# Patient Record
Sex: FEMALE | Race: WHITE | NOT HISPANIC OR LATINO | Employment: OTHER | URBAN - METROPOLITAN AREA
[De-identification: names, ages, dates, MRNs, and addresses within clinical notes are randomized per-mention and may not be internally consistent; named-entity substitution may affect disease eponyms.]

---

## 2017-05-08 ENCOUNTER — OFFICE VISIT (OUTPATIENT)
Dept: UROLOGY | Facility: CLINIC | Age: 67
End: 2017-05-08
Payer: MEDICARE

## 2017-05-08 VITALS
DIASTOLIC BLOOD PRESSURE: 53 MMHG | SYSTOLIC BLOOD PRESSURE: 99 MMHG | TEMPERATURE: 99 F | BODY MASS INDEX: 43.41 KG/M2 | HEIGHT: 63 IN | WEIGHT: 245 LBS | HEART RATE: 69 BPM

## 2017-05-08 DIAGNOSIS — N39.0 URINARY TRACT INFECTION WITHOUT HEMATURIA, SITE UNSPECIFIED: Primary | ICD-10-CM

## 2017-05-08 DIAGNOSIS — R39.15 URINARY URGENCY: ICD-10-CM

## 2017-05-08 DIAGNOSIS — N39.41 URGE INCONTINENCE OF URINE: ICD-10-CM

## 2017-05-08 LAB
BILIRUB SERPL-MCNC: ABNORMAL MG/DL
BLOOD URINE, POC: ABNORMAL
COLOR, POC UA: ABNORMAL
GLUCOSE UR QL STRIP: ABNORMAL
KETONES UR QL STRIP: ABNORMAL
LEUKOCYTE ESTERASE URINE, POC: ABNORMAL
NITRITE, POC UA: ABNORMAL
PH, POC UA: 6
PROTEIN, POC: ABNORMAL
SPECIFIC GRAVITY, POC UA: 1010
UROBILINOGEN, POC UA: ABNORMAL

## 2017-05-08 PROCEDURE — 81001 URINALYSIS AUTO W/SCOPE: CPT | Mod: S$GLB,,, | Performed by: NURSE PRACTITIONER

## 2017-05-08 PROCEDURE — 1160F RVW MEDS BY RX/DR IN RCRD: CPT | Mod: S$GLB,,, | Performed by: NURSE PRACTITIONER

## 2017-05-08 PROCEDURE — 99999 PR PBB SHADOW E&M-NEW PATIENT-LVL V: CPT | Mod: PBBFAC,,, | Performed by: NURSE PRACTITIONER

## 2017-05-08 PROCEDURE — 1159F MED LIST DOCD IN RCRD: CPT | Mod: S$GLB,,, | Performed by: NURSE PRACTITIONER

## 2017-05-08 PROCEDURE — 87077 CULTURE AEROBIC IDENTIFY: CPT

## 2017-05-08 PROCEDURE — 99204 OFFICE O/P NEW MOD 45 MIN: CPT | Mod: 25,S$GLB,, | Performed by: NURSE PRACTITIONER

## 2017-05-08 PROCEDURE — 1125F AMNT PAIN NOTED PAIN PRSNT: CPT | Mod: S$GLB,,, | Performed by: NURSE PRACTITIONER

## 2017-05-08 PROCEDURE — 87086 URINE CULTURE/COLONY COUNT: CPT

## 2017-05-08 PROCEDURE — 87088 URINE BACTERIA CULTURE: CPT

## 2017-05-08 PROCEDURE — 87186 SC STD MICRODIL/AGAR DIL: CPT

## 2017-05-08 RX ORDER — LOSARTAN POTASSIUM AND HYDROCHLOROTHIAZIDE 12.5; 5 MG/1; MG/1
1 TABLET ORAL DAILY
COMMUNITY
Start: 2017-02-13 | End: 2019-12-26

## 2017-05-08 RX ORDER — INSULIN DETEMIR 100 [IU]/ML
65 INJECTION, SOLUTION SUBCUTANEOUS 2 TIMES DAILY
COMMUNITY
Start: 2017-03-10 | End: 2019-12-16 | Stop reason: SDUPTHER

## 2017-05-08 RX ORDER — AMOXICILLIN 500 MG/1
CAPSULE ORAL
COMMUNITY
Start: 2017-02-22 | End: 2017-05-08

## 2017-05-08 RX ORDER — AMOXICILLIN 250 MG/1
CAPSULE ORAL
COMMUNITY
Start: 2017-02-17 | End: 2017-05-08

## 2017-05-08 RX ORDER — HYDROCODONE BITARTRATE AND ACETAMINOPHEN 5; 325 MG/1; MG/1
TABLET ORAL
COMMUNITY
Start: 2017-02-17 | End: 2017-11-16

## 2017-05-08 RX ORDER — FUROSEMIDE 20 MG/1
20 TABLET ORAL
Status: ON HOLD | COMMUNITY
Start: 2017-03-02 | End: 2019-08-07

## 2017-05-08 RX ORDER — FLUOXETINE HYDROCHLORIDE 40 MG/1
40 CAPSULE ORAL DAILY
COMMUNITY
Start: 2017-02-13 | End: 2018-08-07 | Stop reason: DRUGHIGH

## 2017-05-08 RX ORDER — LAMOTRIGINE 100 MG/1
TABLET ORAL
COMMUNITY
Start: 2017-02-16 | End: 2018-04-03 | Stop reason: DRUGHIGH

## 2017-05-08 RX ORDER — LEVOTHYROXINE SODIUM 150 UG/1
150 TABLET ORAL
COMMUNITY
Start: 2017-03-07 | End: 2019-12-26 | Stop reason: SDUPTHER

## 2017-05-08 RX ORDER — TRAZODONE HYDROCHLORIDE 150 MG/1
150 TABLET ORAL NIGHTLY
COMMUNITY
Start: 2017-02-21 | End: 2019-07-29 | Stop reason: CLARIF

## 2017-05-08 RX ORDER — PANTOPRAZOLE SODIUM 40 MG/1
40 TABLET, DELAYED RELEASE ORAL DAILY
COMMUNITY
Start: 2017-02-16 | End: 2019-12-26

## 2017-05-08 RX ORDER — GABAPENTIN 800 MG/1
800 TABLET ORAL DAILY
COMMUNITY
Start: 2017-03-10 | End: 2019-12-26 | Stop reason: SDUPTHER

## 2017-05-08 RX ORDER — DOXYCYCLINE HYCLATE 100 MG
TABLET ORAL
COMMUNITY
Start: 2017-02-23 | End: 2017-05-12

## 2017-05-08 RX ORDER — CALCITRIOL 0.5 UG/1
0.5 CAPSULE ORAL DAILY
COMMUNITY
Start: 2017-03-13 | End: 2020-01-02 | Stop reason: SDUPTHER

## 2017-05-08 RX ORDER — INSULIN ASPART 100 [IU]/ML
INJECTION, SOLUTION INTRAVENOUS; SUBCUTANEOUS
COMMUNITY
Start: 2017-02-10 | End: 2019-12-26 | Stop reason: SDUPTHER

## 2017-05-08 RX ORDER — CHLORHEXIDINE GLUCONATE ORAL RINSE 1.2 MG/ML
SOLUTION DENTAL
COMMUNITY
Start: 2017-03-08 | End: 2017-11-16

## 2017-05-08 RX ORDER — CLONAZEPAM 1 MG/1
TABLET ORAL
COMMUNITY
Start: 2017-02-10 | End: 2018-04-03 | Stop reason: DRUGHIGH

## 2017-05-08 RX ORDER — MECLIZINE HYDROCHLORIDE 25 MG/1
TABLET ORAL 3 TIMES DAILY PRN
COMMUNITY
Start: 2017-03-02 | End: 2018-12-26 | Stop reason: CLARIF

## 2017-05-08 RX ORDER — LINACLOTIDE 145 UG/1
290 CAPSULE, GELATIN COATED ORAL DAILY
COMMUNITY
Start: 2017-03-08 | End: 2019-12-26

## 2017-05-08 RX ORDER — OXYCODONE HYDROCHLORIDE 10 MG/1
10 TABLET ORAL EVERY 8 HOURS PRN
Status: ON HOLD | COMMUNITY
Start: 2017-03-07 | End: 2018-09-19 | Stop reason: HOSPADM

## 2017-05-08 RX ORDER — UMECLIDINIUM BROMIDE AND VILANTEROL TRIFENATATE 62.5; 25 UG/1; UG/1
2 POWDER RESPIRATORY (INHALATION) DAILY
COMMUNITY
Start: 2017-03-09 | End: 2020-04-07 | Stop reason: SDUPTHER

## 2017-05-08 RX ORDER — FLUOXETINE HYDROCHLORIDE 20 MG/1
CAPSULE ORAL
COMMUNITY
Start: 2017-02-20 | End: 2018-04-03 | Stop reason: DRUGHIGH

## 2017-05-08 RX ORDER — ROPINIROLE 4 MG/1
4 TABLET, FILM COATED ORAL 2 TIMES DAILY
COMMUNITY
Start: 2017-03-14 | End: 2019-12-26

## 2017-05-08 RX ORDER — METHOCARBAMOL 500 MG/1
500 TABLET, FILM COATED ORAL 3 TIMES DAILY
COMMUNITY
Start: 2017-03-08 | End: 2019-02-06

## 2017-05-08 NOTE — PROGRESS NOTES
[facility] Note  Staff: STEPHIE MariaP-C    PCP: Dr. Jesus Melendez    Chief Complaint: Urinary Tract infection, urinary frequency, urinary incontinence.    Subjective:        HPI: Alanis Mendieta is a 66 y.o. female new patient with Urology presents with recent history of urinary tract infection, which pt states was +E.Coli. (No documentation upon arrival today for review)    Pt currently resides at [facility] and is alone today during office visit but is able to answer questions related to her medical history.    Questions asked pt during office visit today:  DTF: Yes, NTF: 3 x night  Urgency:Yes, incontinence with urgency? Yes; bothersome Yes; .  Incontinence with laughing or straining: Yes;   If yes, how many pads/day? Yes, wears pads and depends  Feel a bulge?No  G2, P 2, vaginal deliveries with no complications  Gross HematuriaNo  History of UTI: No  Sexually active?: No    REVIEW OF SYSTEMS:  Review of Systems   Constitutional: Negative for chills, diaphoresis, fever and weight loss.   HENT: Negative for congestion, hearing loss, nosebleeds and sore throat.    Eyes: Negative for blurred vision and pain.   Respiratory: Negative for cough and wheezing.    Cardiovascular: Negative for chest pain, palpitations and leg swelling.   Gastrointestinal: Negative for abdominal pain, heartburn, nausea and vomiting.   Genitourinary: Positive for frequency and urgency. Negative for dysuria, flank pain and hematuria.        +Incontinence   Musculoskeletal: Positive for back pain (chronic back pain-in wheelchair). Negative for joint pain, myalgias and neck pain.   Skin: Negative for itching and rash.   Neurological: Negative for dizziness, tremors, sensory change, seizures, loss of consciousness, weakness and headaches.   Endo/Heme/Allergies: Does not bruise/bleed easily.   Psychiatric/Behavioral: Negative for depression and suicidal ideas. The patient is not nervous/anxious.       PMHx:  Past Medical History:   Diagnosis Date    Allergy     Anxiety     Arthritis     Bipolar disorder     Chronic back pain     COPD (chronic obstructive pulmonary disease)     Diabetes mellitus     GERD (gastroesophageal reflux disease)     Hx of thyroid cancer     Hyperlipidemia     Hypertension     Hypothyroidism     Restless leg syndrome     Urinary tract infection      Kidney stones: None    PSHx:  Past Surgical History:   Procedure Laterality Date    CHOLECYSTECTOMY      HYSTERECTOMY      SPINAL FUSION      TOTAL THYROIDECTOMY  2014     Fam Hx:   malignancies: No , gyn malignancies: No . kidney stones: No     Social History     Social History    Marital status:      Spouse name: N/A    Number of children: N/A    Years of education: N/A     Social History Main Topics    Smoking status: Former Smoker     Packs/day: 1.00     Years: 30.00     Types: Cigarettes     Quit date: 5/8/2000    Smokeless tobacco: None    Alcohol use No    Drug use: None    Sexual activity: No     Other Topics Concern    None     Social History Narrative    None     Allergies:  Morpholine analogues and Tubersol [tuberculin ppd]    Medications: reviewed   Anticoagulation: No    Objective:     Vitals:    05/08/17 1331   BP: (!) 99/53   Pulse: 69   Temp: 98.5 °F (36.9 °C)     Physical Exam   Constitutional: She is oriented to person, place, and time. She appears well-developed and well-nourished.   HENT:   Head: Normocephalic and atraumatic.   Eyes: Conjunctivae and EOM are normal. Pupils are equal, round, and reactive to light.   Neck: Normal range of motion. Neck supple.   Cardiovascular: Normal rate, regular rhythm, normal heart sounds and intact distal pulses.    Pulmonary/Chest: Effort normal and breath sounds normal.   Abdominal: Soft. Bowel sounds are normal.   Musculoskeletal: Normal range of motion.   Neurological: She is alert and oriented to person, place, and time. She has normal  reflexes.   Skin: Skin is warm and dry.     Psychiatric: She has a normal mood and affect. Her behavior is normal. Judgment and thought content normal.     LABS REVIEW:  UA today:   Color:Clear, Yellow  Spec. Grav.  1.010  PH  6.0  Trace of Leukocytes    BUN/Cr 01/03/17:  14/0.53  GFR: 115    Assessment:       1. Urinary tract infection without hematuria, site unspecified    2. Urinary urgency    3. Urge incontinence of urine        Plan:     We will send urine for culture testing to rule out any current UTI.  Renal Ultrasound to be performed before next office visit.  Myrbetriq 25 mg one tablet daily prescribed to the patient for her LUTS.    F/u with me in three weeks for pvr scan and recheck.    Trixie Montelongo, STEPHIEP-C

## 2017-05-11 LAB — BACTERIA UR CULT: NORMAL

## 2017-05-12 RX ORDER — DOXYCYCLINE HYCLATE 100 MG
100 TABLET ORAL 2 TIMES DAILY
Qty: 42 TABLET | Refills: 0 | Status: SHIPPED | OUTPATIENT
Start: 2017-05-12 | End: 2017-05-12 | Stop reason: SDUPTHER

## 2017-05-12 RX ORDER — DOXYCYCLINE HYCLATE 100 MG
100 TABLET ORAL 2 TIMES DAILY
Qty: 42 TABLET | Refills: 0 | Status: SHIPPED | OUTPATIENT
Start: 2017-05-12 | End: 2017-06-02

## 2017-05-23 ENCOUNTER — HOSPITAL ENCOUNTER (EMERGENCY)
Facility: HOSPITAL | Age: 67
Discharge: HOME | End: 2017-05-23
Attending: EMERGENCY MEDICINE
Payer: MEDICARE

## 2017-05-23 VITALS
SYSTOLIC BLOOD PRESSURE: 139 MMHG | HEART RATE: 73 BPM | OXYGEN SATURATION: 100 % | RESPIRATION RATE: 18 BRPM | TEMPERATURE: 98 F | BODY MASS INDEX: 40.12 KG/M2 | DIASTOLIC BLOOD PRESSURE: 62 MMHG | HEIGHT: 64 IN | WEIGHT: 235 LBS

## 2017-05-23 DIAGNOSIS — M54.2 CHRONIC CERVICAL PAIN: ICD-10-CM

## 2017-05-23 DIAGNOSIS — S00.03XA CONTUSION OF SCALP, INITIAL ENCOUNTER: Primary | ICD-10-CM
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
## 2017-05-23 DIAGNOSIS — G89.29 CHRONIC CERVICAL PAIN: ICD-10-CM

## 2017-05-23 DIAGNOSIS — N28.9 KIDNEY DISEASE: ICD-10-CM

## 2017-05-23 PROCEDURE — 99284 EMERGENCY DEPT VISIT MOD MDM: CPT

## 2017-05-23 NOTE — ED PROVIDER NOTES
Encounter Date: 5/23/2017       History     Chief Complaint   Patient presents with    Fall     Review of patient's allergies indicates:   Allergen Reactions    Morpholine analogues     Tubersol [tuberculin ppd]      This patient is a 66-year-old female presenting to emergency department from [place] after a fall this morning.  She reports she lost her balance in the bathroom causing her to fall backwards onto the edge of the sink where she hit her head and is now localizing a small area of swelling without broken skin.  She denies any bleeding.  She denies she takes any blood thinners.  She denies losing consciousness or any accompanying confusion, visual or auditory changes, speech changes, difficulty breathing, chest pain, change in strength or sensation in any extremities.  She does report a history of chronic cervical and lumbar back pain and states this has exacerbated her cervical pain.  She additionally reports she scheduled to receive an ultrasound of her kidneys for progressing chronic kidney disease concerns and is requesting to have this here today.          Past Medical History:   Diagnosis Date    Allergy     Anxiety     Arthritis     Bipolar disorder     Chronic back pain     COPD (chronic obstructive pulmonary disease)     Diabetes mellitus     GERD (gastroesophageal reflux disease)     Hx of thyroid cancer     Hyperlipidemia     Hypertension     Hypothyroidism     Restless leg syndrome     Urinary tract infection      Past Surgical History:   Procedure Laterality Date    CHOLECYSTECTOMY      HYSTERECTOMY      SPINAL FUSION      TOTAL THYROIDECTOMY  2014     History reviewed. No pertinent family history.  Social History   Substance Use Topics    Smoking status: Former Smoker     Packs/day: 1.00     Years: 30.00     Types: Cigarettes     Quit date: 5/8/2000    Smokeless tobacco: Not on file    Alcohol use No     Review of Systems   Constitutional: Negative for fatigue.    HENT: Negative for dental problem.    Eyes: Negative for visual disturbance.   Respiratory: Negative for shortness of breath.    Cardiovascular: Negative for chest pain.   Gastrointestinal: Negative for vomiting.   Musculoskeletal: Positive for neck pain.   Skin: Negative for wound.   Neurological: Negative for headaches.   Hematological: Does not bruise/bleed easily.   Psychiatric/Behavioral: Negative for confusion.       Physical Exam     Initial Vitals   BP Pulse Resp Temp SpO2   05/23/17 0836 05/23/17 0836 05/23/17 0836 05/23/17 0839 05/23/17 0836   (!) 155/70 72 18 98.3 °F (36.8 °C) 96 %     Physical Exam    Nursing note and vitals reviewed.  Constitutional: She appears well-developed and well-nourished. No distress.   HENT:   Head: Normocephalic and atraumatic.   Right Ear: External ear normal.   Left Ear: External ear normal.   Nose: Nose normal.   Mouth/Throat: Oropharynx is clear and moist.   Mild area of superficial contusion over the right posterior scalp, no underlying skull depression, no laceration   Eyes: Conjunctivae and EOM are normal.   Neck: Neck supple.   Patient seen in c-collar immobilization, localizing tenderness primarily to the paracervical musculature, no new step-offs, no open lesions   Cardiovascular: Normal rate and regular rhythm.   Pulmonary/Chest: Breath sounds normal. No respiratory distress. She has no wheezes.   Abdominal: Bowel sounds are normal. She exhibits no distension.   Musculoskeletal: Normal range of motion. She exhibits no edema.   Neurological: She is alert and oriented to person, place, and time.   Skin: Skin is warm and dry.   Psychiatric: She has a normal mood and affect. Her behavior is normal. Thought content normal.         ED Course   Procedures  Labs Reviewed - No data to display          Medical Decision Making:   Initial Assessment:   The patient was interviewed and examined and found to be in no acute distress.  She has no focal neurologic deficits or any  severe physical examination stigmata for significant trauma.  She will receive a CT scan of her head to rule out evidence of acute intracranial cold abnormality, as well as a CT scan of the cervical vertebrae to assess for any new fractures in the setting of severe osteoarthritis.  She reports that she has oxycodone-based medications at home for pain but is currently not requesting anything for pain.  She does not warrant a tetanus update this time.  Differential Diagnosis:   DDX include, but are not limited to, acute cervical strain, acute cervical vertebral fracture, skull fracture, subarachnoid hemorrhage, subdural hemorrhage, concussion, contusion, laceration  ED Management:  CT scans of the head and neck are negative for acute concerning findings.  Advanced chronic changes are noted in the CT scan of the neck and the patient was made aware of these.  Ultrasound indicated mild renal cortical thickening, otherwise an unremarkable examination.  On reassessment, the patient denied any worsening symptoms and I do think she is currently stable for discharge.  She will be asked to take her oxycodone-based medications at home and rest over the next few days and use assistance and a walker when needed.  She is asked to return to the ER for any new, concerning, or worsening symptoms and the patient was agreeable with this plan for follow-up and discharged in stable condition per EMS transport back to [place].                    ED Course   Value Comment By Time   US Retroperitoneal Complete (Reviewed) Charles Arias MD 05/23 1007     Clinical Impression:   The primary encounter diagnosis was Contusion of scalp, initial encounter. Diagnoses of Fall from other slipping, tripping, or stumbling, Kidney disease, and Chronic cervical pain were also pertinent to this visit.      Disposition:   Disposition: Discharged  Condition: Stable       Charles Arias MD  05/23/17 5043

## 2017-05-31 ENCOUNTER — TELEPHONE (OUTPATIENT)
Dept: UROLOGY | Facility: CLINIC | Age: 67
End: 2017-05-31

## 2017-05-31 NOTE — TELEPHONE ENCOUNTER
----- Message from Olimpia Malagon sent at 5/31/2017 11:46 AM CDT -----  Contact: Lisa Be called to request clinic notes from the 5/12/17 visit be faxed to    If any questions please call    Thanks

## 2017-05-31 NOTE — TELEPHONE ENCOUNTER
----- Message from Tressa Clark sent at 5/31/2017  2:00 PM CDT -----  Contact: Lisa Gonzalez with [place] - [phone] is saying that she spoke with a Nurse earlier and some information was going to be faxed to her but she has not received anything yet/please fax to [phone] as soon as possible

## 2017-06-12 ENCOUNTER — OFFICE VISIT (OUTPATIENT)
Dept: UROLOGY | Facility: CLINIC | Age: 67
End: 2017-06-12
Payer: MEDICARE

## 2017-06-12 VITALS — DIASTOLIC BLOOD PRESSURE: 66 MMHG | HEART RATE: 72 BPM | SYSTOLIC BLOOD PRESSURE: 115 MMHG | TEMPERATURE: 98 F

## 2017-06-12 DIAGNOSIS — R32 URINARY INCONTINENCE, UNSPECIFIED TYPE: ICD-10-CM

## 2017-06-12 DIAGNOSIS — N39.0 RECURRENT UTI: ICD-10-CM

## 2017-06-12 DIAGNOSIS — N39.0 URINARY TRACT INFECTION WITHOUT HEMATURIA, SITE UNSPECIFIED: Primary | ICD-10-CM

## 2017-06-12 LAB
BILIRUB SERPL-MCNC: ABNORMAL MG/DL
BLOOD URINE, POC: ABNORMAL
COLOR, POC UA: ABNORMAL
GLUCOSE UR QL STRIP: ABNORMAL
KETONES UR QL STRIP: ABNORMAL
LEUKOCYTE ESTERASE URINE, POC: ABNORMAL
NITRITE, POC UA: POSITIVE
PH, POC UA: 8
PROTEIN, POC: ABNORMAL
SPECIFIC GRAVITY, POC UA: 1
UROBILINOGEN, POC UA: ABNORMAL

## 2017-06-12 PROCEDURE — 99213 OFFICE O/P EST LOW 20 MIN: CPT | Mod: 25,S$GLB,, | Performed by: NURSE PRACTITIONER

## 2017-06-12 PROCEDURE — 87086 URINE CULTURE/COLONY COUNT: CPT

## 2017-06-12 PROCEDURE — 81001 URINALYSIS AUTO W/SCOPE: CPT | Mod: S$GLB,,, | Performed by: NURSE PRACTITIONER

## 2017-06-12 PROCEDURE — 99999 PR PBB SHADOW E&M-EST. PATIENT-LVL V: CPT | Mod: PBBFAC,,, | Performed by: NURSE PRACTITIONER

## 2017-06-12 PROCEDURE — 1125F AMNT PAIN NOTED PAIN PRSNT: CPT | Mod: S$GLB,,, | Performed by: NURSE PRACTITIONER

## 2017-06-12 PROCEDURE — 1159F MED LIST DOCD IN RCRD: CPT | Mod: S$GLB,,, | Performed by: NURSE PRACTITIONER

## 2017-06-12 PROCEDURE — 87077 CULTURE AEROBIC IDENTIFY: CPT

## 2017-06-12 PROCEDURE — 51701 INSERT BLADDER CATHETER: CPT | Mod: S$GLB,,, | Performed by: NURSE PRACTITIONER

## 2017-06-12 PROCEDURE — 87088 URINE BACTERIA CULTURE: CPT

## 2017-06-12 PROCEDURE — 87186 SC STD MICRODIL/AGAR DIL: CPT

## 2017-06-12 NOTE — PROGRESS NOTES
[facility] Note  Staff: JOHNNIE Maria      Chief Complaint: Follow-up:  Urinary urgency, frequency, incontinence, hx of recurrent urinary tract infections.    Subjective:        HPI: Alanis Mendieta is a 66 y.o. female presents today for routine recheck.  I initially saw this patient on 05/08/2017 with complaints of recurrent UTIs, (+E.Coli infections), urinary incontinence, urgency and frequency issues.  The pt currently resides at [facility] and is alone today but is able to answer all questions during visit and takes care of herself with no problems.    Questions asked pt during FIRST office visit:  DTF: Yes, NTF: 3 x night  Urgency:Yes, incontinence with urgency? Yes; bothersome Yes; .  Incontinence with laughing or straining: Yes;   If yes, how many pads/day? Yes, wears pads and depends  Feel a bulge?No  G2, P 2, vaginal deliveries with no complications  Gross HematuriaNo  History of UTI: No  Sexually active?: No    Last office visit I started the pt on Myrbetriq 25 mg one tablet daily which has improved her LUTS a little.  As far as UTIs are of concern, she does not remember if she has had another infection since our last visit.    Renal Ultrasound done on 05/23/2017 showed the following:  IMPRESSION:  Findings: The kidneys are normal in size and the right measuring 12.1 x 4.8 x 5.5 cm and the left and 13.2 x 5.0 x 6.4 cm. Resistive indices are minimally elevated at 0.72 on the right and 0.71 on the left, nonspecific finding. Mild cortical thinning is present bilaterally. No stone, mass, or hydronephrosis is identified. The urinary bladder is moderately distended and unremarkable.  IMPRESSION:   Mild renal cortical thinning. Otherwise unremarkable exam.    REVIEW OF SYSTEMS:  Review of Systems   Constitutional: Negative for chills, diaphoresis, fever and weight loss.   HENT: Negative for congestion, hearing loss, nosebleeds and sore throat.    Eyes: Negative  for blurred vision and pain.        Wears glasses   Respiratory: Positive for shortness of breath and wheezing. Negative for cough.         +COPD, On O2 per 2L nasal cannula   Cardiovascular: Negative for chest pain, palpitations and leg swelling.   Gastrointestinal: Negative for abdominal pain, heartburn, nausea and vomiting.   Genitourinary: Positive for dysuria, frequency and urgency. Negative for flank pain and hematuria.        Incontinence +   Musculoskeletal: Negative for back pain, joint pain, myalgias and neck pain.   Skin: Negative for itching and rash.   Neurological: Negative for dizziness, tremors, sensory change, seizures, loss of consciousness, weakness and headaches.   Endo/Heme/Allergies: Does not bruise/bleed easily.   Psychiatric/Behavioral: Negative for depression and suicidal ideas. The patient is not nervous/anxious.      PMHx:  Past Medical History:   Diagnosis Date    Allergy     Anxiety     Arthritis     Bipolar disorder     Chronic back pain     COPD (chronic obstructive pulmonary disease)     Diabetes mellitus     GERD (gastroesophageal reflux disease)     Hx of thyroid cancer     Hyperlipidemia     Hypertension     Hypothyroidism     Restless leg syndrome     Urinary tract infection      Kidney stones: No    PSHx:  Past Surgical History:   Procedure Laterality Date    CHOLECYSTECTOMY      HYSTERECTOMY      SPINAL FUSION      TOTAL THYROIDECTOMY  2014     Social History     Social History    Marital status:      Spouse name: N/A    Number of children: N/A    Years of education: N/A     Social History Main Topics    Smoking status: Former Smoker     Packs/day: 1.00     Years: 30.00     Types: Cigarettes     Quit date: 5/8/2000    Smokeless tobacco: None    Alcohol use No    Drug use: Unknown    Sexual activity: No     Other Topics Concern    None     Social History Narrative    None     Allergies:  Morpholine analogues and Tubersol [tuberculin  ppd]    Medications: reviewed   Anticoagulation: No    Objective:     Vitals:    06/12/17 1352   BP: 115/66   Pulse: 72   Temp: 98.2 °F (36.8 °C)     Physical Exam   Vitals reviewed.  Constitutional: She is oriented to person, place, and time. She appears well-developed and well-nourished.   HENT:   Head: Normocephalic and atraumatic.   Eyes: Conjunctivae and EOM are normal. Pupils are equal, round, and reactive to light.   Neck: Normal range of motion. Neck supple.   Cardiovascular: Normal rate, regular rhythm, normal heart sounds and intact distal pulses.    Pulmonary/Chest: Effort normal. She has wheezes.   Abdominal: Soft. Bowel sounds are normal.   Musculoskeletal:   Limited range of motion with muscle weakness, gets around by wheelchair   Neurological: She is alert and oriented to person, place, and time. She has normal reflexes.   Skin: Skin is warm and dry.     Psychiatric: She has a normal mood and affect. Her behavior is normal. Judgment and thought content normal.     *An in and out catheterized specimen was performed by me today with 80 cc of urine obtained.    LABS REVIEW:  UA today: by catheterized specimen  Color, UA  cloudy yellow   Spec Grav UA 1.005   pH, UA 8   WBC, UA  ++ positive   Nitrite, UA  positive   Protein neg   Glucose, UA neg   Ketones, UA neg   Urobilinogen, UA neg   Bilirubin neg   Blood, UA neg     UCx:   Results for orders placed or performed in visit on 05/08/17   Urine culture   Result Value Ref Range    Urine Culture, Routine       AEROCOCCUS SPECIES  > 100,000 cfu/ml  Susceptibility testing not routinely performed  No other significant isolate       Cr: No results found for: CREATININE    Assessment:       1. Urinary tract infection without hematuria, site unspecified    2. Urinary incontinence, unspecified type    3. Recurrent UTI          Plan:   Recurrent UTIs:  We will send urine for culture testing today.    Urinary incontinence, frequency:  -We will increase her Myrbetriq  to 50 mg one tablet daily.    F/u with Dr. Faith Strong for further evaluation of LUTS.  I believe she will need a urodynamic study in the near future along with cystoscopy if indicated.  Pt verbalized understanding.    [facility]: Roland Montelongo, STEPHIEP-C

## 2017-06-15 LAB — BACTERIA UR CULT: NORMAL

## 2017-06-15 RX ORDER — CIPROFLOXACIN 500 MG/1
500 TABLET ORAL 2 TIMES DAILY
Qty: 42 TABLET | Refills: 0 | Status: SHIPPED | OUTPATIENT
Start: 2017-06-15 | End: 2017-07-06

## 2017-07-13 ENCOUNTER — OFFICE VISIT (OUTPATIENT)
Dept: UROLOGY | Facility: CLINIC | Age: 67
End: 2017-07-13
Payer: MEDICARE

## 2017-07-13 ENCOUNTER — LAB VISIT (OUTPATIENT)
Dept: LAB | Facility: HOSPITAL | Age: 67
End: 2017-07-13
Attending: UROLOGY
Payer: MEDICARE

## 2017-07-13 VITALS
SYSTOLIC BLOOD PRESSURE: 136 MMHG | HEIGHT: 63 IN | RESPIRATION RATE: 18 BRPM | WEIGHT: 250.88 LBS | TEMPERATURE: 98 F | HEART RATE: 90 BPM | BODY MASS INDEX: 44.45 KG/M2 | DIASTOLIC BLOOD PRESSURE: 80 MMHG

## 2017-07-13 DIAGNOSIS — N39.3 SUI (STRESS URINARY INCONTINENCE, FEMALE): ICD-10-CM

## 2017-07-13 DIAGNOSIS — E08.3213 DIABETES MELLITUS DUE TO UNDERLYING CONDITION WITH BOTH EYES AFFECTED BY MILD NONPROLIFERATIVE RETINOPATHY AND MACULAR EDEMA: ICD-10-CM

## 2017-07-13 DIAGNOSIS — N32.81 OAB (OVERACTIVE BLADDER): ICD-10-CM

## 2017-07-13 DIAGNOSIS — N39.0 RECURRENT UTI: ICD-10-CM

## 2017-07-13 DIAGNOSIS — N39.41 URINARY INCONTINENCE, URGE: ICD-10-CM

## 2017-07-13 DIAGNOSIS — N39.0 RECURRENT UTI: Primary | ICD-10-CM

## 2017-07-13 LAB
ALBUMIN SERPL BCP-MCNC: 3.6 G/DL
ALP SERPL-CCNC: 74 U/L
ALT SERPL W/O P-5'-P-CCNC: 19 U/L
ANION GAP SERPL CALC-SCNC: 10 MMOL/L
AST SERPL-CCNC: 20 U/L
BILIRUB SERPL-MCNC: 0.3 MG/DL
BUN SERPL-MCNC: 11 MG/DL
CALCIUM SERPL-MCNC: 9.4 MG/DL
CHLORIDE SERPL-SCNC: 93 MMOL/L
CO2 SERPL-SCNC: 40 MMOL/L
CREAT SERPL-MCNC: 0.8 MG/DL
ERYTHROCYTE [DISTWIDTH] IN BLOOD BY AUTOMATED COUNT: 14.5 %
EST. GFR  (AFRICAN AMERICAN): >60 ML/MIN/1.73 M^2
EST. GFR  (NON AFRICAN AMERICAN): >60 ML/MIN/1.73 M^2
ESTIMATED AVG GLUCOSE: 209 MG/DL
GLUCOSE SERPL-MCNC: 113 MG/DL
HBA1C MFR BLD HPLC: 8.9 %
HCT VFR BLD AUTO: 42.4 %
HGB BLD-MCNC: 14.4 G/DL
MCH RBC QN AUTO: 31.6 PG
MCHC RBC AUTO-ENTMCNC: 33.9 %
MCV RBC AUTO: 93 FL
PLATELET # BLD AUTO: 216 K/UL
PMV BLD AUTO: 8.3 FL
POTASSIUM SERPL-SCNC: 4.3 MMOL/L
PROT SERPL-MCNC: 7.2 G/DL
RBC # BLD AUTO: 4.55 M/UL
SODIUM SERPL-SCNC: 143 MMOL/L
WBC # BLD AUTO: 6.3 K/UL

## 2017-07-13 PROCEDURE — 83036 HEMOGLOBIN GLYCOSYLATED A1C: CPT

## 2017-07-13 PROCEDURE — 51725 SIMPLE CYSTOMETROGRAM: CPT | Mod: S$GLB,,, | Performed by: UROLOGY

## 2017-07-13 PROCEDURE — 85027 COMPLETE CBC AUTOMATED: CPT

## 2017-07-13 PROCEDURE — 99999 PR PBB SHADOW E&M-EST. PATIENT-LVL III: CPT | Mod: PBBFAC,,, | Performed by: UROLOGY

## 2017-07-13 PROCEDURE — 99215 OFFICE O/P EST HI 40 MIN: CPT | Mod: 25,S$GLB,, | Performed by: UROLOGY

## 2017-07-13 PROCEDURE — 1159F MED LIST DOCD IN RCRD: CPT | Mod: S$GLB,,, | Performed by: UROLOGY

## 2017-07-13 PROCEDURE — 1125F AMNT PAIN NOTED PAIN PRSNT: CPT | Mod: S$GLB,,, | Performed by: UROLOGY

## 2017-07-13 PROCEDURE — 36415 COLL VENOUS BLD VENIPUNCTURE: CPT

## 2017-07-13 PROCEDURE — 80053 COMPREHEN METABOLIC PANEL: CPT

## 2017-07-13 RX ORDER — CIPROFLOXACIN 500 MG/1
500 TABLET ORAL 2 TIMES DAILY
Qty: 6 TABLET | Refills: 0 | Status: SHIPPED | OUTPATIENT
Start: 2017-07-13 | End: 2017-07-16

## 2017-07-13 NOTE — PATIENT INSTRUCTIONS
Recurrent uti  - given sheet today  -likey related to pad usage  - given cipro 500 bid x 3d to take for 3 days    Mixed incontinence and OAB  -urge incontinence has improved on myrbetriq 50mg  -still has significant teresa  -not a great surgical candidate for general anesthesia  -recommend if pt wants to proceed and feels that sx are bothersome enough, with coaptite (less successful, but less invasive) vs sling (more invasive, longer lasting results, more risks of post op complications)  -will go ahead and order a cbc, bmp and hba1c today  -will work on getting clearance from  in the meantime    F/u in 1 month to schedule coaptitie vs sling, but likely coaptite

## 2017-07-13 NOTE — PROGRESS NOTES
"[facility] Note - [place]  Staff: MD Ligia    Referring provider and please cc: Christos  PCP: dominic hopkins pt    [facility]: inactive    Chief Complaint: recurrent uti    Subjective:        HPI: Alanis Mendieta is a 66 y.o. female presents with     Answers all questions     -fell in December 2015 and says she had significant incontinence after, but says her incontinence was present for many years before surgery.  +WILLIAM and UUI. had c-spine surgery a year ago and says sx have increased. She states she does not feel the urge to go many times.  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   - 5/8/17 seen by christos for recurrent E.coli uti and incontinence. Nocturia  3x a night. Wears 10-11 depends/day. Was started on myrbetriq 25mg daily. Voided ucx was aerococcus. She says she has asymptomatic utis. Used to be symptomatic.  -6/12/17 seen by monty nunn. States myrbetriq helped "some" and increased to myrbetriq 50mg.  In and out cath was 80cc. ua was nitrite +. ucx with k.pneumoniae.   - returns today stating that her frequency has increased. Prior to the myrbetriq she was going 10x a day, now about 6x a day. Down to 1 pad and 6 or 7 pads a day. Denies any WILLIAM. + constipation (q3 to 4 days and takes Linzess). DM x 17 years.     ECOG Status: 1 - in a wheelchair but can walk with assistance. She is on O2 for copd. Resident of [facility] after c-spine surgery. She's been there for a year and plan is for her to leave.    G3, P 2, vaginal   Gross HematuriaYes - >5 years ago  History of UTI: yes    REVIEW OF SYSTEMS:  General ROS: no fevers, no chills  Psychological ROS: no depression  Endocrine ROS: no heat or cold  Respiratory ROS: + SOB  Cardiovascular ROS: no CP  Gastrointestinal ROS: no abdominal pain, + constipation, no diarrhea, +BRBPR with tearing. Musculoskeletal ROS: no muscle pain  Neurological ROS: no " headaches  Dermatological ROS: no rashes  HEENT: noiglasses, no sinus   ROS: per HPI     PMHx:  Past Medical History:   Diagnosis Date    Allergy     Anxiety     Arthritis     Bipolar disorder     Chronic back pain     COPD (chronic obstructive pulmonary disease)     Diabetes mellitus     GERD (gastroesophageal reflux disease)     Hx of thyroid cancer     Hyperlipidemia     Hypertension     Hypothyroidism     Restless leg syndrome     Urinary tract infection      Kidney stones: No    PSHx:  Past Surgical History:   Procedure Laterality Date    CHOLECYSTECTOMY      HYSTERECTOMY      SPINAL FUSION      TOTAL THYROIDECTOMY       Urologic or Gynecologic Surgery: none    Stents/Valves/Foreign Bodies: No  Cardiac Evaluation: No    Screening Studies  Colonoscopy: yes, last one was a while ago    Fam Hx:   malignancies: No , gyn malignancies: No . mother  a 89, father  a 73  kidney stones: No     Soc Hx:  Former tobacco.  1pk per day x 30 year  No alcohol  Lives in [place]  :  Children: 3  Occupation:previous nurse    Allergies:  Morpholine analogues and Tubersol [tuberculin ppd]    Medications: Myrbetriq, pain medicine  Anticoagulation: No    Objective:     Vitals:    17 0923   BP: 136/80   Pulse: 90   Resp: 18   Temp: 98.4 °F (36.9 °C)       General:WDWN in NAD  Eyes: PERRLA, normal conjunctiva  Respiratory: no increased work on breathing, clear to auscultation  Cardiovascular: regular rate and rhythm. No obvious extremity edema.  GI: no palpation of masses. No tenderness. No hepatosplenomegaly to palpation.  Musculoskeletal: normal range of motion of bilateral upper extremities. Normal muscle strength and tone.  Skin: no obvious rashes or lesions. No tightening of skin noted.  Neurologic: CN grossly normal. Normal sensation.   Psychiatric: awake, alert and oriented x 3. Mood and affect normal. Cooperative.      Pelvic exam today:  negative stress test with  coughing  In and out cath performed with 20 residual  Bladder filled to 150 very slowly  Sensation to void felt at 120cc  Catheter removed  +large volume stress test with coughing but not with valsava  Had pt stand and she had large volume leakage with coughing and valsava  No prolapse  Stool palpable in vault  +severe atrophic vaginitis      LABS REVIEW:  UA voided: clear    UCx:   6/12/17 k.pneumoniae, cath, nitrite +  5/8/17 aerococcus, 100k, voided, tr leuk    Cr: No results found for: CREATININE    PATHOLOGY REVIEW:  none    RADIOGRAPHIC REVIEW:  rbus 5/23/17  No stone  No hydro  Bladder moderately distended      Assessment:       1. Recurrent UTI    2. OAB (overactive bladder)    3. 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 WILLIAM (stress urinary incontinence, female)    5. Urinary incontinence, urge          Plan:     Recurrent uti  - given sheet today  -likey related to pad usage  - given cipro 500 bid x 3d to take for 3 days    Mixed incontinence and OAB  -urge incontinence has improved on myrbetriq 50mg  -still has significant william  -not a great surgical candidate for general anesthesia  -recommend if pt wants to proceed and feels that sx are bothersome enough, with coaptite (less successful, but less invasive) vs sling (more invasive, longer lasting results, more risks of post op complications)  -will go ahead and order a cbc, bmp and hba1c today  -will work on getting clearance from  in the meantime    F/u in 1 month to schedule coaptitie vs sling, but likely coaptite      Faith Strong MD

## 2017-07-27 ENCOUNTER — OFFICE VISIT (OUTPATIENT)
Dept: RHEUMATOLOGY | Facility: CLINIC | Age: 67
End: 2017-07-27
Payer: MEDICARE

## 2017-07-27 VITALS
SYSTOLIC BLOOD PRESSURE: 124 MMHG | DIASTOLIC BLOOD PRESSURE: 67 MMHG | WEIGHT: 240 LBS | BODY MASS INDEX: 42.52 KG/M2 | HEIGHT: 63 IN

## 2017-07-27 DIAGNOSIS — Z79.899 ENCOUNTER FOR LONG-TERM (CURRENT) USE OF MEDICATIONS: Primary | ICD-10-CM

## 2017-07-27 DIAGNOSIS — R76.8 POSITIVE ANA (ANTINUCLEAR ANTIBODY): Primary | ICD-10-CM

## 2017-07-27 LAB
ALBUMIN SERPL-MCNC: 3.8 G/DL (ref 3.1–4.7)
ALP SERPL-CCNC: 61 IU/L (ref 40–104)
ALT (SGPT): 22 IU/L (ref 3–33)
AST SERPL-CCNC: 24 IU/L (ref 10–40)
BASOPHILS NFR BLD: 0 K/UL (ref 0–0.2)
BASOPHILS NFR BLD: 0.3 %
BILIRUB SERPL-MCNC: 0.3 MG/DL (ref 0.3–1)
BUN SERPL-MCNC: 18 MG/DL (ref 8–20)
CALCIUM SERPL-MCNC: 8.9 MG/DL (ref 7.7–10.4)
CHLORIDE: 93 MMOL/L (ref 98–110)
CO2 SERPL-SCNC: 35.6 MMOL/L (ref 22.8–31.6)
CREATININE: 0.81 MG/DL (ref 0.6–1.4)
CRP SERPL-MCNC: 0.99 MG/DL (ref 0–1.4)
EOSINOPHIL NFR BLD: 0.1 K/UL (ref 0–0.7)
EOSINOPHIL NFR BLD: 1.2 %
ERYTHROCYTE [DISTWIDTH] IN BLOOD BY AUTOMATED COUNT: 13.2 % (ref 11.7–14.9)
GLUCOSE: 111 MG/DL (ref 70–99)
GRAN #: 3.7 K/UL (ref 1.4–6.5)
GRAN%: 61.9 %
HCT VFR BLD AUTO: 42.8 % (ref 36–48)
HGB BLD-MCNC: 14.4 G/DL (ref 12–15)
IMMATURE GRANS (ABS): 0 K/UL (ref 0–1)
IMMATURE GRANULOCYTES: 0.5 %
LYMPH #: 1.8 K/UL (ref 1.2–3.4)
LYMPH%: 30.7 %
MCH RBC QN AUTO: 31.6 PG (ref 25–35)
MCHC RBC AUTO-ENTMCNC: 33.6 G/DL (ref 31–36)
MCV RBC AUTO: 93.9 FL (ref 79–98)
MONO #: 0.3 K/UL (ref 0.1–0.6)
MONO%: 5.4 %
NUCLEATED RBCS: 0 %
PLATELET # BLD AUTO: 233 K/UL (ref 140–440)
PMV BLD AUTO: 10.6 FL (ref 8.8–12.7)
POTASSIUM SERPL-SCNC: 3.7 MMOL/L (ref 3.5–5)
PROT SERPL-MCNC: 7.1 G/DL (ref 6–8.2)
RBC # BLD AUTO: 4.56 M/UL (ref 3.5–5.5)
SODIUM: 140 MMOL/L (ref 134–144)
URATE SERPL-MCNC: 5.2 MG/DL (ref 2.6–7.8)
WBC # BLD AUTO: 6 K/UL (ref 5–10)

## 2017-07-27 PROCEDURE — 99204 OFFICE O/P NEW MOD 45 MIN: CPT | Mod: ,,, | Performed by: INTERNAL MEDICINE

## 2017-07-27 PROCEDURE — 1159F MED LIST DOCD IN RCRD: CPT | Mod: ,,, | Performed by: INTERNAL MEDICINE

## 2017-07-27 PROCEDURE — 1125F AMNT PAIN NOTED PAIN PRSNT: CPT | Mod: ,,, | Performed by: INTERNAL MEDICINE

## 2017-07-27 NOTE — PROGRESS NOTES
[facility] RHEUMATOLOGY        NEW PATIENT      Subjective:       Patient ID:   NAME: Alanis Mendieta : 1950     66 y.o. female    Referring Doc: No ref. provider found  Other Physicians:    Chief Complaint:  new pt (pt. had an Positive LEIGH)      History of Present Illness:     New patient referred for positive LEIGH. Test was done by her psychiatry who she sees for Bipolar Disorder\  Fatigue for yrs,hair thinning,no photosensitivity  Dry eyes and dry mouth for 2 yrs.  No mucosal ulceration.  No hx renal or hepatic disorders  No hx thromboembolic episodes,one miscarriage at first trimester          ROS:   GEN: no fevers night sweats or significant weight changes   + fatigue for last few yrs.Interrupted sleep and hx sleep apnea but she is not compliant with CPAP( she does not have one yet)  HEENT:  HA's from neck to occiput for 6 months changes in vision , no mouth ulcers, + sicca symptoms, no scalp tenderness, jaw claudication  CV: no CP, SOB, PND, CRUZ or orthopnea,no palpitations  PULM:n+SOB and  Cough due to COPD ( quit smoking in )No hemoptysis, sputum or pleuritic pain  GI: no abdominal pain, nausea, vomiting,+constipation, No diarrhea, melanotic stools, BRBPR, or hematemesis, + dysphagia  : no hematuria, dysuria  NEURO: paresthesias in hands due to CTS, headaches, visual disturbances, muscle weakness  SKIN:  no rashes , erythema, bruising, or swelling, no Raynauds, no photosensitivity  MUSCULOSKELETAL:no joint swelling except occ L knee, no prolonged AM stiffness, + back pain   PSYCH:    Insomnia,   depression,  anxiety    Medications:    Current Outpatient Prescriptions:     ANORO ELLIPTA 62.5-25 mcg/actuation DsDv, , Disp: , Rfl:     calcitRIOL (ROCALTROL) 0.5 MCG Cap, , Disp: , Rfl:     clonazePAM (KLONOPIN) 1 MG tablet, , Disp: , Rfl:     furosemide (LASIX) 20 MG tablet, , Disp: , Rfl:     gabapentin (NEURONTIN) 800 MG tablet, , Disp: , Rfl:     hydrocodone-acetaminophen 5-325mg  "(NORCO) 5-325 mg per tablet, , Disp: , Rfl:     lamotrigine (LAMICTAL) 100 MG tablet, , Disp: , Rfl:     LEVEMIR FLEXTOUCH 100 unit/mL (3 mL) InPn pen, , Disp: , Rfl:     levothyroxine (SYNTHROID) 150 MCG tablet, , Disp: , Rfl:     LINZESS 145 mcg Cap capsule, , Disp: , Rfl:     losartan-hydrochlorothiazide 50-12.5 mg (HYZAAR) 50-12.5 mg per tablet, , Disp: , Rfl:     meclizine (ANTIVERT) 25 mg tablet, , Disp: , Rfl:     methocarbamol (ROBAXIN) 500 MG Tab, , Disp: , Rfl:     mirabegron (MYRBETRIQ) 50 mg Tb24, Take 1 tablet (50 mg total) by mouth once daily., Disp: 30 tablet, Rfl: 11    NOVOLOG FLEXPEN 100 unit/mL InPn pen, , Disp: , Rfl:     oxycodone (ROXICODONE) 10 mg Tab immediate release tablet, , Disp: , Rfl:     pantoprazole (PROTONIX) 40 MG tablet, , Disp: , Rfl:     ropinirole (REQUIP) 4 MG tablet, , Disp: , Rfl:     chlorhexidine (PERIDEX) 0.12 % solution, , Disp: , Rfl:     fluoxetine (PROZAC) 20 MG capsule, , Disp: , Rfl:     fluoxetine (PROZAC) 40 MG capsule, , Disp: , Rfl:     trazodone (DESYREL) 150 MG tablet, , Disp: , Rfl:    FAMILY HISTORY: negative for Connective Tissue Disease      PAST MEDICAL HISTORY:Thyroid CA,Bipolar Disorder,DM,HTN,OA,Hypothyroidism    PAST SURGICAL HISTORY:Thyroidectomy,Cervical Fusion.Lumbar surgery    SOCIAL HISTORY:lives at [place] for one yr.    ALLERGIES:NKDA except Morphine makes her itch          Objective:     Vitals:  Blood pressure 124/67, height 5' 3" (1.6 m), weight 108.9 kg (240 lb).    Physical Examination:   GEN: no apparent distress, comfortable; AAOx3  SKIN: no rashes, no lesions, no sclerodactyly or induration, no Raynaud's, no periungual erythema  HEAD: normal  EYES: no pallor, no icterus, PERRLA  ENT:  no thrush,no mucosal dryness or ulcerations  NECK: no masses, thyroid normal, trachea midline, no LAD/LN's, supple  CV:   S1 and S2 regular, no murmurs, gallop or rubs  CHEST: Normal respiratory effort;  normal breath sounds; no rubs, no " wheezes, no crackles.   ABDOM: nontender and nondistended; soft; ; no rebound/guarding,no masses  MUSC/Skeletal: ROM normal; no crepitus; joints without synovitis, no deformities   EXTREM: no clubbing, cyanosis, edema, normal pulses.  NEURO: grossly intact; motorWNL; AAOx3; no tremors  PSYCH: normal mood, affect and behavior  LYMPH: normal cervical, supraclavicular            Labs:   @RESUFAST(WBC,HGB,HCT,MCV,PLT)  )@RESUFAST(NA,K,CL,CO2,GLU,BUN,Creatinine,Calcium,PROT,Albumin,Bilitot,Alkphos,AST,ALT,LEIGH,Sed Rate,CRP,RF,CCP)      Radiology/Diagnostic Studies:    I have reviewed all available labs and XRay reports    Assessment/Plan:   66 y.o. female with hx of pos LEIGH,OA,Hx Bipolar Disorder        PLAN:LEIGH panel,complements,CBC,CMP,UA,RF,Hepatitis serology  FU in 2 wks          Discussion:     I have explained all of the above in detail and the patient understands all of the current recommendation(s). I have answered all of their questions to the best of my ability and to their complete satisfaction.      I have reviewed the risks and benefits of the medication in detail with patient, who understands and wishes to proceed. Printed information regarding the disease and/or medication was also provided.        RTC        Electronically signed by Keegan Beverly MD

## 2017-07-29 LAB
C3 SERPL-MCNC: 178 MG/DL (ref 82–167)
C4 NEF SERPL-MCNC: 25 MG/DL (ref 14–44)
HCV AB SERPL QL IA: 0.1 S/CO RATIO (ref 0–0.9)

## 2017-07-31 ENCOUNTER — TELEPHONE (OUTPATIENT)
Dept: RHEUMATOLOGY | Facility: CLINIC | Age: 67
End: 2017-07-31

## 2017-07-31 LAB
ANA SER-ACNC: NEGATIVE
DSDNA AB SER IA-ACNC: 12 IU/ML (ref 0–9)
RNP ANTIBODY: 0.5 AI (ref 0–0.9)
SCL-70 ANTIBODY: <0.2 AI (ref 0–0.9)
SMITH AB: <0.2 AI (ref 0–0.9)

## 2017-07-31 NOTE — TELEPHONE ENCOUNTER
Patient notified of UTI. Pt not allergic to augmentin/pcn.  Pt lives in [facility]. Called [phone] spoke to Shirley Carpenter pts nurse gave rx order for augmentin 875 mg one bid for 5 days no refills. (rx's are filled by [facility] pharmacy)

## 2017-08-10 ENCOUNTER — OFFICE VISIT (OUTPATIENT)
Dept: RHEUMATOLOGY | Facility: CLINIC | Age: 67
End: 2017-08-10
Payer: MEDICARE

## 2017-08-10 VITALS
SYSTOLIC BLOOD PRESSURE: 118 MMHG | DIASTOLIC BLOOD PRESSURE: 69 MMHG | BODY MASS INDEX: 42.52 KG/M2 | HEIGHT: 63 IN | WEIGHT: 240 LBS

## 2017-08-10 DIAGNOSIS — M15.9 OSTEOARTHRITIS, GENERALIZED: Primary | ICD-10-CM

## 2017-08-10 PROCEDURE — 99213 OFFICE O/P EST LOW 20 MIN: CPT | Mod: ,,, | Performed by: INTERNAL MEDICINE

## 2017-08-10 PROCEDURE — 3008F BODY MASS INDEX DOCD: CPT | Mod: ,,, | Performed by: INTERNAL MEDICINE

## 2017-08-10 PROCEDURE — 1159F MED LIST DOCD IN RCRD: CPT | Mod: ,,, | Performed by: INTERNAL MEDICINE

## 2017-08-10 RX ORDER — INSULIN ADMIN. SUPPLIES
INSULIN PEN (EA) SUBCUTANEOUS
COMMUNITY
Start: 2017-08-08 | End: 2018-12-26 | Stop reason: CLARIF

## 2017-08-10 RX ORDER — LAMOTRIGINE 150 MG/1
TABLET ORAL
COMMUNITY
Start: 2017-07-28 | End: 2017-08-10

## 2017-08-10 RX ORDER — DULOXETIN HYDROCHLORIDE 30 MG/1
CAPSULE, DELAYED RELEASE ORAL
COMMUNITY
Start: 2017-07-28 | End: 2018-04-03 | Stop reason: DRUGHIGH

## 2017-08-10 RX ORDER — AMOXICILLIN AND CLAVULANATE POTASSIUM 875; 125 MG/1; MG/1
TABLET, FILM COATED ORAL
COMMUNITY
Start: 2017-07-31 | End: 2017-11-16

## 2017-08-10 NOTE — PROGRESS NOTES
[facility] RHEUMATOLOGY            PROGRESS NOTE      Subjective:       Patient ID:   NAME: Alanis Mendieta : 1950     66 y.o. female    Referring Doc: No ref. provider found  Other Physicians:    Chief Complaint:  Positive LEIGH (lab results, no energy, increased pain /lower back pain, legs, arms)      History of Present Illness:     Patient returns today for a regularly scheduled follow-up visit forPositive LEIGH.       The patient has no new symptoms since last office visit no complaints except pain PIP and DIP joints. Occasional generalized myalgias. No chest pains, no cough, no shortness of breath            ROS:   GEN:  No  fever, night sweats . weight is stable   + fatigue  SKIN: no rashes, no bruising, no ulcerations, no Raynaud's  HEENT: no HA's, No visual changes, no mucosal ulcers, no sicca symptoms,  CV:   no CP, SOB, PND, CRUZ, no orthopnea, no palpitations  PULM: normal with no SOB, cough, hemoptysis, sputum or pleuritic pain  GI:  no abdominal pain, nausea, vomiting, constipation, diarrhea, melanotic stools, BRBPR, hematemesis, no dysphagia  :   no dysuria  NEURO: no paresthesias, headaches, visual disturbances, muscle weakness  MUSCULOSKELETAL:no joint swelling, prolonged AM stiffness, no back pain, + muscle pain  Allergies:  Review of patient's allergies indicates:   Allergen Reactions    Morpholine analogues     Tubersol [tuberculin ppd]        Medications:    Current Outpatient Prescriptions:     amoxicillin-clavulanate 875-125mg (AUGMENTIN) 875-125 mg per tablet, , Disp: , Rfl:     ANORO ELLIPTA 62.5-25 mcg/actuation DsDv, , Disp: , Rfl:     calcitRIOL (ROCALTROL) 0.5 MCG Cap, , Disp: , Rfl:     chlorhexidine (PERIDEX) 0.12 % solution, , Disp: , Rfl:     clonazePAM (KLONOPIN) 1 MG tablet, , Disp: , Rfl:     duloxetine (CYMBALTA) 30 MG capsule, , Disp: , Rfl:     fluoxetine (PROZAC) 40 MG capsule, , Disp: , Rfl:     furosemide (LASIX) 20 MG tablet, , Disp: , Rfl:      "gabapentin (NEURONTIN) 800 MG tablet, , Disp: , Rfl:     lamotrigine (LAMICTAL) 100 MG tablet, , Disp: , Rfl:     LEVEMIR FLEXTOUCH 100 unit/mL (3 mL) InPn pen, , Disp: , Rfl:     levothyroxine (SYNTHROID) 150 MCG tablet, , Disp: , Rfl:     LINZESS 145 mcg Cap capsule, , Disp: , Rfl:     losartan-hydrochlorothiazide 50-12.5 mg (HYZAAR) 50-12.5 mg per tablet, , Disp: , Rfl:     meclizine (ANTIVERT) 25 mg tablet, , Disp: , Rfl:     methocarbamol (ROBAXIN) 500 MG Tab, , Disp: , Rfl:     mirabegron (MYRBETRIQ) 50 mg Tb24, Take 1 tablet (50 mg total) by mouth once daily., Disp: 30 tablet, Rfl: 11    NOVOFINE AUTOCOVER 30 gauge x 1/3" Ndle, , Disp: , Rfl:     NOVOLOG FLEXPEN 100 unit/mL InPn pen, , Disp: , Rfl:     oxycodone (ROXICODONE) 10 mg Tab immediate release tablet, , Disp: , Rfl:     pantoprazole (PROTONIX) 40 MG tablet, , Disp: , Rfl:     ropinirole (REQUIP) 4 MG tablet, , Disp: , Rfl:     trazodone (DESYREL) 150 MG tablet, , Disp: , Rfl:     fluoxetine (PROZAC) 20 MG capsule, , Disp: , Rfl:     hydrocodone-acetaminophen 5-325mg (NORCO) 5-325 mg per tablet, , Disp: , Rfl:     PMHx/PSHx Updates:    Objective:     Vitals:  Blood pressure 118/69, height 5' 3" (1.6 m), weight 108.9 kg (240 lb).    Physical Examination:   GEN: no apparent distress, comfortable; AAOx3  SKIN: no rashes,no ulceration, no Raynaud's, no petechiae, no SQ nodules,  HEAD: normal  EYES: no pallor, no icterus,   NECK: no masses, thyroid normal, trachea midline, no LAD/LN's, supple  CV: RRR with no murmur; l S1 and S2 reg. ,no gallop no rubs,   CHEST: Normal respiratory effort; CTAB; normal breath sounds; no wheeze or crackles  MUSC/Skeletal: ROM normal; no crepitus; joints without synovitis,  no deformities  No joint swelling or tenderness of PIP, MCP, wrist, elbow, shoulder, or knee joints  EXTREM: no clubbing, cyanosis, no edema,normal  pulses   NEURO: grossly intact; motor WNL; AAOx3; , PSYCH: normal mood, affect and " behavior  LYMPH: normal cervical, supraclavicular          Labs:   Lab Results   Component Value Date    WBC 6.0 07/27/2017    HGB 14.4 07/27/2017    HCT 42.8 07/27/2017    MCV 93.9 07/27/2017     07/27/2017    CMP  @LASTLAB(NA,K,CL,CO2,GLU,BUN,Creatinine,Calcium,PROT,Albumin,Bilitot,Alkphos,AST,ALT,CRP,ESR,RF,CCP,LEIGH,SSA,CPK,uric acid) )@  I have reviewed all available lab results and radiology reports.    Radiology/Diagnostic Studies:        Assessment/Plan:   (1) 66 y.o. female with diagnosis of Osteoarthritis.  Repeated LEIGH is  negative with a negative profile except for a slightly positive double-stranded DNA. Complement levels were not decreased CBC CMP, UA, hepatitis serology were all negative; rheumatoid factor was negative as well. Inflammatory markers were within normal range  I do not believe she has systemic lupus. We will see her back in 3 months and repeat serology at that time.                  Discussion:     I have explained all of the above in detail and the patient understands all of the current recommendation(s). I have answered all questions to the best of my ability and to their complete satisfaction.       The patient is to continue with the current management plan         RTC in   3 months      Electronically signed by Keegan Beverly MD

## 2017-08-14 ENCOUNTER — TELEPHONE (OUTPATIENT)
Dept: UROGYNECOLOGY | Facility: CLINIC | Age: 67
End: 2017-08-14

## 2017-08-14 ENCOUNTER — OFFICE VISIT (OUTPATIENT)
Dept: UROLOGY | Facility: CLINIC | Age: 67
End: 2017-08-14
Payer: MEDICARE

## 2017-08-14 VITALS
HEIGHT: 63 IN | SYSTOLIC BLOOD PRESSURE: 124 MMHG | HEART RATE: 93 BPM | WEIGHT: 251 LBS | TEMPERATURE: 99 F | BODY MASS INDEX: 44.47 KG/M2 | DIASTOLIC BLOOD PRESSURE: 73 MMHG

## 2017-08-14 DIAGNOSIS — N39.46 MIXED INCONTINENCE: ICD-10-CM

## 2017-08-14 DIAGNOSIS — N39.0 RECURRENT UTI: Primary | ICD-10-CM

## 2017-08-14 PROCEDURE — 87086 URINE CULTURE/COLONY COUNT: CPT

## 2017-08-14 PROCEDURE — 87088 URINE BACTERIA CULTURE: CPT

## 2017-08-14 PROCEDURE — 3008F BODY MASS INDEX DOCD: CPT | Mod: ,,, | Performed by: UROLOGY

## 2017-08-14 PROCEDURE — 87077 CULTURE AEROBIC IDENTIFY: CPT

## 2017-08-14 PROCEDURE — 99213 OFFICE O/P EST LOW 20 MIN: CPT | Mod: S$GLB,,, | Performed by: UROLOGY

## 2017-08-14 PROCEDURE — 87186 SC STD MICRODIL/AGAR DIL: CPT

## 2017-08-14 PROCEDURE — 99999 PR PBB SHADOW E&M-EST. PATIENT-LVL III: CPT | Mod: PBBFAC,,, | Performed by: UROLOGY

## 2017-08-14 PROCEDURE — 1159F MED LIST DOCD IN RCRD: CPT | Mod: ,,, | Performed by: UROLOGY

## 2017-08-14 PROCEDURE — 1125F AMNT PAIN NOTED PAIN PRSNT: CPT | Mod: ,,, | Performed by: UROLOGY

## 2017-08-14 RX ORDER — OXYBUTYNIN CHLORIDE 10 MG/1
10 TABLET, EXTENDED RELEASE ORAL DAILY
Qty: 30 TABLET | Refills: 11 | Status: SHIPPED | OUTPATIENT
Start: 2017-08-14 | End: 2018-03-23 | Stop reason: ALTCHOICE

## 2017-08-14 RX ORDER — DOCUSATE SODIUM 100 MG/1
100 CAPSULE, LIQUID FILLED ORAL 3 TIMES DAILY PRN
Qty: 100 CAPSULE | Refills: 0 | Status: SHIPPED | OUTPATIENT
Start: 2017-08-14 | End: 2017-08-24

## 2017-08-14 NOTE — TELEPHONE ENCOUNTER
Spoke with patient assisted with scheduling Pessary Appointment 9/12/17 @ 7403. Date time and location of appt confirmed

## 2017-08-14 NOTE — TELEPHONE ENCOUNTER
Attempted to contact patient in regards to consult appt for pessary appt. No answer left message with call back number provided

## 2017-08-14 NOTE — TELEPHONE ENCOUNTER
Attempted to contact in regards to consult appt per Dr wylie for a pessary. No answer left message with call back number

## 2017-08-14 NOTE — PATIENT INSTRUCTIONS
Recurrent uti  -send urine for culture but will hold off on treating infection  -pt does hba1c.     Mixed incontinence and OAB  -urge incontinence has improved some on myrbetriq 50mg, still has significant teresa  -not a great surgical candidate for general anesthesia, will go ahead and refer to urogynecology for pessary. Before any procedure ever done would need control of diabetes (hba1c 8.9)  -if she has no improvement with pessary will plan for uds and schedule uds for 3 months with pessary for now with a catheterized culture and urinalysis 10 days prior at [facility].   -add ditropan 10mg XL once a day and continue myrbetriq 50mg daily. But can cause constipation.  Increase colace to twice a a day.   -change to decaf everything    F/u 3 months for uds  Refer to urogynecology for pessary

## 2017-08-14 NOTE — TELEPHONE ENCOUNTER
Attempted to contact patient in regards to appt  For pessary no answer left message with call back number

## 2017-08-14 NOTE — TELEPHONE ENCOUNTER
----- Message from Faith Strong MD sent at 8/14/2017 11:13 AM CDT -----  Please make f/u appt pessary

## 2017-08-15 LAB
BILIRUB SERPL-MCNC: ABNORMAL MG/DL
BLOOD URINE, POC: ABNORMAL
COLOR, POC UA: ABNORMAL
GLUCOSE UR QL STRIP: ABNORMAL
KETONES UR QL STRIP: ABNORMAL
LEUKOCYTE ESTERASE URINE, POC: ABNORMAL
NITRITE, POC UA: ABNORMAL
PH, POC UA: 6
PROTEIN, POC: ABNORMAL
SPECIFIC GRAVITY, POC UA: 1015
UROBILINOGEN, POC UA: ABNORMAL

## 2017-08-15 NOTE — TELEPHONE ENCOUNTER
Attempted to contact patient in regards to moving up pessary appointment with Debbie galan to sooner date.  no answer left message with callback number.

## 2017-08-15 NOTE — TELEPHONE ENCOUNTER
Attempted to contact patient in regards to scheduling pessary appointment sooner with STEFAN Jacobson. No answer left message with call back number

## 2017-08-17 LAB — BACTERIA UR CULT: NORMAL

## 2017-09-12 ENCOUNTER — OFFICE VISIT (OUTPATIENT)
Dept: UROGYNECOLOGY | Facility: CLINIC | Age: 67
End: 2017-09-12
Payer: MEDICARE

## 2017-09-12 VITALS
TEMPERATURE: 98 F | WEIGHT: 247.56 LBS | HEART RATE: 84 BPM | HEIGHT: 63 IN | BODY MASS INDEX: 43.86 KG/M2 | SYSTOLIC BLOOD PRESSURE: 100 MMHG | DIASTOLIC BLOOD PRESSURE: 62 MMHG

## 2017-09-12 DIAGNOSIS — N81.11 MIDLINE CYSTOCELE: ICD-10-CM

## 2017-09-12 DIAGNOSIS — N39.46 MIXED INCONTINENCE URGE AND STRESS: Primary | ICD-10-CM

## 2017-09-12 DIAGNOSIS — N81.6 RECTOCELE: ICD-10-CM

## 2017-09-12 DIAGNOSIS — N95.2 ATROPHIC VAGINITIS: ICD-10-CM

## 2017-09-12 PROCEDURE — 3008F BODY MASS INDEX DOCD: CPT | Mod: S$GLB,,, | Performed by: OBSTETRICS & GYNECOLOGY

## 2017-09-12 PROCEDURE — 99999 PR PBB SHADOW E&M-EST. PATIENT-LVL III: CPT | Mod: PBBFAC,,, | Performed by: OBSTETRICS & GYNECOLOGY

## 2017-09-12 PROCEDURE — 57160 INSERT PESSARY/OTHER DEVICE: CPT | Mod: S$GLB,,, | Performed by: OBSTETRICS & GYNECOLOGY

## 2017-09-12 PROCEDURE — 99213 OFFICE O/P EST LOW 20 MIN: CPT | Mod: 25,S$GLB,, | Performed by: OBSTETRICS & GYNECOLOGY

## 2017-09-12 PROCEDURE — 1159F MED LIST DOCD IN RCRD: CPT | Mod: S$GLB,,, | Performed by: OBSTETRICS & GYNECOLOGY

## 2017-09-12 PROCEDURE — 1125F AMNT PAIN NOTED PAIN PRSNT: CPT | Mod: S$GLB,,, | Performed by: OBSTETRICS & GYNECOLOGY

## 2017-09-12 NOTE — PROGRESS NOTES
Subjective:     Chief Complaint: pelvic relaxation  History of Present Illness: Alanis Mendieta is a 66 y.o. female patient of Dr. Strong who presents for pelvic relaxation.  Referred for pessary management.  Has mixed incontinence presently on medications with some improvement and urodynamics pending.  She complains of pressure and vaginal bulge.  She lives at a nursing home.  She doesn't think she can place and remove the pessary herself because of physical limitations.    Review of Systems    Constitutional: DM  Eyes: No vision changes.  ENT: No headaches.   Respiratory: COPD  Cardiovascular:  Hypertension  Gastrointestinal: reflux   Genitourinary: No vaginal bleeding or discharge.  Integument/Breast: Negative  Hematologic/Lymphatic: No history of anemia.  Musculoskeletal: Spinal fusion  Neurological: lumbar disc problems.RLS   Behavioral/Psych: Bipolar  Endocrine: Thyroid CA  Allergy/Immune: no recent reactions     Objective:   General Exam:  General appearance: WDNF. NAD.   HEENT: Leyla. EOM's intact.  Neck: Normal thyroid.   Back: No CVA tenderness.  RESP: No SOB.  Breasts: deferred  Abdomen: Benign without localizing signs.  Extremities: No edema. No varices.  Lymphatic: noncontributory  Skin: No rashes. No lesions.  Neurologic: Intact.   Psych: Oriented.   Pelvic Exam:  V:  No lesions. No palpable nodes.   Va:No d/c bleeding or lesions.  Atrophic.  Dominant apical rectocele.Bp=0.  Midline cystocele Ba-1   .Meatus:No caruncle or stenosis  Urethra: Non tender. No suburethral masses.  Cx/Cuff: Good cuff support  Uterus: Surgically absent.  Ad: No mass or tenderness.  Levators :Symmetrical. Normal tone. Non tender.  Minimal contractions  BL: Non tender  RV: No hemorrhoids.  Assessment:   OAB responding to medication  Pelvic relaxation and atrophy     Procedure note; pessary fitting; #3 ring pessary was placed; seemed to fit fairly well; she ambulated in the room and squatted with no displacement;  permanent #3 ring pessary was placed  Plan:    She is moving from nursing home to living by herself and says she cannot manage the pessary herself.  We will allow her to wear it for about 2 weeks and if it does okay and it improves her symptoms, we will see if she can afford the Estring or Femring.  If it does not improve her symptoms she probably be better off just observing for worsening of her prolapse

## 2017-09-12 NOTE — LETTER
September 12, 2017      Faith Strong MD  [address]  [address]  [place] [address]           [place] - Urogynecology  [address], [address]  [place] [address]  Phone: [phone]  Fax: [phone]          Patient: Alanis Mendieta   MR Number: [id number]   YOB: 1950   Date of Visit: 9/12/2017       Dear Dr. Faith Strong:    Thank you for referring Alanis Mendieta to me for evaluation. Attached you will find relevant portions of my assessment and plan of care.    If you have questions, please do not hesitate to call me. I look forward to following Alanis Mendieta along with you.    Sincerely,    Hardy Russell MD    Enclosure  CC:  No Recipients    If you would like to receive this communication electronically, please contact [email or web address] or [phone] to request more information on [employer] access.    For providers and/or their staff who would like to refer a patient to [facility], please contact us through our one-stop-shop provider referral line, [facility], at [phone].    If you feel you have received this communication in error or would no longer like to receive these types of communications, please e-mail [email or web address]

## 2017-09-14 ENCOUNTER — OFFICE VISIT (OUTPATIENT)
Dept: UROGYNECOLOGY | Facility: CLINIC | Age: 67
End: 2017-09-14
Payer: MEDICARE

## 2017-09-14 VITALS
WEIGHT: 247.56 LBS | TEMPERATURE: 99 F | HEART RATE: 87 BPM | SYSTOLIC BLOOD PRESSURE: 128 MMHG | HEIGHT: 63 IN | BODY MASS INDEX: 43.86 KG/M2 | DIASTOLIC BLOOD PRESSURE: 66 MMHG

## 2017-09-14 DIAGNOSIS — N81.11 MIDLINE CYSTOCELE: ICD-10-CM

## 2017-09-14 DIAGNOSIS — N39.0 URINARY TRACT INFECTION WITHOUT HEMATURIA, SITE UNSPECIFIED: Primary | ICD-10-CM

## 2017-09-14 DIAGNOSIS — N39.46 MIXED INCONTINENCE URGE AND STRESS: ICD-10-CM

## 2017-09-14 DIAGNOSIS — N81.6 RECTOCELE: ICD-10-CM

## 2017-09-14 DIAGNOSIS — N95.2 ATROPHIC VAGINITIS: ICD-10-CM

## 2017-09-14 PROCEDURE — 3008F BODY MASS INDEX DOCD: CPT | Mod: S$GLB,,, | Performed by: NURSE PRACTITIONER

## 2017-09-14 PROCEDURE — 1159F MED LIST DOCD IN RCRD: CPT | Mod: S$GLB,,, | Performed by: NURSE PRACTITIONER

## 2017-09-14 PROCEDURE — 99999 PR PBB SHADOW E&M-EST. PATIENT-LVL V: CPT | Mod: PBBFAC,,, | Performed by: NURSE PRACTITIONER

## 2017-09-14 PROCEDURE — 99213 OFFICE O/P EST LOW 20 MIN: CPT | Mod: S$GLB,,, | Performed by: NURSE PRACTITIONER

## 2017-09-14 PROCEDURE — 1126F AMNT PAIN NOTED NONE PRSNT: CPT | Mod: S$GLB,,, | Performed by: NURSE PRACTITIONER

## 2017-09-14 NOTE — PROGRESS NOTES
Subjective:       Patient ID: Alanis Mendieta is a 66 y.o. female.    Chief Complaint: Pessary Check    HPI  Alanis Mendieta is a 66 y.o. female who presents today because her pessary fell out.  She had a #3 ring pessary placed by Dr. huynh on 09/12/17.  She was having to push at times to completely empty her bladder and she is not sure if this dislodged the pessary.  She does not wish to try another pessary at this time.  She is wanting surgical intervention.  She has multiple medical problems including COPD for which she wears oxygen, DM and back problems.  She is going next week for a trial of an implantable morphine pump.  She has been having some lower abdominal tenderness for the past month as well.  She denies any dysuria, N/V/D, fevers or changes in her urination.  She is wondering if she might have a UTI    Review of Systems   Constitutional: Negative for activity change, fever and unexpected weight change.   HENT: Negative for hearing loss.    Eyes: Negative for visual disturbance.   Respiratory: Negative for shortness of breath and wheezing.    Cardiovascular: Negative for chest pain, palpitations and leg swelling.   Gastrointestinal: Negative for abdominal pain, constipation and diarrhea.   Genitourinary: Positive for frequency and urgency. Negative for dyspareunia, dysuria, vaginal bleeding and vaginal discharge.   Musculoskeletal: Negative for gait problem and neck pain.   Skin: Negative for rash and wound.   Allergic/Immunologic: Negative for immunocompromised state.   Neurological: Negative for tremors, speech difficulty and weakness.   Hematological: Does not bruise/bleed easily.   Psychiatric/Behavioral: Negative for agitation and confusion.       Objective:      Physical Exam   Constitutional: She is oriented to person, place, and time. She appears well-developed and well-nourished. No distress.   HENT:   Head: Normocephalic and atraumatic.   Neck: Neck supple. No thyromegaly present.    Pulmonary/Chest: Effort normal. No respiratory distress.   Abdominal: Soft. There is tenderness in the suprapubic area and left lower quadrant. No hernia.   Musculoskeletal: Normal range of motion.   Neurological: She is alert and oriented to person, place, and time.   Skin: Skin is warm and dry. No rash noted.   Psychiatric: She has a normal mood and affect. Her behavior is normal.     Pelvic exam  Deferred per pt request      Assessment:       1. Urinary tract infection without hematuria, site unspecified    2. Mixed incontinence urge and stress    3. Midline cystocele    4. Rectocele    5. Atrophic vaginitis        Plan:       Urinary tract infection without hematuria, site unspecified- I have sent the pt an RX for macrodantin 100 mg bid x 7 days to be filled by the nursing home.  We will send her urine for a culture.  -     Urine culture    Mixed incontinence urge and stress- monitor    Midline cystocele- pt will meet with Dr. Dobbins to discuss her surgical options    Rectocele- as noted above    Atrophic vaginitis- monitor    RTC 2 weeks with Dr. Dobbins

## 2017-09-19 ENCOUNTER — TELEPHONE (OUTPATIENT)
Dept: UROGYNECOLOGY | Facility: CLINIC | Age: 67
End: 2017-09-19

## 2017-09-19 NOTE — TELEPHONE ENCOUNTER
Spoke with Gabi at [facility] informed a urine culture was needed for patient. Order faxed to [phone].

## 2017-09-29 ENCOUNTER — OFFICE VISIT (OUTPATIENT)
Dept: UROGYNECOLOGY | Facility: CLINIC | Age: 67
End: 2017-09-29
Payer: MEDICARE

## 2017-09-29 VITALS
WEIGHT: 250.44 LBS | DIASTOLIC BLOOD PRESSURE: 72 MMHG | BODY MASS INDEX: 44.38 KG/M2 | TEMPERATURE: 98 F | HEART RATE: 80 BPM | HEIGHT: 63 IN | SYSTOLIC BLOOD PRESSURE: 156 MMHG

## 2017-09-29 DIAGNOSIS — N81.6 POSTERIOR VAGINAL WALL PROLAPSE: ICD-10-CM

## 2017-09-29 DIAGNOSIS — N39.46 MIXED INCONTINENCE: ICD-10-CM

## 2017-09-29 DIAGNOSIS — R35.1 NOCTURIA: ICD-10-CM

## 2017-09-29 DIAGNOSIS — R39.15 URINARY URGENCY: Primary | ICD-10-CM

## 2017-09-29 DIAGNOSIS — N81.10 PROLAPSE OF ANTERIOR VAGINAL WALL: ICD-10-CM

## 2017-09-29 PROCEDURE — 1159F MED LIST DOCD IN RCRD: CPT | Mod: S$GLB,,, | Performed by: OBSTETRICS & GYNECOLOGY

## 2017-09-29 PROCEDURE — 3008F BODY MASS INDEX DOCD: CPT | Mod: S$GLB,,, | Performed by: OBSTETRICS & GYNECOLOGY

## 2017-09-29 PROCEDURE — 87077 CULTURE AEROBIC IDENTIFY: CPT

## 2017-09-29 PROCEDURE — 87088 URINE BACTERIA CULTURE: CPT

## 2017-09-29 PROCEDURE — 51701 INSERT BLADDER CATHETER: CPT | Mod: S$GLB,,, | Performed by: OBSTETRICS & GYNECOLOGY

## 2017-09-29 PROCEDURE — 1125F AMNT PAIN NOTED PAIN PRSNT: CPT | Mod: S$GLB,,, | Performed by: OBSTETRICS & GYNECOLOGY

## 2017-09-29 PROCEDURE — 99215 OFFICE O/P EST HI 40 MIN: CPT | Mod: 25,S$GLB,, | Performed by: OBSTETRICS & GYNECOLOGY

## 2017-09-29 PROCEDURE — 87086 URINE CULTURE/COLONY COUNT: CPT

## 2017-09-29 PROCEDURE — 99999 PR PBB SHADOW E&M-EST. PATIENT-LVL V: CPT | Mod: PBBFAC,,, | Performed by: OBSTETRICS & GYNECOLOGY

## 2017-09-29 PROCEDURE — 87186 SC STD MICRODIL/AGAR DIL: CPT

## 2017-09-29 NOTE — LETTER
October 2, 2017        KRISTINE Jacobson, FNP  [address]  [place] [address]             [place] - Urogynecology  [address] [place] [address]  Phone: [phone]  Fax: [phone]   Patient: Alanis Mendieta   MR Number: [id number]   YOB: 1950   Date of Visit: 9/29/2017       Dear Dr. Jacobson:    Thank you for referring Alanis Mendieta to me for evaluation. Attached you will find relevant portions of my assessment and plan of care.    If you have questions, please do not hesitate to call me. I look forward to following Alanis Mendieta along with you.    Sincerely,      Mark Townsend, DO            CC  No Recipients    Enclosure

## 2017-09-29 NOTE — PATIENT INSTRUCTIONS
1.  Mixed urinary incontinence, urge > stress:   --Bladder diary for 3-7 days, write the number of times you go to the rest room and associated symptoms of urgency or leakage.   --Empty bladder every 3 hours.  Empty well: wait a minute, lean forward on toilet.    --Avoid dietary irritants (see sheet).  Keep diary x 3-5 days to determine your irritants.  --KEGELS: do 10 in AM and 10 in PM, holding each x 10 seconds.  When you feel urge to go, STOP, KEGEL, and when urge has passed, then go to bathroom.  Consider PT in future.    --URGE: Start Ditropan 5 mg daily or Myrbetriq 50 mg daily.  SE profile reviewed.  Takes 2-4 weeks to see if will have effect.  For dry mouth: get sour, sugar free lozenge or gum.    --STRESS:  Pessary vs. Sling.   --UDS     2. Prolapse:  Symptomatic Stage 2 anterior and posterior vaginal wall prolapse   --failed Pessary trial, discussed other pessaries not interested and wants to proceed with surgery.   --Daily pelvic floor exercises as assigned, consider Pelvic floor PT in future  --Non surgical options discussed with patient    --Surgical options discussed such as anterior/ posterior colporrhaphy, enterocele repair . Risks/ benefits/ alternatives/ succuss and failure rates were reviewed.     3. Constipation: Controlling constipation may help bladder urgency/leakage and fiber may better control cholesterol and blood glucose.  Start daily fiber.  Take 1 tsp of fiber powder (psyllium or other sugar-free powder).  Mix in 8 oz of water.  Take x 3-5 days.  Then, increase fiber by 1 tsp every 3-5 days until stool is easy to pass.  Stop and continue at that dose.   Do not exceed 6 tsps/day.  May also use over the counter stool softener 1-2 x/day.  AVOID laxatives  --Linzess.    4. --Nocturia (nighttime urination): stop fluids 2 hours before bed.  If have leg swelling:  Elevate feet above chest x 1 hour before bed to get excess fluid off.  Can also use support hose (knee highs).      5. Chronic  opiate use  --Working with Dr. Almazan ( [place])

## 2017-09-29 NOTE — PROGRESS NOTES
Subjective:       Patient ID: Alanis Mendieta is a 66 y.o. female.    Chief Complaint:  Consult (Incontinece )      History of Present Illness: 66 Y O F with history of HTN, DM, COPD, Thyroid cancer (papillary) s/p totally thyroidectomy and radiation on supplemental oxygen since 2012 has a history of fall with spinal stenosis ambulates with a walker. Lives in a nursing home.  She was a referral by STEFAN Jacobson for evaluation of urinary incontinence not quite sure if urge associated. Leakage happenes mostly with change in posisiton. Night time urination. She was tried oxybuynin and started myriberiq 50 mg daily not helping with her symptoms.     HPI:  Ohs Peq Urogyn Hpi     Question 9/29/2017  4:14 PM CDT   General Urogynecology: Are you experiencing the following?    Dysuria (painful urination) Yes   Nocturia:  waking up at night to empty your bladder  Yes   If you answered yes to the previous question, how many times does this happen per night? 3-4   Enuresis (urine loss during sleep) Yes   Dribbling urine after you urinate No   Hematuria (urine appears red) Yes   Type of stream Interrupted   Urinary Incontinence (General): Are you experiencing the following?    Past consultation for incontinence: Have you ever seen someone for the evaluation of incontinence? No   If you answered yes to the previous question, please select all the therapies you have tried.  Kegals   Please note the effectiveness of the therapies. Ineffective   Need to wear protection to keep clothes dry  No   If you answered yes to the previous question, please royer the protection you use.  Diaper   If you wear protection, how much wetness is typically on each pad? Moderate   If you wear protection, how often do you have to change per day, if applicable?  7-8   Stress Symptoms: Are you experiencing the following?    Leakage of urine with cough, laugh and/or sneeze Yes   If you answered yes to the previous question, what is the frequency in days,  "weeks and/or months? Weekly   Leakage of urine with sex No   Leakage of urine with bending/ lifting Yes   Leakage of urine with briskly walking or jogging No   If you lose urine for any other reason not previously mentioned, please note it below, if applicable.     Urge Symptoms: Are you experiencing the following?    Urgency ("got to go" feeling) Yes   Urge: How frequently do you feel an urge to urinate (feeling like you "gotta go" to the bathroom and can't wait) Several times a day   Do you experience a leakage of urine when you have a feeling of urgency?  Yes   Leakage of urine when unaware Yes   Past use of anticholinergics (medications used to treat overactive bladder) No   If you answered yes to the previous question, please royer the anticholinergics you have used:  Oxybutynin   Have you ever used Mirbetriq (aka Mirabegron)?  Yes   Prolapse Symptoms: Are you experiencing any of the following?     Falling out/ Bulging/ Heaviness in the vagina No   Vaginal/ Abdominal Pain/ Pressure No   Need to strain/ Push to void No   Need to wait on the toilet before you void No   Unusual position to urinate (using your hands to push back the vaginal bulge) No   Sensation of incomplete emptying Yes   Past use of pessary device No   If you answered yes to the previous question, please list the devices you have used below.     Bowel Symptoms: Are you experiencing any of the following?    Constipation Yes   Diarrhea  No   Hematochezia (bloody stool) No   Incomplete evacuation of stool No   Involuntary loss of formed stool No   Fecal smearing/urgency No   Involuntary loss of gas No   Vaginal Symptoms: Are you experiencing any of the following?     Abnormal vaginal bleeding  No   Vaginal dryness No   Sexually active  No   Dyspareunia (painful intercourse) No   Estrogen use  No       GYN & OB History  No LMP recorded. Patient has had a hysterectomy.   Date of Last Pap: No result found    OB History    Para Term  AB " Living   3       1     SAB TAB Ectopic Multiple Live Births           2      # Outcome Date GA Lbr Guicho/2nd Weight Sex Delivery Anes PTL Lv   3             2             1 AB                 Past Medical History:   Diagnosis Date    Allergy     Anxiety     Arthritis     Bipolar disorder     Chronic back pain     COPD (chronic obstructive pulmonary disease)     Diabetes mellitus     GERD (gastroesophageal reflux disease)     Hx of thyroid cancer     Hyperlipidemia     Hypertension     Hypothyroidism     Restless leg syndrome     Urinary tract infection      Past Surgical History:   Procedure Laterality Date    CHOLECYSTECTOMY      HYSTERECTOMY      SPINAL FUSION      TOTAL THYROIDECTOMY       Review of patient's allergies indicates:   Allergen Reactions    Morpholine analogues     Tubersol [tuberculin ppd]        Current Outpatient Prescriptions:     amoxicillin-clavulanate 875-125mg (AUGMENTIN) 875-125 mg per tablet, , Disp: , Rfl:     ANORO ELLIPTA 62.5-25 mcg/actuation DsDv, , Disp: , Rfl:     calcitRIOL (ROCALTROL) 0.5 MCG Cap, , Disp: , Rfl:     chlorhexidine (PERIDEX) 0.12 % solution, , Disp: , Rfl:     clonazePAM (KLONOPIN) 1 MG tablet, , Disp: , Rfl:     duloxetine (CYMBALTA) 30 MG capsule, , Disp: , Rfl:     fluoxetine (PROZAC) 20 MG capsule, , Disp: , Rfl:     fluoxetine (PROZAC) 40 MG capsule, , Disp: , Rfl:     furosemide (LASIX) 20 MG tablet, , Disp: , Rfl:     gabapentin (NEURONTIN) 800 MG tablet, , Disp: , Rfl:     hydrocodone-acetaminophen 5-325mg (NORCO) 5-325 mg per tablet, , Disp: , Rfl:     lamotrigine (LAMICTAL) 100 MG tablet, , Disp: , Rfl:     LEVEMIR FLEXTOUCH 100 unit/mL (3 mL) InPn pen, , Disp: , Rfl:     levothyroxine (SYNTHROID) 150 MCG tablet, , Disp: , Rfl:     LINZESS 145 mcg Cap capsule, , Disp: , Rfl:     losartan-hydrochlorothiazide 50-12.5 mg (HYZAAR) 50-12.5 mg per tablet, , Disp: , Rfl:     meclizine (ANTIVERT) 25 mg tablet, ,  "Disp: , Rfl:     methocarbamol (ROBAXIN) 500 MG Tab, , Disp: , Rfl:     mirabegron (MYRBETRIQ) 50 mg Tb24, Take 1 tablet (50 mg total) by mouth once daily., Disp: 30 tablet, Rfl: 11    NOVOFINE AUTOCOVER 30 gauge x 1/3" Ndle, , Disp: , Rfl:     NOVOLOG FLEXPEN 100 unit/mL InPn pen, , Disp: , Rfl:     oxycodone (ROXICODONE) 10 mg Tab immediate release tablet, , Disp: , Rfl:     pantoprazole (PROTONIX) 40 MG tablet, , Disp: , Rfl:     ropinirole (REQUIP) 4 MG tablet, , Disp: , Rfl:     trazodone (DESYREL) 150 MG tablet, , Disp: , Rfl:     oxybutynin (DITROPAN-XL) 10 MG 24 hr tablet, Take 1 tablet (10 mg total) by mouth once daily., Disp: 30 tablet, Rfl: 11      Review of Systems  Review of Systems   Constitutional: Negative.  Negative for activity change, appetite change, chills, diaphoresis, fatigue, fever and unexpected weight change.   HENT: Negative.    Eyes: Negative.    Respiratory: Negative.  Negative for apnea, cough and wheezing.    Cardiovascular: Negative.  Negative for chest pain and palpitations.   Gastrointestinal: Positive for constipation. Negative for abdominal distention, abdominal pain, anal bleeding, blood in stool, diarrhea, nausea, rectal pain and vomiting.   Endocrine: Negative.    Genitourinary: Positive for frequency and urgency. Negative for decreased urine volume, difficulty urinating, dyspareunia, dysuria, enuresis, flank pain, genital sores, hematuria, menstrual problem, pelvic pain, vaginal bleeding, vaginal discharge and vaginal pain.   Musculoskeletal: Negative for back pain and gait problem.   Skin: Negative for color change, pallor, rash and wound.   Allergic/Immunologic: Negative for immunocompromised state.   Neurological: Negative.  Negative for dizziness and speech difficulty.   Hematological: Negative for adenopathy.   Psychiatric/Behavioral: Negative for agitation, behavioral problems, confusion and sleep disturbance.           Objective:     Physical Exam "   Constitutional: She is oriented to person, place, and time. She appears well-developed.   HENT:   Head: Normocephalic and atraumatic.   Eyes: Conjunctivae and EOM are normal.   Neck: Normal range of motion. Neck supple.   Cardiovascular: Normal rate, regular rhythm, S1 normal, S2 normal, normal heart sounds and intact distal pulses.    Pulmonary/Chest: Effort normal and breath sounds normal. She exhibits no tenderness.   Abdominal: Soft. Bowel sounds are normal. She exhibits no distension and no mass. There is no hepatosplenomegaly, splenomegaly or hepatomegaly. There is no tenderness. There is no rigidity, no rebound, no guarding and no CVA tenderness. Hernia confirmed negative in the right inguinal area and confirmed negative in the left inguinal area.   Genitourinary: Pelvic exam was performed with patient supine. Rectum normal, vagina normal, skenes normal and bartholins normal. Right labia normal and left labia normal. Urethra exhibits hypermobility. Urethra exhibits no urethral caruncle, no urethral diverticulum and no urethral mass. Right bartholin is not enlarged and not tender. Left bartholin is not enlarged and not tender. Rectal exam shows resting tone normal and active tone normal. Rectal exam shows no external hemorrhoid, no fissure, no tenderness, anal tone normal and no dovetailing. Guaiac negative stool. There is a rectocele and a cystocele in the vagina. No foreign body, tenderness, bleeding, unspecified prolapse of vaginal walls, atrophy, fistula, mesh exposure or lavator tenderness in the vagina. No vaginal discharge found. Right adnexum displays no tenderness. Left adnexum displays no tenderness. Cervix exhibits absence. Uterus is absent.   PVR: 40 ML  Empty cough stress test: Negative.  Kegel: 2/5    POP-Q  Aa: 1 Ba: 1 C: -4   GH: 4 PB: 2 TVL: 8   Ap: 0 Bp: 0 D:                      Musculoskeletal: Normal range of motion.   Lymphadenopathy:     She has no axillary adenopathy.        Right: No  inguinal adenopathy present.        Left: No inguinal adenopathy present.   Neurological: She is alert and oriented to person, place, and time. She has normal strength and normal reflexes. Cranial nerves II through XII intact. No cranial nerve deficit.   Skin: Skin is warm, dry and intact.   Psychiatric: She has a normal mood and affect. Her speech is normal and behavior is normal. Judgment normal. Cognition and memory are normal.          Assessment:        1. Urinary urgency    2. Mixed incontinence    3. Nocturia    4. Prolapse of anterior vaginal wall    5. Posterior vaginal wall prolapse               Plan:       1.  Mixed urinary incontinence, urge > stress:   --Bladder diary for 3-7 days, write the number of times you go to the rest room and associated symptoms of urgency or leakage.   --Empty bladder every 3 hours.  Empty well: wait a minute, lean forward on toilet.    --Avoid dietary irritants (see sheet).  Keep diary x 3-5 days to determine your irritants.  --KEGELS: do 10 in AM and 10 in PM, holding each x 10 seconds.  When you feel urge to go, STOP, KEGEL, and when urge has passed, then go to bathroom.  Consider PT in future.    --URGE: will consider meds after UDS.   SE profile reviewed.  Takes 2-4 weeks to see if will have effect.  For dry mouth: get sour, sugar free lozenge or gum.    --STRESS:  Pessary vs. Sling.   --UDS     2. Prolapse:  Symptomatic Stage 2 anterior and posterior vaginal wall prolapse   --failed Pessary trial, discussed other pessaries not interested and wants to proceed with surgery.   --Daily pelvic floor exercises as assigned, consider Pelvic floor PT in future  --Non surgical options discussed with patient    --Surgical options discussed such as anterior/ posterior colporrhaphy, enterocele repair . Risks/ benefits/ alternatives/ succuss and failure rates were reviewed.     3. Constipation: Controlling constipation may help bladder urgency/leakage and fiber may better control  cholesterol and blood glucose.  Start daily fiber.  Take 1 tsp of fiber powder (psyllium or other sugar-free powder).  Mix in 8 oz of water.  Take x 3-5 days.  Then, increase fiber by 1 tsp every 3-5 days until stool is easy to pass.  Stop and continue at that dose.   Do not exceed 6 tsps/day.  May also use over the counter stool softener 1-2 x/day.  AVOID laxatives  --Linzess    4. --Nocturia (nighttime urination): stop fluids 2 hours before bed.  If have leg swelling:  Elevate feet above chest x 1 hour before bed to get excess fluid off.  Can also use support hose (knee highs).      5. Chronic opiate use  --Working with Dr. Almazan ([facility])     Approximately  50 min were spent in consult, 90 % in discussion.     Thank you for requesting consultation of your patient.  I look forward to participating in her care.    Mark Townsend DO  Female Pelvic Medicine and Reconstructive Surgery  [facility] [place], [place]

## 2017-10-02 PROBLEM — N81.10 PROLAPSE OF ANTERIOR VAGINAL WALL: Status: ACTIVE | Noted: 2017-10-02

## 2017-10-02 PROBLEM — R35.1 NOCTURIA: Status: ACTIVE | Noted: 2017-10-02

## 2017-10-02 PROBLEM — N39.46 MIXED INCONTINENCE: Status: ACTIVE | Noted: 2017-10-02

## 2017-10-02 LAB — BACTERIA UR CULT: NORMAL

## 2017-10-03 ENCOUNTER — TELEPHONE (OUTPATIENT)
Dept: UROGYNECOLOGY | Facility: CLINIC | Age: 67
End: 2017-10-03

## 2017-10-03 RX ORDER — NITROFURANTOIN (MACROCRYSTALS) 100 MG/1
100 CAPSULE ORAL EVERY 12 HOURS
Qty: 14 CAPSULE | Refills: 0 | Status: SHIPPED | OUTPATIENT
Start: 2017-10-03 | End: 2017-10-10

## 2017-10-04 NOTE — TELEPHONE ENCOUNTER
Attempted to contact patient in regards to Medication. No answer left message with call back number

## 2017-10-05 ENCOUNTER — TELEPHONE (OUTPATIENT)
Dept: UROGYNECOLOGY | Facility: CLINIC | Age: 67
End: 2017-10-05

## 2017-10-05 NOTE — TELEPHONE ENCOUNTER
----- Message from Nickolas Mathew sent at 10/5/2017 11:55 AM CDT -----      ----- Message -----  From: Tanja Garcia  Sent: 10/5/2017  11:38 AM  To: Cary Torres Staff    Returning your call.  Please call [phone].

## 2017-10-05 NOTE — TELEPHONE ENCOUNTER
Spoke with Nan at [facility] to confirm pharmacy ,. Per nan a verbal order has to be received in order for patient to start medication than she (Nan) contacts the pharmacy to have it filled. Verbal order provided to Nan with readback. States there is nothing left to day she will contact pharmacy

## 2017-10-05 NOTE — TELEPHONE ENCOUNTER
----- Message from Nickolas Mathew sent at 10/5/2017 12:10 PM CDT -----      ----- Message -----  From: Tanja Garcia  Sent: 10/5/2017  11:38 AM  To: Cary Torres Staff    Returning your call.  Please call [phone].

## 2017-10-05 NOTE — TELEPHONE ENCOUNTER
Spoke with Lisa nurse at [facility]  given verbal order for Macrodantin . Nurse Lisa states she will notify pharmacy of rx . States nothing else needs to be done to get patient started on medication.  Attempted to return call to patient no answer left message.

## 2017-10-13 ENCOUNTER — PROCEDURE VISIT (OUTPATIENT)
Dept: UROGYNECOLOGY | Facility: CLINIC | Age: 67
End: 2017-10-13
Payer: MEDICARE

## 2017-10-13 VITALS
SYSTOLIC BLOOD PRESSURE: 126 MMHG | HEIGHT: 63 IN | DIASTOLIC BLOOD PRESSURE: 68 MMHG | HEART RATE: 85 BPM | WEIGHT: 250.25 LBS | TEMPERATURE: 99 F | BODY MASS INDEX: 44.34 KG/M2

## 2017-10-13 DIAGNOSIS — N39.46 MIXED INCONTINENCE: Primary | ICD-10-CM

## 2017-10-13 PROCEDURE — 51797 INTRAABDOMINAL PRESSURE TEST: CPT | Mod: S$GLB,,, | Performed by: OBSTETRICS & GYNECOLOGY

## 2017-10-13 PROCEDURE — 51728 CYSTOMETROGRAM W/VP: CPT | Mod: S$GLB,,, | Performed by: OBSTETRICS & GYNECOLOGY

## 2017-10-13 PROCEDURE — 51741 ELECTRO-UROFLOWMETRY FIRST: CPT | Mod: S$GLB,,, | Performed by: OBSTETRICS & GYNECOLOGY

## 2017-10-13 PROCEDURE — 51784 ANAL/URINARY MUSCLE STUDY: CPT | Mod: S$GLB,,, | Performed by: OBSTETRICS & GYNECOLOGY

## 2017-10-15 NOTE — PROCEDURES
Procedures     TITLE OF OPERATION:  1. Complex cystometry.  2. Complex uroflowmetry.  3. Electromyography with surface electrodes.  4. Pressure voiding flow study.  5. Abdominal pressure measurement.  6. Leak point pressure measurement.    INDICATIONS:  66 Y O F with history of HTN, DM, COPD, Thyroid cancer (papillary) s/p totally thyroidectomy and radiation on supplemental oxygen since 2012 has a history of fall with spinal stenosis ambulates with a walker. Lives in a nursing home.  She was a referral by STEFAN Jacobson for evaluation of urinary incontinence not quite sure if urge associated. Leakage happenes mostly with change in posisiton. Night time urination. She was tried oxybuynin and started myriberiq 50 mg daily not helping with her symptoms.     PREOPERATIVE DIAGNOSIS:  1. Mixed urinary incontinence   2. Urinary urgency   3. Nocturia   4. Cystocele   5. Rectocele       POSTOPERATIVE DIAGNOSIS:  1. Mixed urinary incontinence   2. Urinary urgency   3. Nocturia   4. Cystocele   5. Rectocele     ANESTHESIA:  None.    SPECIMEN (BACTERIOLOGICAL, PATHOLOGICAL OR OTHER):  None.    PROSTHETIC DEVICE/IMPLANT:  None.    SURGEONS NARRATIVE:  A time out was performed in which the patient identity and procedure were confirmed.  Urodynamic evaluation was performed using a computerized system ([employer]  Uroflowmetry was performed on the patient in the sitting position without catheters in place.  Subsequent urodynamic testing was performed with the patient in the lithotomy position at 45 degrees. Air charged catheters were used with sterile water as the infusion medium. Vesical and abdominal (rectal) pressures were measured, and detrusor pressure was calculated. EMG activity was recorded with surface electrodes. During filling, room temperature sterile water was infused at a rate of 30 cubic centimeters per minute. The patient was asked cough after instillation of each 100cc volume. Two Valsalva leak  point pressures and two cough leak point pressures were performed with the catheters in place at 300 cubic centimeters and again at maximum capacity. Valsalva leak point pressure was defined as the difference between vesical pressure at which leakage was noted (visualized at the external urethral meatus) and the baseline vesical pressure. Following urodynamic testing, a pressure flow study was performed with the patient in the sitting position. Vesical and abdominal pressures were monitored and detrusor pressures were calculated. After the pressure flow study, the catheters were then removed. The patient tolerated the procedure well.     Urine dipstick: normal .    1.  VOIDING PHASE:      a.  Uroflowmetry:   Prolapse reduction: No   Voided volume:  13.2 mL    Voiding time:   4.7 seconds   Max flow:  2.9 mL/s   Avg flow:   2.8 mL/s    PVR:   80 mL    The overall configuration of this uroflow study was  N/a volume too low .      b.  Pressure flow:   Prolapse reduction: No   Voided volume:   240 mL   Voiding time:  55  seconds   Peak flow:  160 mL/s    Avg flow:  .4 mL/s   Max det pressure:  2.5  cm H20   Det pressure at max flow: 47 cm H20   Void initiated by  permission.     Urethral relaxation (EMG):  Normal .     PVR (calculated):  120 mL    The overall configuration of this pressure flow study was cath fell out,.      2.  FILLING PHASE:   1st desire: 203 mL   Normal desire:  253 mL   Strong desire:  450 mL   Urgency:  549 mL   Compliance (calculated)   mL/cm H20   EMG activity during fillin.8   Detrusor contractions observed: No.      3.  URETHRAL FUNCTION/STORAGE PHASE:    a.  WITHOUT prolapse reduction:   CLPP (300 mL): Positive  at  91 cm H20   VLPP (300 mL): Positive  at  11 cm H20    CLPP (MAX ):    Positive  at  98 cm H20   VLPP (MAX):     Positive  at  79 cm H20  b.  WITH prolapse reduction: had patient stand up to evaluate the incontinence and she had immediate leakage  without control, the catheter fell out so we were unable to see if this was an cough induced DI      CLPP (MAX ):  Positive  at  102 cm H20    These findings are consistent with Positive urodynamic stress incontinence .    Assessment:  UF  incontinence .  PF  Prolapsed .  Compliance  Normal .  Max capacity  Normal .  DO (--).  WILLIAM (+).      Plan: discussed the options of a retropubic mid urethral sling, to help the incontinence, may also need Periureteral bulking as the leakage is so noticeable with standing.

## 2017-10-17 DIAGNOSIS — Z79.899 ENCOUNTER FOR LONG-TERM (CURRENT) USE OF MEDICATIONS: Primary | ICD-10-CM

## 2017-11-16 ENCOUNTER — OFFICE VISIT (OUTPATIENT)
Dept: RHEUMATOLOGY | Facility: CLINIC | Age: 67
End: 2017-11-16
Payer: MEDICARE

## 2017-11-16 VITALS
BODY MASS INDEX: 41.66 KG/M2 | WEIGHT: 244 LBS | SYSTOLIC BLOOD PRESSURE: 124 MMHG | HEIGHT: 64 IN | DIASTOLIC BLOOD PRESSURE: 70 MMHG

## 2017-11-16 DIAGNOSIS — M15.9 OSTEOARTHRITIS, GENERALIZED: Primary | ICD-10-CM

## 2017-11-16 DIAGNOSIS — R76.8 DS DNA ANTIBODY POSITIVE: ICD-10-CM

## 2017-11-16 PROCEDURE — 99213 OFFICE O/P EST LOW 20 MIN: CPT | Mod: ,,, | Performed by: INTERNAL MEDICINE

## 2017-11-16 RX ORDER — LATANOPROST 50 UG/ML
1 SOLUTION/ DROPS OPHTHALMIC NIGHTLY
COMMUNITY
Start: 2017-10-23

## 2017-11-16 NOTE — PROGRESS NOTES
[facility] RHEUMATOLOGY            PROGRESS NOTE      Subjective:       Patient ID:   NAME: Alanis Mendieta : 1950     66 y.o. female    Referring Doc: No ref. provider found  Other Physicians:    Chief Complaint:  No chief complaint on file.      History of Present Illness:     Patient returns today for a regularly scheduled follow-up visit for  History of a positive LEIGH.     The patient is doing well except for left knee pain. She states she injured her knee when she fell a few weeks ago. She was evaluated by ortho,Dr Marin, and received intra-articular steroid injection.  She is still experiencing  knee pain but has a  follow-up appointment with him  coming up. She is contemplating a left knee replacement.          ROS:   GEN:  No  fever, night sweats . weight is stable   + fatigue  SKIN: no rashes, no bruising, no ulcerations, no Raynaud's  HEENT: no HA's, No visual changes, no mucosal ulcers, no sicca symptoms,  CV:   no CP, SOB, PND, CRUZ, no orthopnea, no palpitations  PULM: normal with no SOB, cough, hemoptysis, sputum or pleuritic pain  GI:  no abdominal pain, nausea, vomiting, constipation, diarrhea, melanotic stools, BRBPR, hematemesis, no dysphagia  :   no dysuria  NEURO: no paresthesias, headaches, visual disturbances, muscle weakness  MUSCULOSKELETAL:no joint swelling, prolonged AM stiffness, + back pain, + muscle pain  Allergies:  Review of patient's allergies indicates:   Allergen Reactions    Morpholine analogues     Tubersol [tuberculin ppd]        Medications:    Current Outpatient Prescriptions:     ANORO ELLIPTA 62.5-25 mcg/actuation DsDv, , Disp: , Rfl:     calcitRIOL (ROCALTROL) 0.5 MCG Cap, , Disp: , Rfl:     clonazePAM (KLONOPIN) 1 MG tablet, , Disp: , Rfl:     duloxetine (CYMBALTA) 30 MG capsule, , Disp: , Rfl:     fluoxetine (PROZAC) 20 MG capsule, , Disp: , Rfl:     fluoxetine (PROZAC) 40 MG capsule, , Disp: , Rfl:     furosemide (LASIX) 20 MG tablet, , Disp: ,  "Rfl:     gabapentin (NEURONTIN) 800 MG tablet, , Disp: , Rfl:     lamotrigine (LAMICTAL) 100 MG tablet, , Disp: , Rfl:     latanoprost 0.005 % ophthalmic solution, , Disp: , Rfl:     LEVEMIR FLEXTOUCH 100 unit/mL (3 mL) InPn pen, , Disp: , Rfl:     levothyroxine (SYNTHROID) 150 MCG tablet, , Disp: , Rfl:     LINZESS 145 mcg Cap capsule, , Disp: , Rfl:     losartan-hydrochlorothiazide 50-12.5 mg (HYZAAR) 50-12.5 mg per tablet, , Disp: , Rfl:     meclizine (ANTIVERT) 25 mg tablet, , Disp: , Rfl:     methocarbamol (ROBAXIN) 500 MG Tab, , Disp: , Rfl:     mirabegron (MYRBETRIQ) 50 mg Tb24, Take 1 tablet (50 mg total) by mouth once daily., Disp: 30 tablet, Rfl: 11    NOVOFINE AUTOCOVER 30 gauge x 1/3" Ndle, , Disp: , Rfl:     NOVOLOG FLEXPEN 100 unit/mL InPn pen, , Disp: , Rfl:     oxycodone (ROXICODONE) 10 mg Tab immediate release tablet, , Disp: , Rfl:     pantoprazole (PROTONIX) 40 MG tablet, , Disp: , Rfl:     ropinirole (REQUIP) 4 MG tablet, , Disp: , Rfl:     trazodone (DESYREL) 150 MG tablet, , Disp: , Rfl:     oxybutynin (DITROPAN-XL) 10 MG 24 hr tablet, Take 1 tablet (10 mg total) by mouth once daily., Disp: 30 tablet, Rfl: 11    PMHx/PSHx Updates:      Objective:     Vitals:  Blood pressure 124/70, height 5' 4" (1.626 m), weight 110.7 kg (244 lb).    Physical Examination:   GEN: no apparent distress, comfortable; AAOx3  SKIN: no rashes,no ulceration, no Raynaud's, no petechiae, no SQ nodules,  HEAD: normal  EYES: no pallor, no icterus,   NECK: no masses, thyroid normal, trachea midline, no LAD/LN's, supple  CV: RRR with no murmur; l S1 and S2 reg. ,no gallop no rubs,   CHEST: Normal respiratory effort; CTAB; normal breath sounds; no wheeze or crackles  ABDOM: nontender and nondistended; soft; no masses; no rebound/guarding  MUSC/Skeletal: ROM normal; no crepitus; joints without synovitis,  no deformities  No joint swelling or tenderness of PIP, MCP, wrist, elbow, shoulder, or knee " joints  EXTREM: no clubbing, cyanosis, no edema,normal  pulses   NEURO: grossly intact; motor WNL; AAOx3;  PSYCH: normal mood, affect and behavior  LYMPH: normal cervical, supraclavicular          Labs:   Lab Results   Component Value Date    WBC 6.0 07/27/2017    HGB 14.4 07/27/2017    HCT 42.8 07/27/2017    MCV 93.9 07/27/2017     07/27/2017    CMP  @LASTLAB(NA,K,CL,CO2,GLU,BUN,Creatinine,Calcium,PROT,Albumin,Bilitot,Alkphos,AST,ALT,CRP,ESR,RF,CCP,LEIGH,SSA,CPK,uric acid) )@  I have reviewed all available lab results and radiology reports.    Radiology/Diagnostic Studies:        Assessment/Plan:   (1) 66 y.o. female with diagnosis of History of positive LEIGH. Repeated one was negative but with had sl elevation of double-stranded DNA antibody. She doesn't exhibit any signs or symptoms of lupus. We will repeat LEIGH and double-stranded DNA.    2)Osteoarthritis: Had intra-articular injection in the left knee by . She has follow-up appointment with him and is contemplating a L knee replacement            Discussion:     I have explained all of the above in detail and the patient understands all of the current recommendation(s). I have answered all questions to the best of my ability and to their complete satisfaction.       The patient is to continue with the current management plan         RTC in  4 months or before prn       Electronically signed by Keegan Beverly MD

## 2017-11-16 NOTE — PROGRESS NOTES
[facility] RHEUMATOLOGY            PROGRESS NOTE      Subjective:       Patient ID:   NAME: Alanis Mendieta : 1950     66 y.o. female    Referring Doc: No ref. provider found  Other Physicians:    Chief Complaint:  No chief complaint on file.      History of Present Illness:     Patient returns today for a regularly scheduled follow-up visit for   Pos LEIGH    The patient            ROS:   GEN:  No  fever, night sweats . weight is stable   +fatigue  SKIN: no rashes, no bruising, no ulcerations, no Raynaud's  HEENT: no HA's, No visual changes, no mucosal ulcers, no sicca symptoms,  CV:   no CP, SOB, PND, CRUZ, no orthopnea, no palpitations  PULM: normal with no SOB, cough, hemoptysis, sputum or pleuritic pain  GI:  no abdominal pain, nausea, vomiting, constipation, diarrhea, melanotic stools, BRBPR, hematemesis, no dysphagia  :   no dysuria  NEURO: no paresthesias, headaches, visual disturbances, muscle weakness  MUSCULOSKELETAL:no joint swelling, prolonged AM stiffness, + back pain, + muscle pain  Allergies:  Review of patient's allergies indicates:   Allergen Reactions    Morpholine analogues     Tubersol [tuberculin ppd]        Medications:    Current Outpatient Prescriptions:     ANORO ELLIPTA 62.5-25 mcg/actuation DsDv, , Disp: , Rfl:     calcitRIOL (ROCALTROL) 0.5 MCG Cap, , Disp: , Rfl:     clonazePAM (KLONOPIN) 1 MG tablet, , Disp: , Rfl:     duloxetine (CYMBALTA) 30 MG capsule, , Disp: , Rfl:     fluoxetine (PROZAC) 20 MG capsule, , Disp: , Rfl:     fluoxetine (PROZAC) 40 MG capsule, , Disp: , Rfl:     furosemide (LASIX) 20 MG tablet, , Disp: , Rfl:     gabapentin (NEURONTIN) 800 MG tablet, , Disp: , Rfl:     lamotrigine (LAMICTAL) 100 MG tablet, , Disp: , Rfl:     latanoprost 0.005 % ophthalmic solution, , Disp: , Rfl:     LEVEMIR FLEXTOUCH 100 unit/mL (3 mL) InPn pen, , Disp: , Rfl:     levothyroxine (SYNTHROID) 150 MCG tablet, , Disp: , Rfl:     LINZESS 145 mcg Cap capsule, ,  "Disp: , Rfl:     losartan-hydrochlorothiazide 50-12.5 mg (HYZAAR) 50-12.5 mg per tablet, , Disp: , Rfl:     meclizine (ANTIVERT) 25 mg tablet, , Disp: , Rfl:     methocarbamol (ROBAXIN) 500 MG Tab, , Disp: , Rfl:     mirabegron (MYRBETRIQ) 50 mg Tb24, Take 1 tablet (50 mg total) by mouth once daily., Disp: 30 tablet, Rfl: 11    NOVOFINE AUTOCOVER 30 gauge x 1/3" Ndle, , Disp: , Rfl:     NOVOLOG FLEXPEN 100 unit/mL InPn pen, , Disp: , Rfl:     oxycodone (ROXICODONE) 10 mg Tab immediate release tablet, , Disp: , Rfl:     pantoprazole (PROTONIX) 40 MG tablet, , Disp: , Rfl:     ropinirole (REQUIP) 4 MG tablet, , Disp: , Rfl:     trazodone (DESYREL) 150 MG tablet, , Disp: , Rfl:     oxybutynin (DITROPAN-XL) 10 MG 24 hr tablet, Take 1 tablet (10 mg total) by mouth once daily., Disp: 30 tablet, Rfl: 11    PMHx/PSHx Updates:    Objective:     Vitals:  Blood pressure 124/70, height 5' 4" (1.626 m), weight 110.7 kg (244 lb).    Physical Examination:   GEN: no apparent distress, comfortable; AAOx3  SKIN: no rashes,no ulceration, no Raynaud's, no petechiae, no SQ nodules,  HEAD: normal  EYES: no pallor, no icterus,  NECK: no masses, thyroid normal, trachea midline, no LAD/LN's, supple  CV: RRR with no murmur; l S1 and S2 reg. ,no gallop no rubs,   CHEST: Normal respiratory effort; CTAB; normal breath sounds; no wheeze or crackles  ABDOM: nontender and nondistended; soft; no masses; no rebound/guarding  MUSC/Skeletal: ROM normal; no crepitus; joints without synovitis,  no deformities  No joint swelling or tenderness of PIP, MCP, wrist, elbow, shoulder, or knee joints.Pain in L knee with FROM,no effusion  EXTREM: no clubbing, cyanosis, no edema,normal  pulses   NEURO: grossly intact; motor WNL; AAOx3; ,  PSYCH: normal mood, affect and behavior  LYMPH: normal cervical, supraclavicular          Labs:   Lab Results   Component Value Date    WBC 6.0 07/27/2017    HGB 14.4 07/27/2017    HCT 42.8 07/27/2017    MCV 93.9 " 07/27/2017     07/27/2017    CMP  @LASTLAB(NA,K,CL,CO2,GLU,BUN,Creatinine,Calcium,PROT,Albumin,Bilitot,Alkphos,AST,ALT,CRP,ESR,RF,CCP,LEIGH,SSA,CPK,uric acid) )@  I have reviewed all available lab results and radiology reports.    Radiology/Diagnostic Studies:        Assessment/Plan:   (1) 66 y.o. female with diagnosis of Osteoarthritis and hx pos LEIGH ( repeated one was neg with sl elevation of ds DNA)  Again,i do not think she has SLE but will repeat tests today                Discussion:     I have explained all of the above in detail and the patient understands all of the current recommendation(s). I have answered all questions to the best of my ability and to their complete satisfaction.       The patient is to continue with the current management plan         RTC in         Electronically signed by Keegan Beverly MD

## 2017-11-18 LAB — DSDNA AB SER IA-ACNC: 16 IU/ML (ref 0–9)

## 2017-12-04 DIAGNOSIS — M25.572 LEFT ANKLE PAIN, UNSPECIFIED CHRONICITY: Primary | ICD-10-CM

## 2017-12-07 ENCOUNTER — HOSPITAL ENCOUNTER (OUTPATIENT)
Dept: RADIOLOGY | Facility: HOSPITAL | Age: 67
Discharge: HOME | End: 2017-12-07
Attending: ORTHOPAEDIC SURGERY
Payer: MEDICARE

## 2017-12-07 ENCOUNTER — OFFICE VISIT (OUTPATIENT)
Dept: ORTHOPEDICS | Facility: CLINIC | Age: 67
End: 2017-12-07
Payer: MEDICARE

## 2017-12-07 VITALS — WEIGHT: 244 LBS | HEIGHT: 64 IN | BODY MASS INDEX: 41.66 KG/M2

## 2017-12-07 DIAGNOSIS — M79.672 LEFT FOOT PAIN: ICD-10-CM

## 2017-12-07 DIAGNOSIS — M79.672 LEFT FOOT PAIN: Primary | ICD-10-CM

## 2017-12-07 DIAGNOSIS — M25.572 LEFT ANKLE PAIN, UNSPECIFIED CHRONICITY: Primary | ICD-10-CM

## 2017-12-07 DIAGNOSIS — M25.572 LEFT ANKLE PAIN, UNSPECIFIED CHRONICITY: ICD-10-CM

## 2017-12-07 DIAGNOSIS — S93.325A LISFRANC DISLOCATION, LEFT, INITIAL ENCOUNTER: ICD-10-CM

## 2017-12-07 DIAGNOSIS — S82.832A OTHER CLOSED FRACTURE OF DISTAL END OF LEFT FIBULA, INITIAL ENCOUNTER: ICD-10-CM

## 2017-12-07 PROCEDURE — 73630 X-RAY EXAM OF FOOT: CPT | Mod: TC,PN,LT

## 2017-12-07 PROCEDURE — 99204 OFFICE O/P NEW MOD 45 MIN: CPT | Mod: S$GLB,,, | Performed by: ORTHOPAEDIC SURGERY

## 2017-12-07 PROCEDURE — 73630 X-RAY EXAM OF FOOT: CPT | Mod: 26,LT,, | Performed by: RADIOLOGY

## 2017-12-07 PROCEDURE — 73610 X-RAY EXAM OF ANKLE: CPT | Mod: TC,PN,LT

## 2017-12-07 PROCEDURE — 73610 X-RAY EXAM OF ANKLE: CPT | Mod: 26,LT,, | Performed by: RADIOLOGY

## 2017-12-07 PROCEDURE — 99999 PR PBB SHADOW E&M-EST. PATIENT-LVL II: CPT | Mod: PBBFAC,,, | Performed by: ORTHOPAEDIC SURGERY

## 2017-12-07 NOTE — LETTER
December 7, 2017      Yariel Marin MD  [address]  [place] [address]           [address]  [place] [address]  Phone: [phone]          Patient: Alanis Mendieta   MR Number: [id number]   YOB: 1950   Date of Visit: 12/7/2017       Dear Dr. Yariel Marin:    Thank you for referring Alanis Mendieta to me for evaluation. Attached you will find relevant portions of my assessment and plan of care.    If you have questions, please do not hesitate to call me. I look forward to following Alanis Mendieta along with you.    Sincerely,    Pedro Morrison MD    Enclosure  CC:  No Recipients    If you would like to receive this communication electronically, please contact [email or web address] or [phone] to request more information on [facility] Link access.    For providers and/or their staff who would like to refer a patient to [facility], please contact us through our one-stop-shop provider referral line, [facility] , at [phone].    If you feel you have received this communication in error or would no longer like to receive these types of communications, please e-mail [email or web address]

## 2017-12-07 NOTE — PROGRESS NOTES
"HPI: Alanis Mendieta is a  66 y.o. female who was referred to me by Dr. Marin and was seen in consultation today for left ankle pain and foot pain. She has h/o COPD and is on home O2. She was in rehab facility for weakness and knee pain. She says on 11/24/17 her knee went out while walking with PT and she fell and twisted her foot. She rates her pain as 7/10 today. The patient has T2DM and the last HbA1c was 8.9 in July. She does have h/o peripheral neuropathy.      PAST MEDICAL/SURGICAL/FAMILY/SOCIAL/ HISTORY: REVIEWED    ALLERGIES/MEDICATIONS: REVIEWED       Review of Systems:     Constitution: Negative.   HEENT: Negative.   Eyes: Negative.   Cardiovascular: Negative.   Respiratory: Negative.   Endocrine: Negative.   Hematologic/Lymphatic: Negative.   Skin: Negative.   Musculoskeletal: Positive for left ankle and foot pain   Gastrointestinal: Negative.   Genitourinary: Negative.   Neurological: Negative.   Psychiatric/Behavioral: Negative.   Allergic/Immunologic: Negative.       PHYSICAL EXAM:  There were no vitals filed for this visit.  Ht Readings from Last 1 Encounters:   12/07/17 5' 4" (1.626 m)     Wt Readings from Last 1 Encounters:   12/07/17 110.7 kg (244 lb)       GENERAL: Well developed, well nourished, no acute distress.  SKIN: Skin is intact. No atrophy, abrasions or lesions are noted.   Neurological: Normal mental status. Appropriate and conversant. Alert and oriented x 3.  GAIT: In a wheelchair    Left lower extremity:  2+ dorsalis pedis pulse.  Capillary refill < 3 seconds.  Decreased range of motion tibiotalar and subtalar joints. Diminshed Sensation to light touch  sural, saphenous, superficial peroneal and deep peroneal nerves. Severe swelling, ecchymosis of the foot.  No lymphadenopathy, no masses or tumors palpated.  Minimal  tenderness to palpation.     XRAYS:   3 views of left ankle obtained and reviewed today reveal mildly displaced distal fibula fracture.      3 views of left foot " obtained and reviewed today reveal  displaced Lisfranc/charoct type fracture/dislocation with fragmentation.     ASSESSMENT:        Encounter Diagnoses   Name Primary?    Lisfranc dislocation, left, initial encounter     Other closed fracture of distal end of left fibula, initial encounter     Probable charcot foot    PLAN:  I spent 20 minutes in consulation with the patient today. More than half the time was spent counseling the patient on her condition and the options for operative versus non-operative care.  I ordered an HbA1c and Vitamin D level. We discussed possible limb salvage surgery versus total contact casting. We also discussed risk of below the knee amputation.  I do not think she is an appropriate surgical candidate due to her uncontrolled diabetes mellitus. Short leg splint applied. F/u 1 week with xray of the left foot and ankle Non-weightbearing.

## 2017-12-20 DIAGNOSIS — M25.572 LEFT ANKLE PAIN, UNSPECIFIED CHRONICITY: Primary | ICD-10-CM

## 2017-12-20 DIAGNOSIS — S93.325A LISFRANC DISLOCATION, LEFT, INITIAL ENCOUNTER: ICD-10-CM

## 2017-12-21 ENCOUNTER — HOSPITAL ENCOUNTER (OUTPATIENT)
Dept: RADIOLOGY | Facility: HOSPITAL | Age: 67
Discharge: HOME | End: 2017-12-21
Attending: ORTHOPAEDIC SURGERY
Payer: MEDICARE

## 2017-12-21 ENCOUNTER — TELEPHONE (OUTPATIENT)
Dept: ORTHOPEDICS | Facility: CLINIC | Age: 67
End: 2017-12-21

## 2017-12-21 ENCOUNTER — OFFICE VISIT (OUTPATIENT)
Dept: ORTHOPEDICS | Facility: CLINIC | Age: 67
End: 2017-12-21
Payer: MEDICARE

## 2017-12-21 VITALS
BODY MASS INDEX: 41.66 KG/M2 | WEIGHT: 244 LBS | HEIGHT: 64 IN | DIASTOLIC BLOOD PRESSURE: 74 MMHG | HEART RATE: 94 BPM | SYSTOLIC BLOOD PRESSURE: 151 MMHG

## 2017-12-21 DIAGNOSIS — S93.325A LISFRANC DISLOCATION, LEFT, INITIAL ENCOUNTER: ICD-10-CM

## 2017-12-21 DIAGNOSIS — M25.572 LEFT ANKLE PAIN, UNSPECIFIED CHRONICITY: ICD-10-CM

## 2017-12-21 DIAGNOSIS — S82.832A OTHER CLOSED FRACTURE OF DISTAL END OF LEFT FIBULA, INITIAL ENCOUNTER: ICD-10-CM

## 2017-12-21 DIAGNOSIS — S93.325A LISFRANC DISLOCATION, LEFT, INITIAL ENCOUNTER: Primary | ICD-10-CM

## 2017-12-21 PROCEDURE — 73630 X-RAY EXAM OF FOOT: CPT | Mod: 26,LT,, | Performed by: RADIOLOGY

## 2017-12-21 PROCEDURE — 73610 X-RAY EXAM OF ANKLE: CPT | Mod: 26,LT,, | Performed by: RADIOLOGY

## 2017-12-21 PROCEDURE — 73630 X-RAY EXAM OF FOOT: CPT | Mod: TC,PN,LT

## 2017-12-21 PROCEDURE — 73610 X-RAY EXAM OF ANKLE: CPT | Mod: TC,PN,LT

## 2017-12-21 PROCEDURE — 99999 PR PBB SHADOW E&M-EST. PATIENT-LVL III: CPT | Mod: PBBFAC,,, | Performed by: ORTHOPAEDIC SURGERY

## 2017-12-21 PROCEDURE — 29405 APPL SHORT LEG CAST: CPT | Mod: LT,S$GLB,, | Performed by: ORTHOPAEDIC SURGERY

## 2017-12-21 PROCEDURE — 99214 OFFICE O/P EST MOD 30 MIN: CPT | Mod: 25,S$GLB,, | Performed by: ORTHOPAEDIC SURGERY

## 2017-12-21 NOTE — PROGRESS NOTES
"HPI: Alanis Mendieta is a  66 y.o. female who is here for f/u  today for left ankle pain and foot pain. She has h/o COPD and is on home O2. She was in rehab facility for weakness and knee pain. She says on 11/24/17 her knee went out while walking with PT and she fell and twisted her foot. She rates her pain as 7/10 today. The patient has T2DM and the last HbA1c was 8.9 in July. She does have h/o peripheral neuropathy. Her vitamin D level was 12.5.     PAST MEDICAL/SURGICAL/FAMILY/SOCIAL/ HISTORY: REVIEWED    ALLERGIES/MEDICATIONS: REVIEWED       Review of Systems:     Constitution: Negative.   HEENT: Negative.   Eyes: Negative.   Cardiovascular: Negative.   Respiratory: Negative.   Endocrine: Negative.   Hematologic/Lymphatic: Negative.   Skin: Negative.   Musculoskeletal: Positive for left ankle and foot pain   Gastrointestinal: Negative.   Genitourinary: Negative.   Neurological: Negative.   Psychiatric/Behavioral: Negative.   Allergic/Immunologic: Negative.       PHYSICAL EXAM:  Vitals:    12/21/17 0849   BP: (!) 151/74   Pulse: 94     Ht Readings from Last 1 Encounters:   12/21/17 5' 4" (1.626 m)     Wt Readings from Last 1 Encounters:   12/21/17 110.7 kg (244 lb)       GENERAL: Well developed, well nourished, no acute distress.  SKIN: Skin is intact. No atrophy, abrasions or lesions are noted.   Neurological: Normal mental status. Appropriate and conversant. Alert and oriented x 3.  GAIT: In a wheelchair    Left lower extremity:  2+ dorsalis pedis pulse.  Capillary refill < 3 seconds.  Decreased range of motion tibiotalar and subtalar joints. Diminshed Sensation to light touch  sural, saphenous, superficial peroneal and deep peroneal nerves. Moderate swelling, ecchymosis of the foot.  No lymphadenopathy, no masses or tumors palpated. Mild tenderness to palpation distal fibula and midfoot.     XRAYS:   3 views of left ankle obtained and reviewed today reveal mildly displaced distal fibula fracture.      3 " views of left foot obtained and reviewed today reveal mildly displaced Lisfranc/charoct type fracture/dislocation with fragmentation.     ASSESSMENT:        Encounter Diagnoses   Name Primary?    Lisfranc dislocation, left, initial encounter Yes    Other closed fracture of distal end of left fibula, initial encounter     Probable charcot foot    PLAN:    I do not think she is an appropriate surgical candidate due to her uncontrolled diabetes mellitus. Short leg cast applied, strict Non-weightbearing.  I recommend non-op treatment at this time and to allow the fractures/charcot to consolidate. I supplemented her with vitamin D 50,000 units once a week for a month and she will f/u with her PCP for this issue.  F/u 2 weeks with xray of the left foot and ankle Non-weightbearing.

## 2017-12-21 NOTE — TELEPHONE ENCOUNTER
Verbal order 1)Vitamin D2 50,000 units 1 capsule po every week x4 weeks 2)F/u with PCP 3)aide at appts to provide transfers 4)Strict elevation of left lower extremity 5)Strict non-weight bearing of left lower extremity 6) F/u on 1/4/18. Lisa BAUTISTA stated understanding.

## 2017-12-21 NOTE — TELEPHONE ENCOUNTER
----- Message from Hermelinda Reynolds sent at 12/21/2017 11:07 AM CST -----  Contact: Lisa Velarde [phone]  She is requesting that you fax the prescriptions that you wrote for this patient to her @ [phone] so she can take care of them.  Thank you!

## 2018-01-03 DIAGNOSIS — S93.325A LISFRANC DISLOCATION, LEFT, INITIAL ENCOUNTER: Primary | ICD-10-CM

## 2018-01-04 ENCOUNTER — TELEPHONE (OUTPATIENT)
Dept: PODIATRY | Facility: CLINIC | Age: 68
End: 2018-01-04

## 2018-01-04 ENCOUNTER — TELEPHONE (OUTPATIENT)
Dept: ORTHOPEDICS | Facility: CLINIC | Age: 68
End: 2018-01-04

## 2018-01-04 NOTE — TELEPHONE ENCOUNTER
Spoke to Samara at [facility]. Advised that Dr. Morrison is out on medical leave effective today. Will call Samara back tomorrow with an appointment for patient to see another physician who will follow the patient. Samara stated understanding.

## 2018-01-04 NOTE — TELEPHONE ENCOUNTER
Received incoming call from Sonam Saunders LPN, she stated that Dr Morrison is not able to follow Ms Thapa, for her Lisfranc Dislocation (in a Cast). Dr Morrison wanted to know if you can follow her?

## 2018-01-04 NOTE — TELEPHONE ENCOUNTER
----- Message from Tressa Marroquin sent at 1/4/2018 12:14 PM CST -----  Contact: Omari from [facility]  Calling regarding appointment for post-op being scheduled for Denise as patient is in [place]. Please call Samara , [phone]. Thanks!

## 2018-01-26 ENCOUNTER — OFFICE VISIT (OUTPATIENT)
Dept: UROGYNECOLOGY | Facility: CLINIC | Age: 68
End: 2018-01-26
Payer: MEDICARE

## 2018-01-26 VITALS
TEMPERATURE: 98 F | DIASTOLIC BLOOD PRESSURE: 76 MMHG | HEART RATE: 83 BPM | SYSTOLIC BLOOD PRESSURE: 133 MMHG | HEIGHT: 64 IN | RESPIRATION RATE: 16 BRPM

## 2018-01-26 DIAGNOSIS — N39.46 MIXED INCONTINENCE: ICD-10-CM

## 2018-01-26 DIAGNOSIS — N81.10 PROLAPSE OF ANTERIOR VAGINAL WALL: ICD-10-CM

## 2018-01-26 DIAGNOSIS — R39.15 URINARY URGENCY: ICD-10-CM

## 2018-01-26 DIAGNOSIS — R35.1 NOCTURIA: ICD-10-CM

## 2018-01-26 DIAGNOSIS — N81.6 POSTERIOR VAGINAL WALL PROLAPSE: ICD-10-CM

## 2018-01-26 DIAGNOSIS — N30.00 ACUTE CYSTITIS WITHOUT HEMATURIA: Primary | ICD-10-CM

## 2018-01-26 PROCEDURE — 87088 URINE BACTERIA CULTURE: CPT

## 2018-01-26 PROCEDURE — 87186 SC STD MICRODIL/AGAR DIL: CPT

## 2018-01-26 PROCEDURE — 87086 URINE CULTURE/COLONY COUNT: CPT

## 2018-01-26 PROCEDURE — 87077 CULTURE AEROBIC IDENTIFY: CPT

## 2018-01-26 PROCEDURE — 99999 PR PBB SHADOW E&M-EST. PATIENT-LVL V: CPT | Mod: PBBFAC,,, | Performed by: OBSTETRICS & GYNECOLOGY

## 2018-01-26 PROCEDURE — 99214 OFFICE O/P EST MOD 30 MIN: CPT | Mod: S$GLB,,, | Performed by: OBSTETRICS & GYNECOLOGY

## 2018-01-26 RX ORDER — CIPROFLOXACIN 500 MG/1
500 TABLET ORAL 2 TIMES DAILY
Qty: 14 TABLET | Refills: 0 | Status: SHIPPED | OUTPATIENT
Start: 2018-01-26 | End: 2018-01-26 | Stop reason: SDUPTHER

## 2018-01-26 RX ORDER — CIPROFLOXACIN 500 MG/1
500 TABLET ORAL 2 TIMES DAILY
Qty: 14 TABLET | Refills: 0 | Status: SHIPPED | OUTPATIENT
Start: 2018-01-26 | End: 2018-02-02

## 2018-01-26 RX ORDER — CEPHALEXIN 500 MG/1
500 CAPSULE ORAL EVERY 12 HOURS
Qty: 14 CAPSULE | Refills: 0 | Status: SHIPPED | OUTPATIENT
Start: 2018-01-26 | End: 2018-01-26 | Stop reason: SDUPTHER

## 2018-01-26 RX ORDER — POLYETHYLENE GLYCOL 3350 17 G/17G
17 POWDER, FOR SOLUTION ORAL EVERY OTHER DAY
Qty: 100 PACKET | Refills: 2 | Status: SHIPPED | OUTPATIENT
Start: 2018-01-26 | End: 2018-01-26 | Stop reason: SDUPTHER

## 2018-01-26 RX ORDER — POLYETHYLENE GLYCOL 3350 17 G/17G
17 POWDER, FOR SOLUTION ORAL EVERY OTHER DAY
Qty: 100 PACKET | Refills: 2 | Status: SHIPPED | OUTPATIENT
Start: 2018-01-26 | End: 2018-04-12 | Stop reason: SDUPTHER

## 2018-01-26 RX ORDER — CEPHALEXIN 500 MG/1
500 CAPSULE ORAL EVERY 12 HOURS
Qty: 14 CAPSULE | Refills: 0 | Status: SHIPPED | OUTPATIENT
Start: 2018-01-26 | End: 2018-01-31

## 2018-01-26 NOTE — PATIENT INSTRUCTIONS
1.  Mixed urinary incontinence, urge > stress:   -- timed voids every 3 hours.  Empty well: wait a minute, lean forward on toilet.    --Avoid dietary irritants (see sheet).  Keep diary x 3-5 days to determine your irritants.  --KEGELS: do 10 in AM and 10 in PM, holding each x 10 seconds.  When you feel urge to go, STOP, KEGEL, and when urge has passed, then go to bathroom.  Consider PT in future.    --URGE:   Continue Myrbetriq 50 mg daily, may need to trial SE profile reviewed.  Takes 2-4 weeks to see if will have effect.  For dry mouth: get sour, sugar free lozenge or gum.    --STRESS:  Pessary vs. Sling.   --Voiding dysfunction, Recommend pelvic floor PT with STEFAN Jacobson once foot has healed.     2. Prolapse:  Symptomatic Stage 2 anterior and posterior vaginal wall prolapse; recently   --failed Pessary trial, discussed other pessaries not interested and wants to proceed with surgery.   --Daily pelvic floor exercises as assigned, consider Pelvic floor PT in future  --Non surgical options discussed with patient    --Surgical options discussed such as anterior/ posterior colporrhaphy, enterocele repair . Risks/ benefits/ alternatives/ succuss and failure rates were reviewed.     3. Constipation: bothersome   --Linzess  --Start miralax every other day may increase to daily   --Will recommend colorectal evaluation, may need a colonoscopy     4. --Nocturia (nighttime urination): stop fluids 2 hours before bed.  If have leg swelling:  Elevate feet above chest x 1 hour before bed to get excess fluid off.  Can also use support hose (knee highs).      5. Chronic opiate use  --Working with Dr. Almazan ([place])     6. UTI  --urine culture   --Rx for keflex 500 mg bid

## 2018-01-26 NOTE — Clinical Note
Michael Flores,  I saw her today. She looks like she's got an infection. I did UDS back on October which did show WILLIAM, she wants surgery but recently broke her leg and may need surgery. Gonna have her come back in 2 months to see how she's doing. Do you want her to see you before then? Let me know.  Jaquelin

## 2018-01-26 NOTE — PROGRESS NOTES
Subjective:       Patient ID: Alanis Mendieta is a 67 y.o. female.    Chief Complaint:  Follow-up (2 mo )      History of Present Illness: 66 Y O F with history of HTN, DM, COPD on home O2, Thyroid cancer (papillary) s/p totally thyroidectomy and radiation on supplemental oxygen since 2012 has a history of fall with spinal stenosis ambulates with a walker. Lives in a nursing home.  She was a referral by STEFAN Jacobson for evaluation of urinary incontinence not quite sure if urge associated. Leakage happenes mostly with change in posisiton. Night time urination is bothersome.  She has seen Dr. Strong and Dr. Russell in the past for her urinary incontinence. She was tried oxybuynin and started myriberiq 50 mg daily not helping with her symptoms.     HPI:    Interval  HP since the last visit   1)  UI:  (+) WILLIAM  (+) UUI  - (+) diapers: 6.  Daytime frequency: Q 4 -5 hours.  Nocturia: Yes:    (--) dysuria-  (--) hematuria,  (--) frequent UTIs.  (+) complete bladder emptying.     2)  POP:   Symptoms:(+)  .  (--) vaginal bleeding. (--) vaginal discharge. (-) sexually active.  (--) dyspareunia.   (--)  Vaginal dryness.  (--) vaginal estrogen use.     3)  BM:  (+) constipation/straining.  (--) chronic diarrhea. (--) hematochezia.  (--) fecal incontinence.  (+) fecal smearing/urgency.  (--) incomplete evacuation.  No recent colonoscopy     4)Pessary- tried fell out;      5) Recurrent UTI: UCx:   9/29/17            E. Coli 100 K  8/16/17             E.coli, 2+leukocyte/nitrite, voided   7/27/17                      E.coli  6/12/17                      k.pneumoniae, cath, nitrite +  5/8/17                                  aerococcus, 100k, voided, tr       [facility]     Question 9/29/2017  4:14 PM CDT   General Urogynecology: Are you experiencing the following?    Dysuria (painful urination) Yes   Nocturia:  waking up at night to empty your bladder  Yes   If you answered yes to the previous question, how many times  "does this happen per night? 3-4   Enuresis (urine loss during sleep) Yes   Dribbling urine after you urinate No   Hematuria (urine appears red) Yes   Type of stream Interrupted   Urinary Incontinence (General): Are you experiencing the following?    Past consultation for incontinence: Have you ever seen someone for the evaluation of incontinence? No   If you answered yes to the previous question, please select all the therapies you have tried.  Kegals   Please note the effectiveness of the therapies. Ineffective   Need to wear protection to keep clothes dry  No   If you answered yes to the previous question, please royer the protection you use.  Diaper   If you wear protection, how much wetness is typically on each pad? Moderate   If you wear protection, how often do you have to change per day, if applicable?  7-8   Stress Symptoms: Are you experiencing the following?    Leakage of urine with cough, laugh and/or sneeze Yes   If you answered yes to the previous question, what is the frequency in days, weeks and/or months? Weekly   Leakage of urine with sex No   Leakage of urine with bending/ lifting Yes   Leakage of urine with briskly walking or jogging No   If you lose urine for any other reason not previously mentioned, please note it below, if applicable.     Urge Symptoms: Are you experiencing the following?    Urgency ("got to go" feeling) Yes   Urge: How frequently do you feel an urge to urinate (feeling like you "gotta go" to the bathroom and can't wait) Several times a day   Do you experience a leakage of urine when you have a feeling of urgency?  Yes   Leakage of urine when unaware Yes   Past use of anticholinergics (medications used to treat overactive bladder) No   If you answered yes to the previous question, please royer the anticholinergics you have used:  Oxybutynin   Have you ever used Mirbetriq (aka Mirabegron)?  Yes   Prolapse Symptoms: Are you experiencing any of the following?     Falling out/ " Bulging/ Heaviness in the vagina No   Vaginal/ Abdominal Pain/ Pressure No   Need to strain/ Push to void No   Need to wait on the toilet before you void No   Unusual position to urinate (using your hands to push back the vaginal bulge) No   Sensation of incomplete emptying Yes   Past use of pessary device No   If you answered yes to the previous question, please list the devices you have used below.     Bowel Symptoms: Are you experiencing any of the following?    Constipation Yes   Diarrhea  No   Hematochezia (bloody stool) No   Incomplete evacuation of stool No   Involuntary loss of formed stool No   Fecal smearing/urgency No   Involuntary loss of gas No   Vaginal Symptoms: Are you experiencing any of the following?     Abnormal vaginal bleeding  No   Vaginal dryness No   Sexually active  No   Dyspareunia (painful intercourse) No   Estrogen use  No       GYN & OB History  No LMP recorded. Patient has had a hysterectomy.   Date of Last Pap: No result found    OB History    Para Term  AB Living   3       1     SAB TAB Ectopic Multiple Live Births           2      # Outcome Date GA Lbr Guicho/2nd Weight Sex Delivery Anes PTL Lv   3             2             1 AB                 Past Medical History:   Diagnosis Date    Allergy     Anxiety     Arthritis     Bipolar disorder     Chronic back pain     COPD (chronic obstructive pulmonary disease)     Diabetes mellitus     GERD (gastroesophageal reflux disease)     Hx of thyroid cancer     Hyperlipidemia     Hypertension     Hypothyroidism     Restless leg syndrome     Urinary tract infection      Past Surgical History:   Procedure Laterality Date    CHOLECYSTECTOMY      HYSTERECTOMY      SPINAL FUSION      TOTAL THYROIDECTOMY       Review of patient's allergies indicates:   Allergen Reactions    Morpholine analogues     Tubersol [tuberculin ppd]        Current Outpatient Prescriptions:     ANORO ELLIPTA 62.5-25  "mcg/actuation DsDv, , Disp: , Rfl:     calcitRIOL (ROCALTROL) 0.5 MCG Cap, , Disp: , Rfl:     clonazePAM (KLONOPIN) 1 MG tablet, , Disp: , Rfl:     duloxetine (CYMBALTA) 30 MG capsule, , Disp: , Rfl:     fluoxetine (PROZAC) 20 MG capsule, , Disp: , Rfl:     fluoxetine (PROZAC) 40 MG capsule, , Disp: , Rfl:     furosemide (LASIX) 20 MG tablet, , Disp: , Rfl:     gabapentin (NEURONTIN) 800 MG tablet, , Disp: , Rfl:     lamotrigine (LAMICTAL) 100 MG tablet, , Disp: , Rfl:     latanoprost 0.005 % ophthalmic solution, , Disp: , Rfl:     LEVEMIR FLEXTOUCH 100 unit/mL (3 mL) InPn pen, , Disp: , Rfl:     levothyroxine (SYNTHROID) 150 MCG tablet, , Disp: , Rfl:     LINZESS 145 mcg Cap capsule, , Disp: , Rfl:     losartan-hydrochlorothiazide 50-12.5 mg (HYZAAR) 50-12.5 mg per tablet, , Disp: , Rfl:     meclizine (ANTIVERT) 25 mg tablet, , Disp: , Rfl:     methocarbamol (ROBAXIN) 500 MG Tab, , Disp: , Rfl:     mirabegron (MYRBETRIQ) 50 mg Tb24, Take 1 tablet (50 mg total) by mouth once daily., Disp: 30 tablet, Rfl: 11    NOVOFINE AUTOCOVER 30 gauge x 1/3" Ndle, , Disp: , Rfl:     NOVOLOG FLEXPEN 100 unit/mL InPn pen, , Disp: , Rfl:     oxycodone (ROXICODONE) 10 mg Tab immediate release tablet, , Disp: , Rfl:     pantoprazole (PROTONIX) 40 MG tablet, , Disp: , Rfl:     ropinirole (REQUIP) 4 MG tablet, , Disp: , Rfl:     trazodone (DESYREL) 150 MG tablet, , Disp: , Rfl:     ciprofloxacin HCl (CIPRO) 500 MG tablet, Take 1 tablet (500 mg total) by mouth 2 (two) times daily., Disp: 14 tablet, Rfl: 0    oxybutynin (DITROPAN-XL) 10 MG 24 hr tablet, Take 1 tablet (10 mg total) by mouth once daily., Disp: 30 tablet, Rfl: 11    polyethylene glycol (GLYCOLAX) 17 gram PwPk, Take 17 g by mouth every other day., Disp: 100 packet, Rfl: 2      Review of Systems  Review of Systems   Constitutional: Negative.  Negative for activity change, appetite change, chills, diaphoresis, fatigue, fever and unexpected weight " change.   HENT: Negative.    Eyes: Negative.    Respiratory: Negative.  Negative for apnea, cough and wheezing.    Cardiovascular: Negative.  Negative for chest pain and palpitations.   Gastrointestinal: Positive for constipation. Negative for abdominal distention, abdominal pain, anal bleeding, blood in stool, diarrhea, nausea, rectal pain and vomiting.   Endocrine: Negative.    Genitourinary: Positive for frequency and urgency. Negative for decreased urine volume, difficulty urinating, dyspareunia, dysuria, enuresis, flank pain, genital sores, hematuria, menstrual problem, pelvic pain, vaginal bleeding, vaginal discharge and vaginal pain.   Musculoskeletal: Negative for back pain and gait problem.   Skin: Negative for color change, pallor, rash and wound.   Allergic/Immunologic: Negative for immunocompromised state.   Neurological: Negative.  Negative for dizziness and speech difficulty.   Hematological: Negative for adenopathy.   Psychiatric/Behavioral: Negative for agitation, behavioral problems, confusion and sleep disturbance.           Objective:     Physical Exam   Constitutional: She is oriented to person, place, and time. She appears well-developed and well-nourished.   HENT:   Head: Normocephalic and atraumatic.   Neck: Normal range of motion. Neck supple.   Pulmonary/Chest: No respiratory distress. She has no wheezes.   Musculoskeletal: Normal range of motion.   Leg soft cast  Wheelchair bound   Neurological: She is alert and oriented to person, place, and time.   Skin: No cyanosis. Nails show no clubbing.   Psychiatric: She has a normal mood and affect. Her behavior is normal. Judgment and thought content normal.          Assessment:        1. Acute cystitis without hematuria    2. Mixed incontinence    3. Urinary urgency    4. Nocturia    5. Prolapse of anterior vaginal wall    6. Posterior vaginal wall prolapse               Plan:       1.  Mixed urinary incontinence, urge > stress:   -- timed voids  every 3 hours.  Empty well: wait a minute, lean forward on toilet.    --Avoid dietary irritants (see sheet).  Keep diary x 3-5 days to determine your irritants.  --KEGELS: do 10 in AM and 10 in PM, holding each x 10 seconds.  When you feel urge to go, STOP, KEGEL, and when urge has passed, then go to bathroom.  Consider PT in future.    --URGE: Continue Myrbetriq 50 mg daily  --STRESS:  Sling: UDS + WILLIAM, will follow up with Dr. Strong   --Voiding dysfunction, Recommend pelvic floor PT with STEFAN Jacobson once foot has healed.     2. Prolapse:  Symptomatic Stage 2 anterior and posterior vaginal wall prolapse; recently   --failed Pessary trial, discussed other pessaries not interested and wants to proceed with surgery.   --Daily pelvic floor exercises as assigned, consider Pelvic floor PT in future  --Non surgical options discussed with patient    --Surgical options discussed such as anterior/ posterior colporrhaphy, enterocele repair . Risks/ benefits/ alternatives/ succuss and failure rates were reviewed. Would consider in the future.     3. Constipation: bothersome   --Linzess  --Start miralax every other day may increase to daily   --Will recommend colorectal evaluation, may need a colonoscopy     4. --Nocturia (nighttime urination): stop fluids 2 hours before bed.  If have leg swelling:  Elevate feet above chest x 1 hour before bed to get excess fluid off.  Can also use support hose (knee highs).      5. . Recurrent UTI   --UA and Culture  --Vaginal estrogen has been shown to decrease the recurrence of urinary tract infections in post menopausal women. Will hold off until she is able ambulate and better.   --Change pads often: Q 2-3 hours and add barrier cream daily (Dana's butt paste vs dessitin)   --Add Cranberry supplementation and Probiotics, D-Manose, consider methenamine in the future   --Discussed long term treatment options including: Antibiotic ppx vs self treatment    6. Chronic opiate  use  --Working with Dr. Almazan ([place])     Approximately  30 min were spent in consult, 90 % in discussion.     Mark Townsend DO  Female Pelvic Medicine and Reconstructive Surgery  [facility] [place], [place]

## 2018-01-29 LAB — BACTERIA UR CULT: NORMAL

## 2018-01-30 ENCOUNTER — TELEPHONE (OUTPATIENT)
Dept: UROGYNECOLOGY | Facility: CLINIC | Age: 68
End: 2018-01-30

## 2018-01-30 NOTE — TELEPHONE ENCOUNTER
Called patient to see if she had started keflex given at the office.  Taking med's symptoms resolved.

## 2018-01-31 ENCOUNTER — TELEPHONE (OUTPATIENT)
Dept: ORTHOPEDICS | Facility: CLINIC | Age: 68
End: 2018-01-31

## 2018-01-31 NOTE — TELEPHONE ENCOUNTER
Spoke to Lisa at [facility]. Informed that patient's information was sent to Dr. Caruso. They will need to call his office to schedule an appt as we have sent the patient's information to him. Lisa stated understanding.

## 2018-01-31 NOTE — TELEPHONE ENCOUNTER
----- Message from Anthony Jerome sent at 1/31/2018 12:34 PM CST -----  Contact: [facility]/Elda Schroeder called in regarding the attached patient and stated nurse was supposed to call Berkley back about rescheduling this patient to get her cast off.  Patient had appt on 1/4/18 but got cancelled.  Elda asked for a call back to patients , Samara Velarde at [phone]

## 2018-03-23 ENCOUNTER — OFFICE VISIT (OUTPATIENT)
Dept: UROLOGY | Facility: CLINIC | Age: 68
End: 2018-03-23
Payer: MEDICARE

## 2018-03-23 ENCOUNTER — APPOINTMENT (OUTPATIENT)
Dept: LAB | Facility: HOSPITAL | Age: 68
End: 2018-03-23
Attending: UROLOGY
Payer: MEDICARE

## 2018-03-23 ENCOUNTER — OFFICE VISIT (OUTPATIENT)
Dept: UROGYNECOLOGY | Facility: CLINIC | Age: 68
End: 2018-03-23
Payer: MEDICARE

## 2018-03-23 VITALS
SYSTOLIC BLOOD PRESSURE: 102 MMHG | HEART RATE: 75 BPM | HEIGHT: 64 IN | WEIGHT: 244.06 LBS | DIASTOLIC BLOOD PRESSURE: 61 MMHG | TEMPERATURE: 98 F | BODY MASS INDEX: 41.67 KG/M2

## 2018-03-23 VITALS
DIASTOLIC BLOOD PRESSURE: 68 MMHG | WEIGHT: 244 LBS | SYSTOLIC BLOOD PRESSURE: 118 MMHG | HEIGHT: 64 IN | BODY MASS INDEX: 41.66 KG/M2 | TEMPERATURE: 98 F | HEART RATE: 83 BPM

## 2018-03-23 DIAGNOSIS — N39.0 RECURRENT UTI: Primary | ICD-10-CM

## 2018-03-23 DIAGNOSIS — R39.15 URINARY URGENCY: Primary | ICD-10-CM

## 2018-03-23 LAB
BACTERIA #/AREA URNS HPF: ABNORMAL /HPF
BILIRUB SERPL-MCNC: ABNORMAL MG/DL
BILIRUB UR QL STRIP: NEGATIVE
BLOOD URINE, POC: ABNORMAL
CLARITY UR: ABNORMAL
COLOR UR: YELLOW
COLOR, POC UA: YELLOW
GLUCOSE UR QL STRIP: ABNORMAL
GLUCOSE UR QL STRIP: NEGATIVE
HGB UR QL STRIP: NEGATIVE
KETONES UR QL STRIP: ABNORMAL
KETONES UR QL STRIP: NEGATIVE
LEUKOCYTE ESTERASE UR QL STRIP: ABNORMAL
LEUKOCYTE ESTERASE URINE, POC: ABNORMAL
MICROSCOPIC COMMENT: ABNORMAL
NITRITE UR QL STRIP: POSITIVE
NITRITE, POC UA: POSITIVE
PH UR STRIP: 6 [PH] (ref 5–8)
PH, POC UA: 6
PROT UR QL STRIP: NEGATIVE
PROTEIN, POC: ABNORMAL
SP GR UR STRIP: 1.01 (ref 1–1.03)
SPECIFIC GRAVITY, POC UA: 1.01
URN SPEC COLLECT METH UR: ABNORMAL
UROBILINOGEN UR STRIP-ACNC: NEGATIVE EU/DL
UROBILINOGEN, POC UA: ABNORMAL
WBC #/AREA URNS HPF: >100 /HPF (ref 0–5)

## 2018-03-23 PROCEDURE — 99213 OFFICE O/P EST LOW 20 MIN: CPT | Mod: PBBFAC,PO | Performed by: OBSTETRICS & GYNECOLOGY

## 2018-03-23 PROCEDURE — 87186 SC STD MICRODIL/AGAR DIL: CPT

## 2018-03-23 PROCEDURE — 99999 PR PBB SHADOW E&M-EST. PATIENT-LVL V: CPT | Mod: PBBFAC,,, | Performed by: UROLOGY

## 2018-03-23 PROCEDURE — 99214 OFFICE O/P EST MOD 30 MIN: CPT | Mod: 25,S$GLB,, | Performed by: UROLOGY

## 2018-03-23 PROCEDURE — 99999 PR PBB SHADOW E&M-EST. PATIENT-LVL III: CPT | Mod: PBBFAC,,, | Performed by: OBSTETRICS & GYNECOLOGY

## 2018-03-23 PROCEDURE — 87086 URINE CULTURE/COLONY COUNT: CPT

## 2018-03-23 PROCEDURE — 87077 CULTURE AEROBIC IDENTIFY: CPT

## 2018-03-23 PROCEDURE — 87088 URINE BACTERIA CULTURE: CPT

## 2018-03-23 PROCEDURE — 81002 URINALYSIS NONAUTO W/O SCOPE: CPT | Mod: S$GLB,,, | Performed by: UROLOGY

## 2018-03-23 PROCEDURE — 99214 OFFICE O/P EST MOD 30 MIN: CPT | Mod: S$PBB,,, | Performed by: OBSTETRICS & GYNECOLOGY

## 2018-03-23 PROCEDURE — 81000 URINALYSIS NONAUTO W/SCOPE: CPT

## 2018-03-23 RX ORDER — MUPIROCIN 20 MG/G
OINTMENT TOPICAL
COMMUNITY
Start: 2018-03-07 | End: 2018-04-17

## 2018-03-23 RX ORDER — ESTRADIOL 0.1 MG/G
1 CREAM VAGINAL
Qty: 42.5 G | Refills: 6 | Status: SHIPPED | OUTPATIENT
Start: 2018-03-23 | End: 2018-04-12 | Stop reason: SDUPTHER

## 2018-03-23 NOTE — PROGRESS NOTES
"[facility] Note - [place]  Staff: MD Ligia    Referring provider and please cc: Christos  PCP: Jesus Melendez-[facility] pt    [facility]: inactive    Chief Complaint: recurrent uti    Subjective:        HPI: Alanis Mendieta is a 67 y.o. female presents with     Resident of [facility], there for last 2 years   Pt was an LPN    Recurrent uti  - Likely related to pad use. Last pvr by in and out cath was 10cc  - Sx include: dysuria with voiding  - RF: pads, diabetic hba1c 7.3  - Mild renal cortical thinning. Otherwise unremarkable exam.  - pt was supposed to have a pain pump on 3/16/18 (week ago) and was to have pain pump but found to have UTI. She was started on cipro and has been on this for a week. She says her urine is less foul smelling and she has less burning.     UA voided: 1.015/6/2+/leuk/nit+/trace blood -some burning  UCx:   1/26/18 E.coli  09/29/17 E.coli   8/14/17 E.coli, void: 2+leukocyte/nitrite, voided   7/27/17 E.coli  6/12/17  k.pneumoniae, cath, nitrite +  5/8/17   aerococcus, 100k, voided, tr leuk    oab/mixed incontinence  -fell in 12/15 and says she had significant incontinence after, but says her incontinence was present for many years before surgery.  +WILLIAM and UUI. had c-spine surgery a year ago and says sx have increased. She states she does not feel the urge to go many times.  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   - 5/8/17 seen by christos for recurrent E.coli uti and incontinence. Nocturia  3x a night. Wears 10-11 depends/day. Was started on myrbetriq 25mg daily. Voided ucx was aerococcus. She says she has asymptomatic utis. Used to be symptomatic.  -6/12/17 seen by monty nunn. States myrbetriq helped "some" and increased to myrbetriq 50mg.  In and out cath was 80cc. ua was nitrite +. ucx with k.pneumoniae.   - 7/13/17 frequency had decreased. Prior to the myrbetriq she was going 10x a day, now " about 6x a day. Down to 1 pad from 6 or 7 pads a day. Denies any WILLIAM. + constipation (q3 to 4 days and takes Linzess). DM x 17 years. +stress test with large volume incontinence. But poor candidate for surgery. hba1c 8.9  -8/14/17 dysuria. Urine with nitrite +. Nocturia 2-3x a night. Voids 6-8x a day without lasix. Lasix 3x a week. +UUI.  6-7 depends a day. Has never tried tried ditropan. Takes colace 1x a day and has a bm soft to hard, daily (on oxycodone 10mg 3x a day for her back). Drinks 1 coke a day and 2 coffees a day.    -1/28/18 seen by  and found to have symptomatic stage 2 A/P prolapse. Failed pessary trial. Wanted to proceed with surgery. However she broke her foot and ankle when she was walking with her walker  She was started on hormone cream but pt states she can't reach but a nurse should be able to.     Today she states she is using 6 depends a day. She has a uti again. Not using estrace. Still drinking 4 cups of coffee a day.     ECOG Status: 1 - in a wheelchair but can walk with assistance. She is on O2 for copd. Resident of [facility] after c-spine surgery. She's been there for a year and plan is for her to leave.    G3, P 2, vaginal   Gross HematuriaYes - >5 years ago  History of UTI: yes    REVIEW OF SYSTEMS:  General ROS: no fevers, no chills  Psychological ROS: no depression  Endocrine ROS: no heat or cold  Respiratory ROS: + SOB  Cardiovascular ROS: no CP  Gastrointestinal ROS: no abdominal pain, + constipation, no diarrhea, +BRBPR with tearing. Musculoskeletal ROS: no muscle pain  Neurological ROS: no headaches  Dermatological ROS: no rashes  HEENT: noiglasses, no sinus    ROS: per HPI     Past medical, surgical, social and family hx have been reviewed. There have not any changes.     Allergies:  Morpholine analogues and Tubersol [tuberculin ppd]    Medications: Myrbetriq, pain medicine  Anticoagulation: No    Objective:     Vitals:    03/23/18 0943   BP: 118/68   Pulse: 83    Temp: 97.7 °F (36.5 °C)       General:WDWN in NAD  Eyes: PERRLA, normal conjunctiva  Respiratory: no increased work on breathing, clear to auscultation  Cardiovascular: regular rate and rhythm. No obvious extremity edema.  GI: no palpation of masses. No tenderness. No hepatosplenomegaly to palpation.  Musculoskeletal: normal range of motion of bilateral upper extremities. Normal muscle strength and tone.  Skin: no obvious rashes or lesions. No tightening of skin noted.  Neurologic: CN grossly normal. Normal sensation.   Psychiatric: awake, alert and oriented x 3. Mood and affect normal. Cooperative.     Pelvic exam 7/13/17:  negative stress test with coughing  In and out cath performed with 20 residual  Bladder filled to 150 very slowly  Sensation to void felt at 120cc  Catheter removed  +large volume stress test with coughing but not with valsava  Had pt stand and she had large volume leakage with coughing and valsava  No prolapse  Stool palpable in vault  +severe atrophic vaginitis      LABS REVIEW:      Labs reviewed 7/13/17  Glucose 113  Cr 0  0.8  H/H 14.4/42.4  Hba1c 8.9    Cr:   Lab Results   Component Value Date    CREATININE 0.81 07/27/2017       PATHOLOGY REVIEW:  none    RADIOGRAPHIC REVIEW:  rbus 5/23/17  No stone  No hydro  Bladder moderately distended      Assessment:       1. Recurrent UTI          Plan:     Recurrent uti  -send urine for culture, having some burning, currently on cipro - will not treat, already on cipro   -explained that she will always have an infection with her pads and diabetes. If she has her A/P repair and sling may decrease her pad usage and ultimately her UTIs. During the day she tries to void in the toilet, has some difficulty and nurse's aid either do not respond in time and with pt's oab difficult for her to wait. Not steady enough to void on her own.    -if she wants pain pump then she will need a urine culture done 7-10 days prior to her surgery and start abx 5 days  prior to her surgery and do the surgery while she is on abx. However she will likely have recurrent uti after.  -apply estrace cream vaginally with applicator 3x a week - to be done by nurse's aide.   -still will have recurrent uti's after surgery if she is still in pads bc of functional incontinence. Explained she is going to have to have aid bring her to toilet every 2 hours even if she doesn't have to void.   -will fax a copy of this note to her pain doctor     Mixed incontinence and OAB with Anterior and Posterior Prolapse.   -urge incontinence not improved with myrbetriq and ditropan.   -seeing  today to discuss A/p repair and possible sling (+ stress test)   -she has significant constipation and has a bm once every 2 weeks.  Takes linzess daily and milk of magnesia as needed. This may help her Overactive bladder and stress incontienence. High risk for failure of posterior repair bc of chornic constipation. Takes pain medicine 3x a day.   -start estrogen cream vaginally 3x a week.   -Continue myrbetriq 50mg daily and discontinue ditropan.   -needs to avoid all caffeine.     Will see what  says about scheduling surgery.  Pt would need clearance from Dr.Michael Melendez, does not have pain doctor.   Need to see a1c and other labs from  (a1c 7.3)  Not on any blood thinners, no aspirin.   Will need a cath a urinalysis and urine culture 1 week prior.    Note routed to  and   As well as her pain doctor   Faith Strong MD

## 2018-03-23 NOTE — PATIENT INSTRUCTIONS
Recurrent uti  -send urine for culture, having some burning, currently on cipro  -explained that she will always have an infection with her pads and diabetes. If she has her A/P repair and sling may decrease her pad usage and ultimately her UTIs. During the day she tries to void in the toilet, has some difficulty and nurse's aid either do not respond in time and with pt's oab difficult for her to wait. Not steady enough to void on her own.    -if she wants pain pump then she will need a urine culture done 7-10 days prior to her surgery and start abx 5 days prior to her surgery and do the surgery while she is on abx. However she will likely have recurrent uti after.  -apply estrace cream vaginally with applicator 3x a week - to be done by nurse's aide.   -still will have recurrent uti's after surgery if she is still in pads bc of functional incontinence. Explained she is going to have to have aid bring her to toilet every 2 hours even if she doesn't have to void.     Mixed incontinence and OAB with Anterior and Posterior Prolapse.   -urge incontinence not improved with myrbetriq and ditropan.   -seeing  today to discuss A/p repair and possible sling (+ stress test)   -she has significant constipation and has a bm once every 2 weeks.  Takes linzess daily and milk of magnesia as needed. This may help her Overactive bladder and stress incontienence. High risk for failure of posterior repair bc of chornic constipation. Takes pain medicine 3x a day.   -start estrogen cream vaginally 3x a week.   -Continue myrbetriq 50mg daily and discontinue ditropan.   -needs to avoid all caffeine.     Will see what  says about scheduling surgery.  Pt would need clearance from Dr.Michael Melendez, does not have pain doctor.   Need to see a1c and other labs from  (a1c 7.3)  Not on any blood thinners, no aspirin.

## 2018-03-23 NOTE — PROGRESS NOTES
ubjective:       Patient ID: Alanis Mendieta is a 67 y.o. female.    Chief Complaint:  Follow-up      History of Present Illness: 66 Y O F with history of HTN, DM, COPD on home O2, Thyroid cancer (papillary) s/p totally thyroidectomy and radiation on supplemental oxygen since 2012 has a history of fall with spinal stenosis ambulates with a walker. Lives in a nursing home.  She was a referral by STEFAN Jacobson for evaluation of urinary incontinence not quite sure if urge associated. Leakage happenes mostly with change in posisiton. Night time urination is bothersome.  She has seen Dr. Strong and Dr. Russell in the past for her urinary incontinence. She was tried oxybuynin and started myriberiq 50 mg daily not helping with her symptoms.         Interval  HP since the last visit   1)  UI:  (+) WILLIAM  (+) UUI  - (+) diapers: 6.  Daytime frequency: Q 4 -5 hours.  Nocturia: Yes:    (--) dysuria-  (--) hematuria,  (--) frequent UTIs.  (+) complete bladder emptying.     2)  POP:   Symptoms:(+)  .  (--) vaginal bleeding. (--) vaginal discharge. (-) sexually active.  (--) dyspareunia.   (--)  Vaginal dryness.  (--) vaginal estrogen use.     3)  BM:  (+) constipation/straining.  (--) chronic diarrhea. (--) hematochezia.  (--) fecal incontinence.  (+) fecal smearing/urgency.  (--) incomplete evacuation.  No recent colonoscopy     4)Pessary- tried fell out;      5) Recurrent UTI: UCx:   9/29/17            E. Coli 100 K  8/16/17             E.coli, 2+leukocyte/nitrite, voided   7/27/17                      E.coli  6/12/17                      k.pneumoniae, cath, nitrite +  5/8/17                                  aerococcus, 100k, voided, tr       [facility]     Question 9/29/2017  4:14 PM CDT   General Urogynecology: Are you experiencing the following?    Dysuria (painful urination) Yes   Nocturia:  waking up at night to empty your bladder  Yes   If you answered yes to the previous question, how many times does this  "happen per night? 3-4   Enuresis (urine loss during sleep) Yes   Dribbling urine after you urinate No   Hematuria (urine appears red) Yes   Type of stream Interrupted   Urinary Incontinence (General): Are you experiencing the following?    Past consultation for incontinence: Have you ever seen someone for the evaluation of incontinence? No   If you answered yes to the previous question, please select all the therapies you have tried.  Kegals   Please note the effectiveness of the therapies. Ineffective   Need to wear protection to keep clothes dry  No   If you answered yes to the previous question, please royer the protection you use.  Diaper   If you wear protection, how much wetness is typically on each pad? Moderate   If you wear protection, how often do you have to change per day, if applicable?  7-8   Stress Symptoms: Are you experiencing the following?    Leakage of urine with cough, laugh and/or sneeze Yes   If you answered yes to the previous question, what is the frequency in days, weeks and/or months? Weekly   Leakage of urine with sex No   Leakage of urine with bending/ lifting Yes   Leakage of urine with briskly walking or jogging No   If you lose urine for any other reason not previously mentioned, please note it below, if applicable.     Urge Symptoms: Are you experiencing the following?    Urgency ("got to go" feeling) Yes   Urge: How frequently do you feel an urge to urinate (feeling like you "gotta go" to the bathroom and can't wait) Several times a day   Do you experience a leakage of urine when you have a feeling of urgency?  Yes   Leakage of urine when unaware Yes   Past use of anticholinergics (medications used to treat overactive bladder) No   If you answered yes to the previous question, please royer the anticholinergics you have used:  Oxybutynin   Have you ever used Mirbetriq (aka Mirabegron)?  Yes   Prolapse Symptoms: Are you experiencing any of the following?     Falling out/ Bulging/ " Heaviness in the vagina No   Vaginal/ Abdominal Pain/ Pressure No   Need to strain/ Push to void No   Need to wait on the toilet before you void No   Unusual position to urinate (using your hands to push back the vaginal bulge) No   Sensation of incomplete emptying Yes   Past use of pessary device No   If you answered yes to the previous question, please list the devices you have used below.     Bowel Symptoms: Are you experiencing any of the following?    Constipation Yes   Diarrhea  No   Hematochezia (bloody stool) No   Incomplete evacuation of stool No   Involuntary loss of formed stool No   Fecal smearing/urgency No   Involuntary loss of gas No   Vaginal Symptoms: Are you experiencing any of the following?     Abnormal vaginal bleeding  No   Vaginal dryness No   Sexually active  No   Dyspareunia (painful intercourse) No   Estrogen use  No       GYN & OB History  No LMP recorded. Patient has had a hysterectomy.   Date of Last Pap: No result found    OB History    Para Term  AB Living   3       1     SAB TAB Ectopic Multiple Live Births           2      # Outcome Date GA Lbr Guicho/2nd Weight Sex Delivery Anes PTL Lv   3             2             1 AB                 Past Medical History:   Diagnosis Date    Allergy     Anxiety     Arthritis     Bipolar disorder     Chronic back pain     COPD (chronic obstructive pulmonary disease)     Diabetes mellitus     GERD (gastroesophageal reflux disease)     Hx of thyroid cancer     Hyperlipidemia     Hypertension     Hypothyroidism     Restless leg syndrome     Urinary tract infection      Past Surgical History:   Procedure Laterality Date    CHOLECYSTECTOMY      HYSTERECTOMY      SPINAL FUSION      TOTAL THYROIDECTOMY       Review of patient's allergies indicates:   Allergen Reactions    Morpholine analogues     Tubersol [tuberculin ppd]        Current Outpatient Prescriptions:     ANORO ELLIPTA 62.5-25 mcg/actuation  "DsDv, , Disp: , Rfl:     calcitRIOL (ROCALTROL) 0.5 MCG Cap, , Disp: , Rfl:     clonazePAM (KLONOPIN) 1 MG tablet, , Disp: , Rfl:     duloxetine (CYMBALTA) 30 MG capsule, , Disp: , Rfl:     ERGOCALCIFEROL, VITAMIN D2, (VITAMIN D ORAL), Take by mouth., Disp: , Rfl:     estradiol (ESTRACE) 0.01 % (0.1 mg/gram) vaginal cream, Place 1 g vaginally 3 (three) times a week. Once a day for first two weeks then 3x a week after, Disp: 42.5 g, Rfl: 6    fluoxetine (PROZAC) 20 MG capsule, , Disp: , Rfl:     fluoxetine (PROZAC) 40 MG capsule, , Disp: , Rfl:     furosemide (LASIX) 20 MG tablet, , Disp: , Rfl:     gabapentin (NEURONTIN) 800 MG tablet, , Disp: , Rfl:     lamotrigine (LAMICTAL) 100 MG tablet, , Disp: , Rfl:     latanoprost 0.005 % ophthalmic solution, , Disp: , Rfl:     LEVEMIR FLEXTOUCH 100 unit/mL (3 mL) InPn pen, , Disp: , Rfl:     levothyroxine (SYNTHROID) 150 MCG tablet, , Disp: , Rfl:     LINZESS 145 mcg Cap capsule, , Disp: , Rfl:     losartan-hydrochlorothiazide 50-12.5 mg (HYZAAR) 50-12.5 mg per tablet, , Disp: , Rfl:     meclizine (ANTIVERT) 25 mg tablet, , Disp: , Rfl:     methocarbamol (ROBAXIN) 500 MG Tab, , Disp: , Rfl:     mirabegron (MYRBETRIQ) 50 mg Tb24, Take 1 tablet (50 mg total) by mouth once daily., Disp: 30 tablet, Rfl: 11    mupirocin (BACTROBAN) 2 % ointment, , Disp: , Rfl:     NOVOFINE AUTOCOVER 30 gauge x 1/3" Ndle, , Disp: , Rfl:     NOVOLOG FLEXPEN 100 unit/mL InPn pen, , Disp: , Rfl:     oxycodone (ROXICODONE) 10 mg Tab immediate release tablet, , Disp: , Rfl:     pantoprazole (PROTONIX) 40 MG tablet, , Disp: , Rfl:     polyethylene glycol (GLYCOLAX) 17 gram PwPk, Take 17 g by mouth every other day., Disp: 100 packet, Rfl: 2    ropinirole (REQUIP) 4 MG tablet, , Disp: , Rfl:     trazodone (DESYREL) 150 MG tablet, , Disp: , Rfl:       Review of Systems  Review of Systems   Constitutional: Negative.  Negative for activity change, appetite change, chills, " diaphoresis, fatigue, fever and unexpected weight change.   HENT: Negative.    Eyes: Negative.    Respiratory: Negative.  Negative for apnea, cough and wheezing.    Cardiovascular: Negative.  Negative for chest pain and palpitations.   Gastrointestinal: Positive for constipation. Negative for abdominal distention, abdominal pain, anal bleeding, blood in stool, diarrhea, nausea, rectal pain and vomiting.   Endocrine: Negative.    Genitourinary: Positive for frequency and urgency. Negative for decreased urine volume, difficulty urinating, dyspareunia, dysuria, enuresis, flank pain, genital sores, hematuria, menstrual problem, pelvic pain, vaginal bleeding, vaginal discharge and vaginal pain.   Musculoskeletal: Negative for back pain and gait problem.   Skin: Negative for color change, pallor, rash and wound.   Allergic/Immunologic: Negative for immunocompromised state.   Neurological: Negative.  Negative for dizziness and speech difficulty.   Hematological: Negative for adenopathy.   Psychiatric/Behavioral: Negative for agitation, behavioral problems, confusion and sleep disturbance.           Objective:     Physical Exam   Constitutional: She is oriented to person, place, and time. She appears well-developed and well-nourished.   HENT:   Head: Normocephalic and atraumatic.   Neck: Normal range of motion. Neck supple.   Pulmonary/Chest: No respiratory distress. She has no wheezes.   Musculoskeletal: Normal range of motion.   Leg soft cast  Wheelchair bound   Neurological: She is alert and oriented to person, place, and time.   Skin: No cyanosis. Nails show no clubbing.   Psychiatric: She has a normal mood and affect. Her behavior is normal. Judgment and thought content normal.          Assessment:        No diagnosis found.           Plan:       1.  Mixed urinary incontinence, urge > stress:   -- timed voids every 3 hours.  Empty well: wait a minute, lean forward on toilet.    --Avoid dietary irritants (see sheet).   Keep diary x 3-5 days to determine your irritants.  --KEGELS: do 10 in AM and 10 in PM, holding each x 10 seconds.  When you feel urge to go, STOP, KEGEL, and when urge has passed, then go to bathroom.  Consider PT in future.    --URGE: Continue Myrbetriq 50 mg daily  --STRESS:  Sling: UDS + WILLIAM, will follow up with Dr. Strong   --Voiding dysfunction, Recommend pelvic floor PT with STEFAN Jacobson once foot has healed.     2. Prolapse:  Symptomatic Stage 2 anterior and posterior vaginal wall prolapse; recently   --failed Pessary trial, discussed other pessaries not interested and wants to proceed with surgery.   --Daily pelvic floor exercises as assigned, consider Pelvic floor PT in future  --Non surgical options discussed with patient    --Surgical options discussed such as anterior/ posterior colporrhaphy, enterocele repair . Risks/ benefits/ alternatives/ succuss and failure rates were reviewed. Would consider in the future.     3. Constipation: Bothersome   --Linzess daily   --Continue miralax every other day may increase to daily   --Will recommend colorectal evaluation, colonoscopy     4. --Nocturia (nighttime urination): stop fluids 2 hours before bed.  If have leg swelling:  Elevate feet above chest x 1 hour before bed to get excess fluid off.  Can also use support hose (knee highs).      5.  Recurrent UTI   --UA and Culture  --Vaginal estrogen has been shown to decrease the recurrence of urinary tract infections in post menopausal women. Will hold off until she is able ambulate and better.   --Change pads often: Q 2-3 hours and add barrier cream daily (Dana's butt paste vs dessitin)   --Add Cranberry supplementation and Probiotics, D-Manose, start Methenamine in the future   --Discussed long term treatment options including: Antibiotic ppx vs self treatment    6. Chronic opiate use  --Working with Dr. Almazan ([place])     Approximately  30 min were spent in consult, 90 % in discussion.      Mark Townsend DO    Female Pelvic Medicine and Reconstructive Surgery  [facility] [place], [place]

## 2018-03-26 LAB — BACTERIA UR CULT: NORMAL

## 2018-03-27 ENCOUNTER — TELEPHONE (OUTPATIENT)
Dept: UROLOGY | Facility: CLINIC | Age: 68
End: 2018-03-27

## 2018-03-27 NOTE — TELEPHONE ENCOUNTER
Please let them know  She will most likley never be clear continously of UTIs   I can write for abx but only if she can get her pain pump while she is on abx  Please find out who the doctor is that's inserting this pain pump and I can speak to him, get his cell number for me please

## 2018-03-27 NOTE — TELEPHONE ENCOUNTER
Spoke with [facility] nurse she states patient was seen by doctor at [facility]. [facility]oned [facility] left message for nurse to give the office a call back

## 2018-03-27 NOTE — TELEPHONE ENCOUNTER
Pt was supposed to have surgery about two weeks ago but it was cancelled bc of UTI. Haile needs to tell us who the surgeon was that was going to operate on her. This is the only way we can communicate to the surgeon the plan myself.

## 2018-03-27 NOTE — TELEPHONE ENCOUNTER
----- Message from Hodan Byrne LPN sent at 3/26/2018  3:25 PM CDT -----  Spoke with nurse at [facility] she states patient needs antibiotic treatment in order to get her pain pump

## 2018-03-27 NOTE — TELEPHONE ENCOUNTER
Unable to get in contact with patient's doctor office. Spoke with someone who gave me a different number to call but states patient in there system has not been since 10/2017

## 2018-03-28 NOTE — TELEPHONE ENCOUNTER
Spoke with 's nurse she states the doctor that is doing the surgery is . She will give 's cell number to  to give  a call

## 2018-03-29 NOTE — TELEPHONE ENCOUNTER
Spoke with  and explained situation. morenitaley to clear. He will check urine 10-14d prior to her procedure and treat her with culture romeo abx for 7d prior to procedure and keep her on it for 3 more days. And if it appears contaminated rec'd cath urine.

## 2018-04-03 ENCOUNTER — OFFICE VISIT (OUTPATIENT)
Dept: RHEUMATOLOGY | Facility: CLINIC | Age: 68
End: 2018-04-03
Payer: MEDICARE

## 2018-04-03 VITALS
SYSTOLIC BLOOD PRESSURE: 118 MMHG | BODY MASS INDEX: 41.89 KG/M2 | DIASTOLIC BLOOD PRESSURE: 78 MMHG | WEIGHT: 244.06 LBS

## 2018-04-03 DIAGNOSIS — E87.6 HYPOKALEMIA: Primary | ICD-10-CM

## 2018-04-03 DIAGNOSIS — M15.9 OSTEOARTHRITIS, GENERALIZED: Primary | ICD-10-CM

## 2018-04-03 DIAGNOSIS — R76.8 DS DNA ANTIBODY POSITIVE: ICD-10-CM

## 2018-04-03 LAB
ALBUMIN SERPL-MCNC: 3.6 G/DL (ref 3.1–4.7)
ALP SERPL-CCNC: 62 IU/L (ref 40–104)
ALT (SGPT): 16 IU/L (ref 3–33)
AST SERPL-CCNC: 20 IU/L (ref 10–40)
BASOPHILS NFR BLD: 0 K/UL (ref 0–0.2)
BASOPHILS NFR BLD: 0.6 %
BILIRUB SERPL-MCNC: 0.4 MG/DL (ref 0.3–1)
BUN SERPL-MCNC: 12 MG/DL (ref 8–20)
CALCIUM SERPL-MCNC: 8.8 MG/DL (ref 7.7–10.4)
CHLORIDE: 95 MMOL/L (ref 98–110)
CO2 SERPL-SCNC: 37.8 MMOL/L (ref 22.8–31.6)
CREATININE: 0.56 MG/DL (ref 0.6–1.4)
CRP SERPL-MCNC: 1.4 MG/DL (ref 0–1.4)
EOSINOPHIL NFR BLD: 0.1 K/UL (ref 0–0.7)
EOSINOPHIL NFR BLD: 2.2 %
ERYTHROCYTE [DISTWIDTH] IN BLOOD BY AUTOMATED COUNT: 13.4 % (ref 11.7–14.9)
GLUCOSE: 80 MG/DL (ref 70–99)
GRAN #: 3 K/UL (ref 1.4–6.5)
GRAN%: 55.5 %
HCT VFR BLD AUTO: 40.4 % (ref 36–48)
HGB BLD-MCNC: 13.5 G/DL (ref 12–15)
IMMATURE GRANS (ABS): 0 K/UL (ref 0–1)
IMMATURE GRANULOCYTES: 0.7 %
LYMPH #: 1.8 K/UL (ref 1.2–3.4)
LYMPH%: 33.9 %
MCH RBC QN AUTO: 31.8 PG (ref 25–35)
MCHC RBC AUTO-ENTMCNC: 33.4 G/DL (ref 31–36)
MCV RBC AUTO: 95.3 FL (ref 79–98)
MONO #: 0.4 K/UL (ref 0.1–0.6)
MONO%: 7.1 %
NUCLEATED RBCS: 0 %
PLATELET # BLD AUTO: 243 K/UL (ref 140–440)
PMV BLD AUTO: 10.6 FL (ref 8.8–12.7)
POTASSIUM SERPL-SCNC: 3.4 MMOL/L (ref 3.5–5)
PROT SERPL-MCNC: 7.1 G/DL (ref 6–8.2)
RBC # BLD AUTO: 4.24 M/UL (ref 3.5–5.5)
SODIUM: 141 MMOL/L (ref 134–144)
WBC # BLD AUTO: 5.3 K/UL (ref 5–10)

## 2018-04-03 PROCEDURE — 99213 OFFICE O/P EST LOW 20 MIN: CPT | Mod: ,,, | Performed by: INTERNAL MEDICINE

## 2018-04-03 RX ORDER — DULOXETIN HYDROCHLORIDE 60 MG/1
30 CAPSULE, DELAYED RELEASE ORAL DAILY
COMMUNITY
Start: 2018-03-27 | End: 2018-07-30 | Stop reason: DRUGHIGH

## 2018-04-03 RX ORDER — CLONAZEPAM 0.5 MG/1
TABLET ORAL
COMMUNITY
Start: 2018-03-27 | End: 2018-04-12 | Stop reason: SDUPTHER

## 2018-04-03 RX ORDER — LAMOTRIGINE 150 MG/1
150 TABLET ORAL 2 TIMES DAILY
COMMUNITY
Start: 2018-03-23 | End: 2020-01-02 | Stop reason: SDUPTHER

## 2018-04-03 NOTE — PROGRESS NOTES
[facility] RHEUMATOLOGY            PROGRESS NOTE      Subjective:       Patient ID:   NAME: Aalnis Mendieta : 1950     67 y.o. female    Referring Doc: No ref. provider found  Other Physicians:    Chief Complaint:  Osteoarthritis (Broke Left Foot 17, Left Knee Pain off and on, Right Shoulder Pain and acting up for about 3 weeks)  Follow up appt for Osteoarthritis and ds DNA antibody.  Patient experiencing  pain in the left knee.Will see Dr Marin ( ortho) soon  No CP,cough or SOB  No Raynaud's.No sicca sxs.No joint swelling  Occ pain in R shoulder,no swelling      ROS:   GEN:  No  fever, night sweats . weight is stable  + fatigue  SKIN: no rashes, no bruising, no ulcerations, no Raynaud's  HEENT: no HA's, No visual changes, no mucosal ulcers, no sicca symptoms,  CV:   no CP, SOB, PND, CRUZ, no orthopnea, no palpitations  PULM: normal with no SOB, cough, hemoptysis, sputum or pleuritic pain  GI:  no abdominal pain, nausea, vomiting, constipation, diarrhea, melanotic stools, BRBPR, hematemesis, no dysphagia  :   no dysuria  NEURO: no paresthesias, headaches, visual disturbances, muscle weakness  MUSCULOSKELETAL:no joint swelling, prolonged AM stiffness, no back pain, + muscle pain  Allergies:  Review of patient's allergies indicates:   Allergen Reactions    Morpholine analogues     Tubersol [tuberculin ppd]        Medications:    Current Outpatient Prescriptions:     ANORO ELLIPTA 62.5-25 mcg/actuation DsDv, , Disp: , Rfl:     calcitRIOL (ROCALTROL) 0.5 MCG Cap, , Disp: , Rfl:     clonazePAM (KLONOPIN) 0.5 MG tablet, , Disp: , Rfl:     DULoxetine (CYMBALTA) 60 MG capsule, , Disp: , Rfl:     ERGOCALCIFEROL, VITAMIN D2, (VITAMIN D ORAL), Take by mouth., Disp: , Rfl:     estradiol (ESTRACE) 0.01 % (0.1 mg/gram) vaginal cream, Place 1 g vaginally 3 (three) times a week. Once a day for first two weeks then 3x a week after, Disp: 42.5 g, Rfl: 6    fluoxetine (PROZAC) 40 MG capsule, , Disp: ,  "Rfl:     furosemide (LASIX) 20 MG tablet, , Disp: , Rfl:     gabapentin (NEURONTIN) 800 MG tablet, , Disp: , Rfl:     lamoTRIgine (LAMICTAL) 150 MG Tab, , Disp: , Rfl:     latanoprost 0.005 % ophthalmic solution, , Disp: , Rfl:     LEVEMIR FLEXTOUCH 100 unit/mL (3 mL) InPn pen, , Disp: , Rfl:     levothyroxine (SYNTHROID) 150 MCG tablet, , Disp: , Rfl:     LINZESS 145 mcg Cap capsule, , Disp: , Rfl:     losartan-hydrochlorothiazide 50-12.5 mg (HYZAAR) 50-12.5 mg per tablet, , Disp: , Rfl:     meclizine (ANTIVERT) 25 mg tablet, , Disp: , Rfl:     methocarbamol (ROBAXIN) 500 MG Tab, , Disp: , Rfl:     mirabegron (MYRBETRIQ) 50 mg Tb24, Take 1 tablet (50 mg total) by mouth once daily., Disp: 30 tablet, Rfl: 11    mupirocin (BACTROBAN) 2 % ointment, , Disp: , Rfl:     NOVOFINE AUTOCOVER 30 gauge x 1/3" Ndle, , Disp: , Rfl:     NOVOLOG FLEXPEN 100 unit/mL InPn pen, , Disp: , Rfl:     oxycodone (ROXICODONE) 10 mg Tab immediate release tablet, , Disp: , Rfl:     pantoprazole (PROTONIX) 40 MG tablet, , Disp: , Rfl:     polyethylene glycol (GLYCOLAX) 17 gram PwPk, Take 17 g by mouth every other day., Disp: 100 packet, Rfl: 2    ropinirole (REQUIP) 4 MG tablet, , Disp: , Rfl:     trazodone (DESYREL) 150 MG tablet, , Disp: , Rfl:     PMHx/PSHx Updates:          Objective:     Vitals:  Blood pressure 118/78, weight 110.7 kg (244 lb 0.8 oz).    Physical Examination:   GEN: no apparent distress, comfortable; AAOx3  SKIN: no rashes,no ulceration, no Raynaud's, no petechiae, no SQ nodules,  HEAD: normal  EYES: no pallor, no icterus,   NECK: no masses, thyroid normal, trachea midline, no LAD/LN's, supple  CV: RRR with no murmur; l S1 and S2 reg. ,no gallop no rubs,   CHEST: Normal respiratory effort; CTAB; normal breath sounds; no wheeze or crackles  MUSC/Skeletal: ROM normal; no crepitus; joints without synovitis,  no deformities  No joint swelling or tenderness of PIP, MCP, wrist, elbow, shoulder, or knee " joints.FROM r shoulder,no swelling  EXTREM: no clubbing, cyanosis, no edema,normal  pulses   NEURO: grossly intact; motor WNL; AAOx3;   PSYCH: normal mood, affect and behavior  LYMPH: normal cervical, supraclavicular          Labs:   Lab Results   Component Value Date    WBC 6.0 07/27/2017    HGB 14.4 07/27/2017    HCT 42.8 07/27/2017    MCV 93.9 07/27/2017     07/27/2017    CMP  @LASTLAB(NA,K,CL,CO2,GLU,BUN,Creatinine,Calcium,PROT,Albumin,Bilitot,Alkphos,AST,ALT,CRP,ESR,RF,CCP,LEIGH,SSA,CPK,uric acid) )@  I have reviewed all available lab results and radiology reports.    Radiology/Diagnostic Studies:        Assessment/Plan:   (1) 67 y.o. female with diagnosis of Osteoarthritis. More symptomatic the left knee. Contemplating  knee replacement.  2) ANA1;80 ( borderline ) with pos ds DNA antibody.No other signs or sxs of a CTD        PLAN: repeat labs with CBC,CMP    Discussion:     I have explained all of the above in detail and the patient understands all of the current recommendation(s). I have answered all questions to the best of my ability and to their complete satisfaction.       The patient is to continue with the current management plan         RTC in 4  Months or before if needed      Electronically signed by Keegan Beverly MD

## 2018-04-04 LAB — DSDNA AB SER IA-ACNC: 23 IU/ML (ref 0–9)

## 2018-04-05 LAB
ANA SER-ACNC: NEGATIVE
CCP ANTIBODIES IGG/IGA: 3 UNITS (ref 0–19)

## 2018-04-06 ENCOUNTER — TELEPHONE (OUTPATIENT)
Dept: UROLOGY | Facility: CLINIC | Age: 68
End: 2018-04-06

## 2018-04-06 NOTE — TELEPHONE ENCOUNTER
I was sent a copy of her urine culture 3/27/18 from [facility]f.  E.coli resistant to cipro, doxy, bactrim. sens to augmentin.    I'm not exactly sure why they sent this.  is her pain medicine doctor who was going to do surgery on her. We discussed how she should be on abx while getting surgery sicne she is unlikley to ever clear and he said he would take care of it.     Please contact raf and find out if anything else needs to be done about this or they are just sending it to me to have a copy?    I'm also cc'ing

## 2018-04-12 ENCOUNTER — OFFICE VISIT (OUTPATIENT)
Dept: UROLOGY | Facility: CLINIC | Age: 68
End: 2018-04-12
Payer: MEDICARE

## 2018-04-12 ENCOUNTER — OFFICE VISIT (OUTPATIENT)
Dept: UROGYNECOLOGY | Facility: CLINIC | Age: 68
End: 2018-04-12
Payer: MEDICARE

## 2018-04-12 ENCOUNTER — TELEPHONE (OUTPATIENT)
Dept: UROLOGY | Facility: CLINIC | Age: 68
End: 2018-04-12

## 2018-04-12 VITALS
SYSTOLIC BLOOD PRESSURE: 124 MMHG | HEART RATE: 71 BPM | BODY MASS INDEX: 40.8 KG/M2 | TEMPERATURE: 98 F | HEIGHT: 64 IN | WEIGHT: 239 LBS | DIASTOLIC BLOOD PRESSURE: 69 MMHG

## 2018-04-12 VITALS
BODY MASS INDEX: 40.8 KG/M2 | DIASTOLIC BLOOD PRESSURE: 69 MMHG | HEART RATE: 71 BPM | HEIGHT: 64 IN | WEIGHT: 239 LBS | SYSTOLIC BLOOD PRESSURE: 124 MMHG | TEMPERATURE: 98 F

## 2018-04-12 DIAGNOSIS — N39.0 RECURRENT UTI: Primary | ICD-10-CM

## 2018-04-12 DIAGNOSIS — N81.6 POSTERIOR VAGINAL WALL PROLAPSE: ICD-10-CM

## 2018-04-12 DIAGNOSIS — N39.3 SUI (STRESS URINARY INCONTINENCE, FEMALE): ICD-10-CM

## 2018-04-12 DIAGNOSIS — N39.0 RECURRENT UTI: ICD-10-CM

## 2018-04-12 DIAGNOSIS — R39.15 URINARY URGENCY: ICD-10-CM

## 2018-04-12 DIAGNOSIS — R79.9 ABNORMAL FINDING OF BLOOD CHEMISTRY: ICD-10-CM

## 2018-04-12 DIAGNOSIS — Z01.818 PRE-OP EVALUATION: ICD-10-CM

## 2018-04-12 DIAGNOSIS — R32 URINARY INCONTINENCE, UNSPECIFIED TYPE: ICD-10-CM

## 2018-04-12 DIAGNOSIS — N81.10 PROLAPSE OF ANTERIOR VAGINAL WALL: Primary | ICD-10-CM

## 2018-04-12 DIAGNOSIS — N39.0 URINARY TRACT INFECTION WITHOUT HEMATURIA, SITE UNSPECIFIED: ICD-10-CM

## 2018-04-12 DIAGNOSIS — R35.0 URINARY FREQUENCY: ICD-10-CM

## 2018-04-12 DIAGNOSIS — N81.9 FEMALE GENITAL PROLAPSE, UNSPECIFIED TYPE: ICD-10-CM

## 2018-04-12 LAB
BILIRUB SERPL-MCNC: ABNORMAL MG/DL
BLOOD URINE, POC: ABNORMAL
COLOR, POC UA: YELLOW
GLUCOSE UR QL STRIP: ABNORMAL
KETONES UR QL STRIP: ABNORMAL
LEUKOCYTE ESTERASE URINE, POC: ABNORMAL
NITRITE, POC UA: ABNORMAL
PH, POC UA: 6
PROTEIN, POC: ABNORMAL
SPECIFIC GRAVITY, POC UA: 1
UROBILINOGEN, POC UA: ABNORMAL

## 2018-04-12 PROCEDURE — 99999 PR PBB SHADOW E&M-EST. PATIENT-LVL III: CPT | Mod: PBBFAC,,, | Performed by: OBSTETRICS & GYNECOLOGY

## 2018-04-12 PROCEDURE — 81002 URINALYSIS NONAUTO W/O SCOPE: CPT | Mod: S$GLB,,, | Performed by: OBSTETRICS & GYNECOLOGY

## 2018-04-12 PROCEDURE — 87077 CULTURE AEROBIC IDENTIFY: CPT

## 2018-04-12 PROCEDURE — 87186 SC STD MICRODIL/AGAR DIL: CPT

## 2018-04-12 PROCEDURE — 87086 URINE CULTURE/COLONY COUNT: CPT

## 2018-04-12 PROCEDURE — 99215 OFFICE O/P EST HI 40 MIN: CPT | Mod: S$PBB,,, | Performed by: UROLOGY

## 2018-04-12 PROCEDURE — 99213 OFFICE O/P EST LOW 20 MIN: CPT | Mod: PBBFAC,PN | Performed by: UROLOGY

## 2018-04-12 PROCEDURE — 87088 URINE BACTERIA CULTURE: CPT

## 2018-04-12 PROCEDURE — 99999 PR PBB SHADOW E&M-EST. PATIENT-LVL III: CPT | Mod: PBBFAC,,, | Performed by: UROLOGY

## 2018-04-12 PROCEDURE — 99215 OFFICE O/P EST HI 40 MIN: CPT | Mod: 25,S$GLB,, | Performed by: OBSTETRICS & GYNECOLOGY

## 2018-04-12 RX ORDER — AMPICILLIN 500 MG/1
500 CAPSULE ORAL
Status: ON HOLD | COMMUNITY
Start: 2018-04-05 | End: 2018-04-26

## 2018-04-12 RX ORDER — POLYETHYLENE GLYCOL 3350 17 G/17G
17 POWDER, FOR SOLUTION ORAL DAILY
Qty: 100 PACKET | Refills: 2 | Status: SHIPPED | OUTPATIENT
Start: 2018-04-12 | End: 2018-08-09 | Stop reason: ALTCHOICE

## 2018-04-12 RX ORDER — CLONAZEPAM 1 MG/1
1 TABLET ORAL EVERY MORNING
COMMUNITY
Start: 2018-04-11 | End: 2020-01-22 | Stop reason: SDUPTHER

## 2018-04-12 RX ORDER — ESTRADIOL 0.1 MG/G
1 CREAM VAGINAL
Qty: 42.5 G | Refills: 6 | Status: SHIPPED | OUTPATIENT
Start: 2018-04-13 | End: 2018-06-07 | Stop reason: ALTCHOICE

## 2018-04-12 NOTE — PROGRESS NOTES
Subjective:       Patient ID: Alanis Mendieta is a 67 y.o. female.    Chief Complaint:  Pre-op Exam      History of Present Illness: 66 Y O F with history of HTN, DM, COPD on home O2, Thyroid cancer (papillary) s/p totally thyroidectomy and radiation on supplemental oxygen since 2012 has a history of fall with spinal stenosis ambulates with a walker. Lives in a nursing home.  She was a referral by STEFAN Jacobson for evaluation of urinary incontinence not quite sure if urge associated. Leakage happenes mostly with change in posisiton. Night time urination is bothersome.  She has seen Dr. Strong and Dr. Russell in the past for her urinary incontinence. She was tried oxybuynin and started myriberiq 50 mg daily not helping with her symptoms.  She presents today to discuss surgery.       :    Interval  HP since the last visit   1)  UI:  (+) WILLIAM  (+) UUI  - (+) diapers: 6.  Daytime frequency: Q 4 -5 hours.  Nocturia: Yes:    (--) dysuria-  (--) hematuria,  (--) frequent UTIs.  (+) complete bladder emptying.     2)  POP:   Symptoms:(+)  .  (--) vaginal bleeding. (--) vaginal discharge. (-) sexually active.  (--) dyspareunia.   (--)  Vaginal dryness.  (--) vaginal estrogen use.     3)  BM:  (+) constipation/straining.  (--) chronic diarrhea. (--) hematochezia.  (--) fecal incontinence.  (+) fecal smearing/urgency.  (--) incomplete evacuation.  No recent colonoscopy     4)Pessary- tried fell out;      5) Recurrent UTI: UCx:   9/29/17            E. Coli 100 K  8/16/17             E.coli, 2+leukocyte/nitrite, voided   7/27/17                      E.coli  6/12/17                      k.pneumoniae, cath, nitrite +  5/8/17                                  aerococcus, 100k, voided, tr       [facility]     Question 9/29/2017  4:14 PM CDT   General Urogynecology: Are you experiencing the following?    Dysuria (painful urination) Yes   Nocturia:  waking up at night to empty your bladder  Yes   If you answered yes to the  "previous question, how many times does this happen per night? 3-4   Enuresis (urine loss during sleep) Yes   Dribbling urine after you urinate No   Hematuria (urine appears red) Yes   Type of stream Interrupted   Urinary Incontinence (General): Are you experiencing the following?    Past consultation for incontinence: Have you ever seen someone for the evaluation of incontinence? No   If you answered yes to the previous question, please select all the therapies you have tried.  Kegals   Please note the effectiveness of the therapies. Ineffective   Need to wear protection to keep clothes dry  No   If you answered yes to the previous question, please royer the protection you use.  Diaper   If you wear protection, how much wetness is typically on each pad? Moderate   If you wear protection, how often do you have to change per day, if applicable?  7-8   Stress Symptoms: Are you experiencing the following?    Leakage of urine with cough, laugh and/or sneeze Yes   If you answered yes to the previous question, what is the frequency in days, weeks and/or months? Weekly   Leakage of urine with sex No   Leakage of urine with bending/ lifting Yes   Leakage of urine with briskly walking or jogging No   If you lose urine for any other reason not previously mentioned, please note it below, if applicable.     Urge Symptoms: Are you experiencing the following?    Urgency ("got to go" feeling) Yes   Urge: How frequently do you feel an urge to urinate (feeling like you "gotta go" to the bathroom and can't wait) Several times a day   Do you experience a leakage of urine when you have a feeling of urgency?  Yes   Leakage of urine when unaware Yes   Past use of anticholinergics (medications used to treat overactive bladder) No   If you answered yes to the previous question, please royer the anticholinergics you have used:  Oxybutynin   Have you ever used Mirbetriq (aka Mirabegron)?  Yes   Prolapse Symptoms: Are you experiencing any of the " following?     Falling out/ Bulging/ Heaviness in the vagina No   Vaginal/ Abdominal Pain/ Pressure No   Need to strain/ Push to void No   Need to wait on the toilet before you void No   Unusual position to urinate (using your hands to push back the vaginal bulge) No   Sensation of incomplete emptying Yes   Past use of pessary device No   If you answered yes to the previous question, please list the devices you have used below.     Bowel Symptoms: Are you experiencing any of the following?    Constipation Yes   Diarrhea  No   Hematochezia (bloody stool) No   Incomplete evacuation of stool No   Involuntary loss of formed stool No   Fecal smearing/urgency No   Involuntary loss of gas No   Vaginal Symptoms: Are you experiencing any of the following?     Abnormal vaginal bleeding  No   Vaginal dryness No   Sexually active  No   Dyspareunia (painful intercourse) No   Estrogen use  No       GYN & OB History  No LMP recorded. Patient has had a hysterectomy.   Date of Last Pap: No result found    OB History    Para Term  AB Living   3       1     SAB TAB Ectopic Multiple Live Births           2      # Outcome Date GA Lbr Guicho/2nd Weight Sex Delivery Anes PTL Lv   3             2             1 AB                 Past Medical History:   Diagnosis Date    Allergy     Anxiety     Arthritis     Bipolar disorder     Chronic back pain     COPD (chronic obstructive pulmonary disease)     Diabetes mellitus     GERD (gastroesophageal reflux disease)     Hx of thyroid cancer     Hyperlipidemia     Hypertension     Hypothyroidism     Restless leg syndrome     Urinary tract infection      Past Surgical History:   Procedure Laterality Date    broken foot and ankle      CHOLECYSTECTOMY      HYSTERECTOMY      SPINAL FUSION      TOTAL THYROIDECTOMY       Review of patient's allergies indicates:   Allergen Reactions    Morpholine analogues     Tubersol [tuberculin ppd]        Current  "Outpatient Prescriptions:     ampicillin (PRINCIPEN) 500 MG capsule, , Disp: , Rfl:     ANORO ELLIPTA 62.5-25 mcg/actuation DsDv, , Disp: , Rfl:     calcitRIOL (ROCALTROL) 0.5 MCG Cap, , Disp: , Rfl:     clonazePAM (KLONOPIN) 1 MG tablet, , Disp: , Rfl:     DULoxetine (CYMBALTA) 60 MG capsule, , Disp: , Rfl:     ERGOCALCIFEROL, VITAMIN D2, (VITAMIN D ORAL), Take by mouth., Disp: , Rfl:     fluoxetine (PROZAC) 40 MG capsule, , Disp: , Rfl:     furosemide (LASIX) 20 MG tablet, , Disp: , Rfl:     gabapentin (NEURONTIN) 800 MG tablet, , Disp: , Rfl:     lamoTRIgine (LAMICTAL) 150 MG Tab, , Disp: , Rfl:     latanoprost 0.005 % ophthalmic solution, , Disp: , Rfl:     LEVEMIR FLEXTOUCH 100 unit/mL (3 mL) InPn pen, , Disp: , Rfl:     levothyroxine (SYNTHROID) 150 MCG tablet, , Disp: , Rfl:     LINZESS 145 mcg Cap capsule, , Disp: , Rfl:     losartan-hydrochlorothiazide 50-12.5 mg (HYZAAR) 50-12.5 mg per tablet, , Disp: , Rfl:     meclizine (ANTIVERT) 25 mg tablet, , Disp: , Rfl:     methocarbamol (ROBAXIN) 500 MG Tab, , Disp: , Rfl:     mupirocin (BACTROBAN) 2 % ointment, , Disp: , Rfl:     NOVOFINE AUTOCOVER 30 gauge x 1/3" Ndle, , Disp: , Rfl:     NOVOLOG FLEXPEN 100 unit/mL InPn pen, , Disp: , Rfl:     oxycodone (ROXICODONE) 10 mg Tab immediate release tablet, , Disp: , Rfl:     pantoprazole (PROTONIX) 40 MG tablet, , Disp: , Rfl:     ropinirole (REQUIP) 4 MG tablet, , Disp: , Rfl:     trazodone (DESYREL) 150 MG tablet, , Disp: , Rfl:     [START ON 4/13/2018] estradiol (ESTRACE) 0.01 % (0.1 mg/gram) vaginal cream, Place 1 g vaginally 3 (three) times a week. Place 1g vaginally every other day, Disp: 42.5 g, Rfl: 6    mirabegron (MYRBETRIQ) 50 mg Tb24, Take 1 tablet (50 mg total) by mouth once daily., Disp: 90 tablet, Rfl: 3    polyethylene glycol (GLYCOLAX) 17 gram PwPk, Take 17 g by mouth once daily. Mix 1 cap in juice daily, Disp: 100 packet, Rfl: 2    sodium phosphates (FLEET ENEMA) 19-7 " gram/118 mL Enem, Place 1 enema rectally once. 2 fleets enemas the night before surgery, Disp: 2 Bottle, Rfl: 0      Review of Systems  Review of Systems   Constitutional: Negative.  Negative for activity change, appetite change, chills, diaphoresis, fatigue, fever and unexpected weight change.   HENT: Negative.    Eyes: Negative.    Respiratory: Negative.  Negative for apnea, cough and wheezing.    Cardiovascular: Negative.  Negative for chest pain and palpitations.   Gastrointestinal: Positive for constipation. Negative for abdominal distention, abdominal pain, anal bleeding, blood in stool, diarrhea, nausea, rectal pain and vomiting.   Endocrine: Negative.    Genitourinary: Positive for frequency and urgency. Negative for decreased urine volume, difficulty urinating, dyspareunia, dysuria, enuresis, flank pain, genital sores, hematuria, menstrual problem, pelvic pain, vaginal bleeding, vaginal discharge and vaginal pain.   Musculoskeletal: Negative for back pain and gait problem.   Skin: Negative for color change, pallor, rash and wound.   Allergic/Immunologic: Negative for immunocompromised state.   Neurological: Negative.  Negative for dizziness and speech difficulty.   Hematological: Negative for adenopathy.   Psychiatric/Behavioral: Negative for agitation, behavioral problems, confusion and sleep disturbance.           Objective:     Physical Exam   Constitutional: She is oriented to person, place, and time. She appears well-developed and well-nourished.   HENT:   Head: Normocephalic and atraumatic.   Neck: Normal range of motion. Neck supple.   Pulmonary/Chest: No respiratory distress. She has no wheezes.   Musculoskeletal: Normal range of motion.   Neurological: She is alert and oriented to person, place, and time.   Skin: No cyanosis. Nails show no clubbing.   Psychiatric: She has a normal mood and affect. Her behavior is normal. Judgment and thought content normal.          Assessment:        1. Prolapse  of anterior vaginal wall    2. Urinary frequency    3. Posterior vaginal wall prolapse               Plan:       1. Patient consented for  anterior/posterior repair with perineorrhaphy and cystourethroscopy.   R/B/A reviewed. Specific risks reviewed include:  infection, bleeding, need for blood transfusion, damage to surrounding structures, anesthesia risks, death, heart attack, stroke, mesh erosion/extrusion, pain, dyspareunia, urinary retention, voiding dysfunction, urinary incontinence, exacerbation of urinary urge incontinence, and need for further surgeries.  We reviewed potential for failure of POP defect repair and need for future surgery, with no way of predicting risk.  She understands success rate of  Native tissue vaginal repair 60%.    Alternatives reviewed include: pessary/PT for POP. All questions were answered and consents were signed today and placed in the chart. The sling procedure will be done by Dr. Strong.   2. T&S, urine HCG on DOS  3. Needs clearance for OR per PCP  4. VTE Prophylaxis:  + SCDs  5. Proceed to OR for above-mentioned procedure.  6. Patient instructed clear liquid diet and enema as well as chlorahexadine/dial soap prep to perform day before & AM of surgery.  7. She has a history of chronic opiate use will need to contact  Dr. Almazan ([place]) to discusses post op pain management plan     Approximately  30 min were spent in consult, 90 % in discussion.     Mark Townsend DO  Female Pelvic Medicine and Reconstructive Surgery  [facility]

## 2018-04-12 NOTE — PATIENT INSTRUCTIONS
start myrbetriq 50mg daily   Estrace cream vaginally 3x a week  miralax 17g daily mixed with juice  clearance from dr. Melendez  2 enemas the night before surgery  Start antibiotics 5 days prior to surgery (april 21st- we will call it in)      Recurrent uti  -Patient has NO SYMPTOMS OF UTI. Discontinue the ampicillin.   -explained that she will always have an infection with her pads and diabetes. If she has her A/P repair and sling may decrease her pad usage and ultimately her UTIs. During the day she tries to void in the toilet, has some difficulty and nurse's aid either do not respond in time and with pt's oab difficult for her to wait. Not steady enough to void on her own.    -if she wants pain pump then she will need a urine culture done 7-10 days prior to her surgery and start abx 5 days prior to her surgery and do the surgery while she is on abx. However she will likely have recurrent uti after. Spoke with  on 3/29/18 who agrees with plan.   -restart estrace cream every other night.    -still will have recurrent uti's after surgery if she is still in pads bc of functional incontinence. Explained she is going to have to have aid bring her to toilet every 2 hours even if she doesn't have to void.   -will fax a copy of this note to her pain doctor     Mixed incontinence and OAB with Anterior and Posterior Prolapse.   -urge incontinence not improved with myrbetriq and ditropan.   -seeing  today to discuss A/p repair and possible sling (+ stress test)   -she has significant constipation and has a bm once a week.  Takes linzess daily and milk of magnesia as needed. This may help her Overactive bladder and stress incontienence. High risk for failure of posterior repair bc of chornic constipation. Takes pain medicine 3x a day.    -continue linzess and start miralax 1 cap a day in addition to linzess.   -re-start estrogen cream vaginally 3x a week.   -re-start myrbetriq 50mg daily if not on this.   -I  have consented pt for sling for her WILLIAM, which would likely worsen once her prolapse is repaired. She is scheduled for April 26th  -2 enemas the night before surgery    Pt would need clearance from Dr.Michael Melendez.   Cbc, cmp, hba1c and type and screen to be done by us  If hba1c is >7.7 will have to postpone.   If anterior prolapse recurs she could become obstructed.  If constipation recurs she could have recurrence of rectal prolapse.    Not on any blood thinners, no aspirin.     Post Op Instructions for Midurethral Sling Surgery    RECOVERY ROOM   You will likely awaken with a vaginal packing in place.  This will feel like a large tampon.   If you have a catheter and feel the need to urinate, relax and let the urine flow.  If you do not have a catheter and feel the need to urinate, please let the recovery room nurse know so they can either assist you to the bathroom or provide you with a bedpan.   You may see blue or green urine after surgery.  This is from a dye that is given to you during surgery and will clear within 24 hours.      ACTIVITY   The first six weeks is a critical time period for wound healing.  We depend on the scar tissue to grow into the sling to provide the necessary support to treat the stress urinary incontinence.     After receiving an anesthetic you may feel sleepy or nauseated.  It is best to have little activity for the first 24-48 hours.  Gradually increase activity.   No heavy lifting (nothing greater than 10 pounds or a gallon of milk) for 6 weeks.   Pelvic rest (no sexual intercourse, douching or tampons) for 6 weeks.   It is OK to walk around the house.  Avoid riding a bicycle or putting similar pressure over this area.  No strenuous workouts.     Do not drive for the first 48 hours or if you are taking narcotic pain medication.  Before driving, be sure you can press the brakes without difficulty or pain.    WOUND CARE INSTRUCTIONS   You may have small incisions that were  closed with a medical grade superglue.  You may wash these and pat them dry after 24 hours.   To protect the sling material from infection, do not immerse yourself in water, (i.e. no tub baths, swimming or hot tubbing) for 6 weeks.  You may shower.     It is normal to have some light bleeding which should gradually resolve over the next week.  This will look like a light period.    DIET   In the first 24 hours, it is common to experience some nausea, so take clear liquids.  Once the nausea has resolved, a soft diet is recommended with a gradual increase to your normal diet.    MEDICATIONS   Pain medication should be taken on an as needed basis.  If the narcotic is too much, over-the-counter pain relievers may be taken.  However, do not take additional Tylenol/acetaminophen while taking narcotic pain medication as this can lead to an overdose of the Tylenol/acetaminophen.   Antibiotics, if ordered, should be taken as directed until the prescription has been completed.     A stool softener (Colace or docusate sodium) is recommended to maintain soft stools after surgery.   If you do not have a bowel movement within 36-48 hours of surgery, take 2 tablespoons of Milk of Magnesia.  If there is still no bowel movement by the next day, take ½ bottle of Magnesium Citrate (refrigerate and drink with a straw.  This is available over-the-counter.)    WHEN TO CALL THE DOCTOR:   For heavy bleeding (vaginal discharge like a light period is expected for the 3rd week)   Redness or swelling around the incision.   Fever over 101oF (38.5oC.)   Significant vaginal drainage.    Persistent pain unrelieved by the pain medication.   Leg swelling.   Shortness of breath.   Burning with urination.   Inability to urinate.   Abdominal pain not improving day by day.        FOR EMERGENCIES   If any unusual problems, questions or concerns, please contact your physician or physician on call at [phone] after hours or during  office hours, [phone]

## 2018-04-12 NOTE — TELEPHONE ENCOUNTER
We need clearance from dr melendez for surgery for April 26th  Ordering labs, fax today's note from me and dr goss  Will need abx started 5days prior to surgery    Mixed incontinence and OAB with Anterior and Posterior Prolapse.   -urge incontinence not improved with myrbetriq and ditropan.   -seeing  today to discuss A/p repair and possible sling (+ stress test)   -she has significant constipation and has a bm once a week.  Takes linzess daily and milk of magnesia as needed. This may help her Overactive bladder and stress incontienence. High risk for failure of posterior repair bc of chornic constipation. Takes pain medicine 3x a day.    -continue linzess and start miralax 1 cap a day in addition to linzess.   -re-start estrogen cream vaginally 3x a week.   -re-start myrbetriq 50mg daily if not on this.   -I have consented pt for sling for her WILLIAM, which would likely worsen once her prolapse is repaired. She is scheduled for April 26th    Pt would need clearance from Dr.Michael Melendez.   Cbc, cmp, hba1c and type and screen to be done by us  If hba1c is >7.7 will have to postpone.     Not on any blood thinners, no aspirin.

## 2018-04-12 NOTE — PROGRESS NOTES
[facility] Note - [place]  Staff: MD Ligia    Referring provider and please cc: Cliff  [facility]    [facility]    Chief Complaint: recurrent uti    Subjective:        HPI: Alanis Mendieta is a 67 y.o. female presents with     Resident of [facility], there for last 2 years   Pt was an LPN    Recurrent uti  - Likely related to pad use. Last pvr by in and out cath was 10cc  - Sx include: dysuria with voiding  - RF: pads, diabetic hba1c 7.3  - Mild renal cortical thinning. Otherwise unremarkable exam.  - pt was supposed to have a pain pump on 3/16/18 (week ago) and was to have pain pump but found to have UTI. Pain pump in lower back. She was started on cipro and has been on this for a week. She says her urine is less foul smelling and she has less burning. Still has not received pain pump.   -Spoke with  and explained situation. UTI unlikley to clear. He will check urine 10-14d prior to her procedure and treat her with culture romeo abx for 7d prior to procedure and keep her on it for 3 more days. And if it appears contaminated rec'd cath urine  -started on estrace cream with applicator every night but they stopped after 2 weeks.     UA voided: 1.006+leuk/nit+ - no sx, currently on amoxcillin for UTI  UCx:   3/27/18 E.coli resistant to cipro, doxy, bactrim. sens to augmentin.  3/23/18 E.coli, void:2+/leuk/nit+/trace blood -some burning  18 E.coli  17 E.coli   17 E.coli, void: 2+leukocyte/nitrite, voided   17 E.coli  17  k.pneumoniae, cath, nitrite +  17   aerococcus, 100k, voided, tr leuk    oab/mixed incontinence/anterior and posterior prolapse  -  -fell in 12/15 and says she had significant incontinence after, but says her incontinence was present for many years before surgery.  +WILLIAM and UUI. had c-spine surgery a year ago and says sx have increased. She states she does not feel the urge to go many times.   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   - 5/8/17 started on myrbetriq  - 7/13/17 frequency had decreased. Prior to the myrbetriq she was going 10x a day, now about 6x a day. Down to 1 pad from 6 or 7 pads a day. Denies any WILLIAM. + constipation (q3 to 4 days and takes Linzess). DM x 17 years. +stress test with large volume, I&o:20cc incontinence. But poor candidate for surgery. hba1c 8.9  -1/28/18 seen by  and found to have symptomatic stage 2 A/P prolapse. Failed pessary trial. Wanted to proceed with surgery. However she broke her foot and ankle when she was walking with her walker  She was started on hormone cream but pt states she can't reach but a nurse should be able to. Last hba1c  7.7  -seen 3/28/18 and up to 6 depends a day. She has a uti again. Not using estrace. Still drinking 4 cups of coffee a day. Voids in the toilet.  -she says she is not on myrbetriq but she's supposed to be on this.    -here today for pre-op and wearing 2 diapers at a time. -still has significant constipation despite being on Linzess. Last bm was 5 days ago. She has to splint to empty.       ECOG Status: 1 - in a wheelchair but can walk with assistance. She is on O2 for copd. Resident of [facility] after c-spine surgery. She's been there for a year and plan is for her to leave.    G3, P 2, vaginal   Gross HematuriaYes - >5 years ago  History of UTI: yes    REVIEW OF SYSTEMS:  General ROS: no fevers, no chills  Psychological ROS: no depression  Endocrine ROS: no heat or cold  Respiratory ROS: + SOB  Cardiovascular ROS: no CP  Gastrointestinal ROS: no abdominal pain, + constipation, no diarrhea, +BRBPR with tearing. Musculoskeletal ROS: no muscle pain  Neurological ROS: no headaches  Dermatological ROS: no rashes  HEENT: noiglasses, no sinus    ROS: per HPI    Past medical, surgical, social and family hx have been reviewed. There have not any changes.      Allergies:  Morpholine analogues and Tubersol [tuberculin ppd]    Medications: Myrbetriq, pain medicine  Anticoagulation: No    Objective:     Vitals:    04/12/18 1516   BP: 124/69   Pulse: 71   Temp: 98.4 °F (36.9 °C)       General:WDWN in NAD  Eyes: PERRLA, normal conjunctiva  Respiratory: no increased work on breathing, clear to auscultation, on 2LO2  Cardiovascular: regular rate and rhythm. No obvious extremity edema.  GI: no palpation of masses. No tenderness. No hepatosplenomegaly to palpation.  Musculoskeletal: normal range of motion of bilateral upper extremities. Normal muscle strength and tone.  Skin: no obvious rashes or lesions. No tightening of skin noted.  Neurologic: CN grossly normal. Normal sensation.   Psychiatric: awake, alert and oriented x 3. Mood and affect normal. Cooperative.     Pelvic exam 7/13/17:  negative stress test with coughing  In and out cath performed with 20 residual  Bladder filled to 150 very slowly  Sensation to void felt at 120cc  Catheter removed  +large volume stress test with coughing but not with valsava  Had pt stand and she had large volume leakage with coughing and valsava  No prolapse  Stool palpable in vault  +severe atrophic vaginitis      LABS REVIEW:      Labs reviewed 7/13/17  Glucose 113  Cr 0  0.8  H/H 14.4/42.4  Hba1c 8.9    3/19/18 a1c 7.3    Cr:   Lab Results   Component Value Date    CREATININE 0.56 (L) 04/03/2018       PATHOLOGY REVIEW:  none    RADIOGRAPHIC REVIEW:  rbus 5/23/17  No stone  No hydro  Bladder moderately distended      Assessment:       1. Recurrent UTI    2. Female genital prolapse, unspecified type    3. WILLIAM (stress urinary incontinence, female)          Plan:     Recurrent uti  -Patient has NO SYMPTOMS OF UTI. Discontinue the ampicillin.   -explained that she will always have an infection with her pads and diabetes. If she has her A/P repair and sling may decrease her pad usage and ultimately her UTIs. During the day she tries to void  in the toilet, has some difficulty and nurse's aid either do not respond in time and with pt's oab difficult for her to wait. Not steady enough to void on her own.    -if she wants pain pump then she will need a urine culture done 7-10 days prior to her surgery and start abx 5 days prior to her surgery and do the surgery while she is on abx. However she will likely have recurrent uti after. Spoke with  on 3/29/18 who agrees with plan.   -restart estrace cream every other night.    -still will have recurrent uti's after surgery if she is still in pads bc of functional incontinence. Explained she is going to have to have aid bring her to toilet every 2 hours even if she doesn't have to void.   -will fax a copy of this note to her pain doctor     Mixed incontinence and OAB with Anterior and Posterior Prolapse.   -urge incontinence not improved with myrbetriq and ditropan.   -seeing  today to discuss A/p repair and possible sling (+ stress test)   -she has significant constipation and has a bm once a week.  Takes linzess daily and milk of magnesia as needed. This may help her Overactive bladder and stress incontienence. High risk for failure of posterior repair bc of chornic constipation. Takes pain medicine 3x a day.    -continue linzess and start miralax 1 cap a day in addition to linzess.   -re-start estrogen cream vaginally 3x a week.   -re-start myrbetriq 50mg daily if not on this.   -I have consented pt for sling for her WILLIAM, which would likely worsen once her prolapse is repaired. She is scheduled for April 26th.  -2 enemas the day before surgery    Pt would need clearance from Dr.Michael Melendez. I will send over request.   Cbc, cmp and type and screen to be done by us  Most recent a1c was 7.3  Urine culture sent from today and will treat 5 days prior to surgery and keep her on for total of 10 days.     Not on any blood thinners, no aspirin.     To OR on 4/26/18 for retropubic [place]x mid  urethral sling and A/P repair    I will do this with the assist of Dr. Townsend    The risks include temporary and long term urinary retention requiring a catheter for indefinite amount of time. If this happened pt would need release of the sling. There's a risk of extrusion into the vagina cusing pain in the vagina, risk of erosion into the bladder causing infections, both requiring excision. There is a risks of bleeding and infection. There is a risk of pain. There's a risk that the incontinence is not completely treated or may require pads. There is also risk of perforation of the urethra and bladder. However if there is perforation of the urethra, this would result in termination of the procedure and placement of catheter for indefinite period of time (usually less than 2 weeks) with treatment of incontinence at a later date.     I went over the risks and benefits as well as the difference between mesh for prolapse vs mesh for incontinence and the meaning of the FDA warning against mesh for prolapse.  If anterior prolapse recurs she could become obstructed.  If constipation recurs she could have recurrence of rectal prolapse.       Pt was given a post-op instruction sheet for slings    I spent 60 minutes with the patient of which more than half was spent in direct consultation with the patient in regards to our treatment and plan.        Faith Strong MD

## 2018-04-12 NOTE — PROGRESS NOTES
Subjective:       Patient ID: Alanis Mendieta is a 67 y.o. female.    Chief Complaint:  No chief complaint on file.      History of Present Illness: 66 Y O F with history of HTN, DM, COPD on home O2, Thyroid cancer (papillary) s/p totally thyroidectomy and radiation on supplemental oxygen since  has a history of fall with spinal stenosis ambulates with a walker. Lives in a nursing home [facility].  She was a referral by STEFAN Jacobson for evaluation of urinary incontinence not quite sure if urge associated. Leakage happenes mostly with change in posisiton. Night time urination is bothersome.  She has seen Dr. Strong and Dr. Russell in the past for her urinary incontinence. She was tried oxybuynin and started myriberiq 50 mg daily not helping with her symptoms.             Interval  HP since the last visit   1)  UI:  (+) WILLIAM  (+) UUI  - (+) diapers: 6-8   Daytime frequency: Q 4 -5 hours.  Nocturia: Yes: 2-3  (--) dysuria-  (--) hematuria,  (--) frequent UTIs.  (+) complete bladder emptying.     2)  POP:  Symptoms:(+)  .  (--) vaginal bleeding. (--) vaginal discharge. (-) sexually active.  (--) dyspareunia.   (--)  Vaginal dryness.  (--) vaginal estrogen use.     3)  BM:  (+) constipation/straining.  (--) chronic diarrhea. (--) hematochezia.  (--) fecal incontinence.  (+) fecal smearing/urgency.  (--) incomplete evacuation.  No recent colonoscopy     4)Pessary- tried fell out;      5) Recurrent UTI: UCx:   17            E. Coli 100 K  17             E.coli, 2+leukocyte/nitrite, voided   17                      E.coli  17                      k.pneumoniae, cath, nitrite +  17                                  aerococcus, 100k, voided, tr     GYN & OB History  No LMP recorded. Patient has had a hysterectomy.   Date of Last Pap: No result found    OB History    Para Term  AB Living   3       1     SAB TAB Ectopic Multiple Live Births           2      # Outcome Date GA Lbr  Guicho/2nd Weight Sex Delivery Anes PTL Lv   3             2             1 AB                 Past Medical History:   Diagnosis Date    Allergy     Anxiety     Arthritis     Bipolar disorder     Chronic back pain     COPD (chronic obstructive pulmonary disease)     Diabetes mellitus     GERD (gastroesophageal reflux disease)     Hx of thyroid cancer     Hyperlipidemia     Hypertension     Hypothyroidism     Restless leg syndrome     Urinary tract infection      Past Surgical History:   Procedure Laterality Date    broken foot and ankle      CHOLECYSTECTOMY      HYSTERECTOMY      SPINAL FUSION      TOTAL THYROIDECTOMY       Review of patient's allergies indicates:   Allergen Reactions    Morpholine analogues     Tubersol [tuberculin ppd]        Current Outpatient Prescriptions:     ampicillin (PRINCIPEN) 500 MG capsule, , Disp: , Rfl:     ANORO ELLIPTA 62.5-25 mcg/actuation DsDv, , Disp: , Rfl:     calcitRIOL (ROCALTROL) 0.5 MCG Cap, , Disp: , Rfl:     clonazePAM (KLONOPIN) 1 MG tablet, , Disp: , Rfl:     DULoxetine (CYMBALTA) 60 MG capsule, , Disp: , Rfl:     ERGOCALCIFEROL, VITAMIN D2, (VITAMIN D ORAL), Take by mouth., Disp: , Rfl:     estradiol (ESTRACE) 0.01 % (0.1 mg/gram) vaginal cream, Place 1 g vaginally 3 (three) times a week. Once a day for first two weeks then 3x a week after, Disp: 42.5 g, Rfl: 6    fluoxetine (PROZAC) 40 MG capsule, , Disp: , Rfl:     furosemide (LASIX) 20 MG tablet, , Disp: , Rfl:     gabapentin (NEURONTIN) 800 MG tablet, , Disp: , Rfl:     lamoTRIgine (LAMICTAL) 150 MG Tab, , Disp: , Rfl:     latanoprost 0.005 % ophthalmic solution, , Disp: , Rfl:     LEVEMIR FLEXTOUCH 100 unit/mL (3 mL) InPn pen, , Disp: , Rfl:     levothyroxine (SYNTHROID) 150 MCG tablet, , Disp: , Rfl:     LINZESS 145 mcg Cap capsule, , Disp: , Rfl:     losartan-hydrochlorothiazide 50-12.5 mg (HYZAAR) 50-12.5 mg per tablet, , Disp: , Rfl:     meclizine (ANTIVERT)  "25 mg tablet, , Disp: , Rfl:     methocarbamol (ROBAXIN) 500 MG Tab, , Disp: , Rfl:     mirabegron (MYRBETRIQ) 50 mg Tb24, Take 1 tablet (50 mg total) by mouth once daily., Disp: 30 tablet, Rfl: 11    mupirocin (BACTROBAN) 2 % ointment, , Disp: , Rfl:     NOVOFINE AUTOCOVER 30 gauge x 1/3" Ndle, , Disp: , Rfl:     NOVOLOG FLEXPEN 100 unit/mL InPn pen, , Disp: , Rfl:     oxycodone (ROXICODONE) 10 mg Tab immediate release tablet, , Disp: , Rfl:     pantoprazole (PROTONIX) 40 MG tablet, , Disp: , Rfl:     polyethylene glycol (GLYCOLAX) 17 gram PwPk, Take 17 g by mouth every other day., Disp: 100 packet, Rfl: 2    ropinirole (REQUIP) 4 MG tablet, , Disp: , Rfl:     trazodone (DESYREL) 150 MG tablet, , Disp: , Rfl:       Review of Systems  Review of Systems   Constitutional: Negative.  Negative for activity change, appetite change, chills, diaphoresis, fatigue, fever and unexpected weight change.   HENT: Negative.    Eyes: Negative.    Respiratory: Negative.  Negative for apnea, cough and wheezing.    Cardiovascular: Negative.  Negative for chest pain and palpitations.   Gastrointestinal: Positive for constipation. Negative for abdominal distention, abdominal pain, anal bleeding, blood in stool, diarrhea, nausea, rectal pain and vomiting.   Endocrine: Negative.    Genitourinary: Positive for frequency and urgency. Negative for decreased urine volume, difficulty urinating, dyspareunia, dysuria, enuresis, flank pain, genital sores, hematuria, menstrual problem, pelvic pain, vaginal bleeding, vaginal discharge and vaginal pain.   Musculoskeletal: Negative for back pain and gait problem.   Skin: Negative for color change, pallor, rash and wound.   Allergic/Immunologic: Negative for immunocompromised state.   Neurological: Negative.  Negative for dizziness and speech difficulty.   Hematological: Negative for adenopathy.   Psychiatric/Behavioral: Negative for agitation, behavioral problems, confusion and sleep " disturbance.           Objective:     Physical Exam   Constitutional: She is oriented to person, place, and time. She appears well-developed and well-nourished.   HENT:   Head: Normocephalic and atraumatic.   Neck: Normal range of motion. Neck supple.   Pulmonary/Chest: No respiratory distress. She has no wheezes.   Musculoskeletal: Normal range of motion.   Leg soft cast  Wheelchair bound   Neurological: She is alert and oriented to person, place, and time.   Skin: No cyanosis. Nails show no clubbing.   Psychiatric: She has a normal mood and affect. Her behavior is normal. Judgment and thought content normal.          Assessment:        1. Prolapse of anterior vaginal wall    2. Posterior vaginal wall prolapse               Plan:       1. Patient consented for anterior/posterior repair with perineorrhaphy, possible laparotomy and cystourethroscopy . She will also have  placement of synthetic midurethral sling  and cystourethroscopy by Dr. Strong.    R/B/A reviewed. Specific risks reviewed include:  infection, bleeding, need for blood transfusion, damage to surrounding structures, anesthesia risks, death, heart attack, stroke, mesh erosion/extrusion, pain, dyspareunia, urinary retention, voiding dysfunction, urinary incontinence, exacerbation of urinary urge incontinence, and need for further surgeries.  We reviewed potential for failure of POP defect repair and need for future surgery, with no way of predicting risk.  She understands success rate of native tissue anterior and posterior repair approaches 60 %.  Alternatives reviewed include: pessary/PT for POP. All questions were answered and consents were signed today and placed in the chart.  2. T&S, urine HCG on DOS  3. Cleared for OR per PCP  4. VTE Prophylaxis:   SCDs  5. Proceed to OR for above-mentioned procedure.  6. Patient instructed on mag citrate chlorahexadine/dial soap prep to perform day before & AM of surgery.     Approximately 30 min were spent  in consult, 90 % in discussion.     Mark Townsend DO    Female Pelvic Medicine and Reconstructive Surgery  [facility] [place], [place]

## 2018-04-13 DIAGNOSIS — M17.12 OSTEOARTHRITIS OF LEFT KNEE, UNSPECIFIED OSTEOARTHRITIS TYPE: Primary | ICD-10-CM

## 2018-04-16 ENCOUNTER — TELEPHONE (OUTPATIENT)
Dept: UROLOGY | Facility: CLINIC | Age: 68
End: 2018-04-16

## 2018-04-16 LAB — BACTERIA UR CULT: NORMAL

## 2018-04-16 RX ORDER — CEFDINIR 300 MG/1
300 CAPSULE ORAL 2 TIMES DAILY
Qty: 20 CAPSULE | Refills: 0 | Status: SHIPPED | OUTPATIENT
Start: 2018-04-16 | End: 2018-04-16 | Stop reason: SDUPTHER

## 2018-04-16 RX ORDER — CEFDINIR 300 MG/1
300 CAPSULE ORAL 2 TIMES DAILY
Qty: 20 CAPSULE | Refills: 0 | Status: SHIPPED | OUTPATIENT
Start: 2018-04-16 | End: 2018-04-26

## 2018-04-16 NOTE — TELEPHONE ENCOUNTER
Spoke with Amanda nursing supervisor antibiotic prescription called in to nurse to start on 4/20. Verbally voiced understanding and will get antibiotics for the patient

## 2018-04-16 NOTE — TELEPHONE ENCOUNTER
Her culture grew e.coli sensitive to all the cephalosporins.  I would like to start omnicef 300 bid x 10 days, 5 days before her procedure.   She is not sensitive to any other po abx except nitrofurantoin and I need an antiobiotic with better tissue penetration with her upcoming surgery    Please make sure she starts the abx on 4/20/18  I [facility] to [facility] but also writing hand written rx

## 2018-04-17 ENCOUNTER — HOSPITAL ENCOUNTER (OUTPATIENT)
Dept: PREADMISSION TESTING | Facility: HOSPITAL | Age: 68
Discharge: HOME | End: 2018-04-17
Attending: OBSTETRICS & GYNECOLOGY
Payer: MEDICARE

## 2018-04-17 ENCOUNTER — HOSPITAL ENCOUNTER (OUTPATIENT)
Dept: RADIOLOGY | Facility: HOSPITAL | Age: 68
Discharge: HOME | End: 2018-04-17
Attending: OBSTETRICS & GYNECOLOGY
Payer: MEDICARE

## 2018-04-17 VITALS — BODY MASS INDEX: 40.63 KG/M2 | HEIGHT: 64 IN | WEIGHT: 238 LBS

## 2018-04-17 DIAGNOSIS — N81.9 FEMALE GENITAL PROLAPSE, UNSPECIFIED TYPE: ICD-10-CM

## 2018-04-17 DIAGNOSIS — R39.15 URINARY URGENCY: ICD-10-CM

## 2018-04-17 DIAGNOSIS — N81.6 POSTERIOR VAGINAL WALL PROLAPSE: ICD-10-CM

## 2018-04-17 DIAGNOSIS — N39.0 RECURRENT UTI: ICD-10-CM

## 2018-04-17 DIAGNOSIS — N39.3 SUI (STRESS URINARY INCONTINENCE, FEMALE): ICD-10-CM

## 2018-04-17 DIAGNOSIS — Z01.818 PRE-OP EVALUATION: ICD-10-CM

## 2018-04-17 PROCEDURE — 99900104 DSU ONLY-NO CHARGE-EA ADD'L HR (STAT)

## 2018-04-17 PROCEDURE — 93005 ELECTROCARDIOGRAM TRACING: CPT

## 2018-04-17 PROCEDURE — 93010 ELECTROCARDIOGRAM REPORT: CPT | Mod: ,,, | Performed by: INTERNAL MEDICINE

## 2018-04-17 PROCEDURE — 71046 X-RAY EXAM CHEST 2 VIEWS: CPT | Mod: TC,FY

## 2018-04-17 PROCEDURE — 71046 X-RAY EXAM CHEST 2 VIEWS: CPT | Mod: 26,,, | Performed by: RADIOLOGY

## 2018-04-17 PROCEDURE — 99900103 DSU ONLY-NO CHARGE-INITIAL HR (STAT)

## 2018-04-17 RX ORDER — FERROUS SULFATE 325(65) MG
325 TABLET ORAL DAILY
Status: ON HOLD | COMMUNITY
End: 2019-08-07

## 2018-04-17 RX ORDER — IBUPROFEN 200 MG
400 TABLET ORAL EVERY 12 HOURS PRN
Status: ON HOLD | COMMUNITY
End: 2018-09-19 | Stop reason: HOSPADM

## 2018-04-19 ENCOUNTER — TELEPHONE (OUTPATIENT)
Dept: UROGYNECOLOGY | Facility: CLINIC | Age: 68
End: 2018-04-19

## 2018-04-20 ENCOUNTER — TELEPHONE (OUTPATIENT)
Dept: CARDIOLOGY | Facility: CLINIC | Age: 68
End: 2018-04-20

## 2018-04-20 NOTE — TELEPHONE ENCOUNTER
----- Message from Guillermina Lucas sent at 4/20/2018  9:15 AM CDT -----  Contact: Lisa.  Type:  Sooner Apoointment Request    Caller is requesting a sooner appointment.  Caller declined first available appointment listed below.  Caller will not accept being placed on the waitlist and is requesting a message be sent to doctor.    Name of Caller:  Lisa  When is the first available appointment?  06/26  Symptoms:  Consult  Best Call Back Number:  [phone]  Additional Information:  Lisa nurse with [facility] stated patient can't come on 04/23 next available appointment is too far,requesting a call back

## 2018-04-20 NOTE — TELEPHONE ENCOUNTER
Called and spoke with Lisa at [facility], I advised her that Monday 4/23/18 @ 1pm is our soonest appt. I fit Ms. Mendieta in. The nurse stated that they will do everything in their power to get her here. No futher issues noted.

## 2018-04-23 ENCOUNTER — OFFICE VISIT (OUTPATIENT)
Dept: CARDIOLOGY | Facility: CLINIC | Age: 68
End: 2018-04-23
Payer: MEDICARE

## 2018-04-23 ENCOUNTER — TELEPHONE (OUTPATIENT)
Dept: UROLOGY | Facility: CLINIC | Age: 68
End: 2018-04-23

## 2018-04-23 VITALS
HEIGHT: 64 IN | SYSTOLIC BLOOD PRESSURE: 132 MMHG | WEIGHT: 241.88 LBS | BODY MASS INDEX: 41.29 KG/M2 | HEART RATE: 86 BPM | OXYGEN SATURATION: 97 % | DIASTOLIC BLOOD PRESSURE: 76 MMHG

## 2018-04-23 DIAGNOSIS — Z76.89 ENCOUNTER TO ESTABLISH CARE: ICD-10-CM

## 2018-04-23 DIAGNOSIS — Z76.89 ENCOUNTER TO ESTABLISH CARE: Primary | ICD-10-CM

## 2018-04-23 DIAGNOSIS — N30.00 ACUTE CYSTITIS WITHOUT HEMATURIA: Primary | ICD-10-CM

## 2018-04-23 DIAGNOSIS — Z01.818 PREOPERATIVE CLEARANCE: Primary | ICD-10-CM

## 2018-04-23 PROCEDURE — 93000 ELECTROCARDIOGRAM COMPLETE: CPT | Mod: S$GLB,,, | Performed by: INTERNAL MEDICINE

## 2018-04-23 PROCEDURE — 99999 PR PBB SHADOW E&M-EST. PATIENT-LVL III: CPT | Mod: PBBFAC,,, | Performed by: INTERNAL MEDICINE

## 2018-04-23 PROCEDURE — 99204 OFFICE O/P NEW MOD 45 MIN: CPT | Mod: S$GLB,,, | Performed by: INTERNAL MEDICINE

## 2018-04-23 NOTE — PROGRESS NOTES
[facility]    CC: preoperative  Chief Complaint   Patient presents with    Consult     Abnormal ECG       Patient ID: Alanis Mendieta is a 67 y.o. female with a past medical history of hypertension, diabetes    HPI  She is here for preoperative risk stratification.  SHe has advanced COPD and is on oxygen 24/ 7    Denies any exertional chest pain.    Past Medical History:   Diagnosis Date    Allergy     Anxiety     Arthritis     Asthma     Bipolar disorder     Chronic back pain     COPD (chronic obstructive pulmonary disease)     Diabetes mellitus     Fibromyalgia     GERD (gastroesophageal reflux disease)     Hx of thyroid cancer     Hyperlipidemia     Hypertension     Hypothyroidism     Neuropathy     On home oxygen therapy     Restless leg syndrome     Sleep apnea     Urinary tract infection      Past Surgical History:   Procedure Laterality Date    APPENDECTOMY      broken foot and ankle      CHOLECYSTECTOMY      HYSTERECTOMY      SPINAL FUSION      TOTAL THYROIDECTOMY  2014     Social History     Social History    Marital status:      Spouse name: N/A    Number of children: N/A    Years of education: N/A     Occupational History    Not on file.     Social History Main Topics    Smoking status: Former Smoker     Packs/day: 1.00     Years: 30.00     Types: Cigarettes     Quit date: 5/8/2000    Smokeless tobacco: Never Used    Alcohol use No    Drug use: No    Sexual activity: No     Other Topics Concern    Not on file     Social History Narrative    No narrative on file     No family history on file.    Review of patient's allergies indicates:   Allergen Reactions    Morpholine analogues     Tubersol [tuberculin ppd]        Medication List with Changes/Refills   Current Medications    AMPICILLIN (PRINCIPEN) 500 MG CAPSULE    Take 500 mg by mouth.     ANORO ELLIPTA 62.5-25 MCG/ACTUATION DSDV    Inhale into the lungs once daily.     CALCITRIOL  "(ROCALTROL) 0.5 MCG CAP    Take 0.5 mcg by mouth once daily.     CEFDINIR (OMNICEF) 300 MG CAPSULE    Take 1 capsule (300 mg total) by mouth 2 (two) times daily.    CLONAZEPAM (KLONOPIN) 1 MG TABLET    Take 1 mg by mouth every evening.     DULOXETINE (CYMBALTA) 60 MG CAPSULE    Take 30 mg by mouth once daily.     ERGOCALCIFEROL, VITAMIN D2, (VITAMIN D ORAL)    Take by mouth once daily.     ESTRADIOL (ESTRACE) 0.01 % (0.1 MG/GRAM) VAGINAL CREAM    Place 1 g vaginally 3 (three) times a week. Place 1g vaginally every other day    FERROUS SULFATE 325 MG (65 MG IRON) TAB TABLET    Take 325 mg by mouth once daily.    FLUOXETINE (PROZAC) 40 MG CAPSULE    Take 40 mg by mouth once daily.     FUROSEMIDE (LASIX) 20 MG TABLET    Take 20 mg by mouth 3 (three) times a week.     GABAPENTIN (NEURONTIN) 800 MG TABLET    Take 800 mg by mouth 3 (three) times daily.     IBUPROFEN (ADVIL,MOTRIN) 200 MG TABLET    Take 400 mg by mouth every 12 (twelve) hours as needed for Pain.    LAMOTRIGINE (LAMICTAL) 150 MG TAB    Take 150 mg by mouth 2 (two) times daily.     LATANOPROST 0.005 % OPHTHALMIC SOLUTION    Place 1 drop into both eyes every evening.     LEVEMIR FLEXTOUCH 100 UNIT/ML (3 ML) INPN PEN    Inject 65 Units into the skin every evening.     LEVOTHYROXINE (SYNTHROID) 150 MCG TABLET    Take 150 mcg by mouth before breakfast.     LINZESS 145 MCG CAP CAPSULE    Take 145 mcg by mouth once daily.     LOSARTAN-HYDROCHLOROTHIAZIDE 50-12.5 MG (HYZAAR) 50-12.5 MG PER TABLET    Take 1 tablet by mouth once daily.     MECLIZINE (ANTIVERT) 25 MG TABLET    Take by mouth.     METHOCARBAMOL (ROBAXIN) 500 MG TAB    Take 500 mg by mouth.     MIRABEGRON (MYRBETRIQ) 50 MG TB24    Take 1 tablet (50 mg total) by mouth once daily.    NOVOFINE AUTOCOVER 30 GAUGE X 1/3" NDLE        NOVOLOG FLEXPEN 100 UNIT/ML INPN PEN        OXYCODONE (ROXICODONE) 10 MG TAB IMMEDIATE RELEASE TABLET    Take 10 mg by mouth every 8 (eight) hours as needed.     PANTOPRAZOLE " "(PROTONIX) 40 MG TABLET    Take 40 mg by mouth.     POLYETHYLENE GLYCOL (GLYCOLAX) 17 GRAM PWPK    Take 17 g by mouth once daily. Mix 1 cap in juice daily    ROPINIROLE (REQUIP) 4 MG TABLET    Take 4 mg by mouth 2 (two) times daily.     TRAZODONE (DESYREL) 150 MG TABLET    Take 150 mg by mouth nightly.        Review of Systems  Constitution: Denies chills, fever, and sweats.  HENT: Denies headaches or blurry vision.  Cardiovascular: Denies chest pain or irregular heart beat.  Respiratory: shortness of breath.  Gastrointestinal: Denies abdominal pain, nausea, or vomiting.  Musculoskeletal: Denies muscle cramps.  Neurological: Denies dizziness or focal weakness.  Psychiatric/Behavioral: Normal mental status.  Hematologic/Lymphatic: Denies bleeding problem or easy bruising/bleeding.  Skin: Denies rash or suspicious lesions    Physical Examination  /76 (BP Location: Left arm)   Pulse 86   Ht 5' 4" (1.626 m)   Wt 109.7 kg (241 lb 13.5 oz)   SpO2 97%   BMI 41.51 kg/m²     Constitutional: No acute distress, conversant  HEENT: Sclera anicteric, Pupils equal, round and reactive to light, extraocular motions intact, Oropharynx clear  Neck: No JVD, no carotid bruits  Cardiovascular: regular rate and rhythm, no murmur, rubs or gallops, normal S1/S2  Pulmonary: Clear to auscultation bilaterally  Abdominal: Abdomen soft, nontender, nondistended, positive bowel sounds  Extremities: No lower extremity edema,   Pulses:  Carotid pulses are 2+ on the right side, and 2+ on the left side.  Radial pulses are 2+ on the right side, and 2+ on the left side.   .    Skin: No ecchymosis, erythema, or ulcers  Psych: Alert and oriented x 3, appropriate affect  Neuro: CNII-XII intact, no focal deficits    Labs:  Most Recent Data  CBC:   Lab Results   Component Value Date    WBC 6.70 04/17/2018    HGB 13.6 04/17/2018    HCT 41.6 04/17/2018     04/17/2018    MCV 94 04/17/2018    RDW 14.5 04/17/2018     BMP:   Lab Results "   Component Value Date     04/17/2018    K 4.2 04/17/2018    CL 95 04/17/2018    CO2 36 (H) 04/17/2018    BUN 15 04/17/2018    CREATININE 0.7 04/17/2018    GLU 70 04/17/2018    CALCIUM 9.5 04/17/2018     LFTS;   Lab Results   Component Value Date    PROT 7.1 04/03/2018    ALBUMIN 3.6 04/03/2018    BILITOT 0.4 04/03/2018    AST 20 04/03/2018    ALKPHOS 62 04/03/2018    ALT 19 07/13/2017     COAGS: No results found for: INR, PROTIME, PTT  FLP: No results found for: CHOL, HDL, LDLCALC, TRIG, CHOLHDL  CARDIAC: No results found for: TROPONINI, CKMB, BNP    Imaging:    EKG: Normal sinus rhythm.  Possible left atrial enlargement.  Poor R-wave progression.          I have personally reviewed these images and echo data    Assessment/Plan:  Alanis was seen today for consult.    Diagnoses and all orders for this visit:    Preoperative clearance  -     Transthoracic echo (TTE) complete (CUPID ONLY-[facility]); Future    Encounter to establish care  -     EKG 12-lead      iF Her echocardiography is within normal range she should be okay to proceed with her upcoming  Surgery.  Surgery is scheduled in the next 4 days.  SHe is at least at a moderate risk for cardiovascular mortality and morbidity given her underlying  smoking history, diabetes and hypertension.        Echo report noted. Ok to proceed to surgery and no further cardiac testing needed.    Total duration of face to face visit time 30 minutes.  Total time spent counseling greater than fifty percent of total visit time.  Counseling included discussion regarding imaging findings, diagnosis, possibilities, treatment options, risks and benefits.  The patient had many questions regarding the options and long-term effect which were all answered to my best ability.      Walter Elmore MD,MRCP,RPVI,FACC,FSCAI.  Interventional Cardiology   Phone [phone]

## 2018-04-23 NOTE — TELEPHONE ENCOUNTER
Pt is medically cleared by   Clearance received 4/18/18  Please fax copy to asc    We are awaiting clearance from , whom she sees today, for abnormal ekg    Surgery still scheduled for this Thursday   Can she come here and give catheterized urine to confirm no infection today on cefdinir after she sees     She has been on abx (Cefdinir)  She is not on any blood thinners    I spoke with her and she will come over after her cards appt

## 2018-04-23 NOTE — TELEPHONE ENCOUNTER
----- Message from Karen Mcmillan sent at 4/23/2018  3:05 PM CDT -----  Contact: patient   Patient calling to speak to the Nurse. She will not be coming in to give her urine specimen. She has to go to hospital tomorrow for echo and would like to know if she can do the specimen at the Nursing Home and bring it in. Please advise.   Call back    Thanks!

## 2018-04-23 NOTE — LETTER
April 23, 2018      Mark Townsend,   [address]  [facility] [address]           [place] [facility] - Cardiology  [address]  [place] [address]  Phone: [phone]          Patient: Alanis Mendieta   MR Number: [id number]   YOB: 1950   Date of Visit: 4/23/2018       Dear Dr. Mark Townsend:    Thank you for referring Alanis Mendieta to me for evaluation. Attached you will find relevant portions of my assessment and plan of care.    If you have questions, please do not hesitate to call me. I look forward to following Alanis Mendieta along with you.    Sincerely,    Walter Elmore MD    Enclosure  CC:  No Recipients    If you would like to receive this communication electronically, please contact [email or web address] or [phone] to request more information on [employer] access.    For providers and/or their staff who would like to refer a patient to [facility], please contact us through our one-stop-shop provider referral line, [facility], at [phone].    If you feel you have received this communication in error or would no longer like to receive these types of communications, please e-mail [email or web address]

## 2018-04-24 ENCOUNTER — HOSPITAL ENCOUNTER (OUTPATIENT)
Dept: CARDIOLOGY | Facility: HOSPITAL | Age: 68
Discharge: HOME | End: 2018-04-24
Attending: INTERNAL MEDICINE
Payer: MEDICARE

## 2018-04-24 VITALS
DIASTOLIC BLOOD PRESSURE: 76 MMHG | HEIGHT: 64 IN | SYSTOLIC BLOOD PRESSURE: 132 MMHG | WEIGHT: 241 LBS | BODY MASS INDEX: 41.15 KG/M2 | HEART RATE: 80 BPM

## 2018-04-24 DIAGNOSIS — Z01.818 PREOPERATIVE CLEARANCE: ICD-10-CM

## 2018-04-24 LAB
BSA FOR ECHO PROCEDURE: 2.22 M2
RA PRESSURE: 3 MMHG
TDI LATERAL: 0.09
TDI SEPTAL: 0.09
TDI: 0.09

## 2018-04-24 PROCEDURE — 25500020 PHARM REV CODE 255: Performed by: INTERNAL MEDICINE

## 2018-04-24 PROCEDURE — C8929 TTE W OR WO FOL WCON,DOPPLER: HCPCS

## 2018-04-24 PROCEDURE — 93306 TTE W/DOPPLER COMPLETE: CPT | Mod: 26,,, | Performed by: INTERNAL MEDICINE

## 2018-04-24 RX ADMIN — SULFUR HEXAFLUORIDE 5 ML: KIT at 02:04

## 2018-04-24 NOTE — TELEPHONE ENCOUNTER
Cath ua 4/23/18: negative    Echo done today, awaiting results and clearance by   Surgery scheduled for thursday

## 2018-04-25 ENCOUNTER — TELEPHONE (OUTPATIENT)
Dept: CARDIOLOGY | Facility: CLINIC | Age: 68
End: 2018-04-25

## 2018-04-25 ENCOUNTER — ANESTHESIA EVENT (OUTPATIENT)
Dept: SURGERY | Facility: HOSPITAL | Age: 68
End: 2018-04-25
Payer: MEDICARE

## 2018-04-25 ENCOUNTER — TELEPHONE (OUTPATIENT)
Dept: UROLOGY | Facility: CLINIC | Age: 68
End: 2018-04-25

## 2018-04-25 NOTE — TELEPHONE ENCOUNTER
----- Message from Tanja Garcia sent at 4/25/2018 11:37 AM CDT -----  Test results.  Please call Haile/Sushma today stating that surgery is scheduled for tomorrow.  Please call [phone]

## 2018-04-25 NOTE — TELEPHONE ENCOUNTER
Spoke with Sushma at [facility], advised her that I would need to speak with Dr. Elmore to make sure ms. Thapa is cleared. She verbalized understanding. No further issues noted.

## 2018-04-25 NOTE — OR NURSING
"Spoke with Shirley Carpenter LPN at [place] [phone] to confirm pts arrival time and preop instructions. Advised pt needs to be here for 0800. Nurse states that patient might arrive earlier because "they have a busy day". Advised pt might not be able to come back to preop any earlier. Nurse states "should not be a problem."  "

## 2018-04-25 NOTE — TELEPHONE ENCOUNTER
----- Message from Susanne Estrada sent at 4/25/2018 11:56 AM CDT -----  Type: Needs Medical Advice    Who Called sofya  With    :raf   Case  Manager   Symptoms (please be specific):na  How long has patient had these symptoms: karl  Pharmacy name and phone #: karl  Best Call Back Number: [phone]  Cell [phone]   Additional Information: ##  Pt  Having   Surgery  Tomorrow //  Placed a call to pod // pt needed to  Be  Cleared  By  Dr  Today  To  Have  surgery

## 2018-04-26 ENCOUNTER — HOSPITAL ENCOUNTER (OUTPATIENT)
Facility: HOSPITAL | Age: 68
End: 2018-04-27
Attending: OBSTETRICS & GYNECOLOGY | Admitting: OBSTETRICS & GYNECOLOGY
Payer: MEDICARE

## 2018-04-26 ENCOUNTER — ANESTHESIA (OUTPATIENT)
Dept: SURGERY | Facility: HOSPITAL | Age: 68
End: 2018-04-26
Payer: MEDICARE

## 2018-04-26 DIAGNOSIS — E46 PROTEIN-CALORIE MALNUTRITION, UNSPECIFIED SEVERITY: ICD-10-CM

## 2018-04-26 DIAGNOSIS — N81.6 POSTERIOR VAGINAL WALL PROLAPSE: Primary | ICD-10-CM

## 2018-04-26 DIAGNOSIS — I10 ESSENTIAL HYPERTENSION: ICD-10-CM

## 2018-04-26 DIAGNOSIS — N81.10 PROLAPSE OF ANTERIOR VAGINAL WALL: ICD-10-CM

## 2018-04-26 DIAGNOSIS — E89.0 POSTOPERATIVE HYPOTHYROIDISM: ICD-10-CM

## 2018-04-26 DIAGNOSIS — J44.9 CHRONIC OBSTRUCTIVE PULMONARY DISEASE, UNSPECIFIED COPD TYPE: ICD-10-CM

## 2018-04-26 PROBLEM — K21.9 GERD (GASTROESOPHAGEAL REFLUX DISEASE): Status: ACTIVE | Noted: 2018-04-26

## 2018-04-26 PROBLEM — F41.9 ANXIETY: Status: ACTIVE | Noted: 2018-04-26

## 2018-04-26 PROBLEM — Z79.4 TYPE 2 DIABETES MELLITUS, WITH LONG-TERM CURRENT USE OF INSULIN: Status: ACTIVE | Noted: 2018-04-26

## 2018-04-26 PROBLEM — E11.9 TYPE 2 DIABETES MELLITUS, WITH LONG-TERM CURRENT USE OF INSULIN: Status: ACTIVE | Noted: 2018-04-26

## 2018-04-26 PROBLEM — Z91.81 HISTORY OF FALL: Status: ACTIVE | Noted: 2018-04-26

## 2018-04-26 PROBLEM — E66.9 OBESITY: Status: ACTIVE | Noted: 2018-04-26

## 2018-04-26 LAB
ALBUMIN SERPL BCP-MCNC: 3 G/DL
ALP SERPL-CCNC: 61 U/L
ALT SERPL W/O P-5'-P-CCNC: 13 U/L
ANION GAP SERPL CALC-SCNC: 9 MMOL/L
AST SERPL-CCNC: 15 U/L
BILIRUB SERPL-MCNC: 0.2 MG/DL
BUN SERPL-MCNC: 14 MG/DL
CALCIUM SERPL-MCNC: 8.1 MG/DL
CHLORIDE SERPL-SCNC: 98 MMOL/L
CO2 SERPL-SCNC: 33 MMOL/L
CREAT SERPL-MCNC: 0.7 MG/DL
EST. GFR  (AFRICAN AMERICAN): >60 ML/MIN/1.73 M^2
EST. GFR  (NON AFRICAN AMERICAN): >60 ML/MIN/1.73 M^2
GLUCOSE SERPL-MCNC: 155 MG/DL
POCT GLUCOSE: 109 MG/DL (ref 70–110)
POCT GLUCOSE: 278 MG/DL (ref 70–110)
POTASSIUM SERPL-SCNC: 3.6 MMOL/L
PROT SERPL-MCNC: 5.8 G/DL
SODIUM SERPL-SCNC: 140 MMOL/L

## 2018-04-26 PROCEDURE — 99219 PR INITIAL OBSERVATION CARE,LEVL II: CPT | Mod: ,,, | Performed by: INTERNAL MEDICINE

## 2018-04-26 PROCEDURE — 80053 COMPREHEN METABOLIC PANEL: CPT

## 2018-04-26 PROCEDURE — 36000706: Performed by: OBSTETRICS & GYNECOLOGY

## 2018-04-26 PROCEDURE — 00860 ANES XTRPRTL PX LWR ABD NOS: CPT | Performed by: OBSTETRICS & GYNECOLOGY

## 2018-04-26 PROCEDURE — 63600175 PHARM REV CODE 636 W HCPCS: Performed by: OBSTETRICS & GYNECOLOGY

## 2018-04-26 PROCEDURE — 57288 REPAIR BLADDER DEFECT: CPT | Mod: 80,,, | Performed by: OBSTETRICS & GYNECOLOGY

## 2018-04-26 PROCEDURE — 25000003 PHARM REV CODE 250: Performed by: ANESTHESIOLOGY

## 2018-04-26 PROCEDURE — 36415 COLL VENOUS BLD VENIPUNCTURE: CPT

## 2018-04-26 PROCEDURE — 99900103 DSU ONLY-NO CHARGE-INITIAL HR (STAT): Performed by: OBSTETRICS & GYNECOLOGY

## 2018-04-26 PROCEDURE — 25000003 PHARM REV CODE 250: Performed by: NURSE PRACTITIONER

## 2018-04-26 PROCEDURE — D9220A PRA ANESTHESIA: Mod: ANES,,, | Performed by: ANESTHESIOLOGY

## 2018-04-26 PROCEDURE — 71000039 HC RECOVERY, EACH ADD'L HOUR: Performed by: OBSTETRICS & GYNECOLOGY

## 2018-04-26 PROCEDURE — 25000003 PHARM REV CODE 250: Performed by: NURSE ANESTHETIST, CERTIFIED REGISTERED

## 2018-04-26 PROCEDURE — 37000008 HC ANESTHESIA 1ST 15 MINUTES: Performed by: OBSTETRICS & GYNECOLOGY

## 2018-04-26 PROCEDURE — 57288 REPAIR BLADDER DEFECT: CPT | Mod: ,,, | Performed by: UROLOGY

## 2018-04-26 PROCEDURE — 25000003 PHARM REV CODE 250: Performed by: OBSTETRICS & GYNECOLOGY

## 2018-04-26 PROCEDURE — 37000009 HC ANESTHESIA EA ADD 15 MINS: Performed by: OBSTETRICS & GYNECOLOGY

## 2018-04-26 PROCEDURE — C1771 REP DEV, URINARY, W/SLING: HCPCS | Performed by: OBSTETRICS & GYNECOLOGY

## 2018-04-26 PROCEDURE — 63600175 PHARM REV CODE 636 W HCPCS: Performed by: ANESTHESIOLOGY

## 2018-04-26 PROCEDURE — 63600175 PHARM REV CODE 636 W HCPCS: Performed by: NURSE PRACTITIONER

## 2018-04-26 PROCEDURE — 99900104 DSU ONLY-NO CHARGE-EA ADD'L HR (STAT): Performed by: OBSTETRICS & GYNECOLOGY

## 2018-04-26 PROCEDURE — D9220A PRA ANESTHESIA: Mod: CRNA,,, | Performed by: NURSE ANESTHETIST, CERTIFIED REGISTERED

## 2018-04-26 PROCEDURE — 63600175 PHARM REV CODE 636 W HCPCS: Performed by: NURSE ANESTHETIST, CERTIFIED REGISTERED

## 2018-04-26 PROCEDURE — S0020 INJECTION, BUPIVICAINE HYDRO: HCPCS | Performed by: OBSTETRICS & GYNECOLOGY

## 2018-04-26 PROCEDURE — 36000707: Performed by: OBSTETRICS & GYNECOLOGY

## 2018-04-26 PROCEDURE — 27201423 OPTIME MED/SURG SUP & DEVICES STERILE SUPPLY: Performed by: OBSTETRICS & GYNECOLOGY

## 2018-04-26 PROCEDURE — 88305 TISSUE EXAM BY PATHOLOGIST: CPT | Performed by: PATHOLOGY

## 2018-04-26 PROCEDURE — 27200651 HC AIRWAY, LMA: Performed by: NURSE ANESTHETIST, CERTIFIED REGISTERED

## 2018-04-26 PROCEDURE — 71000033 HC RECOVERY, INTIAL HOUR: Performed by: OBSTETRICS & GYNECOLOGY

## 2018-04-26 DEVICE — SLING MIDURETHRAL LYNX MESH: Type: IMPLANTABLE DEVICE | Site: VAGINA | Status: FUNCTIONAL

## 2018-04-26 RX ORDER — ONDANSETRON 2 MG/ML
INJECTION INTRAMUSCULAR; INTRAVENOUS
Status: DISCONTINUED | OUTPATIENT
Start: 2018-04-26 | End: 2018-04-26

## 2018-04-26 RX ORDER — BACITRACIN 50000 [IU]/1
INJECTION, POWDER, FOR SOLUTION INTRAMUSCULAR
Status: DISCONTINUED | OUTPATIENT
Start: 2018-04-26 | End: 2018-04-26 | Stop reason: HOSPADM

## 2018-04-26 RX ORDER — MIDAZOLAM HYDROCHLORIDE 1 MG/ML
INJECTION, SOLUTION INTRAMUSCULAR; INTRAVENOUS
Status: DISCONTINUED | OUTPATIENT
Start: 2018-04-26 | End: 2018-04-26

## 2018-04-26 RX ORDER — KETOROLAC TROMETHAMINE 30 MG/ML
15 INJECTION, SOLUTION INTRAMUSCULAR; INTRAVENOUS EVERY 6 HOURS
Status: DISCONTINUED | OUTPATIENT
Start: 2018-04-26 | End: 2018-04-27 | Stop reason: HOSPADM

## 2018-04-26 RX ORDER — NEOSTIGMINE METHYLSULFATE 1 MG/ML
INJECTION, SOLUTION INTRAVENOUS
Status: DISCONTINUED | OUTPATIENT
Start: 2018-04-26 | End: 2018-04-26

## 2018-04-26 RX ORDER — DEXAMETHASONE SODIUM PHOSPHATE 4 MG/ML
INJECTION, SOLUTION INTRA-ARTICULAR; INTRALESIONAL; INTRAMUSCULAR; INTRAVENOUS; SOFT TISSUE
Status: DISCONTINUED | OUTPATIENT
Start: 2018-04-26 | End: 2018-04-26

## 2018-04-26 RX ORDER — LEVOTHYROXINE SODIUM 50 UG/1
150 TABLET ORAL
Status: DISCONTINUED | OUTPATIENT
Start: 2018-04-27 | End: 2018-04-27 | Stop reason: HOSPADM

## 2018-04-26 RX ORDER — SUCCINYLCHOLINE CHLORIDE 20 MG/ML
INJECTION INTRAMUSCULAR; INTRAVENOUS
Status: DISCONTINUED | OUTPATIENT
Start: 2018-04-26 | End: 2018-04-26

## 2018-04-26 RX ORDER — DEXTROSE MONOHYDRATE, SODIUM CHLORIDE, AND POTASSIUM CHLORIDE 50; 1.49; 9 G/1000ML; G/1000ML; G/1000ML
INJECTION, SOLUTION INTRAVENOUS CONTINUOUS
Status: DISCONTINUED | OUTPATIENT
Start: 2018-04-26 | End: 2018-04-27 | Stop reason: HOSPADM

## 2018-04-26 RX ORDER — LIDOCAINE HYDROCHLORIDE 10 MG/ML
1 INJECTION, SOLUTION EPIDURAL; INFILTRATION; INTRACAUDAL; PERINEURAL ONCE
Status: COMPLETED | OUTPATIENT
Start: 2018-04-26 | End: 2018-04-26

## 2018-04-26 RX ORDER — LOSARTAN POTASSIUM AND HYDROCHLOROTHIAZIDE 12.5; 5 MG/1; MG/1
1 TABLET ORAL DAILY
Status: DISCONTINUED | OUTPATIENT
Start: 2018-04-27 | End: 2018-04-27 | Stop reason: HOSPADM

## 2018-04-26 RX ORDER — CALCITRIOL 0.25 UG/1
0.5 CAPSULE ORAL DAILY
Status: DISCONTINUED | OUTPATIENT
Start: 2018-04-27 | End: 2018-04-27 | Stop reason: HOSPADM

## 2018-04-26 RX ORDER — OXYCODONE AND ACETAMINOPHEN 10; 325 MG/1; MG/1
1 TABLET ORAL EVERY 4 HOURS PRN
Status: DISCONTINUED | OUTPATIENT
Start: 2018-04-26 | End: 2018-04-27 | Stop reason: HOSPADM

## 2018-04-26 RX ORDER — BISACODYL 10 MG
10 SUPPOSITORY, RECTAL RECTAL DAILY PRN
Status: DISCONTINUED | OUTPATIENT
Start: 2018-04-26 | End: 2018-04-27 | Stop reason: HOSPADM

## 2018-04-26 RX ORDER — ROPINIROLE 1 MG/1
4 TABLET, FILM COATED ORAL 2 TIMES DAILY
Status: DISCONTINUED | OUTPATIENT
Start: 2018-04-26 | End: 2018-04-27 | Stop reason: HOSPADM

## 2018-04-26 RX ORDER — ACETAMINOPHEN 325 MG/1
650 TABLET ORAL EVERY 6 HOURS PRN
Status: DISCONTINUED | OUTPATIENT
Start: 2018-04-26 | End: 2018-04-27 | Stop reason: HOSPADM

## 2018-04-26 RX ORDER — LATANOPROST 50 UG/ML
1 SOLUTION/ DROPS OPHTHALMIC NIGHTLY
Status: DISCONTINUED | OUTPATIENT
Start: 2018-04-26 | End: 2018-04-27 | Stop reason: HOSPADM

## 2018-04-26 RX ORDER — TRAZODONE HYDROCHLORIDE 50 MG/1
150 TABLET ORAL NIGHTLY
Status: DISCONTINUED | OUTPATIENT
Start: 2018-04-26 | End: 2018-04-27 | Stop reason: HOSPADM

## 2018-04-26 RX ORDER — BUPIVACAINE HYDROCHLORIDE 5 MG/ML
INJECTION, SOLUTION EPIDURAL; INTRACAUDAL
Status: DISCONTINUED | OUTPATIENT
Start: 2018-04-26 | End: 2018-04-26 | Stop reason: HOSPADM

## 2018-04-26 RX ORDER — INSULIN ASPART 100 [IU]/ML
0-5 INJECTION, SOLUTION INTRAVENOUS; SUBCUTANEOUS
Status: DISCONTINUED | OUTPATIENT
Start: 2018-04-26 | End: 2018-04-27 | Stop reason: HOSPADM

## 2018-04-26 RX ORDER — FENTANYL CITRATE 50 UG/ML
25 INJECTION, SOLUTION INTRAMUSCULAR; INTRAVENOUS EVERY 5 MIN PRN
Status: COMPLETED | OUTPATIENT
Start: 2018-04-26 | End: 2018-04-26

## 2018-04-26 RX ORDER — IPRATROPIUM BROMIDE AND ALBUTEROL SULFATE 2.5; .5 MG/3ML; MG/3ML
3 SOLUTION RESPIRATORY (INHALATION) EVERY 6 HOURS
Status: DISCONTINUED | OUTPATIENT
Start: 2018-04-26 | End: 2018-04-27 | Stop reason: HOSPADM

## 2018-04-26 RX ORDER — AMOXICILLIN 250 MG
1 CAPSULE ORAL 2 TIMES DAILY PRN
Status: DISCONTINUED | OUTPATIENT
Start: 2018-04-26 | End: 2018-04-27 | Stop reason: HOSPADM

## 2018-04-26 RX ORDER — PROPOFOL 10 MG/ML
VIAL (ML) INTRAVENOUS
Status: DISCONTINUED | OUTPATIENT
Start: 2018-04-26 | End: 2018-04-26

## 2018-04-26 RX ORDER — DULOXETIN HYDROCHLORIDE 30 MG/1
30 CAPSULE, DELAYED RELEASE ORAL DAILY
Status: DISCONTINUED | OUTPATIENT
Start: 2018-04-27 | End: 2018-04-27 | Stop reason: HOSPADM

## 2018-04-26 RX ORDER — LIDOCAINE HCL/PF 100 MG/5ML
SYRINGE (ML) INTRAVENOUS
Status: DISCONTINUED | OUTPATIENT
Start: 2018-04-26 | End: 2018-04-26

## 2018-04-26 RX ORDER — ACETAMINOPHEN 10 MG/ML
1000 INJECTION, SOLUTION INTRAVENOUS EVERY 8 HOURS
Status: DISCONTINUED | OUTPATIENT
Start: 2018-04-26 | End: 2018-04-27 | Stop reason: HOSPADM

## 2018-04-26 RX ORDER — FERROUS SULFATE 325(65) MG
325 TABLET, DELAYED RELEASE (ENTERIC COATED) ORAL DAILY
Status: DISCONTINUED | OUTPATIENT
Start: 2018-04-27 | End: 2018-04-27 | Stop reason: HOSPADM

## 2018-04-26 RX ORDER — ACETAMINOPHEN 10 MG/ML
INJECTION, SOLUTION INTRAVENOUS
Status: DISCONTINUED | OUTPATIENT
Start: 2018-04-26 | End: 2018-04-26

## 2018-04-26 RX ORDER — GLYCOPYRROLATE 0.2 MG/ML
INJECTION INTRAMUSCULAR; INTRAVENOUS
Status: DISCONTINUED | OUTPATIENT
Start: 2018-04-26 | End: 2018-04-26

## 2018-04-26 RX ORDER — EPHEDRINE SULFATE 50 MG/ML
INJECTION, SOLUTION INTRAVENOUS
Status: DISCONTINUED | OUTPATIENT
Start: 2018-04-26 | End: 2018-04-26

## 2018-04-26 RX ORDER — GABAPENTIN 400 MG/1
800 CAPSULE ORAL 3 TIMES DAILY
Status: DISCONTINUED | OUTPATIENT
Start: 2018-04-26 | End: 2018-04-27 | Stop reason: HOSPADM

## 2018-04-26 RX ORDER — ROCURONIUM BROMIDE 10 MG/ML
INJECTION, SOLUTION INTRAVENOUS
Status: DISCONTINUED | OUTPATIENT
Start: 2018-04-26 | End: 2018-04-26

## 2018-04-26 RX ORDER — IBUPROFEN 200 MG
24 TABLET ORAL
Status: DISCONTINUED | OUTPATIENT
Start: 2018-04-26 | End: 2018-04-27 | Stop reason: HOSPADM

## 2018-04-26 RX ORDER — IBUPROFEN 200 MG
16 TABLET ORAL
Status: DISCONTINUED | OUTPATIENT
Start: 2018-04-26 | End: 2018-04-27 | Stop reason: HOSPADM

## 2018-04-26 RX ORDER — SODIUM CHLORIDE 0.9 % (FLUSH) 0.9 %
5 SYRINGE (ML) INJECTION
Status: DISCONTINUED | OUTPATIENT
Start: 2018-04-26 | End: 2018-04-27 | Stop reason: HOSPADM

## 2018-04-26 RX ORDER — LUBIPROSTONE 8 UG/1
24 CAPSULE ORAL 2 TIMES DAILY WITH MEALS
Status: DISCONTINUED | OUTPATIENT
Start: 2018-04-27 | End: 2018-04-27 | Stop reason: HOSPADM

## 2018-04-26 RX ORDER — CLONAZEPAM 1 MG/1
1 TABLET ORAL 2 TIMES DAILY PRN
Status: DISCONTINUED | OUTPATIENT
Start: 2018-04-26 | End: 2018-04-27 | Stop reason: HOSPADM

## 2018-04-26 RX ORDER — OXYCODONE HYDROCHLORIDE 5 MG/1
10 TABLET ORAL ONCE AS NEEDED
Status: COMPLETED | OUTPATIENT
Start: 2018-04-26 | End: 2018-04-26

## 2018-04-26 RX ORDER — KETOROLAC TROMETHAMINE 30 MG/ML
INJECTION, SOLUTION INTRAMUSCULAR; INTRAVENOUS
Status: DISCONTINUED | OUTPATIENT
Start: 2018-04-26 | End: 2018-04-26

## 2018-04-26 RX ORDER — GLUCAGON 1 MG
1 KIT INJECTION
Status: DISCONTINUED | OUTPATIENT
Start: 2018-04-26 | End: 2018-04-27 | Stop reason: HOSPADM

## 2018-04-26 RX ORDER — DIPHENHYDRAMINE HYDROCHLORIDE 50 MG/ML
25 INJECTION INTRAMUSCULAR; INTRAVENOUS EVERY 4 HOURS PRN
Status: DISCONTINUED | OUTPATIENT
Start: 2018-04-26 | End: 2018-04-27 | Stop reason: HOSPADM

## 2018-04-26 RX ORDER — FUROSEMIDE 20 MG/1
20 TABLET ORAL
Status: DISCONTINUED | OUTPATIENT
Start: 2018-04-27 | End: 2018-04-27 | Stop reason: HOSPADM

## 2018-04-26 RX ORDER — FENTANYL CITRATE 50 UG/ML
INJECTION, SOLUTION INTRAMUSCULAR; INTRAVENOUS
Status: DISCONTINUED | OUTPATIENT
Start: 2018-04-26 | End: 2018-04-26

## 2018-04-26 RX ORDER — FAMOTIDINE 20 MG/1
20 TABLET, FILM COATED ORAL 2 TIMES DAILY
Status: DISCONTINUED | OUTPATIENT
Start: 2018-04-26 | End: 2018-04-27 | Stop reason: HOSPADM

## 2018-04-26 RX ORDER — IBUPROFEN 600 MG/1
600 TABLET ORAL EVERY 6 HOURS
Status: DISCONTINUED | OUTPATIENT
Start: 2018-04-27 | End: 2018-04-27 | Stop reason: HOSPADM

## 2018-04-26 RX ORDER — POLYETHYLENE GLYCOL 3350 17 G/17G
17 POWDER, FOR SOLUTION ORAL DAILY
Status: DISCONTINUED | OUTPATIENT
Start: 2018-04-27 | End: 2018-04-27 | Stop reason: HOSPADM

## 2018-04-26 RX ORDER — DIPHENHYDRAMINE HCL 25 MG
25 CAPSULE ORAL EVERY 4 HOURS PRN
Status: DISCONTINUED | OUTPATIENT
Start: 2018-04-26 | End: 2018-04-27 | Stop reason: HOSPADM

## 2018-04-26 RX ORDER — ONDANSETRON 4 MG/1
8 TABLET, ORALLY DISINTEGRATING ORAL EVERY 8 HOURS PRN
Status: DISCONTINUED | OUTPATIENT
Start: 2018-04-26 | End: 2018-04-27 | Stop reason: HOSPADM

## 2018-04-26 RX ORDER — CEFAZOLIN SODIUM 2 G/50ML
2 SOLUTION INTRAVENOUS
Status: COMPLETED | OUTPATIENT
Start: 2018-04-26 | End: 2018-04-26

## 2018-04-26 RX ORDER — IPRATROPIUM BROMIDE AND ALBUTEROL SULFATE 2.5; .5 MG/3ML; MG/3ML
3 SOLUTION RESPIRATORY (INHALATION) EVERY 4 HOURS PRN
Status: DISCONTINUED | OUTPATIENT
Start: 2018-04-26 | End: 2018-04-27 | Stop reason: HOSPADM

## 2018-04-26 RX ORDER — OXYCODONE AND ACETAMINOPHEN 5; 325 MG/1; MG/1
1 TABLET ORAL EVERY 4 HOURS PRN
Status: DISCONTINUED | OUTPATIENT
Start: 2018-04-26 | End: 2018-04-27 | Stop reason: HOSPADM

## 2018-04-26 RX ADMIN — OXYCODONE HYDROCHLORIDE AND ACETAMINOPHEN 1 TABLET: 10; 325 TABLET ORAL at 09:04

## 2018-04-26 RX ADMIN — FENTANYL CITRATE 50 MCG: 50 INJECTION, SOLUTION INTRAMUSCULAR; INTRAVENOUS at 02:04

## 2018-04-26 RX ADMIN — MIDAZOLAM 2 MG: 1 INJECTION INTRAMUSCULAR; INTRAVENOUS at 12:04

## 2018-04-26 RX ADMIN — ACETAMINOPHEN 1000 MG: 10 INJECTION, SOLUTION INTRAVENOUS at 12:04

## 2018-04-26 RX ADMIN — LAMOTRIGINE 150 MG: 100 TABLET ORAL at 09:04

## 2018-04-26 RX ADMIN — CEFAZOLIN SODIUM 2 G: 2 SOLUTION INTRAVENOUS at 12:04

## 2018-04-26 RX ADMIN — ROCURONIUM BROMIDE 10 MG: 10 INJECTION, SOLUTION INTRAVENOUS at 12:04

## 2018-04-26 RX ADMIN — SUCCINYLCHOLINE CHLORIDE 140 MG: 20 INJECTION, SOLUTION INTRAMUSCULAR; INTRAVENOUS at 12:04

## 2018-04-26 RX ADMIN — DEXTROSE MONOHYDRATE, SODIUM CHLORIDE, AND POTASSIUM CHLORIDE: 50; 9; 1.49 INJECTION, SOLUTION INTRAVENOUS at 10:04

## 2018-04-26 RX ADMIN — ROCURONIUM BROMIDE 10 MG: 10 INJECTION, SOLUTION INTRAVENOUS at 01:04

## 2018-04-26 RX ADMIN — KETOROLAC TROMETHAMINE 15 MG: 30 INJECTION, SOLUTION INTRAMUSCULAR at 05:04

## 2018-04-26 RX ADMIN — OXYCODONE HYDROCHLORIDE 10 MG: 5 TABLET ORAL at 03:04

## 2018-04-26 RX ADMIN — DEXTROSE MONOHYDRATE, SODIUM CHLORIDE, AND POTASSIUM CHLORIDE: 50; 9; 1.49 INJECTION, SOLUTION INTRAVENOUS at 03:04

## 2018-04-26 RX ADMIN — ONDANSETRON 4 MG: 2 INJECTION, SOLUTION INTRAMUSCULAR; INTRAVENOUS at 02:04

## 2018-04-26 RX ADMIN — LATANOPROST 1 DROP: 50 SOLUTION OPHTHALMIC at 10:04

## 2018-04-26 RX ADMIN — KETOROLAC TROMETHAMINE 30 MG: 30 INJECTION, SOLUTION INTRAMUSCULAR; INTRAVENOUS at 02:04

## 2018-04-26 RX ADMIN — FENTANYL CITRATE 25 MCG: 50 INJECTION, SOLUTION INTRAMUSCULAR; INTRAVENOUS at 03:04

## 2018-04-26 RX ADMIN — INSULIN DETEMIR 30 UNITS: 100 INJECTION, SOLUTION SUBCUTANEOUS at 09:04

## 2018-04-26 RX ADMIN — PROPOFOL 150 MG: 10 INJECTION, EMULSION INTRAVENOUS at 12:04

## 2018-04-26 RX ADMIN — GABAPENTIN 800 MG: 400 CAPSULE ORAL at 09:04

## 2018-04-26 RX ADMIN — DEXAMETHASONE SODIUM PHOSPHATE 4 MG: 4 INJECTION, SOLUTION INTRAMUSCULAR; INTRAVENOUS at 02:04

## 2018-04-26 RX ADMIN — SODIUM CHLORIDE, SODIUM GLUCONATE, SODIUM ACETATE, POTASSIUM CHLORIDE, MAGNESIUM CHLORIDE, SODIUM PHOSPHATE, DIBASIC, AND POTASSIUM PHOSPHATE: .53; .5; .37; .037; .03; .012; .00082 INJECTION, SOLUTION INTRAVENOUS at 01:04

## 2018-04-26 RX ADMIN — ROPINIROLE HYDROCHLORIDE 4 MG: 1 TABLET, FILM COATED ORAL at 09:04

## 2018-04-26 RX ADMIN — CLONAZEPAM 1 MG: 1 TABLET ORAL at 08:04

## 2018-04-26 RX ADMIN — INSULIN ASPART 1 UNITS: 100 INJECTION, SOLUTION INTRAVENOUS; SUBCUTANEOUS at 09:04

## 2018-04-26 RX ADMIN — ROCURONIUM BROMIDE 10 MG: 10 INJECTION, SOLUTION INTRAVENOUS at 02:04

## 2018-04-26 RX ADMIN — GLYCOPYRROLATE 0.4 MG: 0.2 INJECTION, SOLUTION INTRAMUSCULAR; INTRAVENOUS at 02:04

## 2018-04-26 RX ADMIN — EPHEDRINE SULFATE 10 MG: 50 INJECTION, SOLUTION INTRAMUSCULAR; INTRAVENOUS; SUBCUTANEOUS at 12:04

## 2018-04-26 RX ADMIN — FENTANYL CITRATE 50 MCG: 50 INJECTION, SOLUTION INTRAMUSCULAR; INTRAVENOUS at 12:04

## 2018-04-26 RX ADMIN — ROCURONIUM BROMIDE 20 MG: 10 INJECTION, SOLUTION INTRAVENOUS at 12:04

## 2018-04-26 RX ADMIN — ACETAMINOPHEN 1000 MG: 10 INJECTION, SOLUTION INTRAVENOUS at 09:04

## 2018-04-26 RX ADMIN — LIDOCAINE HYDROCHLORIDE 100 MG: 20 INJECTION, SOLUTION INTRAVENOUS at 12:04

## 2018-04-26 RX ADMIN — TRAZODONE HYDROCHLORIDE 150 MG: 50 TABLET ORAL at 09:04

## 2018-04-26 RX ADMIN — FAMOTIDINE 20 MG: 20 TABLET, FILM COATED ORAL at 08:04

## 2018-04-26 RX ADMIN — SODIUM CHLORIDE, SODIUM GLUCONATE, SODIUM ACETATE, POTASSIUM CHLORIDE, MAGNESIUM CHLORIDE, SODIUM PHOSPHATE, DIBASIC, AND POTASSIUM PHOSPHATE: .53; .5; .37; .037; .03; .012; .00082 INJECTION, SOLUTION INTRAVENOUS at 09:04

## 2018-04-26 RX ADMIN — NEOSTIGMINE METHYLSULFATE 5 MG: 1 INJECTION INTRAVENOUS at 02:04

## 2018-04-26 RX ADMIN — LIDOCAINE HYDROCHLORIDE 10 MG: 10 INJECTION, SOLUTION EPIDURAL; INFILTRATION; INTRACAUDAL; PERINEURAL at 09:04

## 2018-04-26 NOTE — INTERVAL H&P NOTE
The patient has been examined and the H&P has been reviewed:    I concur with the findings and no changes have occurred since H&P was written.    Anesthesia/Surgery risks, benefits and alternative options discussed and understood by patient/family.          Active Hospital Problems    Diagnosis  POA    Posterior vaginal wall prolapse [N81.6]  Yes      Resolved Hospital Problems    Diagnosis Date Resolved POA   No resolved problems to display.

## 2018-04-26 NOTE — TELEPHONE ENCOUNTER
----- Message from Faith Strong MD sent at 4/26/2018 12:06 PM CDT -----  F/u 6 weeks with me for vaginal exam and in and out cath.

## 2018-04-26 NOTE — OP NOTE
THIS IS CONFIDENTIAL AND PRIVILEGED COMMUNICATION  PLEASE DISPOSE OF PAPER COPIES APPROPRIATELY  [facility] [place] [place]   OPERATIVE REPORT        PATIENT NAME: Alanis Mendieta     MEDICAL RECORD NUMBER: [id number]    PROCEDURE DATE: 04/26/2018    PROCEDURE:   1. Enterocele repair  2. Posterior Colporrhaphy  3. Perineorrhaphy     ATTENDING SURGEON: Mark Townsend DO    ASSISTANT: Dr. Strong    PREOPERATIVE DIAGNOSIS: Symptomatic stage 2 Posterior vaginal wall prolapse     POSTOPERATIVE DIAGNOSIS: same     ANESTHESIA: GET    WOUND CLASSIFICATION: Clean-Contaminated    ANTIBIOTIC PROPHYLAXIS: Ancef     FINDINGS: Stage 2 anterior vaginal prolapse, Vaginal apex well suspended. Normal cystourethroscopy with bilateral flow of urine from both ureteral jets, normal bladder mucosa    SPECIMENS SENT: Vaginal epithelium    COMPLICATIONS: None     DRAINS: Preston, UOP- 75 mL    ESTIMATED BLOOD LOSS: 25 mL    IV FLUIDS: 1000 mL    POSTOP CONDITION: stable    PROCEDURE:     The patient was identified in the preoperative area where informed consent was confirmed, and she was taken to the operating room where an adequate level of general anesthesia was obtained. The patient was positioned in lithotomy position with legs in Yellow fin stirrups. Care was taken to avoid joint hyperflexion or hyperextension, and all extremity surfaces were carefully padded so as to minimize risk of neurologic injury. Intravenous antibiotics were administered preoperatively. Sequential compression devices were applied to the patient's lower extremities preoperatively for VTE prophylaxis. Surgical time-out was performed, where the patient was identified and procedures confirmed. An examination under anesthesia was performed with findings described as above. The patient's abdomen, perineum, and vagina were sterilely prepped and draped. A preston catheter was placed in the bladder for drainage.     The  Sling and Cystourethroscopy was done by Dr. Strong and will be dictated separately.  We performed the posterior colporrhaphy and perineorrhaphy.  Allis clamps were placed on the left and right hymenal remnants and at the proximal limit of the rectovaginal septal defect along the vaginal mucosa. The perineal skin to be resected for the perineorrhaphy was grasped with several Allis clamps.  The perineum and vaginal mucosa were injected with .5 Maracine and dilute pitressin, which was distributed beneath the perineal skin and vaginal mucosa both in the midline and in the direction of the fornices. The perineal skin within the outlined area was dissected off the underlying perineal body with Metzenbaum scissors.  The posterior vaginal mucosa was then incised in the midline with Metzenbaum scissors.  The left and right margins of the vaginal mucosa were grasped with Allis clamps and the vaginal mucosa was dissected sharply off of the underlying rectovaginal fibromuscular connective tissue.  Individual areas of bleeding were made hemostatic with the electrocautery.      The rectovaginal fibromuscular tissue was exposed bilaterally to the margins of the levator ani muscles.  At this time a high enterocele sac was identified. We proceeded to isolate and repair the enterocele. This was begun with care to stay very superficial and develop the proper plane. Care taken here will prevent early entry into the peritoneal cavity. The dissection of the enterocele is continued all the way to the neck of the enterocele sac. After the enterocele has been completely isolated, the enterocele sac is reduced and closed.   A 0 Vicryl suture was placed through the peritoneum and into the prerectal fascia that overlies the rectum. A circumferential closure of the defect is then performed.      The rectovaginal fibromuscular tissue was exposed bilaterally to the margins of the levator ani muscles and plicated in the midline with a series  of vertical mattress stitches of 0 Vicryl, bringing together the perineal body in the midline between the hymenal remnants.  A series of vertical mattress stitches were used to plicate the perineal body beneath the perineal skin thereby plicating the superficial and deep transverse perineal muscles and bulbocavernosus muscles.  Care was taken to make sure that vaginal caliber/ introitus allowed 2 - 3 fingerbreadths to be placed without difficulty. There was no significant posterior vaginal wall contracture/ ridging at the completion of the plication. The bed of the repair was copiously irrigated. The vaginal mucosal margins of the vertical incision were trimmed with Metzenbaum scissors on each side and reapproximated with a running, locking stitch of 2-0 Vicryl.  Excellent hemostasis was observed along the suture line. Vaginal exam confirmed good reinforcement of the rectocele site without ridging or significant contracture of the vault.  Rectovaginal examination was performed with findings as described above.    The patient tolerated the procedure well. Needle, sponge lap, and instrument counts were correct x2.  The vaginal was packed with guaze. The patient was transferred to recovery in stable condition.    Dr. Mark Townsend was present and participated in the entire procedure.      DICTATED BY Mark Townsend, DO    _________________________

## 2018-04-26 NOTE — DISCHARGE INSTRUCTIONS
Plan:  Pt already has pain medicine at home. Can take every 4 to 6 hours as needed for pain.   Continue Miralax 1 cup daily   No antibiotics necessary.  Return in 2 weeks for post-op visit with  for vaginal exam and check residual by in and out cath, assess straining and emptying.   She will f/u with  in 6 weeks post-op again to check residual and vaginal exam. Hodan will arrange or sooner if she is c/o straining to urinate or has elevated urine residual.   Return to ER if she has significant vaginal drainage or difficulty urinating.   See post op instructions for prolapse and sling and review with her.     Post Op Instructions for Midurethral Sling Surgery and Prolapse Surgery     RECOVERY ROOM   You will likely awaken with a vaginal packing in place.  This will feel like a large tampon.   If you have a catheter and feel the need to urinate, relax and let the urine flow.  If you do not have a catheter and feel the need to urinate, please let the recovery room nurse know so they can either assist you to the bathroom or provide you with a bedpan.   You may see blue or green urine after surgery.  This is from a dye that is given to you during surgery and will clear within 24 hours.      ACTIVITY   The first six weeks is a critical time period for wound healing.  We depend on the scar tissue to grow into the sling to provide the necessary support to treat the stress urinary incontinence.     After receiving an anesthetic you may feel sleepy or nauseated.  It is best to have little activity for the first 24-48 hours.  Gradually increase activity.   No heavy lifting (nothing greater than 10 pounds or a gallon of milk) for 6 weeks.   Pelvic rest (no sexual intercourse, douching or tampons) for 6 weeks.   It is OK to walk around the house.  Avoid riding a bicycle or putting similar pressure over this area.  No strenuous workouts.     Do not drive for the first 48 hours or if you are  taking narcotic pain medication.  Before driving, be sure you can press the brakes without difficulty or pain.    WOUND CARE INSTRUCTIONS   You may have small incisions that were closed with a medical grade superglue.  You may wash these and pat them dry after 24 hours.   To protect the sling material from infection, do not immerse yourself in water, (i.e. no tub baths, swimming or hot tubbing) for 6 weeks.  You may shower.     It is normal to have some light bleeding which should gradually resolve over the next week.  This will look like a light period.    DIET   In the first 24 hours, it is common to experience some nausea, so take clear liquids.  Once the nausea has resolved, a soft diet is recommended with a gradual increase to your normal diet.    MEDICATIONS   Pain medication should be taken on an as needed basis.  If the narcotic is too much, over-the-counter pain relievers may be taken.  However, do not take additional Tylenol/acetaminophen while taking narcotic pain medication as this can lead to an overdose of the Tylenol/acetaminophen.   Antibiotics, if ordered, should be taken as directed until the prescription has been completed.     A stool softener (Colace or docusate sodium) is recommended to maintain soft stools after surgery.   If you do not have a bowel movement within 36-48 hours of surgery, take 2 tablespoons of Milk of Magnesia.  If there is still no bowel movement by the next day, take ½ bottle of Magnesium Citrate (refrigerate and drink with a straw.  This is available over-the-counter.)    WHEN TO CALL THE DOCTOR:   For heavy bleeding (vaginal discharge like a light period is expected for the 3rd week)   Redness or swelling around the incision.   Fever over 101oF (38.5oC.)   Significant vaginal drainage.    Persistent pain unrelieved by the pain medication.   Leg swelling.   Shortness of breath.   Burning with urination.   Inability to urinate.   Abdominal pain not  improving day by day.        FOR EMERGENCIES   If any unusual problems, questions or concerns, please contact your physician or physician on call at [phone] after hours or during office hours, [phone]

## 2018-04-26 NOTE — H&P (VIEW-ONLY)
Subjective:       Patient ID: Alanis Mendieta is a 67 y.o. female.    Chief Complaint:  Pre-op Exam      History of Present Illness: 66 Y O F with history of HTN, DM, COPD on home O2, Thyroid cancer (papillary) s/p totally thyroidectomy and radiation on supplemental oxygen since 2012 has a history of fall with spinal stenosis ambulates with a walker. Lives in a nursing home.  She was a referral by STEFAN Jacobson for evaluation of urinary incontinence not quite sure if urge associated. Leakage happenes mostly with change in posisiton. Night time urination is bothersome.  She has seen Dr. Strong and Dr. Russell in the past for her urinary incontinence. She was tried oxybuynin and started myriberiq 50 mg daily not helping with her symptoms.  She presents today to discuss surgery.       :    Interval  HP since the last visit   1)  UI:  (+) WILLIAM  (+) UUI  - (+) diapers: 6.  Daytime frequency: Q 4 -5 hours.  Nocturia: Yes:    (--) dysuria-  (--) hematuria,  (--) frequent UTIs.  (+) complete bladder emptying.     2)  POP:   Symptoms:(+)  .  (--) vaginal bleeding. (--) vaginal discharge. (-) sexually active.  (--) dyspareunia.   (--)  Vaginal dryness.  (--) vaginal estrogen use.     3)  BM:  (+) constipation/straining.  (--) chronic diarrhea. (--) hematochezia.  (--) fecal incontinence.  (+) fecal smearing/urgency.  (--) incomplete evacuation.  No recent colonoscopy     4)Pessary- tried fell out;      5) Recurrent UTI: UCx:   9/29/17            E. Coli 100 K  8/16/17             E.coli, 2+leukocyte/nitrite, voided   7/27/17                      E.coli  6/12/17                      k.pneumoniae, cath, nitrite +  5/8/17                                  aerococcus, 100k, voided, tr       [facility]     Question 9/29/2017  4:14 PM CDT   General Urogynecology: Are you experiencing the following?    Dysuria (painful urination) Yes   Nocturia:  waking up at night to empty your bladder  Yes   If you answered yes to the  "previous question, how many times does this happen per night? 3-4   Enuresis (urine loss during sleep) Yes   Dribbling urine after you urinate No   Hematuria (urine appears red) Yes   Type of stream Interrupted   Urinary Incontinence (General): Are you experiencing the following?    Past consultation for incontinence: Have you ever seen someone for the evaluation of incontinence? No   If you answered yes to the previous question, please select all the therapies you have tried.  Kegals   Please note the effectiveness of the therapies. Ineffective   Need to wear protection to keep clothes dry  No   If you answered yes to the previous question, please royer the protection you use.  Diaper   If you wear protection, how much wetness is typically on each pad? Moderate   If you wear protection, how often do you have to change per day, if applicable?  7-8   Stress Symptoms: Are you experiencing the following?    Leakage of urine with cough, laugh and/or sneeze Yes   If you answered yes to the previous question, what is the frequency in days, weeks and/or months? Weekly   Leakage of urine with sex No   Leakage of urine with bending/ lifting Yes   Leakage of urine with briskly walking or jogging No   If you lose urine for any other reason not previously mentioned, please note it below, if applicable.     Urge Symptoms: Are you experiencing the following?    Urgency ("got to go" feeling) Yes   Urge: How frequently do you feel an urge to urinate (feeling like you "gotta go" to the bathroom and can't wait) Several times a day   Do you experience a leakage of urine when you have a feeling of urgency?  Yes   Leakage of urine when unaware Yes   Past use of anticholinergics (medications used to treat overactive bladder) No   If you answered yes to the previous question, please royer the anticholinergics you have used:  Oxybutynin   Have you ever used Mirbetriq (aka Mirabegron)?  Yes   Prolapse Symptoms: Are you experiencing any of the " following?     Falling out/ Bulging/ Heaviness in the vagina No   Vaginal/ Abdominal Pain/ Pressure No   Need to strain/ Push to void No   Need to wait on the toilet before you void No   Unusual position to urinate (using your hands to push back the vaginal bulge) No   Sensation of incomplete emptying Yes   Past use of pessary device No   If you answered yes to the previous question, please list the devices you have used below.     Bowel Symptoms: Are you experiencing any of the following?    Constipation Yes   Diarrhea  No   Hematochezia (bloody stool) No   Incomplete evacuation of stool No   Involuntary loss of formed stool No   Fecal smearing/urgency No   Involuntary loss of gas No   Vaginal Symptoms: Are you experiencing any of the following?     Abnormal vaginal bleeding  No   Vaginal dryness No   Sexually active  No   Dyspareunia (painful intercourse) No   Estrogen use  No       GYN & OB History  No LMP recorded. Patient has had a hysterectomy.   Date of Last Pap: No result found    OB History    Para Term  AB Living   3       1     SAB TAB Ectopic Multiple Live Births           2      # Outcome Date GA Lbr Guicho/2nd Weight Sex Delivery Anes PTL Lv   3             2             1 AB                 Past Medical History:   Diagnosis Date    Allergy     Anxiety     Arthritis     Bipolar disorder     Chronic back pain     COPD (chronic obstructive pulmonary disease)     Diabetes mellitus     GERD (gastroesophageal reflux disease)     Hx of thyroid cancer     Hyperlipidemia     Hypertension     Hypothyroidism     Restless leg syndrome     Urinary tract infection      Past Surgical History:   Procedure Laterality Date    broken foot and ankle      CHOLECYSTECTOMY      HYSTERECTOMY      SPINAL FUSION      TOTAL THYROIDECTOMY       Review of patient's allergies indicates:   Allergen Reactions    Morpholine analogues     Tubersol [tuberculin ppd]        Current  "Outpatient Prescriptions:     ampicillin (PRINCIPEN) 500 MG capsule, , Disp: , Rfl:     ANORO ELLIPTA 62.5-25 mcg/actuation DsDv, , Disp: , Rfl:     calcitRIOL (ROCALTROL) 0.5 MCG Cap, , Disp: , Rfl:     clonazePAM (KLONOPIN) 1 MG tablet, , Disp: , Rfl:     DULoxetine (CYMBALTA) 60 MG capsule, , Disp: , Rfl:     ERGOCALCIFEROL, VITAMIN D2, (VITAMIN D ORAL), Take by mouth., Disp: , Rfl:     fluoxetine (PROZAC) 40 MG capsule, , Disp: , Rfl:     furosemide (LASIX) 20 MG tablet, , Disp: , Rfl:     gabapentin (NEURONTIN) 800 MG tablet, , Disp: , Rfl:     lamoTRIgine (LAMICTAL) 150 MG Tab, , Disp: , Rfl:     latanoprost 0.005 % ophthalmic solution, , Disp: , Rfl:     LEVEMIR FLEXTOUCH 100 unit/mL (3 mL) InPn pen, , Disp: , Rfl:     levothyroxine (SYNTHROID) 150 MCG tablet, , Disp: , Rfl:     LINZESS 145 mcg Cap capsule, , Disp: , Rfl:     losartan-hydrochlorothiazide 50-12.5 mg (HYZAAR) 50-12.5 mg per tablet, , Disp: , Rfl:     meclizine (ANTIVERT) 25 mg tablet, , Disp: , Rfl:     methocarbamol (ROBAXIN) 500 MG Tab, , Disp: , Rfl:     mupirocin (BACTROBAN) 2 % ointment, , Disp: , Rfl:     NOVOFINE AUTOCOVER 30 gauge x 1/3" Ndle, , Disp: , Rfl:     NOVOLOG FLEXPEN 100 unit/mL InPn pen, , Disp: , Rfl:     oxycodone (ROXICODONE) 10 mg Tab immediate release tablet, , Disp: , Rfl:     pantoprazole (PROTONIX) 40 MG tablet, , Disp: , Rfl:     ropinirole (REQUIP) 4 MG tablet, , Disp: , Rfl:     trazodone (DESYREL) 150 MG tablet, , Disp: , Rfl:     [START ON 4/13/2018] estradiol (ESTRACE) 0.01 % (0.1 mg/gram) vaginal cream, Place 1 g vaginally 3 (three) times a week. Place 1g vaginally every other day, Disp: 42.5 g, Rfl: 6    mirabegron (MYRBETRIQ) 50 mg Tb24, Take 1 tablet (50 mg total) by mouth once daily., Disp: 90 tablet, Rfl: 3    polyethylene glycol (GLYCOLAX) 17 gram PwPk, Take 17 g by mouth once daily. Mix 1 cap in juice daily, Disp: 100 packet, Rfl: 2    sodium phosphates (FLEET ENEMA) 19-7 " gram/118 mL Enem, Place 1 enema rectally once. 2 fleets enemas the night before surgery, Disp: 2 Bottle, Rfl: 0      Review of Systems  Review of Systems   Constitutional: Negative.  Negative for activity change, appetite change, chills, diaphoresis, fatigue, fever and unexpected weight change.   HENT: Negative.    Eyes: Negative.    Respiratory: Negative.  Negative for apnea, cough and wheezing.    Cardiovascular: Negative.  Negative for chest pain and palpitations.   Gastrointestinal: Positive for constipation. Negative for abdominal distention, abdominal pain, anal bleeding, blood in stool, diarrhea, nausea, rectal pain and vomiting.   Endocrine: Negative.    Genitourinary: Positive for frequency and urgency. Negative for decreased urine volume, difficulty urinating, dyspareunia, dysuria, enuresis, flank pain, genital sores, hematuria, menstrual problem, pelvic pain, vaginal bleeding, vaginal discharge and vaginal pain.   Musculoskeletal: Negative for back pain and gait problem.   Skin: Negative for color change, pallor, rash and wound.   Allergic/Immunologic: Negative for immunocompromised state.   Neurological: Negative.  Negative for dizziness and speech difficulty.   Hematological: Negative for adenopathy.   Psychiatric/Behavioral: Negative for agitation, behavioral problems, confusion and sleep disturbance.           Objective:     Physical Exam   Constitutional: She is oriented to person, place, and time. She appears well-developed and well-nourished.   HENT:   Head: Normocephalic and atraumatic.   Neck: Normal range of motion. Neck supple.   Pulmonary/Chest: No respiratory distress. She has no wheezes.   Musculoskeletal: Normal range of motion.   Neurological: She is alert and oriented to person, place, and time.   Skin: No cyanosis. Nails show no clubbing.   Psychiatric: She has a normal mood and affect. Her behavior is normal. Judgment and thought content normal.          Assessment:        1. Prolapse  of anterior vaginal wall    2. Urinary frequency    3. Posterior vaginal wall prolapse               Plan:       1. Patient consented for  anterior/posterior repair with perineorrhaphy and cystourethroscopy.   R/B/A reviewed. Specific risks reviewed include:  infection, bleeding, need for blood transfusion, damage to surrounding structures, anesthesia risks, death, heart attack, stroke, mesh erosion/extrusion, pain, dyspareunia, urinary retention, voiding dysfunction, urinary incontinence, exacerbation of urinary urge incontinence, and need for further surgeries.  We reviewed potential for failure of POP defect repair and need for future surgery, with no way of predicting risk.  She understands success rate of  Native tissue vaginal repair 60%.    Alternatives reviewed include: pessary/PT for POP. All questions were answered and consents were signed today and placed in the chart. The sling procedure will be done by Dr. Strong.   2. T&S, urine HCG on DOS  3. Needs clearance for OR per PCP  4. VTE Prophylaxis:  + SCDs  5. Proceed to OR for above-mentioned procedure.  6. Patient instructed clear liquid diet and enema as well as chlorahexadine/dial soap prep to perform day before & AM of surgery.  7. She has a history of chronic opiate use will need to contact  Dr. Almazan ([place]) to discusses post op pain management plan     Approximately  30 min were spent in consult, 90 % in discussion.     Mark Townsend DO  Female Pelvic Medicine and Reconstructive Surgery  [facility]

## 2018-04-26 NOTE — ANESTHESIA POSTPROCEDURE EVALUATION
"Anesthesia Post Evaluation    Patient: Alanis Mendieta    Procedure(s) Performed: Procedure(s) (LRB):  REPAIR POSTERIOR (N/A)  mid urethral sling (N/A)  CYSTOSCOPY    Final Anesthesia Type: general  Patient location during evaluation: PACU  Patient participation: Yes- Able to Participate  Level of consciousness: awake and alert  Post-procedure vital signs: reviewed and stable  Pain management: adequate  Airway patency: patent  PONV status at discharge: No PONV  Anesthetic complications: no      Cardiovascular status: hemodynamically stable and blood pressure returned to baseline  Respiratory status: unassisted, spontaneous ventilation and room air  Hydration status: euvolemic  Follow-up not needed.        Visit Vitals  /63   Pulse 66   Temp 36.3 °C (97.3 °F) (Temporal)   Resp 17   Ht 5' 4" (1.626 m)   Wt 108 kg (237 lb 15.8 oz)   SpO2 98%   Breastfeeding? No   BMI 40.85 kg/m²       Pain/Loraine Score: Pain Assessment Performed: Yes (4/26/2018  3:30 PM)  Presence of Pain: complains of pain/discomfort (4/26/2018  3:30 PM)  Pain Rating Prior to Med Admin: 5 (4/26/2018  3:46 PM)  Loraine Score: 9 (4/26/2018  3:30 PM)      "

## 2018-04-26 NOTE — TRANSFER OF CARE
"Anesthesia Transfer of Care Note    Patient: Alanis Mendieta    Procedure(s) Performed: Procedure(s) (LRB):  REPAIR POSTERIOR (N/A)  mid urethral sling (N/A)  CYSTOSCOPY    Patient location: PACU    Anesthesia Type: general    Transport from OR: Transported from OR on 2-3 L/min O2 by NC with adequate spontaneous ventilation    Post pain: adequate analgesia    Post assessment: no apparent anesthetic complications    Post vital signs: stable    Level of consciousness: awake    Nausea/Vomiting: no nausea/vomiting    Complications: none    Transfer of care protocol was followed      Last vitals:   Visit Vitals  BP (!) 150/68 (BP Location: Left arm, Patient Position: Lying)   Pulse 76   Temp 36.9 °C (98.4 °F) (Temporal)   Resp 18   Ht 5' 4" (1.626 m)   Wt 108 kg (237 lb 15.8 oz)   SpO2 96%   Breastfeeding? No   BMI 40.85 kg/m²     "

## 2018-04-26 NOTE — ANESTHESIA PREPROCEDURE EVALUATION
04/26/2018  Alanis Mendieta is a 67 y.o., female.    Anesthesia Evaluation      I have reviewed the Medications.     Review of Systems  Anesthesia Hx:  No problems with previous Anesthesia   Social:  Former Smoker    Cardiovascular:   Hypertension hyperlipidemia Cardiac clearance per Dr. Elmore.   Pulmonary:   COPD (home O2 3L), severe Asthma Sleep Apnea (does not have CPAP machine)    Renal/:  Renal/ Normal     Hepatic/GI:   GERD    Neurological:   RLS  Chronic Pain Syndrome (oxycodone daily )  Peripheral Neuropathy    Endocrine:   Diabetes Hypothyroidism    Psych:   Psychiatric History (Bipolar disorder) anxiety          Physical Exam  General:  Morbid Obesity    Airway/Jaw/Neck:  Airway Findings: Mouth Opening: Normal General Airway Assessment: Adult  Mallampati: II  Jaw/Neck Findings:  Neck ROM: Extension Decreased, Mild      Dental:  Dental Findings: Edentulous   Chest/Lungs:  Chest/Lungs Findings: Clear to auscultation, Decreased Breath Sounds Bilateral     Heart/Vascular:  Heart Findings: Rate: Normal  Rhythm: Regular Rhythm  Sounds: Normal  Heart murmur: negative Vascular Findings: Normal (No carotid bruits.)       Mental Status:  Mental Status Findings:  Cooperative, Alert and Oriented         Anesthesia Plan  Type of Anesthesia, risks & benefits discussed:  Anesthesia Type:  general  Patient's Preference:   Intra-op Monitoring Plan:   Intra-op Monitoring Plan Comments:   Post Op Pain Control Plan:   Post Op Pain Control Plan Comments:   Induction:   IV  Beta Blocker:  Patient is not currently on a Beta-Blocker (No further documentation required).       Informed Consent: Patient understands risks and agrees with Anesthesia plan.  Questions answered. Anesthesia consent signed with patient.  ASA Score: 3     Day of Surgery Review of History & Physical:            Ready For Surgery From  Anesthesia Perspective.

## 2018-04-27 LAB
ALBUMIN SERPL BCP-MCNC: 2.7 G/DL
ALP SERPL-CCNC: 61 U/L
ALT SERPL W/O P-5'-P-CCNC: 15 U/L
ANION GAP SERPL CALC-SCNC: 6 MMOL/L
AST SERPL-CCNC: 17 U/L
BASOPHILS # BLD AUTO: 0 K/UL
BASOPHILS NFR BLD: 0.1 %
BILIRUB SERPL-MCNC: 0.2 MG/DL
BUN SERPL-MCNC: 11 MG/DL
CALCIUM SERPL-MCNC: 7.8 MG/DL
CHLORIDE SERPL-SCNC: 100 MMOL/L
CO2 SERPL-SCNC: 32 MMOL/L
CREAT SERPL-MCNC: 0.7 MG/DL
DIFFERENTIAL METHOD: ABNORMAL
EOSINOPHIL # BLD AUTO: 0 K/UL
EOSINOPHIL NFR BLD: 0 %
ERYTHROCYTE [DISTWIDTH] IN BLOOD BY AUTOMATED COUNT: 14 %
EST. GFR  (AFRICAN AMERICAN): >60 ML/MIN/1.73 M^2
EST. GFR  (NON AFRICAN AMERICAN): >60 ML/MIN/1.73 M^2
GLUCOSE SERPL-MCNC: 212 MG/DL
HCT VFR BLD AUTO: 34.7 %
HGB BLD-MCNC: 11.6 G/DL
LYMPHOCYTES # BLD AUTO: 1.4 K/UL
LYMPHOCYTES NFR BLD: 16.9 %
MAGNESIUM SERPL-MCNC: 1.9 MG/DL
MCH RBC QN AUTO: 31 PG
MCHC RBC AUTO-ENTMCNC: 33.4 G/DL
MCV RBC AUTO: 93 FL
MONOCYTES # BLD AUTO: 0.3 K/UL
MONOCYTES NFR BLD: 4.1 %
NEUTROPHILS # BLD AUTO: 6.6 K/UL
NEUTROPHILS NFR BLD: 78.9 %
PHOSPHATE SERPL-MCNC: 3.8 MG/DL
PLATELET # BLD AUTO: 223 K/UL
PMV BLD AUTO: 8.2 FL
POCT GLUCOSE: 155 MG/DL (ref 70–110)
POCT GLUCOSE: 159 MG/DL (ref 70–110)
POTASSIUM SERPL-SCNC: 4.3 MMOL/L
PREALB SERPL-MCNC: 23 MG/DL
PROT SERPL-MCNC: 5.8 G/DL
RBC # BLD AUTO: 3.74 M/UL
SODIUM SERPL-SCNC: 138 MMOL/L
T4 FREE SERPL-MCNC: 0.96 NG/DL
TSH SERPL DL<=0.005 MIU/L-ACNC: 0.25 UIU/ML
WBC # BLD AUTO: 8.3 K/UL

## 2018-04-27 PROCEDURE — 84134 ASSAY OF PREALBUMIN: CPT

## 2018-04-27 PROCEDURE — 63600175 PHARM REV CODE 636 W HCPCS: Performed by: OBSTETRICS & GYNECOLOGY

## 2018-04-27 PROCEDURE — 80053 COMPREHEN METABOLIC PANEL: CPT

## 2018-04-27 PROCEDURE — 27000221 HC OXYGEN, UP TO 24 HOURS

## 2018-04-27 PROCEDURE — 84443 ASSAY THYROID STIM HORMONE: CPT

## 2018-04-27 PROCEDURE — 94640 AIRWAY INHALATION TREATMENT: CPT

## 2018-04-27 PROCEDURE — 94761 N-INVAS EAR/PLS OXIMETRY MLT: CPT

## 2018-04-27 PROCEDURE — 25000242 PHARM REV CODE 250 ALT 637 W/ HCPCS: Performed by: NURSE PRACTITIONER

## 2018-04-27 PROCEDURE — 85025 COMPLETE CBC W/AUTO DIFF WBC: CPT

## 2018-04-27 PROCEDURE — 25000003 PHARM REV CODE 250: Performed by: OBSTETRICS & GYNECOLOGY

## 2018-04-27 PROCEDURE — 25000003 PHARM REV CODE 250: Performed by: NURSE PRACTITIONER

## 2018-04-27 PROCEDURE — 84439 ASSAY OF FREE THYROXINE: CPT

## 2018-04-27 PROCEDURE — 99225 PR SUBSEQUENT OBSERVATION CARE,LEVEL II: CPT | Mod: ,,, | Performed by: INTERNAL MEDICINE

## 2018-04-27 PROCEDURE — 97162 PT EVAL MOD COMPLEX 30 MIN: CPT

## 2018-04-27 PROCEDURE — 84100 ASSAY OF PHOSPHORUS: CPT

## 2018-04-27 PROCEDURE — 97116 GAIT TRAINING THERAPY: CPT

## 2018-04-27 PROCEDURE — 36415 COLL VENOUS BLD VENIPUNCTURE: CPT

## 2018-04-27 PROCEDURE — 83735 ASSAY OF MAGNESIUM: CPT

## 2018-04-27 PROCEDURE — 97802 MEDICAL NUTRITION INDIV IN: CPT | Mod: 59

## 2018-04-27 RX ADMIN — KETOROLAC TROMETHAMINE 15 MG: 30 INJECTION, SOLUTION INTRAMUSCULAR at 12:04

## 2018-04-27 RX ADMIN — GABAPENTIN 800 MG: 400 CAPSULE ORAL at 09:04

## 2018-04-27 RX ADMIN — LAMOTRIGINE 150 MG: 100 TABLET ORAL at 09:04

## 2018-04-27 RX ADMIN — CALCITRIOL 0.5 MCG: 0.25 CAPSULE, LIQUID FILLED ORAL at 09:04

## 2018-04-27 RX ADMIN — IPRATROPIUM BROMIDE AND ALBUTEROL SULFATE 3 ML: .5; 3 SOLUTION RESPIRATORY (INHALATION) at 12:04

## 2018-04-27 RX ADMIN — ACETAMINOPHEN 1000 MG: 10 INJECTION, SOLUTION INTRAVENOUS at 05:04

## 2018-04-27 RX ADMIN — FUROSEMIDE 20 MG: 20 TABLET ORAL at 09:04

## 2018-04-27 RX ADMIN — DEXTROSE MONOHYDRATE, SODIUM CHLORIDE, AND POTASSIUM CHLORIDE: 50; 9; 1.49 INJECTION, SOLUTION INTRAVENOUS at 06:04

## 2018-04-27 RX ADMIN — OXYCODONE HYDROCHLORIDE AND ACETAMINOPHEN 1 TABLET: 5; 325 TABLET ORAL at 10:04

## 2018-04-27 RX ADMIN — FAMOTIDINE 20 MG: 20 TABLET, FILM COATED ORAL at 09:04

## 2018-04-27 RX ADMIN — LOSARTAN POTASSIUM AND HYDROCHLOROTHIAZIDE 1 TABLET: 12.5; 5 TABLET ORAL at 09:04

## 2018-04-27 RX ADMIN — POLYETHYLENE GLYCOL (3350) 17 G: 17 POWDER, FOR SOLUTION ORAL at 09:04

## 2018-04-27 RX ADMIN — ROPINIROLE HYDROCHLORIDE 4 MG: 1 TABLET, FILM COATED ORAL at 09:04

## 2018-04-27 RX ADMIN — DULOXETINE HYDROCHLORIDE 30 MG: 30 CAPSULE, DELAYED RELEASE ORAL at 09:04

## 2018-04-27 RX ADMIN — LEVOTHYROXINE SODIUM 150 MCG: 50 TABLET ORAL at 05:04

## 2018-04-27 RX ADMIN — KETOROLAC TROMETHAMINE 15 MG: 30 INJECTION, SOLUTION INTRAMUSCULAR at 05:04

## 2018-04-27 RX ADMIN — LUBIPROSTONE 24 MCG: 8 CAPSULE, GELATIN COATED ORAL at 09:04

## 2018-04-27 RX ADMIN — IPRATROPIUM BROMIDE AND ALBUTEROL SULFATE 3 ML: .5; 3 SOLUTION RESPIRATORY (INHALATION) at 07:04

## 2018-04-27 RX ADMIN — FERROUS SULFATE TAB EC 325 MG (65 MG FE EQUIVALENT) 325 MG: 325 (65 FE) TABLET DELAYED RESPONSE at 09:04

## 2018-04-27 NOTE — HPI
Consulted postoperatively to assist with medical managment of a 67 y/o female who underwent posterior vaginal prolapse repair.  She has a PMH of HTN, DM, COPD requiring on home O2, hypothyroidism s/p total thyroidectomy for thyroid cancer, spinal stenosis, falls, and anxiety.  She is a nursing home patient, who had preoperative c/o urinary incontinence. She appears anxious and reports mild vaginal discomfort at this time.  She denies SOB, CP, N/V, fever, or chills.

## 2018-04-27 NOTE — PLAN OF CARE
Problem: Physical Therapy Goal  Goal: Physical Therapy Goal  Goals to be met by: 2018     Patient will increase functional independence with mobility by performin. Sit to stand transfer with Minimal Assistance  2. Bed to chair transfer with Minimal Assistance using Rolling Walker  3. Gait  x 50 feet with Minimal Assistance using Rolling Walker.   4. Lower extremity exercise program x20 reps per handout, with assistance as needed    Outcome: Ongoing (interventions implemented as appropriate)  PT eval and treat completed- gait training 20ft/RW with o2 2 liters and wheelchair following with LLE giveway- Pt to benefit from continued therapies

## 2018-04-27 NOTE — SUBJECTIVE & OBJECTIVE
Past Medical History:   Diagnosis Date    Allergy     Anxiety     Arthritis     Asthma     Bipolar disorder     Chronic back pain     COPD (chronic obstructive pulmonary disease)     Diabetes mellitus     Fibromyalgia     GERD (gastroesophageal reflux disease)     Hx of thyroid cancer     Hyperlipidemia     Hypertension     Hypothyroidism     Neuropathy     On home oxygen therapy     Restless leg syndrome     Sleep apnea     Urinary tract infection        Past Surgical History:   Procedure Laterality Date    APPENDECTOMY      broken foot and ankle      CHOLECYSTECTOMY      HYSTERECTOMY      SPINAL FUSION      TOTAL THYROIDECTOMY  2014       Review of patient's allergies indicates:   Allergen Reactions    Morpholine analogues     Tubersol [tuberculin ppd]        No current facility-administered medications on file prior to encounter.      Current Outpatient Prescriptions on File Prior to Encounter   Medication Sig    ANORO ELLIPTA 62.5-25 mcg/actuation DsDv Inhale into the lungs once daily.     calcitRIOL (ROCALTROL) 0.5 MCG Cap Take 0.5 mcg by mouth once daily.     clonazePAM (KLONOPIN) 1 MG tablet Take 1 mg by mouth every evening.     DULoxetine (CYMBALTA) 60 MG capsule Take 30 mg by mouth once daily.     ERGOCALCIFEROL, VITAMIN D2, (VITAMIN D ORAL) Take by mouth once daily.     fluoxetine (PROZAC) 40 MG capsule Take 40 mg by mouth once daily.     furosemide (LASIX) 20 MG tablet Take 20 mg by mouth 3 (three) times a week.     gabapentin (NEURONTIN) 800 MG tablet Take 800 mg by mouth 3 (three) times daily.     lamoTRIgine (LAMICTAL) 150 MG Tab Take 150 mg by mouth 2 (two) times daily.     latanoprost 0.005 % ophthalmic solution Place 1 drop into both eyes every evening.     levothyroxine (SYNTHROID) 150 MCG tablet Take 150 mcg by mouth before breakfast.     LINZESS 145 mcg Cap capsule Take 145 mcg by mouth once daily.     losartan-hydrochlorothiazide 50-12.5 mg (HYZAAR)  "50-12.5 mg per tablet Take 1 tablet by mouth once daily.     meclizine (ANTIVERT) 25 mg tablet Take by mouth.     ropinirole (REQUIP) 4 MG tablet Take 4 mg by mouth 2 (two) times daily.     trazodone (DESYREL) 150 MG tablet Take 150 mg by mouth nightly.     LEVEMIR FLEXTOUCH 100 unit/mL (3 mL) InPn pen Inject 65 Units into the skin every evening.     methocarbamol (ROBAXIN) 500 MG Tab Take 500 mg by mouth.     NOVOFINE AUTOCOVER 30 gauge x 1/3" Ndle     NOVOLOG FLEXPEN 100 unit/mL InPn pen     oxycodone (ROXICODONE) 10 mg Tab immediate release tablet Take 10 mg by mouth every 8 (eight) hours as needed.     pantoprazole (PROTONIX) 40 MG tablet Take 40 mg by mouth.     [DISCONTINUED] ampicillin (PRINCIPEN) 500 MG capsule Take 500 mg by mouth.      Family History     Problem Relation (Age of Onset)    Diabetes Mother, Father    Heart disease Mother, Father    Hypertension Mother, Father        Social History Main Topics    Smoking status: Former Smoker     Packs/day: 1.00     Years: 30.00     Types: Cigarettes     Quit date: 5/8/2000    Smokeless tobacco: Never Used    Alcohol use No    Drug use: No    Sexual activity: No     Review of Systems   Constitutional: Negative for chills and fever.   HENT: Negative for congestion and rhinorrhea.    Eyes: Negative for discharge and visual disturbance.   Respiratory: Negative for cough, chest tightness, shortness of breath and wheezing.    Cardiovascular: Negative for chest pain, palpitations and leg swelling.   Gastrointestinal: Negative for abdominal distention, abdominal pain, diarrhea, nausea and vomiting.   Genitourinary: Negative for dysuria and frequency.        Reports hx of urinary incontinence and mild postoperative vaginal discomfort     Musculoskeletal: Positive for arthralgias, back pain and myalgias. Negative for joint swelling.   Skin: Negative.  Negative for rash.   Neurological: Negative for seizures and syncope.   Psychiatric/Behavioral: The " patient is nervous/anxious.      Objective:     Vital Signs (Most Recent):  Temp: 97.8 °F (36.6 °C) (04/26/18 1925)  Pulse: 74 (04/26/18 1925)  Resp: 18 (04/26/18 1925)  BP: (!) 149/65 (04/26/18 1925)  SpO2: 98 % (04/26/18 1925) Vital Signs (24h Range):  Temp:  [97.3 °F (36.3 °C)-98.4 °F (36.9 °C)] 97.8 °F (36.6 °C)  Pulse:  [66-80] 74  Resp:  [14-29] 18  SpO2:  [91 %-98 %] 98 %  BP: (134-183)/(60-87) 149/65     Weight: 108 kg (237 lb 15.8 oz)  Body mass index is 40.85 kg/m².    Physical Exam   Constitutional: She is oriented to person, place, and time. She appears well-developed and well-nourished. No distress. Nasal cannula in place.   HENT:   Head: Normocephalic and atraumatic.   Eyes: EOM are normal. Pupils are equal, round, and reactive to light.   Neck: Normal range of motion. Neck supple.   Cardiovascular: Normal rate, regular rhythm, normal heart sounds and intact distal pulses.    No murmur heard.  Pulmonary/Chest: Effort normal and breath sounds normal. No respiratory distress. She has no wheezes. She has no rales.   Abdominal: Soft. Bowel sounds are normal. She exhibits no distension. There is no tenderness.   Obese    Genitourinary:   Genitourinary Comments: preston catheter present    Musculoskeletal: Normal range of motion.   Neurological: She is alert and oriented to person, place, and time.   Skin: Skin is warm and dry. She is not diaphoretic.   Psychiatric: Her behavior is normal. Her mood appears anxious.   Nursing note and vitals reviewed.      Significant Labs:   CMP:   Recent Labs  Lab 04/26/18  1509      K 3.6   CL 98   CO2 33*   *   BUN 14   CREATININE 0.7   CALCIUM 8.1*   PROT 5.8*   ALBUMIN 3.0*   BILITOT 0.2   ALKPHOS 61   AST 15   ALT 13   ANIONGAP 9   EGFRNONAA >60       Significant Imaging: reviewed

## 2018-04-27 NOTE — ASSESSMENT & PLAN NOTE
Contributing Nutrition Diagnosis  Morbid obesity    Related to (etiology):   Excessive energy intake    Signs and Symptoms (as evidenced by):   BMI 40.94     Interventions/Recommendations (treatment strategy):  Diabetic calorie controlled diet (with protein supplement that limits calories, ie ProMod or Beneprotein)    Nutrition Diagnosis Status:   New

## 2018-04-27 NOTE — PLAN OF CARE
Problem: Patient Care Overview  Goal: Plan of Care Review  Outcome: Ongoing (interventions implemented as appropriate)  Pt awake in bed. AAOx3. Dx: s/p retropubic sling repair w/cysto. Plan of care reviewed. Questions answered. Verbalized understanding.  IVFs infusing. O2 @ 3L/min per n/c. Byrd intact. Vaginal packing in place. TEDs/SCDs. Trapeze. Accuchecks AC/HS. Conts pain management. No further complaints at this time. Call light in reach. Bed low. Will cont to monitor.

## 2018-04-27 NOTE — PROGRESS NOTES
Progress Note  Hospital Medicine  Patient Name:Alanis Mendieta  MRN:  [id number]  Patient Class: OP- Outpatient Recovery  Admit Date: 4/26/2018  Length of Stay: 0 days  Expected Discharge Date:   Attending Physician: Mark Townsend DO  Primary Care Provider:  Delio Melendez MD    SUBJECTIVE:     Principal Problem: Posterior vaginal wall prolapse  Initial history of present illness: Consulted postoperatively to assist with medical managment of a 65 y/o female who underwent posterior vaginal prolapse repair.  She has a PMH of HTN, DM, COPD requiring on home O2, hypothyroidism s/p total thyroidectomy for thyroid cancer, spinal stenosis, falls, and anxiety.  She is a nursing home patient, who had preoperative c/o urinary incontinence. She appears anxious and reports mild vaginal discomfort at this time.  She denies SOB, CP, N/V, fever, or chills.     PMH/PSH/SH/FH/Meds: reviewed.    Symptoms/Review of Systems: Patient is in good spirits, pain controlled. No shortness of breath, cough, chest pain or headache, fever or abdominal pain.     Diet:  Adequate intake.    Activity level: up with assist, wheel-chair bound  Pain:  Patient reports no pain.       OBJECTIVE:   Vital Signs (Most Recent):      Temp: 97.4 °F (36.3 °C) (04/27/18 0815)  Pulse: 74 (04/27/18 0815)  Resp: 15 (04/27/18 0815)  BP: (!) 119/90 (04/27/18 0815)  SpO2: 99 % (04/27/18 0815)       Vital Signs Range (Last 24H):  Temp:  [96.4 °F (35.8 °C)-98.4 °F (36.9 °C)]   Pulse:  [66-82]   Resp:  [14-29]   BP: (115-183)/(58-90)   SpO2:  [91 %-100 %]     Weight: 108 kg (237 lb 15.8 oz)  Body mass index is 40.85 kg/m².    Intake/Output Summary (Last 24 hours) at 04/27/18 0845  Last data filed at 04/27/18 0600   Gross per 24 hour   Intake             1000 ml   Output             2950 ml   Net            -1950 ml     Physical Examination:  Constitutional: She is oriented to person, place, and time. She appears well-developed and well-nourished. No distress.  Nasal cannula in place.   HENT:   Head: Normocephalic and atraumatic.   Eyes: EOM are normal. Pupils are equal, round, and reactive to light.   Neck: Normal range of motion. Neck supple.   Cardiovascular: Normal rate, regular rhythm, normal heart sounds and intact distal pulses.    No murmur heard.  Pulmonary/Chest: Effort normal and breath sounds normal. No respiratory distress. She has no wheezes. She has no rales.   Abdominal: Soft. Bowel sounds are normal. She exhibits no distension. There is no tenderness.   Musculoskeletal: Normal range of motion.   Neurological: She is alert and oriented to person, place, and time.   Skin: Skin is warm and dry. She is not diaphoretic.   Psychiatric: Her behavior is normal. Her mood appears anxious.   Nursing note and vitals reviewed.    CBC:    Recent Labs  Lab 04/27/18  0352   WBC 8.30   RBC 3.74*   HGB 11.6*   HCT 34.7*      MCV 93   MCH 31.0   MCHC 33.4   BMP    Recent Labs  Lab 04/26/18  1509 04/27/18  0352   * 212*    138   K 3.6 4.3   CL 98 100   CO2 33* 32*   BUN 14 11   CREATININE 0.7 0.7   CALCIUM 8.1* 7.8*   MG  --  1.9      Diagnostic Results:  Microbiology Results (last 7 days)     ** No results found for the last 168 hours. **         Assessment/Plan:         * Posterior vaginal wall prolapse     - S/P repair by Dr. Townsend and Dr. Strong   - Care per primary rec's  - PRN pain management per urology           COPD (chronic obstructive pulmonary disease)     - hx of COPD requiring home O2  - titrate supplemental oxygen as needed to maintain saturations greater than 92%  - duo nebs Q6 for now           Type 2 diabetes mellitus, with long-term current use of insulin     Check blood glucose level q AC/HS.  Use Novolog Insulin Sliding Scale as needed.   Continue American Diabetic Association 1800 Kcal diet          Postoperative hypothyroidism     - hx of thyroid cancer s/p thyroidectomy   - resume home dosing of levothyroxine            Protein calorie malnutrition     - total protein 5.8, albumin 3.0 with initial labs  - Encouraged patient to improve protein intake.  - nutrition consulted           Essential hypertension     - resume home antihypertensive regimen           History of fall     - hx of falls - utilizes walker at baseline  - high risk of fall / injury follow all fall precautions and safety measures  - PT/OT consulted           Anxiety     - resume home clonazepam dosing PRN BID          GERD (gastroesophageal reflux disease)     - continue famotidine as prescribed           Obesity     - encourage healthy lifestyle and weight loss efforts     Expecting DC back to nursing home once patient urinates and works with PT.     VTE Risk Mitigation         Ordered     IP VTE LOW RISK PATIENT  Once      04/26/18 0807     Place NICHO hose  Until discontinued      04/26/18 0807     Place sequential compression device  Until discontinued      04/26/18 0807        Fabiano Stubbs MD  Department of Hospital Medicine   [facility]

## 2018-04-27 NOTE — PLAN OF CARE
04/27/18 1300   Final Note   Assessment Type Final Discharge Note   Discharge Disposition group home [facility]  ([facility])   What phone number can be called within the next 1-3 days to see how you are doing after discharge? ([phone])

## 2018-04-27 NOTE — ASSESSMENT & PLAN NOTE
- hx of falls - utilizes walker at baseline  - high risk of fall / injury follow all fall precautions and safety measures  - PT/OT consulted

## 2018-04-27 NOTE — ASSESSMENT & PLAN NOTE
- S/P repair by Dr. Townsend and Dr. Strong   - Care per primary rec's  - PRN pain management per urology

## 2018-04-27 NOTE — PROGRESS NOTES
04/27/18 0733   Patient Assessment/Suction   Level of Consciousness (AVPU) alert   All Lung Fields Breath Sounds clear   Cough Type none   PRE-TX-O2-ETCO2   O2 Device (Oxygen Therapy) nasal cannula   $ Is the patient on Low Flow Oxygen? Yes   Flow (L/min) 2   SpO2 100 %   Pulse Oximetry Type Intermittent   $ Pulse Oximetry - Multiple Charge Pulse Oximetry - Multiple   Pulse 71   Resp 18   Aerosol Therapy   $ Aerosol Therapy Charges Aerosol Treatment   Respiratory Treatment Status given   SVN/Inhaler Treatment Route mask   Position During Treatment HOB at 30 degrees   Patient Tolerance good   Post-Treatment   Post-treatment Heart Rate (beats/min) 75   Post-treatment Resp Rate (breaths/min) 18   All Fields Breath Sounds unchanged

## 2018-04-27 NOTE — PLAN OF CARE
Notified NP Way of need for orders via Secure Chat. Also informed of patients request for Klonopin due to feeling anxious and SOB. Sats 100%3L/NC. VS stable. No distress noted.

## 2018-04-27 NOTE — PLAN OF CARE
AAOx4. Plan of care reviewed with patient. Understanding verbalized. Safety maintained throughout shift. Bed in low and locked position, side rails up x2, call light within reach. O2 3L in use. Patient ambulated with walker and assistance. Ambulated with PT. No falls/injuries during shift. Vaginal packing removed. Byrd catheter removed, patient voided with no difficulties. Blood glucose monitored per orders. Pain mildly controlled with PRN pain medications.       Patient to be discharged to [facility]. Report called to Aydee. IV discontinued, catheter intact. Patient transferred off unit via W/C with [facility] transport.

## 2018-04-27 NOTE — PLAN OF CARE
Problem: Nutrition Imbalance: Excess Oral Intake (Adult)  Intervention: Promote Healthy Nutritional Intake/Lifestyle  Recommendation/Intervention: 1) continue diabetic 2000 calorie diet with texture per SLP 2) Add protein supplement, Beneprotein, with meals (recommend protein supplement that has minimal calories since pt is eating well) 3) Encouraged use of high protein foods and discussed examples  Goals: 1) pt will consume at least 75% of meals during admit 2) pt will use protein supplement during admit  Nutrition Goal Status: new  Communication of RD Recs:  (POC, sticky note)

## 2018-04-27 NOTE — PT/OT/SLP EVAL
Physical Therapy Evaluation    Patient Name:  Alanis Mendieta   MRN:  [id number]    Recommendations:     Discharge Recommendations:  nursing facility, skilled   Discharge Equipment Recommendations: none   Barriers to discharge: None    Assessment:     Alanis Mendieta is a 67 y.o. female admitted with a medical diagnosis of Posterior vaginal wall prolapse.  She presents with the following impairments/functional limitations:  weakness, impaired endurance, impaired self care skills, impaired functional mobilty, decreased lower extremity function, impaired cardiopulmonary response to activity . Pt ambulated with RW with LLE giveaway/wheelchair following- pt stated scheduled for L TKA end of May by Dr Marin.    Rehab Prognosis:  fair; patient would benefit from acute skilled PT services to address these deficits and reach maximum level of function.      Recent Surgery: Procedure(s) (LRB):  REPAIR POSTERIOR (N/A)  mid urethral sling (N/A)  CYSTOSCOPY 1 Day Post-Op    Plan:     During this hospitalization, patient to be seen 6 x/week to address the above listed problems via gait training, therapeutic activities, therapeutic exercises  · Plan of Care Expires:  05/11/18   Plan of Care Reviewed with: patient    Subjective     Communicated with nurse Martin prior to session.  Patient found seated at the bedside chair post OT upon PT entry to room, agreeable to evaluation.    Stated is happy with result of surgery- able to empty bladder well- pt pleasant, cheerful and cooperative  Stated walks with RW with wheelchair following as LK is bad and gives way without warning    Chief Complaint: none  Patient comments/goals: go back to [facility] today  Pain/Comfort:  · Pain Rating 1: 0/10    Patients cultural, spiritual, [employer] conflicts given the current situation:      Living Environment:  [facility] x 2 years  Prior to admission, patients level of function was ambulatory short distance with chair following.  Patient  has the following equipment: wheelchair, walker, rolling.  DME owned (not currently used): .  Upon discharge, patient will have assistance from NH.    Objective:     Patient found with: oxygen     General Precautions: Standard,     Orthopedic Precautions:N/A   Braces: N/A     Exams:  · RLE ROM: WFL  · RLE Strength: WFL  · LLE ROM: WFL  · LLE Strength: WFL    Functional Mobility:  · Transfers:     · Sit to Stand:  minimum assistance with rolling walker  · Gait: 20ft with O2 at 2 liters/RW with wheelchair following with LLE giveaway    AM-PAC 6 CLICK MOBILITY  Total Score:15       Therapeutic Activities and Exercises:   bedside commode use with OT Tee  Pt transferred to bedside chair  Gait to hallways with wheelchair following  Back to reclining chair via stand pivot min assist    Patient left up in chair with all lines intact, call button in reach and nurse present.    GOALS:    Physical Therapy Goals        Problem: Physical Therapy Goal    Goal Priority Disciplines Outcome Goal Variances Interventions   Physical Therapy Goal     PT/OT, PT Ongoing (interventions implemented as appropriate)     Description:  Goals to be met by: 2018     Patient will increase functional independence with mobility by performin. Sit to stand transfer with Minimal Assistance  2. Bed to chair transfer with Minimal Assistance using Rolling Walker  3. Gait  x 50 feet with Minimal Assistance using Rolling Walker.   4. Lower extremity exercise program x20 reps per handout, with assistance as needed                      History:     Past Medical History:   Diagnosis Date    Allergy     Anxiety     Arthritis     Asthma     Bipolar disorder     Chronic back pain     COPD (chronic obstructive pulmonary disease)     Diabetes mellitus     Fibromyalgia     GERD (gastroesophageal reflux disease)     Hx of thyroid cancer     Hyperlipidemia     Hypertension     Hypothyroidism     Neuropathy     On home oxygen therapy      Restless leg syndrome     Sleep apnea     Urinary tract infection        Past Surgical History:   Procedure Laterality Date    APPENDECTOMY      broken foot and ankle      CHOLECYSTECTOMY      HYSTERECTOMY      SPINAL FUSION      TOTAL THYROIDECTOMY  2014       Clinical Decision Making:     History  Co-morbidities and personal factors that may impact the plan of care Examination  Body Structures and Functions, activity limitations and participation restrictions that may impact the plan of care Clinical Presentation   Decision Making/ Complexity Score   Co-morbidities:   [] Time since onset of injury / illness / exacerbation  [] Status of current condition  []Patient's cognitive status and safety concerns    [] Multiple Medical Problems (see med hx)  Personal Factors:   [] Patient's age  [] Prior Level of function   [] Patient's home situation (environment and family support)  [] Patient's level of motivation  [] Expected progression of patient      HISTORY:(criteria)    [] 27503 - no personal factors/history    [] 05052 - has 1-2 personal factor/comorbidity     [] 85205 - has >3 personal factor/comorbidity     Body Regions:  [] Objective examination findings  [] Head     []  Neck  [] Trunk   [] Upper Extremity  [] Lower Extremity    Body Systems:  [] For communication ability, affect, cognition, language, and learning style: the assessment of the ability to make needs known, consciousness, orientation (person, place, and time), expected emotional /behavioral responses, and learning preferences (eg, learning barriers, education  needs)  [] For the neuromuscular system: a general assessment of gross coordinated movement (eg, balance, gait, locomotion, transfers, and transitions) and motor function  (motor control and motor learning)  [] For the musculoskeletal system: the assessment of gross symmetry, gross range of motion, gross strength, height, and weight  [] For the integumentary system: the assessment  of pliability(texture), presence of scar formation, skin color, and skin integrity  [] For cardiovascular/pulmonary system: the assessment of heart rate, respiratory rate, blood pressure, and edema     Activity limitations:    [] Patient's cognitive status and saf ety concerns          [] Status of current condition      [] Weight bearing restriction  [] Cardiopulmunary Restriction    Participation Restrictions:   [] Goals and goal agreement with the patient     [] Rehab potential (prognosis) and probable outcome      Examination of Body System: (criteria)    [] 79706 - addressing 1-2 elements    [] 45338 - addressing a total of 3 or more elements     [] 19552 -  Addressing a total of 4 or more elements         Clinical Presentation: (criteria)  Choose one     On examination of body system using standardized tests and measures patient presents with (CHOOSE ONE) elements from any of the following: body structures and functions, activity limitations, and/or participation restrictions.  Leading to a clinical presentation that is considered (CHOOSE ONE)                              Clinical Decision Making  (Eval Complexity):  Choose One     Time Tracking:     PT Received On: 04/27/18  PT Start Time: 1127     PT Stop Time: 1146  PT Total Time (min): 19 min     Billable Minutes: Evaluation 9 and Gait Training 10      Cris Gomez, PT  04/27/2018

## 2018-04-27 NOTE — ASSESSMENT & PLAN NOTE
- glucose 278 post operatively   - poor PO intake this evening - will administer 30 units detemir x1 this evening then resume home dosing starting 04/27/18  - ISS AC / HS  - diabetic diet

## 2018-04-27 NOTE — PLAN OF CARE
Met with pt to obtain signature for the disclosure form to return to [facility].  I offered to call pt's family to let them know she was returning to [facility] however pt stated that she would take care of that and call her daughter-in-law or text her son.  Awaiting call back from [facility] when okay to call report.      12:50 P.M. - Lakeisha at [facility] stated she just put in a note in [facility].  They are requesting to know frequency of taking the meclizine and methocarbamal.  If frequency is the same as previously then okay for the nurse to verbally tell their nurse when report is called.  Lakeisha stated they do not need a hard script for klonopin as she had requested in [facility]. Okay to call report to their nurse Aydee.  Updated pt's nurse Mary.    Faxed [facility] pt's MAR's through [facility].          04/27/18 1225   Discharge Reassessment   Assessment Type Discharge Planning Reassessment   Discharge Plan A Return to nursing home

## 2018-04-27 NOTE — CONSULTS
[facility] Hospital Medicine  Consult Note    Patient Name: Alanis Mendieta  MRN: [id number]  Admission Date: 4/26/2018  Hospital Length of Stay: 0 days  Attending Physician: Mark Townsend DO   Primary Care Provider: Delio Melendez MD           Patient information was obtained from patient and ER records.     Consults  Subjective:     Principal Problem: Posterior vaginal wall prolapse    Chief Complaint: No chief complaint on file.       HPI: Consulted postoperatively to assist with medical managment of a 65 y/o female who underwent posterior vaginal prolapse repair.  She has a PMH of HTN, DM, COPD requiring on home O2, hypothyroidism s/p total thyroidectomy for thyroid cancer, spinal stenosis, falls, and anxiety.  She is a nursing home patient, who had preoperative c/o urinary incontinence. She appears anxious and reports mild vaginal discomfort at this time.  She denies SOB, CP, N/V, fever, or chills.     Past Medical History:   Diagnosis Date    Allergy     Anxiety     Arthritis     Asthma     Bipolar disorder     Chronic back pain     COPD (chronic obstructive pulmonary disease)     Diabetes mellitus     Fibromyalgia     GERD (gastroesophageal reflux disease)     Hx of thyroid cancer     Hyperlipidemia     Hypertension     Hypothyroidism     Neuropathy     On home oxygen therapy     Restless leg syndrome     Sleep apnea     Urinary tract infection        Past Surgical History:   Procedure Laterality Date    APPENDECTOMY      broken foot and ankle      CHOLECYSTECTOMY      HYSTERECTOMY      SPINAL FUSION      TOTAL THYROIDECTOMY  2014       Review of patient's allergies indicates:   Allergen Reactions    Morpholine analogues     Tubersol [tuberculin ppd]        No current facility-administered medications on file prior to encounter.      Current Outpatient Prescriptions on File Prior to Encounter   Medication Sig    ANORO ELLIPTA 62.5-25 mcg/actuation  "DsDv Inhale into the lungs once daily.     calcitRIOL (ROCALTROL) 0.5 MCG Cap Take 0.5 mcg by mouth once daily.     clonazePAM (KLONOPIN) 1 MG tablet Take 1 mg by mouth every evening.     DULoxetine (CYMBALTA) 60 MG capsule Take 30 mg by mouth once daily.     ERGOCALCIFEROL, VITAMIN D2, (VITAMIN D ORAL) Take by mouth once daily.     fluoxetine (PROZAC) 40 MG capsule Take 40 mg by mouth once daily.     furosemide (LASIX) 20 MG tablet Take 20 mg by mouth 3 (three) times a week.     gabapentin (NEURONTIN) 800 MG tablet Take 800 mg by mouth 3 (three) times daily.     lamoTRIgine (LAMICTAL) 150 MG Tab Take 150 mg by mouth 2 (two) times daily.     latanoprost 0.005 % ophthalmic solution Place 1 drop into both eyes every evening.     levothyroxine (SYNTHROID) 150 MCG tablet Take 150 mcg by mouth before breakfast.     LINZESS 145 mcg Cap capsule Take 145 mcg by mouth once daily.     losartan-hydrochlorothiazide 50-12.5 mg (HYZAAR) 50-12.5 mg per tablet Take 1 tablet by mouth once daily.     meclizine (ANTIVERT) 25 mg tablet Take by mouth.     ropinirole (REQUIP) 4 MG tablet Take 4 mg by mouth 2 (two) times daily.     trazodone (DESYREL) 150 MG tablet Take 150 mg by mouth nightly.     LEVEMIR FLEXTOUCH 100 unit/mL (3 mL) InPn pen Inject 65 Units into the skin every evening.     methocarbamol (ROBAXIN) 500 MG Tab Take 500 mg by mouth.     NOVOFINE AUTOCOVER 30 gauge x 1/3" Ndle     NOVOLOG FLEXPEN 100 unit/mL InPn pen     oxycodone (ROXICODONE) 10 mg Tab immediate release tablet Take 10 mg by mouth every 8 (eight) hours as needed.     pantoprazole (PROTONIX) 40 MG tablet Take 40 mg by mouth.     [DISCONTINUED] ampicillin (PRINCIPEN) 500 MG capsule Take 500 mg by mouth.      Family History     Problem Relation (Age of Onset)    Diabetes Mother, Father    Heart disease Mother, Father    Hypertension Mother, Father        Social History Main Topics    Smoking status: Former Smoker     Packs/day: 1.00     " Years: 30.00     Types: Cigarettes     Quit date: 5/8/2000    Smokeless tobacco: Never Used    Alcohol use No    Drug use: No    Sexual activity: No     Review of Systems   Constitutional: Negative for chills and fever.   HENT: Negative for congestion and rhinorrhea.    Eyes: Negative for discharge and visual disturbance.   Respiratory: Negative for cough, chest tightness, shortness of breath and wheezing.    Cardiovascular: Negative for chest pain, palpitations and leg swelling.   Gastrointestinal: Negative for abdominal distention, abdominal pain, diarrhea, nausea and vomiting.   Genitourinary: Negative for dysuria and frequency.        Reports hx of urinary incontinence and mild postoperative vaginal discomfort     Musculoskeletal: Positive for arthralgias, back pain and myalgias. Negative for joint swelling.   Skin: Negative.  Negative for rash.   Neurological: Negative for seizures and syncope.   Psychiatric/Behavioral: The patient is nervous/anxious.      Objective:     Vital Signs (Most Recent):  Temp: 97.8 °F (36.6 °C) (04/26/18 1925)  Pulse: 74 (04/26/18 1925)  Resp: 18 (04/26/18 1925)  BP: (!) 149/65 (04/26/18 1925)  SpO2: 98 % (04/26/18 1925) Vital Signs (24h Range):  Temp:  [97.3 °F (36.3 °C)-98.4 °F (36.9 °C)] 97.8 °F (36.6 °C)  Pulse:  [66-80] 74  Resp:  [14-29] 18  SpO2:  [91 %-98 %] 98 %  BP: (134-183)/(60-87) 149/65     Weight: 108 kg (237 lb 15.8 oz)  Body mass index is 40.85 kg/m².    Physical Exam   Constitutional: She is oriented to person, place, and time. She appears well-developed and well-nourished. No distress. Nasal cannula in place.   HENT:   Head: Normocephalic and atraumatic.   Eyes: EOM are normal. Pupils are equal, round, and reactive to light.   Neck: Normal range of motion. Neck supple.   Cardiovascular: Normal rate, regular rhythm, normal heart sounds and intact distal pulses.    No murmur heard.  Pulmonary/Chest: Effort normal and breath sounds normal. No respiratory distress.  She has no wheezes. She has no rales.   Abdominal: Soft. Bowel sounds are normal. She exhibits no distension. There is no tenderness.   Obese    Genitourinary:   Genitourinary Comments: preston catheter present    Musculoskeletal: Normal range of motion.   Neurological: She is alert and oriented to person, place, and time.   Skin: Skin is warm and dry. She is not diaphoretic.   Psychiatric: Her behavior is normal. Her mood appears anxious.   Nursing note and vitals reviewed.      Significant Labs:   CMP:   Recent Labs  Lab 04/26/18  1509      K 3.6   CL 98   CO2 33*   *   BUN 14   CREATININE 0.7   CALCIUM 8.1*   PROT 5.8*   ALBUMIN 3.0*   BILITOT 0.2   ALKPHOS 61   AST 15   ALT 13   ANIONGAP 9   EGFRNONAA >60       Significant Imaging: reviewed     Assessment/Plan:     * Posterior vaginal wall prolapse    - S/P repair by Dr. Townsend and Dr. Strong   - Care per primary rec's  - PRN pain management per urology         COPD (chronic obstructive pulmonary disease)    - hx of COPD requiring home O2  - titrate supplemental oxygen as needed to maintain saturations greater than 92%  - duo nebs Q6 for now         Type 2 diabetes mellitus, with long-term current use of insulin    - glucose 278 post operatively   - poor PO intake this evening - will administer 30 units detemir x1 this evening then resume home dosing starting 04/27/18  - ISS AC / HS  - diabetic diet         Postoperative hypothyroidism    - hx of thyroid cancer s/p thyroidectomy   - resume home dosing of levothyroxine         Protein calorie malnutrition    - total protein 5.8, albumin 3.0 with initial labs  - prealbumin pending   - nutrition consulted         Essential hypertension    - resume home antihypertensive regimen         History of fall    - hx of falls - utilizes walker at baseline  - high risk of fall / injury follow all fall precautions and safety measures  - PT/OT consulted         Anxiety    - resume home clonazepam dosing PRN  BID        GERD (gastroesophageal reflux disease)    - continue famotidine as prescribed         Obesity    - encourage healthy lifestyle and weight loss efforts          VTE Risk Mitigation         Ordered     IP VTE LOW RISK PATIENT  Once      04/26/18 0807     Place NICHO hose  Until discontinued      04/26/18 0807     Place sequential compression device  Until discontinued      04/26/18 0807            Thank you for your consult. We will follow-up with patient. Please contact us if you have any additional questions.    Walter Escobar NP  Department of Hospital Medicine   [facility]

## 2018-04-27 NOTE — PLAN OF CARE
Spoke to Soledad at [facility], [phone] who verified they would  pt and let us know when okay to call for report after they received the discharge orders.  Faxed the AVS, H & P, Op report, discharge summary through [facility] to [facility].      04/27/18 2812   Discharge Reassessment   Discharge Plan A Return to nursing home

## 2018-04-27 NOTE — CONSULTS
[facility]  Adult Nutrition  Consult Note    SUMMARY     Recommendations    Recommendation/Intervention: 1) continue diabetic 2000 calorie diet with texture per SLP 2) Add protein supplement, Beneprotein, with meals (recommend protein supplement that has minimal calories since pt is eating well) 3) Encouraged use of high protein foods and discussed examples  Goals: 1) pt will consume at least 75% of meals during admit 2) pt will use protein supplement during admit  Nutrition Goal Status: new  Communication of RD Recs:  (POC, sticky note)    Discharge Plan    Diabetic 2000 calorie with low calorie protein supplement      Reason for Assessment    Reason for Assessment: consult  1. Posterior vaginal wall prolapse    2. Prolapse of anterior vaginal wall    3. Chronic obstructive pulmonary disease, unspecified COPD type    4. Essential hypertension    5. Postoperative hypothyroidism    6. Protein-calorie malnutrition, unspecified severity      Past Medical History:   Diagnosis Date    Allergy     Anxiety     Arthritis     Asthma     Bipolar disorder     Chronic back pain     COPD (chronic obstructive pulmonary disease)     Diabetes mellitus     Fibromyalgia     GERD (gastroesophageal reflux disease)     Hx of thyroid cancer     Hyperlipidemia     Hypertension     Hypothyroidism     Neuropathy     On home oxygen therapy     Restless leg syndrome     Sleep apnea     Urinary tract infection      General Information Comments: Admitted for stress urinary incontinence.  Pt is s/p procedure, alert and reports good appetite. Pt has no teeth, and notes her gums have toughened up so she can eat whole meats now.  Mild muscle loss. Wt loss is minimal.      Nutrition Risk Screen    Nutrition Risk Screen: no indicators present    Nutrition/Diet History    Patient Reported Diet/Restrictions/Preferences: general (without desserts, has fruit instead)  Food Preferences: no intolerances noted. Notes  "she can use Boost now to increase protein intake.    Do you have any cultural, spiritual, Moravian conflicts, given your current situation?: none  Food Allergies: NKFA    Anthropometrics    Temp: 97 °F (36.1 °C)  Height Method: Stated  Height: 5' 4"  Height (inches): 64 in  Weight Method: Stated  Weight: 107.9 kg (237 lb 14 oz)  Weight (lb): 237.88 lb  Ideal Body Weight (IBW), Female: 120 lb  % Ideal Body Weight, Female (lb): 198.23 lb  BMI (Calculated): 40.9  BMI Grade: greater than 40 - morbid obesity  Usual Body Weight (UBW), k kg (per chart review 17)  % Usual Body Weight: 98.3  % Weight Change From Usual Weight: -1.91 %       Lab/Procedures/Meds    Pertinent Labs Reviewed: reviewed  Lab Results   Component Value Date    HGBA1C 6.6 (H) 2018     Lab Results   Component Value Date    ALBUMIN 2.7 (L) 2018     Lab Results   Component Value Date    CRP 1.40 2018     Lab Results   Component Value Date    WBC 8.30 2018     Pertinent Medications Reviewed: reviewed  Scheduled Meds:   acetaminophen  1,000 mg Intravenous Q8H    albuterol-ipratropium 2.5mg-0.5mg/3mL  3 mL Nebulization Q6H    calcitRIOL  0.5 mcg Oral Daily    DULoxetine  30 mg Oral Daily    famotidine  20 mg Oral BID    ferrous sulfate  325 mg Oral Daily    furosemide  20 mg Oral Once per day on     gabapentin  800 mg Oral TID    ibuprofen  600 mg Oral Q6H    insulin detemir U-100  65 Units Subcutaneous QHS    ketorolac  15 mg Intravenous Q6H    lamoTRIgine  150 mg Oral BID    latanoprost  1 drop Both Eyes QHS    levothyroxine  150 mcg Oral Before breakfast    losartan-hydrochlorothiazide 50-12.5 mg  1 tablet Oral Daily    lubiprostone  24 mcg Oral BID WM    polyethylene glycol  17 g Oral Daily    rOPINIRole  4 mg Oral BID    traZODone  150 mg Oral Nightly     Continuous Infusions:   dextrose 5 % and 0.9 % NaCl with KCl 20 mEq 125 mL/hr at 18 0629    electrolyte-S (pH 7.4) Stopped " (04/26/18 3885)     PRN Meds:.acetaminophen, albuterol-ipratropium 2.5mg-0.5mg/3mL, bisacodyl, clonazePAM, dextrose 50%, dextrose 50%, diphenhydrAMINE, diphenhydrAMINE, glucagon (human recombinant), glucose, glucose, insulin aspart U-100, ondansetron, oxyCODONE-acetaminophen, oxyCODONE-acetaminophen, promethazine (PHENERGAN) IVPB, senna-docusate 8.6-50 mg, sodium chloride 0.9%    Physical Findings/Assessment    Overall Physical Appearance: obese  Oral/Mouth Cavity: teeth absent  Skin: incision(s) (William score 17)    Estimated/Assessed Needs    Weight Used For Calorie Calculations: 108 kg (238 lb 1.6 oz)     Energy Need Method: Dent-St Jeor: 1600 X 1.25=2000     Weight Used For Protein Calculations: 108 kg (238 lb 1.6 oz)  1.0-1.2 gm/kg/day: 108-129    Fluid Need Method: RDA Method (or per MD) (5131-3294 mls/day)     CHO Requirement: 50% of EEN      Nutrition Prescription Ordered    Current Diet Order: diabetic 2000 calorie, dysphagia (mechanical soft), diet Coke with meals    Evaluation of Received Nutrient/Fluid Intake    Energy Calories Required: exceed needs  Protein Required: exceed needs  Fluid Required: meeting needs  % Intake of Estimated Energy Needs: >100% (prior to admit)  % Meal Intake: YVONNE since admit    Nutrition Risk    Level of Risk/Frequency of Follow-up:  (1 x wkly)     Assessment and Plan    Nutrition Problem  Increased protein needs    Related to (etiology):   Increased physiological need for healing    Signs and Symptoms (as evidenced by):   S/p surgery with incision    Interventions/Recommendations (treatment strategy):  See above    Nutrition Diagnosis Status:   New        Obesity    Contributing Nutrition Diagnosis  Morbid obesity    Related to (etiology):   Excessive energy intake    Signs and Symptoms (as evidenced by):   BMI 40.94     Interventions/Recommendations (treatment strategy):  Diabetic calorie controlled diet (with protein supplement that limits calories, ie ProMod or  Beneprotein)    Nutrition Diagnosis Status:   New               Monitor and Evaluation    Food and Nutrient Intake: energy intake  Food and Nutrient Adminstration: diet order  Anthropometric Measurements: weight, weight change  Biochemical Data, Medical Tests and Procedures: inflammatory profile, glucose/endocrine profile  Nutrition-Focused Physical Findings: overall appearance, skin

## 2018-04-27 NOTE — ASSESSMENT & PLAN NOTE
- hx of COPD requiring home O2  - titrate supplemental oxygen as needed to maintain saturations greater than 92%  - duo nebs Q6 for now

## 2018-04-27 NOTE — PROGRESS NOTES
[facility]  Obstetrics & Gynecology  Progress Note    Patient Name: Alanis Mendieta  MRN: [id number]  Admission Date: 4/26/2018  Primary Care Provider: Delio Melendez MD  Principal Problem: Posterior vaginal wall prolapse    Subjective:     Interval History: s/p retropubic mus, and Posterior colporrhapy, enterocele repair and perineorrhaphy POD #1 doing well. Patient tolerating diet well, no n/v/f/c. Passed active void trial with PVR of 50 mL    Scheduled Meds:   acetaminophen  1,000 mg Intravenous Q8H    albuterol-ipratropium 2.5mg-0.5mg/3mL  3 mL Nebulization Q6H    calcitRIOL  0.5 mcg Oral Daily    DULoxetine  30 mg Oral Daily    famotidine  20 mg Oral BID    ferrous sulfate  325 mg Oral Daily    furosemide  20 mg Oral Once per day on Mon Wed Fri    gabapentin  800 mg Oral TID    ibuprofen  600 mg Oral Q6H    insulin detemir U-100  65 Units Subcutaneous QHS    ketorolac  15 mg Intravenous Q6H    lamoTRIgine  150 mg Oral BID    latanoprost  1 drop Both Eyes QHS    levothyroxine  150 mcg Oral Before breakfast    losartan-hydrochlorothiazide 50-12.5 mg  1 tablet Oral Daily    lubiprostone  24 mcg Oral BID WM    polyethylene glycol  17 g Oral Daily    rOPINIRole  4 mg Oral BID    traZODone  150 mg Oral Nightly     Continuous Infusions:   dextrose 5 % and 0.9 % NaCl with KCl 20 mEq 125 mL/hr at 04/27/18 0629    electrolyte-S (pH 7.4) Stopped (04/26/18 1546)     PRN Meds:acetaminophen, albuterol-ipratropium 2.5mg-0.5mg/3mL, bisacodyl, clonazePAM, dextrose 50%, dextrose 50%, diphenhydrAMINE, diphenhydrAMINE, glucagon (human recombinant), glucose, glucose, insulin aspart U-100, ondansetron, oxyCODONE-acetaminophen, oxyCODONE-acetaminophen, promethazine (PHENERGAN) IVPB, senna-docusate 8.6-50 mg, sodium chloride 0.9%    Review of patient's allergies indicates:   Allergen Reactions    Morpholine analogues     Tubersol [tuberculin ppd]        Objective:     Vital Signs (Most  Recent):  Temp: 97 °F (36.1 °C) (04/27/18 1157)  Pulse: 79 (04/27/18 1157)  Resp: 18 (04/27/18 1157)  BP: 120/89 (04/27/18 1157)  SpO2: 99 % (04/27/18 1157) Vital Signs (24h Range):  Temp:  [96.4 °F (35.8 °C)-98.4 °F (36.9 °C)] 97 °F (36.1 °C)  Pulse:  [66-82] 79  Resp:  [14-29] 18  SpO2:  [91 %-100 %] 99 %  BP: (115-183)/(58-90) 120/89     Weight: 107.9 kg (237 lb 14 oz)  Body mass index is 40.83 kg/m².  No LMP recorded. Patient has had a hysterectomy.    I&O (Last 24H):    Intake/Output Summary (Last 24 hours) at 04/27/18 1253  Last data filed at 04/27/18 0600   Gross per 24 hour   Intake              500 ml   Output             2950 ml   Net            -2450 ml       Physical Exam:   Constitutional: She appears well-developed and well-nourished.    HENT:   Head: Normocephalic and atraumatic.     Neck: Normal range of motion.    Cardiovascular: Normal rate, regular rhythm and normal heart sounds.     Pulmonary/Chest: Effort normal and breath sounds normal. No respiratory distress. She has no wheezes. She has no rales. She exhibits no tenderness.        Abdominal: Soft. Bowel sounds are normal. She exhibits no distension, no mass and no abdominal incision. There is tenderness. There is no rebound and no guarding. No hernia. Hernia confirmed negative in the right inguinal area and confirmed negative in the left inguinal area.     Genitourinary: Rectum normal. Rectal exam shows no external hemorrhoid, no fissure, no tenderness, anal tone normal and guaiac negative stool. Guaiac negative stool. Pelvic exam was performed with patient supine. There is no rash, tenderness, lesion or injury on the right labia. There is no rash, tenderness, lesion or injury on the left labia. Uterus is absent. Uterus is not deviated. Right adnexum displays no mass, no tenderness and no fullness. Left adnexum displays no mass, no tenderness and no fullness. No erythema in the vagina. Labial bartholins normal.Cervix exhibits absence.    Genitourinary Comments: Packing removed by nursing staff, 50% saturated            Musculoskeletal: Moves all extremeties. She exhibits no edema or tenderness.      Lymphadenopathy:        Right: No inguinal adenopathy present.        Left: No inguinal adenopathy present.    Neurological: She displays normal reflexes. She exhibits normal muscle tone.    Skin: Skin is warm and dry. No rash noted. No erythema. No pallor.    Psychiatric: She has a normal mood and affect.       Laboratory:  ABGs: No results for input(s): PH, PCO2, PO2, HCO3, POCSATURATED, BE in the last 48 hours.  CBC:   Recent Labs  Lab 04/27/18  0352   WBC 8.30   RBC 3.74*   HGB 11.6*   HCT 34.7*      MCV 93   MCH 31.0   MCHC 33.4     CMP:   Recent Labs  Lab 04/27/18 0352   *   CALCIUM 7.8*   ALBUMIN 2.7*   PROT 5.8*      K 4.3   CO2 32*      BUN 11   CREATININE 0.7   ALKPHOS 61   ALT 15   AST 17   BILITOT 0.2       Diagnostic Results:  Labs: Reviewed  labs reviewed nlw    Assessment/Plan:     Active Diagnoses:    Diagnosis Date Noted POA    PRINCIPAL PROBLEM:  Posterior vaginal wall prolapse [N81.6] 04/12/2018 Yes    COPD (chronic obstructive pulmonary disease) [J44.9] 04/26/2018 Unknown    Postoperative hypothyroidism [E89.0] 04/26/2018 Unknown    Type 2 diabetes mellitus, with long-term current use of insulin [E11.9, Z79.4] 04/26/2018 Not Applicable    GERD (gastroesophageal reflux disease) [K21.9] 04/26/2018 Unknown    Anxiety [F41.9] 04/26/2018 Unknown    Obesity [E66.9] 04/26/2018 Unknown    History of fall [Z91.81] 04/26/2018 Not Applicable    Protein calorie malnutrition [E46] 04/26/2018 Unknown    Essential hypertension [I10] 04/26/2018 Unknown      Problems Resolved During this Admission:    Diagnosis Date Noted Date Resolved POA       Dispo:  Discharge to [facility]  PO pain medications given by Pain specialist   Follow up in 2 wks call and ED precautions given.     Mark Townsend,  DO  Obstetrics & Gynecology  [facility]

## 2018-04-27 NOTE — PLAN OF CARE
04/27/18 0021   Patient Assessment/Suction   Level of Consciousness (AVPU) alert   Respiratory Effort Normal;Unlabored   Expansion/Accessory Muscles/Retractions no use of accessory muscles   All Lung Fields Breath Sounds clear   PRE-TX-O2-ETCO2   O2 Device (Oxygen Therapy) room air  (Pt placed back on 2Lpm NC. )   SpO2 96 %   Pulse 82   Resp 18   Aerosol Therapy   $ Aerosol Therapy Charges Aerosol Treatment   Respiratory Treatment Status given   SVN/Inhaler Treatment Route mouthpiece;with oxygen   Position During Treatment HOB at 30 degrees   Patient Tolerance good   Post-Treatment   Post-treatment Heart Rate (beats/min) 83   Post-treatment Resp Rate (breaths/min) 18   All Fields Breath Sounds unchanged   Pt tolerates RA/NC and treatments well.

## 2018-05-02 VITALS
WEIGHT: 237.88 LBS | BODY MASS INDEX: 40.61 KG/M2 | OXYGEN SATURATION: 99 % | TEMPERATURE: 97 F | HEIGHT: 64 IN | SYSTOLIC BLOOD PRESSURE: 120 MMHG | DIASTOLIC BLOOD PRESSURE: 89 MMHG | RESPIRATION RATE: 18 BRPM | HEART RATE: 79 BPM

## 2018-05-04 ENCOUNTER — TELEPHONE (OUTPATIENT)
Dept: UROGYNECOLOGY | Facility: CLINIC | Age: 68
End: 2018-05-04

## 2018-05-04 NOTE — TELEPHONE ENCOUNTER
Called pt and relayed message that her pathology report was normal, pt voiced understanding and wanted to know from the doctor how long did she think she should wait before getting her knee replacement surgery explain to pt I will forward her question to the doctor she voiced understanding and call was ended.

## 2018-05-04 NOTE — TELEPHONE ENCOUNTER
----- Message from Mark Townsend DO sent at 5/3/2018  4:33 PM CDT -----  Please call the patient to let her know that the pathology results are normal. Thank you

## 2018-05-05 PROBLEM — E46 PROTEIN CALORIE MALNUTRITION: Status: ACTIVE | Noted: 2018-05-05

## 2018-05-05 PROBLEM — E66.9 OBESITY: Status: ACTIVE | Noted: 2018-05-05

## 2018-05-05 PROBLEM — E89.0 POSTOPERATIVE HYPOTHYROIDISM: Status: ACTIVE | Noted: 2018-05-05

## 2018-05-05 PROBLEM — J44.9 COPD (CHRONIC OBSTRUCTIVE PULMONARY DISEASE): Status: ACTIVE | Noted: 2018-05-05

## 2018-05-05 PROBLEM — K21.9 GERD (GASTROESOPHAGEAL REFLUX DISEASE): Status: ACTIVE | Noted: 2018-05-05

## 2018-05-05 PROBLEM — F41.9 ANXIETY: Status: ACTIVE | Noted: 2018-05-05

## 2018-05-05 PROBLEM — I10 ESSENTIAL HYPERTENSION: Status: ACTIVE | Noted: 2018-05-05

## 2018-05-06 NOTE — DISCHARGE SUMMARY
[facility]  Obstetrics & Gynecology  Discharge Summary    Patient Name: Alanis Mendieta  MRN: [id number]  Admission Date: 4/26/2018  Hospital Length of Stay: 0 days  Discharge Date and Time: 4/27/2018  3:28 PM  Attending Physician: No att. providers found   Discharging Provider: Mark Townsend DO  Primary Care Provider: Delio eMlendez MD    HPI:    66 Y O F with history of HTN, DM, COPD on home O2, Thyroid cancer (papillary) s/p totally thyroidectomy and radiation on supplemental oxygen since 2012 has a history of fall with spinal stenosis ambulates with a walker. Lives in a nursing home.  She was a referral by STEFAN Jacobson for evaluation of urinary incontinence not quite sure if urge associated. Leakage happenes mostly with change in posisiton. Night time urination is bothersome.  She has seen Dr. Strong and Dr. Russell in the past for her urinary incontinence. She was tried oxybuynin and started myriberiq 50 mg daily not helping with her symptoms.  She wants to proceed with surgery.     Hospital Course: S/p  MUS and posterior repair, enterocele repair.    Procedure(s) (LRB):  REPAIR POSTERIOR (N/A)  mid urethral sling (N/A)  CYSTOSCOPY     Consults:   Consults         Status Ordering Provider     Inpatient consult to Registered Dietitian/Nutritionist  Once     Provider:  (Not yet assigned)    PAULIE Garcia          Significant Diagnostic Studies: Labs: BMP: No results for input(s): GLU, NA, K, CL, CO2, BUN, CREATININE, CALCIUM, MG in the last 48 hours.    Pending Diagnostic Studies:     None        Final Active Diagnoses:    Diagnosis Date Noted POA    PRINCIPAL PROBLEM:  Posterior vaginal wall prolapse [N81.6] 04/12/2018 Yes    COPD (chronic obstructive pulmonary disease) [J44.9] 05/05/2018 Yes    Postoperative hypothyroidism [E89.0] 05/05/2018 Yes    GERD (gastroesophageal reflux disease) [K21.9] 05/05/2018 Yes    Anxiety [F41.9] 05/05/2018 Yes    Obesity [E66.9]  05/05/2018 Yes    Protein calorie malnutrition [E46] 05/05/2018 Yes    Essential hypertension [I10] 05/05/2018 Yes    Type 2 diabetes mellitus, with long-term current use of insulin [E11.9, Z79.4] 04/26/2018 Not Applicable    History of fall [Z91.81] 04/26/2018 Not Applicable      Problems Resolved During this Admission:    Diagnosis Date Noted Date Resolved POA        Discharged Condition: good    Disposition: alf Nursing Home    Follow Up:  Follow-up Information     [facility].    Why:  Nursing Home, alf  Contact information:  [address]  [facility] [address]           Mark Townsend,  In 2 weeks.    Specialties:  Urology, Gynecology  Contact information:  [address] [place] [address] Drive  [address]  [facility] [address]                 Patient Instructions:   No discharge procedures on file.  Medications:  Reconciled Home Medications:      Medication List      CONTINUE taking these medications    ANORO ELLIPTA 62.5-25 mcg/actuation Dsdv  Generic drug:  umeclidinium-vilanterol  Inhale into the lungs once daily.     calcitRIOL 0.5 MCG Cap  Commonly known as:  ROCALTROL  Take 0.5 mcg by mouth once daily.     clonazePAM 1 MG tablet  Commonly known as:  KLONOPIN  Take 1 mg by mouth every evening.     DULoxetine 60 MG capsule  Commonly known as:  CYMBALTA  Take 30 mg by mouth once daily.     ferrous sulfate 325 mg (65 mg iron) Tab tablet  Take 325 mg by mouth once daily.     FLUoxetine 40 MG capsule  Commonly known as:  PROZAC  Take 40 mg by mouth once daily.     furosemide 20 MG tablet  Commonly known as:  LASIX  Take 20 mg by mouth 3 (three) times a week.     gabapentin 800 MG tablet  Commonly known as:  NEURONTIN  Take 800 mg by mouth 3 (three) times daily.     ibuprofen 200 MG tablet  Commonly known as:  ADVIL,MOTRIN  Take 400 mg by mouth every 12 (twelve) hours as needed for Pain.     lamoTRIgine 150 MG Tab  Commonly known as:  LAMICTAL  Take 150 mg by mouth 2 (two) times  "daily.     latanoprost 0.005 % ophthalmic solution  Place 1 drop into both eyes every evening.     LEVEMIR FLEXTOUCH U-100 INSULN 100 unit/mL (3 mL) Inpn pen  Generic drug:  insulin detemir U-100  Inject 65 Units into the skin every evening.     levothyroxine 150 MCG tablet  Commonly known as:  SYNTHROID  Take 150 mcg by mouth before breakfast.     LINZESS 145 mcg Cap capsule  Generic drug:  linaclotide  Take 145 mcg by mouth once daily.     losartan-hydrochlorothiazide 50-12.5 mg 50-12.5 mg per tablet  Commonly known as:  HYZAAR  Take 1 tablet by mouth once daily.     meclizine 25 mg tablet  Commonly known as:  ANTIVERT  Take by mouth 3 (three) times daily as needed.     methocarbamol 500 MG Tab  Commonly known as:  ROBAXIN  Take 500 mg by mouth 3 (three) times daily.     mirabegron 50 mg Tb24  Commonly known as:  MYRBETRIQ  Take 1 tablet (50 mg total) by mouth once daily.     NOVOFINE AUTOCOVER 30 gauge x 1/3" Ndle  Generic drug:  pen needle, diabetic, safety     NovoLOG Flexpen U-100 Insulin 100 unit/mL Inpn pen  Generic drug:  insulin aspart U-100     oxyCODONE 10 mg Tab immediate release tablet  Commonly known as:  ROXICODONE  Take 10 mg by mouth every 8 (eight) hours as needed.     pantoprazole 40 MG tablet  Commonly known as:  PROTONIX  Take 40 mg by mouth.     polyethylene glycol 17 gram Pwpk  Commonly known as:  GLYCOLAX  Take 17 g by mouth once daily. Mix 1 cap in juice daily     rOPINIRole 4 MG tablet  Commonly known as:  REQUIP  Take 4 mg by mouth 2 (two) times daily.     traZODone 150 MG tablet  Commonly known as:  DESYREL  Take 150 mg by mouth nightly.     VITAMIN D ORAL  Take by mouth once daily.        ASK your doctor about these medications    cefdinir 300 MG capsule  Commonly known as:  OMNICEF  Take 1 capsule (300 mg total) by mouth 2 (two) times daily.  Ask about: Should I take this medication?     estradiol 0.01 % (0.1 mg/gram) vaginal cream  Commonly known as:  ESTRACE  Place 1 g vaginally 3 " (three) times a week. Place 1g vaginally every other day            Mark Townsend DO  Obstetrics & Gynecology  [facility]

## 2018-05-16 ENCOUNTER — TELEPHONE (OUTPATIENT)
Dept: UROLOGY | Facility: CLINIC | Age: 68
End: 2018-05-16

## 2018-05-16 NOTE — TELEPHONE ENCOUNTER
Patient is trying to see orthopedic doctor to discuss knee replacement surgery. Orthopedic doctor states patient needs written clearance from  for the sling performed by  in order to schedule another surgery. Please advise

## 2018-05-16 NOTE — TELEPHONE ENCOUNTER
----- Message from Molly Olivo sent at 5/16/2018 11:23 AM CDT -----  Contact: Sushma wei/ raf ph#[phone]  Sushma wei/ raf ph#[phone]  Need clearance for knee replacement from dr wylie   Fax# [phone] trey cowart

## 2018-05-17 ENCOUNTER — TELEPHONE (OUTPATIENT)
Dept: UROGYNECOLOGY | Facility: CLINIC | Age: 68
End: 2018-05-17

## 2018-05-17 NOTE — TELEPHONE ENCOUNTER
----- Message from Chrissy Hill sent at 5/17/2018 11:00 AM CDT -----  Contact: Ana wei/[facility]   Ana is calling in regards to scheduling a f/u appt for pt.     Ana can be reached at [phone].    Thank you

## 2018-05-18 ENCOUNTER — TELEPHONE (OUTPATIENT)
Dept: UROLOGY | Facility: CLINIC | Age: 68
End: 2018-05-18

## 2018-05-18 NOTE — TELEPHONE ENCOUNTER
She is cleared to proceed with ortho surgery as long as she is not in extended length hyperflex position (squat/lithotomy)

## 2018-05-18 NOTE — TELEPHONE ENCOUNTER
Left detailed message for patient regarding clearance for ortho.  Instructed to call clinic in regards to Ortho surgeon she is seeing.      She is cleared to proceed with ortho surgery as long as she is not in extended length hyperflex position (squat/lithotomy)     Patient is trying to see orthopedic doctor to discuss knee replacement surgery. Orthopedic doctor states patient needs written clearance from  for the sling performed by  in order to schedule another surgery. Please advise

## 2018-05-24 ENCOUNTER — OFFICE VISIT (OUTPATIENT)
Dept: UROGYNECOLOGY | Facility: CLINIC | Age: 68
End: 2018-05-24
Payer: MEDICARE

## 2018-05-24 VITALS
HEIGHT: 64 IN | SYSTOLIC BLOOD PRESSURE: 150 MMHG | DIASTOLIC BLOOD PRESSURE: 70 MMHG | BODY MASS INDEX: 41.13 KG/M2 | HEART RATE: 80 BPM | WEIGHT: 240.94 LBS

## 2018-05-24 DIAGNOSIS — R30.0 DYSURIA: Primary | ICD-10-CM

## 2018-05-24 DIAGNOSIS — R35.0 URINARY FREQUENCY: ICD-10-CM

## 2018-05-24 LAB
BILIRUB SERPL-MCNC: ABNORMAL MG/DL
BLOOD URINE, POC: 50
COLOR, POC UA: ABNORMAL
GLUCOSE UR QL STRIP: 250
KETONES UR QL STRIP: ABNORMAL
LEUKOCYTE ESTERASE URINE, POC: ABNORMAL
NITRITE, POC UA: POSITIVE
PH, POC UA: 7
PROTEIN, POC: ABNORMAL
SPECIFIC GRAVITY, POC UA: 1
UROBILINOGEN, POC UA: ABNORMAL

## 2018-05-24 PROCEDURE — 99024 POSTOP FOLLOW-UP VISIT: CPT | Mod: S$GLB,,, | Performed by: OBSTETRICS & GYNECOLOGY

## 2018-05-24 PROCEDURE — 87077 CULTURE AEROBIC IDENTIFY: CPT

## 2018-05-24 PROCEDURE — 99999 PR PBB SHADOW E&M-EST. PATIENT-LVL III: CPT | Mod: PBBFAC,,, | Performed by: OBSTETRICS & GYNECOLOGY

## 2018-05-24 PROCEDURE — 87088 URINE BACTERIA CULTURE: CPT

## 2018-05-24 PROCEDURE — 81002 URINALYSIS NONAUTO W/O SCOPE: CPT | Mod: S$GLB,,, | Performed by: OBSTETRICS & GYNECOLOGY

## 2018-05-24 PROCEDURE — 87086 URINE CULTURE/COLONY COUNT: CPT

## 2018-05-24 PROCEDURE — 87186 SC STD MICRODIL/AGAR DIL: CPT

## 2018-05-24 RX ORDER — ALBUTEROL SULFATE 90 UG/1
1 AEROSOL, METERED RESPIRATORY (INHALATION) 2 TIMES DAILY
COMMUNITY
Start: 2018-04-23 | End: 2019-12-26

## 2018-05-24 RX ORDER — CLONAZEPAM 0.5 MG/1
TABLET ORAL
COMMUNITY
Start: 2018-05-23 | End: 2018-07-30 | Stop reason: DRUGHIGH

## 2018-05-24 RX ORDER — CEFDINIR 300 MG/1
CAPSULE ORAL
COMMUNITY
Start: 2018-04-27 | End: 2018-07-30

## 2018-05-24 RX ORDER — DULOXETIN HYDROCHLORIDE 30 MG/1
CAPSULE, DELAYED RELEASE ORAL
COMMUNITY
Start: 2018-04-27 | End: 2018-12-26 | Stop reason: CLARIF

## 2018-05-28 LAB — BACTERIA UR CULT: NORMAL

## 2018-05-30 ENCOUNTER — TELEPHONE (OUTPATIENT)
Dept: UROGYNECOLOGY | Facility: CLINIC | Age: 68
End: 2018-05-30

## 2018-05-30 NOTE — TELEPHONE ENCOUNTER
Left voice message for pt to give the office a call back at [phone]. Called to relay Positive UTI results and to remind her to  her Keflex from her pharmacy.

## 2018-05-30 NOTE — TELEPHONE ENCOUNTER
----- Message from Mark Townsend DO sent at 5/30/2018  6:40 AM CDT -----  Please call the patient regarding her positive urine culture and make sure that she is taking her Keflex antibiotics. If she is not taking it, please let me know. Thanks

## 2018-05-31 ENCOUNTER — TELEPHONE (OUTPATIENT)
Dept: UROGYNECOLOGY | Facility: CLINIC | Age: 68
End: 2018-05-31

## 2018-05-31 NOTE — TELEPHONE ENCOUNTER
Informed pt her urine culture results was positive for UTI and Dr Townsend sent a rx for antibiotic Keflex to her pharmacy. Pt stated she did not know if the nurse Shirley had received it and gave me the number [phone] to confirm with her. Called Shirley and informed her Dr Townsend want pt to stop taking the bactrim and start taking keflex 500 mg BID for 7 days. Shirley voiced understanding and called ended.

## 2018-06-01 ENCOUNTER — TELEPHONE (OUTPATIENT)
Dept: UROGYNECOLOGY | Facility: CLINIC | Age: 68
End: 2018-06-01

## 2018-06-01 NOTE — TELEPHONE ENCOUNTER
Called pt checking to see if she started taking her antibiotic keflex. Pt stated yes, she stop taking the bactrim and is now currently taking keflex. Informed pt I'll relay this message to Dr Townsend, pt voiced understanding and call ended.

## 2018-06-01 NOTE — PROGRESS NOTES
Post Op Note     6/1/2018  Alanis Mendieta is a 67 y.o. female status post    PROCEDURE DATE: 04/26/2018     PROCEDURE:   1. Enterocele repair  2. Posterior Colporrhaphy  3. Perineorrhaphy     And mid urethral sling by Dr. Strong       for pelvic relaxation and urinary incontinence on 4/26/2018.   doing well with some problems : urinary urgency and worsened incontinence .      Incontinence episode: No,   OAB symptoms: No   Incomplete emptying: No  Prolapse symptoms: No        Review of patient's allergies indicates:   Allergen Reactions    Morpholine analogues     Tubersol [tuberculin ppd]       Past Medical History:   Diagnosis Date    Allergy     Anxiety     Arthritis     Asthma     Bipolar disorder     Chronic back pain     COPD (chronic obstructive pulmonary disease)     Diabetes mellitus     Fibromyalgia     GERD (gastroesophageal reflux disease)     Hx of thyroid cancer     Hyperlipidemia     Hypertension     Hypothyroidism     Neuropathy     On home oxygen therapy     Restless leg syndrome     Sleep apnea     Urinary tract infection      Past Surgical History:   Procedure Laterality Date    APPENDECTOMY      broken foot and ankle      CHOLECYSTECTOMY      HYSTERECTOMY      SPINAL FUSION      TOTAL THYROIDECTOMY  2014     Family History   Problem Relation Age of Onset    Diabetes Mother     Heart disease Mother     Hypertension Mother     Diabetes Father     Heart disease Father     Hypertension Father        Current Outpatient Prescriptions on File Prior to Visit   Medication Sig Dispense Refill    ANORO ELLIPTA 62.5-25 mcg/actuation DsDv Inhale into the lungs once daily.       calcitRIOL (ROCALTROL) 0.5 MCG Cap Take 0.5 mcg by mouth once daily.       clonazePAM (KLONOPIN) 1 MG tablet Take 1 mg by mouth every evening.       DULoxetine (CYMBALTA) 60 MG capsule Take 30 mg by mouth once daily.       ERGOCALCIFEROL, VITAMIN D2, (VITAMIN D ORAL) Take by mouth once  "daily.       ferrous sulfate 325 mg (65 mg iron) Tab tablet Take 325 mg by mouth once daily.      fluoxetine (PROZAC) 40 MG capsule Take 40 mg by mouth once daily.       furosemide (LASIX) 20 MG tablet Take 20 mg by mouth 3 (three) times a week.       gabapentin (NEURONTIN) 800 MG tablet Take 800 mg by mouth 3 (three) times daily.       ibuprofen (ADVIL,MOTRIN) 200 MG tablet Take 400 mg by mouth every 12 (twelve) hours as needed for Pain.      lamoTRIgine (LAMICTAL) 150 MG Tab Take 150 mg by mouth 2 (two) times daily.       latanoprost 0.005 % ophthalmic solution Place 1 drop into both eyes every evening.       LEVEMIR FLEXTOUCH 100 unit/mL (3 mL) InPn pen Inject 65 Units into the skin every evening.       levothyroxine (SYNTHROID) 150 MCG tablet Take 150 mcg by mouth before breakfast.       LINZESS 145 mcg Cap capsule Take 145 mcg by mouth once daily.       losartan-hydrochlorothiazide 50-12.5 mg (HYZAAR) 50-12.5 mg per tablet Take 1 tablet by mouth once daily.       meclizine (ANTIVERT) 25 mg tablet Take by mouth 3 (three) times daily as needed.       methocarbamol (ROBAXIN) 500 MG Tab Take 500 mg by mouth 3 (three) times daily.       mirabegron (MYRBETRIQ) 50 mg Tb24 Take 1 tablet (50 mg total) by mouth once daily. 90 tablet 3    NOVOFINE AUTOCOVER 30 gauge x 1/3" Ndle       NOVOLOG FLEXPEN 100 unit/mL InPn pen       oxycodone (ROXICODONE) 10 mg Tab immediate release tablet Take 10 mg by mouth every 8 (eight) hours as needed.       pantoprazole (PROTONIX) 40 MG tablet Take 40 mg by mouth.       polyethylene glycol (GLYCOLAX) 17 gram PwPk Take 17 g by mouth once daily. Mix 1 cap in juice daily 100 packet 2    ropinirole (REQUIP) 4 MG tablet Take 4 mg by mouth 2 (two) times daily.       trazodone (DESYREL) 150 MG tablet Take 150 mg by mouth nightly.       estradiol (ESTRACE) 0.01 % (0.1 mg/gram) vaginal cream Place 1 g vaginally 3 (three) times a week. Place 1g vaginally every other day 42.5 g " "6     No current facility-administered medications on file prior to visit.          PE  Vitals:    05/24/18 1709   BP: (!) 150/70   Pulse: 80   Weight: 109.3 kg (240 lb 15.4 oz)   Height: 5' 4" (1.626 m)   PainSc:   6   PainLoc: Back     Lung:clear to auscultation and percussion, no chest deformities noted  Heart:RRR, normal S1 & S2, no murmurs, rubs, or gallops, 2+ peripheral pulses  Abdomen: BS normal.  Abdomen soft, non-tender.  No masses or organomegaly  Incision: healing well.  Extremities: normal.  Vagina: normal vagina sutures in place no mesh erosion       POP-Q:     Aa                             Ba                             C                            GH                            PB                             TVL                        Ap                             Bp                             D                                  PVR: 80 mL  UA: bacteriuria and nitrite positive      Operative findings again reviewed. Pathology report discussed.    Impression: Normal post-operative course, UTI    Plan:     Increase PO intake  Start Bactrim DS BID for 7 days   Urine culture  Call and ED precautions given   Follow up visit in in 4 weeks. Anticipated return   to work not applicable.    "

## 2018-06-07 ENCOUNTER — OFFICE VISIT (OUTPATIENT)
Dept: UROLOGY | Facility: CLINIC | Age: 68
End: 2018-06-07
Payer: MEDICARE

## 2018-06-07 ENCOUNTER — APPOINTMENT (OUTPATIENT)
Dept: LAB | Facility: HOSPITAL | Age: 68
End: 2018-06-07
Attending: UROLOGY
Payer: MEDICARE

## 2018-06-07 VITALS
SYSTOLIC BLOOD PRESSURE: 122 MMHG | WEIGHT: 240.94 LBS | HEART RATE: 87 BPM | HEIGHT: 64 IN | BODY MASS INDEX: 41.13 KG/M2 | TEMPERATURE: 98 F | DIASTOLIC BLOOD PRESSURE: 75 MMHG

## 2018-06-07 DIAGNOSIS — N39.46 MIXED INCONTINENCE: ICD-10-CM

## 2018-06-07 DIAGNOSIS — N32.81 OAB (OVERACTIVE BLADDER): ICD-10-CM

## 2018-06-07 DIAGNOSIS — N39.0 RECURRENT UTI: Primary | ICD-10-CM

## 2018-06-07 LAB
BILIRUB SERPL-MCNC: ABNORMAL MG/DL
BILIRUB UR QL STRIP: NEGATIVE
BLOOD URINE, POC: ABNORMAL
CLARITY UR: CLEAR
COLOR UR: YELLOW
COLOR, POC UA: ABNORMAL
GLUCOSE UR QL STRIP: ABNORMAL
GLUCOSE UR QL STRIP: ABNORMAL
HGB UR QL STRIP: NEGATIVE
KETONES UR QL STRIP: ABNORMAL
KETONES UR QL STRIP: NEGATIVE
LEUKOCYTE ESTERASE UR QL STRIP: NEGATIVE
LEUKOCYTE ESTERASE URINE, POC: ABNORMAL
NITRITE UR QL STRIP: NEGATIVE
NITRITE, POC UA: ABNORMAL
PH UR STRIP: 6 [PH] (ref 5–8)
PH, POC UA: 5
PROT UR QL STRIP: NEGATIVE
PROTEIN, POC: ABNORMAL
SP GR UR STRIP: 1.02 (ref 1–1.03)
SPECIFIC GRAVITY, POC UA: 1025
URN SPEC COLLECT METH UR: ABNORMAL
UROBILINOGEN UR STRIP-ACNC: NEGATIVE EU/DL
UROBILINOGEN, POC UA: ABNORMAL

## 2018-06-07 PROCEDURE — 99024 POSTOP FOLLOW-UP VISIT: CPT | Mod: S$GLB,,, | Performed by: UROLOGY

## 2018-06-07 PROCEDURE — 99999 PR PBB SHADOW E&M-EST. PATIENT-LVL V: CPT | Mod: PBBFAC,,, | Performed by: UROLOGY

## 2018-06-07 PROCEDURE — 51725 SIMPLE CYSTOMETROGRAM: CPT | Mod: 58,S$GLB,, | Performed by: UROLOGY

## 2018-06-07 PROCEDURE — 87086 URINE CULTURE/COLONY COUNT: CPT

## 2018-06-07 PROCEDURE — 81003 URINALYSIS AUTO W/O SCOPE: CPT

## 2018-06-07 PROCEDURE — 81002 URINALYSIS NONAUTO W/O SCOPE: CPT | Mod: S$GLB,,, | Performed by: UROLOGY

## 2018-06-07 RX ORDER — CEPHALEXIN 500 MG/1
CAPSULE ORAL
COMMUNITY
Start: 2018-05-31 | End: 2018-06-07 | Stop reason: ALTCHOICE

## 2018-06-07 RX ORDER — CONJUGATED ESTROGENS 0.62 MG/G
CREAM VAGINAL
COMMUNITY
Start: 2018-06-01 | End: 2018-06-07 | Stop reason: SDUPTHER

## 2018-06-07 RX ORDER — POLYETHYLENE GLYCOL 3350 17 G/17G
POWDER, FOR SOLUTION ORAL
COMMUNITY
Start: 2018-06-01 | End: 2018-06-07 | Stop reason: ALTCHOICE

## 2018-06-07 RX ORDER — SULFAMETHOXAZOLE AND TRIMETHOPRIM 800; 160 MG/1; MG/1
TABLET ORAL
COMMUNITY
Start: 2018-05-25 | End: 2018-06-07 | Stop reason: ALTCHOICE

## 2018-06-07 RX ORDER — CONJUGATED ESTROGENS 0.62 MG/G
0.5 CREAM VAGINAL
Qty: 30 G | Status: SHIPPED | OUTPATIENT
Start: 2018-06-08 | End: 2018-10-11 | Stop reason: ALTCHOICE

## 2018-06-07 NOTE — PATIENT INSTRUCTIONS
Recurrent uti/asymptomatic bacteriuria  -Patient has NO SYMPTOMS OF UTI. Discontinue the keflex  -explained that she will always have an infection with her pads and diabetes. If she has her A/P repair and sling may decrease her pad usage and ultimately her UTIs. During the day she tries to void in the toilet, has some difficulty and nurse's aid either do not respond in time and with pt's oab difficult for her to wait. Not steady enough to void on her own.    -if she wants pain pump then she will need a urine culture done 7-10 days prior to her surgery and start abx 5 days prior to her surgery and do the surgery while she is on abx. However she will likely have recurrent uti after. Spoke with  on 3/29/18 who agrees with plan.   -restart premarin cream every other night.    -still will have recurrent uti's after surgery if she is still in pads bc of functional incontinence. Explained she is going to have to have aid bring   -sending cath urine for ua and culture but will not treat  -stop wearing pads/diapers    Mixed incontinence (leakage) and OAB with Anterior and Posterior Prolapse s/p mid urethral sling and enterocele and posterior repair on 4/26/18  -urge incontinence still present. Continue myrbetriq 50mg daily for now. May need botox in the future. Treat constipation and needs to void every 2 hours. Set alarm on phone. DO NOT HOLD urine. stop wearing diapers, patient using as a safety mechanis  -negative stress test today with 150cc in bladder sitting and standing, previously had large volume.   -she had significant constipation and has a bm once a week.  Takes linzess daily and milk of magnesia as needed.  High risk for failure of posterior repair bc of chronic constipation. Takes pain medicine 3x a day. Needs to take linzess and miralax daily (not prn)  -continue linzess and miralax 1 cap a day  -continue premarin cream vaginally 3x a week.   -continue myrbetriq 50mg daily if not on this.     Follow-up  in 2 months for vaginal exam and pvr by in and out cath

## 2018-06-07 NOTE — PROGRESS NOTES
[facility] Note - [place]  Staff: MD Ligia    Referring provider and please cc: Cliff  [facility]    [facility]    Chief Complaint: recurrent uti    Subjective:        HPI: Alanis Mendieta is a 67 y.o. female presents with     Resident of [facility], there for last 2 years   Pt was an LPN    Recurrent uti  - Likely related to pad use. Last pvr by in and out cath was 10cc  - Sx include: dysuria with voiding  - RF: pads, diabetic hba1c 7.3  - Mild renal cortical thinning. Otherwise unremarkable exam.  - pt was supposed to have a pain pump on 3/16/18 (week ago) and was to have pain pump but found to have UTI. Pain pump in lower back. She was started on cipro and has been on this for a week. She says her urine is less foul smelling and she has less burning. Still has not received pain pump.   -Spoke with  and explained situation. UTI unlikley to clear. He will check urine 10-14d prior to her procedure and treat her with culture romeo abx for 7d prior to procedure and keep her on it for 3 more days. And if it appears contaminated rec'd cath urine  -started on estrace cream with applicator every night but they stopped after 2 weeks.     UA voided: 1.006+leuk/nit+ - no sx, currently on amoxcillin for UTI  UCx:   3/27/18 E.coli resistant to cipro, doxy, bactrim. sens to augmentin.  3/23/18 E.coli, void:2+/leuk/nit+/trace blood -some burning  18 E.coli  17 E.coli   17 E.coli, void: 2+leukocyte/nitrite, voided   17 E.coli  17  k.pneumoniae, cath, nitrite +  17   aerococcus, 100k, voided, tr leuk    oab/mixed incontinence/anterior and posterior prolapse  -  -fell in 12/15 and says she had significant incontinence after, but says her incontinence was present for many years before surgery.  +WILLIAM and UUI. had c-spine surgery a year ago and says sx have increased. She states she does not feel the urge to go many times.   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   - 5/8/17 started on myrbetriq  - 7/13/17 frequency had decreased. Prior to the myrbetriq she was going 10x a day, now about 6x a day. Down to 1 pad from 6 or 7 pads a day. Denies any WILLIAM. + constipation (q3 to 4 days and takes Linzess). DM x 17 years. +stress test with large volume, I&o:20cc incontinence. But poor candidate for surgery. hba1c 8.9  -1/28/18 seen by  and found to have symptomatic stage 2 A/P prolapse. Failed pessary trial. Wanted to proceed with surgery. However she broke her foot and ankle when she was walking with her walker  She was started on hormone cream but pt states she can't reach but a nurse should be able to. Last hba1c  7.7  -seen 3/28/18 and up to 6 depends a day. She has a uti again. Not using estrace. Still drinking 4 cups of coffee a day. Voids in the toilet.  -4/12/18 here today for pre-op and wearing 2 diapers at a time. -still has significant constipation despite being on Linzess. Last bm was 5 days ago. She has to splint to empty.   -4/26/18 underwent [facility] by me and enterocele repair, posterior colporapphy and perineopharry   -6/1/18 seen by  and pvr by 80.     Returns today and states she is wearing 6 depends/diapers per day and mostly dry. She has urge incontinence with large squirt on the way to the bathroom. Voids every 2 hours in the am but waits to go to the bathroom in the evening. She also uses lasix 3x a day.  She's been having a soft bm every day since being on miralax 1 cap and linzess daily. She's using premarin cream 3x a day.       ECOG Status: 1 - in a wheelchair but can walk with assistance. She is on O2 for copd. Resident of [facility] after c-spine surgery. She's been there for a year and plan is for her to leave.    G3, P 2, vaginal   Gross HematuriaYes - >5 years ago  History of UTI: yes    REVIEW OF SYSTEMS:  General  ROS: no fevers, no chills  Psychological ROS: no depression  Endocrine ROS: no heat or cold  Respiratory ROS: + SOB  Cardiovascular ROS: no CP  Gastrointestinal ROS: no abdominal pain, + constipation, no diarrhea, +BRBPR with tearing. Musculoskeletal ROS: no muscle pain  Neurological ROS: no headaches  Dermatological ROS: no rashes  HEENT: noiglasses, no sinus    ROS: per HPI    Past medical, surgical, social and family hx have been reviewed. There have not any changes.     Allergies:  Morpholine analogues and Tubersol [tuberculin ppd]    Medications: Myrbetriq 50mg daily  pain medicine  Anticoagulation: No    Objective:     Vitals:    06/07/18 1024   BP: 122/75   Pulse: 87   Temp: 98 °F (36.7 °C)       General:WDWN in NAD  Eyes: PERRLA, normal conjunctiva  Respiratory: no increased work on breathing, clear to auscultation, on 2LO2  Cardiovascular: regular rate and rhythm. No obvious extremity edema.  GI: no palpation of masses. No tenderness. No hepatosplenomegaly to palpation.  Musculoskeletal: normal range of motion of bilateral upper extremities. Normal muscle strength and tone.  Skin: no obvious rashes or lesions. No tightening of skin noted.  Neurologic: CN grossly normal. Normal sensation.   Psychiatric: awake, alert and oriented x 3. Mood and affect normal. Cooperative.     Pelvic exam 7/13/17:  negative stress test with coughing  In and out cath performed with 20 residual  Bladder filled to 150 very slowly  Sensation to void felt at 120cc  Catheter removed  +large volume stress test with coughing but not with valsava  Had pt stand and she had large volume leakage with coughing and valsava  No prolapse  Stool palpable in vault  +severe atrophic vaginitis    Pelvic exam today (s/p sling and repair):  negative stress test with coughing supine, negative stress test with valsalva supine  In and out cath performed with 60cc residual - urine was sent for ua and culture sent for sample  Bladder filled to 150  cc  Sensation to void felt at 100 cc  Catheter removed  Negative stress test with coughing supine, negative stress test with valsalva supine  Negative stress test with coughing or valsalva  Standing  No prolapse  Mesh not exposed         LABS REVIEW:      Labs reviewed 7/13/17  Glucose 113  Cr 0  0.8  H/H 14.4/42.4  Hba1c 8.9    3/19/18 a1c 7.3    Cr:   Lab Results   Component Value Date    CREATININE 0.7 04/27/2018       PATHOLOGY REVIEW:  Vagina, excision 4/26/18:  Benign nonkeratinizing squamous mucosa without dysplasia.    RADIOGRAPHIC REVIEW:  rbus 5/23/17  No stone  No hydro  Bladder moderately distended      Assessment:       1. Recurrent UTI    2. Mixed incontinence    3. OAB (overactive bladder)          Plan:     Recurrent uti/asymptomatic bacteriuria  -Patient has NO SYMPTOMS OF UTI. Discontinue the keflex  -explained that she will always have an infection with her pads and diabetes. If she has her A/P repair and sling may decrease her pad usage and ultimately her UTIs. During the day she tries to void in the toilet, has some difficulty and nurse's aid either do not respond in time and with pt's oab difficult for her to wait. Not steady enough to void on her own.    -if she wants pain pump then she will need a urine culture done 7-10 days prior to her surgery and start abx 5 days prior to her surgery and do the surgery while she is on abx. However she will likely have recurrent uti after. Spoke with  on 3/29/18 who agrees with plan.   -restart premarin cream every other night.    -still will have recurrent uti's after surgery if she is still in pads bc of functional incontinence. Explained she is going to have to have aid bring   -sending cath urine for ua and culture but will not treat  -stop wearing pads/diapers    Mixed incontinence (leakage) and OAB with Anterior and Posterior Prolapse s/p mid urethral sling and enterocele and posterior repair on 4/26/18  -urge incontinence still present.  Continue myrbetriq 50mg daily for now. May need botox in the future. Treat constipation and needs to void every 2 hours. Set alarm on phone. DO NOT HOLD urine. stop wearing diapers, patient using as a safety mechanis  -negative stress test today with 150cc in bladder sitting and standing, previously had large volume.   -she had significant constipation and has a bm once a week.  Takes linzess daily and milk of magnesia as needed.  High risk for failure of posterior repair bc of chronic constipation. Takes pain medicine 3x a day. Needs to take linzess and miralax daily (not prn)  -continue linzess and miralax 1 cap a day  -continue premarin cream vaginally 3x a week.   -continue myrbetriq 50mg daily if not on this.     Follow-up in 2 months for vaginal exam and pvr by in and out cath            Faith Strong MD

## 2018-06-08 LAB — BACTERIA UR CULT: NO GROWTH

## 2018-06-29 ENCOUNTER — OFFICE VISIT (OUTPATIENT)
Dept: UROGYNECOLOGY | Facility: CLINIC | Age: 68
End: 2018-06-29
Payer: MEDICARE

## 2018-06-29 VITALS
HEIGHT: 64 IN | HEART RATE: 76 BPM | WEIGHT: 246.5 LBS | DIASTOLIC BLOOD PRESSURE: 59 MMHG | SYSTOLIC BLOOD PRESSURE: 124 MMHG | BODY MASS INDEX: 42.08 KG/M2

## 2018-06-29 DIAGNOSIS — R35.1 NOCTURIA: ICD-10-CM

## 2018-06-29 DIAGNOSIS — R35.0 URINARY FREQUENCY: Primary | ICD-10-CM

## 2018-06-29 PROCEDURE — 99213 OFFICE O/P EST LOW 20 MIN: CPT | Mod: S$PBB,,, | Performed by: OBSTETRICS & GYNECOLOGY

## 2018-06-29 PROCEDURE — 99215 OFFICE O/P EST HI 40 MIN: CPT | Mod: PBBFAC,PO | Performed by: OBSTETRICS & GYNECOLOGY

## 2018-06-29 PROCEDURE — 99999 PR PBB SHADOW E&M-EST. PATIENT-LVL V: CPT | Mod: PBBFAC,,, | Performed by: OBSTETRICS & GYNECOLOGY

## 2018-06-29 NOTE — PROGRESS NOTES
Post Op Note     6/29/2018  Alanis Mendieta is a 67 y.o. female status post    PROCEDURE DATE: 04/26/2018     PROCEDURE:   1. Enterocele repair  2. Posterior Colporrhaphy  3. Perineorrhaphy     And mid urethral sling by Dr. Strong       for pelvic relaxation and urinary incontinence on 4/26/2018.   doing well with some problems : urinary urgency and worsened incontinence .      Incontinence episode: No  OAB symptoms: No   Incomplete emptying: No  Prolapse symptoms: No    Review of patient's allergies indicates:   Allergen Reactions    Morpholine analogues     Tubersol [tuberculin ppd]       Past Medical History:   Diagnosis Date    Allergy     Anxiety     Arthritis     Asthma     Bipolar disorder     Chronic back pain     COPD (chronic obstructive pulmonary disease)     Diabetes mellitus     Fibromyalgia     GERD (gastroesophageal reflux disease)     Hx of thyroid cancer     Hyperlipidemia     Hypertension     Hypothyroidism     Neuropathy     On home oxygen therapy     Restless leg syndrome     Sleep apnea     Urinary tract infection      Past Surgical History:   Procedure Laterality Date    APPENDECTOMY      broken foot and ankle      CHOLECYSTECTOMY      HYSTERECTOMY      SPINAL FUSION      TOTAL THYROIDECTOMY  2014     Family History   Problem Relation Age of Onset    Diabetes Mother     Heart disease Mother     Hypertension Mother     Diabetes Father     Heart disease Father     Hypertension Father        Current Outpatient Prescriptions on File Prior to Visit   Medication Sig Dispense Refill    ANORO ELLIPTA 62.5-25 mcg/actuation DsDv Inhale into the lungs once daily.       calcitRIOL (ROCALTROL) 0.5 MCG Cap Take 0.5 mcg by mouth once daily.       cefdinir (OMNICEF) 300 MG capsule       clonazePAM (KLONOPIN) 0.5 MG tablet       clonazePAM (KLONOPIN) 1 MG tablet Take 1 mg by mouth every evening.       DULoxetine (CYMBALTA) 30 MG capsule       DULoxetine  "(CYMBALTA) 60 MG capsule Take 30 mg by mouth once daily.       ERGOCALCIFEROL, VITAMIN D2, (VITAMIN D ORAL) Take by mouth once daily.       ferrous sulfate 325 mg (65 mg iron) Tab tablet Take 325 mg by mouth once daily.      fluoxetine (PROZAC) 40 MG capsule Take 40 mg by mouth once daily.       furosemide (LASIX) 20 MG tablet Take 20 mg by mouth 3 (three) times a week.       gabapentin (NEURONTIN) 800 MG tablet Take 800 mg by mouth 3 (three) times daily.       ibuprofen (ADVIL,MOTRIN) 200 MG tablet Take 400 mg by mouth every 12 (twelve) hours as needed for Pain.      lamoTRIgine (LAMICTAL) 150 MG Tab Take 150 mg by mouth 2 (two) times daily.       latanoprost 0.005 % ophthalmic solution Place 1 drop into both eyes every evening.       LEVEMIR FLEXTOUCH 100 unit/mL (3 mL) InPn pen Inject 65 Units into the skin every evening.       levothyroxine (SYNTHROID) 150 MCG tablet Take 150 mcg by mouth before breakfast.       LINZESS 145 mcg Cap capsule Take 145 mcg by mouth once daily.       losartan-hydrochlorothiazide 50-12.5 mg (HYZAAR) 50-12.5 mg per tablet Take 1 tablet by mouth once daily.       meclizine (ANTIVERT) 25 mg tablet Take by mouth 3 (three) times daily as needed.       methocarbamol (ROBAXIN) 500 MG Tab Take 500 mg by mouth 3 (three) times daily.       mirabegron (MYRBETRIQ) 50 mg Tb24 Take 1 tablet (50 mg total) by mouth once daily. 90 tablet 3    NOVOFINE AUTOCOVER 30 gauge x 1/3" Ndle       NOVOLOG FLEXPEN 100 unit/mL InPn pen       oxycodone (ROXICODONE) 10 mg Tab immediate release tablet Take 10 mg by mouth every 8 (eight) hours as needed.       pantoprazole (PROTONIX) 40 MG tablet Take 40 mg by mouth.       polyethylene glycol (GLYCOLAX) 17 gram PwPk Take 17 g by mouth once daily. Mix 1 cap in juice daily 100 packet 2    PREMARIN vaginal cream Place 0.5 g vaginally 3 (three) times a week. 30 g prn    PROAIR HFA 90 mcg/actuation inhaler       ropinirole (REQUIP) 4 MG tablet Take " "4 mg by mouth 2 (two) times daily.       trazodone (DESYREL) 150 MG tablet Take 150 mg by mouth nightly.        No current facility-administered medications on file prior to visit.          PE  Vitals:    06/29/18 1353   BP: (!) 124/59   Pulse: 76   Weight: 111.8 kg (246 lb 7.6 oz)   Height: 5' 4" (1.626 m)   PainSc:   8   PainLoc: Back     Lung:clear to auscultation and percussion, no chest deformities noted  Heart:RRR, normal S1 & S2, no murmurs, rubs, or gallops, 2+ peripheral pulses  Abdomen: BS normal.  Abdomen soft, non-tender.  No masses or organomegaly  Incision: healing well.  Extremities: normal.  Vagina: normal vagina sutures in place no mesh erosion       POP-Q:     Aa                             Ba                             C                            GH                            PB                             TVL                        Ap                             Bp                             D                                          Impression: Normal post-operative course, worsening incontinence, no UTI     Plan:   Follow up in 6 months   call and ED precuations reviewed  --  "

## 2018-07-10 ENCOUNTER — OFFICE VISIT (OUTPATIENT)
Dept: ORTHOPEDICS | Facility: CLINIC | Age: 68
End: 2018-07-10
Payer: MEDICARE

## 2018-07-10 VITALS
HEART RATE: 79 BPM | HEIGHT: 64 IN | BODY MASS INDEX: 41.86 KG/M2 | DIASTOLIC BLOOD PRESSURE: 74 MMHG | SYSTOLIC BLOOD PRESSURE: 122 MMHG | WEIGHT: 245.19 LBS

## 2018-07-10 DIAGNOSIS — M17.12 OSTEOARTHRITIS OF LEFT KNEE, UNSPECIFIED OSTEOARTHRITIS TYPE: Primary | ICD-10-CM

## 2018-07-10 PROCEDURE — 99214 OFFICE O/P EST MOD 30 MIN: CPT | Mod: ,,, | Performed by: ORTHOPAEDIC SURGERY

## 2018-07-10 PROCEDURE — 3074F SYST BP LT 130 MM HG: CPT | Mod: ,,, | Performed by: ORTHOPAEDIC SURGERY

## 2018-07-10 PROCEDURE — 3078F DIAST BP <80 MM HG: CPT | Mod: ,,, | Performed by: ORTHOPAEDIC SURGERY

## 2018-07-10 NOTE — PROGRESS NOTES
[facility]    Subjective:     Chief Complaint:   Chief Complaint   Patient presents with    Left Knee - Pain     Left knee pain. States that her pain has gotten up to a 7 today. States that she would like to set up for surgery.       Past Medical History:   Diagnosis Date    Allergy     Anxiety     Arthritis     Asthma     Bipolar disorder     Chronic back pain     COPD (chronic obstructive pulmonary disease)     Diabetes mellitus     Fibromyalgia     GERD (gastroesophageal reflux disease)     Hx of thyroid cancer     Hyperlipidemia     Hypertension     Hypothyroidism     Neuropathy     On home oxygen therapy     Restless leg syndrome     Sleep apnea     Urinary tract infection        Past Surgical History:   Procedure Laterality Date    APPENDECTOMY      broken foot and ankle      CHOLECYSTECTOMY      HYSTERECTOMY      SPINAL FUSION      TOTAL THYROIDECTOMY  2014       Current Outpatient Prescriptions   Medication Sig    ANORO ELLIPTA 62.5-25 mcg/actuation DsDv Inhale into the lungs once daily.     calcitRIOL (ROCALTROL) 0.5 MCG Cap Take 0.5 mcg by mouth once daily.     cefdinir (OMNICEF) 300 MG capsule     clonazePAM (KLONOPIN) 0.5 MG tablet     clonazePAM (KLONOPIN) 1 MG tablet Take 1 mg by mouth every evening.     DULoxetine (CYMBALTA) 30 MG capsule     DULoxetine (CYMBALTA) 60 MG capsule Take 30 mg by mouth once daily.     ERGOCALCIFEROL, VITAMIN D2, (VITAMIN D ORAL) Take by mouth once daily.     ferrous sulfate 325 mg (65 mg iron) Tab tablet Take 325 mg by mouth once daily.    fluoxetine (PROZAC) 40 MG capsule Take 40 mg by mouth once daily.     furosemide (LASIX) 20 MG tablet Take 20 mg by mouth 3 (three) times a week.     gabapentin (NEURONTIN) 800 MG tablet Take 800 mg by mouth 3 (three) times daily.     ibuprofen (ADVIL,MOTRIN) 200 MG tablet Take 400 mg by mouth every 12 (twelve) hours as needed for Pain.    lamoTRIgine (LAMICTAL) 150 MG Tab Take 150 mg  "by mouth 2 (two) times daily.     latanoprost 0.005 % ophthalmic solution Place 1 drop into both eyes every evening.     LEVEMIR FLEXTOUCH 100 unit/mL (3 mL) InPn pen Inject 65 Units into the skin every evening.     levothyroxine (SYNTHROID) 150 MCG tablet Take 150 mcg by mouth before breakfast.     LINZESS 145 mcg Cap capsule Take 145 mcg by mouth once daily.     losartan-hydrochlorothiazide 50-12.5 mg (HYZAAR) 50-12.5 mg per tablet Take 1 tablet by mouth once daily.     meclizine (ANTIVERT) 25 mg tablet Take by mouth 3 (three) times daily as needed.     methocarbamol (ROBAXIN) 500 MG Tab Take 500 mg by mouth 3 (three) times daily.     mirabegron (MYRBETRIQ) 50 mg Tb24 Take 1 tablet (50 mg total) by mouth once daily.    NOVOFINE AUTOCOVER 30 gauge x 1/3" Ndle     NOVOLOG FLEXPEN 100 unit/mL InPn pen     oxycodone (ROXICODONE) 10 mg Tab immediate release tablet Take 10 mg by mouth every 8 (eight) hours as needed.     pantoprazole (PROTONIX) 40 MG tablet Take 40 mg by mouth.     polyethylene glycol (GLYCOLAX) 17 gram PwPk Take 17 g by mouth once daily. Mix 1 cap in juice daily    PREMARIN vaginal cream Place 0.5 g vaginally 3 (three) times a week.    PROAIR HFA 90 mcg/actuation inhaler     ropinirole (REQUIP) 4 MG tablet Take 4 mg by mouth 2 (two) times daily.     trazodone (DESYREL) 150 MG tablet Take 150 mg by mouth nightly.      No current facility-administered medications for this visit.        Review of patient's allergies indicates:   Allergen Reactions    Morpholine analogues     Tubersol [tuberculin ppd]        Family History   Problem Relation Age of Onset    Diabetes Mother     Heart disease Mother     Hypertension Mother     Diabetes Father     Heart disease Father     Hypertension Father        Social History     Social History    Marital status:      Spouse name: N/A    Number of children: N/A    Years of education: N/A     Occupational History    Not on file. "     Social History Main Topics    Smoking status: Former Smoker     Packs/day: 1.00     Years: 30.00     Types: Cigarettes     Quit date: 5/8/2000    Smokeless tobacco: Never Used    Alcohol use No    Drug use: No    Sexual activity: No     Other Topics Concern    Not on file     Social History Narrative    No narrative on file       History of present illness: Patient comes in today again for her left knee. She is miserable. She says she simply cannot walk because of her left knee. She has severe daily pain. That bothers her at night. She states that she really does not want to go on like this and wants to have a total knee done. She has gotten clearance from her primary care doctor.      Review of Systems:    Constitution: Negative for chills, fever, and sweats.  Negative for unexplained weight loss.    HENT:  Negative for headaches and blurry vision.    Cardiovascular:Negative for chest pain or irregular heart beat. Negative for hypertension.    Respiratory:  Negative for cough and shortness of breath.    Gastrointestinal: Negative for abdominal pain, heartburn, melena, nausea, and vomitting.    Genitourinary:  Negative bladder incontinence and dysuria.    Musculoskeletal:  See HPI for details.     Neurological: Negative for numbness.    Psychiatric/Behavioral: Negative for depression.  The patient is not nervous/anxious.      Endocrine: Negative for polyuria    Hematologic/Lymphatic: Negative for bleeding problem.  Does not bruise/bleed easily.    Skin: Negative for poor would healing and rash    Objective:      Physical Examination:    Vital Signs:    Vitals:    07/10/18 1607   BP: 122/74   Pulse: 79       Body mass index is 42.09 kg/m².    This a well-developed, well nourished patient in no acute distress.  They are alert and oriented and cooperative to examination.        Patient appears her stated age. She has range of motion from 0-120 degrees. Her knee is stable. She has a 1+ effusion.  Pertinent  New Results:    XRAY Report / Interpretation:   No new XRAYS Today.    Assessment/Plan:      This is woman with severe osteoarthritis of the knee. We spoke extensively about joint replacement surgery. Spoke about the risks and benefits. We also spoke about her increased risk due to medical conditions like COPD diabetes and urinary problems. She understood. Surgery be scheduled at her convenience      This note was created using Dragon voice recognition software that occasionally misinterpreted phrases or words.

## 2018-07-11 ENCOUNTER — TELEPHONE (OUTPATIENT)
Dept: ORTHOPEDICS | Facility: CLINIC | Age: 68
End: 2018-07-11

## 2018-07-11 NOTE — TELEPHONE ENCOUNTER
Spoke with Sushma. As per Dr. Marin, he wants to speak with Dr. De La Rosa before we give a date for surgery. Once they talk I will call Sushma back and we can set up surgery

## 2018-07-11 NOTE — TELEPHONE ENCOUNTER
----- Message from Claudia Webster sent at 7/11/2018 11:10 AM CDT -----  Contact: JERROD @ [facility] # [phone]

## 2018-07-11 NOTE — TELEPHONE ENCOUNTER
----- Message from Claudia Webster sent at 7/11/2018 11:10 AM CDT -----  Contact: JERROD @ [facility] # [phone]

## 2018-07-20 DIAGNOSIS — M17.12 PRIMARY OSTEOARTHRITIS OF LEFT KNEE: Primary | ICD-10-CM

## 2018-07-20 RX ORDER — MUPIROCIN 20 MG/G
OINTMENT TOPICAL
Status: CANCELLED | OUTPATIENT
Start: 2018-07-20

## 2018-07-20 RX ORDER — SODIUM CHLORIDE 9 MG/ML
INJECTION, SOLUTION INTRAVENOUS CONTINUOUS
Status: CANCELLED | OUTPATIENT
Start: 2018-07-20

## 2018-07-30 ENCOUNTER — HOSPITAL ENCOUNTER (OUTPATIENT)
Dept: PREADMISSION TESTING | Facility: HOSPITAL | Age: 68
Discharge: HOME | End: 2018-07-30
Attending: ORTHOPAEDIC SURGERY
Payer: MEDICARE

## 2018-07-30 ENCOUNTER — HOSPITAL ENCOUNTER (OUTPATIENT)
Dept: RADIOLOGY | Facility: HOSPITAL | Age: 68
Discharge: HOME | End: 2018-07-30
Attending: ORTHOPAEDIC SURGERY
Payer: MEDICARE

## 2018-07-30 VITALS — WEIGHT: 244 LBS | HEIGHT: 64 IN | BODY MASS INDEX: 41.66 KG/M2

## 2018-07-30 DIAGNOSIS — M17.12 PRIMARY OSTEOARTHRITIS OF LEFT KNEE: ICD-10-CM

## 2018-07-30 LAB
ABO + RH BLD: NORMAL
ALBUMIN SERPL BCP-MCNC: 3.5 G/DL
ALP SERPL-CCNC: 69 U/L
ALT SERPL W/O P-5'-P-CCNC: 17 U/L
ANION GAP SERPL CALC-SCNC: 9 MMOL/L
AST SERPL-CCNC: 17 U/L
BACTERIA #/AREA URNS HPF: ABNORMAL /HPF
BASOPHILS # BLD AUTO: 0 K/UL
BASOPHILS NFR BLD: 0.4 %
BILIRUB SERPL-MCNC: 0.3 MG/DL
BILIRUB UR QL STRIP: NEGATIVE
BLD GP AB SCN CELLS X3 SERPL QL: NORMAL
BUN SERPL-MCNC: 10 MG/DL
CALCIUM SERPL-MCNC: 9.6 MG/DL
CHLORIDE SERPL-SCNC: 92 MMOL/L
CLARITY UR: ABNORMAL
CO2 SERPL-SCNC: 39 MMOL/L
COLOR UR: YELLOW
CREAT SERPL-MCNC: 0.8 MG/DL
DIFFERENTIAL METHOD: ABNORMAL
EOSINOPHIL # BLD AUTO: 0.1 K/UL
EOSINOPHIL NFR BLD: 1.9 %
ERYTHROCYTE [DISTWIDTH] IN BLOOD BY AUTOMATED COUNT: 14.4 %
EST. GFR  (AFRICAN AMERICAN): >60 ML/MIN/1.73 M^2
EST. GFR  (NON AFRICAN AMERICAN): >60 ML/MIN/1.73 M^2
GLUCOSE SERPL-MCNC: 244 MG/DL
GLUCOSE UR QL STRIP: ABNORMAL
HCT VFR BLD AUTO: 42.2 %
HGB BLD-MCNC: 13.8 G/DL
HGB UR QL STRIP: NEGATIVE
KETONES UR QL STRIP: NEGATIVE
LEUKOCYTE ESTERASE UR QL STRIP: ABNORMAL
LYMPHOCYTES # BLD AUTO: 1.5 K/UL
LYMPHOCYTES NFR BLD: 32.9 %
MCH RBC QN AUTO: 30.1 PG
MCHC RBC AUTO-ENTMCNC: 32.8 G/DL
MCV RBC AUTO: 92 FL
MICROSCOPIC COMMENT: ABNORMAL
MONOCYTES # BLD AUTO: 0.3 K/UL
MONOCYTES NFR BLD: 5.9 %
NEUTROPHILS # BLD AUTO: 2.7 K/UL
NEUTROPHILS NFR BLD: 58.9 %
NITRITE UR QL STRIP: POSITIVE
PH UR STRIP: 7 [PH] (ref 5–8)
PLATELET # BLD AUTO: 227 K/UL
PMV BLD AUTO: 8.8 FL
POTASSIUM SERPL-SCNC: 3.9 MMOL/L
PROT SERPL-MCNC: 6.8 G/DL
PROT UR QL STRIP: NEGATIVE
RBC # BLD AUTO: 4.59 M/UL
RBC #/AREA URNS HPF: 0 /HPF (ref 0–4)
SODIUM SERPL-SCNC: 140 MMOL/L
SP GR UR STRIP: 1.01 (ref 1–1.03)
SQUAMOUS #/AREA URNS HPF: 1 /HPF
URN SPEC COLLECT METH UR: ABNORMAL
UROBILINOGEN UR STRIP-ACNC: NEGATIVE EU/DL
WBC # BLD AUTO: 4.6 K/UL
WBC #/AREA URNS HPF: 11 /HPF (ref 0–5)
YEAST URNS QL MICRO: ABNORMAL

## 2018-07-30 PROCEDURE — 87088 URINE BACTERIA CULTURE: CPT

## 2018-07-30 PROCEDURE — 99900103 DSU ONLY-NO CHARGE-INITIAL HR (STAT)

## 2018-07-30 PROCEDURE — 87081 CULTURE SCREEN ONLY: CPT

## 2018-07-30 PROCEDURE — 86901 BLOOD TYPING SEROLOGIC RH(D): CPT

## 2018-07-30 PROCEDURE — 71046 X-RAY EXAM CHEST 2 VIEWS: CPT | Mod: 26,,, | Performed by: RADIOLOGY

## 2018-07-30 PROCEDURE — 87077 CULTURE AEROBIC IDENTIFY: CPT

## 2018-07-30 PROCEDURE — 81000 URINALYSIS NONAUTO W/SCOPE: CPT

## 2018-07-30 PROCEDURE — 80053 COMPREHEN METABOLIC PANEL: CPT

## 2018-07-30 PROCEDURE — 87086 URINE CULTURE/COLONY COUNT: CPT

## 2018-07-30 PROCEDURE — 99900104 DSU ONLY-NO CHARGE-EA ADD'L HR (STAT)

## 2018-07-30 PROCEDURE — 87186 SC STD MICRODIL/AGAR DIL: CPT

## 2018-07-30 PROCEDURE — 85025 COMPLETE CBC W/AUTO DIFF WBC: CPT

## 2018-07-30 PROCEDURE — 71046 X-RAY EXAM CHEST 2 VIEWS: CPT | Mod: TC,FY

## 2018-07-30 NOTE — PRE ADMISSION SCREENING
JOINT CAMP ASSESSMENT    Name Alanis Mendieta   MRN [id number]    Age/Sex 67 y.o. female    Surgeon Dr. Saldivar Finger   Joint Camp Date 7/30/2018   Surgery Date 8/13/2018   Procedure Left Knee Arthroplasty   Insurance Payor: [email or web address] MEDICARE / Plan: [employer] (SPECIAL NEEDS PLAN) / Product Type: Medicare Advantage /    Care Team Patient Care Team:  Delio Melendez MD as PCP - General (Family Medicine)    Pharmacy   [facility] [place] [place] [facility] [address] [facility]  [address] [place] [address]  Phone: [phone] Fax: [phone]     AM-PAC Score   15   Risk Assessment Score 14     Past Medical History:   Diagnosis Date    Allergy     Anxiety     Arthritis     Asthma     Bipolar disorder     Cancer     thyroid    Chronic back pain     COPD (chronic obstructive pulmonary disease)     Diabetes mellitus     Fibromyalgia     GERD (gastroesophageal reflux disease)     [place] (hard of hearing)     Hx of thyroid cancer     Hyperlipidemia     Hypertension     Hypothyroidism     Neuropathy     On home oxygen therapy     3 l/m 24/7    Restless leg syndrome     Sleep apnea     Urinary tract infection        Past Surgical History:   Procedure Laterality Date    APPENDECTOMY      broken foot and ankle      CHOLECYSTECTOMY      HYSTERECTOMY      SPINAL FUSION      x3 BACK, NECK X 4    TOTAL THYROIDECTOMY  2014         Home Enviroment     Living Arrangement: Lives in nursing home  Home Environment: lives in a nursing home.  Home Safety Concerns: unremarkable    DISCHARGE CAREGIVER/SUPPORT SYSTEM     Identified post-op caregiver: Patient has assisted living/ group Home.  Patient's caregiver(s) will be able to provide physical assistance. Patient will have someone to assist overnight.      Caregiver present at pre-op interview:  No      PRE-OPERATIVE FUNCTIONAL STATUS     Employment: Unemployed    Pre-op Functional Status: Mobility/Ambulation: independent with safety concerns with  mobility in the home on all with wheelchair for all for distances.    Use of assistive device for ambulation: wheelchair  ADL: partial assistance  ADL Limitations: difficulty with walking  Medical Restrictions: Decreased range of motions in extremities    POTENTIAL BARRIERS TO DISCHARGE/POTENTIAL POST-OP COMPLICATIONS     Patient with significant medical Hx along with home oxygen use. Patient is currently in [facility] and will be returning upon discharge.      DISCHARGE PLAN     Expected LOS of 3 days or less for joint replacement discussed with patient.     Patient in agreement with discharge plan: Yes    Discharge to: Skilled Nursing Facility (Patient currently in [facility])     HH:  N/A due to patient being in a nursing home.     OP PT: Patient will have therapy in the nursing home.     Home DME: rolling walker     Needed DME at D/C: bedside commode     Rx: Per Dr. Marin at discharge.     Meds to Beds: N/A  Patient expected to discharge on Aspirin 325mg by mouth twice daily for DVT prophylaxis.

## 2018-07-30 NOTE — PRE ADMISSION SCREENING
Patient Name: Alanis Mendieta  YOB: 1950   MRN: [id number]     [facility]   Basic Mobility Inpatient Short Form 6 Clicks         How much difficulty does the patient currently have  Unable  A Lot  A Little  None      1. Turning over in bed (including adjusting bedclothes, sheets and blankets)?     1 []    2 [x]    3 []    4 []        2. Sitting down on and standing up from a chair with arms (e.g., wheelchair, bedside commode, etc.)     1 []  2 [x]  3 []     4 []      3. Moving from lying on back to sitting on the side of the bed?     1 []  2 [x]  3 []    4 []    How much help from another person does the patient currently need  Total  A Lot  A Little  None      4. Moving to and from a bed to a chair (including a wheelchair)?    1 []  2 []  3 []    4 [x]      5. Need to walk in hospital room?    1 []  2 []  3 []    4 [x]      6. Climbing 3-5 steps with a railing?    1 [x]  2 []  3 []    4 []       Raw Score:       15           CMS 0-100% Score:    57.70        %   Standardized Score:    39.45           CMS Modifier:        CK                                  [facility]   Basic Mobility Inpatient Short Form 6 Clicks Score Conversion Table*         *Use this form to convert -PAC Basic Mobility Inpatient Raw Scores.   [facility] Inpatient Basic Mobility Short Form Scoring Example   1. Add the number values associated with the response to each item. For example, items totals yield a Raw Score of 21.   2. Match the raw score to the t-Scale scores (t-Scale score = 50.25, SE = 4.69).   3. Find the associated CMS % (CMS % = 28.97%).   4. Locate the correct CMS Functional Modifier Code, or G Code (G code = CJ)     NOTE: Each -PAC Short Form has a separate conversion table. Make sure that you use the correct conversion table.       Instruction Manual - page 45 contains conversion table

## 2018-07-30 NOTE — PRE ADMISSION SCREENING
"               [facility] Risk Assessment Scale    Patient Name: Alanis Mendieta  YOB: 1950  MRN: [id number]            RIsk Factor Measure Recommendation Patient Data Scale/Score   BMI >40 Reconsider surgery, weight loss   Estimated body mass index is 41.88 kg/m² as calculated from the following:    Height as of this encounter: 5' 4" (1.626 m).    Weight as of this encounter: 110.7 kg (244 lb).   [] 0 = 1 - 24.9  [] 1 = 25-29.9  [] 2 = 30-34.9  [] 3 = 35-39.9  [x] 4 = 40-44.9  [] 5 = 45-99.9   Hemoglobin AIC (if applicable) >9 Delay surgery until DM under control  Refer for:  · Nutrition Therapy  · Exercise   · Medication    Lab Results   Component Value Date    HGBA1C 6.6 (H) 04/17/2018       Lab Results   Component Value Date     (H) 07/30/2018      [] 0 = 4.0-5.6  [] 1 = 5.7-6.4  [x] 2 = 6.5-6.9  [] 3 = 7.0-7.9  [] 4 = 8.0-8.9  [] 5 = 9.0-12   Hemoglobin (Anemia) <9 Delay surgery   Correct anemia Lab Results   Component Value Date    HGB 13.8 07/30/2018    [] 20 - <9.0                    Albumin <3 Delay surgery &Workup Lab Results   Component Value Date    ALBUMIN 3.5 07/30/2018    [] 20 - <3.0   Smoking Cessation >4 Weeks Delay Surgery  Refer to OP Cessation Class    Former Smoker  Quit: 2000 [] 20 - current smoker                                _____ PPD                    Hx of MI, PE, Arrhythmia, CVA, DVT <30 Days Delay Surgery    N/A [] 20      Infection Variable Delay surgery and re-evaluate   N/A [] 20 - recent/current infection     Depression (PHQ) >10 out of 27 Delay Surgery and re-evaluate  Medication  Counseling              [x] 0     []1     []2     []3      []4      [] 5                    (1-4)      (5-9)  (10-14)  (15-19)   (20-27)     Memory Impairment & Memory loss (Mini-Cog Screening Tool) Advanced dementia and/or Parkinson's Reconsider surgery     [x] 0     []1     []2     []3     []4     [] 5     Physical Conditioning (Modified AM-PAC Per Physical Therapy at [facility]) " Unable to ambulate on day of surgery Delay surgery and re-evaluate  Pre-Rehabilitation   (PT evaluation)       []  0   []4       [x]8     []12        []16     []20       (<20%)   (<40%)   (<60%)   (<80% )    (>80%)     Home Environment/Caregiver support  (Per /Navigator Interview)    Availability of basic services and/or approprate assistance during post-operative period Delay surgery and re-evaluate  Safe home environment  Health   1 week post-surgery  Transportation  availability  Ability to obtain DME/Medications post-op    [x] 0     []1     []2     []3     []4     [] 5  [x] 0     []1     []2     []3     []4     [] 5  [x] 0     []1     []2     []3     []4     [] 5  [x] 0     []1     []2     []3     []4     [] 5         MD Contact: Dr. Marin Comments:  Total Score:  14

## 2018-07-31 ENCOUNTER — TELEPHONE (OUTPATIENT)
Dept: ORTHOPEDICS | Facility: CLINIC | Age: 68
End: 2018-07-31

## 2018-07-31 RX ORDER — SULFAMETHOXAZOLE AND TRIMETHOPRIM 800; 160 MG/1; MG/1
1 TABLET ORAL 2 TIMES DAILY
COMMUNITY
Start: 2018-07-31 | End: 2018-08-07

## 2018-07-31 NOTE — TELEPHONE ENCOUNTER
Called Sushma at [facility] and informed her pt has a UTI, we need to treat and repeat cath u/a on aug 7.

## 2018-08-01 LAB — MRSA SPEC QL CULT: NORMAL

## 2018-08-02 ENCOUNTER — TELEPHONE (OUTPATIENT)
Dept: ORTHOPEDICS | Facility: CLINIC | Age: 68
End: 2018-08-02

## 2018-08-02 LAB — BACTERIA UR CULT: NORMAL

## 2018-08-02 NOTE — TELEPHONE ENCOUNTER
Spoke with Sushma, I need to know  What insurance this pt has as primary. It is not [employer]. She will have the lady in accounting call me

## 2018-08-02 NOTE — TELEPHONE ENCOUNTER
----- Message from Edna Hodgson sent at 8/2/2018 10:08 AM CDT -----  Contact: Sushma [phone]  Sushma is returning your call from yesterday.

## 2018-08-03 ENCOUNTER — TELEPHONE (OUTPATIENT)
Dept: ORTHOPEDICS | Facility: CLINIC | Age: 68
End: 2018-08-03

## 2018-08-03 NOTE — TELEPHONE ENCOUNTER
----- Message from Nena Shi sent at 8/2/2018  2:57 PM CDT -----  Contact: neymar ferrer Pemberton  She has active [facility] a

## 2018-08-07 ENCOUNTER — OFFICE VISIT (OUTPATIENT)
Dept: RHEUMATOLOGY | Facility: CLINIC | Age: 68
End: 2018-08-07
Payer: MEDICARE

## 2018-08-07 VITALS
BODY MASS INDEX: 42.33 KG/M2 | SYSTOLIC BLOOD PRESSURE: 128 MMHG | WEIGHT: 246.63 LBS | DIASTOLIC BLOOD PRESSURE: 58 MMHG

## 2018-08-07 DIAGNOSIS — M15.9 OSTEOARTHRITIS, GENERALIZED: Primary | ICD-10-CM

## 2018-08-07 PROCEDURE — 99213 OFFICE O/P EST LOW 20 MIN: CPT | Mod: ,,, | Performed by: INTERNAL MEDICINE

## 2018-08-07 RX ORDER — FLUOXETINE HYDROCHLORIDE 20 MG/1
20 CAPSULE ORAL DAILY
COMMUNITY
Start: 2018-07-25 | End: 2020-01-02 | Stop reason: SDUPTHER

## 2018-08-07 NOTE — PROGRESS NOTES
[facility] RHEUMATOLOGY            PROGRESS NOTE      Subjective:       Patient ID:   NAME: Alanis Mendieta : 1950     67 y.o. female    Referring Doc: No ref. provider found  Other Physicians:    Chief Complaint:  Osteoarthritis      History of Present Illness:     Patient returns today for a regularly scheduled follow-up visit for osteoarthritis      The patient offers  no new complaints. Chronic knee pain. She will have knee replacement in the coming weeks. No chest pains, cough or shortness of breath. She is taking  antibiotics for UTI.            ROS:   GEN:  No  fever, night sweats . weight is stable   + some fatigue  SKIN: no rashes, no bruising, no ulcerations, no Raynaud's  HEENT: no HA's, No visual changes, no mucosal ulcers, no sicca symptoms,  CV:   no CP, SOB, PND, CRUZ, no orthopnea, no palpitations  PULM: normal with no SOB, cough, hemoptysis, sputum or pleuritic pain  GI:  no abdominal pain, nausea, vomiting, constipation, diarrhea, melanotic stools, BRBPR, hematemesis, no dysphagia  :   no dysuria  NEURO: no paresthesias, headaches, visual disturbances, muscle weakness  MUSCULOSKELETAL:no joint swelling, prolonged AM stiffness, no back pain, no muscle pain  Allergies:  Review of patient's allergies indicates:   Allergen Reactions    Morphine Itching    Tubersol [tuberculin ppd] Other (See Comments)     Site swells bad. Has to have chest xray       Medications:    Current Outpatient Prescriptions:     ANORO ELLIPTA 62.5-25 mcg/actuation DsDv, Inhale into the lungs once daily. Inhale x 2, Disp: , Rfl:     calcitRIOL (ROCALTROL) 0.5 MCG Cap, Take 0.5 mcg by mouth once daily. , Disp: , Rfl:     clonazePAM (KLONOPIN) 1 MG tablet, Take 1 mg by mouth every evening. , Disp: , Rfl:     DULoxetine (CYMBALTA) 30 MG capsule, , Disp: , Rfl:     ERGOCALCIFEROL, VITAMIN D2, (VITAMIN D ORAL), Take by mouth once daily. , Disp: , Rfl:     ferrous sulfate 325 mg (65 mg iron) Tab tablet, Take 325  "mg by mouth once daily., Disp: , Rfl:     FLUoxetine (PROZAC) 20 MG capsule, , Disp: , Rfl:     furosemide (LASIX) 20 MG tablet, Take 20 mg by mouth 3 (three) times a week. , Disp: , Rfl:     gabapentin (NEURONTIN) 800 MG tablet, Take 800 mg by mouth 3 (three) times daily. , Disp: , Rfl:     ibuprofen (ADVIL,MOTRIN) 200 MG tablet, Take 400 mg by mouth every 12 (twelve) hours as needed for Pain., Disp: , Rfl:     lamoTRIgine (LAMICTAL) 150 MG Tab, Take 150 mg by mouth 2 (two) times daily. , Disp: , Rfl:     latanoprost 0.005 % ophthalmic solution, Place 1 drop into both eyes every evening. , Disp: , Rfl:     LEVEMIR FLEXTOUCH 100 unit/mL (3 mL) InPn pen, Inject 40 Units into the skin 2 (two) times daily. , Disp: , Rfl:     levothyroxine (SYNTHROID) 150 MCG tablet, Take 150 mcg by mouth before breakfast. , Disp: , Rfl:     LINZESS 145 mcg Cap capsule, Take 145 mcg by mouth once daily. , Disp: , Rfl:     losartan-hydrochlorothiazide 50-12.5 mg (HYZAAR) 50-12.5 mg per tablet, Take 1 tablet by mouth once daily. , Disp: , Rfl:     meclizine (ANTIVERT) 25 mg tablet, Take by mouth 3 (three) times daily as needed. , Disp: , Rfl:     methocarbamol (ROBAXIN) 500 MG Tab, Take 500 mg by mouth 3 (three) times daily. , Disp: , Rfl:     mirabegron (MYRBETRIQ) 50 mg Tb24, Take 1 tablet (50 mg total) by mouth once daily., Disp: 90 tablet, Rfl: 3    NOVOFINE AUTOCOVER 30 gauge x 1/3" Ndle, , Disp: , Rfl:     NOVOLOG FLEXPEN 100 unit/mL InPn pen, , Disp: , Rfl:     oxycodone (ROXICODONE) 10 mg Tab immediate release tablet, Take 10 mg by mouth every 8 (eight) hours as needed. , Disp: , Rfl:     pantoprazole (PROTONIX) 40 MG tablet, Take 40 mg by mouth. , Disp: , Rfl:     polyethylene glycol (GLYCOLAX) 17 gram PwPk, Take 17 g by mouth once daily. Mix 1 cap in juice daily, Disp: 100 packet, Rfl: 2    PREMARIN vaginal cream, Place 0.5 g vaginally 3 (three) times a week., Disp: 30 g, Rfl: prn    PROAIR HFA 90 " mcg/actuation inhaler, , Disp: , Rfl:     ropinirole (REQUIP) 4 MG tablet, Take 4 mg by mouth 2 (two) times daily. , Disp: , Rfl:     sulfamethoxazole-trimethoprim 800-160mg (BACTRIM DS) 800-160 mg Tab, Take 1 tablet by mouth 2 (two) times daily., Disp: , Rfl:     trazodone (DESYREL) 150 MG tablet, Take 150 mg by mouth nightly. , Disp: , Rfl:     PMHx/PSHx Updates:          Objective:     Vitals:  Blood pressure (!) 128/58, weight 111.9 kg (246 lb 9.6 oz).    Physical Examination:   GEN: no apparent distress, comfortable; AAOx3  SKIN: no rashes,no ulceration, no Raynaud's, no petechiae, no SQ nodules,  HEAD: normal  EYES: no pallor, no icterus,  NECK: no masses, thyroid normal, trachea midline, no LAD/LN's, supple  CV: RRR with no murmur; l S1 and S2 reg. ,no gallop no rubs,   CHEST: Normal respiratory effort; CTAB; normal breath sounds; no wheeze or crackles  MUSC/Skeletal: ROM normal; no crepitus; joints without synovitis,   No joint swelling or tenderness of PIP, MCP, wrist, elbow, shoulder, or knee joints  EXTREM: no clubbing, cyanosis, no edema,normal  pulses   NEURO: grossly intact; motor WNL; AAOx3; ,   PSYCH: normal mood, affect and behavior  LYMPH: normal cervical, supraclavicular          Labs:   Lab Results   Component Value Date    WBC 4.60 07/30/2018    HGB 13.8 07/30/2018    HCT 42.2 07/30/2018    MCV 92 07/30/2018     07/30/2018    CMP  @LASTLAB(NA,K,CL,CO2,GLU,BUN,Creatinine,Calcium,PROT,Albumin,Bilitot,Alkphos,AST,ALT,CRP,ESR,RF,CCP,LEIGH,SSA,CPK,uric acid) )@  I have reviewed all available lab results and radiology reports.    Radiology/Diagnostic Studies:        Assessment/Plan:   (1) 67 y.o. female with diagnosis of Osteoarthritis She will have total knee replacement  In the next few weeks.                Discussion:     I have explained all of the above in detail and the patient understands all of the current recommendation(s). I have answered all questions to the best of my ability and  to their complete satisfaction.       The patient is to continue with the current management plan         RTC in 4 months or before if needed        Electronically signed by Keegan Beverly MD

## 2018-08-08 ENCOUNTER — TELEPHONE (OUTPATIENT)
Dept: ORTHOPEDICS | Facility: CLINIC | Age: 68
End: 2018-08-08

## 2018-08-08 DIAGNOSIS — N39.0 URINARY TRACT INFECTION WITHOUT HEMATURIA, SITE UNSPECIFIED: Primary | ICD-10-CM

## 2018-08-08 NOTE — TELEPHONE ENCOUNTER
Spoke with pt, explained to her again about her UTI that we have to treat and canceled her surgery, then we can reschedule her surgery

## 2018-08-08 NOTE — TELEPHONE ENCOUNTER
Pt still has a UTI. Call pt and explained that her surgery is cx due to her UTI. I called [facility] and spoke with her nurse, Lisa and informed her also.As per Dr. Marin, will treat her with Cipro and repeat urine when done. Called [facility], spoke with Marline and cx surgery

## 2018-08-08 NOTE — TELEPHONE ENCOUNTER
----- Message from Kendra York sent at 8/8/2018  2:54 PM CDT -----  Contact: Self [phone]  Patient is requesting a call back from the nurse regarding the cancelled surgery.  Patient would like to know if there are any results from her lab work.    Patient may be reached at [phone].    Thank you.  LC

## 2018-08-09 ENCOUNTER — OFFICE VISIT (OUTPATIENT)
Dept: UROLOGY | Facility: CLINIC | Age: 68
End: 2018-08-09
Payer: MEDICARE

## 2018-08-09 ENCOUNTER — APPOINTMENT (OUTPATIENT)
Dept: LAB | Facility: HOSPITAL | Age: 68
End: 2018-08-09
Attending: UROLOGY
Payer: MEDICARE

## 2018-08-09 VITALS
BODY MASS INDEX: 42.12 KG/M2 | DIASTOLIC BLOOD PRESSURE: 65 MMHG | HEIGHT: 64 IN | WEIGHT: 246.69 LBS | TEMPERATURE: 98 F | SYSTOLIC BLOOD PRESSURE: 115 MMHG | HEART RATE: 83 BPM

## 2018-08-09 DIAGNOSIS — R39.198 INCREASING RESIDUAL URINE: ICD-10-CM

## 2018-08-09 DIAGNOSIS — N39.3 SUI (STRESS URINARY INCONTINENCE, FEMALE): ICD-10-CM

## 2018-08-09 DIAGNOSIS — N32.81 OAB (OVERACTIVE BLADDER): ICD-10-CM

## 2018-08-09 DIAGNOSIS — N39.0 RECURRENT UTI: Primary | ICD-10-CM

## 2018-08-09 DIAGNOSIS — R79.9 ABNORMAL FINDING OF BLOOD CHEMISTRY: ICD-10-CM

## 2018-08-09 LAB
BACTERIA #/AREA URNS HPF: ABNORMAL /HPF
BILIRUB SERPL-MCNC: ABNORMAL MG/DL
BILIRUB UR QL STRIP: NEGATIVE
BLOOD URINE, POC: ABNORMAL
CLARITY UR: CLEAR
COLOR UR: YELLOW
COLOR, POC UA: ABNORMAL
GLUCOSE UR QL STRIP: 500
GLUCOSE UR QL STRIP: ABNORMAL
HGB UR QL STRIP: NEGATIVE
KETONES UR QL STRIP: ABNORMAL
KETONES UR QL STRIP: NEGATIVE
LEUKOCYTE ESTERASE UR QL STRIP: ABNORMAL
LEUKOCYTE ESTERASE URINE, POC: ABNORMAL
MICROSCOPIC COMMENT: ABNORMAL
NITRITE UR QL STRIP: NEGATIVE
NITRITE, POC UA: ABNORMAL
PH UR STRIP: 7 [PH] (ref 5–8)
PH, POC UA: 6
PROT UR QL STRIP: NEGATIVE
PROTEIN, POC: ABNORMAL
RBC #/AREA URNS HPF: 1 /HPF (ref 0–4)
SP GR UR STRIP: 1.01 (ref 1–1.03)
SPECIFIC GRAVITY, POC UA: 1015
URN SPEC COLLECT METH UR: ABNORMAL
UROBILINOGEN UR STRIP-ACNC: NEGATIVE EU/DL
UROBILINOGEN, POC UA: ABNORMAL
WBC #/AREA URNS HPF: 18 /HPF (ref 0–5)
WBC CLUMPS URNS QL MICRO: ABNORMAL
YEAST URNS QL MICRO: ABNORMAL

## 2018-08-09 PROCEDURE — 99999 PR PBB SHADOW E&M-EST. PATIENT-LVL V: CPT | Mod: PBBFAC,,, | Performed by: UROLOGY

## 2018-08-09 PROCEDURE — 99215 OFFICE O/P EST HI 40 MIN: CPT | Mod: S$GLB,,, | Performed by: UROLOGY

## 2018-08-09 PROCEDURE — 87088 URINE BACTERIA CULTURE: CPT

## 2018-08-09 PROCEDURE — 87186 SC STD MICRODIL/AGAR DIL: CPT

## 2018-08-09 PROCEDURE — 51701 INSERT BLADDER CATHETER: CPT | Mod: PBBFAC,PN | Performed by: UROLOGY

## 2018-08-09 PROCEDURE — 87077 CULTURE AEROBIC IDENTIFY: CPT

## 2018-08-09 PROCEDURE — 87086 URINE CULTURE/COLONY COUNT: CPT

## 2018-08-09 PROCEDURE — 81002 URINALYSIS NONAUTO W/O SCOPE: CPT | Mod: PBBFAC,PN | Performed by: UROLOGY

## 2018-08-09 PROCEDURE — 99215 OFFICE O/P EST HI 40 MIN: CPT | Mod: PBBFAC,PN,25 | Performed by: UROLOGY

## 2018-08-09 PROCEDURE — 81000 URINALYSIS NONAUTO W/SCOPE: CPT

## 2018-08-09 RX ORDER — CIPROFLOXACIN 500 MG/1
500 TABLET ORAL 2 TIMES DAILY
Qty: 14 TABLET | Refills: 0 | Status: SHIPPED | OUTPATIENT
Start: 2018-08-09 | End: 2018-08-09 | Stop reason: ALTCHOICE

## 2018-08-09 NOTE — PROGRESS NOTES
[facility] Note - [place]  Staff: MD Ligia    Referring provider and please cc: Cliff  [facility]    [facility]    Chief Complaint: recurrent uti    Subjective:        HPI: Alanis Mendieta is a 67 y.o. female presents with     Resident of [facility], there for last 2 years   Pt was an LPN    Recurrent uti  - Likely related to pad use. Last pvr by in and out cath was 10cc  - Sx include: dysuria with voiding  - RF: pads, diabetic hba1c 7.3  - Mild renal cortical thinning. Otherwise unremarkable exam.  - pt was supposed to have a pain pump on 3/16/18 (week ago) and was to have pain pump but found to have UTI. Pain pump in lower back. She was started on cipro and has been on this for a week. She says her urine is less foul smelling and she has less burning. Still has not received pain pump.   -Spoke with  and explained situation. UTI unlikley to clear. He will check urine 10-14d prior to her procedure and treat her with culture romeo abx for 7d prior to procedure and keep her on it for 3 more days. And if it appears contaminated rec'd cath urine. Never had her pain pump placed.   -was supposed to hae knee surgery with  on 8/13/18 but cancelled  -started on premarin cream with applicator every night but they stopped after 2 weeks.   -last a1c 4/17/18 6.6    UA void: 1+leuk/500 glucose/tr blood - on cipro   UA cath: sent for urinalysis and culture, voided 300 and pvr by I&o: 200cc (felt empty).  UCx:   7/30/18 E.coli, void:1+leuk/nit+, 11 wbc  6/7/18  NG, cath: 2+glucose, void: 1+wbc/tr protein/2+glucose/tr blood, pvr by I&o: 60cc   5/24/18 E.Coli, void: nit+/2_wbc/250 glucose/50 blood   4/12/18 E.coli, void: 1+wbc  3/27/18 E.coli resistant to cipro, doxy, bactrim. sens to augmentin.  3/23/18 E.coli, void:2+/leuk/nit+/trace blood -some burning  1/26/18 E.coli  09/29/17 E.coli   8/14/17 E.coli, void: 2+leukocyte/nitrite, voided    17 E.coli  17  k.pneumoniae, cath, nitrite +  17   aerococcus, 100k, voided, tr leuk    oab/mixed incontinence/anterior and posterior prolapse  -  -fell in 12/15 and says she had significant incontinence after, but says her incontinence was present for many years before surgery.  +WILLIAM and UUI. had c-spine surgery a year ago and says sx have increased. She states she does not feel the urge to go many times.  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   - 17 started on myrbetriq  - 17 frequency had decreased. Prior to the myrbetriq she was going 10x a day, now about 6x a day. Down to 1 pad from 6 or 7 pads a day. Denies any WILLIAM. + constipation (q3 to 4 days and takes Linzess). DM x 17 years. +stress test with large volume, I&o:20cc incontinence. But poor candidate for surgery. hba1c 8.9  -18 seen by  and found to have symptomatic stage 2 A/P prolapse. Failed pessary trial. Wanted to proceed with surgery. However she broke her foot and ankle when she was walking with her walker  She was started on hormone cream but pt states she can't reach but a nurse should be able to. Last hba1c  7.7  -seen 3/28/18 and up to 6 depends a day. She has a uti again. Not using estrace. Still drinking 4 cups of coffee a day. Voids in the toilet.  -18 here today for pre-op and wearing 2 diapers at a time. -still has significant constipation despite being on Linzess. Last bm was 5 days ago. She has to splint to empty.   -18 underwent [facility] MUS by me and enterocele repair, posterior colporapphy and perineopharry   -18 seen by  and pvr by 80.   -seen 18 wearing 6 depends/diapers per day and mostly dry. She has urge incontinence with large squirt on the way to the bathroom. Voids every 2 hours in the am but waits to go to the bathroom in the evening. She also uses lasix 3x a day.  She's been having  a soft bm every day since being on miralax 1 cap and linzess daily. She's using premarin cream 3x a day. We continued myrbetriq.    She returns today and states she's have explosive BMs 3 to 4x a day on linzess and 1 cap of miralax a day. She denies any urgency or urge incontinence and also denies any stress incontinence since I've seen her during the day. She is able to ambulate/get to the restroom during the day, whereas she used to void or leak in her diaper. She is still on myrbetriq. Her main issue is night time bedwetting. Her in and out cath was 200cc today after voiding 300cc. Mild prolapse. Does state she has noticed a slow flow.     She also was supposed to have knee surgery but they cancelled bc of uti and put her on cipro (culture resistant to this) and told her they would schedule her when she was clear. She also never had her pain pump.       ECOG Status: 1 - in a wheelchair but can walk with assistance. She is on O2 for copd. Resident of [facility] after c-spine surgery. She's been there for a year and plan is for her to leave.    G3, P 2, vaginal   Gross HematuriaYes - >5 years ago  History of UTI: yes    REVIEW OF SYSTEMS:  General ROS: no fevers, no chills  Psychological ROS: no depression  Endocrine ROS: no heat or cold  Respiratory ROS: + SOB  Cardiovascular ROS: no CP  Gastrointestinal ROS: no abdominal pain, + constipation, no diarrhea, +BRBPR with tearing. Musculoskeletal ROS: no muscle pain  Neurological ROS: no headaches  Dermatological ROS: no rashes  HEENT: noiglasses, no sinus    ROS: per HPI    Past medical, surgical, social and family hx have been reviewed. There have not any changes.     Allergies:  Morphine and Tubersol [tuberculin ppd]    Medications: Myrbetriq 50mg daily  pain medicine  Anticoagulation: No    Objective:     Vitals:    08/09/18 1047   BP: 115/65   Pulse: 83   Temp: 98 °F (36.7 °C)       General:WDWN in NAD  Eyes: PERRLA, normal conjunctiva  Respiratory: no  increased work on breathing, clear to auscultation, on 2LO2  Cardiovascular: regular rate and rhythm. No obvious extremity edema.  GI: no palpation of masses. No tenderness. No hepatosplenomegaly to palpation.  Musculoskeletal: normal range of motion of bilateral upper extremities. Normal muscle strength and tone.  Skin: no obvious rashes or lesions. No tightening of skin noted.  Neurologic: CN grossly normal. Normal sensation.   Psychiatric: awake, alert and oriented x 3. Mood and affect normal. Cooperative.     Pelvic exam 7/13/17:  negative stress test with coughing  In and out cath performed with 20 residual  Bladder filled to 150 very slowly  Sensation to void felt at 120cc  Catheter removed  +large volume stress test with coughing but not with valsava  Had pt stand and she had large volume leakage with coughing and valsava  No prolapse  Stool palpable in vault  +severe atrophic vaginitis    Pelvic exam 6/8/18 (s/p sling and repair):  negative stress test with coughing supine, negative stress test with valsalva supine  In and out cath performed with 60cc residual - urine was sent for ua and culture sent for sample  Bladder filled to 150 cc  Sensation to void felt at 100 cc  Catheter removed  Negative stress test with coughing supine, negative stress test with valsalva supine  Negative stress test with coughing or valsalva  Standing  No prolapse  Mesh not exposed     In and out cath today with 200cc. Felt empty. Just voided 300cc  Mesh not palpable  Mild cystocele. Sling does not appear obstructing      LABS REVIEW:      Labs reviewed 7/13/17  Glucose 113  Cr 0  0.8  H/H 14.4/42.4  Hba1c 8.9    3/19/18 a1c 7.3    Cr:   Lab Results   Component Value Date    CREATININE 0.8 07/30/2018       PATHOLOGY REVIEW:  Vagina, excision 4/26/18:  Benign nonkeratinizing squamous mucosa without dysplasia.    RADIOGRAPHIC REVIEW:  rbus 5/23/17  No stone  No hydro  Bladder moderately distended      Assessment:       1. Recurrent  "UTI    2. WILLIAM (stress urinary incontinence, female)          Plan:     Recurrent uti/asymptomatic bacteriuria  -Patient has NO SYMPTOMS OF UTI.   -she was written for Cipro and is on this now bc she was diagnosed with UTI prior to her knee surgery, which has now been cancelled. The culture is not sensitive to cipro giving antibiotics an aysmptomatic patient is only create MORE resistant bacteria. She only needs treatment prior to surgery as discussed below. As soon as she comes off antibiotics, she develops uti so treating her prior to surgery and trying to clear her is unlikley to happen if she does not get surgery while on antibiotics.  She needs to be ON antibiotics while she is getting surgery.   -if she wants pain pump or knee surgery then she will need a urine culture done 7-10 days prior to her surgery and start abx 5 days prior to her surgery and do the surgery while she is on abx. However she will likely have recurrent uti after. Spoke with  on 3/29/18 who agrees with plan.   -message sent to  "This pt is never going to be clear of uti. The only way you will be able to do surgery uti free is to get a urine 14 days prior to surgery and then treat her with culture appropriate abx starting 7 days prior to surgery and continue for 10 days total. You can also get a cath urinalysis the morning before surgery to make sure she's clear and treated  If you need us to do this for you we can. Just notify us about when the surgery is and we can set her up for the above."  -restart premarin cream every other night.    -sending cath urine for ua and culture but will not treat. Discontinue cipro.   -jessica get a ct urogram to evaluate kidneys for stones. Ultrasound does not always show this. Stones can always cause recurrent utis. If no reason found for recurrent uti's on ct, likely it's because just prone to utis.   -risk factors for recurrent uti: elevated urine residuals (always at least 60 to 100cc), " depends usage, diabetes, atrophic vaginitis (lack of hormones) and checking for stones. Will check a1c    Mixed incontinence (leakage) and OAB with Anterior and Posterior Prolapse s/p mid urethral sling and enterocele and posterior repair on 4/26/18  -discontinue myrbetriq. Repair of cystocele and stress incontinence should improve overactive bladder symptoms by now. Wearing depends during day for safety but no longer leaking. Only having nocturnal enuresis, not much we can do for this.   -negative stress test 6/8/18 with 150cc in bladder sitting and standing, previously had large volume leakage with coughing and laughing.   -continue linzess. Discontinue miralax since having explosive BMs 3 to 4x a day now. No diarrhea in depends. High risk for recurrence of rectocele if she develops constipation.   -continue premarin cream vaginally 3x a week.   -pvr by in and out cath 200cc today. Felt empty. No obstruction seen. Denies straining. Does have weak stream.     Follow up in 2 months for pvr by in and out cath (after stopping myrbetriq)    I spent 60 minutes with the patient of which more than half was spent in direct consultation with the patient in regards to our treatment and plan.      Faith Strong MD

## 2018-08-09 NOTE — PATIENT INSTRUCTIONS
Patient Instructions   (MUST READ and have family member review with you if not clear)      Medicines discontinued today: myrbetriq (overactive bladder medicine), discontinue cipro and miralax     Imaging/Pictures ordered (to be done in radiology at the hospital): ct scan. When?: soon. Why?:look for stones or cause of uti. If you do not have this scheduled then please call nurse back to find out when to schedule.       Other instructions:   1. NO ANTIBIOTICS UNLESS YOU ARE GOING TO GET SURGERY. THEN START ABX BASED ON THE CULTURE 7 DAYS BEFORE SURGERY. Culture should be obtained 14 days before surgery.  -Please feel free to call us or message via my chart if you have any questions about this plan.   -please sign up for [employer] our online portal system, it is great way to see your results and communicate with me or my nurses    Follow up: me in 2 months. If you do not receive a request for a follow-up appointment or no follow-up appointment has been made please make sure to contact us.    Detailed information about your medical conditions you have been diagnosed and treated for today. Please feel free to contact us with any questions about this.     Recurrent uti/asymptomatic bacteriuria  -Patient has NO SYMPTOMS OF UTI.   -she was written for Cipro and is on this now bc she was diagnosed with UTI prior to her knee surgery, which has now been cancelled. Not only is the culture not sensitive to cipro giving antibiotics an aysmptomatic patient is only create MORE resistant bacteria. She only needs treatment prior to surgery as discussed below. As soon as she comes off antibiotics, she develops uti. She needs to be ON antibiotics while she is getting surgery.   -if she wants pain pump or knee surgery then she will need a urine culture done 7-10 days prior to her surgery and start abx 5 days prior to her surgery and do the surgery while she is on abx. However she will likely have recurrent uti after. Spoke with   "on 3/29/18 who agrees with plan.   -message sent to  "This pt is never going to be clear of uti. The only way you will be able to do surgery uti free is to get a urine 14 days prior to surgery and then treat her with culture appropriate abx starting 7 days prior to surgery and continue for 10 days total. You can also get a cath urinalysis the morning before surgery to make sure she's clear and treated  If you need us to do this for you we can. Just notify us about when the surgery is and we can set her up for the above."  -restart premarin cream every other night.    -sending cath urine for ua and culture but will not treat. Discontinue cipro.   -jessica get a ct urogram to evaluate kidneys for stones. Ultrasound does not always show this. Stones can always cause recurrent utis. If no reason found for recurrent uti's on ct, likely it's because just prone to utis.   -risk factors for recurrent uti: elevated urine residuals (always at least 60 to 100cc), depends usage, diabetes, atrophic vaginitis (lack of hormones) and checking for stones.     Mixed incontinence (leakage) and OAB with Anterior and Posterior Prolapse s/p mid urethral sling and enterocele and posterior repair on 4/26/18  -discontinue myrbetriq. Repair of cystocele and stress incontinence should improve overactive bladder symptoms by now. Wearing depends during day for safety but no longer leaking. Only having nocturnal enuresis, not much we can do for this.   -negative stress test 6/8/18 with 150cc in bladder sitting and standing, previously had large volume leakage with coughing and laughing.   -continue linzess. Discontinue miralax since having explosive BMs 3 to 4x a day now. No diarrhea in depends. High risk for recurrence of rectocele if she develops constipation.   -continue premarin cream vaginally 3x a week.   -pvr by in and out cath 200cc today. Felt empty. No obstruction seen. Denies straining. Does have weak stream.     Follow up in 2 " months for pvr by in and out cath (after stopping myrbetriq)

## 2018-08-13 ENCOUNTER — TELEPHONE (OUTPATIENT)
Dept: ORTHOPEDICS | Facility: CLINIC | Age: 68
End: 2018-08-13

## 2018-08-13 LAB — BACTERIA UR CULT: NORMAL

## 2018-08-13 NOTE — TELEPHONE ENCOUNTER
----- Message from Edna Hodgson sent at 8/13/2018 11:42 AM CDT -----  Contact: Sushma Be [phone]  Please call Sushma about patients surgery that was CX.

## 2018-08-13 NOTE — TELEPHONE ENCOUNTER
Spoke with Sushma Brink Alexis. Will talk with Dr. Marin tomorrow about if he wants to treat pt for chronic UTI or have Dr. Stone do it.

## 2018-08-14 ENCOUNTER — HOSPITAL ENCOUNTER (OUTPATIENT)
Dept: RADIOLOGY | Facility: HOSPITAL | Age: 68
Discharge: HOME | End: 2018-08-14
Attending: UROLOGY
Payer: MEDICARE

## 2018-08-14 ENCOUNTER — TELEPHONE (OUTPATIENT)
Dept: UROLOGY | Facility: CLINIC | Age: 68
End: 2018-08-14

## 2018-08-14 DIAGNOSIS — N39.0 RECURRENT UTI: ICD-10-CM

## 2018-08-14 DIAGNOSIS — N39.3 SUI (STRESS URINARY INCONTINENCE, FEMALE): ICD-10-CM

## 2018-08-14 PROCEDURE — 74178 CT ABD&PLV WO CNTR FLWD CNTR: CPT | Mod: TC

## 2018-08-14 PROCEDURE — 74178 CT ABD&PLV WO CNTR FLWD CNTR: CPT | Mod: 26,,, | Performed by: RADIOLOGY

## 2018-08-14 PROCEDURE — 25500020 PHARM REV CODE 255

## 2018-08-14 RX ORDER — SODIUM CHLORIDE 9 MG/ML
INJECTION, SOLUTION INTRAVENOUS
Status: DISPENSED
Start: 2018-08-14 | End: 2018-08-14

## 2018-08-14 RX ADMIN — IOHEXOL 100 ML: 350 INJECTION, SOLUTION INTRAVENOUS at 11:08

## 2018-08-14 NOTE — TELEPHONE ENCOUNTER
Spoke with Sushma at [place]. Note faxed below. She will coordinate this information with 's office

## 2018-08-14 NOTE — TELEPHONE ENCOUNTER
Spoke with Sushma at [place] patient was suppose to have orthopedic surgery this week but was canceled due to uti. Patient is currently on cipro.  requesting recommendations from  before surgery can be rescheduled.

## 2018-08-14 NOTE — TELEPHONE ENCOUNTER
"This is from my note on 8/9/18. At the time of that visit I sent an epic message to  and his staff regarding her UTI, which I documented in my note below. Please send them an epic message again, and make a copy of my note and highlight below. Also let them know we are very responsive to epic so if they tell us the date of the surgetry we can arrange the urine samples. The catheterized urine samples can be obtained at [place].       Recurrent uti/asymptomatic bacteriuria  -Patient has NO SYMPTOMS OF UTI.   -she was written for Cipro and is on this now bc she was diagnosed with UTI prior to her knee surgery, which has now been cancelled. The culture is not sensitive to cipro giving antibiotics an aysmptomatic patient is only create MORE resistant bacteria. She only needs treatment prior to surgery as discussed below. As soon as she comes off antibiotics, she develops uti so treating her prior to surgery and trying to clear her is unlikley to happen if she does not get surgery while on antibiotics.  She needs to be ON antibiotics while she is getting surgery.   -if she wants pain pump or knee surgery then she will need a urine culture done 7-10 days prior to her surgery and start abx 5 days prior to her surgery and do the surgery while she is on abx. However she will likely have recurrent uti after. Spoke with  on 3/29/18 who agrees with plan.   -message sent to  "This pt is never going to be clear of uti. The only way you will be able to do surgery uti free is to get a urine 14 days prior to surgery and then treat her with culture appropriate abx starting 7 days prior to surgery and continue for 10 days total. You can also get a cath urinalysis the morning before surgery to make sure she's clear and treated  If you need us to do this for you we can. Just notify us about when the surgery is and we can set her up for the above."      "

## 2018-08-14 NOTE — TELEPHONE ENCOUNTER
----- Message from Savannah Bloom sent at 8/14/2018  8:13 AM CDT -----  Type: Needs Medical Advice    Who Called:  Sushma Be  Best Call Back Number: [phone]  Additional Information: Stated patient has Urinary Tract Infection and needs another medication ordered/please call back to advise.

## 2018-08-15 ENCOUNTER — TELEPHONE (OUTPATIENT)
Dept: ORTHOPEDICS | Facility: CLINIC | Age: 68
End: 2018-08-15

## 2018-08-15 NOTE — TELEPHONE ENCOUNTER
Spoke with Sushma, as per Dr. Marin, we will put the pt back on the surgery schedule. Dr. Strong can treat her before and during surgery for the chronic UTI. He will do a cath UA the morning of surgery and he feels there is too many WBC's at that time, the surgery will be postponed again. Will call Sushma back with a surgery date

## 2018-08-15 NOTE — TELEPHONE ENCOUNTER
----- Message from Edna Hodgson sent at 8/14/2018  2:32 PM CDT -----  Contact: Sushma [phone]  Sushma is returning your call. She is also faxing you a updated progress note on patient.

## 2018-08-22 ENCOUNTER — TELEPHONE (OUTPATIENT)
Dept: ORTHOPEDICS | Facility: CLINIC | Age: 68
End: 2018-08-22

## 2018-08-22 NOTE — TELEPHONE ENCOUNTER
Spoke with Sushma at [place]. Surgery is now scheduled on 9/17/18. As per Dr. Marin, Dr. Strong will treat her for 10 days before surgery and thereafter with antibiotics for her chronic UTI. Dr. Castro will repeat a cath u/a the morning of surgery and if too infected surgery will be canceled again.

## 2018-08-22 NOTE — TELEPHONE ENCOUNTER
Called [facility] in pre-op, pt added back on for surgery on 9/17. Dr. Strong will treat pt with antibiotics 10 days before surgery and after. Cath u/a will be repeated morning of surgery and shown to Dr. Marin and make a decision if surgery can continue. Called surgery, spoke with Tatyana and added pt back on the book

## 2018-08-23 ENCOUNTER — TELEPHONE (OUTPATIENT)
Dept: UROLOGY | Facility: CLINIC | Age: 68
End: 2018-08-23

## 2018-08-23 NOTE — TELEPHONE ENCOUNTER
Spoke with Sushma at [place] she states patient's surgery with  is 9/17 and wants to know which antibiotic  wants the patient on 10 days prior to surgery

## 2018-08-23 NOTE — TELEPHONE ENCOUNTER
Please ask them to get a catheterized uriin eculture on sept 5 and send me the results and I will send in abx based on urine culture sensitivities

## 2018-08-23 NOTE — TELEPHONE ENCOUNTER
----- Message from Renzo Yao sent at 8/23/2018 11:43 AM CDT -----  Contact:   Sushma, [phone]. Calling to give the office patient's surgery date and what antibiotic she needs to be on. Please advise. Thanks.

## 2018-09-05 ENCOUNTER — TELEPHONE (OUTPATIENT)
Dept: UROLOGY | Facility: CLINIC | Age: 68
End: 2018-09-05

## 2018-09-05 NOTE — TELEPHONE ENCOUNTER
Spoke with Sushma at [place] informed her results not received. Faxed to a [phone] number. Sushma will refax results

## 2018-09-05 NOTE — TELEPHONE ENCOUNTER
----- Message from Jael Molina sent at 9/5/2018  1:51 PM CDT -----  Contact: Sushma,  at [place]  Sushma faxed over the patient's urine culture and wanted to see if the office received it and to see when the office will send over the patient's antibiotic prescription.  Call Back#[phone]  Thanks

## 2018-09-07 ENCOUNTER — TELEPHONE (OUTPATIENT)
Dept: UROLOGY | Facility: CLINIC | Age: 68
End: 2018-09-07

## 2018-09-07 ENCOUNTER — TELEPHONE (OUTPATIENT)
Dept: ORTHOPEDICS | Facility: CLINIC | Age: 68
End: 2018-09-07

## 2018-09-07 RX ORDER — AMOXICILLIN AND CLAVULANATE POTASSIUM 875; 125 MG/1; MG/1
1 TABLET, FILM COATED ORAL 2 TIMES DAILY
Qty: 20 TABLET | Refills: 0 | Status: ON HOLD | OUTPATIENT
Start: 2018-09-07 | End: 2018-09-19

## 2018-09-07 NOTE — TELEPHONE ENCOUNTER
Her cath urine culture from 9/5/18  Grew e.coli (uploaded to media)  Orally sensitive to: augmentin, ampicillin and ceftin  Iv sensitive to: rocephin, ancef, ertapenem, gen, imipenem and zosyn  Resistant to: cipro, levaquin and bactrim    I recommend she start augmentin 875 twice a day 7 days before surgery and remain on it for 5 days after. Sent to [facility] care pharmacy  She can receive ancef or rocephin pre-op, up to .

## 2018-09-07 NOTE — TELEPHONE ENCOUNTER
Spoke with nan with [facility] informed them urine culture results received per  will review today after clinic

## 2018-09-07 NOTE — TELEPHONE ENCOUNTER
Spoke with Mine, I did get lab work. Forward to [facility]. Pt glucose high. She will speak with pt as she is non-compliant about what she eats.

## 2018-09-07 NOTE — TELEPHONE ENCOUNTER
----- Message from Nena Shi sent at 9/6/2018 11:41 AM CDT -----  Contact: yas from [facility]  (dr reina) yas is calling to check to see if we received the faxes that were faxed over on this week on this patient [phone]

## 2018-09-07 NOTE — TELEPHONE ENCOUNTER
----- Message from Lea Grey sent at 9/7/2018 10:40 AM CDT -----  Contact: Dick finn  [place]  Urine cultures have been faxed twice and they are waiting on antibiotic to be called in for patient     Please call back [phone]

## 2018-09-07 NOTE — TELEPHONE ENCOUNTER
----- Message from Mackenzie Gonsalves sent at 9/7/2018  8:20 AM CDT -----  Contact: Sushma olvera  at [place]   Sushma olvera  at [place] called to check status of the order for patient antibiotics. Please call back at [phone]

## 2018-09-14 ENCOUNTER — ANESTHESIA EVENT (OUTPATIENT)
Dept: SURGERY | Facility: HOSPITAL | Age: 68
End: 2018-09-14
Payer: MEDICARE

## 2018-09-14 RX ORDER — VANCOMYCIN HYDROCHLORIDE 1 G/20ML
1000 INJECTION, POWDER, LYOPHILIZED, FOR SOLUTION INTRAVENOUS
Status: CANCELLED | OUTPATIENT
Start: 2018-09-14 | End: 2018-09-15

## 2018-09-17 ENCOUNTER — ANESTHESIA (OUTPATIENT)
Dept: SURGERY | Facility: HOSPITAL | Age: 68
End: 2018-09-17
Payer: MEDICARE

## 2018-09-17 ENCOUNTER — HOSPITAL ENCOUNTER (OUTPATIENT)
Facility: HOSPITAL | Age: 68
Discharge: NURSING, REHAB OR LONG-TERM CARE | End: 2018-09-19
Attending: ORTHOPAEDIC SURGERY | Admitting: ORTHOPAEDIC SURGERY
Payer: MEDICARE

## 2018-09-17 DIAGNOSIS — I10 ESSENTIAL HYPERTENSION: ICD-10-CM

## 2018-09-17 DIAGNOSIS — J44.9 CHRONIC OBSTRUCTIVE PULMONARY DISEASE, UNSPECIFIED COPD TYPE: ICD-10-CM

## 2018-09-17 DIAGNOSIS — K21.9 GASTROESOPHAGEAL REFLUX DISEASE, ESOPHAGITIS PRESENCE NOT SPECIFIED: ICD-10-CM

## 2018-09-17 DIAGNOSIS — Z79.4 TYPE 2 DIABETES MELLITUS WITH COMPLICATION, WITH LONG-TERM CURRENT USE OF INSULIN: ICD-10-CM

## 2018-09-17 DIAGNOSIS — M17.12 PRIMARY OSTEOARTHRITIS OF LEFT KNEE: Primary | ICD-10-CM

## 2018-09-17 DIAGNOSIS — Z96.652 S/P TOTAL KNEE ARTHROPLASTY, LEFT: ICD-10-CM

## 2018-09-17 DIAGNOSIS — E11.8 TYPE 2 DIABETES MELLITUS WITH COMPLICATION, WITH LONG-TERM CURRENT USE OF INSULIN: ICD-10-CM

## 2018-09-17 DIAGNOSIS — E78.5 HYPERLIPIDEMIA, UNSPECIFIED HYPERLIPIDEMIA TYPE: ICD-10-CM

## 2018-09-17 PROBLEM — E03.9 HYPOTHYROIDISM: Status: ACTIVE | Noted: 2018-09-17

## 2018-09-17 LAB
ABO + RH BLD: NORMAL
BILIRUB UR QL STRIP: NEGATIVE
BLD GP AB SCN CELLS X3 SERPL QL: NORMAL
CLARITY UR: CLEAR
COLOR UR: YELLOW
GLUCOSE UR QL STRIP: NEGATIVE
HGB UR QL STRIP: NEGATIVE
KETONES UR QL STRIP: NEGATIVE
LEUKOCYTE ESTERASE UR QL STRIP: NEGATIVE
NITRITE UR QL STRIP: NEGATIVE
PH UR STRIP: 7 [PH] (ref 5–8)
POCT GLUCOSE: 160 MG/DL (ref 70–110)
POCT GLUCOSE: 235 MG/DL (ref 70–110)
POCT GLUCOSE: 277 MG/DL (ref 70–110)
PROT UR QL STRIP: ABNORMAL
SP GR UR STRIP: 1.01 (ref 1–1.03)
URN SPEC COLLECT METH UR: ABNORMAL
UROBILINOGEN UR STRIP-ACNC: 1 EU/DL

## 2018-09-17 PROCEDURE — 63600175 PHARM REV CODE 636 W HCPCS: Performed by: ANESTHESIOLOGY

## 2018-09-17 PROCEDURE — 64447 NJX AA&/STRD FEMORAL NRV IMG: CPT | Mod: 59,LT,, | Performed by: ANESTHESIOLOGY

## 2018-09-17 PROCEDURE — 71000039 HC RECOVERY, EACH ADD'L HOUR: Performed by: ORTHOPAEDIC SURGERY

## 2018-09-17 PROCEDURE — 99900035 HC TECH TIME PER 15 MIN (STAT)

## 2018-09-17 PROCEDURE — 63600175 PHARM REV CODE 636 W HCPCS: Performed by: NURSE ANESTHETIST, CERTIFIED REGISTERED

## 2018-09-17 PROCEDURE — 99900104 DSU ONLY-NO CHARGE-EA ADD'L HR (STAT): Performed by: ORTHOPAEDIC SURGERY

## 2018-09-17 PROCEDURE — 94640 AIRWAY INHALATION TREATMENT: CPT

## 2018-09-17 PROCEDURE — 25000003 PHARM REV CODE 250: Performed by: ANESTHESIOLOGY

## 2018-09-17 PROCEDURE — 71000033 HC RECOVERY, INTIAL HOUR: Performed by: ORTHOPAEDIC SURGERY

## 2018-09-17 PROCEDURE — 99220 PR INITIAL OBSERVATION CARE,LEVL III: CPT | Mod: ,,, | Performed by: INTERNAL MEDICINE

## 2018-09-17 PROCEDURE — 63600175 PHARM REV CODE 636 W HCPCS: Performed by: ORTHOPAEDIC SURGERY

## 2018-09-17 PROCEDURE — 63600175 PHARM REV CODE 636 W HCPCS

## 2018-09-17 PROCEDURE — 86850 RBC ANTIBODY SCREEN: CPT

## 2018-09-17 PROCEDURE — 27201423 OPTIME MED/SURG SUP & DEVICES STERILE SUPPLY: Performed by: ORTHOPAEDIC SURGERY

## 2018-09-17 PROCEDURE — 37000009 HC ANESTHESIA EA ADD 15 MINS: Performed by: ORTHOPAEDIC SURGERY

## 2018-09-17 PROCEDURE — 25000003 PHARM REV CODE 250: Performed by: ORTHOPAEDIC SURGERY

## 2018-09-17 PROCEDURE — 25000003 PHARM REV CODE 250: Performed by: NURSE ANESTHETIST, CERTIFIED REGISTERED

## 2018-09-17 PROCEDURE — D9220A PRA ANESTHESIA: Mod: CRNA,,, | Performed by: NURSE ANESTHETIST, CERTIFIED REGISTERED

## 2018-09-17 PROCEDURE — 99900103 DSU ONLY-NO CHARGE-INITIAL HR (STAT): Performed by: ORTHOPAEDIC SURGERY

## 2018-09-17 PROCEDURE — 37000008 HC ANESTHESIA 1ST 15 MINUTES: Performed by: ORTHOPAEDIC SURGERY

## 2018-09-17 PROCEDURE — 36000710: Performed by: ORTHOPAEDIC SURGERY

## 2018-09-17 PROCEDURE — 36415 COLL VENOUS BLD VENIPUNCTURE: CPT

## 2018-09-17 PROCEDURE — 94770 HC EXHALED C02 TEST: CPT | Mod: 59

## 2018-09-17 PROCEDURE — 94761 N-INVAS EAR/PLS OXIMETRY MLT: CPT

## 2018-09-17 PROCEDURE — C1776 JOINT DEVICE (IMPLANTABLE): HCPCS | Performed by: ORTHOPAEDIC SURGERY

## 2018-09-17 PROCEDURE — 25000242 PHARM REV CODE 250 ALT 637 W/ HCPCS: Performed by: ANESTHESIOLOGY

## 2018-09-17 PROCEDURE — S0020 INJECTION, BUPIVICAINE HYDRO: HCPCS | Performed by: ANESTHESIOLOGY

## 2018-09-17 PROCEDURE — 81003 URINALYSIS AUTO W/O SCOPE: CPT

## 2018-09-17 PROCEDURE — 76942 ECHO GUIDE FOR BIOPSY: CPT | Mod: 26,,, | Performed by: ANESTHESIOLOGY

## 2018-09-17 PROCEDURE — 25000003 PHARM REV CODE 250: Performed by: INTERNAL MEDICINE

## 2018-09-17 PROCEDURE — 63600175 PHARM REV CODE 636 W HCPCS: Performed by: INTERNAL MEDICINE

## 2018-09-17 PROCEDURE — 36000711: Performed by: ORTHOPAEDIC SURGERY

## 2018-09-17 PROCEDURE — C1713 ANCHOR/SCREW BN/BN,TIS/BN: HCPCS | Performed by: ORTHOPAEDIC SURGERY

## 2018-09-17 PROCEDURE — S5010 5% DEXTROSE AND 0.45% SALINE: HCPCS | Performed by: ORTHOPAEDIC SURGERY

## 2018-09-17 PROCEDURE — 27000221 HC OXYGEN, UP TO 24 HOURS

## 2018-09-17 PROCEDURE — D9220A PRA ANESTHESIA: Mod: ANES,,, | Performed by: ANESTHESIOLOGY

## 2018-09-17 DEVICE — PATELLA ONLAY 29MM TRI PEG: Type: IMPLANTABLE DEVICE | Site: KNEE | Status: FUNCTIONAL

## 2018-09-17 RX ORDER — KETOROLAC TROMETHAMINE 30 MG/ML
INJECTION, SOLUTION INTRAMUSCULAR; INTRAVENOUS
Status: DISCONTINUED | OUTPATIENT
Start: 2018-09-17 | End: 2018-09-17

## 2018-09-17 RX ORDER — BISACODYL 10 MG
10 SUPPOSITORY, RECTAL RECTAL DAILY
Status: DISCONTINUED | OUTPATIENT
Start: 2018-09-18 | End: 2018-09-19 | Stop reason: HOSPADM

## 2018-09-17 RX ORDER — CEFAZOLIN SODIUM 1 G/50ML
1 SOLUTION INTRAVENOUS
Status: COMPLETED | OUTPATIENT
Start: 2018-09-17 | End: 2018-09-18

## 2018-09-17 RX ORDER — PHENYLEPHRINE HYDROCHLORIDE 10 MG/ML
INJECTION INTRAVENOUS
Status: DISCONTINUED | OUTPATIENT
Start: 2018-09-17 | End: 2018-09-17

## 2018-09-17 RX ORDER — MORPHINE SULFATE 2 MG/ML
2 INJECTION, SOLUTION INTRAMUSCULAR; INTRAVENOUS EVERY 4 HOURS PRN
Status: DISCONTINUED | OUTPATIENT
Start: 2018-09-17 | End: 2018-09-17

## 2018-09-17 RX ORDER — GLUCAGON 1 MG
1 KIT INJECTION
Status: DISCONTINUED | OUTPATIENT
Start: 2018-09-17 | End: 2018-09-19 | Stop reason: HOSPADM

## 2018-09-17 RX ORDER — SUCCINYLCHOLINE CHLORIDE 20 MG/ML
INJECTION INTRAMUSCULAR; INTRAVENOUS
Status: DISCONTINUED | OUTPATIENT
Start: 2018-09-17 | End: 2018-09-17

## 2018-09-17 RX ORDER — LOSARTAN POTASSIUM AND HYDROCHLOROTHIAZIDE 12.5; 5 MG/1; MG/1
1 TABLET ORAL DAILY
Status: DISCONTINUED | OUTPATIENT
Start: 2018-09-18 | End: 2018-09-19 | Stop reason: HOSPADM

## 2018-09-17 RX ORDER — LATANOPROST 50 UG/ML
1 SOLUTION/ DROPS OPHTHALMIC NIGHTLY
Status: DISCONTINUED | OUTPATIENT
Start: 2018-09-17 | End: 2018-09-19 | Stop reason: HOSPADM

## 2018-09-17 RX ORDER — DIPHENHYDRAMINE HYDROCHLORIDE 50 MG/ML
12.5 INJECTION INTRAMUSCULAR; INTRAVENOUS EVERY 6 HOURS PRN
Status: DISCONTINUED | OUTPATIENT
Start: 2018-09-17 | End: 2018-09-19 | Stop reason: HOSPADM

## 2018-09-17 RX ORDER — VANCOMYCIN HCL IN 5 % DEXTROSE 1G/250ML
1 PLASTIC BAG, INJECTION (ML) INTRAVENOUS
Status: COMPLETED | OUTPATIENT
Start: 2018-09-17 | End: 2018-09-17

## 2018-09-17 RX ORDER — ZOLPIDEM TARTRATE 5 MG/1
5 TABLET ORAL NIGHTLY PRN
Status: DISCONTINUED | OUTPATIENT
Start: 2018-09-17 | End: 2018-09-18

## 2018-09-17 RX ORDER — IBUPROFEN 200 MG
16 TABLET ORAL
Status: DISCONTINUED | OUTPATIENT
Start: 2018-09-17 | End: 2018-09-19 | Stop reason: HOSPADM

## 2018-09-17 RX ORDER — OXYCODONE HYDROCHLORIDE 5 MG/1
5 TABLET ORAL
Status: DISCONTINUED | OUTPATIENT
Start: 2018-09-17 | End: 2018-09-17 | Stop reason: HOSPADM

## 2018-09-17 RX ORDER — ROCURONIUM BROMIDE 10 MG/ML
INJECTION, SOLUTION INTRAVENOUS
Status: DISCONTINUED | OUTPATIENT
Start: 2018-09-17 | End: 2018-09-17

## 2018-09-17 RX ORDER — MIDAZOLAM HYDROCHLORIDE 1 MG/ML
INJECTION, SOLUTION INTRAMUSCULAR; INTRAVENOUS
Status: DISCONTINUED | OUTPATIENT
Start: 2018-09-17 | End: 2018-09-17

## 2018-09-17 RX ORDER — OXYCODONE HYDROCHLORIDE 10 MG/1
10 TABLET ORAL
Status: DISCONTINUED | OUTPATIENT
Start: 2018-09-17 | End: 2018-09-19 | Stop reason: HOSPADM

## 2018-09-17 RX ORDER — FERROUS SULFATE 325(65) MG
325 TABLET, DELAYED RELEASE (ENTERIC COATED) ORAL DAILY
Status: DISCONTINUED | OUTPATIENT
Start: 2018-09-18 | End: 2018-09-19 | Stop reason: HOSPADM

## 2018-09-17 RX ORDER — OXYCODONE HYDROCHLORIDE 5 MG/1
5 TABLET ORAL
Status: DISCONTINUED | OUTPATIENT
Start: 2018-09-17 | End: 2018-09-19 | Stop reason: HOSPADM

## 2018-09-17 RX ORDER — PROPOFOL 10 MG/ML
VIAL (ML) INTRAVENOUS
Status: DISCONTINUED | OUTPATIENT
Start: 2018-09-17 | End: 2018-09-17

## 2018-09-17 RX ORDER — ONDANSETRON 4 MG/1
8 TABLET, ORALLY DISINTEGRATING ORAL EVERY 8 HOURS PRN
Status: DISCONTINUED | OUTPATIENT
Start: 2018-09-17 | End: 2018-09-19 | Stop reason: HOSPADM

## 2018-09-17 RX ORDER — DEXTROSE MONOHYDRATE AND SODIUM CHLORIDE 5; .45 G/100ML; G/100ML
INJECTION, SOLUTION INTRAVENOUS CONTINUOUS
Status: DISCONTINUED | OUTPATIENT
Start: 2018-09-17 | End: 2018-09-17

## 2018-09-17 RX ORDER — ASPIRIN 325 MG
325 TABLET ORAL 2 TIMES DAILY
Status: DISCONTINUED | OUTPATIENT
Start: 2018-09-17 | End: 2018-09-19 | Stop reason: HOSPADM

## 2018-09-17 RX ORDER — OXYCODONE HCL 10 MG/1
10 TABLET, FILM COATED, EXTENDED RELEASE ORAL EVERY 12 HOURS
Status: DISCONTINUED | OUTPATIENT
Start: 2018-09-17 | End: 2018-09-19 | Stop reason: HOSPADM

## 2018-09-17 RX ORDER — CELECOXIB 100 MG/1
200 CAPSULE ORAL ONCE
Status: COMPLETED | OUTPATIENT
Start: 2018-09-17 | End: 2018-09-17

## 2018-09-17 RX ORDER — SODIUM CHLORIDE, SODIUM LACTATE, POTASSIUM CHLORIDE, CALCIUM CHLORIDE 600; 310; 30; 20 MG/100ML; MG/100ML; MG/100ML; MG/100ML
75 INJECTION, SOLUTION INTRAVENOUS CONTINUOUS
Status: DISCONTINUED | OUTPATIENT
Start: 2018-09-17 | End: 2018-09-17

## 2018-09-17 RX ORDER — ONDANSETRON HYDROCHLORIDE 2 MG/ML
INJECTION, SOLUTION INTRAMUSCULAR; INTRAVENOUS
Status: DISCONTINUED | OUTPATIENT
Start: 2018-09-17 | End: 2018-09-17

## 2018-09-17 RX ORDER — LIDOCAINE HCL/PF 100 MG/5ML
SYRINGE (ML) INTRAVENOUS
Status: DISCONTINUED | OUTPATIENT
Start: 2018-09-17 | End: 2018-09-17

## 2018-09-17 RX ORDER — HYDROMORPHONE HYDROCHLORIDE 2 MG/ML
0.2 INJECTION, SOLUTION INTRAMUSCULAR; INTRAVENOUS; SUBCUTANEOUS EVERY 5 MIN PRN
Status: DISCONTINUED | OUTPATIENT
Start: 2018-09-17 | End: 2018-09-17 | Stop reason: HOSPADM

## 2018-09-17 RX ORDER — ACETAMINOPHEN 10 MG/ML
1000 INJECTION, SOLUTION INTRAVENOUS ONCE
Status: COMPLETED | OUTPATIENT
Start: 2018-09-17 | End: 2018-09-17

## 2018-09-17 RX ORDER — ROPINIROLE 1 MG/1
4 TABLET, FILM COATED ORAL 2 TIMES DAILY
Status: DISCONTINUED | OUTPATIENT
Start: 2018-09-17 | End: 2018-09-19 | Stop reason: HOSPADM

## 2018-09-17 RX ORDER — SODIUM CHLORIDE 0.9 % (FLUSH) 0.9 %
3 SYRINGE (ML) INJECTION
Status: DISCONTINUED | OUTPATIENT
Start: 2018-09-17 | End: 2018-09-17 | Stop reason: HOSPADM

## 2018-09-17 RX ORDER — PREGABALIN 75 MG/1
75 CAPSULE ORAL ONCE
Status: COMPLETED | OUTPATIENT
Start: 2018-09-17 | End: 2018-09-17

## 2018-09-17 RX ORDER — POLYETHYLENE GLYCOL 3350 17 G/17G
17 POWDER, FOR SOLUTION ORAL DAILY
Status: DISCONTINUED | OUTPATIENT
Start: 2018-09-18 | End: 2018-09-19 | Stop reason: HOSPADM

## 2018-09-17 RX ORDER — FENTANYL CITRATE 50 UG/ML
INJECTION, SOLUTION INTRAMUSCULAR; INTRAVENOUS
Status: DISCONTINUED | OUTPATIENT
Start: 2018-09-17 | End: 2018-09-17

## 2018-09-17 RX ORDER — GLYCOPYRROLATE 0.2 MG/ML
INJECTION INTRAMUSCULAR; INTRAVENOUS
Status: DISCONTINUED | OUTPATIENT
Start: 2018-09-17 | End: 2018-09-17

## 2018-09-17 RX ORDER — DIPHENHYDRAMINE HYDROCHLORIDE 50 MG/ML
25 INJECTION INTRAMUSCULAR; INTRAVENOUS EVERY 6 HOURS PRN
Status: DISCONTINUED | OUTPATIENT
Start: 2018-09-17 | End: 2018-09-17 | Stop reason: HOSPADM

## 2018-09-17 RX ORDER — POLYETHYLENE GLYCOL 3350 17 G/17G
17 POWDER, FOR SOLUTION ORAL DAILY PRN
COMMUNITY
End: 2019-07-29 | Stop reason: CLARIF

## 2018-09-17 RX ORDER — INSULIN ASPART 100 [IU]/ML
0-5 INJECTION, SOLUTION INTRAVENOUS; SUBCUTANEOUS
Status: DISCONTINUED | OUTPATIENT
Start: 2018-09-17 | End: 2018-09-19 | Stop reason: HOSPADM

## 2018-09-17 RX ORDER — SODIUM CHLORIDE 0.9 % (FLUSH) 0.9 %
5 SYRINGE (ML) INJECTION
Status: DISCONTINUED | OUTPATIENT
Start: 2018-09-17 | End: 2018-09-19 | Stop reason: HOSPADM

## 2018-09-17 RX ORDER — CALCITRIOL 0.25 UG/1
0.5 CAPSULE ORAL DAILY
Status: DISCONTINUED | OUTPATIENT
Start: 2018-09-18 | End: 2018-09-19 | Stop reason: HOSPADM

## 2018-09-17 RX ORDER — ONDANSETRON 2 MG/ML
4 INJECTION INTRAMUSCULAR; INTRAVENOUS ONCE
Status: COMPLETED | OUTPATIENT
Start: 2018-09-17 | End: 2018-09-17

## 2018-09-17 RX ORDER — NEOSTIGMINE METHYLSULFATE 1 MG/ML
INJECTION, SOLUTION INTRAVENOUS
Status: DISCONTINUED | OUTPATIENT
Start: 2018-09-17 | End: 2018-09-17

## 2018-09-17 RX ORDER — SODIUM CHLORIDE, SODIUM LACTATE, POTASSIUM CHLORIDE, CALCIUM CHLORIDE 600; 310; 30; 20 MG/100ML; MG/100ML; MG/100ML; MG/100ML
INJECTION, SOLUTION INTRAVENOUS CONTINUOUS
Status: DISCONTINUED | OUTPATIENT
Start: 2018-09-17 | End: 2018-09-17

## 2018-09-17 RX ORDER — OXYCODONE HCL 10 MG/1
10 TABLET, FILM COATED, EXTENDED RELEASE ORAL ONCE
Status: COMPLETED | OUTPATIENT
Start: 2018-09-17 | End: 2018-09-17

## 2018-09-17 RX ORDER — LEVOTHYROXINE SODIUM 50 UG/1
150 TABLET ORAL
Status: DISCONTINUED | OUTPATIENT
Start: 2018-09-18 | End: 2018-09-19 | Stop reason: HOSPADM

## 2018-09-17 RX ORDER — IBUPROFEN 200 MG
24 TABLET ORAL
Status: DISCONTINUED | OUTPATIENT
Start: 2018-09-17 | End: 2018-09-19 | Stop reason: HOSPADM

## 2018-09-17 RX ORDER — SODIUM CHLORIDE 9 MG/ML
INJECTION, SOLUTION INTRAVENOUS CONTINUOUS
Status: DISCONTINUED | OUTPATIENT
Start: 2018-09-17 | End: 2018-09-17

## 2018-09-17 RX ORDER — LIDOCAINE HYDROCHLORIDE 10 MG/ML
1 INJECTION, SOLUTION EPIDURAL; INFILTRATION; INTRACAUDAL; PERINEURAL ONCE
Status: COMPLETED | OUTPATIENT
Start: 2018-09-17 | End: 2018-09-17

## 2018-09-17 RX ORDER — TRANEXAMIC ACID 100 MG/ML
INJECTION, SOLUTION INTRAVENOUS
Status: DISCONTINUED | OUTPATIENT
Start: 2018-09-17 | End: 2018-09-17

## 2018-09-17 RX ORDER — POLYETHYLENE GLYCOL 3350 17 G/17G
17 POWDER, FOR SOLUTION ORAL DAILY PRN
Status: DISCONTINUED | OUTPATIENT
Start: 2018-09-17 | End: 2018-09-19 | Stop reason: HOSPADM

## 2018-09-17 RX ORDER — DEXAMETHASONE SODIUM PHOSPHATE 4 MG/ML
INJECTION, SOLUTION INTRA-ARTICULAR; INTRALESIONAL; INTRAMUSCULAR; INTRAVENOUS; SOFT TISSUE
Status: DISCONTINUED | OUTPATIENT
Start: 2018-09-17 | End: 2018-09-17

## 2018-09-17 RX ORDER — LUBIPROSTONE 8 UG/1
8 CAPSULE ORAL
Status: DISCONTINUED | OUTPATIENT
Start: 2018-09-18 | End: 2018-09-19 | Stop reason: HOSPADM

## 2018-09-17 RX ORDER — LEVALBUTEROL 1.25 MG/.5ML
1.25 SOLUTION, CONCENTRATE RESPIRATORY (INHALATION)
Status: COMPLETED | OUTPATIENT
Start: 2018-09-17 | End: 2018-09-17

## 2018-09-17 RX ORDER — FUROSEMIDE 20 MG/1
20 TABLET ORAL
Status: DISCONTINUED | OUTPATIENT
Start: 2018-09-17 | End: 2018-09-19 | Stop reason: HOSPADM

## 2018-09-17 RX ORDER — FENTANYL CITRATE 50 UG/ML
25 INJECTION, SOLUTION INTRAMUSCULAR; INTRAVENOUS EVERY 5 MIN PRN
Status: DISCONTINUED | OUTPATIENT
Start: 2018-09-17 | End: 2018-09-17 | Stop reason: HOSPADM

## 2018-09-17 RX ORDER — MEPERIDINE HYDROCHLORIDE 50 MG/ML
12.5 INJECTION INTRAMUSCULAR; INTRAVENOUS; SUBCUTANEOUS ONCE AS NEEDED
Status: DISCONTINUED | OUTPATIENT
Start: 2018-09-17 | End: 2018-09-17 | Stop reason: HOSPADM

## 2018-09-17 RX ORDER — BUPIVACAINE HYDROCHLORIDE 5 MG/ML
INJECTION, SOLUTION EPIDURAL; INTRACAUDAL
Status: COMPLETED | OUTPATIENT
Start: 2018-09-17 | End: 2018-09-17

## 2018-09-17 RX ORDER — FAMOTIDINE 20 MG/1
20 TABLET, FILM COATED ORAL 2 TIMES DAILY
Status: DISCONTINUED | OUTPATIENT
Start: 2018-09-17 | End: 2018-09-19 | Stop reason: HOSPADM

## 2018-09-17 RX ADMIN — OXYCODONE HYDROCHLORIDE 15 MG: 10 TABLET ORAL at 06:09

## 2018-09-17 RX ADMIN — HYDROMORPHONE HYDROCHLORIDE 0.2 MG: 2 INJECTION INTRAMUSCULAR; INTRAVENOUS; SUBCUTANEOUS at 02:09

## 2018-09-17 RX ADMIN — FUROSEMIDE 20 MG: 20 TABLET ORAL at 06:09

## 2018-09-17 RX ADMIN — ASPIRIN 325 MG ORAL TABLET 325 MG: 325 PILL ORAL at 08:09

## 2018-09-17 RX ADMIN — ROPINIROLE HYDROCHLORIDE 4 MG: 1 TABLET, FILM COATED ORAL at 08:09

## 2018-09-17 RX ADMIN — MIDAZOLAM 1 MG: 1 INJECTION INTRAMUSCULAR; INTRAVENOUS at 10:09

## 2018-09-17 RX ADMIN — ZOLPIDEM TARTRATE 5 MG: 5 TABLET ORAL at 10:09

## 2018-09-17 RX ADMIN — Medication 0.5 MG: at 10:09

## 2018-09-17 RX ADMIN — LAMOTRIGINE 150 MG: 100 TABLET ORAL at 08:09

## 2018-09-17 RX ADMIN — KETOROLAC TROMETHAMINE 30 MG: 30 INJECTION, SOLUTION INTRAMUSCULAR; INTRAVENOUS at 01:09

## 2018-09-17 RX ADMIN — FENTANYL CITRATE 100 MCG: 50 INJECTION, SOLUTION INTRAMUSCULAR; INTRAVENOUS at 02:09

## 2018-09-17 RX ADMIN — LEVALBUTEROL 1.25 MG: 1.25 SOLUTION, CONCENTRATE RESPIRATORY (INHALATION) at 11:09

## 2018-09-17 RX ADMIN — LIDOCAINE HYDROCHLORIDE 50 MG: 20 INJECTION, SOLUTION INTRAVENOUS at 12:09

## 2018-09-17 RX ADMIN — CELECOXIB 200 MG: 100 CAPSULE ORAL at 09:09

## 2018-09-17 RX ADMIN — SODIUM CHLORIDE, SODIUM LACTATE, POTASSIUM CHLORIDE, AND CALCIUM CHLORIDE: .6; .31; .03; .02 INJECTION, SOLUTION INTRAVENOUS at 10:09

## 2018-09-17 RX ADMIN — OXYCODONE HYDROCHLORIDE 10 MG: 10 TABLET, FILM COATED, EXTENDED RELEASE ORAL at 09:09

## 2018-09-17 RX ADMIN — CEFAZOLIN SODIUM 1 G: 1 SOLUTION INTRAVENOUS at 03:09

## 2018-09-17 RX ADMIN — DEXTROSE AND SODIUM CHLORIDE: 5; .45 INJECTION, SOLUTION INTRAVENOUS at 02:09

## 2018-09-17 RX ADMIN — BUPIVACAINE HYDROCHLORIDE 10 ML: 5 INJECTION, SOLUTION EPIDURAL; INTRACAUDAL; PERINEURAL at 10:09

## 2018-09-17 RX ADMIN — PROPOFOL 140 MG: 10 INJECTION, EMULSION INTRAVENOUS at 12:09

## 2018-09-17 RX ADMIN — SUCCINYLCHOLINE CHLORIDE 120 MG: 20 INJECTION, SOLUTION INTRAMUSCULAR; INTRAVENOUS at 12:09

## 2018-09-17 RX ADMIN — PREGABALIN 75 MG: 75 CAPSULE ORAL at 09:09

## 2018-09-17 RX ADMIN — DEXAMETHASONE SODIUM PHOSPHATE 4 MG: 4 INJECTION, SOLUTION INTRAMUSCULAR; INTRAVENOUS at 12:09

## 2018-09-17 RX ADMIN — SODIUM CHLORIDE, SODIUM LACTATE, POTASSIUM CHLORIDE, AND CALCIUM CHLORIDE: .6; .31; .03; .02 INJECTION, SOLUTION INTRAVENOUS at 01:09

## 2018-09-17 RX ADMIN — LIDOCAINE HYDROCHLORIDE 10 MG: 10 INJECTION, SOLUTION EPIDURAL; INFILTRATION; INTRACAUDAL; PERINEURAL at 09:09

## 2018-09-17 RX ADMIN — CEFAZOLIN SODIUM 1 G: 1 SOLUTION INTRAVENOUS at 10:09

## 2018-09-17 RX ADMIN — INSULIN ASPART 3 UNITS: 100 INJECTION, SOLUTION INTRAVENOUS; SUBCUTANEOUS at 06:09

## 2018-09-17 RX ADMIN — ONDANSETRON 4 MG: 2 INJECTION, SOLUTION INTRAMUSCULAR; INTRAVENOUS at 12:09

## 2018-09-17 RX ADMIN — ROCURONIUM BROMIDE 10 MG: 10 INJECTION, SOLUTION INTRAVENOUS at 12:09

## 2018-09-17 RX ADMIN — FENTANYL CITRATE 50 MCG: 50 INJECTION, SOLUTION INTRAMUSCULAR; INTRAVENOUS at 10:09

## 2018-09-17 RX ADMIN — INSULIN ASPART 1 UNITS: 100 INJECTION, SOLUTION INTRAVENOUS; SUBCUTANEOUS at 10:09

## 2018-09-17 RX ADMIN — FAMOTIDINE 20 MG: 20 TABLET ORAL at 08:09

## 2018-09-17 RX ADMIN — PHENYLEPHRINE HYDROCHLORIDE 100 MCG: 10 INJECTION INTRAVENOUS at 12:09

## 2018-09-17 RX ADMIN — ACETAMINOPHEN 1000 MG: 10 INJECTION, SOLUTION INTRAVENOUS at 10:09

## 2018-09-17 RX ADMIN — GLYCOPYRROLATE 0.4 MG: 0.2 INJECTION, SOLUTION INTRAMUSCULAR; INTRAVENOUS at 01:09

## 2018-09-17 RX ADMIN — TRANEXAMIC ACID 1000 MG: 100 INJECTION, SOLUTION INTRAVENOUS at 12:09

## 2018-09-17 RX ADMIN — FENTANYL CITRATE 50 MCG: 50 INJECTION, SOLUTION INTRAMUSCULAR; INTRAVENOUS at 01:09

## 2018-09-17 RX ADMIN — OXYCODONE HYDROCHLORIDE 10 MG: 10 TABLET, FILM COATED, EXTENDED RELEASE ORAL at 08:09

## 2018-09-17 RX ADMIN — VANCOMYCIN HYDROCHLORIDE 1000 MG: 1 INJECTION, POWDER, LYOPHILIZED, FOR SOLUTION INTRAVENOUS at 11:09

## 2018-09-17 RX ADMIN — OXYCODONE HYDROCHLORIDE 5 MG: 5 TABLET ORAL at 02:09

## 2018-09-17 RX ADMIN — FENTANYL CITRATE 100 MCG: 50 INJECTION, SOLUTION INTRAMUSCULAR; INTRAVENOUS at 12:09

## 2018-09-17 RX ADMIN — NEOSTIGMINE METHYLSULFATE 3 MG: 1 INJECTION INTRAVENOUS at 01:09

## 2018-09-17 NOTE — PLAN OF CARE
Released from pacu per dr ramires pt with pain controlled pt resting deep at intervals verbalozed knee hurts then falls back asleep and snores on 02 at 3 liters at [facility] complete on knee ice man on and running + pedal pulse  No nausea no emesis report to nurse on 406

## 2018-09-17 NOTE — BRIEF OP NOTE
[facility]  Brief Operative Note    SUMMARY     Surgery Date: 9/17/2018     Surgeon(s) and Role:     * Yariel Marin MD - Primary    Assisting Surgeon: None    Pre-op Diagnosis:  Osteoarthritis of left knee [M17.12]    Post-op Diagnosis:  Post-Op Diagnosis Codes:     * Osteoarthritis of left knee [M17.12]    Procedure(s) (LRB):  ARTHROPLASTY, KNEE (Left)    Anesthesia: General    Description of Procedure: R tka    Description of the findings of the procedure: dictated 168493    Estimated Blood Loss: 20 mL         Specimens:   Specimen (12h ago, onward)    None

## 2018-09-17 NOTE — OR NURSING
Pt arrived to preop via own w/c. With the assistance of one pt was assisted to bed. SCD to right leg only and warm blankets provided. Periop process explained to patient with verbalized understanding. Pts belongings tagged/bagged and brought to PACU. Pt wants her son, Kj, called after surgery [phone].

## 2018-09-17 NOTE — ANESTHESIA PROCEDURE NOTES
Peripheral    Patient location during procedure: pre-op   Block not for primary anesthetic.  Reason for block: at surgeon's request and post-op pain management   Post-op Pain Location: DDD LEFT KNEE  Start time: 9/17/2018 10:45 AM  Timeout: 9/17/2018 10:45 AM   End time: 9/17/2018 10:55 AM  Surgery related to: LEFT KNEE TKA  Staffing  Anesthesiologist: Aguilar Lucas MD  Performed: anesthesiologist   Preanesthetic Checklist  Completed: patient identified, site marked, surgical consent, pre-op evaluation, timeout performed, IV checked, risks and benefits discussed and monitors and equipment checked  Peripheral Block  Patient position: supine  Prep: ChloraPrep and site prepped and draped  Patient monitoring: heart rate, cardiac monitor, continuous pulse ox, continuous capnometry and frequent blood pressure checks  Block type: adductor canal  Laterality: left  Injection technique: single shot  Needle  Needle type: Stimuplex   Needle gauge: 17 G  Needle length: 4 in  Needle localization: ultrasound guidance  Needle insertion depth: 2 cm   -ultrasound image captured on disc.  Assessment  Injection assessment: negative aspiration, negative parasthesia and local visualized surrounding nerve  Paresthesia pain: none  Heart rate change: no  Slow fractionated injection: yes  Additional Notes  VSS.  [facility] RN monitoring vitals throughout procedure.  Patient tolerated procedure well.     EXPAREL 20 ML

## 2018-09-17 NOTE — NURSING TRANSFER
"  Nursing Transfer Note      9/17/2018     Transfer from PACU to 406  Transfer via bed.   Dressing to left knee CDI; Byrd catheter intact and draining clear, yellow urine; PIV CDI and infusing; SCD/foot pump initiated. Pedal pulses +2 bilaterally. O2 at 3L via NC. Patient pain 5/10; it's "tolerable" per patient. Voices no other complaints at this time. Oriented to room. Patient belongings at bedside. Bed low, brakes locked, SR^2, call light within reach. Will continue to monitor.      "

## 2018-09-17 NOTE — ANESTHESIA PREPROCEDURE EVALUATION
09/17/2018  Alanis Mendieta is a 67 y.o., female.    Anesthesia Evaluation    I have reviewed the Patient Summary Reports.    I have reviewed the Nursing Notes.   I have reviewed the Medications.     Review of Systems  Cardiovascular:   Hypertension    Hepatic/GI:   GERD    Musculoskeletal:   Arthritis     Endocrine:   Diabetes, poorly controlled, type 2 Hypothyroidism    Psych:   Psychiatric History          Physical Exam  General:  Morbid Obesity                 Anesthesia Plan  Type of Anesthesia, risks & benefits discussed:  Anesthesia Type:  general, regional  Patient's Preference:   Intra-op Monitoring Plan: standard ASA monitors  Intra-op Monitoring Plan Comments:   Post Op Pain Control Plan:   Post Op Pain Control Plan Comments:   Induction:   IV  Beta Blocker:  Patient is not currently on a Beta-Blocker (No further documentation required).       Informed Consent: Patient understands risks and agrees with Anesthesia plan.  Questions answered. Anesthesia consent signed with patient.  ASA Score: 4     Day of Surgery Review of History & Physical: I have interviewed and examined the patient. I have reviewed the patient's H&P dated:  There are no significant changes.          Ready For Surgery From Anesthesia Perspective.

## 2018-09-17 NOTE — PLAN OF CARE
09/17/18 1543   Patient Assessment/Suction   Level of Consciousness (AVPU) alert   Respiratory Effort Normal;Unlabored   PRE-TX-O2-ETCO2   O2 Device (Oxygen Therapy) nasal cannula   $ Is the patient on Low Flow Oxygen? Yes   Flow (L/min) 3   Oxygen Concentration (%) 32   SpO2 96 %   Pulse Oximetry Type Intermittent   $ Pulse Oximetry - Multiple Charge Pulse Oximetry - Multiple   Ready to Wean/Extubation Screen   FIO2<=50 (chart decimal) 0.32

## 2018-09-17 NOTE — TRANSFER OF CARE
"Anesthesia Transfer of Care Note    Patient: Alanis Mendieta    Procedure(s) Performed: Procedure(s) (LRB):  ARTHROPLASTY, KNEE (Left)    Patient location: PACU    Anesthesia Type: general and regional    Transport from OR: Transported from OR on 2-3 L/min O2 by NC with adequate spontaneous ventilation    Post pain: adequate analgesia    Post assessment: no apparent anesthetic complications and tolerated procedure well    Post vital signs: stable    Level of consciousness: awake, alert and oriented    Nausea/Vomiting: no nausea/vomiting    Complications: none    Transfer of care protocol was followed      Last vitals:   Visit Vitals  BP (!) 154/62   Pulse 75   Temp 37.2 °C (99 °F) (Temporal)   Resp 16   Ht 5' 4" (1.626 m)   Wt 110.7 kg (244 lb)   SpO2 97%   Breastfeeding? No   BMI 41.88 kg/m²     "

## 2018-09-17 NOTE — ANESTHESIA POSTPROCEDURE EVALUATION
"Anesthesia Post Evaluation    Patient: Alanis Mendieta    Procedure(s) Performed: Procedure(s) (LRB):  ARTHROPLASTY, KNEE (Left)    Final Anesthesia Type: general  Patient location during evaluation: PACU  Patient participation: Yes- Able to Participate  Level of consciousness: awake and alert and oriented  Post-procedure vital signs: reviewed and stable  Pain management: adequate  Airway patency: patent  PONV status at discharge: No PONV  Anesthetic complications: no      Cardiovascular status: blood pressure returned to baseline  Respiratory status: unassisted, spontaneous ventilation and room air  Hydration status: euvolemic  Follow-up not needed.        Visit Vitals  BP (!) 123/58 (Patient Position: Lying)   Pulse 85   Temp 36.7 °C (98 °F) (Oral)   Resp 16   Ht 5' 4" (1.626 m)   Wt 110.7 kg (244 lb)   SpO2 96%   Breastfeeding? No   BMI 41.88 kg/m²       Pain/Loraine Score: Pain Assessment Performed: Yes (9/17/2018 11:26 AM)  Presence of Pain: non-verbal indicators absent (9/17/2018  3:15 PM)  Pain Rating Prior to Med Admin: 5 (9/17/2018  3:30 PM)  Pain Rating Post Med Admin: 5 (9/17/2018  3:30 PM)  Loraine Score: 9 (9/17/2018  3:30 PM)        "

## 2018-09-17 NOTE — PLAN OF CARE
Problem: Patient Care Overview  Goal: Plan of Care Review  Outcome: Ongoing (interventions implemented as appropriate)  A&Ox4. Catheter draining clear, yellow urine. IVF infusing per order. POCT glucose monitored AC/HS; treated per ISS. VSS. Remains afebrile throughout shift. Remains fall-free throughout shift. Comfort level established. Good pain control with prn medications. Bed low, brakes locked, SR up x2, call light within reach. Verbalized understanding of poc. Open communication facilitated. Will continue to monitor.

## 2018-09-17 NOTE — H&P
PCP: Delio Melendez MD    History & Physical    Chief Complaint: s/p Left total knee arthroplasty    History of Present Illness:  Patient is a 67 y.o. female admitted to Hospitalist Service from [facility] s/p left total knee arthroplasty performed by Dr. Marin. Patient reportedly has past medical history significant for OA, DM-2, history of asthma, Bipolar disorder, COPD (on home oxygen s L/min), history of thyroid CA, GERD, [place], hypertension, hyperlipidemia, hypothyroidism, RLS, VIET. Post-operatively, patient denied chest pain, shortness of breath, abdominal pain, nausea, vomiting, headache, vision changes, focal neuro-deficits, cough or fever.    Past Medical History:   Diagnosis Date    Allergy     Anxiety     Arthritis     Asthma     Bipolar disorder     Cancer     thyroid    Chronic back pain     COPD (chronic obstructive pulmonary disease)     Diabetes mellitus     Fibromyalgia     GERD (gastroesophageal reflux disease)     [place] (hard of hearing)     Hx of thyroid cancer     Hyperlipidemia     Hypertension     Hypothyroidism     Neuropathy     On home oxygen therapy     3 l/m 24/7    Restless leg syndrome     Sleep apnea     Urinary tract infection      Past Surgical History:   Procedure Laterality Date    APPENDECTOMY      BACK SURGERY      x 3    broken foot and ankle      CHOLECYSTECTOMY      CYSTOSCOPY  4/26/2018    [facility] at [facility]    EYE SURGERY Bilateral     cataract    HYSTERECTOMY      mid urethral sling N/A 4/26/2018    [facility] at [facility] OR    POSTERIOR REPAIR      REPAIR POSTERIOR N/A 4/26/2018    Performed by Mark Townsend DO at [facility] OR    SPINAL FUSION      x3 BACK, NECK X 4    TOTAL THYROIDECTOMY  2014     Family History   Problem Relation Age of Onset    Diabetes Mother     Heart disease Mother     Hypertension Mother     Diabetes Father     Heart disease Father      Hypertension Father      Social History     Tobacco Use    Smoking status: Former Smoker     Packs/day: 1.00     Years: 30.00     Pack years: 30.00     Types: Cigarettes     Last attempt to quit: 2000     Years since quittin.3    Smokeless tobacco: Never Used   Substance Use Topics    Alcohol use: No    Drug use: No      Review of patient's allergies indicates:   Allergen Reactions    Morphine Itching    Tubersol [tuberculin ppd] Other (See Comments)     Site swells bad. Has to have chest xray     PTA Medications   Medication Sig    amoxicillin-clavulanate 875-125mg (AUGMENTIN) 875-125 mg per tablet Take 1 tablet by mouth 2 (two) times daily. Start 7 days before surgery and continue 3 days after for 10 days    ANORO ELLIPTA 62.5-25 mcg/actuation DsDv Inhale into the lungs once daily. Inhale x 2    calcitRIOL (ROCALTROL) 0.5 MCG Cap Take 0.5 mcg by mouth once daily.     clonazePAM (KLONOPIN) 1 MG tablet Take 1 mg by mouth 2 (two) times daily. In the am pt takes 0.5mg PO  In the evening pt takes 1mg PO    DULoxetine (CYMBALTA) 30 MG capsule     ERGOCALCIFEROL, VITAMIN D2, (VITAMIN D ORAL) Take 1.25 mg by mouth every Wednesday.     ferrous sulfate 325 mg (65 mg iron) Tab tablet Take 325 mg by mouth once daily.    FLUoxetine (PROZAC) 20 MG capsule Take 20 mg by mouth once daily.     furosemide (LASIX) 20 MG tablet Take 20 mg by mouth 3 (three) times a week. Pt takes medication Mon, Wed, and Sat    gabapentin (NEURONTIN) 800 MG tablet Take 800 mg by mouth 2 (two) times daily. At lunch and hour of sleep    ibuprofen (ADVIL,MOTRIN) 200 MG tablet Take 400 mg by mouth every 12 (twelve) hours as needed for Pain.    lamoTRIgine (LAMICTAL) 150 MG Tab Take 150 mg by mouth 2 (two) times daily.     latanoprost 0.005 % ophthalmic solution Place 1 drop into both eyes every evening.     LEVEMIR FLEXTOUCH 100 unit/mL (3 mL) InPn pen Inject 40 Units into the skin 2 (two) times daily.     levothyroxine  "(SYNTHROID) 150 MCG tablet Take 150 mcg by mouth before breakfast.     LINZESS 145 mcg Cap capsule Take 145 mcg by mouth once daily.     losartan-hydrochlorothiazide 50-12.5 mg (HYZAAR) 50-12.5 mg per tablet Take 1 tablet by mouth once daily.     meclizine (ANTIVERT) 25 mg tablet Take by mouth 3 (three) times daily as needed.     methocarbamol (ROBAXIN) 500 MG Tab Take 500 mg by mouth 3 (three) times daily.     NOVOFINE AUTOCOVER 30 gauge x 1/3" Ndle     NOVOLOG FLEXPEN 100 unit/mL InPn pen     oxycodone (ROXICODONE) 10 mg Tab immediate release tablet Take 10 mg by mouth every 8 (eight) hours as needed.     pantoprazole (PROTONIX) 40 MG tablet Take 40 mg by mouth.     polyethylene glycol (GLYCOLAX) 17 gram PwPk Take by mouth daily as needed.    ropinirole (REQUIP) 4 MG tablet Take 4 mg by mouth 2 (two) times daily.     trazodone (DESYREL) 150 MG tablet Take 150 mg by mouth nightly.     PREMARIN vaginal cream Place 0.5 g vaginally 3 (three) times a week.    PROAIR HFA 90 mcg/actuation inhaler      Review of Systems:  Constitutional: no fever or chills  Eyes: no visual changes  Ears, nose, mouth, throat, and face: no nasal congestion or sore throat  Respiratory: no cough or shorness of breath  Cardiovascular: no chest pain or palpitations  Gastrointestinal: no nausea or vomiting, no abdominal pain or change in bowel habits  Genitourinary: no hematuria or dysuria  Integument/breast: no rash or pruritis  Hematologic/lymphatic: no easy bruising or lymphadenopathy  Musculoskeletal: see HPI  Neurological: no seizures or tremors.  Behavioral/Psych: no auditory or visual hallucinations  Endocrine: no heat or cold intolerance     OBJECTIVE:     Vital Signs (Most Recent)  Temp: 99 °F (37.2 °C) (09/17/18 0902)  Pulse: 82 (09/17/18 0902)  Resp: 18 (09/17/18 0902)  BP: (!) 146/66 (09/17/18 0902)  SpO2: 97 %(3L NC) (09/17/18 0902)    Physical Exam:  General appearance: well developed, appears stated age  Head: " normocephalic, atraumatic  Eyes:  conjunctivae/corneas clear. PERRL.  Nose: Nares normal. Septum midline.  Throat: lips, mucosa, and tongue normal; teeth and gums normal, no throat erythema.  Neck: supple, symmetrical, trachea midline, no JVD and thyroid not enlarged, symmetric, no tenderness/mass/nodules  Lungs:  clear to auscultation bilaterally and normal respiratory effort  Chest wall: no tenderness  Heart: regular rate and rhythm, S1, S2 normal, no murmur, click, rub or gallop  Abdomen: soft, non-tender non-distented; bowel sounds normal; no masses,  no organomegaly  Extremities: no cyanosis, clubbing or edema. Left knee dressing C/D/I.  Pulses: 2+ and symmetric  Skin: Skin color, texture, turgor normal. No rashes or lesions.  Lymph nodes: Cervical, supraclavicular, and axillary nodes normal.  Neurologic: Normal strength and tone. No focal numbness or weakness. CNII-XII intact.      Laboratory:   CBC: No results for input(s): WBC, RBC, HGB, HCT, PLT, MCV, MCH, MCHC in the last 168 hours.  CMP: No results for input(s): GLU, CALCIUM, ALBUMIN, PROT, NA, K, CO2, CL, BUN, CREATININE, ALKPHOS, ALT, AST, BILITOT in the last 168 hours.    Hemoglobin A1C   Date Value Ref Range Status   08/14/2018 9.0 (H) 4.0 - 5.6 % Final     Comment:     ADA Screening Guidelines:  5.7-6.4%  Consistent with prediabetes  >or=6.5%  Consistent with diabetes  High levels of fetal hemoglobin interfere with the HbA1C  assay. Heterozygous hemoglobin variants (HbS, HgC, etc)do  not significantly interfere with this assay.   However, presence of multiple variants may affect accuracy.     04/17/2018 6.6 (H) 4.0 - 5.6 % Final     Comment:     According to ADA guidelines, hemoglobin A1c <7.0% represents  optimal control in non-pregnant diabetic patients. Different  metrics may apply to specific patient populations.   Standards of Medical Care in Diabetes-2016.  For the purpose of screening for the presence of diabetes:  <5.7%     Consistent with  the absence of diabetes  5.7-6.4%  Consistent with increasing risk for diabetes   (prediabetes)  >or=6.5%  Consistent with diabetes  Currently, no consensus exists for use of hemoglobin A1c  for diagnosis of diabetes for children.  This Hemoglobin A1c assay has significant interference with fetal   hemoglobin   (HbF). The results are invalid for patients with abnormal amounts of   HbF,   including those with known Hereditary Persistence   of Fetal Hemoglobin. Heterozygous hemoglobin variants (HbAS, HbAC,   HbAD, HbAE, HbA2) do not significantly interfere with this assay;   however, presence of multiple variants in a sample may impact the %   interference.     07/13/2017 8.9 (H) 4.0 - 5.6 % Final     Comment:     According to ADA guidelines, hemoglobin A1c <7.0% represents  optimal control in non-pregnant diabetic patients. Different  metrics may apply to specific patient populations.   Standards of Medical Care in Diabetes-2016.  For the purpose of screening for the presence of diabetes:  <5.7%     Consistent with the absence of diabetes  5.7-6.4%  Consistent with increasing risk for diabetes   (prediabetes)  >or=6.5%  Consistent with diabetes  Currently, no consensus exists for use of hemoglobin A1c  for diagnosis of diabetes for children.  This Hemoglobin A1c assay has significant interference with fetal   hemoglobin   (HbF). The results are invalid for patients with abnormal amounts of   HbF,   including those with known Hereditary Persistence   of Fetal Hemoglobin. Heterozygous hemoglobin variants (HbAS, HbAC,   HbAD, HbAE, HbA2) do not significantly interfere with this assay;   however, presence of multiple variants in a sample may impact the %   interference.         Diagnostic Results: None    Assessment/Plan:     Active Hospital Problems    Diagnosis  POA    *S/P total knee arthroplasty, left [Z96.652]  Not Applicable    Primary osteoarthritis of left knee [M17.12]  Continue to follow Orthopedic  recommendations.  Needs aggressive incentive spirometry.  Follow hemoglobin and hematocrit closely.  Pain control with IV narcotics and antiemetics as needed.  Physical therapy as per Orthopedics protocol with fall precautions.    Yes    Hypothyroidism [E03.9]  Continue Thyroid supplementation.    Yes    Hyperlipidemia [E78.5]  Chronic problem. Will continue chronic medications and monitor for any changes, adjusting as needed.    Yes    Anxiety [F41.9]  Use anxiolytics as needed.    Yes    COPD (chronic obstructive pulmonary disease) [J44.9]  Patient's COPD is controlled currently. Continue scheduled inhalers and monitor respiratory status closely.    Yes    Essential hypertension [I10]  Chronic problem. Will continue chronic medications and monitor for any changes, adjusting as needed.    Yes    GERD (gastroesophageal reflux disease) [K21.9]  Continue PPI.    Yes    Type 2 diabetes mellitus, with long-term current use of insulin [E11.9, Z79.4]  Check blood glucose level q AC/HS.  Use Novolog Insulin Sliding Scale as needed.   Continue American Diabetic Association 1800 Kcal diet.  Not Applicable     DVT prophylaxis: On  mg PO BID as per Dr. Marin. Use SCD and TEDs.     Fabiano Stubbs MD  Department of Hospital Medicine   [facility]

## 2018-09-18 LAB
ANION GAP SERPL CALC-SCNC: 7 MMOL/L
BASOPHILS # BLD AUTO: 0 K/UL
BASOPHILS # BLD AUTO: 0 K/UL
BASOPHILS NFR BLD: 0.3 %
BASOPHILS NFR BLD: 0.3 %
BUN SERPL-MCNC: 13 MG/DL
CALCIUM SERPL-MCNC: 8.4 MG/DL
CHLORIDE SERPL-SCNC: 95 MMOL/L
CO2 SERPL-SCNC: 35 MMOL/L
CREAT SERPL-MCNC: 0.8 MG/DL
DIFFERENTIAL METHOD: ABNORMAL
DIFFERENTIAL METHOD: ABNORMAL
EOSINOPHIL # BLD AUTO: 0 K/UL
EOSINOPHIL # BLD AUTO: 0 K/UL
EOSINOPHIL NFR BLD: 0.3 %
EOSINOPHIL NFR BLD: 0.3 %
ERYTHROCYTE [DISTWIDTH] IN BLOOD BY AUTOMATED COUNT: 14.3 %
ERYTHROCYTE [DISTWIDTH] IN BLOOD BY AUTOMATED COUNT: 14.3 %
EST. GFR  (AFRICAN AMERICAN): >60 ML/MIN/1.73 M^2
EST. GFR  (NON AFRICAN AMERICAN): >60 ML/MIN/1.73 M^2
GLUCOSE SERPL-MCNC: 274 MG/DL
HCT VFR BLD AUTO: 38.3 %
HCT VFR BLD AUTO: 38.3 %
HGB BLD-MCNC: 12.6 G/DL
HGB BLD-MCNC: 12.6 G/DL
LYMPHOCYTES # BLD AUTO: 1.8 K/UL
LYMPHOCYTES # BLD AUTO: 1.8 K/UL
LYMPHOCYTES NFR BLD: 21.9 %
LYMPHOCYTES NFR BLD: 21.9 %
MCH RBC QN AUTO: 30.4 PG
MCH RBC QN AUTO: 30.4 PG
MCHC RBC AUTO-ENTMCNC: 33 G/DL
MCHC RBC AUTO-ENTMCNC: 33 G/DL
MCV RBC AUTO: 92 FL
MCV RBC AUTO: 92 FL
MONOCYTES # BLD AUTO: 0.5 K/UL
MONOCYTES # BLD AUTO: 0.5 K/UL
MONOCYTES NFR BLD: 5.5 %
MONOCYTES NFR BLD: 5.5 %
NEUTROPHILS # BLD AUTO: 6 K/UL
NEUTROPHILS # BLD AUTO: 6 K/UL
NEUTROPHILS NFR BLD: 72 %
NEUTROPHILS NFR BLD: 72 %
PLATELET # BLD AUTO: 227 K/UL
PLATELET # BLD AUTO: 227 K/UL
PMV BLD AUTO: 8.7 FL
PMV BLD AUTO: 8.7 FL
POCT GLUCOSE: 212 MG/DL (ref 70–110)
POCT GLUCOSE: 224 MG/DL (ref 70–110)
POCT GLUCOSE: 241 MG/DL (ref 70–110)
POCT GLUCOSE: 245 MG/DL (ref 70–110)
POTASSIUM SERPL-SCNC: 4.6 MMOL/L
RBC # BLD AUTO: 4.16 M/UL
RBC # BLD AUTO: 4.16 M/UL
SODIUM SERPL-SCNC: 137 MMOL/L
WBC # BLD AUTO: 8.3 K/UL
WBC # BLD AUTO: 8.3 K/UL

## 2018-09-18 PROCEDURE — 25000003 PHARM REV CODE 250: Performed by: ANESTHESIOLOGY

## 2018-09-18 PROCEDURE — 27000221 HC OXYGEN, UP TO 24 HOURS

## 2018-09-18 PROCEDURE — 36415 COLL VENOUS BLD VENIPUNCTURE: CPT

## 2018-09-18 PROCEDURE — 25000003 PHARM REV CODE 250: Performed by: ORTHOPAEDIC SURGERY

## 2018-09-18 PROCEDURE — 97530 THERAPEUTIC ACTIVITIES: CPT | Performed by: PHYSICAL THERAPIST

## 2018-09-18 PROCEDURE — 97161 PT EVAL LOW COMPLEX 20 MIN: CPT | Performed by: PHYSICAL THERAPIST

## 2018-09-18 PROCEDURE — 97165 OT EVAL LOW COMPLEX 30 MIN: CPT

## 2018-09-18 PROCEDURE — G8988 SELF CARE GOAL STATUS: HCPCS | Mod: CK

## 2018-09-18 PROCEDURE — 99225 PR SUBSEQUENT OBSERVATION CARE,LEVEL II: CPT | Mod: ,,, | Performed by: INTERNAL MEDICINE

## 2018-09-18 PROCEDURE — 97530 THERAPEUTIC ACTIVITIES: CPT

## 2018-09-18 PROCEDURE — 63600175 PHARM REV CODE 636 W HCPCS: Performed by: ORTHOPAEDIC SURGERY

## 2018-09-18 PROCEDURE — G8987 SELF CARE CURRENT STATUS: HCPCS | Mod: CK

## 2018-09-18 PROCEDURE — G8978 MOBILITY CURRENT STATUS: HCPCS | Mod: CK | Performed by: PHYSICAL THERAPIST

## 2018-09-18 PROCEDURE — 80048 BASIC METABOLIC PNL TOTAL CA: CPT

## 2018-09-18 PROCEDURE — 97112 NEUROMUSCULAR REEDUCATION: CPT | Performed by: PHYSICAL THERAPIST

## 2018-09-18 PROCEDURE — 97535 SELF CARE MNGMENT TRAINING: CPT

## 2018-09-18 PROCEDURE — 94799 UNLISTED PULMONARY SVC/PX: CPT

## 2018-09-18 PROCEDURE — G8979 MOBILITY GOAL STATUS: HCPCS | Mod: CJ | Performed by: PHYSICAL THERAPIST

## 2018-09-18 PROCEDURE — 85025 COMPLETE CBC W/AUTO DIFF WBC: CPT

## 2018-09-18 PROCEDURE — 25000003 PHARM REV CODE 250: Performed by: INTERNAL MEDICINE

## 2018-09-18 PROCEDURE — 94761 N-INVAS EAR/PLS OXIMETRY MLT: CPT

## 2018-09-18 PROCEDURE — 97110 THERAPEUTIC EXERCISES: CPT | Performed by: PHYSICAL THERAPIST

## 2018-09-18 PROCEDURE — 63600175 PHARM REV CODE 636 W HCPCS: Performed by: INTERNAL MEDICINE

## 2018-09-18 RX ORDER — CLONAZEPAM 0.5 MG/1
0.5 TABLET ORAL 2 TIMES DAILY PRN
Status: DISCONTINUED | OUTPATIENT
Start: 2018-09-18 | End: 2018-09-19 | Stop reason: HOSPADM

## 2018-09-18 RX ADMIN — OXYCODONE HYDROCHLORIDE 15 MG: 10 TABLET ORAL at 03:09

## 2018-09-18 RX ADMIN — ASPIRIN 325 MG ORAL TABLET 325 MG: 325 PILL ORAL at 08:09

## 2018-09-18 RX ADMIN — OXYCODONE HYDROCHLORIDE 15 MG: 10 TABLET ORAL at 01:09

## 2018-09-18 RX ADMIN — INSULIN ASPART 2 UNITS: 100 INJECTION, SOLUTION INTRAVENOUS; SUBCUTANEOUS at 11:09

## 2018-09-18 RX ADMIN — INSULIN DETEMIR 20 UNITS: 100 INJECTION, SOLUTION SUBCUTANEOUS at 08:09

## 2018-09-18 RX ADMIN — INSULIN ASPART 2 UNITS: 100 INJECTION, SOLUTION INTRAVENOUS; SUBCUTANEOUS at 07:09

## 2018-09-18 RX ADMIN — LAMOTRIGINE 150 MG: 100 TABLET ORAL at 08:09

## 2018-09-18 RX ADMIN — CEFAZOLIN SODIUM 1 G: 1 SOLUTION INTRAVENOUS at 05:09

## 2018-09-18 RX ADMIN — INSULIN ASPART 1 UNITS: 100 INJECTION, SOLUTION INTRAVENOUS; SUBCUTANEOUS at 08:09

## 2018-09-18 RX ADMIN — OXYCODONE HYDROCHLORIDE 15 MG: 10 TABLET ORAL at 10:09

## 2018-09-18 RX ADMIN — LOSARTAN POTASSIUM AND HYDROCHLOROTHIAZIDE 1 TABLET: 12.5; 5 TABLET ORAL at 08:09

## 2018-09-18 RX ADMIN — LEVOTHYROXINE SODIUM 150 MCG: 50 TABLET ORAL at 05:09

## 2018-09-18 RX ADMIN — OXYCODONE HYDROCHLORIDE 10 MG: 10 TABLET, FILM COATED, EXTENDED RELEASE ORAL at 08:09

## 2018-09-18 RX ADMIN — TRAZODONE HYDROCHLORIDE 25 MG: 50 TABLET ORAL at 08:09

## 2018-09-18 RX ADMIN — LATANOPROST 1 DROP: 50 SOLUTION OPHTHALMIC at 08:09

## 2018-09-18 RX ADMIN — Medication 0.5 MG: at 07:09

## 2018-09-18 RX ADMIN — CALCITRIOL 0.5 MCG: 0.25 CAPSULE, LIQUID FILLED ORAL at 08:09

## 2018-09-18 RX ADMIN — ROPINIROLE HYDROCHLORIDE 4 MG: 1 TABLET, FILM COATED ORAL at 08:09

## 2018-09-18 RX ADMIN — OXYCODONE HYDROCHLORIDE 15 MG: 10 TABLET ORAL at 05:09

## 2018-09-18 RX ADMIN — FAMOTIDINE 20 MG: 20 TABLET ORAL at 08:09

## 2018-09-18 RX ADMIN — OXYCODONE HYDROCHLORIDE 10 MG: 10 TABLET ORAL at 11:09

## 2018-09-18 RX ADMIN — CLONAZEPAM 0.5 MG: 0.5 TABLET ORAL at 07:09

## 2018-09-18 RX ADMIN — INSULIN DETEMIR 20 UNITS: 100 INJECTION, SOLUTION SUBCUTANEOUS at 11:09

## 2018-09-18 NOTE — PLAN OF CARE
Problem: Patient Care Overview  Goal: Plan of Care Review  Outcome: Ongoing (interventions implemented as appropriate)  Vitals stable. POC/Meds reviewed, pt verbalized understanding. PRN pain medication administered as ordered per pt request. IVPB abx administered. Cryo on-checked and filled as needed. Byrd in place, draining clear yellow urine output. Blood sugar monitoring, SSI administered per order. O2 3L per NC. Trapeze aided in reposistioning. Neuro checks performed, denies numbness/tingling. +2 pulses. IS at bedside, encouraged use. Turns/reposistions independently. Hourly/Q2hr rounding performed, safety maintained. Bed in lowest position, wheels locked, sr upx2, call light in easy reach. Will continue to monitor.

## 2018-09-18 NOTE — ANESTHESIA POST-OP PAIN MANAGEMENT
Acute Pain Service Progress Note    Alanis Mendieta is a 67 y.o., female, [id number].    Surgery:  TKA    Post Op Day #: 1    Catheter type:     Infusion type:     Problem List:    Active Hospital Problems    Diagnosis  POA    *S/P total knee arthroplasty, left [Z96.652]  Not Applicable    Primary osteoarthritis of left knee [M17.12]  Yes    Hypothyroidism [E03.9]  Yes    Hyperlipidemia [E78.5]  Yes    Anxiety [F41.9]  Yes    COPD (chronic obstructive pulmonary disease) [J44.9]  Yes    Essential hypertension [I10]  Yes    GERD (gastroesophageal reflux disease) [K21.9]  Yes    Type 2 diabetes mellitus, with long-term current use of insulin [E11.9, Z79.4]  Not Applicable      Resolved Hospital Problems   No resolved problems to display.       Subjective:     General appearance of alert, oriented, no complaints   Pain with rest: 6    Numbers   Pain with movement: 7    Numbers   Side Effects    1. Pruritis Yes    2. Nausea No    3. Motor Blockade No, 0=Ability to raise lower extremities off bed    4. Sedation No, 1=awake and alert    Objective:     Catheter level NA   Catheter site NA   Catheter placement NA cm @skin      Vitals   Vitals:    09/18/18 0746   BP:    Pulse: 80   Resp: 19   Temp:         Labs    Admission on 09/17/2018   Component Date Value Ref Range Status    Group & Rh 09/17/2018 O POS   Final    Indirect [place] 09/17/2018 NEG   Final    Specimen UA 09/17/2018 Urine, Catheterized   Final    Color, UA 09/17/2018 Yellow  Yellow, Straw, Edilia Final    Appearance, UA 09/17/2018 Clear  Clear Final    pH, UA 09/17/2018 7.0  5.0 - 8.0 Final    Specific Gravity, UA 09/17/2018 1.010  1.005 - 1.030 Final    Protein, UA 09/17/2018 Trace* Negative Final    Glucose, UA 09/17/2018 Negative  Negative Final    Ketones, UA 09/17/2018 Negative  Negative Final    Bilirubin (UA) 09/17/2018 Negative  Negative Final    Occult Blood UA 09/17/2018 Negative  Negative Final    Nitrite, UA 09/17/2018  Negative  Negative Final    Urobilinogen, UA 09/17/2018 1.0  <2.0 EU/dL Final    Leukocytes, UA 09/17/2018 Negative  Negative Final    POCT Glucose 09/17/2018 160* 70 - 110 mg/dL Final    POCT Glucose 09/17/2018 277* 70 - 110 mg/dL Final    POCT Glucose 09/17/2018 235* 70 - 110 mg/dL Final    WBC 09/18/2018 8.30  3.90 - 12.70 K/uL Final    RBC 09/18/2018 4.16  4.00 - 5.40 M/uL Final    Hemoglobin 09/18/2018 12.6  12.0 - 16.0 g/dL Final    Hematocrit 09/18/2018 38.3  37.0 - 48.5 % Final    MCV 09/18/2018 92  82 - 98 fL Final    MCH 09/18/2018 30.4  27.0 - 31.0 pg Final    MCHC 09/18/2018 33.0  32.0 - 36.0 g/dL Final    RDW 09/18/2018 14.3  11.5 - 14.5 % Final    Platelets 09/18/2018 227  150 - 350 K/uL Final    MPV 09/18/2018 8.7* 9.2 - 12.9 fL Final    Gran # (ANC) 09/18/2018 6.0  1.8 - 7.7 K/uL Final    Lymph # 09/18/2018 1.8  1.0 - 4.8 K/uL Final    Mono # 09/18/2018 0.5  0.3 - 1.0 K/uL Final    Eos # 09/18/2018 0.0  0.0 - 0.5 K/uL Final    Baso # 09/18/2018 0.00  0.00 - 0.20 K/uL Final    Gran% 09/18/2018 72.0  38.0 - 73.0 % Final    Lymph% 09/18/2018 21.9  18.0 - 48.0 % Final    Mono% 09/18/2018 5.5  4.0 - 15.0 % Final    Eosinophil% 09/18/2018 0.3  0.0 - 8.0 % Final    Basophil% 09/18/2018 0.3  0.0 - 1.9 % Final    Differential Method 09/18/2018 Automated   Final    Sodium 09/18/2018 137  136 - 145 mmol/L Final    Potassium 09/18/2018 4.6  3.5 - 5.1 mmol/L Final    Chloride 09/18/2018 95  95 - 110 mmol/L Final    CO2 09/18/2018 35* 23 - 29 mmol/L Final    Glucose 09/18/2018 274* 70 - 110 mg/dL Final    BUN, Bld 09/18/2018 13  8 - 23 mg/dL Final    Creatinine 09/18/2018 0.8  0.5 - 1.4 mg/dL Final    Calcium 09/18/2018 8.4* 8.7 - 10.5 mg/dL Final    Anion Gap 09/18/2018 7* 8 - 16 mmol/L Final    eGFR if African American 09/18/2018 >60  >60 mL/min/1.73 m^2 Final    eGFR if non African American 09/18/2018 >60  >60 mL/min/1.73 m^2 Final    WBC 09/18/2018 8.30  3.90 - 12.70 K/uL  Final    RBC 09/18/2018 4.16  4.00 - 5.40 M/uL Final    Hemoglobin 09/18/2018 12.6  12.0 - 16.0 g/dL Final    Hematocrit 09/18/2018 38.3  37.0 - 48.5 % Final    MCV 09/18/2018 92  82 - 98 fL Final    MCH 09/18/2018 30.4  27.0 - 31.0 pg Final    MCHC 09/18/2018 33.0  32.0 - 36.0 g/dL Final    RDW 09/18/2018 14.3  11.5 - 14.5 % Final    Platelets 09/18/2018 227  150 - 350 K/uL Final    MPV 09/18/2018 8.7* 9.2 - 12.9 fL Final    Gran # (ANC) 09/18/2018 6.0  1.8 - 7.7 K/uL Final    Lymph # 09/18/2018 1.8  1.0 - 4.8 K/uL Final    Mono # 09/18/2018 0.5  0.3 - 1.0 K/uL Final    Eos # 09/18/2018 0.0  0.0 - 0.5 K/uL Final    Baso # 09/18/2018 0.00  0.00 - 0.20 K/uL Final    Gran% 09/18/2018 72.0  38.0 - 73.0 % Final    Lymph% 09/18/2018 21.9  18.0 - 48.0 % Final    Mono% 09/18/2018 5.5  4.0 - 15.0 % Final    Eosinophil% 09/18/2018 0.3  0.0 - 8.0 % Final    Basophil% 09/18/2018 0.3  0.0 - 1.9 % Final    Differential Method 09/18/2018 Automated   Final    POCT Glucose 09/18/2018 212* 70 - 110 mg/dL Final        Meds   Current Facility-Administered Medications   Medication Dose Route Frequency Provider Last Rate Last Dose    aspirin tablet 325 mg  325 mg Oral BID Yariel Marin MD   325 mg at 09/18/18 0840    bisacodyl suppository 10 mg  10 mg Rectal Daily Yariel Marin MD        calcitRIOL capsule 0.5 mcg  0.5 mcg Oral Daily Fabiano Stubbs MD   0.5 mcg at 09/18/18 0839    dextrose 50% injection 12.5 g  12.5 g Intravenous PRN Fabiano Stubbs MD        dextrose 50% injection 25 g  25 g Intravenous PRN aFbiano Stubbs MD        diphenhydrAMINE injection 12.5 mg  12.5 mg Intravenous Q6H PRN Aguilar Lucas MD        famotidine tablet 20 mg  20 mg Oral BID Yariel Marin MD   20 mg at 09/18/18 0839    ferrous sulfate EC tablet 325 mg  325 mg Oral Daily Fabiano Stubbs MD        furosemide tablet 20 mg  20 mg Oral Once per day on Mon Wed Fri Fabiano Stubbs MD   20 mg at 09/17/18 1847    glucagon (human  recombinant) injection 1 mg  1 mg Intramuscular PRN Fabiano Stubbs MD        glucose chewable tablet 16 g  16 g Oral PRN Fabiano Stubbs MD        glucose chewable tablet 24 g  24 g Oral PRN Fabiano Stubbs MD        HYDROmorphone in 0.9 % NaCl syringe 0.5 mg  0.5 mg Intravenous Q6H PRN Aguilar Lucas MD   0.5 mg at 09/18/18 0720    insulin aspart U-100 pen 0-5 Units  0-5 Units Subcutaneous QID (AC + HS) PRN Fabiano Stubbs MD   2 Units at 09/18/18 0711    insulin detemir U-100 pen 20 Units  20 Units Subcutaneous BID Fabiano Stubbs MD        lamoTRIgine tablet 150 mg  150 mg Oral BID Fabiano Stubbs MD   150 mg at 09/18/18 0838    latanoprost 0.005 % ophthalmic solution 1 drop  1 drop Both Eyes QHS Fabiano Stubbs MD        levothyroxine tablet 150 mcg  150 mcg Oral Before breakfast Fabiano Stubbs MD   150 mcg at 09/18/18 0524    losartan-hydrochlorothiazide 50-12.5 mg per tablet 1 tablet  1 tablet Oral Daily Fabiano Stubbs MD   1 tablet at 09/18/18 0840    lubiprostone capsule 8 mcg  8 mcg Oral Daily with breakfast Fabiano Stubbs MD        ondansetron disintegrating tablet 8 mg  8 mg Oral Q8H PRN Yariel Marin MD        oxyCODONE 12 hr tablet 10 mg  10 mg Oral Q12H Aguilar Lucas MD   10 mg at 09/18/18 0840    oxyCODONE immediate release tablet 15 mg  15 mg Oral Q3H PRN Aguilar Lucas MD   15 mg at 09/18/18 0524    oxyCODONE immediate release tablet 5 mg  5 mg Oral Q3H PRN Aguilar Lucas MD        oxyCODONE immediate release tablet Tab 10 mg  10 mg Oral Q3H PRN Aguilar Luacs MD        polyethylene glycol packet 17 g  17 g Oral Daily Yariel Marin MD        polyethylene glycol packet 17 g  17 g Oral Daily PRN Fabiano Stubbs MD        rOPINIRole tablet 4 mg  4 mg Oral BID Fabiano Stubbs MD   4 mg at 09/18/18 0838    sodium chloride 0.9% flush 5 mL  5 mL Intravenous PRN Yariel Marin MD        zolpidem tablet 5 mg  5 mg Oral Nightly PRN Yariel Marin MD   5 mg at 09/17/18 8595         Anticoagulant dose MAR at MAR.    Assessment:     Pain control patient to recieve AM dose of sustained release and immediate release opioid for breakthrough pain especially behind knee    Plan:     Patient doing well, continue present treatment.    Evaluator Geoff eWir

## 2018-09-18 NOTE — PLAN OF CARE
Problem: Physical Therapy Goal  Goal: Physical Therapy Goal  Outcome: Ongoing (interventions implemented as appropriate)  Goals to be met by: 10/02/2018     Patient will increase functional independence with mobility by performin. Supine to sit with Stand-by Assistance  2. Sit to supine with Contact Guard Assistance  3. Sit to stand transfer with Contact Guard Assistance  4. Bed to chair transfer with Contact Guard Assistance using Rolling Walker  5. Gait  x 20 feet with Contact Guard Assistance using Rolling Walker.   6. Wheelchair propulsion x50 feet with Modified [place] using bilateral uppper extremities  7. Lower extremity exercise program x10-20 reps per handout, with supervision    Comments: Goals Established and progressing towards.

## 2018-09-18 NOTE — PLAN OF CARE
Spoke with the pt, she is from [facility]. She is anticipated to return there SNF at the time of discharge....OMKAR Dominguez       09/18/18 0539   Discharge Assessment   Assessment Type Discharge Planning Assessment

## 2018-09-18 NOTE — PROGRESS NOTES
[facility]  Orthopedics  Progress Note    Patient Name: Alanis Mendieta  MRN: [id number]  Admission Date: 9/17/2018  Hospital Length of Stay: 0 days  Attending Provider: Yariel Marin MD  Primary Care Provider: Delio Melendez MD  Follow-up For: Procedure(s) (LRB):  ARTHROPLASTY, KNEE (Left)    Post-Operative Day: 1 Day Post-Op  Subjective:     Principal Problem:S/P total knee arthroplasty, left    Principal Orthopedic Problem: S/P L TKA    Interval History: Pain and poor mobility    Review of patient's allergies indicates:   Allergen Reactions    Morphine Itching    Tubersol [tuberculin ppd] Other (See Comments)     Site swells bad. Has to have chest xray       Current Facility-Administered Medications   Medication    aspirin tablet 325 mg    bisacodyl suppository 10 mg    calcitRIOL capsule 0.5 mcg    dextrose 50% injection 12.5 g    dextrose 50% injection 25 g    diphenhydrAMINE injection 12.5 mg    famotidine tablet 20 mg    ferrous sulfate EC tablet 325 mg    furosemide tablet 20 mg    glucagon (human recombinant) injection 1 mg    glucose chewable tablet 16 g    glucose chewable tablet 24 g    HYDROmorphone in 0.9 % NaCl syringe 0.5 mg    insulin aspart U-100 pen 0-5 Units    lamoTRIgine tablet 150 mg    latanoprost 0.005 % ophthalmic solution 1 drop    levothyroxine tablet 150 mcg    losartan-hydrochlorothiazide 50-12.5 mg per tablet 1 tablet    lubiprostone capsule 8 mcg    ondansetron disintegrating tablet 8 mg    oxyCODONE 12 hr tablet 10 mg    oxyCODONE immediate release tablet 15 mg    oxyCODONE immediate release tablet 5 mg    oxyCODONE immediate release tablet Tab 10 mg    polyethylene glycol packet 17 g    polyethylene glycol packet 17 g    rOPINIRole tablet 4 mg    sodium chloride 0.9% flush 5 mL    zolpidem tablet 5 mg     Objective:     Vital Signs (Most Recent):  Temp: 97.9 °F (36.6 °C) (09/18/18 0523)  Pulse: 77 (09/18/18 0523)  Resp: 18  "(09/18/18 0523)  BP: (!) 126/58 (09/18/18 0523)  SpO2: (!) 92 % (09/18/18 0523) Vital Signs (24h Range):  Temp:  [97.9 °F (36.6 °C)-99.2 °F (37.3 °C)] 97.9 °F (36.6 °C)  Pulse:  [66-97] 77  Resp:  [11-21] 18  SpO2:  [88 %-98 %] 92 %  BP: (108-171)/(58-76) 126/58     Weight: 111 kg (244 lb 11.2 oz)  Height: 5' 4" (162.6 cm)  Body mass index is 42 kg/m².      Intake/Output Summary (Last 24 hours) at 9/18/2018 0714  Last data filed at 9/18/2018 0500  Gross per 24 hour   Intake 2687.5 ml   Output 1805 ml   Net 882.5 ml       General    Nursing note and vitals reviewed.  Constitutional:   Obese   Pulmonary/Chest: Effort normal.   Abdominal: Soft.   Neurological: She is alert.   Psychiatric: She has a normal mood and affect. Her behavior is normal.             Left Knee Exam     Comments:  LLE DNVI. Dressings clean and dry      Significant Labs:   CBC:   Recent Labs   Lab  09/18/18   0620   WBC  8.30  8.30   HGB  12.6  12.6   HCT  38.3  38.3   PLT  227  227     CMP: No results for input(s): NA, K, CL, CO2, GLU, BUN, CREATININE, CALCIUM, PROT, ALBUMIN, BILITOT, ALKPHOS, AST, ALT, ANIONGAP, EGFRNONAA in the last 48 hours.    Invalid input(s): ESTGF[place]  All pertinent labs within the past 24 hours have been reviewed.    Significant Imaging: X-Ray: I have reviewed all pertinent results/findings and my personal findings are:  There has been a cemented tricompartmental left knee arthroplasty with all hardware components well seated.  No fracture.    Assessment/Plan:     * S/P total knee arthroplasty, left    Change dressing today @ 1500  Consult SNF  PPD              RUMA WORKMAN  Orthopedics  [facility]  "

## 2018-09-18 NOTE — OP NOTE
DATE OF PROCEDURE:  09/17/2018.    ATTENDING PHYSICIAN:  Yariel Mairn M.D.    FIRST ASSISTANT:  Bahman Vargas.    PREOPERATIVE DIAGNOSIS:  Bone-on-bone osteoarthritis, left knee.    POSTOPERATIVE DIAGNOSIS:  Bone-on-bone osteoarthritis, left knee.    PROCEDURE PERFORMED:  Left total knee arthroplasty.    COMPONENTS UTILIZED:  A MicroPort total knee orthro system, size 4 femur, size 4   tibia, size 10 spacer, size 29 mm tri-peg patellar button.    ESTIMATED BLOOD LOSS:  Zero.    IV FLUID:  Crystalloid.    ANESTHESIA:  Spinal anesthesia.    PROCEDURE IN DETAIL:  The patient was placed on the operating table in the   supine position.  The left knee was prepped and draped in the usual sterile   manner for surgery.  An anterior approach was undertaken to the knee and carried   down sharply through the skin.  Medial parapatellar tissues were visualized and   divided and the patella was taken laterally.  There was severe bone-on-bone   osteoarthritis noted in the medial compartment.  A drill was used to gain access   to the femur and an intramedullary pascale was inserted up the femoral canal.  A   cutting jig was pinned into position such that it would make a 5-degree valgus   cut and take 9 mm of distal bone.  It was checked secondarily and the cut was   made.  We now sized the femur.  It sized to a 4.  Size 4 cutting jig was pinned   into position and the cuts were made.  We now placed a femoral trial.  It fit   perfectly.  It was extracted.  The ACL and PCL were now sacrificed being very   careful not to damage any neurovascular structures.  The tibia was subluxed   anteriorly and a cutting jig was pinned into position such that it would make a   neutral cut and take 2 mm of medial bone.  It was checked secondarily and the   cut was made.  We now placed a femoral trial and a tibial trial.  The construct   had full extension, full flexion, symmetric flexion and extension gaps.  We   broached the tibia.  The patella was  calipered and cut and a button was placed.    The construct trialed ideally with no liftoff.  We pulse and irrigated and   dried the bony surfaces.  We mixed our bone cement and cemented first the tibia,   next the femur and finally the patella.  All excess cement was removed at the   time of cementation.  The construct was held in full extension until the cement   had completely hardened.  We copiously irrigated again.  The actual spacer was   tapped into position.  We closed the extensor mechanism with a combination of #2   FiberWire and a running Quill stitch.  We closed the subcutaneous with 2-0   Vicryl and the skin with 4-0 Monocryl.  Sterile dressings were applied and the   patient was taken to Recovery Room in stable condition.        /madai [id number] blank(s)        NEGRA/ERIC  dd: 09/17/2018 13:30:12 (CDT)  td: 09/17/2018 15:12:58 (CDT)  Doc ID   #[id number]  Job ID #[id number]    CC:

## 2018-09-18 NOTE — PLAN OF CARE
Problem: Occupational Therapy Goal  Goal: Occupational Therapy Goal  Goals to be met by: 9/25/18     Patient will increase functional independence with ADLs by performing:    LE Dressing with Set-up Assistance, Moderate Assistance and Assistive Devices as needed.  Toileting from bedside commode with Set-up Assistance, Moderate Assistance and Assistive Devices as needed for hygiene and clothing management.   Toilet transfer to bedside commode with Minimal Assistance.    Outcome: Ongoing (interventions implemented as appropriate)  OT evaluation completed today. Goals & care plan established.    PATITO Santoyo  9/18/2018

## 2018-09-18 NOTE — PLAN OF CARE
Problem: Patient Care Overview  Goal: Plan of Care Review  Outcome: Ongoing (interventions implemented as appropriate)  Pt has remained free from injury this shift, she has ambulated with PT this evening and has sat in chair for short periods today.  She is eating and drinking well, no complaints of nausea.  She has been medicated for pain several times this shift with PRN meds.  Repositioned for safety and comfort pt is able to reposition herself as needed with trapeze bar.  Slightly elevated temp this shift but not w/i parameters for treatment.  accuchecks assessed prior to meals and covered with PRN sliding scale.  Dressing to left knee was changed this evening no bleeding noted, pulses are positive.  She was encouraged to call for assistance or needs with return demonstration on use of call light.

## 2018-09-18 NOTE — SUBJECTIVE & OBJECTIVE
"Principal Problem:S/P total knee arthroplasty, left    Principal Orthopedic Problem: S/P L TKA    Interval History: Pain and poor mobility    Review of patient's allergies indicates:   Allergen Reactions    Morphine Itching    Tubersol [tuberculin ppd] Other (See Comments)     Site swells bad. Has to have chest xray       Current Facility-Administered Medications   Medication    aspirin tablet 325 mg    bisacodyl suppository 10 mg    calcitRIOL capsule 0.5 mcg    dextrose 50% injection 12.5 g    dextrose 50% injection 25 g    diphenhydrAMINE injection 12.5 mg    famotidine tablet 20 mg    ferrous sulfate EC tablet 325 mg    furosemide tablet 20 mg    glucagon (human recombinant) injection 1 mg    glucose chewable tablet 16 g    glucose chewable tablet 24 g    HYDROmorphone in 0.9 % NaCl syringe 0.5 mg    insulin aspart U-100 pen 0-5 Units    lamoTRIgine tablet 150 mg    latanoprost 0.005 % ophthalmic solution 1 drop    levothyroxine tablet 150 mcg    losartan-hydrochlorothiazide 50-12.5 mg per tablet 1 tablet    lubiprostone capsule 8 mcg    ondansetron disintegrating tablet 8 mg    oxyCODONE 12 hr tablet 10 mg    oxyCODONE immediate release tablet 15 mg    oxyCODONE immediate release tablet 5 mg    oxyCODONE immediate release tablet Tab 10 mg    polyethylene glycol packet 17 g    polyethylene glycol packet 17 g    rOPINIRole tablet 4 mg    sodium chloride 0.9% flush 5 mL    zolpidem tablet 5 mg     Objective:     Vital Signs (Most Recent):  Temp: 97.9 °F (36.6 °C) (09/18/18 0523)  Pulse: 77 (09/18/18 0523)  Resp: 18 (09/18/18 0523)  BP: (!) 126/58 (09/18/18 0523)  SpO2: (!) 92 % (09/18/18 0523) Vital Signs (24h Range):  Temp:  [97.9 °F (36.6 °C)-99.2 °F (37.3 °C)] 97.9 °F (36.6 °C)  Pulse:  [66-97] 77  Resp:  [11-21] 18  SpO2:  [88 %-98 %] 92 %  BP: (108-171)/(58-76) 126/58     Weight: 111 kg (244 lb 11.2 oz)  Height: 5' 4" (162.6 cm)  Body mass index is 42 kg/m².      Intake/Output " Summary (Last 24 hours) at 9/18/2018 0714  Last data filed at 9/18/2018 0500  Gross per 24 hour   Intake 2687.5 ml   Output 1805 ml   Net 882.5 ml       General    Nursing note and vitals reviewed.  Constitutional:   Obese   Pulmonary/Chest: Effort normal.   Abdominal: Soft.   Neurological: She is alert.   Psychiatric: She has a normal mood and affect. Her behavior is normal.             Left Knee Exam     Comments:  LLE DNVI. Dressings clean and dry      Significant Labs:   CBC:   Recent Labs   Lab  09/18/18   0620   WBC  8.30  8.30   HGB  12.6  12.6   HCT  38.3  38.3   PLT  227  227     CMP: No results for input(s): NA, K, CL, CO2, GLU, BUN, CREATININE, CALCIUM, PROT, ALBUMIN, BILITOT, ALKPHOS, AST, ALT, ANIONGAP, EGFRNONAA in the last 48 hours.    Invalid input(s): ESTGF[place]  All pertinent labs within the past 24 hours have been reviewed.    Significant Imaging: X-Ray: I have reviewed all pertinent results/findings and my personal findings are:  There has been a cemented tricompartmental left knee arthroplasty with all hardware components well seated.  No fracture.

## 2018-09-18 NOTE — HPI
POD #2 S/P L TKA  - post-op pain improving  - Concerned about Medicare criteria and ability to receive PT 5x/wk

## 2018-09-18 NOTE — PLAN OF CARE
09/17/18 1905   Patient Assessment/Suction   Level of Consciousness (AVPU) alert   Respiratory Effort Normal;Unlabored   Rhythm/Pattern, Respiratory pattern regular   PRE-TX-O2-ETCO2   O2 Device (Oxygen Therapy) nasal cannula   Flow (L/min) 2   SpO2 98 %   Pulse Oximetry Type Intermittent

## 2018-09-18 NOTE — PROGRESS NOTES
Progress Note  Hospital Medicine  Patient Name:Alanis Mendieta  MRN:  [id number]  Patient Class: OP- Outpatient Recovery  Admit Date: 9/17/2018  Length of Stay: 0 days  Expected Discharge Date:   Attending Physician: Yariel Marin MD  Primary Care Provider:  Delio Melendez MD    SUBJECTIVE:     Principal Problem: S/P total knee arthroplasty, left  Initial history of present illness: Patient is a 67 y.o. female admitted to Hospitalist Service from Operation Room s/p left total knee arthroplasty performed by Dr. Marin. Patient reportedly has past medical history significant for OA, DM-2, history of asthma, Bipolar disorder, COPD (on home oxygen s L/min), history of thyroid CA, GERD, [place], hypertension, hyperlipidemia, hypothyroidism, RLS, VIET. Post-operatively, patient denied chest pain, shortness of breath, abdominal pain, nausea, vomiting, headache, vision changes, focal neuro-deficits, cough or fever.    PMH/PSH/SH/FH/Meds: reviewed.    Symptoms/Review of Systems: Doing well. Pain controlled. Participating with PT but slowly. No shortness of breath, cough, chest pain or headache, fever or abdominal pain.     Diet:  Adequate intake.    Activity level: Up with assistance  Pain:  Patient reports no pain.       OBJECTIVE:   Vital Signs (Most Recent):      Temp: 100.1 °F (37.8 °C) (09/18/18 0726)  Pulse: 80 (09/18/18 0746)  Resp: 19 (09/18/18 0746)  BP: (!) 158/77 (09/18/18 0726)  SpO2: 96 % (09/18/18 0746)       Vital Signs Range (Last 24H):  Temp:  [97.9 °F (36.6 °C)-100.1 °F (37.8 °C)]   Pulse:  [66-97]   Resp:  [11-21]   BP: (108-171)/(58-77)   SpO2:  [92 %-98 %]     Weight: 111 kg (244 lb 11.2 oz)  Body mass index is 42 kg/m².    Intake/Output Summary (Last 24 hours) at 9/18/2018 0930  Last data filed at 9/18/2018 0500  Gross per 24 hour   Intake 2687.5 ml   Output 1805 ml   Net 882.5 ml     Physical Examination:  General appearance: well developed, appears stated age  Head: normocephalic, atraumatic  Eyes:   conjunctivae/corneas clear. PERRL.  Nose: Nares normal. Septum midline.  Throat: lips, mucosa, and tongue normal; teeth and gums normal, no throat erythema.  Neck: supple, symmetrical, trachea midline, no JVD and thyroid not enlarged, symmetric, no tenderness/mass/nodules  Lungs:  clear to auscultation bilaterally and normal respiratory effort  Chest wall: no tenderness  Heart: regular rate and rhythm, S1, S2 normal, no murmur, click, rub or gallop  Abdomen: soft, non-tender non-distented; bowel sounds normal; no masses,  no organomegaly  Extremities: no cyanosis, clubbing or edema. Left knee dressing C/D/I.  Pulses: 2+ and symmetric  Skin: Skin color, texture, turgor normal. No rashes or lesions.  Lymph nodes: Cervical, supraclavicular, and axillary nodes normal.  Neurologic: Normal strength and tone. No focal numbness or weakness. CNII-XII intact.      CBC:  Recent Labs   Lab  09/18/18   0620   WBC  8.30  8.30   RBC  4.16  4.16   HGB  12.6  12.6   HCT  38.3  38.3   PLT  227  227   MCV  92  92   MCH  30.4  30.4   MCHC  33.0  33.0   BMP  Recent Labs   Lab  09/18/18   0620   GLU  274*   NA  137   K  4.6   CL  95   CO2  35*   BUN  13   CREATININE  0.8   CALCIUM  8.4*      Diagnostic Results:  Microbiology Results (last 7 days)     ** No results found for the last 168 hours. **         Left knee x-ray: Status post left total knee arthroplasty without complication.    Assessment/Plan:      *S/P total knee arthroplasty, left [Z96.652]   Not Applicable    Primary osteoarthritis of left knee [M17.12]  Continue to follow Orthopedic recommendations.  Needs aggressive incentive spirometry.  Follow hemoglobin and hematocrit closely.  Pain control with PO narcotics and antiemetics as needed.  Physical therapy as per Orthopedics protocol with fall precautions.      Yes    Hypothyroidism [E03.9]  Continue Thyroid supplementation.      Yes    Hyperlipidemia [E78.5]  Continue chronic medications and monitor for any  changes, adjusting as needed.      Yes    Anxiety [F41.9]  Use anxiolytics as needed.      Yes    COPD (chronic obstructive pulmonary disease) [J44.9]  Patient's COPD is controlled currently. Continue scheduled inhalers and monitor respiratory status closely.      Yes    Essential hypertension [I10]  Continue chronic medications and monitor for any changes, adjusting as needed.      Yes    GERD (gastroesophageal reflux disease) [K21.9]  Continue PPI.      Yes    Type 2 diabetes mellitus, with long-term current use of insulin [E11.9, Z79.4]  Check blood glucose level q AC/HS.  Use Novolog Insulin Sliding Scale as needed.   Continue American Diabetic Association 1800 Kcal diet.  Start Levemir 20 units sq q 12 hrs.   Not Applicable      DVT prophylaxis: On  mg PO BID as per Dr. Marin. Use SCD and TEDs.           Fabiano Stubbs MD  Department of Hospital Medicine   [facility]

## 2018-09-18 NOTE — PLAN OF CARE
Per RUMA Patrick, once the pt can go back to [facility] with PT/OT she can be discharged.   Spoke with Tomeka at [facility] regarding the pt returning, she states they should have SNF auth from the insurance today....OMKAR Dominguez       09/18/18 103   Discharge Reassessment   Assessment Type Discharge Planning Reassessment

## 2018-09-18 NOTE — PT/OT/SLP EVAL
Occupational Therapy   Evaluation    Name: Alanis Mendieta  MRN: [id number]  Admitting Diagnosis:  S/P total knee arthroplasty, left 1 Day Post-Op    Recommendations:     Discharge Recommendations: nursing facility, skilled  Discharge Equipment Recommendations:  3-in-1 commode  Barriers to discharge:  None    History:     Occupational Profile:  Living Environment: Pt resides at [facility].  Previous level of function: Mod I with UB ADL's and limited assistance with LB ADL's at wheelchair level.  Equipment Used at Home:  walker, rolling, wheelchair  Assistance upon Discharge: Nursing staff will help pt.    Past Medical History:   Diagnosis Date    Allergy     Anxiety     Arthritis     Asthma     Bipolar disorder     Cancer     thyroid    Chronic back pain     COPD (chronic obstructive pulmonary disease)     Diabetes mellitus     Fibromyalgia     GERD (gastroesophageal reflux disease)     [place] (hard of hearing)     Hx of thyroid cancer     Hyperlipidemia     Hypertension     Hypothyroidism     Neuropathy     On home oxygen therapy     3 l/m 24/7    Restless leg syndrome     Sleep apnea     Urinary tract infection        Past Surgical History:   Procedure Laterality Date    APPENDECTOMY      ARTHROPLASTY, KNEE Left 9/17/2018    Performed by [facility] at [facility] OR    BACK SURGERY      x 3    broken foot and ankle      CHOLECYSTECTOMY      CYSTOSCOPY  4/26/2018    Performed by Faith [facility] at [facility] OR    EYE SURGERY Bilateral     cataract    HYSTERECTOMY      KNEE ARTHROPLASTY Left 9/17/2018    Procedure: ARTHROPLASTY, KNEE;  Surgeon: Yariel Marin MD;  Location: [facility] OR;  Service: Orthopedics;  Laterality: Left;    mid urethral sling N/A 4/26/2018    Performed by [facility] at [facility] OR    POSTERIOR REPAIR      REPAIR POSTERIOR N/A 4/26/2018    Performed by Mark Townsend DO at [facility] OR    SPINAL FUSION      x3 BACK, NECK X 4    TOTAL  "THYROIDECTOMY  2014       Subjective     Chief Complaint: L leg pain  Patient/Family Comments/goals: " I have GOT to get back in bed!"    Pain/Comfort:  · Pain Rating 1: 9/10  · Location - Side 1: Left  · Location - Orientation 1: generalized  · Location 1: leg  · Pain Addressed 1: Pre-medicate for activity, Reposition, Nurse notified, Cessation of Activity(help pt back to bed)  · Pain Rating Post-Intervention 1: 8/10    Patients cultural, spiritual, Catholic conflicts given the current situation:      Objective:     Communicated with: Lizzie, nurse prior to session.  Patient found up in chair and oxygen, preston catheter, cryotherapy upon OT entry to room.    General Precautions: Standard, fall   Orthopedic Precautions:LLE weight bearing as tolerated   Braces: N/A     Occupational Performance:    Bed Mobility:    · Patient completed Scooting/Bridging with minimum assistance and with side rail  · Patient completed Sit to Supine with minimum assistance, with side rail and with leg lift    Functional Mobility/Transfers:  · Patient completed Sit <> Stand Transfer with moderate assistance  with  rolling walker   · Patient completed Bed <> Chair Transfer using Stand Pivot technique with moderate assistance with rolling walker  · Functional Mobility: Cues needed for walker safety and sequencing.    Activities of Daily Living:  · Lower Body Dressing: minimum assistance and cues for technique with reacher and sock aid to don/doff pants over feet and socks 'in chair.    Cognitive/Visual Perceptual:  Cognitive/Psychosocial Skills:     -       Follows Commands/attention:Follows multistep  commands  -       Safety awareness/insight to disability: impaired   -       Mood/Affect/Coping skills/emotional control: Anxious and Agitated    Physical Exam:  Balance:    -       Min A static & Mod A dynamic standing   Postural examination/scapula alignment:    -       Rounded shoulders  -       Forward head  Skin integrity: Visible skin " intact  Dominant hand:    -       right  Upper Extremity Range of Motion:     -       Right Upper Extremity: WFL except      -       Left Upper Extremity: WFL      Upper Extremity Strength:    -       Right Upper Extremity: WFL except shoulder 2/2 pain when pressure applied  -       Left Upper Extremity: WFL     Strength:    -       Right Upper Extremity: WFL    -       Left Upper Extremity: WFL    Fine Motor Coordination:    -       Intact  Gross motor coordination:   Impaired with transfers    AMPAC 6 Click ADL:  AMPAC Total Score: 16    Treatment & Education:  OT ed pt on OT role & POC as well as discharge recommendations.  OT ed pt on use of adaptive equipment for LB dressing, bathing & safe item retrieval with reacher with demonstration provided.  OT ed patient on safety with walker use for functional mobility with cues for hand placement & sequencing.   OT ed pt on bed mobility techniques to increase independence with task.  OT ed pt on fall risk and strongly advised pt to call for help for all OOB mobility.    Education:    Patient left HOB elevated with all lines intact, call button in reach and Lizzie, nurse notified    Assessment:     Alanis Mendieta is a 67 y.o. female with a medical diagnosis of S/P total knee arthroplasty, left.  She presents with the following performance deficits affecting function: weakness, impaired self care skills, impaired endurance, impaired functional mobilty, decreased lower extremity function, pain, decreased safety awareness, decreased ROM, impaired balance, gait instability, decreased coordination.  Pt's participation limited by pain and anxiety, despite receiving pain medication prior to session. Pt tolerated less than one hour up in chair this morning. Pt will benefit from further inpatient therapy to maximize return to prior level of function, due to pt currently needing extensive assistance for ADLs and functional mobility.    Rehab Prognosis: Good; patient would  "benefit from acute skilled OT services to address these deficits and reach maximum level of function.         Clinical Decision Makin.  OT Low:  "Pt evaluation falls under low complexity for evaluation coding due to performance deficits noted in 1-3 areas as stated above and 0 co-morbities affecting current functional status. Data obtained from problem focused assessments. No modifications or assistance was required for completion of evaluation. Only brief occupational profile and history review completed."     Plan:     Patient to be seen 3 x/week to address the above listed problems via self-care/home management, therapeutic activities, therapeutic exercises  · Plan of Care Expires: 10/18/18  · Plan of Care Reviewed with: patient    This Plan of care has been discussed with the patient who was involved in its development and understands and is in agreement with the identified goals and treatment plan    GOALS:   Multidisciplinary Problems     Occupational Therapy Goals        Problem: Occupational Therapy Goal    Goal Priority Disciplines Outcome Interventions   Occupational Therapy Goal     OT, PT/OT Ongoing (interventions implemented as appropriate)    Description:  Goals to be met by: 18     Patient will increase functional independence with ADLs by performing:    LE Dressing with Set-up Assistance, Moderate Assistance and Assistive Devices as needed.  Toileting from bedside commode with Set-up Assistance, Moderate Assistance and Assistive Devices as needed for hygiene and clothing management.   Toilet transfer to bedside commode with Minimal Assistance.                      Time Tracking:     OT Date of Treatment: 18  OT Start Time: 954  OT Stop Time:   OT Total Time (min): 29 min    Billable Minutes:Evaluation 8  Self Care/Home Management 12  Therapeutic Activity 9    PATITO Gore  2018    "

## 2018-09-18 NOTE — PLAN OF CARE
The pt is a shelter resident at [place]. I sent them a packet and snf order via [facility] and a note asking if the pt has Skilled services through her [employer] special needs plan. If so she will need to return skilled however will return before the auth is obtained. CM will continue to follow.  Judy Munoz LMSW     09/18/18 0897   Discharge Assessment   Assessment Type Discharge Planning Assessment

## 2018-09-18 NOTE — PLAN OF CARE
Problem: Physical Therapy Goal  Goal: Physical Therapy Goal  Outcome: Ongoing (interventions implemented as appropriate)  Pt is progressing towards goals

## 2018-09-18 NOTE — PT/OT/SLP PROGRESS
Physical Therapy  Treatment    Alanis Mendieta   MRN: [id number]   Admitting Diagnosis: S/P total knee arthroplasty, left    PT Received On: 09/18/18  PT Start Time: 1326     PT Stop Time: 1351    PT Total Time (min): 25 min       Billable Minutes:  Gait Training 9, Therapeutic Activity 8 and Therapeutic Exercise 8    Treatment Type: Treatment  PT/PTA: PT             General Precautions: Standard, fall  Orthopedic Precautions: LLE weight bearing as tolerated   Braces: N/A    Do you have any cultural, spiritual, Scientologist conflicts, given your current situation?: none    Subjective:  Communicated with RN Lizzie prior to session.  Pt received in bed, and was agreeable to PT tx.  She expressed that she had sat up on the EOB for lunch.  Pt encouraged to keep knee straight while resting.     Pain/Comfort  Pain Rating 1: 6/10  Location - Side 1: Left  Location 1: knee  Pain Addressed 1: Reposition, Distraction    Objective:   Patient found with: peripheral IV, oxygen, preston catheter, cryotherapy    Functional Mobility:  Bed Mobility:    supine<>sit with increased time and HOB raised - CGA    Transfers:   Sit<>Stand with vc's to push up from the bed, and using the RW with CGA.  Pt encouraged to place weight through L LE.     Gait:    Pt ambulated 10ft with RW using step to pattern, and requiring 1-step cues for gait sequence.       Stairs:  NA    Balance:   Static Sit: GOOD: Takes MODERATE challenges from all directions  Dynamic Sit: GOOD: Maintains balance through MODERATE excursions of active trunk movement  Static Stand: FAIR+: Takes MINIMAL challenges from all directions  Dynamic stand: FAIR+: Needs CLOSE SUPERVISION during gait and is able to right self with minor LOB     Therapeutic Activities and Exercises:  Supine: AP's x 20 reps, Quad set x 10 reps, Glut set x 10 reps, SAQ x 10 reps     AM-PAC 6 CLICK MOBILITY  How much help from another person does this patient currently need?   1 = Unable, Total/Dependent  Assistance  2 = A lot, Maximum/Moderate Assistance  3 = A little, Minimum/Contact Guard/Supervision  4 = None, Modified [place]/Independent    Turning over in bed (including adjusting bedclothes, sheets and blankets)?: 3  Sitting down on and standing up from a chair with arms (e.g., wheelchair, bedside commode, etc.): 3  Moving from lying on back to sitting on the side of the bed?: 3  Moving to and from a bed to a chair (including a wheelchair)?: 3  Need to walk in hospital room?: 3  Climbing 3-5 steps with a railing?: 1  Basic Mobility Total Score: 16    AM-PAC Raw Score CMS G-Code Modifier Level of Impairment Assistance   6 % Total / Unable   7 - 9 CM 80 - 100% Maximal Assist   10 - 14 CL 60 - 80% Moderate Assist   15 - 19 CK 40 - 60% Moderate Assist   20 - 22 CJ 20 - 40% Minimal Assist   23 CI 1-20% SBA / CGA   24 CH 0% Independent/ Mod I     Patient left up in chair with all lines intact and call button in reach.    Assessment:  Alanis Mendieta is a 67 y.o. female with a medical diagnosis of S/P total knee arthroplasty, left and presents with decreased LE strength, as well as strong fear of falling.  She requires assistance for all aspects of ADL's at this point.  Continue to recommend SNF upon d/c to optimize her gain of functional mobility.     Rehab identified problem list/impairments: Rehab identified problem list/impairments: weakness, impaired self care skills, impaired balance, decreased safety awareness, decreased ROM, impaired joint extensibility, impaired endurance, impaired functional mobilty, pain, gait instability, decreased lower extremity function, impaired cardiopulmonary response to activity, orthopedic precautions    Rehab potential is good.    Activity tolerance: Good    Discharge recommendations: Discharge Facility/Level Of Care Needs: nursing facility, skilled     Barriers to discharge:      Equipment recommendations: Equipment Needed After Discharge: 3-in-1 commode      GOALS:   Multidisciplinary Problems     Physical Therapy Goals        Problem: Physical Therapy Goal    Goal Priority Disciplines Outcome Goal Variances Interventions   Physical Therapy Goal     PT, PT/OT Ongoing (interventions implemented as appropriate)                     PLAN:    Patient to be seen BID  to address the above listed problems via gait training, therapeutic exercises, therapeutic activities  Plan of Care expires: 10/02/18  Plan of Care reviewed with: patient    PT G-Codes  Functional Assessment Tool Used: Am PAC  Score: 16  Functional Limitation: Mobility: Walking and moving around  Mobility: Walking and Moving Around Current Status (): CK  Mobility: Walking and Moving Around Goal Status (): JOVI Mckeon, PT  09/18/2018

## 2018-09-18 NOTE — PT/OT/SLP EVAL
Physical Therapy Evaluation    Patient Name:  Alanis Mendieta   MRN:  [id number]    Recommendations:     Discharge Recommendations:  nursing facility, skilled   Discharge Equipment Recommendations: none   Barriers to discharge: pain    Assessment:     Alanis Mendieta is a 67 y.o. female admitted with a medical diagnosis of S/P total knee arthroplasty, left.  She presents with the following impairments/functional limitations:  weakness, gait instability, decreased upper extremity function, decreased ROM, impaired cardiopulmonary response to activity, impaired endurance, impaired balance, decreased lower extremity function, impaired joint extensibility, decreased safety awareness, impaired self care skills, pain, impaired skin, orthopedic precautions, impaired functional mobilty.  During today's PT evaluation, pt was able to transfer bed<>chair with RW and Max A, however pt required extensive encouragement to place L foot on the floor and put weight through it.  She is extremely limited by pain.  She is recommended for SNF upon d/c, and states that she would like to do that at [place].     Rehab Prognosis:  Good; patient would benefit from acute skilled PT services to address these deficits and reach maximum level of function.      Recent Surgery: Procedure(s) (LRB):  ARTHROPLASTY, KNEE (Left) 1 Day Post-Op    Plan:     During this hospitalization, patient to be seen BID to address the above listed problems via gait training, therapeutic activities, therapeutic exercises, wheelchair management/training  · Plan of Care Expires:  10/02/18   Plan of Care Reviewed with: patient    Subjective     Communicated with RN Lizzie prior to session.  Patient found supine in bed upon PT entry to room, agreeable to evaluation.      Chief Complaint: pain  Patient comments/goals: to move better  Pain/Comfort:  · Pain Rating 1: 7/10  · Location - Side 1: Left  · Location 1: knee  · Pain Addressed 1: Pre-medicate for activity,  Reposition, Distraction    Patients cultural, spiritual, Christianity conflicts given the current situation: none    Living Environment:  Pt states that she lives in the assisted living portion of [facility], however she is an unreliable historian.  She states that prior to admission she was able to walk short distances in her room with a RW, but most of the time she is in her w/c.  She states that she receives assistance for bathing, but she is independent with dressing.    Patient has the following equipment: wheelchair, walker, rolling.  DME owned (not currently used): none.  Upon discharge, patient will have assistance from staff at [facility].    Objective:     Patient found with: peripheral IV, preston catheter, cryotherapy, oxygen     General Precautions: Standard, fall   Orthopedic Precautions:Full weight bearing   Braces: N/A     Exams:  · Cognitive Exam:  Patient is oriented to Person, Place, Time and Situation  · Gross Motor Coordination:  WFL  · Postural Exam:  Patient presented with the following abnormalities:    · -       Rounded shoulders  · -       Forward head  · -       Abnormal trunk flexion  · -       Kyphosis  · Skin Integrity/Edema:      · -       Skin integrity: L knee surgical incision covered with ace wrap and cryocuff  · RUE ROM: Deficits: shoulder flexion limited due to pain, functional   · RUE Strength: WFL  · LUE ROM: WFL  · LUE Strength: WFL  · RLE ROM: WFL  · RLE Strength: Deficits: Grossly 4/5, however pt reports that the R knee yony on her.   · LLE ROM: Deficits: Pt lacking ~10 * from full extension and this is greatly limited by pain.  She is able to achieve ~80* flexion sitting on the EOB.   · LLE Strength: Deficits: Grossly 3/5    Functional Mobility:  · Bed Mobility:     · Supine to Sit: moderate assistance and HOB raised with use of trapeze  · Transfers:     · Sit to Stand:  maximal assistance and with vc's to push up from the bed.  Pt initially refuses to place L foot down on  the floor and was screaming in pain.  She was not able to bring both hands up to the RW, and essentially plopped back down onto the bed reporting that her R knee gave out.  She was able to attempt a 2nd time, and was able to stand and place weight through the L LE.  with rolling walker  · Gait: 2ft with Max A and RW with 1-step cues for sequencing and techique.  She was able to take a few steps from the bed<>chair.    · Balance: Dynamic standing balance: Poor - pt required Max A to perform lateral weight shifting in standing, and then marching while standing.     AM-PAC 6 CLICK MOBILITY  Total Score:12       Therapeutic Activities and Exercises:   As above.  Pt also completed AP x 14 reps - stopped due to pain and pt refused to continue to 20 reps.  Quad set and glut set x 10 reps with 5 sec hold bilaterally.     Patient left up in chair with all lines intact and call button in reach.    GOALS:   Multidisciplinary Problems     Physical Therapy Goals        Problem: Physical Therapy Goal    Goal Priority Disciplines Outcome Goal Variances Interventions   Physical Therapy Goal     PT, PT/OT Ongoing (interventions implemented as appropriate)                     History:     Past Medical History:   Diagnosis Date    Allergy     Anxiety     Arthritis     Asthma     Bipolar disorder     Cancer     thyroid    Chronic back pain     COPD (chronic obstructive pulmonary disease)     Diabetes mellitus     Fibromyalgia     GERD (gastroesophageal reflux disease)     [place] (hard of hearing)     Hx of thyroid cancer     Hyperlipidemia     Hypertension     Hypothyroidism     Neuropathy     On home oxygen therapy     3 l/m 24/7    Restless leg syndrome     Sleep apnea     Urinary tract infection        Past Surgical History:   Procedure Laterality Date    APPENDECTOMY      BACK SURGERY      x 3    broken foot and ankle      CHOLECYSTECTOMY      CYSTOSCOPY  4/26/2018    [facility] OR    EYE SURGERY Bilateral     cataract    HYSTERECTOMY      mid urethral sling N/A 4/26/2018    [facility] by [facility] at [facility] OR    POSTERIOR REPAIR      REPAIR POSTERIOR N/A 4/26/2018    Performed by Mark Townsend DO at [facility] OR    SPINAL FUSION      x3 BACK, NECK X 4    TOTAL THYROIDECTOMY  2014       Clinical Decision Making:     History  Co-morbidities and personal factors that may impact the plan of care Examination  Body Structures and Functions, activity limitations and participation restrictions that may impact the plan of care Clinical Presentation   Decision Making/ Complexity Score   Co-morbidities:   [] Time since onset of injury / illness / exacerbation  [] Status of current condition  []Patient's cognitive status and safety concerns    [] Multiple Medical Problems (see med hx)  Personal Factors:   [] Patient's age  [] Prior Level of function   [] Patient's home situation (environment and family support)  [] Patient's level of motivation  [] Expected progression of patient      HISTORY:(criteria)    [] 70711 - no personal factors/history    [] 26580 - has 1-2 personal factor/comorbidity     [] 46644 - has >3 personal factor/comorbidity     Body Regions:  [] Objective examination findings  [] Head     []  Neck  [] Trunk   [] Upper Extremity  [] Lower Extremity    Body Systems:  [] For communication ability, affect, cognition, language, and learning style: the assessment of the ability to make needs known, consciousness, orientation (person, place, and time), expected emotional /behavioral responses, and learning preferences (eg, learning barriers, education  needs)  [] For the neuromuscular system: a general assessment of gross coordinated movement (eg, balance, gait, locomotion, transfers, and transitions) and motor function  (motor control and motor learning)  [] For the musculoskeletal system: the assessment of gross symmetry, gross range of motion, gross  strength, height, and weight  [] For the integumentary system: the assessment of pliability(texture), presence of scar formation, skin color, and skin integrity  [] For cardiovascular/pulmonary system: the assessment of heart rate, respiratory rate, blood pressure, and edema     Activity limitations:    [] Patient's cognitive status and saf ety concerns          [] Status of current condition      [] Weight bearing restriction  [] Cardiopulmunary Restriction    Participation Restrictions:   [] Goals and goal agreement with the patient     [] Rehab potential (prognosis) and probable outcome      Examination of Body System: (criteria)    [] 93194 - addressing 1-2 elements    [] 56938 - addressing a total of 3 or more elements     [] 98490 -  Addressing a total of 4 or more elements         Clinical Presentation: (criteria)  Choose one     On examination of body system using standardized tests and measures patient presents with (CHOOSE ONE) elements from any of the following: body structures and functions, activity limitations, and/or participation restrictions.  Leading to a clinical presentation that is considered (CHOOSE ONE)                              Clinical Decision Making  (Eval Complexity):  Choose One     Time Tracking:     PT Received On: 09/18/18  PT Start Time: 0843     PT Stop Time: 0917  PT Total Time (min): 34 min     Billable Minutes: Evaluation 10, Therapeutic Activity 16 and Therapeutic Exercise 8      Edna Mckeon, PT  09/18/2018

## 2018-09-19 VITALS
HEART RATE: 89 BPM | DIASTOLIC BLOOD PRESSURE: 80 MMHG | RESPIRATION RATE: 18 BRPM | WEIGHT: 244.69 LBS | TEMPERATURE: 99 F | BODY MASS INDEX: 41.77 KG/M2 | SYSTOLIC BLOOD PRESSURE: 149 MMHG | OXYGEN SATURATION: 100 % | HEIGHT: 64 IN

## 2018-09-19 LAB
ANION GAP SERPL CALC-SCNC: 10 MMOL/L
BASOPHILS # BLD AUTO: 0 K/UL
BASOPHILS NFR BLD: 0.4 %
BUN SERPL-MCNC: 8 MG/DL
CALCIUM SERPL-MCNC: 8.7 MG/DL
CHLORIDE SERPL-SCNC: 91 MMOL/L
CO2 SERPL-SCNC: 36 MMOL/L
CREAT SERPL-MCNC: 0.7 MG/DL
DIFFERENTIAL METHOD: ABNORMAL
EOSINOPHIL # BLD AUTO: 0.1 K/UL
EOSINOPHIL NFR BLD: 0.8 %
ERYTHROCYTE [DISTWIDTH] IN BLOOD BY AUTOMATED COUNT: 14.6 %
EST. GFR  (AFRICAN AMERICAN): >60 ML/MIN/1.73 M^2
EST. GFR  (NON AFRICAN AMERICAN): >60 ML/MIN/1.73 M^2
GLUCOSE SERPL-MCNC: 218 MG/DL
HCT VFR BLD AUTO: 39.8 %
HGB BLD-MCNC: 13.2 G/DL
LYMPHOCYTES # BLD AUTO: 1.9 K/UL
LYMPHOCYTES NFR BLD: 20.7 %
MCH RBC QN AUTO: 30.5 PG
MCHC RBC AUTO-ENTMCNC: 33 G/DL
MCV RBC AUTO: 92 FL
MONOCYTES # BLD AUTO: 0.6 K/UL
MONOCYTES NFR BLD: 6.9 %
NEUTROPHILS # BLD AUTO: 6.5 K/UL
NEUTROPHILS NFR BLD: 71.2 %
PLATELET # BLD AUTO: 237 K/UL
PMV BLD AUTO: 8.6 FL
POCT GLUCOSE: 217 MG/DL (ref 70–110)
POCT GLUCOSE: 221 MG/DL (ref 70–110)
POTASSIUM SERPL-SCNC: 4.9 MMOL/L
RBC # BLD AUTO: 4.32 M/UL
SODIUM SERPL-SCNC: 137 MMOL/L
WBC # BLD AUTO: 9.2 K/UL

## 2018-09-19 PROCEDURE — 25000003 PHARM REV CODE 250: Performed by: ANESTHESIOLOGY

## 2018-09-19 PROCEDURE — 97116 GAIT TRAINING THERAPY: CPT | Mod: 59

## 2018-09-19 PROCEDURE — 99217 PR OBSERVATION CARE DISCHARGE: CPT | Mod: ,,, | Performed by: INTERNAL MEDICINE

## 2018-09-19 PROCEDURE — 85025 COMPLETE CBC W/AUTO DIFF WBC: CPT

## 2018-09-19 PROCEDURE — 27000221 HC OXYGEN, UP TO 24 HOURS

## 2018-09-19 PROCEDURE — 25000003 PHARM REV CODE 250: Performed by: INTERNAL MEDICINE

## 2018-09-19 PROCEDURE — 25000003 PHARM REV CODE 250: Performed by: ORTHOPAEDIC SURGERY

## 2018-09-19 PROCEDURE — 94761 N-INVAS EAR/PLS OXIMETRY MLT: CPT

## 2018-09-19 PROCEDURE — 36415 COLL VENOUS BLD VENIPUNCTURE: CPT

## 2018-09-19 PROCEDURE — 80048 BASIC METABOLIC PNL TOTAL CA: CPT

## 2018-09-19 PROCEDURE — 97530 THERAPEUTIC ACTIVITIES: CPT

## 2018-09-19 RX ORDER — AMOXICILLIN AND CLAVULANATE POTASSIUM 875; 125 MG/1; MG/1
1 TABLET, FILM COATED ORAL 2 TIMES DAILY
Qty: 20 TABLET | Refills: 0 | Status: SHIPPED | OUTPATIENT
Start: 2018-09-19 | End: 2018-09-29

## 2018-09-19 RX ORDER — ASPIRIN 325 MG
325 TABLET ORAL 2 TIMES DAILY
Refills: 0
Start: 2018-09-19 | End: 2019-12-26

## 2018-09-19 RX ORDER — OXYCODONE AND ACETAMINOPHEN 7.5; 325 MG/1; MG/1
1 TABLET ORAL EVERY 4 HOURS PRN
Qty: 10 TABLET | Refills: 0 | Status: SHIPPED | OUTPATIENT
Start: 2018-09-19 | End: 2018-12-26 | Stop reason: CLARIF

## 2018-09-19 RX ADMIN — LUBIPROSTONE 8 MCG: 8 CAPSULE, GELATIN COATED ORAL at 08:09

## 2018-09-19 RX ADMIN — BISACODYL 10 MG: 10 SUPPOSITORY RECTAL at 08:09

## 2018-09-19 RX ADMIN — ONDANSETRON 8 MG: 4 TABLET, ORALLY DISINTEGRATING ORAL at 05:09

## 2018-09-19 RX ADMIN — FAMOTIDINE 20 MG: 20 TABLET ORAL at 08:09

## 2018-09-19 RX ADMIN — INSULIN ASPART 2 UNITS: 100 INJECTION, SOLUTION INTRAVENOUS; SUBCUTANEOUS at 12:09

## 2018-09-19 RX ADMIN — ASPIRIN 325 MG ORAL TABLET 325 MG: 325 PILL ORAL at 08:09

## 2018-09-19 RX ADMIN — FUROSEMIDE 20 MG: 20 TABLET ORAL at 09:09

## 2018-09-19 RX ADMIN — POLYETHYLENE GLYCOL (3350) 17 G: 17 POWDER, FOR SOLUTION ORAL at 08:09

## 2018-09-19 RX ADMIN — LEVOTHYROXINE SODIUM 150 MCG: 50 TABLET ORAL at 05:09

## 2018-09-19 RX ADMIN — OXYCODONE HYDROCHLORIDE 10 MG: 10 TABLET, FILM COATED, EXTENDED RELEASE ORAL at 09:09

## 2018-09-19 RX ADMIN — INSULIN DETEMIR 20 UNITS: 100 INJECTION, SOLUTION SUBCUTANEOUS at 09:09

## 2018-09-19 RX ADMIN — OXYCODONE HYDROCHLORIDE 15 MG: 10 TABLET ORAL at 02:09

## 2018-09-19 RX ADMIN — OXYCODONE HYDROCHLORIDE 15 MG: 10 TABLET ORAL at 05:09

## 2018-09-19 RX ADMIN — LOSARTAN POTASSIUM AND HYDROCHLOROTHIAZIDE 1 TABLET: 12.5; 5 TABLET ORAL at 09:09

## 2018-09-19 RX ADMIN — CALCITRIOL 0.5 MCG: 0.25 CAPSULE, LIQUID FILLED ORAL at 08:09

## 2018-09-19 RX ADMIN — INSULIN ASPART 2 UNITS: 100 INJECTION, SOLUTION INTRAVENOUS; SUBCUTANEOUS at 07:09

## 2018-09-19 RX ADMIN — LAMOTRIGINE 150 MG: 100 TABLET ORAL at 09:09

## 2018-09-19 RX ADMIN — ROPINIROLE HYDROCHLORIDE 4 MG: 1 TABLET, FILM COATED ORAL at 09:09

## 2018-09-19 NOTE — UM SECONDARY REVIEW
Physician Advisor External    Level of Care Issue    Approved Inpatient     Per EHR reviewer Dr Chalwa IP approved for 9/17/18

## 2018-09-19 NOTE — PLAN OF CARE
Problem: Patient Care Overview  Goal: Plan of Care Review  Outcome: Ongoing (interventions implemented as appropriate)  POC discussed with patient, verbalized understanding. Patient with uneventful night, slept between care. Surgical pain controlled well with po oxycodone as ordered. NV wnl. Voided but also incontinent of urine X 3. States she has issues with incontinence of urine normally. VS stable. Tolerating diet well. Call light at bedside. No complaints voiced.

## 2018-09-19 NOTE — NURSING
Pt was picked up by [facility], all personal belonging were taken with pt.  I forgot to send RX with her and [facility] was notified and said they would send someone to get it.  Pt has seen PT a couple times today and is ambulating a few feet only, she is able to use BSC and has had a BM today.  She is voiding and tolerating food and drink.  IV was removed intact and dressing place.  Dressing to right knee has clean dressing with no bleeding noted, pulses positive, she denies any numbness or tingling.  Discharge instructions were sent with her.

## 2018-09-19 NOTE — PT/OT/SLP PROGRESS
Physical Therapy Treatment    Patient Name:  Alanis Mendieta   MRN:  [id number]    Recommendations:     Discharge Recommendations:  nursing facility, skilled       Assessment:     Alanis Mendieta is a 67 y.o. female admitted with a medical diagnosis of S/P total knee arthroplasty, left.  She presents with the following impairments/functional limitations:  weakness, impaired endurance, impaired functional mobilty, gait instability, impaired balance, impaired self care skills, decreased lower extremity function, decreased safety awareness, pain, decreased ROM, orthopedic precautions, impaired cardiopulmonary response to activity .    Rehab Prognosis:  fair; patient would benefit from acute skilled PT services to address these deficits and reach maximum level of function.      Recent Surgery: Procedure(s) (LRB):  ARTHROPLASTY, KNEE (Left) 2 Days Post-Op    Plan:     During this hospitalization, patient to be seen BID to address the above listed problems via gait training, therapeutic exercises, therapeutic activities  · Plan of Care Expires:  10/02/18   Plan of Care Reviewed with: patient    Subjective     Communicated with nurse naomi prior to session.  Patient found supine upon PT entry to room, agreeable to treatment.      Chief Complaint: discomfort from consitpation  Patient comments/goals: pt agreeable to PT after encouragement and edu  Pain/Comfort:  · Pain Rating 1: (did not rate)  · Location - Side 1: Left  · Location 1: knee  · Pain Addressed 1: Pre-medicate for activity, Nurse notified    Patients cultural, spiritual, Confucianism conflicts given the current situation: none    Objective:     Patient found with: peripheral IV, oxygen, cryotherapy     General Precautions: Standard, fall   Orthopedic Precautions:LLE weight bearing as tolerated   Braces: N/A     Functional Mobility:  · Bed Mobility:     · Scooting: moderate assistance  · Supine to Sit: moderate assistance    · Transfers:     · Sit to Stand:   minimum assistance and moderate assistance with rolling walker  · Bed to Chair: minimum assistance with  rolling walker  using  Stand Pivot  · Toilet Transfer: moderate assistance with  rolling walker  using  Stand Pivot    · Gait: 4' and 8' (bed to commode and commode to chair)      AM-PAC 6 CLICK MOBILITY          Therapeutic Activities and Exercises:   pt required increased cueing and instruction to perform mobility and for safety. Pt attempting to let go of walker and reach for bed/commode/chair.    2 person assistance required for pt safety     pt assisted on/off commode with mod A. Nurse present to provide silvino care    Patient left up in chair with all lines intact, call button in reach and nurse naomi notified..    GOALS:   Multidisciplinary Problems     Physical Therapy Goals        Problem: Physical Therapy Goal    Goal Priority Disciplines Outcome Goal Variances Interventions   Physical Therapy Goal     PT, PT/OT Ongoing (interventions implemented as appropriate)                     Time Tracking:     PT Received On: 09/19/18  PT Start Time: 0955     PT Stop Time: 1023  PT Total Time (min): 28 min     Billable Minutes: Therapeutic Activity 25    Treatment Type: Treatment  PT/PTA: PTA     PTA Visit Number: 1     Xochilt Ramirez PTA  09/19/2018

## 2018-09-19 NOTE — PROGRESS NOTES
[facility]  Orthopedics  Progress Note    Patient Name: Alanis Mendieta  MRN: [id number]  Admission Date: 9/17/2018  Hospital Length of Stay: 0 days  Attending Provider: Yariel Marin MD  Primary Care Provider: Delio Melendez MD  Follow-up For: Procedure(s) (LRB):  ARTHROPLASTY, KNEE (Left)    Post-Operative Day: 2 Days Post-Op  Subjective:     Principal Problem:S/P total knee arthroplasty, left    Principal Orthopedic Problem: S/P L TKA    Interval History: nothing new    Review of patient's allergies indicates:   Allergen Reactions    Morphine Itching    Tubersol [tuberculin ppd] Other (See Comments)     Site swells bad. Has to have chest xray       Current Facility-Administered Medications   Medication    aspirin tablet 325 mg    bisacodyl suppository 10 mg    calcitRIOL capsule 0.5 mcg    clonazePAM tablet 0.5 mg    dextrose 50% injection 12.5 g    dextrose 50% injection 25 g    diphenhydrAMINE injection 12.5 mg    famotidine tablet 20 mg    ferrous sulfate EC tablet 325 mg    furosemide tablet 20 mg    glucagon (human recombinant) injection 1 mg    glucose chewable tablet 16 g    glucose chewable tablet 24 g    insulin aspart U-100 pen 0-5 Units    insulin detemir U-100 pen 20 Units    lamoTRIgine tablet 150 mg    latanoprost 0.005 % ophthalmic solution 1 drop    levothyroxine tablet 150 mcg    losartan-hydrochlorothiazide 50-12.5 mg per tablet 1 tablet    lubiprostone capsule 8 mcg    ondansetron disintegrating tablet 8 mg    oxyCODONE 12 hr tablet 10 mg    oxyCODONE immediate release tablet 15 mg    oxyCODONE immediate release tablet 5 mg    oxyCODONE immediate release tablet Tab 10 mg    polyethylene glycol packet 17 g    polyethylene glycol packet 17 g    rOPINIRole tablet 4 mg    sodium chloride 0.9% flush 5 mL    trazodone split tablet 25 mg     Objective:     Vital Signs (Most Recent):  Temp: 99.2 °F (37.3 °C) (09/19/18 0702)  Pulse: 84 (09/19/18  "0702)  Resp: 18 (09/19/18 0702)  BP: (!) 152/68 (09/19/18 0702)  SpO2: 99 % (09/19/18 0702) Vital Signs (24h Range):  Temp:  [96.8 °F (36 °C)-100.8 °F (38.2 °C)] 99.2 °F (37.3 °C)  Pulse:  [75-90] 84  Resp:  [16-20] 18  SpO2:  [92 %-100 %] 99 %  BP: (119-174)/(63-77) 152/68     Weight: 111 kg (244 lb 11.2 oz)  Height: 5' 4" (162.6 cm)  Body mass index is 42 kg/m².      Intake/Output Summary (Last 24 hours) at 9/19/2018 0720  Last data filed at 9/19/2018 0500  Gross per 24 hour   Intake 1280 ml   Output 1350 ml   Net -70 ml       General    Nursing note and vitals reviewed.  Constitutional: She is oriented to person, place, and time. She appears well-developed and well-nourished.   Pulmonary/Chest: Effort normal.   Abdominal: Soft.   Neurological: She is alert and oriented to person, place, and time.   Psychiatric: She has a normal mood and affect. Her behavior is normal.             Left Knee Exam     Comments:  LLE DNVI. Incision clean and dry.      Significant Labs:   CBC:   Recent Labs   Lab  09/18/18   0620  09/19/18   0540   WBC  8.30  8.30  9.20   HGB  12.6  12.6  13.2   HCT  38.3  38.3  39.8   PLT  227  227  237     CMP:   Recent Labs   Lab  09/18/18   0620   NA  137   K  4.6   CL  95   CO2  35*   GLU  274*   BUN  13   CREATININE  0.8   CALCIUM  8.4*   ANIONGAP  7*   EGFRNONAA  >60     All pertinent labs within the past 24 hours have been reviewed.    Significant Imaging: None    Assessment/Plan:     * S/P total knee arthroplasty, left    Stable and may be T/F'd to [facility]              RUMA WORKMAN  Orthopedics  [facility]  "

## 2018-09-19 NOTE — PLAN OF CARE
Per Lakeisha at [place] [phone]- they have SNF auth for the pt to return. Discharge orders and AVS sent via [facility]. Judy Munoz LMSW      Report can be called to Lisa at [phone]. I updated the pts nurse.      09/19/18 0952   Discharge Assessment   Assessment Type Discharge Planning Reassessment

## 2018-09-19 NOTE — PLAN OF CARE
Problem: Physical Therapy Goal  Goal: Physical Therapy Goal  Outcome: Ongoing (interventions implemented as appropriate)  PT BID for bed mobility, transfer training, gait training

## 2018-09-19 NOTE — SUBJECTIVE & OBJECTIVE
"Principal Problem:S/P total knee arthroplasty, left    Principal Orthopedic Problem: S/P L TKA    Interval History: nothing new    Review of patient's allergies indicates:   Allergen Reactions    Morphine Itching    Tubersol [tuberculin ppd] Other (See Comments)     Site swells bad. Has to have chest xray       Current Facility-Administered Medications   Medication    aspirin tablet 325 mg    bisacodyl suppository 10 mg    calcitRIOL capsule 0.5 mcg    clonazePAM tablet 0.5 mg    dextrose 50% injection 12.5 g    dextrose 50% injection 25 g    diphenhydrAMINE injection 12.5 mg    famotidine tablet 20 mg    ferrous sulfate EC tablet 325 mg    furosemide tablet 20 mg    glucagon (human recombinant) injection 1 mg    glucose chewable tablet 16 g    glucose chewable tablet 24 g    insulin aspart U-100 pen 0-5 Units    insulin detemir U-100 pen 20 Units    lamoTRIgine tablet 150 mg    latanoprost 0.005 % ophthalmic solution 1 drop    levothyroxine tablet 150 mcg    losartan-hydrochlorothiazide 50-12.5 mg per tablet 1 tablet    lubiprostone capsule 8 mcg    ondansetron disintegrating tablet 8 mg    oxyCODONE 12 hr tablet 10 mg    oxyCODONE immediate release tablet 15 mg    oxyCODONE immediate release tablet 5 mg    oxyCODONE immediate release tablet Tab 10 mg    polyethylene glycol packet 17 g    polyethylene glycol packet 17 g    rOPINIRole tablet 4 mg    sodium chloride 0.9% flush 5 mL    trazodone split tablet 25 mg     Objective:     Vital Signs (Most Recent):  Temp: 99.2 °F (37.3 °C) (09/19/18 0702)  Pulse: 84 (09/19/18 0702)  Resp: 18 (09/19/18 0702)  BP: (!) 152/68 (09/19/18 0702)  SpO2: 99 % (09/19/18 0702) Vital Signs (24h Range):  Temp:  [96.8 °F (36 °C)-100.8 °F (38.2 °C)] 99.2 °F (37.3 °C)  Pulse:  [75-90] 84  Resp:  [16-20] 18  SpO2:  [92 %-100 %] 99 %  BP: (119-174)/(63-77) 152/68     Weight: 111 kg (244 lb 11.2 oz)  Height: 5' 4" (162.6 cm)  Body mass index is 42 " kg/m².      Intake/Output Summary (Last 24 hours) at 9/19/2018 0720  Last data filed at 9/19/2018 0500  Gross per 24 hour   Intake 1280 ml   Output 1350 ml   Net -70 ml       General    Nursing note and vitals reviewed.  Constitutional: She is oriented to person, place, and time. She appears well-developed and well-nourished.   Pulmonary/Chest: Effort normal.   Abdominal: Soft.   Neurological: She is alert and oriented to person, place, and time.   Psychiatric: She has a normal mood and affect. Her behavior is normal.             Left Knee Exam     Comments:  LLE DNVI. Incision clean and dry.      Significant Labs:   CBC:   Recent Labs   Lab  09/18/18   0620  09/19/18   0540   WBC  8.30  8.30  9.20   HGB  12.6  12.6  13.2   HCT  38.3  38.3  39.8   PLT  227  227  237     CMP:   Recent Labs   Lab  09/18/18   0620   NA  137   K  4.6   CL  95   CO2  35*   GLU  274*   BUN  13   CREATININE  0.8   CALCIUM  8.4*   ANIONGAP  7*   EGFRNONAA  >60     All pertinent labs within the past 24 hours have been reviewed.    Significant Imaging: None

## 2018-09-19 NOTE — DISCHARGE SUMMARY
Discharge Summary  Hospital Medicine    Admit Date: 9/17/2018    Date and Time: 9/19/20189:42 AM    Discharge Attending Physician: Fabiano Stubbs MD    Primary Care Physician: Delio Melendez MD    Diagnoses:  Active Hospital Problems    Diagnosis  POA    *S/P total knee arthroplasty, left [Z96.652]  Not Applicable    Primary osteoarthritis of left knee [M17.12]  Yes    Hypothyroidism [E03.9]  Yes    Hyperlipidemia [E78.5]  Yes    Anxiety [F41.9]  Yes    COPD (chronic obstructive pulmonary disease) [J44.9]  Yes    Essential hypertension [I10]  Yes    GERD (gastroesophageal reflux disease) [K21.9]  Yes    Type 2 diabetes mellitus, with long-term current use of insulin [E11.9, Z79.4]  Not Applicable      Resolved Hospital Problems   No resolved problems to display.     Discharged Condition: Good    Hospital Course:   Patient is a 67 y.o. female admitted to Hospitalist Service from Operation Room s/p left total knee arthroplasty performed by Dr. Marin. Patient reportedly has past medical history significant for OA, DM-2, history of asthma, Bipolar disorder, COPD (on home oxygen s L/min), history of thyroid CA, GERD, [place], hypertension, hyperlipidemia, hypothyroidism, RLS, VIET. Post-operatively, patient denied chest pain, shortness of breath, abdominal pain, nausea, vomiting, headache, vision changes, focal neuro-deficits, cough or fever. Patient was admitted to Hospitalist medicine service. Patient was followed by orthopedics. Post-operative, patient did well. Pain adequately controlled. Patient participated with physical therapy. Home health and home physical therapy has been arranged. Fall precautions discussed with the patient. Patient to follow up with primary care physician next week and orthopedic doctor in 2 weeks. Post-operative anti-coagulation as per orthopedics recommendations advised. In case of chest pain, shortness of breath, stroke or stroke like symptoms, high grade fever or any signs or  symptoms of surgical site wound infection symptoms, patient to return to nearest emergency room as soon as possible. Patient was discharged to SNF in stable condition with following discharge plan of care. Total time with the patient was 30 minutes and greater than 50% was spent in counseling and coordination of care. The assessment and plan have been discussed at length. Physicians' notes reviewed. Labs and procedure reviewed.     Consults: Dr. Marin    Significant Diagnostic Studies:   Left knee x-ray: Status post left total knee arthroplasty without complication.    Microbiology Results (last 7 days)     ** No results found for the last 168 hours. **        Special Treatments/Procedures: as above  Disposition: SNF    Medications:  Reconciled Home Medications:   Current Discharge Medication List      START taking these medications    Details   aspirin 325 MG tablet Take 1 tablet (325 mg total) by mouth 2 (two) times daily.  Refills: 0      oxyCODONE-acetaminophen (PERCOCET) 7.5-325 mg per tablet Take 1 tablet by mouth every 4 (four) hours as needed for Pain.  Qty: 10 tablet, Refills: 0         CONTINUE these medications which have CHANGED    Details   amoxicillin-clavulanate 875-125mg (AUGMENTIN) 875-125 mg per tablet Take 1 tablet by mouth 2 (two) times daily. Start 7 days before surgery and continue 3 days after for 10 days  Qty: 20 tablet, Refills: 0         CONTINUE these medications which have NOT CHANGED    Details   ANORO ELLIPTA 62.5-25 mcg/actuation DsDv Inhale into the lungs once daily. Inhale x 2      calcitRIOL (ROCALTROL) 0.5 MCG Cap Take 0.5 mcg by mouth once daily.       clonazePAM (KLONOPIN) 1 MG tablet Take 1 mg by mouth 2 (two) times daily. In the am pt takes 0.5mg PO  In the evening pt takes 1mg PO      DULoxetine (CYMBALTA) 30 MG capsule       ERGOCALCIFEROL, VITAMIN D2, (VITAMIN D ORAL) Take 1.25 mg by mouth every Wednesday.       ferrous sulfate 325 mg (65 mg iron) Tab tablet Take 325 mg by  "mouth once daily.      FLUoxetine (PROZAC) 20 MG capsule Take 20 mg by mouth once daily.       furosemide (LASIX) 20 MG tablet Take 20 mg by mouth 3 (three) times a week. Pt takes medication Mon, Wed, and Sat      gabapentin (NEURONTIN) 800 MG tablet Take 800 mg by mouth 2 (two) times daily. At lunch and hour of sleep      lamoTRIgine (LAMICTAL) 150 MG Tab Take 150 mg by mouth 2 (two) times daily.       latanoprost 0.005 % ophthalmic solution Place 1 drop into both eyes every evening.       LEVEMIR FLEXTOUCH 100 unit/mL (3 mL) InPn pen Inject 40 Units into the skin 2 (two) times daily.       levothyroxine (SYNTHROID) 150 MCG tablet Take 150 mcg by mouth before breakfast.       LINZESS 145 mcg Cap capsule Take 145 mcg by mouth once daily.       losartan-hydrochlorothiazide 50-12.5 mg (HYZAAR) 50-12.5 mg per tablet Take 1 tablet by mouth once daily.       meclizine (ANTIVERT) 25 mg tablet Take by mouth 3 (three) times daily as needed.       methocarbamol (ROBAXIN) 500 MG Tab Take 500 mg by mouth 3 (three) times daily.       NOVOFINE AUTOCOVER 30 gauge x 1/3" Ndle       NOVOLOG FLEXPEN 100 unit/mL InPn pen       pantoprazole (PROTONIX) 40 MG tablet Take 40 mg by mouth.       polyethylene glycol (GLYCOLAX) 17 gram PwPk Take by mouth daily as needed.      ropinirole (REQUIP) 4 MG tablet Take 4 mg by mouth 2 (two) times daily.       trazodone (DESYREL) 150 MG tablet Take 150 mg by mouth nightly.       PREMARIN vaginal cream Place 0.5 g vaginally 3 (three) times a week.  Qty: 30 g, Refills: prn      PROAIR HFA 90 mcg/actuation inhaler          STOP taking these medications       ibuprofen (ADVIL,MOTRIN) 200 MG tablet Comments:   Reason for Stopping:         oxycodone (ROXICODONE) 10 mg Tab immediate release tablet Comments:   Reason for Stopping:             Discharge Procedure Orders   Diet diabetic     Diet Cardiac     Other restrictions (specify):   Order Comments: PLEASE OBSERVE FALL PRECAUTIONS     Call MD for: "   Order Comments: For worsening symptoms, chest pain, shortness of breath, increased abdominal pain, high grade fever, stroke or stroke like symptoms, immediately go to the nearest Emergency Room or call 911 as soon as possible.     Follow-up Information     Yariel Marin MD On 10/2/2018.    Specialties:  Orthopedic Surgery, Surgery, Sports Medicine  Why:  @1:45pm   Contact information:  [address]  [address]  [facility] [address]  [phone]             Delio Melendez MD In 1 week.    Specialty:  Family Medicine  Contact information:  [address]  [address]  [facility]  [place] [address]  [phone]

## 2018-09-19 NOTE — PT/OT/SLP PROGRESS
Physical Therapy Treatment    Patient Name:  Alanis Mendieta   MRN:  [id number]    Recommendations:     Discharge Recommendations:  nursing facility, skilled       Assessment:     Alanis Mendieta is a 67 y.o. female admitted with a medical diagnosis of S/P total knee arthroplasty, left.  She presents with the following impairments/functional limitations:  weakness, impaired endurance, impaired self care skills, impaired functional mobilty, gait instability, impaired balance, decreased lower extremity function, decreased safety awareness, pain, decreased ROM, impaired joint extensibility, orthopedic precautions .    Rehab Prognosis:  fair; patient would benefit from acute skilled PT services to address these deficits and reach maximum level of function.      Recent Surgery: Procedure(s) (LRB):  ARTHROPLASTY, KNEE (Left) 2 Days Post-Op    Plan:     During this hospitalization, patient to be seen BID to address the above listed problems via gait training, therapeutic exercises, therapeutic activities  · Plan of Care Expires:  10/02/18   Plan of Care Reviewed with: patient    Subjective     Communicated with nurse Lizzie prior to session.  Patient found seated in chair upon PT entry to room, agreeable to treatment.      Chief Complaint: still feeling constipated despite having a BM this AM.  Patient comments/goals: ready to mobilize and return BTB  Pain/Comfort:  · Pain Rating 1: (did not rate)  · Location - Side 1: Left  · Location 1: knee  · Pain Addressed 1: Pre-medicate for activity, Nurse notified    Patients cultural, spiritual, Evangelical conflicts given the current situation: none    Objective:     Patient found with: peripheral IV, oxygen     General Precautions: Standard, fall   Orthopedic Precautions:LLE weight bearing as tolerated   Braces: N/A     Functional Mobility:  · Bed Mobility:     · Scooting: minimum assistance  · Sit to Supine: moderate assistance    · Transfers:     · Sit to Stand:  moderate  assistance with rolling walker    · Gait: 8-9' BTB with min A using RW. slow pace with decreased step-stride length        Therapeutic Activities and Exercises:   pt assisted BTB and repositioned for comfort.     Patient left supine with all lines intact, call button in reach and nurse Lizzie notified..    GOALS:   Multidisciplinary Problems     Physical Therapy Goals        Problem: Physical Therapy Goal    Goal Priority Disciplines Outcome Goal Variances Interventions   Physical Therapy Goal     PT, PT/OT Ongoing (interventions implemented as appropriate)                     Time Tracking:     PT Received On: 09/19/18  PT Start Time: 1315     PT Stop Time: 1335  PT Total Time (min): 20 min     Billable Minutes: Gait Training 8 and Therapeutic Activity 12    Treatment Type: Treatment  PT/PTA: PTA     PTA Visit Number: 1     Xochilt Ramirez PTA  09/19/2018

## 2018-10-02 ENCOUNTER — OFFICE VISIT (OUTPATIENT)
Dept: ORTHOPEDICS | Facility: CLINIC | Age: 68
End: 2018-10-02
Payer: MEDICAID

## 2018-10-02 VITALS
HEIGHT: 64 IN | WEIGHT: 232 LBS | SYSTOLIC BLOOD PRESSURE: 130 MMHG | DIASTOLIC BLOOD PRESSURE: 80 MMHG | BODY MASS INDEX: 39.61 KG/M2

## 2018-10-02 DIAGNOSIS — Z96.652 STATUS POST LEFT KNEE REPLACEMENT: Primary | ICD-10-CM

## 2018-10-02 PROCEDURE — 99024 POSTOP FOLLOW-UP VISIT: CPT | Mod: ,,, | Performed by: ORTHOPAEDIC SURGERY

## 2018-10-02 RX ORDER — POLYETHYLENE GLYCOL 3350 17 G/17G
POWDER, FOR SOLUTION ORAL
COMMUNITY
Start: 2018-09-04 | End: 2018-10-02 | Stop reason: SDUPTHER

## 2018-10-02 RX ORDER — DULOXETIN HYDROCHLORIDE 60 MG/1
60 CAPSULE, DELAYED RELEASE ORAL DAILY
COMMUNITY
Start: 2018-08-23 | End: 2020-01-14 | Stop reason: SDUPTHER

## 2018-10-02 RX ORDER — CLONAZEPAM 0.5 MG/1
TABLET ORAL
COMMUNITY
Start: 2018-09-14 | End: 2018-10-02

## 2018-10-02 NOTE — PROGRESS NOTES
[facility] POST-OP NOTE    Subjective:           Chief Complaint:   Chief Complaint   Patient presents with    Left Knee - Post-op Evaluation     : L-TKA on 9/17/18. Left knee soreness and stiffness. She does get a lot of P.T       Past Medical History:   Diagnosis Date    Allergy     Anxiety     Arthritis     Asthma     Bipolar disorder     Cancer     thyroid    Chronic back pain     COPD (chronic obstructive pulmonary disease)     Diabetes mellitus     Diabetes mellitus, type 2     Fibromyalgia     GERD (gastroesophageal reflux disease)     [place] (hard of hearing)     Hx of thyroid cancer     Hyperlipidemia     Hypertension     Hypothyroidism     Neuropathy     On home oxygen therapy     3 l/m 24/7    Restless leg syndrome     Sleep apnea     Urinary tract infection        Past Surgical History:   Procedure Laterality Date    APPENDECTOMY      ARTHROPLASTY, KNEE Left 9/17/2018    Performed by Yariel Marin MD at [facility] OR    BACK SURGERY      x 3    broken foot and ankle      CHOLECYSTECTOMY      CYSTOSCOPY  4/26/2018    Performed by Faith Strong MD at [facility] OR    EYE SURGERY Bilateral     cataract    HYSTERECTOMY      KNEE ARTHROPLASTY Left 9/17/2018    Procedure: ARTHROPLASTY, KNEE;  Surgeon: Yariel Marin MD;  Location: [facility] OR;  Service: Orthopedics;  Laterality: Left;    mid urethral sling N/A 4/26/2018    Performed by Faith Strong MD at [facility] OR    POSTERIOR REPAIR      REPAIR POSTERIOR N/A 4/26/2018    Performed by Mark Townsend DO at [facility] OR    SPINAL FUSION      x3 BACK, NECK X 4    TOTAL THYROIDECTOMY  2014       Current Outpatient Medications   Medication Sig    ANORO ELLIPTA 62.5-25 mcg/actuation DsDv Inhale into the lungs once daily. Inhale x 2    aspirin 325 MG tablet Take 1 tablet (325 mg total) by mouth 2 (two) times daily.    calcitRIOL (ROCALTROL) 0.5 MCG Cap Take 0.5 mcg by mouth once daily.     clonazePAM (KLONOPIN)  "1 MG tablet Take 1 mg by mouth 2 (two) times daily. In the am pt takes 0.5mg PO  In the evening pt takes 1mg PO    DULoxetine (CYMBALTA) 30 MG capsule     DULoxetine (CYMBALTA) 60 MG capsule     ERGOCALCIFEROL, VITAMIN D2, (VITAMIN D ORAL) Take 1.25 mg by mouth every Wednesday.     ferrous sulfate 325 mg (65 mg iron) Tab tablet Take 325 mg by mouth once daily.    FLUoxetine (PROZAC) 20 MG capsule Take 20 mg by mouth once daily.     furosemide (LASIX) 20 MG tablet Take 20 mg by mouth 3 (three) times a week. Pt takes medication Mon, Wed, and Sat    gabapentin (NEURONTIN) 800 MG tablet Take 800 mg by mouth 2 (two) times daily. At lunch and hour of sleep    lamoTRIgine (LAMICTAL) 150 MG Tab Take 150 mg by mouth 2 (two) times daily.     latanoprost 0.005 % ophthalmic solution Place 1 drop into both eyes every evening.     LEVEMIR FLEXTOUCH 100 unit/mL (3 mL) InPn pen Inject 40 Units into the skin 2 (two) times daily.     levothyroxine (SYNTHROID) 150 MCG tablet Take 150 mcg by mouth before breakfast.     LINZESS 145 mcg Cap capsule Take 145 mcg by mouth once daily.     losartan-hydrochlorothiazide 50-12.5 mg (HYZAAR) 50-12.5 mg per tablet Take 1 tablet by mouth once daily.     meclizine (ANTIVERT) 25 mg tablet Take by mouth 3 (three) times daily as needed.     methocarbamol (ROBAXIN) 500 MG Tab Take 500 mg by mouth 3 (three) times daily.     NOVOFINE AUTOCOVER 30 gauge x 1/3" Ndle     NOVOLOG FLEXPEN 100 unit/mL InPn pen     oxyCODONE-acetaminophen (PERCOCET) 7.5-325 mg per tablet Take 1 tablet by mouth every 4 (four) hours as needed for Pain.    pantoprazole (PROTONIX) 40 MG tablet Take 40 mg by mouth.     polyethylene glycol (GLYCOLAX) 17 gram PwPk Take by mouth daily as needed.    PREMARIN vaginal cream Place 0.5 g vaginally 3 (three) times a week.    PROAIR HFA 90 mcg/actuation inhaler     ropinirole (REQUIP) 4 MG tablet Take 4 mg by mouth 2 (two) times daily.     trazodone (DESYREL) 150 MG " tablet Take 150 mg by mouth nightly.      No current facility-administered medications for this visit.        Review of patient's allergies indicates:   Allergen Reactions    Morphine Itching    Tubersol [tuberculin ppd] Other (See Comments)     Site swells bad. Has to have chest xray       Family History   Problem Relation Age of Onset    Diabetes Mother     Heart disease Mother     Hypertension Mother     Diabetes Father     Heart disease Father     Hypertension Father        Social History     Socioeconomic History    Marital status:      Spouse name: Not on file    Number of children: Not on file    Years of education: Not on file    Highest education level: Not on file   Social Needs    Financial resource strain: Not on file    Food insecurity - worry: Not on file    Food insecurity - inability: Not on file    Transportation needs - medical: Not on file    Transportation needs - non-medical: Not on file   Occupational History    Not on file   Tobacco Use    Smoking status: Former Smoker     Packs/day: 1.00     Years: 30.00     Pack years: 30.00     Types: Cigarettes     Last attempt to quit: 2000     Years since quittin.4    Smokeless tobacco: Never Used   Substance and Sexual Activity    Alcohol use: No    Drug use: No    Sexual activity: No   Other Topics Concern    Not on file   Social History Narrative    Not on file       History of present illness: Patient returns status post left total knee. She is doing very well.      Review of Systems:    Musculoskeletal:  See HPI      Objective:        Physical Examination:    Vital Signs:    Vitals:    10/02/18 1447   BP: 130/80       Body mass index is 39.82 kg/m².    This a well-developed, well nourished patient in no acute distress.  They are alert and oriented and cooperative to examination.        Wounds are clean dry and intact. Calf is soft.  Pertinent New Results:    XRAY Report / Interpretation:   No new XRAYS  Today.    Assessment/Plan:      Stable following left total knee. Follow-up in 4 weeks. Weightbearing as tolerated. Continue aspirin.    This note was created using Dragon voice recognition software that occasionally misinterpreted phrases or words.

## 2018-10-11 ENCOUNTER — APPOINTMENT (OUTPATIENT)
Dept: LAB | Facility: HOSPITAL | Age: 68
End: 2018-10-11
Attending: UROLOGY
Payer: MEDICARE

## 2018-10-11 ENCOUNTER — OFFICE VISIT (OUTPATIENT)
Dept: UROLOGY | Facility: CLINIC | Age: 68
End: 2018-10-11
Payer: MEDICARE

## 2018-10-11 ENCOUNTER — TELEPHONE (OUTPATIENT)
Dept: UROLOGY | Facility: CLINIC | Age: 68
End: 2018-10-11

## 2018-10-11 VITALS
WEIGHT: 236 LBS | SYSTOLIC BLOOD PRESSURE: 109 MMHG | HEIGHT: 64 IN | DIASTOLIC BLOOD PRESSURE: 65 MMHG | HEART RATE: 78 BPM | BODY MASS INDEX: 40.29 KG/M2

## 2018-10-11 DIAGNOSIS — N81.9 FEMALE GENITAL PROLAPSE, UNSPECIFIED TYPE: ICD-10-CM

## 2018-10-11 DIAGNOSIS — N39.46 MIXED INCONTINENCE: ICD-10-CM

## 2018-10-11 DIAGNOSIS — K76.9 LIVER LESION: ICD-10-CM

## 2018-10-11 DIAGNOSIS — R82.71 ASYMPTOMATIC BACTERIURIA: ICD-10-CM

## 2018-10-11 DIAGNOSIS — N39.0 RECURRENT UTI: Primary | ICD-10-CM

## 2018-10-11 DIAGNOSIS — N81.10 PELVIC ORGAN PROLAPSE QUANTIFICATION STAGE 1 CYSTOCELE: ICD-10-CM

## 2018-10-11 DIAGNOSIS — N32.81 OAB (OVERACTIVE BLADDER): ICD-10-CM

## 2018-10-11 LAB
BILIRUB UR QL STRIP: NEGATIVE
CLARITY UR: CLEAR
COLOR UR: YELLOW
GLUCOSE UR QL STRIP: NEGATIVE
HGB UR QL STRIP: NEGATIVE
KETONES UR QL STRIP: NEGATIVE
LEUKOCYTE ESTERASE UR QL STRIP: NEGATIVE
NITRITE UR QL STRIP: NEGATIVE
PH UR STRIP: 7 [PH] (ref 5–8)
PROT UR QL STRIP: NEGATIVE
SP GR UR STRIP: 1.01 (ref 1–1.03)
URN SPEC COLLECT METH UR: NORMAL
UROBILINOGEN UR STRIP-ACNC: NEGATIVE EU/DL

## 2018-10-11 PROCEDURE — 81003 URINALYSIS AUTO W/O SCOPE: CPT

## 2018-10-11 PROCEDURE — 99215 OFFICE O/P EST HI 40 MIN: CPT | Mod: S$PBB,25,, | Performed by: UROLOGY

## 2018-10-11 PROCEDURE — 87088 URINE BACTERIA CULTURE: CPT

## 2018-10-11 PROCEDURE — 51725 SIMPLE CYSTOMETROGRAM: CPT | Mod: PBBFAC,PN | Performed by: UROLOGY

## 2018-10-11 PROCEDURE — 51725 SIMPLE CYSTOMETROGRAM: CPT | Mod: 26,S$PBB,, | Performed by: UROLOGY

## 2018-10-11 PROCEDURE — 99999 PR PBB SHADOW E&M-EST. PATIENT-LVL III: CPT | Mod: PBBFAC,,, | Performed by: UROLOGY

## 2018-10-11 PROCEDURE — 87086 URINE CULTURE/COLONY COUNT: CPT

## 2018-10-11 PROCEDURE — 87077 CULTURE AEROBIC IDENTIFY: CPT

## 2018-10-11 PROCEDURE — 87186 SC STD MICRODIL/AGAR DIL: CPT

## 2018-10-11 PROCEDURE — 99213 OFFICE O/P EST LOW 20 MIN: CPT | Mod: PBBFAC,PN | Performed by: UROLOGY

## 2018-10-11 NOTE — TELEPHONE ENCOUNTER
----- Message from Faith Strong MD sent at 10/11/2018 12:50 PM CDT -----  Please find out if nursing home has already doine an abdominal ultrasound for her liver lesions and if she has, then doesn't need the one I ordered

## 2018-10-11 NOTE — PROGRESS NOTES
[facility] - [place]  Staff: MD Ligia  PCP: Jesus Melendez-raf pt    [facility] inactive    Chief Complaint: recurrent uti, mixed incontinence    Subjective:        HPI: Alanis Mendieta is a 67 y.o. female presents with     Resident of [facility]  Pt was an LPN     Recurrent uti  - Likely related to pad use. Last pvr by in and out cath was 10cc  - Sx include: dysuria with voiding  - RF: pads, diabetic hba1c 7.3  - Mild renal cortical thinning. Otherwise unremarkable exam.  - pt was supposed to have a pain pump on 3/16/18 (week ago) and was to have pain pump but found to have UTI. Pain pump in lower back. She was started on cipro and has been on this for a week. She says her urine is less foul smelling and she has less burning. Still has not received pain pump.   -Spoke with  and explained situation. UTI unlikley to clear. He will check urine 10-14d prior to her procedure and treat her with culture romeo abx for 7d prior to procedure and keep her on it for 3 more days. And if it appears contaminated rec'd cath urine. Never had her pain pump placed.   -was supposed to hae knee surgery with  on 8/13/18 but cancelled however eventually underwent this on abx.   -started on premarin cream with applicator every night but they stopped after 2 weeks.   -last a1c 4/17/18 6.6    Returns today and states she is No longer using premarin cream. She's had 1 other uti since I last saw her. This was treated bc of knee surgery.     RF: pad usage, atrophic vaginitis, diabetes      UA cath: sent for urinalysis and culture, pvr by I &o: 60cc clear urine on ampicillin  UCx:   8/9/18  E.coli- tx for knee surgery  7/30/18 E.coli, void:1+leuk/nit+, 11 wbc  6/7/18  NG, cath: 2+glucose, void: 1+wbc/tr protein/2+glucose/tr blood, pvr by I&o: 60cc   5/24/18 E.Coli, void: nit+/2_wbc/250 glucose/50 blood   4/12/18 E.coli, void: 1+wbc  3/27/18 E.coli resistant to cipro, doxy, bactrim.  sens to augmentin.  3/23/18 E.coli, void:2+/leuk/nit+/trace blood -some burning  18 E.coli  17 E.coli   17 E.coli, void: 2+leukocyte/nitrite, voided   17 E.coli  17  k.pneumoniae, cath, nitrite +  17   aerococcus, 100k, voided, tr leuk    oab/mixed incontinence/anterior and posterior prolapse  -  -fell in 12/15 and says she had significant incontinence after, but says her incontinence was present for many years before surgery.  +WILLIAM and UUI. had c-spine surgery a year ago and says sx have increased. She states she does not feel the urge to go many times.  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   - 17 started on myrbetriq  - 17 frequency had decreased. Prior to the myrbetriq she was going 10x a day, now about 6x a day. Down to 1 pad from 6 or 7 pads a day. Denies any WILLIAM. + constipation (q3 to 4 days and takes Linzess). DM x 17 years. +stress test with large volume, I&o:20cc incontinence. But poor candidate for surgery. hba1c 8.9  -18 seen by  and found to have symptomatic stage 2 A/P prolapse. Failed pessary trial. Wanted to proceed with surgery. However she broke her foot and ankle when she was walking with her walker  She was started on hormone cream but pt states she can't reach but a nurse should be able to. Last hba1c  7.7  -seen 3/28/18 and up to 6 depends a day. She has a uti again. Not using estrace. Still drinking 4 cups of coffee a day. Voids in the toilet.  -18 here today for pre-op and wearing 2 diapers at a time. -still has significant constipation despite being on Linzess. Last bm was 5 days ago. She has to splint to empty.   -18 underwent [facility] by me and enterocele repair, posterior colporapphy and perineopharry   -6/1/18 seen by  and pvr by 80.   -seen 18 wearing 6 depends/diapers per day and mostly dry. She has urge incontinence with  large squirt on the way to the bathroom. Voids every 2 hours in the am but waits to go to the bathroom in the evening. She also uses lasix 3x a day.  She's been having a soft bm every day since being on miralax 1 cap and linzess daily. She's using premarin cream 3x a day. We continued myrbetriq.  -seen 8/9/18 have explosive BMs 3 to 4x a day on linzess and 1 cap of miralax a day. She denies any urgency or urge incontinence and also denies any stress incontinence since I've seen her during the day. She is able to ambulate/get to the restroom during the day, whereas she used to void or leak in her diaper. She is still on myrbetriq. Her main issue is night time bedwetting. Her in and out cath was 200cc after voiding 300cc. Mild prolapse. Does state she has noticed a slow flow. Discontinued myrbetriq. Had ordered a ctu for recurrent uti and showed mild hydro on right to possible R UPJo and mild b pelvic caliectasis. Also showed some liver lesions that needed further evaluation. Pt stated she did not get bc she cannot get mris.     Returns today and has since had knee surgery. She states she has urge to void every 1 hour and leaks before she can make it to the restroom. Wearing 7-10 depends a day. She denies any stress incontinence. She has a soft bm daily usually. She states she had dysuria and uti last week and started on ampicillin 2 days ago. Negative stress test today with 150cc in bladder, 60cc residual and no mesh palpated. No recurrence of prolapse.    RF: back issues, diabetes and primary OAB    ECOG Status: 1 - in a wheelchair but can walk with assistance. She is on O2 for copd. Resident of [facility] after c-spine surgery. She's been there for a year and plan is for her to leave.    G3, P 2, vaginal   Gross HematuriaYes - >5 years ago  History of UTI: yes    REVIEW OF SYSTEMS:  General ROS: no fevers, no chills  Psychological ROS: no depression  Endocrine ROS: no heat or cold  Respiratory ROS: +  SOB  Cardiovascular ROS: no CP  Gastrointestinal ROS: no abdominal pain, + constipation, no diarrhea, +BRBPR with tearing. Musculoskeletal ROS: no muscle pain  Neurological ROS: no headaches  Dermatological ROS: no rashes  HEENT: noiglasses, no sinus    ROS: per HPI    Past medical, surgical, social and family hx have been reviewed. There have not any changes.     Allergies:  Morphine and Tubersol [tuberculin ppd]    Medications: Myrbetriq 50mg daily  pain medicine  Anticoagulation: No    Objective:     Vitals:    10/11/18 1125   BP: 109/65   Pulse: 78       General:WDWN in NAD  Eyes: PERRLA, normal conjunctiva  Respiratory: no increased work on breathing, clear to auscultation, on 2LO2  Cardiovascular: regular rate and rhythm. No obvious extremity edema.  GI: no palpation of masses. No tenderness. No hepatosplenomegaly to palpation.  Musculoskeletal: normal range of motion of bilateral upper extremities. Normal muscle strength and tone.  Skin: no obvious rashes or lesions. No tightening of skin noted.  Neurologic: CN grossly normal. Normal sensation.   Psychiatric: awake, alert and oriented x 3. Mood and affect normal. Cooperative.     Pelvic exam 7/13/17:  negative stress test with coughing  In and out cath performed with 20 residual  Bladder filled to 150 very slowly  Sensation to void felt at 120cc  Catheter removed  +large volume stress test with coughing but not with valsava  Had pt stand and she had large volume leakage with coughing and valsava  No prolapse  Stool palpable in vault  +severe atrophic vaginitis    Pelvic exam 6/8/18 (s/p sling and repair):  negative stress test with coughing supine, negative stress test with valsalva supine  In and out cath performed with 60cc residual - urine was sent for ua and culture sent for sample  Bladder filled to 150 cc  Sensation to void felt at 100 cc  Catheter removed  Negative stress test with coughing supine, negative stress test with valsalva supine  Negative  stress test with coughing or valsalva  Standing  No prolapse  Mesh not exposed     Pelvic exam today:  negative stress test with coughing supine, negative stress test with valsalva supine  In and out cath performed with 60cc residual - urine was sent for sample  Bladder filled to 150 cc  Sensation to void felt at 60 cc  Catheter removed  Negative stress test with coughing supine, negative stress test with valsalva supine  Did not check standing, hard for pt to stand on her own    No prolapse noted and no mesh palpated         LABS REVIEW:      Labs reviewed 7/13/17  Glucose 113  Cr 0  0.8  H/H 14.4/42.4  Hba1c 8.9    3/19/18 a1c 7.3    Cr:   Lab Results   Component Value Date    CREATININE 0.7 09/19/2018       PATHOLOGY REVIEW:  Vagina, excision 4/26/18:  Benign nonkeratinizing squamous mucosa without dysplasia.    RADIOGRAPHIC REVIEW:  ctu 8/14/18  Radiology read: Mild bilateral pelvicaliectasis.  No urolithiasis or mass identified.  Hepatic lipoma or angiomyolipoma, and additional indeterminate right hepatic lobe lesions.  Further evaluation of the indeterminate lesions with liver MRI with without contrast is recommended.  My read: bilateral hydro to possible right upjo with no obvious crossing vessel but with an obvious transition point at upjo and left to proximal ureter without obvious transition point or crossing vessel. No filling defect seen in left side. Right side not completely opacified.  Pt cannot MRI     rbus 5/23/17  No stone  No hydro  Bladder moderately distended      Assessment:       1. Recurrent UTI    2. Asymptomatic bacteriuria    3. Mixed incontinence    4. OAB (overactive bladder)    5. Pelvic organ prolapse quantification stage 1 cystocele    6. Female genital prolapse, unspecified type    7. Liver lesion          Plan:     Recurrent uti/asymptomatic bacteriuria/atrophic vaginitis.   -sending catheterized urine for ua and culture but will not treat if +  -discontinue ampicillin since no  longer having symptoms of uti.    -restart premarin cream insert 1 g with applicator vaginally 2x weekly for atrophic vaginitis, written prescription given today   -will likely to continue have uti's bc of depends however recommend NO TREATMENT UNLESS having burning with urination that does not improve with fluid intake and azo over the counter tablets.   -start a probiotic and cranberry tablet or probiotic with cranberry, written prescription given today although this is over the counter.   -risk factors for recurrent uti:  depends usage, diabetes, atrophic vaginitis (lack of hormones), possible right upjo. Consider ultrasound or  mag3 in future.     Mixed incontinence (leakage) and OAB with Anterior and Posterior Prolapse s/p mid urethral sling and enterocele and posterior repair on 4/26/18  Having functional (unable to make it to restroom bc inability to pull down pants or walk fast) and urge incontinence. No longer having stress urinary incontinence, negative stress test with 150cc today. Residual only 60cc today.   -no prolapse recurrence   -restart myrbetriq 50mg daily for overactive bladder and urge incontinence. Written prescription given today.   -Repair of cystocele and stress incontinence should have improved overactive bladder symptoms by now.   Only having nocturnal enuresis, not much we can do for this.   -continue linzess    Liver lesions  -seen on ctu, was supposed to have evaluated  with MRI but could not get mri. Pt states that she did not get an US either, so will proceed with abdominal US but have  f/u results. Will also send a copy of today's note and a copy of her ctu to dr jara.   -ultimate;ly patient also needs to make sure she discusses this with     Follow up with urology NP in 3 months for   -pvr by in and out cath and ensure residual is not increasing on myrbetriq.   -see if OAB and incontinence has improved  -vaginal exam to ensure no mesh palpable ,  -make sure she is  not getting antibiotics all the time unless necessary  -make sure she is on myrbetriq  -make sure she is getting premarin cream 2x weekly  -follow-up abdominal ultrasound and make sure  has a copy of results and has reviewed this with her.     Follow-up with me in 6 months for in and out cath and vaginal exam to check for mesh.     Instructions for patient and nursing home:      1. Start myrbetriq 50mg nightly for overactive bladder  2. Discontinue ampicillin  3. Restart premarin cream with applicator. 1 g vaginally twice a week to prevent recurrent uti and develop good tissue over mesh  4. Start a probiotic with cranberry, over the counter any kind to prevent recurrent uti  5. Abdominal ultrasound to look at liver lesions- DISCUSS results with   6. Follow-up in 3 months with urology nurse practitioner for repeat vaginal exam  7. Follow up in 6months with me     I spent 60 minutes with the patient of which more than half was spent in direct consultation with the patient in regards to our treatment and plan.      Faith Strong MD

## 2018-10-11 NOTE — TELEPHONE ENCOUNTER
Spoke with nurse with raf she states patient has never had a abdominal ultrasound and is working on getting patient scheduled to have one

## 2018-10-11 NOTE — PATIENT INSTRUCTIONS
Instructions for patient and nursing home:      1. Start myrbetriq 50mg nightly for overactive bladder  2. Discontinue ampicillin  3. Restart premarin cream with applicator. 1 g vaginally twice a week to prevent recurrent uti and develop good tissue over mesh  4. Start a probiotic with cranberry, over the counter any kind to prevent recurrent uti  5. Abdominal ultrasound to look at liver lesions- DISCUSS results with   6. Follow-up in 3 months with urology nurse practitioner for repeat vaginal exam  7. Follow up in 6months with me       Recurrent uti/asymptomatic bacteriuria/atrophic vaginitis.   -sending catheterized urine for ua and culture but will not treat if +  -discontinue ampicillin since no longer having symptoms of uti.    -restart premarin cream insert 1 g with applicator vaginally 2x weekly for atrophic vaginitis, written prescription given today   -will likely to continue have uti's bc of depends however recommend NO TREATMENT UNLESS having burning with urination that does not improve with fluid intake and azo over the counter tablets.   -start a probiotic and cranberry tablet or probiotic with cranberry, written prescription given today although this is over the counter.   -risk factors for recurrent uti:  depends usage, diabetes, atrophic vaginitis (lack of hormones), possible right upjo. Consider ultrasound or  mag3 in future.     Mixed incontinence (leakage) and OAB with Anterior and Posterior Prolapse s/p mid urethral sling and enterocele and posterior repair on 4/26/18  Having functional (unable to make it to restroom bc inability to pull down pants or walk fast) and urge incontinence. No longer having stress urinary incontinence, negative stress test with 150cc today. Residual only 60cc today.   -no prolapse recurrence   -restart myrbetriq 50mg daily for overactive bladder and urge incontinence. Written prescription given today.   -Repair of cystocele and stress incontinence should have  improved overactive bladder symptoms by now.   Only having nocturnal enuresis, not much we can do for this.   -continue linzess    Liver lesions  -seen on ctu, was supposed to have evaluated  with MRI but could not get mri. Pt states that she did not get an US either, so will proceed with abdominal US but have  f/u results. Will also send a copy of today's note and a copy of her ctu to dr jara.   -ultimate;ly patient also needs to make sure she discusses this with     Follow up with urology NP in 3 months for   -pvr by in and out cath and ensure residual is not increasing on myrbetriq.   -see if OAB and incontinence has improved  -vaginal exam to ensure no mesh palpable ,  -make sure she is not getting antibiotics all the time unless necessary  -make sure she is on myrbetriq  -make sure she is getting premarin cream 2x weekly  -follow-up abdominal ultrasound and make sure  has a copy of results and has reviewed this with her.     Follow-up with me in 6 months for in and out cath and vaginal exam to check for mesh.

## 2018-10-15 LAB — BACTERIA UR CULT: NORMAL

## 2018-10-19 NOTE — TELEPHONE ENCOUNTER
"Please find out who her pcp is? If it is  then please send my last note and a copy of her last ctu read and add at the end-"I  tried to order ultrasound, have had difficulty. Was being ordered for "liver lesions" and I would like him to f/u or refer to GI if he thinks appropriate. "  "

## 2018-10-25 ENCOUNTER — HOSPITAL ENCOUNTER (OUTPATIENT)
Dept: RADIOLOGY | Facility: HOSPITAL | Age: 68
Discharge: HOME | End: 2018-10-25
Attending: UROLOGY
Payer: MEDICARE

## 2018-10-25 DIAGNOSIS — K76.9 LIVER LESION: ICD-10-CM

## 2018-10-25 PROCEDURE — 76700 US EXAM ABDOM COMPLETE: CPT | Mod: TC

## 2018-10-25 PROCEDURE — 76700 US EXAM ABDOM COMPLETE: CPT | Mod: 26,,, | Performed by: RADIOLOGY

## 2018-10-30 ENCOUNTER — OFFICE VISIT (OUTPATIENT)
Dept: ORTHOPEDICS | Facility: CLINIC | Age: 68
End: 2018-10-30
Payer: MEDICARE

## 2018-10-30 VITALS
BODY MASS INDEX: 39.95 KG/M2 | DIASTOLIC BLOOD PRESSURE: 70 MMHG | WEIGHT: 234 LBS | SYSTOLIC BLOOD PRESSURE: 122 MMHG | HEIGHT: 64 IN

## 2018-10-30 DIAGNOSIS — Z96.652 S/P TOTAL KNEE ARTHROPLASTY, LEFT: Primary | ICD-10-CM

## 2018-10-30 PROCEDURE — 73560 X-RAY EXAM OF KNEE 1 OR 2: CPT | Mod: LT,,, | Performed by: ORTHOPAEDIC SURGERY

## 2018-10-30 PROCEDURE — 99024 POSTOP FOLLOW-UP VISIT: CPT | Mod: ,,, | Performed by: ORTHOPAEDIC SURGERY

## 2018-10-30 RX ORDER — POLYETHYLENE GLYCOL 3350 17 G/17G
POWDER, FOR SOLUTION ORAL
COMMUNITY
Start: 2018-10-29 | End: 2018-10-30 | Stop reason: SDUPTHER

## 2018-10-30 RX ORDER — CLONAZEPAM 0.5 MG/1
0.5 TABLET ORAL DAILY
COMMUNITY
Start: 2018-10-29 | End: 2020-01-22 | Stop reason: SDUPTHER

## 2018-10-30 RX ORDER — POTASSIUM CHLORIDE 750 MG/1
10 TABLET, EXTENDED RELEASE ORAL
COMMUNITY
Start: 2018-10-22 | End: 2020-01-02 | Stop reason: SDUPTHER

## 2018-10-30 NOTE — PROGRESS NOTES
[facility] POST-OP NOTE    Subjective:           Chief Complaint:   Chief Complaint   Patient presents with    Left Knee - Post-op Evaluation     Left TKA 9-17-18. She does have minor pain and she feels a clunking sound. She  is in P.T. She will be leaving to go back to [place] Saturday.        Past Medical History:   Diagnosis Date    Allergy     Anxiety     Arthritis     Asthma     Bipolar disorder     Cancer     thyroid    Chronic back pain     COPD (chronic obstructive pulmonary disease)     Diabetes mellitus     Diabetes mellitus, type 2     Fibromyalgia     GERD (gastroesophageal reflux disease)     [place] (hard of hearing)     Hx of thyroid cancer     Hyperlipidemia     Hypertension     Hypothyroidism     Neuropathy     On home oxygen therapy     3 l/m 24/7    Restless leg syndrome     Sleep apnea     Urinary tract infection        Past Surgical History:   Procedure Laterality Date    APPENDECTOMY      ARTHROPLASTY, KNEE Left 9/17/2018    Performed by [facility]iel [facility] at [facility] OR    BACK SURGERY      x 3    broken foot and ankle      CHOLECYSTECTOMY      CYSTOSCOPY  4/26/2018    Performed by Faith Strong [facility] at [facility] OR    EYE SURGERY Bilateral     cataract    HYSTERECTOMY      KNEE ARTHROPLASTY Left 9/17/2018    Procedure: ARTHROPLASTY, KNEE;  Surgeon: Yariel Marin MD;  Location: [facility] OR;  Service: Orthopedics;  Laterality: Left;    mid urethral sling N/A 4/26/2018    Performed by Faith [facility] at [facility] OR    POSTERIOR REPAIR      REPAIR POSTERIOR N/A 4/26/2018    Performed by Mark Townsend DO at [facility] OR    SPINAL FUSION      x3 BACK, NECK X 4    TOTAL THYROIDECTOMY  2014       Current Outpatient Medications   Medication Sig    ANORO ELLIPTA 62.5-25 mcg/actuation DsDv Inhale into the lungs once daily. Inhale x 2    calcitRIOL (ROCALTROL) 0.5 MCG Cap Take 0.5 mcg by mouth once daily.     clonazePAM (KLONOPIN) 0.5 MG tablet      "clonazePAM (KLONOPIN) 1 MG tablet Take 1 mg by mouth 2 (two) times daily. In the am pt takes 0.5mg PO  In the evening pt takes 1mg PO    conjugated estrogens (PREMARIN) vaginal cream Insert 1g 2x a week.    DULoxetine (CYMBALTA) 30 MG capsule     DULoxetine (CYMBALTA) 60 MG capsule     ERGOCALCIFEROL, VITAMIN D2, (VITAMIN D ORAL) Take 1.25 mg by mouth every Wednesday.     ferrous sulfate 325 mg (65 mg iron) Tab tablet Take 325 mg by mouth once daily.    FLUoxetine (PROZAC) 20 MG capsule Take 20 mg by mouth once daily.     furosemide (LASIX) 20 MG tablet Take 20 mg by mouth 3 (three) times a week. Pt takes medication Mon, Wed, and Sat    gabapentin (NEURONTIN) 800 MG tablet Take 800 mg by mouth 2 (two) times daily. At lunch and hour of sleep    lamoTRIgine (LAMICTAL) 150 MG Tab Take 150 mg by mouth 2 (two) times daily.     latanoprost 0.005 % ophthalmic solution Place 1 drop into both eyes every evening.     LEVEMIR FLEXTOUCH 100 unit/mL (3 mL) InPn pen Inject 40 Units into the skin 2 (two) times daily.     levothyroxine (SYNTHROID) 150 MCG tablet Take 150 mcg by mouth before breakfast.     LINZESS 145 mcg Cap capsule Take 145 mcg by mouth once daily.     losartan-hydrochlorothiazide 50-12.5 mg (HYZAAR) 50-12.5 mg per tablet Take 1 tablet by mouth once daily.     meclizine (ANTIVERT) 25 mg tablet Take by mouth 3 (three) times daily as needed.     methocarbamol (ROBAXIN) 500 MG Tab Take 500 mg by mouth 3 (three) times daily.     mirabegron (MYRBETRIQ) 50 mg Tb24 Take 1 tablet (50 mg total) by mouth once daily.    NOVOFINE AUTOCOVER 30 gauge x 1/3" Ndle     NOVOLOG FLEXPEN 100 unit/mL InPn pen     oxyCODONE-acetaminophen (PERCOCET) 7.5-325 mg per tablet Take 1 tablet by mouth every 4 (four) hours as needed for Pain.    pantoprazole (PROTONIX) 40 MG tablet Take 40 mg by mouth.     polyethylene glycol (GLYCOLAX) 17 gram PwPk Take by mouth daily as needed.    potassium chloride SA (K-DUR,KLOR-CON) " 10 MEQ tablet     PROAIR HFA 90 mcg/actuation inhaler     ropinirole (REQUIP) 4 MG tablet Take 4 mg by mouth 2 (two) times daily.     trazodone (DESYREL) 150 MG tablet Take 150 mg by mouth nightly.     aspirin 325 MG tablet Take 1 tablet (325 mg total) by mouth 2 (two) times daily.     No current facility-administered medications for this visit.        Review of patient's allergies indicates:   Allergen Reactions    Morphine Itching    Tubersol [tuberculin ppd] Other (See Comments)     Site swells bad. Has to have chest xray       Family History   Problem Relation Age of Onset    Diabetes Mother     Heart disease Mother     Hypertension Mother     Diabetes Father     Heart disease Father     Hypertension Father        Social History     Socioeconomic History    Marital status:      Spouse name: Not on file    Number of children: Not on file    Years of education: Not on file    Highest education level: Not on file   Social Needs    Financial resource strain: Not on file    Food insecurity - worry: Not on file    Food insecurity - inability: Not on file    Transportation needs - medical: Not on file    Transportation needs - non-medical: Not on file   Occupational History    Not on file   Tobacco Use    Smoking status: Former Smoker     Packs/day: 1.00     Years: 30.00     Pack years: 30.00     Types: Cigarettes     Last attempt to quit: 2000     Years since quittin.4    Smokeless tobacco: Never Used   Substance and Sexual Activity    Alcohol use: No    Drug use: No    Sexual activity: No   Other Topics Concern    Not on file   Social History Narrative    Not on file       History of present illness: Patient comes in today for the left knee. She's doing very well. Her pain really is minimal. She takes an occasional pain pill at night.      Review of Systems:    Musculoskeletal:  See HPI      Objective:        Physical Examination:    Vital Signs:    Vitals:    10/30/18  1444   BP: 122/70       Body mass index is 40.17 kg/m².    This a well-developed, well nourished patient in no acute distress.  They are alert and oriented and cooperative to examination.        Wounds are clean dry and intact. Calf is soft. Range of motion is 0-120 degrees.  Pertinent New Results:    XRAY Report / Interpretation:   AP and lateral of the left knee demonstrate her left total knee arthroplasty be in ideal position    Assessment/Plan:      Stable following total knee arthroplasty. Continue strengthening. Follow-up when necessary    This note was created using Dragon voice recognition software that occasionally misinterpreted phrases or words.

## 2018-12-26 ENCOUNTER — HOSPITAL ENCOUNTER (EMERGENCY)
Facility: HOSPITAL | Age: 68
Discharge: HOME | End: 2018-12-26
Attending: EMERGENCY MEDICINE
Payer: MEDICARE

## 2018-12-26 VITALS
OXYGEN SATURATION: 96 % | HEART RATE: 79 BPM | DIASTOLIC BLOOD PRESSURE: 61 MMHG | TEMPERATURE: 98 F | SYSTOLIC BLOOD PRESSURE: 130 MMHG

## 2018-12-26 DIAGNOSIS — R52 PAIN: ICD-10-CM

## 2018-12-26 DIAGNOSIS — R25.2 MUSCLE CRAMPING: Primary | ICD-10-CM

## 2018-12-26 LAB
ALBUMIN SERPL BCP-MCNC: 3.3 G/DL
ALP SERPL-CCNC: 63 U/L
ALT SERPL W/O P-5'-P-CCNC: 18 U/L
ANION GAP SERPL CALC-SCNC: 6 MMOL/L
AST SERPL-CCNC: 19 U/L
BASOPHILS # BLD AUTO: 0 K/UL
BASOPHILS NFR BLD: 0.3 %
BILIRUB SERPL-MCNC: 0.2 MG/DL
BUN SERPL-MCNC: 11 MG/DL
CALCIUM SERPL-MCNC: 9.3 MG/DL
CHLORIDE SERPL-SCNC: 92 MMOL/L
CK SERPL-CCNC: 72 U/L
CO2 SERPL-SCNC: 41 MMOL/L
CREAT SERPL-MCNC: 0.8 MG/DL
DIFFERENTIAL METHOD: ABNORMAL
EOSINOPHIL # BLD AUTO: 0.2 K/UL
EOSINOPHIL NFR BLD: 3.3 %
ERYTHROCYTE [DISTWIDTH] IN BLOOD BY AUTOMATED COUNT: 14.8 %
EST. GFR  (AFRICAN AMERICAN): >60 ML/MIN/1.73 M^2
EST. GFR  (NON AFRICAN AMERICAN): >60 ML/MIN/1.73 M^2
GLUCOSE SERPL-MCNC: 183 MG/DL
HCT VFR BLD AUTO: 40.5 %
HGB BLD-MCNC: 13.3 G/DL
LYMPHOCYTES # BLD AUTO: 2.1 K/UL
LYMPHOCYTES NFR BLD: 34 %
MCH RBC QN AUTO: 29.9 PG
MCHC RBC AUTO-ENTMCNC: 32.8 G/DL
MCV RBC AUTO: 91 FL
MONOCYTES # BLD AUTO: 0.4 K/UL
MONOCYTES NFR BLD: 6.3 %
NEUTROPHILS # BLD AUTO: 3.4 K/UL
NEUTROPHILS NFR BLD: 56.1 %
PLATELET # BLD AUTO: 260 K/UL
PMV BLD AUTO: 8.6 FL
POTASSIUM SERPL-SCNC: 4.4 MMOL/L
PROT SERPL-MCNC: 6.7 G/DL
RBC # BLD AUTO: 4.45 M/UL
SODIUM SERPL-SCNC: 139 MMOL/L
TROPONIN I SERPL DL<=0.01 NG/ML-MCNC: <0.006 NG/ML
WBC # BLD AUTO: 6.1 K/UL

## 2018-12-26 PROCEDURE — 84484 ASSAY OF TROPONIN QUANT: CPT

## 2018-12-26 PROCEDURE — 93005 ELECTROCARDIOGRAM TRACING: CPT

## 2018-12-26 PROCEDURE — 99285 EMERGENCY DEPT VISIT HI MDM: CPT | Mod: 25

## 2018-12-26 PROCEDURE — 36415 COLL VENOUS BLD VENIPUNCTURE: CPT

## 2018-12-26 PROCEDURE — 80053 COMPREHEN METABOLIC PANEL: CPT

## 2018-12-26 PROCEDURE — 82550 ASSAY OF CK (CPK): CPT

## 2018-12-26 PROCEDURE — 85025 COMPLETE CBC W/AUTO DIFF WBC: CPT

## 2018-12-26 RX ORDER — TIZANIDINE 4 MG/1
4 TABLET ORAL EVERY 6 HOURS PRN
Qty: 20 TABLET | Refills: 0 | Status: SHIPPED | OUTPATIENT
Start: 2018-12-26 | End: 2019-01-05

## 2018-12-26 RX ORDER — OXYCODONE HYDROCHLORIDE 10 MG/1
10 TABLET ORAL EVERY 8 HOURS PRN
COMMUNITY
End: 2019-05-14 | Stop reason: SDUPTHER

## 2018-12-26 NOTE — ED PROVIDER NOTES
"Encounter Date: 12/26/2018    SCRIBE #1 NOTE: I, Sonam Mooney, am scribing for, and in the presence of, Dr. Nick Hernández.       History     Chief Complaint   Patient presents with    Chest Pain     Pt states that she has had "chest contractions for the last 3 years" according to EMS       Time seen by provider: 4:09 PM on 12/26/2018    Alanis Mendieta is a 67 y.o. female with DM type II, chronic back pain, HLD, COPD, UTI, fibromyalgia, neuropathy, HTN, restless leg syndrome, hypothyroidism, and a Hx of thyroid cancer who presents to the ED with an onset of constant "body contractions and jerking" that began 2 weeks ago. Pt has not taken any medication. She has had tremors in the past. Pt endorses dizziness and has chronic urinary incontinence. The patient denies any other symptoms at this time. No pertinent PSHx noted. Pt is a former smoker. Pt is allergic to Morphine and Tubersol.      The history is provided by the patient.     Review of patient's allergies indicates:   Allergen Reactions    Morphine Itching    Tubersol [tuberculin ppd] Other (See Comments)     Site swells bad. Has to have chest xray     Past Medical History:   Diagnosis Date    Allergy     Anxiety     Arthritis     Asthma     Bipolar disorder     Cancer     thyroid    Chronic back pain     COPD (chronic obstructive pulmonary disease)     Diabetes mellitus     Diabetes mellitus, type 2     Fibromyalgia     GERD (gastroesophageal reflux disease)     [place] (hard of hearing)     Hx of thyroid cancer     Hyperlipidemia     Hypertension     Hypothyroidism     Neuropathy     On home oxygen therapy     3 l/m 24/7    Restless leg syndrome     Sleep apnea     Urinary tract infection      Past Surgical History:   Procedure Laterality Date    APPENDECTOMY      ARTHROPLASTY, KNEE Left 9/17/2018    [facility]    BACK SURGERY      x 3    broken foot and ankle      CHOLECYSTECTOMY      " "CYSTOSCOPY  2018    [facility]    EYE SURGERY Bilateral     cataract    HYSTERECTOMY      mid urethral sling N/A 2018    Performed by [facility] at [facility] OR    POSTERIOR REPAIR      REPAIR POSTERIOR N/A 2018    Performed by [facility]send [facility] at [facility] OR    SPINAL FUSION      x3 BACK, NECK X 4    TOTAL THYROIDECTOMY       Family History   Problem Relation Age of Onset    Diabetes Mother     Heart disease Mother     Hypertension Mother     Diabetes Father     Heart disease Father     Hypertension Father      Social History     Tobacco Use    Smoking status: Former Smoker     Packs/day: 1.00     Years: 30.00     Pack years: 30.00     Types: Cigarettes     Last attempt to quit: 2000     Years since quittin.6    Smokeless tobacco: Never Used   Substance Use Topics    Alcohol use: No    Drug use: No     Review of Systems   Constitutional: Negative for fever.   HENT: Negative for sore throat.    Respiratory: Negative for shortness of breath.    Cardiovascular: Negative for chest pain.   Gastrointestinal: Negative for nausea.   Genitourinary: Positive for enuresis (Chronic). Negative for dysuria.   Musculoskeletal: Negative for back pain.   Skin: Negative for rash.   Neurological: Positive for dizziness. Negative for weakness.        Positive for "body contractions and jerking".   Hematological: Does not bruise/bleed easily.       Physical Exam     Initial Vitals [18 1442]   BP Pulse Resp Temp SpO2   139/63 81 -- 98.4 °F (36.9 °C) (!) 91 %      MAP       --         Physical Exam    Nursing note and vitals reviewed.  Constitutional: She appears well-developed and well-nourished.  Non-toxic appearance. No distress.   HENT:   Head: Normocephalic and atraumatic.   Mouth/Throat: Mucous membranes are dry.   Eyes: EOM are normal. Pupils are equal, round, and reactive to light.   Neck: Normal range of motion. Neck " supple. No neck rigidity. No JVD present.   Cardiovascular: Normal rate, regular rhythm, normal heart sounds and intact distal pulses. Exam reveals no gallop and no friction rub.    No murmur heard.  Pulmonary/Chest: Breath sounds normal. She has no wheezes. She has no rhonchi. She has no rales.   Abdominal: Soft. Bowel sounds are normal. She exhibits no distension. There is no tenderness. There is no rigidity, no rebound and no guarding. A hernia is present. Hernia confirmed positive in the ventral area.   Musculoskeletal: Normal range of motion.   Crepitus of the left knee with movement.    Neurological: She is alert and oriented to person, place, and time. She has normal strength and normal reflexes. No cranial nerve deficit or sensory deficit. She exhibits normal muscle tone. Coordination normal. GCS eye subscore is 4. GCS verbal subscore is 5. GCS motor subscore is 6.   5/5 strength.   Skin: Skin is warm and dry. No erythema.   Warmth noted to the left knee. No erythema noted to the left knee.   Psychiatric: She has a normal mood and affect. Her speech is normal and behavior is normal. She is not actively hallucinating.         ED Course   Procedures  Labs Reviewed   CBC W/ AUTO DIFFERENTIAL - Abnormal; Notable for the following components:       Result Value    RDW 14.8 (*)     MPV 8.6 (*)     All other components within normal limits   COMPREHENSIVE METABOLIC PANEL - Abnormal; Notable for the following components:    Chloride 92 (*)     CO2 41 (*)     Glucose 183 (*)     Albumin 3.3 (*)     Anion Gap 6 (*)     All other components within normal limits    Narrative:     CO2   critical result(s) called and verbal readback obtained from   RUMA Whatley, 12/26/2018 17:15   TROPONIN I   CK    Narrative:     Add on     EKG Readings: (Independently Interpreted)   Initial Reading: No STEMI. Rhythm: Normal Sinus Rhythm. Heart Rate: 82 bpm.   Prolonged QT. Non-specific T wave inversion in aVL.       Imaging  Results          X-Ray Chest AP Portable (Final result)  Result time 12/26/18 18:13:07   Procedure changed from X-Ray Chest PA And Lateral     Final result by Sanya Carey MD (12/26/18 18:13:07)                 Impression:      No acute cardiopulmonary abnormality.      Electronically signed by: Sanya Carey  Date:    12/26/2018  Time:    18:13             Narrative:    EXAMINATION:  XR CHEST AP PORTABLE    CLINICAL HISTORY:  chest pain;    TECHNIQUE:  Single frontal view of the chest was performed.    COMPARISON:  07/30/2018    FINDINGS:  Chronically elevated right hemidiaphragm.  Lungs are clear.  Normal size heart.  Aortic atherosclerosis.  No pleural effusion or pneumothorax.  Normal pulmonary vascular distribution.  Mild degenerative change of both AC and glenohumeral joints.  Partially imaged fusion hardware near the cervicothoracic junction.                                 Medical Decision Making:   History:   Old Medical Records: I decided to obtain old medical records.  Initial Assessment:   67-year-old woman presents for evaluation of cramping muscle pain all throughout.  States is in her abdomen, chest, arms and legs.  Sent over to the ER from evaluation.  No fever, no acute shortness of breath or other complaints.  Laboratory evaluation shows no evidence of acute renal insufficiency, grave electrolyte abnormalities or rhabdomyolysis.  Chest x-ray shows no acute cardiopulmonary abnormalities.  Cardiac workup negative.  Patient is stable for outpatient follow-up with her PCP.  She is discharged improved in no acute distress.  Return precautions discussed.  I will discharge her with muscle relaxants for her muscle cramps.  Clinical Tests:   Lab Tests: Ordered and Reviewed  Radiological Study: Ordered and Reviewed  Medical Tests: Ordered and Reviewed            Scribe Attestation:   Scribe #1: I performed the above scribed service and the documentation accurately describes the services I performed.  I attest to the accuracy of the note.    I, Edgar Guerrero, personally performed the services described in this documentation. All medical record entries made by the scribe were at my direction and in my presence.  I have reviewed the chart and agree that the record reflects my personal performance and is accurate and complete. Nick Hernández MD.  10:33 PM 12/26/2018             Clinical Impression:   The primary encounter diagnosis was Muscle cramping. A diagnosis of Pain was also pertinent to this visit.      Disposition:   Disposition: Discharged  Condition: Stable                        Nick Hernández MD  12/26/18 [id number]

## 2018-12-27 ENCOUNTER — OFFICE VISIT (OUTPATIENT)
Dept: ORTHOPEDICS | Facility: CLINIC | Age: 68
End: 2018-12-27
Payer: MEDICARE

## 2018-12-27 VITALS
BODY MASS INDEX: 41.32 KG/M2 | WEIGHT: 242 LBS | DIASTOLIC BLOOD PRESSURE: 70 MMHG | SYSTOLIC BLOOD PRESSURE: 122 MMHG | HEART RATE: 85 BPM | HEIGHT: 64 IN

## 2018-12-27 DIAGNOSIS — Z96.652 HISTORY OF TOTAL KNEE ARTHROPLASTY, LEFT: Primary | ICD-10-CM

## 2018-12-27 PROCEDURE — 99213 OFFICE O/P EST LOW 20 MIN: CPT | Mod: 25,,, | Performed by: ORTHOPAEDIC SURGERY

## 2018-12-27 PROCEDURE — 73562 X-RAY EXAM OF KNEE 3: CPT | Mod: LT,,, | Performed by: ORTHOPAEDIC SURGERY

## 2018-12-27 NOTE — PROGRESS NOTES
[facility] POST-OP NOTE    Subjective:           Chief Complaint:   Chief Complaint   Patient presents with    Left Knee - Post-op Evaluation      left knee pain. She had TKA 9-17-18. She is having a lot of pain and it is shifting. She is done with P.T       Past Medical History:   Diagnosis Date    Allergy     Anxiety     Arthritis     Asthma     Bipolar disorder     Cancer     thyroid    Chronic back pain     COPD (chronic obstructive pulmonary disease)     Diabetes mellitus     Diabetes mellitus, type 2     Fibromyalgia     GERD (gastroesophageal reflux disease)     [place] (hard of hearing)     Hx of thyroid cancer     Hyperlipidemia     Hypertension     Hypothyroidism     Neuropathy     On home oxygen therapy     3 l/m 24/7    Restless leg syndrome     Sleep apnea     Urinary tract infection        Past Surgical History:   Procedure Laterality Date    APPENDECTOMY      ARTHROPLASTY, KNEE Left 9/17/2018    [facility]formed by [facility] OR    BACK SURGERY      x 3    broken foot and ankle      CHOLECYSTECTOMY      CYSTOSCOPY  4/26/2018    Performed by [facility] at [facility] OR    EYE SURGERY Bilateral     cataract    HYSTERECTOMY      mid urethral sling N/A 4/26/2018    Performed by [facility] at [facility] OR    POSTERIOR REPAIR      REPAIR POSTERIOR N/A 4/26/2018    Performed by Mark Townsend DO at [facility] OR    SPINAL FUSION      x3 BACK, NECK X 4    TOTAL THYROIDECTOMY  2014       Current Outpatient Medications   Medication Sig    ANORO ELLIPTA 62.5-25 mcg/actuation DsDv Inhale 2 puffs into the lungs once daily.     aspirin 325 MG tablet Take 1 tablet (325 mg total) by mouth 2 (two) times daily. (Patient taking differently: Take 325 mg by mouth once daily. )    calcitRIOL (ROCALTROL) 0.5 MCG Cap Take 0.5 mcg by mouth once daily.     clonazePAM (KLONOPIN) 0.5 MG tablet Take 0.5 mg by mouth once daily. At noon    clonazePAM  (KLONOPIN) 1 MG tablet Take 1 mg by mouth every evening. At noon pt takes 0.5mg PO  In the evening pt takes 1mg PO    conjugated estrogens (PREMARIN) vaginal cream Insert 1g 2x a week.    DULoxetine (CYMBALTA) 60 MG capsule Take 60 mg by mouth once daily.     ERGOCALCIFEROL, VITAMIN D2, (VITAMIN D ORAL) Take 1.25 mg by mouth every Wednesday.     ferrous sulfate 325 mg (65 mg iron) Tab tablet Take 325 mg by mouth every 7 days. Sat    FLUoxetine (PROZAC) 20 MG capsule Take 20 mg by mouth once daily.     furosemide (LASIX) 20 MG tablet Take 20 mg by mouth 3 (three) times a week. Pt takes medication Mon, Wed, and Sat    gabapentin (NEURONTIN) 800 MG tablet Take 800 mg by mouth 2 (two) times daily. At lunch and hour of sleep    lamoTRIgine (LAMICTAL) 150 MG Tab Take 150 mg by mouth 2 (two) times daily.     latanoprost 0.005 % ophthalmic solution Place 1 drop into both eyes every evening.     LEVEMIR FLEXTOUCH 100 unit/mL (3 mL) InPn pen Inject 55 Units into the skin 2 (two) times daily.     levothyroxine (SYNTHROID) 150 MCG tablet Take 150 mcg by mouth before breakfast.     LINZESS 145 mcg Cap capsule Take 145 mcg by mouth once daily.     losartan-hydrochlorothiazide 50-12.5 mg (HYZAAR) 50-12.5 mg per tablet Take 1 tablet by mouth once daily.     methocarbamol (ROBAXIN) 500 MG Tab Take 500 mg by mouth 3 (three) times daily.     mirabegron (MYRBETRIQ) 50 mg Tb24 Take 1 tablet (50 mg total) by mouth once daily.    NOVOLOG FLEXPEN 100 unit/mL InPn pen Inject into the skin 3 (three) times daily with meals. Sliding scale    oxyCODONE (ROXICODONE) 10 mg Tab immediate release tablet Take 10 mg by mouth every 8 (eight) hours as needed for Pain.    pantoprazole (PROTONIX) 40 MG tablet Take 40 mg by mouth once daily.     polyethylene glycol (GLYCOLAX) 17 gram PwPk Take 17 g by mouth daily as needed (constipation).     potassium chloride SA (K-DUR,KLOR-CON) 10 MEQ tablet Take 10 mEq by mouth 3 (three) times a  week. Mon , Wed, Sat with lasix    PROAIR HFA 90 mcg/actuation inhaler Inhale 1 puff into the lungs daily as needed for Wheezing or Shortness of Breath.     ropinirole (REQUIP) 4 MG tablet Take 4 mg by mouth 2 (two) times daily.     tiZANidine (ZANAFLEX) 4 MG tablet Take 1 tablet (4 mg total) by mouth every 6 (six) hours as needed (cramping).    trazodone (DESYREL) 150 MG tablet Take 150 mg by mouth nightly.      No current facility-administered medications for this visit.        Review of patient's allergies indicates:   Allergen Reactions    Morphine Itching    Tubersol [tuberculin ppd] Other (See Comments)     Site swells bad. Has to have chest xray       Family History   Problem Relation Age of Onset    Diabetes Mother     Heart disease Mother     Hypertension Mother     Diabetes Father     Heart disease Father     Hypertension Father        Social History     Socioeconomic History    Marital status:      Spouse name: Not on file    Number of children: Not on file    Years of education: Not on file    Highest education level: Not on file   Social Needs    Financial resource strain: Not on file    Food insecurity - worry: Not on file    Food insecurity - inability: Not on file    Transportation needs - medical: Not on file    Transportation needs - non-medical: Not on file   Occupational History    Not on file   Tobacco Use    Smoking status: Former Smoker     Packs/day: 1.00     Years: 30.00     Pack years: 30.00     Types: Cigarettes     Last attempt to quit: 2000     Years since quittin.6    Smokeless tobacco: Never Used   Substance and Sexual Activity    Alcohol use: No    Drug use: No    Sexual activity: No   Other Topics Concern    Not on file   Social History Narrative    Not on file       History of present illness:   Patient comes in today for the left knee. She really hasn't been mobilizing much. She states that she still getting    Review of  Systems:    Musculoskeletal:  See HPI      Objective:        Physical Examination:    Vital Signs:    Vitals:    12/27/18 0853   BP: 122/70   Pulse: 85       Body mass index is 41.54 kg/m².    This a well-developed, well nourished patient in no acute distress.  They are alert and oriented and cooperative to examination.        Wounds are clean dry and intact. Calf is soft. Range of motion is 0-110 degrees. The knee is stable both in extension and flexion.  Pertinent New Results:    XRAY Report / Interpretation:   AP lateral and sunrise views of the left knee demonstrate her left total knee to be in ideal position.    Assessment/Plan:      Stable following left total knee. She needs significantly more mobilization. Her quad is weak in that in my opinion is probably why she is having problems. I have sent orders back to the nursing facility to mobilize her significantly more. We will check her in 4 weeks    This note was created using Dragon voice recognition software that occasionally misinterpreted phrases or words.

## 2018-12-27 NOTE — ED NOTES
Shirley Carpenter at [place] notified of pt discharge and need to transportation back to nursing home.

## 2019-01-08 ENCOUNTER — OFFICE VISIT (OUTPATIENT)
Dept: UROGYNECOLOGY | Facility: CLINIC | Age: 69
End: 2019-01-08
Payer: MEDICARE

## 2019-01-08 VITALS
SYSTOLIC BLOOD PRESSURE: 104 MMHG | BODY MASS INDEX: 41.32 KG/M2 | DIASTOLIC BLOOD PRESSURE: 58 MMHG | HEIGHT: 64 IN | HEART RATE: 82 BPM | WEIGHT: 242 LBS

## 2019-01-08 DIAGNOSIS — N39.46 MIXED INCONTINENCE URGE AND STRESS: ICD-10-CM

## 2019-01-08 DIAGNOSIS — N81.11 CYSTOCELE, MIDLINE: ICD-10-CM

## 2019-01-08 DIAGNOSIS — N81.6 RECTOCELE: ICD-10-CM

## 2019-01-08 DIAGNOSIS — R35.0 URINARY FREQUENCY: Primary | ICD-10-CM

## 2019-01-08 DIAGNOSIS — N95.2 ATROPHIC VAGINITIS: ICD-10-CM

## 2019-01-08 DIAGNOSIS — N30.00 ACUTE CYSTITIS WITHOUT HEMATURIA: ICD-10-CM

## 2019-01-08 LAB
BILIRUB SERPL-MCNC: ABNORMAL MG/DL
BLOOD URINE, POC: ABNORMAL
COLOR, POC UA: ABNORMAL
GLUCOSE UR QL STRIP: NORMAL
KETONES UR QL STRIP: ABNORMAL
LEUKOCYTE ESTERASE URINE, POC: ABNORMAL
NITRITE, POC UA: POSITIVE
PH, POC UA: 6
PROTEIN, POC: ABNORMAL
SPECIFIC GRAVITY, POC UA: 1
UROBILINOGEN, POC UA: ABNORMAL

## 2019-01-08 PROCEDURE — 99213 PR OFFICE/OUTPT VISIT, EST, LEVL III, 20-29 MIN: ICD-10-PCS | Mod: 25,S$GLB,, | Performed by: NURSE PRACTITIONER

## 2019-01-08 PROCEDURE — 81002 POCT URINE DIPSTICK WITHOUT MICROSCOPE: ICD-10-PCS | Mod: S$GLB,,, | Performed by: NURSE PRACTITIONER

## 2019-01-08 PROCEDURE — 1101F PR PT FALLS ASSESS DOC 0-1 FALLS W/OUT INJ PAST YR: ICD-10-PCS | Mod: CPTII,S$GLB,, | Performed by: NURSE PRACTITIONER

## 2019-01-08 PROCEDURE — 99999 PR PBB SHADOW E&M-EST. PATIENT-LVL III: CPT | Mod: PBBFAC,,, | Performed by: NURSE PRACTITIONER

## 2019-01-08 PROCEDURE — 87086 URINE CULTURE/COLONY COUNT: CPT

## 2019-01-08 PROCEDURE — 1101F PT FALLS ASSESS-DOCD LE1/YR: CPT | Mod: CPTII,S$GLB,, | Performed by: NURSE PRACTITIONER

## 2019-01-08 PROCEDURE — 87088 URINE BACTERIA CULTURE: CPT

## 2019-01-08 PROCEDURE — 81002 URINALYSIS NONAUTO W/O SCOPE: CPT | Mod: S$GLB,,, | Performed by: NURSE PRACTITIONER

## 2019-01-08 PROCEDURE — 99999 PR PBB SHADOW E&M-EST. PATIENT-LVL III: ICD-10-PCS | Mod: PBBFAC,,, | Performed by: NURSE PRACTITIONER

## 2019-01-08 PROCEDURE — 99213 OFFICE O/P EST LOW 20 MIN: CPT | Mod: 25,S$GLB,, | Performed by: NURSE PRACTITIONER

## 2019-01-08 PROCEDURE — 87186 SC STD MICRODIL/AGAR DIL: CPT

## 2019-01-08 PROCEDURE — 87077 CULTURE AEROBIC IDENTIFY: CPT

## 2019-01-09 NOTE — PROGRESS NOTES
Subjective:       Patient ID: Alanis Mendieta is a 68 y.o. female.    Chief Complaint: 6 month f/u    HPI  Alanis Mendieta is a 68 y.o. female who presents today for follow up in regards to recurrent UTI and incontinence.  She feels that for the past 2 weeks she has had some dysuria.  She has noticed an increase in her frequency/urgency and incontinence.  Her UA today is suspicious for UTI.  She has been taking the myrbetriq, but does not feel that it is helping very much.  She is difficult at times to ascertain a specific number for frequency and nocturia.  She does state that she is using several pads and diapers a day for the incontinence.  She does not often get up at night because the nurses at [facility] don't want her to get up to go to the restroom on her own.  She ends up urinating in the bed before they come to take her to the restroom.  She denies any real vaginal complaints.  She has been using the premarin cream sporadically because she states that the nursing staff does not bring it in to her on a routine basis.  She is wondering what can be done to help her incontinence.    Review of Systems   Constitutional: Negative for activity change, fever and unexpected weight change.   HENT: Negative for hearing loss.    Eyes: Negative for visual disturbance.   Respiratory: Negative for shortness of breath and wheezing.    Cardiovascular: Negative for chest pain, palpitations and leg swelling.   Gastrointestinal: Positive for abdominal pain. Negative for constipation and diarrhea.   Genitourinary: Positive for dysuria, frequency and urgency. Negative for dyspareunia, vaginal bleeding and vaginal discharge.   Musculoskeletal: Negative for gait problem and neck pain.   Skin: Negative for rash and wound.   Allergic/Immunologic: Negative for immunocompromised state.   Neurological: Negative for tremors, speech difficulty and weakness.   Hematological: Does not bruise/bleed easily.   Psychiatric/Behavioral:  Negative for agitation and confusion.       Objective:      Physical Exam   Constitutional: She is oriented to person, place, and time. She appears well-developed and well-nourished. No distress.   HENT:   Head: Normocephalic and atraumatic.   Neck: Neck supple. No thyromegaly present.   Pulmonary/Chest: Effort normal. No respiratory distress.   Abdominal: Soft. There is tenderness in the suprapubic area. There is no CVA tenderness. No hernia.   Musculoskeletal: Normal range of motion.   Pt in wheelchair with O2   Neurological: She is alert and oriented to person, place, and time.   Skin: Skin is warm and dry. No rash noted.   Psychiatric: She has a normal mood and affect. Her behavior is normal.     Pelvic Exam:  Deferred at this time.      Assessment:       1. Urinary frequency    2. Acute cystitis without hematuria    3. Mixed incontinence urge and stress    4. Cystocele, midline    5. Rectocele    6. Atrophic vaginitis        Plan:       Urinary frequency- we will send her urine for culture  -     POCT urine dipstick without microscope  -     Urine culture    Acute cystitis without hematuria- we will begin her on bactrim. RX sent to [facility] for them to fill at their pharmacy.    Mixed incontinence urge and stress- monitor    Cystocele, midline- surgically repaired on 04/26/18    Rectocele- as noted above    Atrophic vaginitis- resume premarin 3 times a week.  Repeat order written for [facility] staff    RTC 1 month

## 2019-01-11 LAB — BACTERIA UR CULT: NORMAL

## 2019-01-30 ENCOUNTER — OFFICE VISIT (OUTPATIENT)
Dept: UROGYNECOLOGY | Facility: CLINIC | Age: 69
End: 2019-01-30
Payer: MEDICARE

## 2019-01-30 VITALS
SYSTOLIC BLOOD PRESSURE: 149 MMHG | DIASTOLIC BLOOD PRESSURE: 72 MMHG | HEIGHT: 63 IN | HEART RATE: 90 BPM | BODY MASS INDEX: 42.73 KG/M2 | WEIGHT: 241.19 LBS

## 2019-01-30 DIAGNOSIS — N95.2 ATROPHIC VAGINITIS: ICD-10-CM

## 2019-01-30 DIAGNOSIS — N39.46 MIXED INCONTINENCE URGE AND STRESS: ICD-10-CM

## 2019-01-30 DIAGNOSIS — R35.0 URINARY FREQUENCY: Primary | ICD-10-CM

## 2019-01-30 DIAGNOSIS — N30.00 ACUTE CYSTITIS WITHOUT HEMATURIA: ICD-10-CM

## 2019-01-30 PROCEDURE — 1101F PR PT FALLS ASSESS DOC 0-1 FALLS W/OUT INJ PAST YR: ICD-10-PCS | Mod: CPTII,S$GLB,, | Performed by: NURSE PRACTITIONER

## 2019-01-30 PROCEDURE — 99214 PR OFFICE/OUTPT VISIT, EST, LEVL IV, 30-39 MIN: ICD-10-PCS | Mod: 25,S$GLB,, | Performed by: NURSE PRACTITIONER

## 2019-01-30 PROCEDURE — 99999 PR PBB SHADOW E&M-EST. PATIENT-LVL V: CPT | Mod: PBBFAC,,, | Performed by: NURSE PRACTITIONER

## 2019-01-30 PROCEDURE — 3077F SYST BP >= 140 MM HG: CPT | Mod: CPTII,S$GLB,, | Performed by: NURSE PRACTITIONER

## 2019-01-30 PROCEDURE — 3077F PR MOST RECENT SYSTOLIC BLOOD PRESSURE >= 140 MM HG: ICD-10-PCS | Mod: CPTII,S$GLB,, | Performed by: NURSE PRACTITIONER

## 2019-01-30 PROCEDURE — 3078F DIAST BP <80 MM HG: CPT | Mod: CPTII,S$GLB,, | Performed by: NURSE PRACTITIONER

## 2019-01-30 PROCEDURE — 99999 PR PBB SHADOW E&M-EST. PATIENT-LVL V: ICD-10-PCS | Mod: PBBFAC,,, | Performed by: NURSE PRACTITIONER

## 2019-01-30 PROCEDURE — 3078F PR MOST RECENT DIASTOLIC BLOOD PRESSURE < 80 MM HG: ICD-10-PCS | Mod: CPTII,S$GLB,, | Performed by: NURSE PRACTITIONER

## 2019-01-30 PROCEDURE — 51700 PR IRRIGATION, BLADDER: ICD-10-PCS | Mod: S$GLB,,, | Performed by: NURSE PRACTITIONER

## 2019-01-30 PROCEDURE — 96372 PR INJECTION,THERAP/PROPH/DIAG2ST, IM OR SUBCUT: ICD-10-PCS | Mod: 59,S$GLB,, | Performed by: NURSE PRACTITIONER

## 2019-01-30 PROCEDURE — 51700 IRRIGATION OF BLADDER: CPT | Mod: S$GLB,,, | Performed by: NURSE PRACTITIONER

## 2019-01-30 PROCEDURE — 1101F PT FALLS ASSESS-DOCD LE1/YR: CPT | Mod: CPTII,S$GLB,, | Performed by: NURSE PRACTITIONER

## 2019-01-30 PROCEDURE — 99214 OFFICE O/P EST MOD 30 MIN: CPT | Mod: 25,S$GLB,, | Performed by: NURSE PRACTITIONER

## 2019-01-30 PROCEDURE — 96372 THER/PROPH/DIAG INJ SC/IM: CPT | Mod: 59,S$GLB,, | Performed by: NURSE PRACTITIONER

## 2019-01-30 RX ORDER — LIDOCAINE HYDROCHLORIDE 20 MG/ML
20 INJECTION, SOLUTION INFILTRATION; PERINEURAL
Status: COMPLETED | OUTPATIENT
Start: 2019-01-30 | End: 2019-01-30

## 2019-01-30 RX ORDER — POLYETHYLENE GLYCOL 3350 17 G/17G
POWDER, FOR SOLUTION ORAL
COMMUNITY
Start: 2019-01-29 | End: 2019-07-29 | Stop reason: CLARIF

## 2019-01-30 RX ORDER — TIZANIDINE 4 MG/1
4 TABLET ORAL EVERY 4 HOURS PRN
COMMUNITY
Start: 2019-01-08 | End: 2020-01-22 | Stop reason: SDUPTHER

## 2019-01-30 RX ORDER — SULFAMETHOXAZOLE AND TRIMETHOPRIM 800; 160 MG/1; MG/1
TABLET ORAL
COMMUNITY
Start: 2019-01-08 | End: 2019-02-06

## 2019-01-30 RX ORDER — INSULIN ADMIN. SUPPLIES
INSULIN PEN (EA) SUBCUTANEOUS
COMMUNITY
Start: 2019-01-24 | End: 2020-01-14 | Stop reason: SDUPTHER

## 2019-01-30 RX ORDER — GENTAMICIN SULFATE 40 MG/ML
80 INJECTION, SOLUTION INTRAMUSCULAR; INTRAVENOUS ONCE
Status: COMPLETED | OUTPATIENT
Start: 2019-01-30 | End: 2019-01-30

## 2019-01-30 RX ADMIN — GENTAMICIN SULFATE 80 MG: 40 INJECTION, SOLUTION INTRAMUSCULAR; INTRAVENOUS at 10:01

## 2019-01-30 RX ADMIN — LIDOCAINE HYDROCHLORIDE 20 ML: 20 INJECTION, SOLUTION INFILTRATION; PERINEURAL at 10:01

## 2019-01-30 NOTE — PROGRESS NOTES
Subjective:       Patient ID: Alanis Mendieta is a 68 y.o. female.    Chief Complaint: gent instillation    HPI  Alanis Mendieta is a 68 y.o. female who presents today for gentamicin instillation.  She was seen on 01/08/2019 with complaints of dysuria and incontinence.  She had not been using her premarin cream and has to wear diapers and pads and had a UTI.  Her urine culture at the time showed E. Coli that was resistant to multiple medications.  She was supposed to come in weekly for 2-3 weeks for gentamicin instillation, but the pt was not able to make it into the office until today.  The pt states that the assisted living community was responsible for her missed appointments because she did not set them up according to their process.  The pt also states that she is not getting the premarin cream at all at this time.  NP has put a call out to Antonella Ridley who is the DON at [facility] to see if any assistance can be offered in regards to pt's concerns.    Review of Systems   Constitutional: Negative for activity change, fever and unexpected weight change.   HENT: Negative for hearing loss.    Eyes: Negative for visual disturbance.   Respiratory: Negative for shortness of breath and wheezing.    Cardiovascular: Negative for chest pain, palpitations and leg swelling.   Gastrointestinal: Positive for abdominal pain. Negative for constipation and diarrhea.   Genitourinary: Positive for dysuria, frequency and urgency. Negative for dyspareunia, vaginal bleeding and vaginal discharge.   Musculoskeletal: Negative for gait problem and neck pain.   Skin: Negative for rash and wound.   Allergic/Immunologic: Negative for immunocompromised state.   Neurological: Negative for tremors, speech difficulty and weakness.   Hematological: Does not bruise/bleed easily.   Psychiatric/Behavioral: Negative for agitation and confusion.       Objective:      Physical Exam   Constitutional: She is oriented to person, place, and  time. She appears well-developed and well-nourished. No distress.   HENT:   Head: Normocephalic and atraumatic.   Neck: Neck supple. No thyromegaly present.   Pulmonary/Chest: Effort normal. No respiratory distress.   Abdominal: Soft. There is tenderness in the suprapubic area. No hernia.   Musculoskeletal: Normal range of motion.   Neurological: She is alert and oriented to person, place, and time.   Skin: Skin is warm and dry. No rash noted.   Psychiatric: She has a normal mood and affect. Her behavior is normal.     Pelvic Exam:  V: No lesions. No palpable nodes.   Meatus:No caruncle or stenosis  Urethra: Non tender. No suburethral masses.  BL:  tender  RV: No hemorrhoids.      Assessment:       1. Urinary frequency    2. Acute cystitis without hematuria    3. Mixed incontinence urge and stress    4. Atrophic vaginitis          Procedure note- After betadine irrigation of the urethra, lidocaine instilled via urojet.   A #14 Somali red rubber tip catheter was inserted into the bladder.  140 mls of urine noted.  Pt did not void prior to catheterization.   80 mg of gentamicin and 20 mls of 2% lidocaine instilled into bladder.  Pt instructed to hold mixture for at least 1 hour prior to voiding.  Pt verbalized understanding.      Plan:       Urinary frequency- continue myrbetriq    Acute cystitis without hematuria- weekly instillations of gentamicin  -     lidocaine HCL 20 mg/ml (2%) injection 20 mL  -     gentamicin injection 80 mg    Mixed incontinence urge and stress- continue myrbetriq    Atrophic vaginitis- use premarin cream MWF    RTC 1 week.

## 2019-02-06 ENCOUNTER — OFFICE VISIT (OUTPATIENT)
Dept: UROGYNECOLOGY | Facility: CLINIC | Age: 69
End: 2019-02-06
Payer: MEDICARE

## 2019-02-06 VITALS
HEART RATE: 85 BPM | DIASTOLIC BLOOD PRESSURE: 88 MMHG | BODY MASS INDEX: 42.5 KG/M2 | HEIGHT: 63 IN | WEIGHT: 239.88 LBS | SYSTOLIC BLOOD PRESSURE: 112 MMHG

## 2019-02-06 DIAGNOSIS — N30.90 CYSTITIS: ICD-10-CM

## 2019-02-06 DIAGNOSIS — N95.2 ATROPHIC VAGINITIS: ICD-10-CM

## 2019-02-06 DIAGNOSIS — N39.46 MIXED INCONTINENCE URGE AND STRESS: ICD-10-CM

## 2019-02-06 DIAGNOSIS — R35.0 URINARY FREQUENCY: Primary | ICD-10-CM

## 2019-02-06 LAB
BILIRUB SERPL-MCNC: ABNORMAL MG/DL
BLOOD URINE, POC: ABNORMAL
COLOR, POC UA: ABNORMAL
GLUCOSE UR QL STRIP: ABNORMAL
KETONES UR QL STRIP: ABNORMAL
LEUKOCYTE ESTERASE URINE, POC: ABNORMAL
NITRITE, POC UA: POSITIVE
PH, POC UA: 5
PROTEIN, POC: ABNORMAL
SPECIFIC GRAVITY, POC UA: 1.01
UROBILINOGEN, POC UA: ABNORMAL

## 2019-02-06 PROCEDURE — 51700 IRRIGATION OF BLADDER: CPT | Mod: S$GLB,,, | Performed by: NURSE PRACTITIONER

## 2019-02-06 PROCEDURE — 1101F PT FALLS ASSESS-DOCD LE1/YR: CPT | Mod: CPTII,S$GLB,, | Performed by: NURSE PRACTITIONER

## 2019-02-06 PROCEDURE — 99999 PR PBB SHADOW E&M-EST. PATIENT-LVL V: CPT | Mod: PBBFAC,,, | Performed by: NURSE PRACTITIONER

## 2019-02-06 PROCEDURE — 51700 PR IRRIGATION, BLADDER: ICD-10-PCS | Mod: S$GLB,,, | Performed by: NURSE PRACTITIONER

## 2019-02-06 PROCEDURE — 99214 PR OFFICE/OUTPT VISIT, EST, LEVL IV, 30-39 MIN: ICD-10-PCS | Mod: 25,S$GLB,, | Performed by: NURSE PRACTITIONER

## 2019-02-06 PROCEDURE — 1101F PR PT FALLS ASSESS DOC 0-1 FALLS W/OUT INJ PAST YR: ICD-10-PCS | Mod: CPTII,S$GLB,, | Performed by: NURSE PRACTITIONER

## 2019-02-06 PROCEDURE — 81002 POCT URINE DIPSTICK WITHOUT MICROSCOPE: ICD-10-PCS | Mod: S$GLB,,, | Performed by: NURSE PRACTITIONER

## 2019-02-06 PROCEDURE — 81002 URINALYSIS NONAUTO W/O SCOPE: CPT | Mod: S$GLB,,, | Performed by: NURSE PRACTITIONER

## 2019-02-06 PROCEDURE — 99999 PR PBB SHADOW E&M-EST. PATIENT-LVL V: ICD-10-PCS | Mod: PBBFAC,,, | Performed by: NURSE PRACTITIONER

## 2019-02-06 PROCEDURE — 99214 OFFICE O/P EST MOD 30 MIN: CPT | Mod: 25,S$GLB,, | Performed by: NURSE PRACTITIONER

## 2019-02-06 RX ORDER — LIDOCAINE HYDROCHLORIDE 20 MG/ML
20 INJECTION, SOLUTION INFILTRATION; PERINEURAL
Status: COMPLETED | OUTPATIENT
Start: 2019-02-06 | End: 2019-02-06

## 2019-02-06 RX ORDER — GENTAMICIN SULFATE 40 MG/ML
80 INJECTION, SOLUTION INTRAMUSCULAR; INTRAVENOUS ONCE
Status: COMPLETED | OUTPATIENT
Start: 2019-02-06 | End: 2019-02-06

## 2019-02-06 RX ADMIN — LIDOCAINE HYDROCHLORIDE 20 ML: 20 INJECTION, SOLUTION INFILTRATION; PERINEURAL at 03:02

## 2019-02-06 RX ADMIN — GENTAMICIN SULFATE 80 MG: 40 INJECTION, SOLUTION INTRAMUSCULAR; INTRAVENOUS at 03:02

## 2019-02-06 NOTE — PROGRESS NOTES
Subjective:       Patient ID: Alanis Mendieta is a 68 y.o. female.    Chief Complaint: Procedure (instillation)    HPI  Alanis Mendieta is a 68 y.o. female who presents today for her 2nd gentamicin instillation.  She feels that this week she has had better control of her urine.  She thinks she still may have the infection, but does feel that it is improving.  She received the premarin cream yesterday morning and hopefully this will become a more routine occurrence.  She denies any other acute complaints/concerns and is ready to proceed with the instillation.    Review of Systems   Constitutional: Negative for activity change, fever and unexpected weight change.   HENT: Negative for hearing loss.    Eyes: Negative for visual disturbance.   Respiratory: Negative for shortness of breath and wheezing.    Cardiovascular: Negative for chest pain, palpitations and leg swelling.   Gastrointestinal: Negative for abdominal pain, constipation and diarrhea.   Genitourinary: Positive for frequency and urgency. Negative for dyspareunia, dysuria, vaginal bleeding and vaginal discharge.   Musculoskeletal: Negative for gait problem and neck pain.   Skin: Negative for rash and wound.   Allergic/Immunologic: Negative for immunocompromised state.   Neurological: Negative for tremors, speech difficulty and weakness.   Hematological: Does not bruise/bleed easily.   Psychiatric/Behavioral: Negative for agitation and confusion.       Objective:      Physical Exam   Constitutional: She is oriented to person, place, and time. She appears well-developed and well-nourished. No distress.   HENT:   Head: Normocephalic and atraumatic.   Neck: Neck supple. No thyromegaly present.   Pulmonary/Chest: Effort normal. No respiratory distress.   Abdominal: Soft. There is no tenderness. No hernia.   Musculoskeletal: Normal range of motion.   Neurological: She is alert and oriented to person, place, and time.   Skin: Skin is warm and dry. No rash  noted.   Psychiatric: She has a normal mood and affect. Her behavior is normal.     Pelvic Exam:  V: No lesions. No palpable nodes.   Meatus:No caruncle or stenosis  Urethra: Non tender. No suburethral masses.  BL: Non tender  RV: No hemorrhoids.      Assessment:       1. Urinary frequency    2. Mixed incontinence urge and stress    3. Cystitis    4. Atrophic vaginitis          Procedure note- After betadine irrigation of the urethra, lidocaine instilled via urojet.   A #14 Citizen of the [place] red rubber tip catheter was inserted into the bladder. Pt was unable to void prior to the catheterization.  When NP was getting ready to catheterize the pt she started leaking urine.  She was able to stop the flow of urine but a fairly large amount of leakage had occurred.   230 mls of urine noted upon catheterization..  80 mg of gentamicin and 20 mls of 2% lidocaine instilled into bladder.  Pt instructed to hold mixture for at least 1 hour prior to voiding.  Pt verbalized understanding.      Plan:       Urinary frequency- monitor    Mixed incontinence urge and stress-monitor    Cystitis- instillation as noted above.  We will repeat in 1 week and do a cath urine culture next week.  -     lidocaine HCL 20 mg/ml (2%) injection 20 mL  -     gentamicin injection 80 mg    Atrophic vaginitis- continue premarin 3 times a week    RTC 1 week.

## 2019-02-13 ENCOUNTER — OFFICE VISIT (OUTPATIENT)
Dept: UROGYNECOLOGY | Facility: CLINIC | Age: 69
End: 2019-02-13
Payer: MEDICARE

## 2019-02-13 VITALS
HEIGHT: 63 IN | HEART RATE: 77 BPM | BODY MASS INDEX: 42.35 KG/M2 | WEIGHT: 239 LBS | SYSTOLIC BLOOD PRESSURE: 94 MMHG | DIASTOLIC BLOOD PRESSURE: 62 MMHG

## 2019-02-13 DIAGNOSIS — N39.46 MIXED INCONTINENCE URGE AND STRESS: ICD-10-CM

## 2019-02-13 DIAGNOSIS — N30.90 CYSTITIS: ICD-10-CM

## 2019-02-13 DIAGNOSIS — N95.2 ATROPHIC VAGINITIS: ICD-10-CM

## 2019-02-13 DIAGNOSIS — R35.0 URINARY FREQUENCY: Primary | ICD-10-CM

## 2019-02-13 PROCEDURE — 87086 URINE CULTURE/COLONY COUNT: CPT

## 2019-02-13 PROCEDURE — 87088 URINE BACTERIA CULTURE: CPT

## 2019-02-13 PROCEDURE — 99999 PR PBB SHADOW E&M-EST. PATIENT-LVL V: CPT | Mod: PBBFAC,,, | Performed by: NURSE PRACTITIONER

## 2019-02-13 PROCEDURE — 99999 PR PBB SHADOW E&M-EST. PATIENT-LVL V: ICD-10-PCS | Mod: PBBFAC,,, | Performed by: NURSE PRACTITIONER

## 2019-02-13 PROCEDURE — 87077 CULTURE AEROBIC IDENTIFY: CPT

## 2019-02-13 PROCEDURE — 99214 OFFICE O/P EST MOD 30 MIN: CPT | Mod: 25,S$GLB,, | Performed by: NURSE PRACTITIONER

## 2019-02-13 PROCEDURE — 87186 SC STD MICRODIL/AGAR DIL: CPT

## 2019-02-13 PROCEDURE — 1101F PT FALLS ASSESS-DOCD LE1/YR: CPT | Mod: CPTII,S$GLB,, | Performed by: NURSE PRACTITIONER

## 2019-02-13 PROCEDURE — 51700 PR IRRIGATION, BLADDER: ICD-10-PCS | Mod: S$GLB,,, | Performed by: NURSE PRACTITIONER

## 2019-02-13 PROCEDURE — 99214 PR OFFICE/OUTPT VISIT, EST, LEVL IV, 30-39 MIN: ICD-10-PCS | Mod: 25,S$GLB,, | Performed by: NURSE PRACTITIONER

## 2019-02-13 PROCEDURE — 51700 IRRIGATION OF BLADDER: CPT | Mod: S$GLB,,, | Performed by: NURSE PRACTITIONER

## 2019-02-13 PROCEDURE — 1101F PR PT FALLS ASSESS DOC 0-1 FALLS W/OUT INJ PAST YR: ICD-10-PCS | Mod: CPTII,S$GLB,, | Performed by: NURSE PRACTITIONER

## 2019-02-13 RX ORDER — LIDOCAINE HYDROCHLORIDE 20 MG/ML
20 INJECTION, SOLUTION INFILTRATION; PERINEURAL
Status: COMPLETED | OUTPATIENT
Start: 2019-02-13 | End: 2019-02-13

## 2019-02-13 RX ORDER — NALOXEGOL OXALATE 25 MG/1
TABLET, FILM COATED ORAL
COMMUNITY
Start: 2019-02-11 | End: 2019-07-29 | Stop reason: CLARIF

## 2019-02-13 RX ORDER — MECLIZINE HYDROCHLORIDE 25 MG/1
25 TABLET ORAL EVERY 8 HOURS PRN
COMMUNITY
Start: 2019-02-04 | End: 2019-12-26

## 2019-02-13 RX ORDER — GENTAMICIN SULFATE 40 MG/ML
80 INJECTION, SOLUTION INTRAMUSCULAR; INTRAVENOUS ONCE
Status: COMPLETED | OUTPATIENT
Start: 2019-02-13 | End: 2019-02-13

## 2019-02-13 RX ADMIN — LIDOCAINE HYDROCHLORIDE 20 ML: 20 INJECTION, SOLUTION INFILTRATION; PERINEURAL at 04:02

## 2019-02-13 RX ADMIN — GENTAMICIN SULFATE 80 MG: 40 INJECTION, SOLUTION INTRAMUSCULAR; INTRAVENOUS at 04:02

## 2019-02-13 NOTE — PROGRESS NOTES
"Subjective:       Patient ID: Alanis Mendieta is a 68 y.o. female.    Chief Complaint: gent instillation    HPI  Alanis Mendieta is a 68 y.o. female who presents today for her 3rd gentamicin instillation.  She feels that the bladder has improved, but does not think the infection is completely cleared.  She feels that she has some increased control on her bladder but not enough.  She has some nights where she isn't getting up at all and is dry all night.  Other times she has a "flood" of urine.  She states that she still is not receiving her premarin cream.  She also states that she has difficulty wiping her bottom and her vaginal area.  She has also "gotten yelled at" for changing her own diaper when she had a small smear of stool in the diaper.  Np will again attempt to contact the DON to discuss some of the pt's concerns.      Review of Systems   Constitutional: Negative for activity change, fever and unexpected weight change.   HENT: Negative for hearing loss.    Eyes: Negative for visual disturbance.   Respiratory: Negative for shortness of breath and wheezing.    Cardiovascular: Negative for chest pain, palpitations and leg swelling.   Gastrointestinal: Negative for abdominal pain, constipation and diarrhea.   Genitourinary: Positive for frequency and urgency. Negative for dyspareunia, dysuria, vaginal bleeding and vaginal discharge.   Musculoskeletal: Negative for gait problem and neck pain.   Skin: Negative for rash and wound.   Allergic/Immunologic: Negative for immunocompromised state.   Neurological: Negative for tremors, speech difficulty and weakness.   Hematological: Does not bruise/bleed easily.   Psychiatric/Behavioral: Negative for agitation and confusion.       Objective:      Physical Exam   Constitutional: She is oriented to person, place, and time. She appears well-developed and well-nourished. No distress.   HENT:   Head: Normocephalic and atraumatic.   Neck: Neck supple. No thyromegaly " present.   Pulmonary/Chest: Effort normal. No respiratory distress.   Abdominal: Soft. There is no tenderness. No hernia.   Musculoskeletal: Normal range of motion.   Neurological: She is alert and oriented to person, place, and time.   Skin: Skin is warm and dry. No rash noted.   Psychiatric: She has a normal mood and affect. Her behavior is normal.     Pelvic Exam:  V: No lesions. No palpable nodes.   Meatus:No caruncle or stenosis  Urethra: Non tender. No suburethral masses.  BL: Non tender  RV: No hemorrhoids.      Assessment:       1. Urinary frequency    2. Cystitis    3. Mixed incontinence urge and stress    4. Atrophic vaginitis          Procedure note- After betadine irrigation of the urethra, lidocaine instilled via urojet.   A #14 [place] red rubber tip catheter was inserted into the bladder.  30 mls of residual urine collected, however pt spontaneously voided around the catheter and had a large amount of urine in the pan.  80 mg of gentamicin and 20 mls of 2% lidocaine instilled into bladder.  Pt instructed to hold mixture for at least 1 hour prior to voiding.  Pt verbalized understanding.      Plan:       Urinary frequency- we will send her urine for culture.  We will evaluate the urine culture and then decide on the next step  -     Urine culture    Cystitis- instillation as noted above  -     gentamicin injection 80 mg  -     lidocaine HCL 20 mg/ml (2%) injection 20 mL    Mixed incontinence urge and stress- await culture results    Atrophic vaginitis- continue premarin.    RTC PRN

## 2019-02-16 LAB — BACTERIA UR CULT: NORMAL

## 2019-02-19 RX ORDER — NITROFURANTOIN (MACROCRYSTALS) 100 MG/1
100 CAPSULE ORAL EVERY 12 HOURS
Qty: 10 CAPSULE | Refills: 0 | Status: SHIPPED | OUTPATIENT
Start: 2019-02-19 | End: 2019-02-24

## 2019-02-20 ENCOUNTER — TELEPHONE (OUTPATIENT)
Dept: UROGYNECOLOGY | Facility: CLINIC | Age: 69
End: 2019-02-20

## 2019-02-20 NOTE — TELEPHONE ENCOUNTER
----- Message from Christine Vance sent at 2/20/2019  9:44 AM CST -----  Type: Needs Medical Advice    Who Called:  Ana Paula Belle Call Back Number: [phone]  Additional Information: Wants to schedule a gentamicin instillation but I was not sure if this is a procedure or not. I did not know if I could schedule this. I tried calling. Please call back to schedule. (If you could skype me also that I may know for the next time if I can schedule.)

## 2019-03-13 ENCOUNTER — OFFICE VISIT (OUTPATIENT)
Dept: UROGYNECOLOGY | Facility: CLINIC | Age: 69
End: 2019-03-13
Payer: MEDICARE

## 2019-03-13 VITALS
SYSTOLIC BLOOD PRESSURE: 118 MMHG | WEIGHT: 239.88 LBS | HEIGHT: 64 IN | HEART RATE: 69 BPM | DIASTOLIC BLOOD PRESSURE: 60 MMHG | BODY MASS INDEX: 40.95 KG/M2

## 2019-03-13 DIAGNOSIS — N95.2 ATROPHIC VAGINITIS: ICD-10-CM

## 2019-03-13 DIAGNOSIS — N39.46 MIXED INCONTINENCE URGE AND STRESS: ICD-10-CM

## 2019-03-13 DIAGNOSIS — R35.0 URINARY FREQUENCY: Primary | ICD-10-CM

## 2019-03-13 DIAGNOSIS — N30.90 CYSTITIS: ICD-10-CM

## 2019-03-13 PROCEDURE — 51700 IRRIGATION OF BLADDER: CPT | Mod: S$GLB,,, | Performed by: NURSE PRACTITIONER

## 2019-03-13 PROCEDURE — 99214 PR OFFICE/OUTPT VISIT, EST, LEVL IV, 30-39 MIN: ICD-10-PCS | Mod: 25,S$GLB,, | Performed by: NURSE PRACTITIONER

## 2019-03-13 PROCEDURE — 99214 OFFICE O/P EST MOD 30 MIN: CPT | Mod: 25,S$GLB,, | Performed by: NURSE PRACTITIONER

## 2019-03-13 PROCEDURE — 51700 PR IRRIGATION, BLADDER: ICD-10-PCS | Mod: S$GLB,,, | Performed by: NURSE PRACTITIONER

## 2019-03-13 PROCEDURE — 1101F PR PT FALLS ASSESS DOC 0-1 FALLS W/OUT INJ PAST YR: ICD-10-PCS | Mod: CPTII,S$GLB,, | Performed by: NURSE PRACTITIONER

## 2019-03-13 PROCEDURE — 87086 URINE CULTURE/COLONY COUNT: CPT

## 2019-03-13 PROCEDURE — 81002 POCT URINE DIPSTICK WITHOUT MICROSCOPE: ICD-10-PCS | Mod: S$GLB,,, | Performed by: NURSE PRACTITIONER

## 2019-03-13 PROCEDURE — 81002 URINALYSIS NONAUTO W/O SCOPE: CPT | Mod: S$GLB,,, | Performed by: NURSE PRACTITIONER

## 2019-03-13 PROCEDURE — 99999 PR PBB SHADOW E&M-EST. PATIENT-LVL V: ICD-10-PCS | Mod: PBBFAC,,, | Performed by: NURSE PRACTITIONER

## 2019-03-13 PROCEDURE — 1101F PT FALLS ASSESS-DOCD LE1/YR: CPT | Mod: CPTII,S$GLB,, | Performed by: NURSE PRACTITIONER

## 2019-03-13 PROCEDURE — 99999 PR PBB SHADOW E&M-EST. PATIENT-LVL V: CPT | Mod: PBBFAC,,, | Performed by: NURSE PRACTITIONER

## 2019-03-13 RX ORDER — ALBUTEROL SULFATE 0.83 MG/ML
2.5 SOLUTION RESPIRATORY (INHALATION) EVERY 6 HOURS PRN
COMMUNITY
Start: 2019-03-11 | End: 2020-02-26

## 2019-03-13 RX ORDER — LIDOCAINE HYDROCHLORIDE 20 MG/ML
20 INJECTION, SOLUTION INFILTRATION; PERINEURAL
Status: COMPLETED | OUTPATIENT
Start: 2019-03-13 | End: 2019-03-13

## 2019-03-13 RX ORDER — FLUTICASONE PROPIONATE 50 MCG
SPRAY, SUSPENSION (ML) NASAL
COMMUNITY
Start: 2019-03-04 | End: 2019-07-29 | Stop reason: CLARIF

## 2019-03-13 RX ORDER — GENTAMICIN SULFATE 40 MG/ML
80 INJECTION, SOLUTION INTRAMUSCULAR; INTRAVENOUS ONCE
Status: COMPLETED | OUTPATIENT
Start: 2019-03-13 | End: 2019-03-13

## 2019-03-13 RX ADMIN — GENTAMICIN SULFATE 80 MG: 40 INJECTION, SOLUTION INTRAMUSCULAR; INTRAVENOUS at 09:03

## 2019-03-13 RX ADMIN — LIDOCAINE HYDROCHLORIDE 20 ML: 20 INJECTION, SOLUTION INFILTRATION; PERINEURAL at 09:03

## 2019-03-13 NOTE — PROGRESS NOTES
Subjective:       Patient ID: Alanis Mendieta is a 68 y.o. female.    Chief Complaint: gen tinstillation    HPI  Alanis Mendieta is a 68 y.o. female who presents today for gentamicin instillation.  She was last seen about a month ago.  At that time she had a positive urine culture with E. Coli.  She was treated with gentamicin and macrodantin.  She did notice an improvement in her symptoms.  Recently she started to feel that she was having less control of her bladder again.  Her UA today is suspicious for UTI.  The pt states that she is not getting her premarin cream and that the aids in the nursing home are not getting her completely clean after a BM.  She is also having URI symptoms today and is getting a CXR later today.    Review of Systems   Constitutional: Negative for activity change, fever and unexpected weight change.   HENT: Negative for hearing loss.    Eyes: Negative for visual disturbance.   Respiratory: Negative for shortness of breath and wheezing.    Cardiovascular: Negative for chest pain, palpitations and leg swelling.   Gastrointestinal: Negative for abdominal pain, constipation and diarrhea.   Genitourinary: Positive for frequency and urgency. Negative for dyspareunia, dysuria, vaginal bleeding and vaginal discharge.   Musculoskeletal: Negative for gait problem and neck pain.   Skin: Negative for rash and wound.   Allergic/Immunologic: Negative for immunocompromised state.   Neurological: Negative for tremors, speech difficulty and weakness.   Hematological: Does not bruise/bleed easily.   Psychiatric/Behavioral: Negative for agitation and confusion.       Objective:      Physical Exam   Constitutional: She is oriented to person, place, and time. She appears well-developed and well-nourished. No distress.   HENT:   Head: Normocephalic and atraumatic.   Neck: Neck supple. No thyromegaly present.   Pulmonary/Chest: Effort normal. No respiratory distress.   Wearing O2 today.   Abdominal:  Soft. There is no tenderness. No hernia.   Musculoskeletal: Normal range of motion.   Neurological: She is alert and oriented to person, place, and time.   Skin: Skin is warm and dry. No rash noted.   Psychiatric: She has a normal mood and affect. Her behavior is normal.     Pelvic Exam:  V: No lesions. No palpable nodes.   Meatus:No caruncle or stenosis  Urethra: Non tender. No suburethral masses.  BL: Non tender  RV: No hemorrhoids.      Assessment:       1. Urinary frequency    2. Mixed incontinence urge and stress    3. Cystitis    4. Atrophic vaginitis          Procedure note- After betadine irrigation of the urethra, lidocaine instilled via urojet.   A #14 [place] red rubber tip catheter was inserted into the bladder.  100 mls of residual urine noted.  80 mg of gentamicin and 20 mls of 2% lidocaine instilled into bladder.  Pt instructed to hold mixture for at least 1 hour prior to voiding.  Pt verbalized understanding.      Plan:       Urinary frequency- we will send her urine for culture as noted below  -     POCT urine dipstick without microscope  -     Urine culture    Mixed incontinence urge and stress- monitor    Cystitis- as noted above  -     gentamicin injection 80 mg  -     lidocaine HCL 20 mg/ml (2%) injection 20 mL    Atrophic vaginitis - continue premarin MWF    RTC 1 week.

## 2019-03-15 LAB
BACTERIA UR CULT: NORMAL
BACTERIA UR CULT: NORMAL

## 2019-03-20 ENCOUNTER — OFFICE VISIT (OUTPATIENT)
Dept: UROGYNECOLOGY | Facility: CLINIC | Age: 69
End: 2019-03-20
Payer: MEDICARE

## 2019-03-20 ENCOUNTER — TELEPHONE (OUTPATIENT)
Dept: UROGYNECOLOGY | Facility: CLINIC | Age: 69
End: 2019-03-20

## 2019-03-20 VITALS
SYSTOLIC BLOOD PRESSURE: 121 MMHG | WEIGHT: 239.88 LBS | HEIGHT: 64 IN | HEART RATE: 86 BPM | BODY MASS INDEX: 40.95 KG/M2 | DIASTOLIC BLOOD PRESSURE: 73 MMHG

## 2019-03-20 DIAGNOSIS — N30.90 CYSTITIS: ICD-10-CM

## 2019-03-20 DIAGNOSIS — N95.2 ATROPHIC VAGINITIS: ICD-10-CM

## 2019-03-20 DIAGNOSIS — N39.46 MIXED INCONTINENCE URGE AND STRESS: ICD-10-CM

## 2019-03-20 DIAGNOSIS — R35.0 URINARY FREQUENCY: Primary | ICD-10-CM

## 2019-03-20 LAB
BILIRUB SERPL-MCNC: NEGATIVE MG/DL
BLOOD URINE, POC: NEGATIVE
COLOR, POC UA: ABNORMAL
GLUCOSE UR QL STRIP: NORMAL
KETONES UR QL STRIP: NEGATIVE
LEUKOCYTE ESTERASE URINE, POC: ABNORMAL
NITRITE, POC UA: POSITIVE
PH, POC UA: 5
PROTEIN, POC: NEGATIVE
SPECIFIC GRAVITY, POC UA: 1.01
UROBILINOGEN, POC UA: NORMAL

## 2019-03-20 PROCEDURE — 87086 URINE CULTURE/COLONY COUNT: CPT

## 2019-03-20 PROCEDURE — 51700 IRRIGATION OF BLADDER: CPT | Mod: S$GLB,,, | Performed by: NURSE PRACTITIONER

## 2019-03-20 PROCEDURE — 81002 URINALYSIS NONAUTO W/O SCOPE: CPT | Mod: S$GLB,,, | Performed by: NURSE PRACTITIONER

## 2019-03-20 PROCEDURE — 1101F PT FALLS ASSESS-DOCD LE1/YR: CPT | Mod: CPTII,S$GLB,, | Performed by: NURSE PRACTITIONER

## 2019-03-20 PROCEDURE — 1101F PR PT FALLS ASSESS DOC 0-1 FALLS W/OUT INJ PAST YR: ICD-10-PCS | Mod: CPTII,S$GLB,, | Performed by: NURSE PRACTITIONER

## 2019-03-20 PROCEDURE — 87077 CULTURE AEROBIC IDENTIFY: CPT

## 2019-03-20 PROCEDURE — 99999 PR PBB SHADOW E&M-EST. PATIENT-LVL V: CPT | Mod: PBBFAC,,, | Performed by: NURSE PRACTITIONER

## 2019-03-20 PROCEDURE — 87088 URINE BACTERIA CULTURE: CPT

## 2019-03-20 PROCEDURE — 51700 PR IRRIGATION, BLADDER: ICD-10-PCS | Mod: S$GLB,,, | Performed by: NURSE PRACTITIONER

## 2019-03-20 PROCEDURE — 87186 SC STD MICRODIL/AGAR DIL: CPT

## 2019-03-20 PROCEDURE — 99214 OFFICE O/P EST MOD 30 MIN: CPT | Mod: 25,S$GLB,, | Performed by: NURSE PRACTITIONER

## 2019-03-20 PROCEDURE — 99214 PR OFFICE/OUTPT VISIT, EST, LEVL IV, 30-39 MIN: ICD-10-PCS | Mod: 25,S$GLB,, | Performed by: NURSE PRACTITIONER

## 2019-03-20 PROCEDURE — 99999 PR PBB SHADOW E&M-EST. PATIENT-LVL V: ICD-10-PCS | Mod: PBBFAC,,, | Performed by: NURSE PRACTITIONER

## 2019-03-20 PROCEDURE — 81002 POCT URINE DIPSTICK WITHOUT MICROSCOPE: ICD-10-PCS | Mod: S$GLB,,, | Performed by: NURSE PRACTITIONER

## 2019-03-20 RX ORDER — GENTAMICIN SULFATE 40 MG/ML
80 INJECTION, SOLUTION INTRAMUSCULAR; INTRAVENOUS ONCE
Status: COMPLETED | OUTPATIENT
Start: 2019-03-20 | End: 2019-03-20

## 2019-03-20 RX ORDER — GUAIFENESIN 600 MG/1
1200 TABLET, EXTENDED RELEASE ORAL 2 TIMES DAILY
COMMUNITY
End: 2019-07-29 | Stop reason: CLARIF

## 2019-03-20 RX ORDER — LIDOCAINE HYDROCHLORIDE 20 MG/ML
20 INJECTION, SOLUTION INFILTRATION; PERINEURAL
Status: COMPLETED | OUTPATIENT
Start: 2019-03-20 | End: 2019-03-20

## 2019-03-20 RX ADMIN — GENTAMICIN SULFATE 80 MG: 40 INJECTION, SOLUTION INTRAMUSCULAR; INTRAVENOUS at 11:03

## 2019-03-20 RX ADMIN — LIDOCAINE HYDROCHLORIDE 20 ML: 20 INJECTION, SOLUTION INFILTRATION; PERINEURAL at 11:03

## 2019-03-20 NOTE — PROGRESS NOTES
Subjective:       Patient ID: Alanis Mendieta is a 68 y.o. female.    Chief Complaint: Procedure (instillation)    HPI  Alanis Mendieta is a 68 y.o. female who presents today for instillation.  She has been feeling that the instillations have been helping her and she has notice about an 85% improvement in her bladder.  She for the most part now is dry.  She will have an occasional accident but mostly does well.  She also has a new diaper that is working better for her.  She has increased her water intake and has been trying to toilet herself more instead of waiting for the aids.  She denies any acute complaints/concerns.  Her UA today is suspicious for UTI, but we will send it for culture.    Review of Systems   Constitutional: Negative for activity change, fever and unexpected weight change.   HENT: Negative for hearing loss.    Eyes: Negative for visual disturbance.   Respiratory: Negative for shortness of breath and wheezing.    Cardiovascular: Negative for chest pain, palpitations and leg swelling.   Gastrointestinal: Negative for abdominal pain, constipation and diarrhea.   Genitourinary: Positive for frequency. Negative for dyspareunia, dysuria, urgency, vaginal bleeding and vaginal discharge.   Musculoskeletal: Negative for gait problem and neck pain.   Skin: Negative for rash and wound.   Allergic/Immunologic: Negative for immunocompromised state.   Neurological: Negative for tremors, speech difficulty and weakness.   Hematological: Does not bruise/bleed easily.   Psychiatric/Behavioral: Negative for agitation and confusion.       Objective:      Physical Exam   Constitutional: She is oriented to person, place, and time. She appears well-developed and well-nourished. No distress.   HENT:   Head: Normocephalic and atraumatic.   Neck: Neck supple. No thyromegaly present.   Pulmonary/Chest: Effort normal. No respiratory distress.   Abdominal: Soft. There is no tenderness. No hernia.   Musculoskeletal:  Normal range of motion.   Neurological: She is alert and oriented to person, place, and time.   Skin: Skin is warm and dry. No rash noted.   Psychiatric: She has a normal mood and affect. Her behavior is normal.     Pelvic Exam:  V: No lesions. No palpable nodes.   Meatus:No caruncle or stenosis  Urethra: Non tender. No suburethral masses.  BL: Non tender  RV: No hemorrhoids.      Assessment:       1. Urinary frequency    2. Mixed incontinence urge and stress    3. Cystitis    4. Atrophic vaginitis          Procedure note- After betadine irrigation of the urethra, lidocaine instilled via urojet.   A #14 [place] red rubber tip catheter was inserted into the bladder.  130 mls of urine noted. Pt did not void prior to catheterization.  80 mg of gentamicin and 20 mls of 2% lidocaine instilled into bladder.  Pt instructed to hold mixture for at least 1 hour prior to voiding.  Pt verbalized understanding.      Plan:       Urinary frequency- we will send her urine for culture as noted below  -     POCT urine dipstick without microscope  -     Urine culture    Mixed incontinence urge and stress- monitor    Cystitis- instillation as noted  -     lidocaine HCL 20 mg/ml (2%) injection 20 mL  -     gentamicin injection 80 mg    Atrophic vaginitis- continue premarin    RTC 1 month

## 2019-03-20 NOTE — TELEPHONE ENCOUNTER
----- Message from Susanne Estrada sent at 3/20/2019 10:38 AM CDT -----    Pt   Bus  Ride is  Running  Late // any  Questions  Call [phone] iva

## 2019-03-23 LAB — BACTERIA UR CULT: NORMAL

## 2019-03-26 RX ORDER — AMOXICILLIN AND CLAVULANATE POTASSIUM 875; 125 MG/1; MG/1
1 TABLET, FILM COATED ORAL 2 TIMES DAILY
Qty: 14 TABLET | Refills: 0 | Status: SHIPPED | OUTPATIENT
Start: 2019-03-26 | End: 2019-04-02

## 2019-04-11 ENCOUNTER — OFFICE VISIT (OUTPATIENT)
Dept: UROLOGY | Facility: CLINIC | Age: 69
End: 2019-04-11
Payer: MEDICARE

## 2019-04-11 VITALS
SYSTOLIC BLOOD PRESSURE: 114 MMHG | DIASTOLIC BLOOD PRESSURE: 61 MMHG | HEART RATE: 78 BPM | WEIGHT: 240.75 LBS | HEIGHT: 64 IN | BODY MASS INDEX: 41.1 KG/M2

## 2019-04-11 DIAGNOSIS — N32.81 OAB (OVERACTIVE BLADDER): ICD-10-CM

## 2019-04-11 DIAGNOSIS — N39.46 MIXED STRESS AND URGE URINARY INCONTINENCE: Primary | ICD-10-CM

## 2019-04-11 DIAGNOSIS — N39.0 RECURRENT UTI: ICD-10-CM

## 2019-04-11 DIAGNOSIS — R82.71 ASYMPTOMATIC BACTERIURIA: ICD-10-CM

## 2019-04-11 DIAGNOSIS — Q62.39 UPJ OBSTRUCTION, CONGENITAL: ICD-10-CM

## 2019-04-11 PROCEDURE — 1101F PR PT FALLS ASSESS DOC 0-1 FALLS W/OUT INJ PAST YR: ICD-10-PCS | Mod: CPTII,S$GLB,, | Performed by: UROLOGY

## 2019-04-11 PROCEDURE — 99999 PR PBB SHADOW E&M-EST. PATIENT-LVL V: CPT | Mod: PBBFAC,,, | Performed by: UROLOGY

## 2019-04-11 PROCEDURE — 99215 PR OFFICE/OUTPT VISIT, EST, LEVL V, 40-54 MIN: ICD-10-PCS | Mod: S$GLB,,, | Performed by: UROLOGY

## 2019-04-11 PROCEDURE — 3074F SYST BP LT 130 MM HG: CPT | Mod: CPTII,S$GLB,, | Performed by: UROLOGY

## 2019-04-11 PROCEDURE — 3078F PR MOST RECENT DIASTOLIC BLOOD PRESSURE < 80 MM HG: ICD-10-PCS | Mod: CPTII,S$GLB,, | Performed by: UROLOGY

## 2019-04-11 PROCEDURE — 99999 PR PBB SHADOW E&M-EST. PATIENT-LVL V: ICD-10-PCS | Mod: PBBFAC,,, | Performed by: UROLOGY

## 2019-04-11 PROCEDURE — 3074F PR MOST RECENT SYSTOLIC BLOOD PRESSURE < 130 MM HG: ICD-10-PCS | Mod: CPTII,S$GLB,, | Performed by: UROLOGY

## 2019-04-11 PROCEDURE — 1101F PT FALLS ASSESS-DOCD LE1/YR: CPT | Mod: CPTII,S$GLB,, | Performed by: UROLOGY

## 2019-04-11 PROCEDURE — 99215 OFFICE O/P EST HI 40 MIN: CPT | Mod: S$GLB,,, | Performed by: UROLOGY

## 2019-04-11 PROCEDURE — 3078F DIAST BP <80 MM HG: CPT | Mod: CPTII,S$GLB,, | Performed by: UROLOGY

## 2019-04-11 RX ORDER — BISACODYL 5 MG
10 TABLET, DELAYED RELEASE (ENTERIC COATED) ORAL DAILY
Qty: 60 TABLET | Refills: 11 | Status: SHIPPED | OUTPATIENT
Start: 2019-04-11 | End: 2020-02-26

## 2019-04-11 RX ORDER — OXYCODONE HYDROCHLORIDE 10 MG/1
10 TABLET ORAL 3 TIMES DAILY
Status: ON HOLD | COMMUNITY
Start: 2015-11-10 | End: 2021-05-13 | Stop reason: HOSPADM

## 2019-04-11 NOTE — PROGRESS NOTES
[facility] Note - [place]  Staff: MD Ligia  PCP: Jesus Gamboa pt    [facility]: inactive    Chief Complaint: recurrent uti, mixed incontinence    Subjective:        HPI: Alanis Mendieta is a 68 y.o. female presents with     [facility]  Pt was an LPN     Recurrent uti/ oab/mixed incontinence/anterior and posterior prolapse  -fell in 12/15 and says she had significant incontinence after, but says her incontinence was present for many years before surgery.  +WILLIAM and UUI. had c-spine surgery a year ago and says sx have increased. She states she does not feel the urge to go many times.  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   -4/26/18 underwent [facility] MUS by me and enterocele repair, posterior colporapphy and perineopharry by  and me.   -a ctu 8/14/18 for recurrent uti and showed mild hydro on right to possible R UPJo and mild b pelvic caliectasis. Denies right flank pain.  -10/11/18 Negative stress test with 150cc in bladder, 60cc residual and no mesh palpated. No recurrence of prolapse.  -She's been seeing Surendra for her symptomatic uti's. She's had 3 gent instillations and a recent round of abx by her. Symptoms include burning, feels bloated. She has f/u with her in 2 weeks.   -Now voiding every q2-3 hours, sometimes 4 hours. She makes it to the bathroom mostly during the day to urinate. No incontinence during the day. She does wear depends at night though. . She c/o straining to urinate for the past 2 weeks and has to move to forward (pvr by I&o: 200cc today but she hadn't voided in many hours and had urge to void.). She stopped miralax 2 weeks ago saying it caused her to have squiggly BM and gas. Now she is c/o constipation with hard stool since stopping the miralax. Still on myrbetriq.       UA cath: not sending. No c/o uti, pvr by I&o:200cc but she said she has not  voided in many hours   Urine history:  3/20/19 E.coli  3/13/19 Multiple org  2/13/19 E.coli  1/8/19  E.coli ESBL  10/11/18 P.mirablis, pvr by I &o: 60cc   8/9/18  E.coli- tx for knee surgery  7/30/18 E.coli, void:1+leuk/nit+, 11 wbc  6/7/18  NG, cath: 2+glucose, void: 1+wbc/tr protein/2+glucose/tr blood, pvr by I&o: 60cc   5/24/18 E.Coli, void: nit+/2_wbc/250 glucose/50 blood   4/12/18 E.coli, void: 1+wbc  3/27/18 E.coli resistant to cipro, doxy, bactrim. sens to augmentin.  3/23/18 E.coli, void:2+/leuk/nit+/trace blood -some burning  1/26/18 E.coli  09/29/17 E.coli   8/14/17 E.coli, void: 2+leukocyte/nitrite, voided   7/27/17 E.coli  6/12/17  k.pneumoniae, cath, nitrite +  5/8/17   aerococcus, 100k, voided, tr leuk        RF: back issues, diabetes and primary OAB    ECOG Status: 1 - in a wheelchair but can walk with assistance. She is on O2 for copd. Resident of [facility] after c-spine surgery. She's been there for a year and plan is for her to leave.    G3, P 2, vaginal   Gross HematuriaYes - >5 years ago  History of UTI: yes    REVIEW OF SYSTEMS:  General ROS: no fevers, no chills  Psychological ROS: no depression  Endocrine ROS: no heat or cold  Respiratory ROS: + SOB  Cardiovascular ROS: no CP  Gastrointestinal ROS: no abdominal pain, + constipation, no diarrhea, +BRBPR with tearing. Musculoskeletal ROS: no muscle pain  Neurological ROS: no headaches  Dermatological ROS: no rashes  HEENT: noiglasses, no sinus    ROS: per HPI    Past medical, surgical, social and family hx have been reviewed. There have not any changes.     Allergies:  Morphine and Tubersol [tuberculin ppd]    Medications: Myrbetriq 50mg daily  pain medicine  Anticoagulation: No    Objective:     Vitals:    04/11/19 1038   BP: 114/61   Pulse: 78       General:WDWN in NAD  Eyes: PERRLA, normal conjunctiva  Respiratory: no increased work on breathing, clear to auscultation, on 2LO2  Cardiovascular: regular rate and rhythm. No obvious extremity  edema.  GI: no palpation of masses. No tenderness. No hepatosplenomegaly to palpation.  Musculoskeletal: normal range of motion of bilateral upper extremities. Normal muscle strength and tone.  Skin: no obvious rashes or lesions. No tightening of skin noted.  Neurologic: CN grossly normal. Normal sensation.   Psychiatric: awake, alert and oriented x 3. Mood and affect normal. Cooperative.     Pelvic exam 7/13/17:  negative stress test with coughing  In and out cath performed with 20 residual  Bladder filled to 150 very slowly  Sensation to void felt at 120cc  Catheter removed  +large volume stress test with coughing but not with valsava  Had pt stand and she had large volume leakage with coughing and valsava  No prolapse  Stool palpable in vault  +severe atrophic vaginitis    Pelvic exam 6/8/18 (s/p sling and repair):  negative stress test with coughing supine, negative stress test with valsalva supine  In and out cath performed with 60cc residual - urine was sent for ua and culture sent for sample  Bladder filled to 150 cc  Sensation to void felt at 100 cc  Catheter removed  Negative stress test with coughing supine, negative stress test with valsalva supine  Negative stress test with coughing or valsalva  Standing  No prolapse  Mesh not exposed     Pelvic exam today:  In and out cath today with 200cc but voided many hours ago  No palpable mesh  No recurrent prolapse      LABS REVIEW:      Labs reviewed 7/13/17  Glucose 113  Cr 0  0.8  H/H 14.4/42.4  Hba1c 8.9    3/19/18 a1c 7.3    BMP  Lab Results   Component Value Date     12/26/2018    K 4.4 12/26/2018    CL 92 (L) 12/26/2018    CO2 41 (HH) 12/26/2018    BUN 11 12/26/2018    CREATININE 0.8 12/26/2018    CALCIUM 9.3 12/26/2018    ANIONGAP 6 (L) 12/26/2018    ESTGFRAFRICA >60 12/26/2018    EGFRNONAA >60 12/26/2018     Lab Results   Component Value Date    WBC 6.10 12/26/2018    HGB 13.3 12/26/2018    HCT 40.5 12/26/2018    MCV 91 12/26/2018      12/26/2018       PATHOLOGY REVIEW:  Vagina, excision 4/26/18:  Benign nonkeratinizing squamous mucosa without dysplasia.    RADIOGRAPHIC REVIEW:  Us abdomen 10/25/18 - this was sent to  at the NH (done for liver lesions)  1. Hepatomegaly with previously discussed liver lesions better seen on prior CT.  If unable to obtain MRI, follow-up triple phase CT liver could be obtained in 6-12 months to assess for stability.  2. Mild dilation of the common bile duct, possibly senescent change or reservoir effect post cholecystectomy.  Correlate with bilirubin level as the need for further assessment with ERCP/MRCP    ctu 8/14/18  Radiology read: Mild bilateral pelvicaliectasis.  No urolithiasis or mass identified.  Hepatic lipoma or angiomyolipoma, and additional indeterminate right hepatic lobe lesions.  Further evaluation of the indeterminate lesions with liver MRI with without contrast is recommended.  My read: bilateral hydro to possible right upjo with no obvious crossing vessel but with an obvious transition point at upjo and left to proximal ureter without obvious transition point or crossing vessel. No filling defect seen in left side. Right side not completely opacified.  Pt cannot MRI     rbus 5/23/17  No stone  No hydro  Bladder moderately distended      Assessment:       1. Mixed stress and urge urinary incontinence    2. Recurrent UTI    3. Asymptomatic bacteriuria    4. UPJ obstruction, congenital    5. OAB (overactive bladder)          Plan:     Recurrent uti/asymptomatic bacteriuria/atrophic vaginitis/ right upjo without significant hydro/no flank pain/oab and mixed incontinence  -not sending urine, no sx today.   -residual 200cc but voided many hours ago. Review of prevoius residuals 100s to 130s. Also c/o straining to urinate but has significant constipation since stopping miralax. Recommend restarting miralax, pt hesistant. Will try dulcolax 100 BId. Will see if Debbie can recheck her true  residual when she returns for f/u with her on 4/25/19. Refill given for myrbetriq today but if residuals remain > 200 then would need to discontinue.  -incontienence improved s/p sling, A/P repatir on mybetriq  -continues to get uti's likely due to pad usage at night. Could be right UPJo but unlikely, denies flank pain. Consider mag3 at some point. Hasn't had cysto since her surgery, could always consider this in future as well to ensure no mesh seen, unlikely has had uti's prior to surgery.     Liver lesions  -seen on ctu, was supposed to have evaluated  with MRI but could not get mri. Had an abd us showing liver lesions. Dr. Melendez needs to review if he hasn't already.   -copy sent with pt today     Follow up in 6 months   -consider cystoscopy (will send cath urine and treat then)  -consider mag3 scan    She says she is moving out from NH and getting  soon    Faith Strong MD

## 2019-04-11 NOTE — Clinical Note
Can you have her void and check true residual when she returns. Cloudy urine today but not sending for culture since no sx.

## 2019-04-11 NOTE — PATIENT INSTRUCTIONS
Recurrent uti/asymptomatic bacteriuria/atrophic vaginitis/ right upjo without significant hydro/no flank pain/oab and mixed incontinence  -not sending urine, no sx today.   -residual 200cc but voided many hours ago. Review of prevoius residuals 100s to 130s. Also c/o straining to urinate but has significant constipation since stopping miralax. Recommend restarting miralax, pt hesistant. Will try dulcolax 100 BId. Will see if Debbie can recheck her true residual when she returns for f/u with her on 4/25/19. Refill given for myrbetriq today but if residuals remain > 200 then would need to discontinue.  -incontienence improved s/p sling, A/P repatir on mybetriq  -continues to get uti's likely due to pad usage at night. Could be right UPJo but unlikely, denies flank pain. Consider mag3 at some point. Hasn't had cysto since her surgery, could always consider this in future as well to ensure no mesh seen, unlikely has had uti's prior to surgery.     Liver lesions  -seen on ctu, was supposed to have evaluated  with MRI but could not get mri. Had an abd us showing liver lesions. Dr. Melendez needs to review if he hasn't already.   -copy sent with pt today     Follow up in 6 months   -consider cystoscopy (will send cath urine and treat then)  -consider mag3 scan

## 2019-04-11 NOTE — Clinical Note
-residual 200cc but voided many hours ago. Review of prevoius residuals 100s to 130s. Also c/o straining to urinate but has significant constipation since stopping miralax. Recommend restarting miralax, pt hesistant. Will try dulcolax 100 BId. Will see if Debbie can recheck her true residual when she returns for f/u with her on 4/25/19. Refill given for myrbetriq today but if residuals remain > 200 then would need to discontinue.

## 2019-04-25 ENCOUNTER — OFFICE VISIT (OUTPATIENT)
Dept: UROGYNECOLOGY | Facility: CLINIC | Age: 69
End: 2019-04-25
Payer: MEDICARE

## 2019-04-25 VITALS
WEIGHT: 242.31 LBS | DIASTOLIC BLOOD PRESSURE: 76 MMHG | HEART RATE: 89 BPM | SYSTOLIC BLOOD PRESSURE: 124 MMHG | BODY MASS INDEX: 41.59 KG/M2

## 2019-04-25 DIAGNOSIS — N95.2 ATROPHIC VAGINITIS: ICD-10-CM

## 2019-04-25 DIAGNOSIS — N30.90 CYSTITIS: ICD-10-CM

## 2019-04-25 DIAGNOSIS — R35.0 URINARY FREQUENCY: Primary | ICD-10-CM

## 2019-04-25 DIAGNOSIS — N39.46 MIXED INCONTINENCE URGE AND STRESS: ICD-10-CM

## 2019-04-25 LAB
BILIRUB SERPL-MCNC: ABNORMAL MG/DL
BLOOD URINE, POC: ABNORMAL
COLOR, POC UA: ABNORMAL
GLUCOSE UR QL STRIP: ABNORMAL
KETONES UR QL STRIP: ABNORMAL
LEUKOCYTE ESTERASE URINE, POC: ABNORMAL
NITRITE, POC UA: ABNORMAL
PH, POC UA: 6
PROTEIN, POC: ABNORMAL
SPECIFIC GRAVITY, POC UA: 1.01
UROBILINOGEN, POC UA: ABNORMAL

## 2019-04-25 PROCEDURE — 87077 CULTURE AEROBIC IDENTIFY: CPT

## 2019-04-25 PROCEDURE — 99214 PR OFFICE/OUTPT VISIT, EST, LEVL IV, 30-39 MIN: ICD-10-PCS | Mod: 25,S$GLB,, | Performed by: NURSE PRACTITIONER

## 2019-04-25 PROCEDURE — 87088 URINE BACTERIA CULTURE: CPT

## 2019-04-25 PROCEDURE — 99999 PR PBB SHADOW E&M-EST. PATIENT-LVL IV: CPT | Mod: PBBFAC,,, | Performed by: NURSE PRACTITIONER

## 2019-04-25 PROCEDURE — 87086 URINE CULTURE/COLONY COUNT: CPT

## 2019-04-25 PROCEDURE — 81002 POCT URINE DIPSTICK WITHOUT MICROSCOPE: ICD-10-PCS | Mod: S$GLB,,, | Performed by: NURSE PRACTITIONER

## 2019-04-25 PROCEDURE — 51700 PR IRRIGATION, BLADDER: ICD-10-PCS | Mod: S$GLB,,, | Performed by: NURSE PRACTITIONER

## 2019-04-25 PROCEDURE — 1101F PR PT FALLS ASSESS DOC 0-1 FALLS W/OUT INJ PAST YR: ICD-10-PCS | Mod: CPTII,S$GLB,, | Performed by: NURSE PRACTITIONER

## 2019-04-25 PROCEDURE — 81002 URINALYSIS NONAUTO W/O SCOPE: CPT | Mod: S$GLB,,, | Performed by: NURSE PRACTITIONER

## 2019-04-25 PROCEDURE — 51700 IRRIGATION OF BLADDER: CPT | Mod: S$GLB,,, | Performed by: NURSE PRACTITIONER

## 2019-04-25 PROCEDURE — 99999 PR PBB SHADOW E&M-EST. PATIENT-LVL IV: ICD-10-PCS | Mod: PBBFAC,,, | Performed by: NURSE PRACTITIONER

## 2019-04-25 PROCEDURE — 99214 OFFICE O/P EST MOD 30 MIN: CPT | Mod: 25,S$GLB,, | Performed by: NURSE PRACTITIONER

## 2019-04-25 PROCEDURE — 87186 SC STD MICRODIL/AGAR DIL: CPT

## 2019-04-25 PROCEDURE — 1101F PT FALLS ASSESS-DOCD LE1/YR: CPT | Mod: CPTII,S$GLB,, | Performed by: NURSE PRACTITIONER

## 2019-04-25 RX ORDER — LIDOCAINE HYDROCHLORIDE 20 MG/ML
20 INJECTION, SOLUTION INFILTRATION; PERINEURAL
Status: COMPLETED | OUTPATIENT
Start: 2019-04-25 | End: 2019-04-25

## 2019-04-25 RX ORDER — LACTULOSE 10 G/15ML
20 SOLUTION ORAL; RECTAL DAILY PRN
COMMUNITY
Start: 2019-04-23 | End: 2020-10-15

## 2019-04-25 RX ORDER — GENTAMICIN SULFATE 40 MG/ML
80 INJECTION, SOLUTION INTRAMUSCULAR; INTRAVENOUS ONCE
Status: COMPLETED | OUTPATIENT
Start: 2019-04-25 | End: 2019-04-25

## 2019-04-25 RX ORDER — AMOXICILLIN AND CLAVULANATE POTASSIUM 875; 125 MG/1; MG/1
1 TABLET, FILM COATED ORAL 2 TIMES DAILY
Qty: 14 TABLET | Refills: 0 | Status: SHIPPED | OUTPATIENT
Start: 2019-04-25 | End: 2019-05-02

## 2019-04-25 RX ADMIN — GENTAMICIN SULFATE 80 MG: 40 INJECTION, SOLUTION INTRAMUSCULAR; INTRAVENOUS at 02:04

## 2019-04-25 RX ADMIN — LIDOCAINE HYDROCHLORIDE 20 ML: 20 INJECTION, SOLUTION INFILTRATION; PERINEURAL at 02:04

## 2019-04-25 NOTE — PROGRESS NOTES
Subjective:       Patient ID: Alanis Mendieta is a 68 y.o. female.    Chief Complaint: gent instillation    HPI  Alanis Mendieta is a 68 y.o. female who presents today for gentamicin instillation.  She has been having a lot of back pain for the past 3 weeks.  She feels that her frequency is increasing slightly and her incontinence might be increasing ever so slightly as well.  Her UA today is suspicious for UTI.  She denies any real vaginal complaints/concerns.  She is ready to proceed with the instillation.    Review of Systems   Constitutional: Negative for activity change, fever and unexpected weight change.   HENT: Negative for hearing loss.    Eyes: Negative for visual disturbance.   Respiratory: Negative for shortness of breath and wheezing.    Cardiovascular: Negative for chest pain, palpitations and leg swelling.   Gastrointestinal: Negative for abdominal pain, constipation and diarrhea.   Genitourinary: Positive for frequency. Negative for dyspareunia, dysuria, urgency, vaginal bleeding and vaginal discharge.   Musculoskeletal: Positive for back pain. Negative for gait problem and neck pain.   Skin: Negative for rash and wound.   Allergic/Immunologic: Negative for immunocompromised state.   Neurological: Negative for tremors, speech difficulty and weakness.   Hematological: Does not bruise/bleed easily.   Psychiatric/Behavioral: Negative for agitation and confusion.       Objective:      Physical Exam   Constitutional: She is oriented to person, place, and time. She appears well-developed and well-nourished. No distress.   HENT:   Head: Normocephalic and atraumatic.   Neck: Neck supple. No thyromegaly present.   Pulmonary/Chest: Effort normal. No respiratory distress.   Abdominal: Soft. There is no tenderness. There is CVA tenderness. No hernia.   R>L CVA tenderness   Musculoskeletal: Normal range of motion.   Neurological: She is alert and oriented to person, place, and time.   Skin: Skin is warm  and dry. No rash noted.   Psychiatric: She has a normal mood and affect. Her behavior is normal.     Pelvic Exam:  V: No lesions. No palpable nodes.   Meatus:No caruncle or stenosis  Urethra: Non tender. No suburethral masses.  BL: Non tender  RV: No hemorrhoids.      Assessment:       1. Urinary frequency    2. Mixed incontinence urge and stress    3. Cystitis    4. Atrophic vaginitis          Procedure note- After betadine irrigation of the urethra, lidocaine instilled via urojet.   A #14 [place] red rubber tip catheter was inserted into the bladder.  60 mls of residual urine noted.  80 mg of gentamicin and 20 mls of 2% lidocaine instilled into bladder.  Pt instructed to hold mixture for at least 1 hour prior to voiding.  Pt verbalized understanding.      Plan:       Urinary frequency- monitor  -     POCT URINE DIPSTICK WITHOUT MICROSCOPE    Mixed incontinence urge and stress- was improving, but slightly worse recently    Cystitis- as noted above.  Once this current infection has cleared.  I would suggest trying methenamine and calcium for prophylaxis.  -     lidocaine HCL 20 mg/ml (2%) injection 20 mL  -     gentamicin injection 80 mg    Atrophic vaginitis- continue premarin    RTC 1-2 weeks.

## 2019-04-29 LAB — BACTERIA UR CULT: NORMAL

## 2019-05-14 ENCOUNTER — OFFICE VISIT (OUTPATIENT)
Dept: UROGYNECOLOGY | Facility: CLINIC | Age: 69
End: 2019-05-14
Payer: MEDICARE

## 2019-05-14 VITALS
BODY MASS INDEX: 40.69 KG/M2 | SYSTOLIC BLOOD PRESSURE: 124 MMHG | DIASTOLIC BLOOD PRESSURE: 58 MMHG | HEART RATE: 87 BPM | WEIGHT: 238.31 LBS | HEIGHT: 64 IN

## 2019-05-14 DIAGNOSIS — N95.2 ATROPHIC VAGINITIS: ICD-10-CM

## 2019-05-14 DIAGNOSIS — N39.46 MIXED INCONTINENCE URGE AND STRESS: ICD-10-CM

## 2019-05-14 DIAGNOSIS — N30.90 CYSTITIS: ICD-10-CM

## 2019-05-14 DIAGNOSIS — R35.0 URINARY FREQUENCY: Primary | ICD-10-CM

## 2019-05-14 LAB
BILIRUB SERPL-MCNC: ABNORMAL MG/DL
BLOOD URINE, POC: ABNORMAL
COLOR, POC UA: YELLOW
GLUCOSE UR QL STRIP: 500
KETONES UR QL STRIP: ABNORMAL
LEUKOCYTE ESTERASE URINE, POC: ABNORMAL
NITRITE, POC UA: ABNORMAL
PH, POC UA: 6
PROTEIN, POC: ABNORMAL
SPECIFIC GRAVITY, POC UA: 1
UROBILINOGEN, POC UA: ABNORMAL

## 2019-05-14 PROCEDURE — 81002 POCT URINE DIPSTICK WITHOUT MICROSCOPE: ICD-10-PCS | Mod: S$GLB,,, | Performed by: NURSE PRACTITIONER

## 2019-05-14 PROCEDURE — 81002 URINALYSIS NONAUTO W/O SCOPE: CPT | Mod: S$GLB,,, | Performed by: NURSE PRACTITIONER

## 2019-05-14 PROCEDURE — 99214 OFFICE O/P EST MOD 30 MIN: CPT | Mod: 25,S$GLB,, | Performed by: NURSE PRACTITIONER

## 2019-05-14 PROCEDURE — 51700 PR IRRIGATION, BLADDER: ICD-10-PCS | Mod: S$GLB,,, | Performed by: NURSE PRACTITIONER

## 2019-05-14 PROCEDURE — 1101F PR PT FALLS ASSESS DOC 0-1 FALLS W/OUT INJ PAST YR: ICD-10-PCS | Mod: CPTII,S$GLB,, | Performed by: NURSE PRACTITIONER

## 2019-05-14 PROCEDURE — 1101F PT FALLS ASSESS-DOCD LE1/YR: CPT | Mod: CPTII,S$GLB,, | Performed by: NURSE PRACTITIONER

## 2019-05-14 PROCEDURE — 99999 PR PBB SHADOW E&M-EST. PATIENT-LVL V: ICD-10-PCS | Mod: PBBFAC,,, | Performed by: NURSE PRACTITIONER

## 2019-05-14 PROCEDURE — 99214 PR OFFICE/OUTPT VISIT, EST, LEVL IV, 30-39 MIN: ICD-10-PCS | Mod: 25,S$GLB,, | Performed by: NURSE PRACTITIONER

## 2019-05-14 PROCEDURE — 99999 PR PBB SHADOW E&M-EST. PATIENT-LVL V: CPT | Mod: PBBFAC,,, | Performed by: NURSE PRACTITIONER

## 2019-05-14 PROCEDURE — 87086 URINE CULTURE/COLONY COUNT: CPT

## 2019-05-14 PROCEDURE — 51700 IRRIGATION OF BLADDER: CPT | Mod: S$GLB,,, | Performed by: NURSE PRACTITIONER

## 2019-05-14 RX ORDER — TRAZODONE HYDROCHLORIDE 100 MG/1
TABLET ORAL
COMMUNITY
Start: 2019-04-29 | End: 2019-07-29 | Stop reason: CLARIF

## 2019-05-14 RX ORDER — GENTAMICIN SULFATE 40 MG/ML
80 INJECTION, SOLUTION INTRAMUSCULAR; INTRAVENOUS ONCE
Status: COMPLETED | OUTPATIENT
Start: 2019-05-14 | End: 2019-05-14

## 2019-05-14 RX ORDER — LIDOCAINE HYDROCHLORIDE 20 MG/ML
20 INJECTION, SOLUTION INFILTRATION; PERINEURAL
Status: COMPLETED | OUTPATIENT
Start: 2019-05-14 | End: 2019-05-14

## 2019-05-14 RX ORDER — NITROFURANTOIN 25; 75 MG/1; MG/1
CAPSULE ORAL
COMMUNITY
Start: 2019-05-14 | End: 2019-07-29 | Stop reason: CLARIF

## 2019-05-14 RX ADMIN — GENTAMICIN SULFATE 80 MG: 40 INJECTION, SOLUTION INTRAMUSCULAR; INTRAVENOUS at 03:05

## 2019-05-14 RX ADMIN — LIDOCAINE HYDROCHLORIDE 20 ML: 20 INJECTION, SOLUTION INFILTRATION; PERINEURAL at 03:05

## 2019-05-14 NOTE — PROGRESS NOTES
Subjective:       Patient ID: Alanis Mendieta is a 68 y.o. female.    Chief Complaint: instillation    HPI  Alanis Mendieta is a 68 y.o. female who presents today for gentamicin instillation.  She feels that she was doing fairly well until about 2-3 days ago.  She started having some dysuria and is concerned that she might be developing another UTI.  Her UA today is not overly suspicious for UTI.  She is ready for instillation and will await culture results before doing any oral antibiotics.    Review of Systems   Constitutional: Negative for activity change, fever and unexpected weight change.   HENT: Negative for hearing loss.    Eyes: Negative for visual disturbance.   Respiratory: Negative for shortness of breath and wheezing.    Cardiovascular: Negative for chest pain, palpitations and leg swelling.   Gastrointestinal: Negative for abdominal pain, constipation and diarrhea.   Genitourinary: Positive for dysuria, frequency and urgency. Negative for dyspareunia, vaginal bleeding and vaginal discharge.   Musculoskeletal: Negative for gait problem and neck pain.   Skin: Negative for rash and wound.   Allergic/Immunologic: Negative for immunocompromised state.   Neurological: Negative for tremors, speech difficulty and weakness.   Hematological: Does not bruise/bleed easily.   Psychiatric/Behavioral: Negative for agitation and confusion.       Objective:      Physical Exam   Constitutional: She is oriented to person, place, and time. She appears well-developed and well-nourished. No distress.   HENT:   Head: Normocephalic and atraumatic.   Neck: Neck supple. No thyromegaly present.   Pulmonary/Chest: Effort normal. No respiratory distress.   Abdominal: Soft. There is no tenderness. No hernia.   Musculoskeletal: Normal range of motion.   Neurological: She is alert and oriented to person, place, and time.   Skin: Skin is warm and dry. No rash noted.   Psychiatric: She has a normal mood and affect. Her behavior  is normal.     Pelvic Exam:  V: No lesions. No palpable nodes.   Meatus:No caruncle or stenosis  Urethra: Non tender. No suburethral masses.  BL: Non tender  RV: No hemorrhoids.      Assessment:       1. Urinary frequency    2. Mixed incontinence urge and stress    3. Cystitis    4. Atrophic vaginitis          Procedure note- After betadine irrigation of the urethra, lidocaine instilled via urojet.   A #14 Frisian red rubber tip catheter was inserted into the bladder.  110 mls of residual urine noted.  80 mg of gentamicin and 20 mls of 2% lidocaine instilled into bladder.  Pt instructed to hold mixture for at least 1 hour prior to voiding.  Pt verbalized understanding.      Plan:       Urinary frequency- we will send her urine for catheterized specimen as noted below  -     POCT urine dipstick without microscope  -     Urine culture    Mixed incontinence urge and stress- continue myrbetriq    Cystitis- instillation as noted above  -     lidocaine HCL 20 mg/ml (2%) injection 20 mL  -     gentamicin injection 80 mg    Atrophic vaginitis- continue estrace    RTC PRN. We will wait until the culture results are returned.  We will consider methenamine and vitamin C in the future.

## 2019-05-16 LAB — BACTERIA UR CULT: NO GROWTH

## 2019-07-20 DIAGNOSIS — M54.12 CERVICAL RADICULITIS: Primary | ICD-10-CM

## 2019-07-20 DIAGNOSIS — M54.12 RADICULOPATHY, CERVICAL REGION: Primary | ICD-10-CM

## 2019-07-29 ENCOUNTER — TELEPHONE (OUTPATIENT)
Dept: RADIOLOGY | Facility: HOSPITAL | Age: 69
End: 2019-07-29

## 2019-07-29 RX ORDER — ONDANSETRON 4 MG/1
4 TABLET, ORALLY DISINTEGRATING ORAL EVERY 6 HOURS PRN
COMMUNITY
End: 2020-10-15

## 2019-07-29 RX ORDER — LORATADINE 10 MG/1
10 TABLET ORAL DAILY
COMMUNITY
End: 2020-02-26

## 2019-07-29 RX ORDER — CHOLECALCIFEROL (VITAMIN D3) 25 MCG
1000 TABLET ORAL WEEKLY
COMMUNITY
End: 2020-02-26

## 2019-07-29 RX ORDER — GUAIFENESIN 100 MG/5ML
200 SOLUTION ORAL EVERY 8 HOURS PRN
COMMUNITY
End: 2020-02-26

## 2019-07-29 RX ORDER — IBUPROFEN 400 MG/1
400 TABLET ORAL EVERY 8 HOURS PRN
Status: ON HOLD | COMMUNITY
End: 2021-05-13 | Stop reason: HOSPADM

## 2019-07-29 RX ORDER — LANOLIN ALCOHOL/MO/W.PET/CERES
400 CREAM (GRAM) TOPICAL 2 TIMES DAILY
COMMUNITY
End: 2020-02-26

## 2019-07-29 RX ORDER — PHENAZOPYRIDINE HYDROCHLORIDE 95 MG/1
190 TABLET ORAL DAILY PRN
COMMUNITY
End: 2019-12-26

## 2019-07-29 RX ORDER — TEMAZEPAM 7.5 MG/1
15 CAPSULE ORAL NIGHTLY PRN
COMMUNITY
End: 2019-10-10

## 2019-07-29 RX ORDER — GABAPENTIN 400 MG/1
400 CAPSULE ORAL DAILY
COMMUNITY
End: 2019-12-26 | Stop reason: SDUPTHER

## 2019-08-07 ENCOUNTER — HOSPITAL ENCOUNTER (OUTPATIENT)
Dept: RADIOLOGY | Facility: HOSPITAL | Age: 69
Discharge: HOME | End: 2019-08-07
Attending: NEUROLOGICAL SURGERY | Admitting: NEUROLOGICAL SURGERY
Payer: MEDICARE

## 2019-08-07 ENCOUNTER — HOSPITAL ENCOUNTER (OUTPATIENT)
Facility: HOSPITAL | Age: 69
Discharge: HOME | End: 2019-08-07
Attending: NEUROLOGICAL SURGERY | Admitting: NEUROLOGICAL SURGERY
Payer: MEDICARE

## 2019-08-07 VITALS
TEMPERATURE: 99 F | WEIGHT: 230 LBS | SYSTOLIC BLOOD PRESSURE: 154 MMHG | OXYGEN SATURATION: 100 % | RESPIRATION RATE: 16 BRPM | HEART RATE: 79 BPM | HEIGHT: 64 IN | BODY MASS INDEX: 39.27 KG/M2 | DIASTOLIC BLOOD PRESSURE: 78 MMHG

## 2019-08-07 DIAGNOSIS — M54.12 CERVICAL RADICULITIS: ICD-10-CM

## 2019-08-07 PROCEDURE — 25000003 PHARM REV CODE 250: Performed by: NEUROLOGICAL SURGERY

## 2019-08-07 PROCEDURE — 72126 CT NECK SPINE W/DYE: CPT | Mod: TC

## 2019-08-07 PROCEDURE — 25500020 PHARM REV CODE 255: Performed by: NEUROLOGICAL SURGERY

## 2019-08-07 PROCEDURE — 71000016 HC POSTOP RECOV ADDL HR

## 2019-08-07 PROCEDURE — 71000015 HC POSTOP RECOV 1ST HR

## 2019-08-07 PROCEDURE — 25000003 PHARM REV CODE 250: Performed by: RADIOLOGY

## 2019-08-07 RX ORDER — OXYCODONE HYDROCHLORIDE 5 MG/1
10 TABLET ORAL ONCE
Status: COMPLETED | OUTPATIENT
Start: 2019-08-07 | End: 2019-08-07

## 2019-08-07 RX ORDER — LIDOCAINE HYDROCHLORIDE 10 MG/ML
30 INJECTION INFILTRATION; PERINEURAL ONCE
Status: COMPLETED | OUTPATIENT
Start: 2019-08-07 | End: 2019-08-07

## 2019-08-07 RX ORDER — ACETAMINOPHEN 500 MG
500 TABLET ORAL EVERY 4 HOURS PRN
Status: CANCELLED | OUTPATIENT
Start: 2019-08-07

## 2019-08-07 RX ORDER — LIDOCAINE HYDROCHLORIDE 10 MG/ML
1 INJECTION, SOLUTION EPIDURAL; INFILTRATION; INTRACAUDAL; PERINEURAL ONCE
Status: DISCONTINUED | OUTPATIENT
Start: 2019-08-07 | End: 2019-08-07

## 2019-08-07 RX ADMIN — LIDOCAINE HYDROCHLORIDE 8 ML: 10 INJECTION, SOLUTION INFILTRATION; PERINEURAL at 11:08

## 2019-08-07 RX ADMIN — OXYCODONE HYDROCHLORIDE 10 MG: 5 TABLET ORAL at 01:08

## 2019-08-07 RX ADMIN — IOHEXOL 10 ML: 300 INJECTION, SOLUTION INTRAVENOUS at 11:08

## 2019-08-07 NOTE — DISCHARGE INSTRUCTIONS
Resume previous diet. No strenuous activity for next 2 days. Encourage fluids by mouth. May remove bandaid tomorrow. No tub baths shower only.

## 2019-08-07 NOTE — PLAN OF CARE
Transportation here. D/c'd to NH via wheelchair with portable oxygen with 3LNC. Pain tolerable. Bandaid to mid lower back CDI.

## 2019-08-22 ENCOUNTER — HOSPITAL ENCOUNTER (EMERGENCY)
Facility: HOSPITAL | Age: 69
Discharge: HOME | End: 2019-08-23
Attending: EMERGENCY MEDICINE
Payer: MEDICARE

## 2019-08-22 DIAGNOSIS — R42 VERTIGO: Primary | ICD-10-CM

## 2019-08-22 PROCEDURE — 99284 EMERGENCY DEPT VISIT MOD MDM: CPT | Mod: 25

## 2019-08-23 VITALS
HEART RATE: 81 BPM | SYSTOLIC BLOOD PRESSURE: 168 MMHG | RESPIRATION RATE: 19 BRPM | DIASTOLIC BLOOD PRESSURE: 71 MMHG | TEMPERATURE: 98 F | OXYGEN SATURATION: 97 %

## 2019-08-23 NOTE — ED NOTES
Pt arrived to ER via EMS from [place] for near syncopal episode. Pt states she got out of bed after receiving her night medication, became dizzy and fell, hitting her head. Pt denies any LOC. Pt reports that she often becomes dizzy after receiving her nighttime medications.

## 2019-08-23 NOTE — ED NOTES
Pt departed with transporter from nursing home.  Departure delays d/t waiting for transport.  Pt cleaned of urine multiple times while waiting for transport, but remains pleasant and cooperative

## 2019-08-23 NOTE — ED PROVIDER NOTES
Encounter Date: 8/22/2019       History     Chief Complaint   Patient presents with    Pre Syncope     Chief complaint is dizziness and fall HPI this patient states she stood up felt vertiginous and then fell to the floor.  She denies loss of consciousness.  She states she hit the back of her head.  She does states she has a history of vertigo.  She is now alert cooperative.  No complaints        Review of patient's allergies indicates:   Allergen Reactions    Morphine Itching    Tubersol [tuberculin ppd] Other (See Comments)     Site swells bad. Has to have chest xray     Past Medical History:   Diagnosis Date    Allergy     Anticoagulant long-term use     Anxiety     Arthritis     Asthma     Bipolar disorder     Cancer     thyroid    Chronic back pain     COPD (chronic obstructive pulmonary disease)     Diabetes mellitus     Diabetes mellitus, type 2     Fibromyalgia     GERD (gastroesophageal reflux disease)     [place] (hard of hearing)     Hx of thyroid cancer     Hyperlipidemia     Hypertension     Hypothyroidism     Neuropathy     On home oxygen therapy     3 l/m 24/7    Restless leg syndrome     Sleep apnea     Urinary tract infection      Past Surgical History:   Procedure Laterality Date    APPENDECTOMY      ARTHROPLASTY, KNEE Left 9/17/2018    [facility]formed by [facility] at [facility] OR    BACK SURGERY      x 3    broken foot and ankle      CHOLECYSTECTOMY      CYSTOSCOPY  4/26/2018    Performed by Faith [facility] at [facility] OR    EYE SURGERY Bilateral     cataract    HYSTERECTOMY      mid urethral sling N/A 4/26/2018    Performed by [facility] at [facility] OR    MYELOGRAM N/A 8/7/2019    [facility] by [facility] OR    POSTERIOR REPAIR      REPAIR POSTERIOR N/A 4/26/2018    Performed by Mark Townsend DO at [facility] OR    SPINAL FUSION      x3 BACK, NECK X 4    TOTAL THYROIDECTOMY  2014     Family History   Problem Relation Age  of Onset    Diabetes Mother     Heart disease Mother     Hypertension Mother     Diabetes Father     Heart disease Father     Hypertension Father      Social History     Tobacco Use    Smoking status: Former Smoker     Packs/day: 1.00     Years: 30.00     Pack years: 30.00     Types: Cigarettes     Last attempt to quit: 2000     Years since quittin.3    Smokeless tobacco: Never Used   Substance Use Topics    Alcohol use: No    Drug use: No     Review of Systems   Constitutional: Negative for chills and fever.   HENT: Negative for ear pain, rhinorrhea and sore throat.    Eyes: Negative for pain and visual disturbance.   Respiratory: Negative for cough and shortness of breath.    Cardiovascular: Negative for chest pain and palpitations.   Gastrointestinal: Negative for abdominal pain, constipation, diarrhea, nausea and vomiting.   Genitourinary: Negative for dysuria, frequency, hematuria and urgency.   Musculoskeletal: Negative for back pain, joint swelling and myalgias.   Skin: Negative for rash.   Neurological: Positive for headaches. Negative for dizziness, seizures and weakness.   Psychiatric/Behavioral: Negative for dysphoric mood. The patient is not nervous/anxious.        Physical Exam     Initial Vitals [19 2258]   BP Pulse Resp Temp SpO2   (P) 134/63 (P) 72 (P) 18 (P) 97.9 °F (36.6 °C) (P) 99 %      MAP       --         Physical Exam    Nursing note and vitals reviewed.  Constitutional: She appears well-developed and well-nourished.   HENT:   Head: Normocephalic and atraumatic.   Eyes: Conjunctivae, EOM and lids are normal. Pupils are equal, round, and reactive to light.   Neck: Trachea normal and normal range of motion. Neck supple. No thyroid mass and no thyromegaly present.   Cardiovascular: Normal rate, regular rhythm and normal heart sounds.   Pulmonary/Chest: Effort normal and breath sounds normal.   Abdominal: Soft. Normal appearance and bowel sounds are normal. There is no  tenderness.   Musculoskeletal: Normal range of motion.   Neurological: She is alert and oriented to person, place, and time. She has normal strength and normal reflexes. No cranial nerve deficit or sensory deficit.   Skin: Skin is warm and dry.   Psychiatric: She has a normal mood and affect. Her speech is normal and behavior is normal. Judgment and thought content normal.         ED Course   Procedures  Labs Reviewed - No data to display       Imaging Results    None                               Clinical Impression:       ICD-10-CM ICD-9-CM   1. Vertigo R42 780.4                                Faustino Hernandez MD  08/23/19 0252

## 2019-09-03 PROCEDURE — 99309 SBSQ NF CARE MODERATE MDM 30: CPT | Mod: ,,, | Performed by: FAMILY MEDICINE

## 2019-09-03 PROCEDURE — 99309 PR NURSING FAC CARE, SUBSEQ, SIGNIF COMPLIC: ICD-10-PCS | Mod: ,,, | Performed by: FAMILY MEDICINE

## 2019-09-16 ENCOUNTER — OUTSIDE PLACE OF SERVICE (OUTPATIENT)
Dept: ADMINISTRATIVE | Facility: HOSPITAL | Age: 69
End: 2019-09-16
Payer: MEDICARE

## 2019-09-24 PROCEDURE — 99309 PR NURSING FAC CARE, SUBSEQ, SIGNIF COMPLIC: ICD-10-PCS | Mod: S$GLB,,, | Performed by: PHYSICIAN ASSISTANT

## 2019-09-24 PROCEDURE — 99309 SBSQ NF CARE MODERATE MDM 30: CPT | Mod: S$GLB,,, | Performed by: PHYSICIAN ASSISTANT

## 2019-09-27 ENCOUNTER — OUTSIDE PLACE OF SERVICE (OUTPATIENT)
Dept: FAMILY MEDICINE | Facility: CLINIC | Age: 69
End: 2019-09-27
Payer: MEDICARE

## 2019-10-10 DIAGNOSIS — F51.01 PRIMARY INSOMNIA: Primary | ICD-10-CM

## 2019-10-10 RX ORDER — TEMAZEPAM 30 MG/1
30 CAPSULE ORAL NIGHTLY PRN
COMMUNITY
End: 2019-10-10

## 2019-10-10 RX ORDER — TEMAZEPAM 30 MG/1
30 CAPSULE ORAL NIGHTLY PRN
Qty: 30 CAPSULE | Refills: 5 | Status: SHIPPED | OUTPATIENT
Start: 2019-10-10 | End: 2020-01-14 | Stop reason: SDUPTHER

## 2019-10-24 ENCOUNTER — OUTSIDE PLACE OF SERVICE (OUTPATIENT)
Dept: ADMINISTRATIVE | Facility: HOSPITAL | Age: 69
End: 2019-10-24
Payer: MEDICARE

## 2019-10-24 DIAGNOSIS — K76.9 LIVER LESION: ICD-10-CM

## 2019-10-24 DIAGNOSIS — G56.00 CARPAL TUNNEL SYNDROME: ICD-10-CM

## 2019-10-24 DIAGNOSIS — M51.9 LUMBAR DISC DISEASE: ICD-10-CM

## 2019-10-24 PROCEDURE — 99309 PR NURSING FAC CARE, SUBSEQ, SIGNIF COMPLIC: ICD-10-PCS | Mod: ,,, | Performed by: FAMILY MEDICINE

## 2019-10-24 PROCEDURE — 99309 SBSQ NF CARE MODERATE MDM 30: CPT | Mod: ,,, | Performed by: FAMILY MEDICINE

## 2019-10-25 DIAGNOSIS — K76.9 LIVER LESION: Primary | ICD-10-CM

## 2019-10-29 ENCOUNTER — HOSPITAL ENCOUNTER (OUTPATIENT)
Dept: RADIOLOGY | Facility: HOSPITAL | Age: 69
Discharge: HOME | End: 2019-10-29
Attending: FAMILY MEDICINE
Payer: MEDICARE

## 2019-10-29 DIAGNOSIS — K76.9 LIVER LESION: ICD-10-CM

## 2019-10-29 PROCEDURE — 25500020 PHARM REV CODE 255: Performed by: FAMILY MEDICINE

## 2019-10-29 PROCEDURE — 74178 CT ABD&PLV WO CNTR FLWD CNTR: CPT | Mod: TC

## 2019-10-29 RX ADMIN — IOHEXOL 100 ML: 350 INJECTION, SOLUTION INTRAVENOUS at 01:10

## 2019-11-04 ENCOUNTER — TELEPHONE (OUTPATIENT)
Dept: FAMILY MEDICINE | Facility: CLINIC | Age: 69
End: 2019-11-04

## 2019-11-04 NOTE — TELEPHONE ENCOUNTER
----- Message from Delio Melendez MD sent at 11/1/2019  8:32 PM CDT -----  Call [facility], her CT scan of the liver shows spots of fatty liver.  No tumors or masses that are dangerous.  No further treatment needed

## 2019-11-14 DIAGNOSIS — J01.90 ACUTE SINUSITIS, UNSPECIFIED: ICD-10-CM

## 2019-11-14 DIAGNOSIS — H81.13 BENIGN PAROXYSMAL VERTIGO OF BOTH EARS: Primary | ICD-10-CM

## 2019-11-21 ENCOUNTER — OUTSIDE PLACE OF SERVICE (OUTPATIENT)
Dept: ADMINISTRATIVE | Facility: HOSPITAL | Age: 69
End: 2019-11-21
Payer: MEDICARE

## 2019-11-21 DIAGNOSIS — G56.00 CTS (CARPAL TUNNEL SYNDROME): ICD-10-CM

## 2019-11-21 DIAGNOSIS — M51.36 DDD (DEGENERATIVE DISC DISEASE), LUMBAR: ICD-10-CM

## 2019-11-21 PROCEDURE — 99309 SBSQ NF CARE MODERATE MDM 30: CPT | Mod: ,,, | Performed by: PHYSICIAN ASSISTANT

## 2019-11-21 PROCEDURE — 99309 PR NURSING FAC CARE, SUBSEQ, SIGNIF COMPLIC: ICD-10-PCS | Mod: ,,, | Performed by: PHYSICIAN ASSISTANT

## 2019-12-04 ENCOUNTER — TELEPHONE (OUTPATIENT)
Dept: FAMILY MEDICINE | Facility: CLINIC | Age: 69
End: 2019-12-04

## 2019-12-04 NOTE — TELEPHONE ENCOUNTER
----- Message from Teresa De La Cruz sent at 12/4/2019  2:38 PM CST -----  Pt needs to be seen for a follow up due to being discharged on 12/10 from [facility]. Pt has not been seen in the office.  Pt #[phone]. Please call  Alicia @ [phone] from [place] [facility].

## 2019-12-05 ENCOUNTER — TELEPHONE (OUTPATIENT)
Dept: FAMILY MEDICINE | Facility: CLINIC | Age: 69
End: 2019-12-05

## 2019-12-05 NOTE — TELEPHONE ENCOUNTER
----- Message from Remington Westfall sent at 12/5/2019  2:35 PM CST -----  Pt is a pt at [facility] they are wanting to know will dr jara follow the pt. She is about to be discharged on dec 10   Taith at [facility] [phone]

## 2019-12-16 ENCOUNTER — LAB VISIT (OUTPATIENT)
Dept: LAB | Facility: HOSPITAL | Age: 69
End: 2019-12-16
Attending: FAMILY MEDICINE
Payer: MEDICARE

## 2019-12-16 DIAGNOSIS — Z79.4 TYPE 2 DIABETES MELLITUS WITH OTHER SPECIFIED COMPLICATION, WITH LONG-TERM CURRENT USE OF INSULIN: Primary | ICD-10-CM

## 2019-12-16 DIAGNOSIS — N39.0 URINARY TRACT INFECTION, SITE NOT SPECIFIED: Primary | ICD-10-CM

## 2019-12-16 DIAGNOSIS — E11.69 TYPE 2 DIABETES MELLITUS WITH OTHER SPECIFIED COMPLICATION, WITH LONG-TERM CURRENT USE OF INSULIN: Primary | ICD-10-CM

## 2019-12-16 LAB
BACTERIA #/AREA URNS HPF: ABNORMAL /HPF
BILIRUB UR QL STRIP: NEGATIVE
CLARITY UR: ABNORMAL
COLOR UR: YELLOW
GLUCOSE UR QL STRIP: NEGATIVE
HGB UR QL STRIP: NEGATIVE
HYALINE CASTS #/AREA URNS LPF: 4 /LPF
KETONES UR QL STRIP: NEGATIVE
LEUKOCYTE ESTERASE UR QL STRIP: ABNORMAL
MICROSCOPIC COMMENT: ABNORMAL
NITRITE UR QL STRIP: NEGATIVE
PH UR STRIP: 7 [PH] (ref 5–8)
PROT UR QL STRIP: NEGATIVE
RBC #/AREA URNS HPF: 1 /HPF (ref 0–4)
SP GR UR STRIP: 1.01 (ref 1–1.03)
SQUAMOUS #/AREA URNS HPF: 9 /HPF
URN SPEC COLLECT METH UR: ABNORMAL
UROBILINOGEN UR STRIP-ACNC: NEGATIVE EU/DL
WBC #/AREA URNS HPF: 13 /HPF (ref 0–5)

## 2019-12-16 PROCEDURE — 87086 URINE CULTURE/COLONY COUNT: CPT

## 2019-12-16 PROCEDURE — 81001 URINALYSIS AUTO W/SCOPE: CPT

## 2019-12-16 PROCEDURE — 87186 SC STD MICRODIL/AGAR DIL: CPT

## 2019-12-16 PROCEDURE — 87077 CULTURE AEROBIC IDENTIFY: CPT

## 2019-12-16 RX ORDER — INSULIN DETEMIR 100 [IU]/ML
65 INJECTION, SOLUTION SUBCUTANEOUS 2 TIMES DAILY
Qty: 39 ML | Refills: 0 | Status: SHIPPED | OUTPATIENT
Start: 2019-12-16 | End: 2019-12-17 | Stop reason: SDUPTHER

## 2019-12-16 NOTE — TELEPHONE ENCOUNTER
----- Message from Remington Westfall sent at 12/16/2019 12:58 PM CST -----  Pt was discharged from [facility] and she was suppose to be provided with enough meds until her appt with us but is needing  levemiar   Pt doesn't have a pharm yet   Home health nurse jakob [phone]

## 2019-12-17 DIAGNOSIS — E11.69 TYPE 2 DIABETES MELLITUS WITH OTHER SPECIFIED COMPLICATION, WITH LONG-TERM CURRENT USE OF INSULIN: ICD-10-CM

## 2019-12-17 DIAGNOSIS — Z79.4 TYPE 2 DIABETES MELLITUS WITH OTHER SPECIFIED COMPLICATION, WITH LONG-TERM CURRENT USE OF INSULIN: ICD-10-CM

## 2019-12-17 RX ORDER — INSULIN DETEMIR 100 [IU]/ML
65 INJECTION, SOLUTION SUBCUTANEOUS 2 TIMES DAILY
Qty: 39 ML | Refills: 0 | Status: SHIPPED | OUTPATIENT
Start: 2019-12-17 | End: 2019-12-26

## 2019-12-17 NOTE — TELEPHONE ENCOUNTER
----- Message from Remington Westfall sent at 12/17/2019 10:11 AM CST -----  From message yesterday 12/16/2019. Pt is set up with [facility] [address] as her pharm, pt is worried about running out of insulin, pt is needing an appt for refills   Harry [phone]

## 2019-12-18 LAB — BACTERIA UR CULT: ABNORMAL

## 2019-12-23 ENCOUNTER — TELEPHONE (OUTPATIENT)
Dept: FAMILY MEDICINE | Facility: CLINIC | Age: 69
End: 2019-12-23

## 2019-12-23 DIAGNOSIS — N30.00 ACUTE CYSTITIS WITHOUT HEMATURIA: Primary | ICD-10-CM

## 2019-12-23 RX ORDER — CEPHALEXIN 500 MG/1
500 CAPSULE ORAL EVERY 8 HOURS
Qty: 21 CAPSULE | Refills: 0 | Status: SHIPPED | OUTPATIENT
Start: 2019-12-23 | End: 2019-12-26

## 2019-12-23 NOTE — TELEPHONE ENCOUNTER
Spoke to patient with information from Dr Melendez. Verbalized understanding that Keflex was being sent to pharmacy. Order pended.

## 2019-12-23 NOTE — TELEPHONE ENCOUNTER
----- Message from Delio Melendez MD sent at 12/22/2019  2:35 PM CST -----  Call patient.  She has an E coli urinary tract infection.  We can call in some Keflex 500 mg p.o. t.i.d. x7 days to cure this

## 2019-12-23 NOTE — TELEPHONE ENCOUNTER
LMOR on cell, home phone busy. On cell, asked patient to try to call us back if she can as she has UTI and we need to katarina in medication.

## 2019-12-26 ENCOUNTER — OFFICE VISIT (OUTPATIENT)
Dept: FAMILY MEDICINE | Facility: CLINIC | Age: 69
End: 2019-12-26
Payer: MEDICARE

## 2019-12-26 VITALS — SYSTOLIC BLOOD PRESSURE: 102 MMHG | DIASTOLIC BLOOD PRESSURE: 68 MMHG | HEART RATE: 88 BPM

## 2019-12-26 DIAGNOSIS — F41.9 ANXIETY: ICD-10-CM

## 2019-12-26 DIAGNOSIS — J43.1 PANLOBULAR EMPHYSEMA: ICD-10-CM

## 2019-12-26 DIAGNOSIS — I10 ESSENTIAL HYPERTENSION: ICD-10-CM

## 2019-12-26 DIAGNOSIS — E11.69 TYPE 2 DIABETES MELLITUS WITH OTHER SPECIFIED COMPLICATION, WITH LONG-TERM CURRENT USE OF INSULIN: ICD-10-CM

## 2019-12-26 DIAGNOSIS — N81.6 POSTERIOR VAGINAL WALL PROLAPSE: ICD-10-CM

## 2019-12-26 DIAGNOSIS — Z96.652 S/P TOTAL KNEE ARTHROPLASTY, LEFT: ICD-10-CM

## 2019-12-26 DIAGNOSIS — N39.46 MIXED INCONTINENCE: ICD-10-CM

## 2019-12-26 DIAGNOSIS — Z91.81 HISTORY OF FALL: ICD-10-CM

## 2019-12-26 DIAGNOSIS — Z79.4 TYPE 2 DIABETES MELLITUS WITH OTHER SPECIFIED COMPLICATION, WITH LONG-TERM CURRENT USE OF INSULIN: ICD-10-CM

## 2019-12-26 DIAGNOSIS — N81.10 PROLAPSE OF ANTERIOR VAGINAL WALL: ICD-10-CM

## 2019-12-26 DIAGNOSIS — N30.00 ACUTE CYSTITIS WITHOUT HEMATURIA: ICD-10-CM

## 2019-12-26 DIAGNOSIS — M17.12 PRIMARY OSTEOARTHRITIS OF LEFT KNEE: ICD-10-CM

## 2019-12-26 DIAGNOSIS — K21.9 GASTROESOPHAGEAL REFLUX DISEASE WITHOUT ESOPHAGITIS: ICD-10-CM

## 2019-12-26 DIAGNOSIS — E78.2 MIXED HYPERLIPIDEMIA: ICD-10-CM

## 2019-12-26 DIAGNOSIS — Z79.4 TYPE 2 DIABETES MELLITUS WITH DIABETIC POLYNEUROPATHY, WITH LONG-TERM CURRENT USE OF INSULIN: Primary | ICD-10-CM

## 2019-12-26 DIAGNOSIS — E89.0 POSTOPERATIVE HYPOTHYROIDISM: ICD-10-CM

## 2019-12-26 DIAGNOSIS — E44.1 MILD PROTEIN-CALORIE MALNUTRITION: ICD-10-CM

## 2019-12-26 DIAGNOSIS — E11.42 TYPE 2 DIABETES MELLITUS WITH DIABETIC POLYNEUROPATHY, WITH LONG-TERM CURRENT USE OF INSULIN: Primary | ICD-10-CM

## 2019-12-26 PROCEDURE — 99214 OFFICE O/P EST MOD 30 MIN: CPT | Mod: S$GLB,,, | Performed by: FAMILY MEDICINE

## 2019-12-26 PROCEDURE — 99214 PR OFFICE/OUTPT VISIT, EST, LEVL IV, 30-39 MIN: ICD-10-PCS | Mod: S$GLB,,, | Performed by: FAMILY MEDICINE

## 2019-12-26 RX ORDER — MUPIROCIN 20 MG/G
OINTMENT TOPICAL
COMMUNITY
Start: 2019-10-07 | End: 2020-02-26

## 2019-12-26 RX ORDER — INSULIN DETEMIR 100 [IU]/ML
70 INJECTION, SOLUTION SUBCUTANEOUS 2 TIMES DAILY
Qty: 2 BOX | Refills: 5 | Status: SHIPPED | OUTPATIENT
Start: 2019-12-26 | End: 2020-03-18 | Stop reason: DRUGHIGH

## 2019-12-26 RX ORDER — ROPINIROLE 4 MG/1
4 TABLET, FILM COATED ORAL NIGHTLY
COMMUNITY
End: 2020-01-02 | Stop reason: SDUPTHER

## 2019-12-26 RX ORDER — BLOOD SUGAR DIAGNOSTIC
STRIP MISCELLANEOUS
Refills: 1 | COMMUNITY
Start: 2019-12-06 | End: 2019-12-30 | Stop reason: SDUPTHER

## 2019-12-26 RX ORDER — GABAPENTIN 800 MG/1
800 TABLET ORAL NIGHTLY
Qty: 90 TABLET | Refills: 1 | Status: SHIPPED | OUTPATIENT
Start: 2019-12-26 | End: 2020-06-16 | Stop reason: SDUPTHER

## 2019-12-26 RX ORDER — LANCETS
EACH MISCELLANEOUS
Refills: 1 | COMMUNITY
Start: 2019-12-06 | End: 2019-12-30 | Stop reason: SDUPTHER

## 2019-12-26 RX ORDER — GABAPENTIN 400 MG/1
400 CAPSULE ORAL DAILY
Qty: 90 CAPSULE | Refills: 1 | Status: SHIPPED | OUTPATIENT
Start: 2019-12-26 | End: 2020-06-16 | Stop reason: SDUPTHER

## 2019-12-26 RX ORDER — PANTOPRAZOLE SODIUM 40 MG/1
40 TABLET, DELAYED RELEASE ORAL DAILY
COMMUNITY
End: 2020-01-02 | Stop reason: SDUPTHER

## 2019-12-26 RX ORDER — INSULIN ASPART 100 [IU]/ML
INJECTION, SOLUTION INTRAVENOUS; SUBCUTANEOUS
Qty: 1 BOX | Refills: 5 | Status: SHIPPED | OUTPATIENT
Start: 2019-12-26 | End: 2020-03-18 | Stop reason: DRUGHIGH

## 2019-12-26 RX ORDER — LINACLOTIDE 290 UG/1
290 CAPSULE, GELATIN COATED ORAL DAILY
COMMUNITY
Start: 2019-11-21 | End: 2020-01-07 | Stop reason: SDUPTHER

## 2019-12-26 RX ORDER — DEXTROMETHORPHAN HYDROBROMIDE, GUAIFENESIN 5; 100 MG/5ML; MG/5ML
650 LIQUID ORAL EVERY 4 HOURS PRN
COMMUNITY
End: 2020-02-26

## 2019-12-26 RX ORDER — LOSARTAN POTASSIUM AND HYDROCHLOROTHIAZIDE 12.5; 5 MG/1; MG/1
1 TABLET ORAL DAILY
COMMUNITY
End: 2020-01-02 | Stop reason: SDUPTHER

## 2019-12-26 RX ORDER — LEVOTHYROXINE SODIUM 150 UG/1
150 TABLET ORAL
Qty: 90 TABLET | Refills: 1 | Status: SHIPPED | OUTPATIENT
Start: 2019-12-26 | End: 2020-02-26 | Stop reason: SDUPTHER

## 2019-12-26 RX ORDER — FUROSEMIDE 20 MG/1
20 TABLET ORAL
COMMUNITY
Start: 2019-12-10 | End: 2020-02-26

## 2019-12-26 RX ORDER — ONDANSETRON 4 MG/1
TABLET, FILM COATED ORAL
COMMUNITY
Start: 2019-12-10 | End: 2019-12-26

## 2019-12-26 NOTE — PROGRESS NOTES
SUBJECTIVE:    Patient ID: Alanis Mendieta is a 68 y.o. female.    Chief Complaint: Hospital Follow Up (f/u from [place] d/c. did not bring any medication bottles (list updated with  list) ac ) and Dizziness (x days ac)    This 68-year-old female recently was discharged from [facility] where she would stay for 2-3 years.  Two weeks ago she was able to move into an apartment here in [place] with the assistance of the waiver program, Admittedly assistance.  She has received furniture, hospital bed and a sitter 7 days a week, 8 hr per day.  Area the cook meds for and transport her to office visits and do light shopping.  Currently she uses a walker for mobility in her home and has a wheelchair for long distances.  She complains of a recent UTI with some dysuria.  Also notes some spinning sensations tinnitus and vertigo.  She has meclizine at home for this    Her blood sugars have been between 80 and 170 according to the patient and she is eating less neck is now that she has moved out of the nursing home.  And is eating healthier meals made by her sitters.  She uses Levemir 70 units subcu b.i.d. and NovoLog with meals sliding scale between 2 and 12 units.    She did have a right carpal tunnel surgery done by Dr. Minor Olsen and needs and left carpal tunnel repair but she is not willing to do that yet.  Her left total knee replacement aches a bit but she has had no recent falls.      Lab Visit on 12/16/2019   Component Date Value Ref Range Status    Specimen UA 12/16/2019 Urine, Clean Catch   Final    Color, UA 12/16/2019 Yellow  Yellow, Straw, Edilia Final    Appearance, UA 12/16/2019 Hazy* Clear Final    pH, UA 12/16/2019 7.0  5.0 - 8.0 Final    Specific Gravity, UA 12/16/2019 1.010  1.005 - 1.030 Final    Protein, UA 12/16/2019 Negative  Negative Final    Glucose, UA 12/16/2019 Negative  Negative Final    Ketones, UA 12/16/2019 Negative  Negative Final    Bilirubin (UA) 12/16/2019  Negative  Negative Final    Occult Blood UA 12/16/2019 Negative  Negative Final    Nitrite, UA 12/16/2019 Negative  Negative Final    Urobilinogen, UA 12/16/2019 Negative  Negative EU/dL Final    Leukocytes, UA 12/16/2019 3+* Negative Final    Urine Culture, Routine 12/16/2019 *  Final                    Value:ESCHERICHIA COLI  >100,000 cfu/ml      RBC, UA 12/16/2019 1  0 - 4 /hpf Final    WBC, UA 12/16/2019 13* 0 - 5 /hpf Final    Bacteria 12/16/2019 Many* None-Occ /hpf Final    Squam Epithel, UA 12/16/2019 9  /hpf Final    Hyaline Casts, UA 12/16/2019 4* 0-1/lpf /lpf Final    Microscopic Comment 12/16/2019 SEE COMMENT   Final       Past Medical History:   Diagnosis Date    Allergy     Anticoagulant long-term use     Anxiety     Arthritis     Asthma     Bipolar disorder     Cancer     thyroid    Chronic back pain     COPD (chronic obstructive pulmonary disease)     Diabetes mellitus     Diabetes mellitus, type 2     Fibromyalgia     GERD (gastroesophageal reflux disease)     [place] (hard of hearing)     Hx of thyroid cancer     Hyperlipidemia     Hypertension     Hypothyroidism     Neuropathy     On home oxygen therapy     3 l/m 24/7    Restless leg syndrome     Sleep apnea     Urinary tract infection      Past Surgical History:   Procedure Laterality Date    APPENDECTOMY      BACK SURGERY      x 3    broken foot and ankle      CHOLECYSTECTOMY      EYE SURGERY Bilateral     cataract    HYSTERECTOMY      KNEE ARTHROPLASTY Left 9/17/2018    Procedure: ARTHROPLASTY, KNEE;  Surgeon: Yariel Marin MD;  Location: [facility] OR;  Service: Orthopedics;  Laterality: Left;    MYELOGRAPHY N/A 8/7/2019    Procedure: MYELOGRAM;  Surgeon: Sun Diagnostic Provider;  Location: [facility] OR;  Service: General;  Laterality: N/A;    POSTERIOR REPAIR      SPINAL FUSION      x3 BACK, NECK X 4    TOTAL THYROIDECTOMY  2014     Family History   Problem Relation Age of Onset    Diabetes Mother     Heart  disease Mother     Hypertension Mother     Diabetes Father     Heart disease Father     Hypertension Father        Marital Status:   Alcohol History:  reports that she does not drink alcohol.  Tobacco History:  reports that she quit smoking about 19 years ago. Her smoking use included cigarettes. She has a 30.00 pack-year smoking history. She has never used smokeless tobacco.  Drug History:  reports that she does not use drugs.    Review of patient's allergies indicates:   Allergen Reactions    Morphine Itching    Tubersol [tuberculin ppd] Other (See Comments)     Site swells bad. Has to have chest xray       Current Outpatient Medications:     acetaminophen (TYLENOL) 650 MG TbSR, Take 650 mg by mouth every 4 (four) hours as needed., Disp: , Rfl:     albuterol (PROVENTIL) 2.5 mg /3 mL (0.083 %) nebulizer solution, Take 2.5 mg by nebulization every 6 (six) hours as needed for Shortness of Breath. , Disp: , Rfl:     ANORO ELLIPTA 62.5-25 mcg/actuation DsDv, Inhale 2 puffs into the lungs once daily. , Disp: , Rfl:     ARTIFICIAL TEARS,HYPROMELLOSE, OPHT, Place 1 drop into both eyes 3 (three) times daily., Disp: , Rfl:     aspirin 325 MG tablet, Take 1 tablet (325 mg total) by mouth 2 (two) times daily., Disp: , Rfl: 0    bisacodyl (DULCOLAX) 5 mg EC tablet, Take 2 tablets (10 mg total) by mouth once daily., Disp: 60 tablet, Rfl: 11    calcitRIOL (ROCALTROL) 0.5 MCG Cap, Take 0.5 mcg by mouth once daily. , Disp: , Rfl:     clonazePAM (KLONOPIN) 0.5 MG tablet, Take 0.5 mg by mouth once daily. In evening, Disp: , Rfl:     clonazePAM (KLONOPIN) 1 MG tablet, Take 1 mg by mouth every morning. , Disp: , Rfl:     conjugated estrogens (PREMARIN) vaginal cream, Insert 1g 2x a week. (Patient taking differently: Place 1 g vaginally twice a week. Mon. And Fri.), Disp: 45 g, Rfl: 6    DULoxetine (CYMBALTA) 60 MG capsule, Take 60 mg by mouth once daily. , Disp: , Rfl:     ERGOCALCIFEROL, VITAMIN D2, (VITAMIN  D ORAL), Take 1.25 mg by mouth every Wednesday. , Disp: , Rfl:     FLUoxetine (PROZAC) 20 MG capsule, Take 20 mg by mouth once daily. , Disp: , Rfl:     furosemide (LASIX) 20 MG tablet, Take 20 mg by mouth every Mon, Wed, Fri. , Disp: , Rfl:     gabapentin (NEURONTIN) 400 MG capsule, Take 1 capsule (400 mg total) by mouth once daily., Disp: 90 capsule, Rfl: 1    gabapentin (NEURONTIN) 800 MG tablet, Take 1 tablet (800 mg total) by mouth every evening. In evening, Disp: 90 tablet, Rfl: 1    guaifenesin 100 mg/5 ml (ROBITUSSIN) 100 mg/5 mL syrup, Take 200 mg by mouth every 8 (eight) hours as needed for Cough., Disp: , Rfl:     lamoTRIgine (LAMICTAL) 150 MG Tab, Take 150 mg by mouth 2 (two) times daily. , Disp: , Rfl:     LEVEMIR FLEXTOUCH U-100 INSULN 100 unit/mL (3 mL) InPn pen, Inject 70 Units into the skin 2 (two) times daily., Disp: 2 Box, Rfl: 5    levothyroxine (SYNTHROID) 150 MCG tablet, Take 1 tablet (150 mcg total) by mouth before breakfast., Disp: 90 tablet, Rfl: 1    LINZESS 290 mcg Cap capsule, Take 290 mcg by mouth once daily. , Disp: , Rfl:     loratadine (CLARITIN) 10 mg tablet, Take 10 mg by mouth once daily., Disp: , Rfl:     losartan-hydrochlorothiazide 50-12.5 mg (HYZAAR) 50-12.5 mg per tablet, Take 1 tablet by mouth once daily., Disp: , Rfl:     magnesium oxide (MAG-OX) 400 mg (241.3 mg magnesium) tablet, Take 400 mg by mouth 2 (two) times daily., Disp: , Rfl:     methylnaltrexone (RELISTOR) 150 mg Tab, Take 450 mg by mouth once daily., Disp: , Rfl:     mirabegron (MYRBETRIQ) 50 mg Tb24, Take 1 tablet (50 mg total) by mouth once daily., Disp: 90 tablet, Rfl: 3    ondansetron (ZOFRAN-ODT) 4 MG TbDL, Take 4 mg by mouth every 6 (six) hours as needed (nausea)., Disp: , Rfl:     oxyCODONE (ROXICODONE) 10 mg Tab immediate release tablet, Take 10 mg by mouth every 8 (eight) hours as needed for Pain. , Disp: , Rfl:     pantoprazole (PROTONIX) 40 MG tablet, Take 40 mg by mouth once daily.,  "Disp: , Rfl:     potassium chloride SA (K-DUR,KLOR-CON) 10 MEQ tablet, Take 10 mEq by mouth 3 (three) times a week. Mon , Wed, and Fri., Disp: , Rfl:     rOPINIRole (REQUIP) 4 MG tablet, Take 4 mg by mouth every evening., Disp: , Rfl:     temazepam (RESTORIL) 30 mg capsule, Take 1 capsule (30 mg total) by mouth nightly as needed., Disp: 30 capsule, Rfl: 5    tiZANidine (ZANAFLEX) 4 MG tablet, Take 4 mg by mouth every 4 (four) hours as needed (cramps). , Disp: , Rfl:     ACCU-CHEK LIBERTY PLUS TEST STRP Strp, TEST BLOOD GLUCOSE BEFORE MEALS AND AT BEDTIME, Disp: , Rfl: 1    ACCU-CHEK SOFTCLIX LANCETS Misc, USE 4 TIMES A DAY, Disp: , Rfl: 1    ibuprofen (ADVIL,MOTRIN) 400 MG tablet, Take 400 mg by mouth every 8 (eight) hours as needed for Other., Disp: , Rfl:     lactulose (CHRONULAC) 10 gram/15 mL solution, Take 20 g by mouth daily as needed. , Disp: , Rfl:     latanoprost 0.005 % ophthalmic solution, Place 1 drop into both eyes every evening. , Disp: , Rfl:     mupirocin (BACTROBAN) 2 % ointment, , Disp: , Rfl:     NOVOFINE AUTOCOVER 30 gauge x 1/3" Ndle, , Disp: , Rfl:     NOVOLOG FLEXPEN U-100 INSULIN 100 unit/mL (3 mL) InPn pen, Sliding scale with meals, Disp: 1 Box, Rfl: 5    vitamin D (VITAMIN D3) 1000 units Tab, Take 1,000 Units by mouth once a week. wed, Disp: , Rfl:     Review of Systems   Constitutional: Negative for appetite change, chills, fatigue, fever and unexpected weight change.   HENT: Negative for congestion, ear pain, sinus pain, sore throat and trouble swallowing.    Eyes: Negative for pain, discharge and visual disturbance.   Respiratory: Negative for apnea, cough, shortness of breath and wheezing.         Uses  O2 2 l/ min prn    Cardiovascular: Negative for chest pain, palpitations and leg swelling.   Gastrointestinal: Negative for abdominal pain, blood in stool, constipation, diarrhea, nausea and vomiting.        GERD  Controlled  well   Endocrine: Negative for heat intolerance, " polydipsia and polyuria.   Genitourinary: Negative for difficulty urinating, dyspareunia, dysuria, frequency, hematuria and menstrual problem.   Musculoskeletal: Negative for arthralgias, back pain, gait problem, joint swelling and myalgias.   Allergic/Immunologic: Negative for environmental allergies, food allergies and immunocompromised state.   Neurological: Negative for dizziness (vertigo spells recently, has  meclizine at  home,.), tremors, seizures, numbness and headaches.   Psychiatric/Behavioral: Negative for behavioral problems, confusion, hallucinations and suicidal ideas. The patient is not nervous/anxious.           Objective:      Vitals:    12/26/19 1159   BP: 102/68   Pulse: 88     There is no height or weight on file to calculate BMI.  Physical Exam   Constitutional: She is oriented to person, place, and time. She appears well-developed and well-nourished.   Obese white female in no apparent distress in a wheelchair   HENT:   Head: Normocephalic and atraumatic.   Right Ear: External ear normal.   Left Ear: External ear normal.   Nose: Nose normal.   Mouth/Throat: Oropharynx is clear and moist.   Eyes: Pupils are equal, round, and reactive to light. EOM are normal.   Neck: Normal range of motion. Neck supple. Carotid bruit is not present. No thyromegaly present.   Cardiovascular: Normal rate, regular rhythm, normal heart sounds and intact distal pulses.   No murmur heard.  Pulmonary/Chest: Effort normal and breath sounds normal. She has no wheezes. She has no rales.   Patient wearing oxygen at 2 liters/minute   Abdominal: Soft. Bowel sounds are normal. She exhibits no distension. There is no hepatosplenomegaly. There is no tenderness.   Musculoskeletal: Normal range of motion. She exhibits no tenderness or deformity.        Lumbar back: Normal. She exhibits no pain and no spasm.   Bends 90 degrees at  Waist, shoulders full range of motion.  Her right carpal tunnel surgery scars well healed in the  wrist.  Left TKR has full range of motion.   Lymphadenopathy:     She has no cervical adenopathy.   Neurological: She is alert and oriented to person, place, and time. No cranial nerve deficit. Coordination normal.   Skin: Skin is warm and dry. No rash noted.   Psychiatric: She has a normal mood and affect. Her behavior is normal. Judgment and thought content normal.   Nursing note and vitals reviewed.        Assessment:       1. Type 2 diabetes mellitus with diabetic polyneuropathy, with long-term current use of insulin    2. Type 2 diabetes mellitus with other specified complication, with long-term current use of insulin    3. Mild protein-calorie malnutrition    4. Postoperative hypothyroidism    5. Posterior vaginal wall prolapse    6. Gastroesophageal reflux disease without esophagitis    7. S/P total knee arthroplasty, left    8. Primary osteoarthritis of left knee    9. History of fall    10. Anxiety    11. Panlobular emphysema    12. Mixed hyperlipidemia    13. Essential hypertension    14. Prolapse of anterior vaginal wall    15. Mixed incontinence    16. Acute cystitis without hematuria         Plan:       Type 2 diabetes mellitus with diabetic polyneuropathy, with long-term current use of insulin  -     gabapentin (NEURONTIN) 400 MG capsule; Take 1 capsule (400 mg total) by mouth once daily.  Dispense: 90 capsule; Refill: 1  -     gabapentin (NEURONTIN) 800 MG tablet; Take 1 tablet (800 mg total) by mouth every evening. In evening  Dispense: 90 tablet; Refill: 1  -     LEVEMIR FLEXTOUCH U-100 INSULN 100 unit/mL (3 mL) InPn pen; Inject 70 Units into the skin 2 (two) times daily.  Dispense: 2 Box; Refill: 5  -     NOVOLOG FLEXPEN U-100 INSULIN 100 unit/mL (3 mL) InPn pen; Sliding scale with meals  Dispense: 1 Box; Refill: 5  A1c today is 7.3 which is an improvement from her prior at the nursing home.  Continue Levemir NovoLog as is.  Type 2 diabetes mellitus with other specified complication, with long-term  current use of insulin  -     LEVEMIR FLEXTOUCH U-100 INSULN 100 unit/mL (3 mL) InPn pen; Inject 70 Units into the skin 2 (two) times daily.  Dispense: 2 Box; Refill: 5    Mild protein-calorie malnutrition  Her nutrition seems adequate  Postoperative hypothyroidism  -     levothyroxine (SYNTHROID) 150 MCG tablet; Take 1 tablet (150 mcg total) by mouth before breakfast.  Dispense: 90 tablet; Refill: 1  Continue levothyroxine 150 mcg daily  Posterior vaginal wall prolapse    Gastroesophageal reflux disease without esophagitis  Continue pantoprazole  S/P total knee arthroplasty, left  Doing well lately  Primary osteoarthritis of left knee    History of fall    Anxiety    Panlobular emphysema  Continue O2 2 L p.r.n.  Mixed hyperlipidemia    Essential hypertension    Prolapse of anterior vaginal wall    Mixed incontinence  Patient seems to have quite a bit of support at home with the [employer] program sitters.  She has functioning independently.  She may have home health at this time and we will follow her back in 2 months for her progress.  Continue Keflex 500 mg t.i.d. for UTI  Follow up in about 2 years (around 12/26/2021) for Diabetic Check-Up with Linda.

## 2019-12-30 DIAGNOSIS — E11.42 TYPE 2 DIABETES MELLITUS WITH DIABETIC POLYNEUROPATHY, WITH LONG-TERM CURRENT USE OF INSULIN: Primary | ICD-10-CM

## 2019-12-30 DIAGNOSIS — Z79.4 TYPE 2 DIABETES MELLITUS WITH DIABETIC POLYNEUROPATHY, WITH LONG-TERM CURRENT USE OF INSULIN: Primary | ICD-10-CM

## 2019-12-30 RX ORDER — BLOOD SUGAR DIAGNOSTIC
STRIP MISCELLANEOUS
Qty: 400 EACH | Refills: 1 | Status: SHIPPED | OUTPATIENT
Start: 2019-12-30 | End: 2020-01-14 | Stop reason: SDUPTHER

## 2019-12-30 RX ORDER — LANCETS
EACH MISCELLANEOUS
Qty: 400 EACH | Refills: 1 | Status: SHIPPED | OUTPATIENT
Start: 2019-12-30 | End: 2020-01-14 | Stop reason: SDUPTHER

## 2019-12-30 NOTE — TELEPHONE ENCOUNTER
----- Message from Rosa Linder sent at 12/30/2019  3:33 PM CST -----  Contact: STEPHANI bains for us to call [facility] by [address] patient needs a refill on needles and lancets. Sdiney # [phone] REYNA

## 2020-01-02 DIAGNOSIS — K21.9 GASTROESOPHAGEAL REFLUX DISEASE WITHOUT ESOPHAGITIS: ICD-10-CM

## 2020-01-02 DIAGNOSIS — I10 ESSENTIAL HYPERTENSION: Primary | ICD-10-CM

## 2020-01-02 RX ORDER — PANTOPRAZOLE SODIUM 40 MG/1
40 TABLET, DELAYED RELEASE ORAL DAILY
Qty: 90 TABLET | Refills: 1 | Status: SHIPPED | OUTPATIENT
Start: 2020-01-02 | End: 2020-02-26 | Stop reason: SDUPTHER

## 2020-01-02 RX ORDER — ROPINIROLE 4 MG/1
4 TABLET, FILM COATED ORAL NIGHTLY
Qty: 90 TABLET | Refills: 1 | Status: SHIPPED | OUTPATIENT
Start: 2020-01-02 | End: 2020-01-07 | Stop reason: SDUPTHER

## 2020-01-02 RX ORDER — POTASSIUM CHLORIDE 750 MG/1
10 TABLET, EXTENDED RELEASE ORAL
Qty: 36 TABLET | Refills: 1 | Status: SHIPPED | OUTPATIENT
Start: 2020-01-03 | End: 2020-02-26 | Stop reason: SDUPTHER

## 2020-01-02 RX ORDER — CALCITRIOL 0.5 UG/1
0.5 CAPSULE ORAL DAILY
Qty: 90 CAPSULE | Refills: 1 | Status: SHIPPED | OUTPATIENT
Start: 2020-01-02 | End: 2020-03-18 | Stop reason: DRUGHIGH

## 2020-01-02 RX ORDER — LAMOTRIGINE 150 MG/1
150 TABLET ORAL 2 TIMES DAILY
Qty: 180 TABLET | Refills: 1 | Status: SHIPPED | OUTPATIENT
Start: 2020-01-02 | End: 2020-06-16 | Stop reason: SDUPTHER

## 2020-01-02 RX ORDER — LOSARTAN POTASSIUM AND HYDROCHLOROTHIAZIDE 12.5; 5 MG/1; MG/1
1 TABLET ORAL DAILY
Qty: 90 TABLET | Refills: 1 | Status: SHIPPED | OUTPATIENT
Start: 2020-01-02 | End: 2020-02-26 | Stop reason: SDUPTHER

## 2020-01-02 RX ORDER — FLUOXETINE HYDROCHLORIDE 20 MG/1
20 CAPSULE ORAL DAILY
Qty: 90 CAPSULE | Refills: 1 | Status: SHIPPED | OUTPATIENT
Start: 2020-01-02 | End: 2020-05-26 | Stop reason: SDUPTHER

## 2020-01-02 NOTE — TELEPHONE ENCOUNTER
----- Message from Yee Chatterjee sent at 1/2/2020 11:22 AM CST -----  Contact: Alanis Mendieta  Needs refill on Fluoxetine 20MG, Potassium 10Meq, Losartan 50-12.5MG , Ropinirole 4MG , Calcitriol 0.5 MCG , Lamotrigine 150MG, Pantoprazole 40Mg  Send to [facility] [address]   Pt# [phone]

## 2020-01-03 ENCOUNTER — TELEPHONE (OUTPATIENT)
Dept: FAMILY MEDICINE | Facility: CLINIC | Age: 70
End: 2020-01-03

## 2020-01-03 NOTE — TELEPHONE ENCOUNTER
----- Message from Yee Chatterjee sent at 1/3/2020 11:01 AM CST -----  Contact: Jaquelin wei/ [facility]  Rx was sent over on 12/26. She says that she needs the max daily dose for the Novolog so she can process it through the patient's insurance.   Jaquelin's# [phone]

## 2020-01-07 RX ORDER — ROPINIROLE 4 MG/1
4 TABLET, FILM COATED ORAL 2 TIMES DAILY
Qty: 180 TABLET | Refills: 0 | Status: SHIPPED | OUTPATIENT
Start: 2020-01-07 | End: 2020-01-22 | Stop reason: SDUPTHER

## 2020-01-07 RX ORDER — LINACLOTIDE 290 UG/1
290 CAPSULE, GELATIN COATED ORAL DAILY
Qty: 90 CAPSULE | Refills: 1 | Status: SHIPPED | OUTPATIENT
Start: 2020-01-07 | End: 2020-01-14 | Stop reason: SDUPTHER

## 2020-01-07 NOTE — TELEPHONE ENCOUNTER
Spoke with pt she is still having problems with vertigo like she had in the [facility]. She has increased her meclizine to BID and once she took it TID. Pt says she felt better after taking it TID. She will try that for a few days and let us know how that works. Her requip needs to to be Bid and she needs a refill on the linzess. Set up for you to send in.

## 2020-01-10 ENCOUNTER — EXTERNAL HOME HEALTH (OUTPATIENT)
Dept: HOME HEALTH SERVICES | Facility: HOSPITAL | Age: 70
End: 2020-01-10

## 2020-01-14 ENCOUNTER — EXTERNAL HOME HEALTH (OUTPATIENT)
Dept: HOME HEALTH SERVICES | Facility: HOSPITAL | Age: 70
End: 2020-01-14
Payer: MEDICAID

## 2020-01-14 DIAGNOSIS — F51.01 PRIMARY INSOMNIA: ICD-10-CM

## 2020-01-14 DIAGNOSIS — Z79.4 TYPE 2 DIABETES MELLITUS WITH DIABETIC POLYNEUROPATHY, WITH LONG-TERM CURRENT USE OF INSULIN: ICD-10-CM

## 2020-01-14 DIAGNOSIS — F41.9 ANXIETY: Primary | ICD-10-CM

## 2020-01-14 DIAGNOSIS — K59.00 CONSTIPATION, UNSPECIFIED CONSTIPATION TYPE: ICD-10-CM

## 2020-01-14 DIAGNOSIS — R06.2 WHEEZING: ICD-10-CM

## 2020-01-14 DIAGNOSIS — E11.42 TYPE 2 DIABETES MELLITUS WITH DIABETIC POLYNEUROPATHY, WITH LONG-TERM CURRENT USE OF INSULIN: ICD-10-CM

## 2020-01-14 DIAGNOSIS — E55.9 VITAMIN D DEFICIENCY: ICD-10-CM

## 2020-01-14 RX ORDER — ASPIRIN 325 MG
325 TABLET ORAL DAILY
COMMUNITY
End: 2020-02-26

## 2020-01-14 RX ORDER — ALBUTEROL SULFATE 90 UG/1
2 AEROSOL, METERED RESPIRATORY (INHALATION) EVERY 6 HOURS PRN
COMMUNITY
End: 2020-01-14 | Stop reason: SDUPTHER

## 2020-01-14 RX ORDER — DULOXETIN HYDROCHLORIDE 60 MG/1
60 CAPSULE, DELAYED RELEASE ORAL DAILY
Qty: 90 CAPSULE | Refills: 1 | Status: CANCELLED | OUTPATIENT
Start: 2020-01-14

## 2020-01-14 RX ORDER — PREGABALIN 75 MG/1
75 CAPSULE ORAL DAILY
COMMUNITY
End: 2020-02-26

## 2020-01-14 RX ORDER — ROSUVASTATIN CALCIUM 40 MG/1
40 TABLET, COATED ORAL NIGHTLY
COMMUNITY
End: 2020-02-26

## 2020-01-14 RX ORDER — FERROUS SULFATE 325(65) MG
325 TABLET, DELAYED RELEASE (ENTERIC COATED) ORAL
COMMUNITY
End: 2020-02-26

## 2020-01-15 RX ORDER — DULOXETIN HYDROCHLORIDE 60 MG/1
60 CAPSULE, DELAYED RELEASE ORAL DAILY
Qty: 90 CAPSULE | Refills: 1 | Status: SHIPPED | OUTPATIENT
Start: 2020-01-15 | End: 2020-03-18 | Stop reason: DRUGHIGH

## 2020-01-15 RX ORDER — BLOOD SUGAR DIAGNOSTIC
STRIP MISCELLANEOUS
Qty: 400 EACH | Refills: 1 | Status: SHIPPED | OUTPATIENT
Start: 2020-01-15 | End: 2020-03-18 | Stop reason: DRUGHIGH

## 2020-01-15 RX ORDER — ERGOCALCIFEROL 1.25 MG/1
50000 CAPSULE ORAL
Qty: 90 CAPSULE | Refills: 1 | Status: SHIPPED | OUTPATIENT
Start: 2020-01-15 | End: 2020-03-18

## 2020-01-15 RX ORDER — LANCETS
EACH MISCELLANEOUS
Qty: 400 EACH | Refills: 1 | Status: SHIPPED | OUTPATIENT
Start: 2020-01-15 | End: 2020-03-18 | Stop reason: DRUGHIGH

## 2020-01-15 RX ORDER — ALBUTEROL SULFATE 90 UG/1
2 AEROSOL, METERED RESPIRATORY (INHALATION) EVERY 6 HOURS PRN
Qty: 1 INHALER | Refills: 3 | Status: SHIPPED | OUTPATIENT
Start: 2020-01-15 | End: 2020-12-11 | Stop reason: SDUPTHER

## 2020-01-15 RX ORDER — LINACLOTIDE 290 UG/1
290 CAPSULE, GELATIN COATED ORAL DAILY
Qty: 90 CAPSULE | Refills: 1 | Status: SHIPPED | OUTPATIENT
Start: 2020-01-15 | End: 2020-02-26 | Stop reason: SDUPTHER

## 2020-01-15 RX ORDER — INSULIN ADMIN. SUPPLIES
1 INSULIN PEN (EA) SUBCUTANEOUS 2 TIMES DAILY
Qty: 100 EACH | Refills: 2 | Status: SHIPPED | OUTPATIENT
Start: 2020-01-15 | End: 2020-02-13 | Stop reason: SDUPTHER

## 2020-01-15 RX ORDER — TEMAZEPAM 30 MG/1
30 CAPSULE ORAL NIGHTLY PRN
Qty: 90 CAPSULE | Refills: 1 | Status: SHIPPED | OUTPATIENT
Start: 2020-01-15 | End: 2020-02-26 | Stop reason: SDUPTHER

## 2020-01-22 RX ORDER — CLONAZEPAM 0.5 MG/1
0.5 TABLET ORAL NIGHTLY
Qty: 30 TABLET | Refills: 2 | Status: SHIPPED | OUTPATIENT
Start: 2020-01-22 | End: 2020-02-26

## 2020-01-22 RX ORDER — ROPINIROLE 4 MG/1
4 TABLET, FILM COATED ORAL 2 TIMES DAILY
Qty: 180 TABLET | Refills: 1 | Status: SHIPPED | OUTPATIENT
Start: 2020-01-22 | End: 2020-02-26 | Stop reason: SDUPTHER

## 2020-01-22 RX ORDER — MECLIZINE HYDROCHLORIDE 25 MG/1
25 TABLET ORAL 2 TIMES DAILY PRN
Qty: 60 TABLET | Refills: 2 | Status: SHIPPED | OUTPATIENT
Start: 2020-01-22 | End: 2020-03-18 | Stop reason: DRUGHIGH

## 2020-01-22 RX ORDER — TIZANIDINE 4 MG/1
4 TABLET ORAL DAILY
Qty: 30 TABLET | Refills: 2 | Status: SHIPPED | OUTPATIENT
Start: 2020-01-22 | End: 2020-02-13

## 2020-01-22 RX ORDER — CLONAZEPAM 1 MG/1
1 TABLET ORAL EVERY MORNING
Qty: 30 TABLET | Refills: 2 | Status: SHIPPED | OUTPATIENT
Start: 2020-01-22 | End: 2020-02-21

## 2020-01-23 ENCOUNTER — TELEPHONE (OUTPATIENT)
Dept: FAMILY MEDICINE | Facility: CLINIC | Age: 70
End: 2020-01-23

## 2020-01-23 NOTE — TELEPHONE ENCOUNTER
----- Message from Teresa De La Cruz sent at 1/23/2020 10:40 AM CST -----  Refill for Tizanidine 4 mg, Meclizine 25 mg, Relistor 150 mg, Clonazepam 1mg, Clonazepam 0.5 mg. [facility] [address] [place] [address]. Pt #[phone]

## 2020-01-24 ENCOUNTER — TELEPHONE (OUTPATIENT)
Dept: FAMILY MEDICINE | Facility: CLINIC | Age: 70
End: 2020-01-24

## 2020-01-24 NOTE — TELEPHONE ENCOUNTER
----- Message from Remington Westfall sent at 1/24/2020 11:05 AM CST -----  Muscle relaxer  [facility]   Pt [phone]

## 2020-02-13 DIAGNOSIS — Z79.4 TYPE 2 DIABETES MELLITUS WITH DIABETIC POLYNEUROPATHY, WITH LONG-TERM CURRENT USE OF INSULIN: ICD-10-CM

## 2020-02-13 DIAGNOSIS — E11.42 TYPE 2 DIABETES MELLITUS WITH DIABETIC POLYNEUROPATHY, WITH LONG-TERM CURRENT USE OF INSULIN: ICD-10-CM

## 2020-02-13 RX ORDER — TIZANIDINE 4 MG/1
4 TABLET ORAL 3 TIMES DAILY
Qty: 90 TABLET | Refills: 1 | Status: SHIPPED | OUTPATIENT
Start: 2020-02-13 | End: 2020-02-23

## 2020-02-13 RX ORDER — INSULIN ADMIN. SUPPLIES
1 INSULIN PEN (EA) SUBCUTANEOUS 2 TIMES DAILY
Qty: 100 EACH | Refills: 2 | Status: SHIPPED | OUTPATIENT
Start: 2020-02-13 | End: 2020-03-18 | Stop reason: DRUGHIGH

## 2020-02-13 NOTE — TELEPHONE ENCOUNTER
"----- Message from Yee Chatterjee sent at 2/13/2020  3:38 PM CST -----  Contact: Alanis Mendieta   Needs refill on  NOVOFINE AUTOCOVER 30 gauge x 1/3" Ndle  Send to [facility] on [address]  Pt# [phone]  Pt also says that her current dose of Tizanidine isn't helping her muscle spasms. She would like to know can dr. Melendez increase it?? And she's completely out of the pills  "

## 2020-02-17 ENCOUNTER — TELEPHONE (OUTPATIENT)
Dept: FAMILY MEDICINE | Facility: CLINIC | Age: 70
End: 2020-02-17

## 2020-02-17 DIAGNOSIS — J43.1 PANLOBULAR EMPHYSEMA: Primary | ICD-10-CM

## 2020-02-17 NOTE — TELEPHONE ENCOUNTER
----- Message from Teresa De La Cruz sent at 2/17/2020 12:04 PM CST -----  Pt received the albuterol that goes in her nebulizer but did not get the nebulizer. Please advise. [facility].Refill for 30 gauge needles and Dr. Melendez needs to  write a RX override because the RX is too early.. [facility] [address]. Pt #[phone]

## 2020-02-19 NOTE — TELEPHONE ENCOUNTER
----- Message from Yee Chatterjee sent at 2/19/2020  2:08 PM CST -----  Contact: Alanis Mendieta  Pt called last Friday for her needles and the pharmacy told her that dr. Melendez had to do an override. And she found out today that dr. Melendez wrote for 2 needles a day, but she says she needs a new script to include her novolog pen and the levemir . She says it needs to be done today and she says she needs more than 100 needles. She says if she has to use the needles 5 times a day, the needles would be 140 a month. Pt says to give her a call back if there's any confusion.   Send to [facility] on [address]  Pt# [phone]

## 2020-02-19 NOTE — TELEPHONE ENCOUNTER
----- Message from Ruby Ahn sent at 2/19/2020  2:08 PM CST -----   with [facility] [employer] called stated the pt called her to report she is using her lancets 5 times a day and will be out in 2 days.  The  did not leave her name or CB 3 asked that we call the patient directly only phone number I have is the one in epic.

## 2020-02-26 ENCOUNTER — TELEPHONE (OUTPATIENT)
Dept: FAMILY MEDICINE | Facility: CLINIC | Age: 70
End: 2020-02-26

## 2020-02-26 ENCOUNTER — OFFICE VISIT (OUTPATIENT)
Dept: FAMILY MEDICINE | Facility: CLINIC | Age: 70
End: 2020-02-26
Payer: MEDICARE

## 2020-02-26 VITALS
HEART RATE: 68 BPM | BODY MASS INDEX: 37.25 KG/M2 | WEIGHT: 217 LBS | DIASTOLIC BLOOD PRESSURE: 58 MMHG | SYSTOLIC BLOOD PRESSURE: 100 MMHG

## 2020-02-26 DIAGNOSIS — K59.00 CONSTIPATION, UNSPECIFIED CONSTIPATION TYPE: ICD-10-CM

## 2020-02-26 DIAGNOSIS — E11.69 TYPE 2 DIABETES MELLITUS WITH OTHER SPECIFIED COMPLICATION, WITH LONG-TERM CURRENT USE OF INSULIN: Primary | ICD-10-CM

## 2020-02-26 DIAGNOSIS — I10 ESSENTIAL HYPERTENSION: ICD-10-CM

## 2020-02-26 DIAGNOSIS — E78.2 MIXED HYPERLIPIDEMIA: ICD-10-CM

## 2020-02-26 DIAGNOSIS — S93.325A LISFRANC DISLOCATION, LEFT, INITIAL ENCOUNTER: ICD-10-CM

## 2020-02-26 DIAGNOSIS — J43.1 PANLOBULAR EMPHYSEMA: ICD-10-CM

## 2020-02-26 DIAGNOSIS — G25.81 RESTLESS LEGS SYNDROME: ICD-10-CM

## 2020-02-26 DIAGNOSIS — K59.01 SLOW TRANSIT CONSTIPATION: ICD-10-CM

## 2020-02-26 DIAGNOSIS — E89.0 POSTOPERATIVE HYPOTHYROIDISM: ICD-10-CM

## 2020-02-26 DIAGNOSIS — F41.9 ANXIETY: ICD-10-CM

## 2020-02-26 DIAGNOSIS — K21.9 GASTROESOPHAGEAL REFLUX DISEASE WITHOUT ESOPHAGITIS: ICD-10-CM

## 2020-02-26 DIAGNOSIS — M17.12 PRIMARY OSTEOARTHRITIS OF LEFT KNEE: ICD-10-CM

## 2020-02-26 DIAGNOSIS — E03.9 ACQUIRED HYPOTHYROIDISM: ICD-10-CM

## 2020-02-26 DIAGNOSIS — F51.01 PRIMARY INSOMNIA: ICD-10-CM

## 2020-02-26 DIAGNOSIS — E66.9 OBESITY, UNSPECIFIED CLASSIFICATION, UNSPECIFIED OBESITY TYPE, UNSPECIFIED WHETHER SERIOUS COMORBIDITY PRESENT: ICD-10-CM

## 2020-02-26 DIAGNOSIS — Z79.4 TYPE 2 DIABETES MELLITUS WITH OTHER SPECIFIED COMPLICATION, WITH LONG-TERM CURRENT USE OF INSULIN: Primary | ICD-10-CM

## 2020-02-26 DIAGNOSIS — Z96.652 S/P TOTAL KNEE ARTHROPLASTY, LEFT: ICD-10-CM

## 2020-02-26 PROBLEM — Z91.81 HISTORY OF FALL: Status: RESOLVED | Noted: 2018-04-26 | Resolved: 2020-02-26

## 2020-02-26 PROBLEM — S82.832A CLOSED FRACTURE OF DISTAL END OF LEFT FIBULA: Status: RESOLVED | Noted: 2017-12-07 | Resolved: 2020-02-26

## 2020-02-26 LAB — HBA1C MFR BLD: 6.5 %

## 2020-02-26 PROCEDURE — 1159F PR MEDICATION LIST DOCUMENTED IN MEDICAL RECORD: ICD-10-PCS | Mod: S$GLB,,, | Performed by: FAMILY MEDICINE

## 2020-02-26 PROCEDURE — 3078F DIAST BP <80 MM HG: CPT | Mod: S$GLB,,, | Performed by: FAMILY MEDICINE

## 2020-02-26 PROCEDURE — 3078F PR MOST RECENT DIASTOLIC BLOOD PRESSURE < 80 MM HG: ICD-10-PCS | Mod: S$GLB,,, | Performed by: FAMILY MEDICINE

## 2020-02-26 PROCEDURE — 1101F PR PT FALLS ASSESS DOC 0-1 FALLS W/OUT INJ PAST YR: ICD-10-PCS | Mod: S$GLB,,, | Performed by: FAMILY MEDICINE

## 2020-02-26 PROCEDURE — 1101F PT FALLS ASSESS-DOCD LE1/YR: CPT | Mod: S$GLB,,, | Performed by: FAMILY MEDICINE

## 2020-02-26 PROCEDURE — 3044F HG A1C LEVEL LT 7.0%: CPT | Mod: S$GLB,,, | Performed by: FAMILY MEDICINE

## 2020-02-26 PROCEDURE — 3074F SYST BP LT 130 MM HG: CPT | Mod: S$GLB,,, | Performed by: FAMILY MEDICINE

## 2020-02-26 PROCEDURE — 83036 POCT HEMOGLOBIN A1C: ICD-10-PCS | Mod: QW,,, | Performed by: FAMILY MEDICINE

## 2020-02-26 PROCEDURE — 3074F PR MOST RECENT SYSTOLIC BLOOD PRESSURE < 130 MM HG: ICD-10-PCS | Mod: S$GLB,,, | Performed by: FAMILY MEDICINE

## 2020-02-26 PROCEDURE — 1159F MED LIST DOCD IN RCRD: CPT | Mod: S$GLB,,, | Performed by: FAMILY MEDICINE

## 2020-02-26 PROCEDURE — 99214 PR OFFICE/OUTPT VISIT, EST, LEVL IV, 30-39 MIN: ICD-10-PCS | Mod: S$GLB,,, | Performed by: FAMILY MEDICINE

## 2020-02-26 PROCEDURE — 83036 HEMOGLOBIN GLYCOSYLATED A1C: CPT | Mod: QW,,, | Performed by: FAMILY MEDICINE

## 2020-02-26 PROCEDURE — 3044F PR MOST RECENT HEMOGLOBIN A1C LEVEL <7.0%: ICD-10-PCS | Mod: S$GLB,,, | Performed by: FAMILY MEDICINE

## 2020-02-26 PROCEDURE — 99214 OFFICE O/P EST MOD 30 MIN: CPT | Mod: S$GLB,,, | Performed by: FAMILY MEDICINE

## 2020-02-26 RX ORDER — ROPINIROLE 4 MG/1
4 TABLET, FILM COATED ORAL 2 TIMES DAILY
Qty: 180 TABLET | Refills: 1 | Status: SHIPPED | OUTPATIENT
Start: 2020-02-26 | End: 2020-05-14 | Stop reason: SDUPTHER

## 2020-02-26 RX ORDER — CLONAZEPAM 0.5 MG/1
0.5 TABLET ORAL DAILY
COMMUNITY
Start: 2020-02-19 | End: 2020-04-15 | Stop reason: SDUPTHER

## 2020-02-26 RX ORDER — TIZANIDINE 4 MG/1
4 TABLET ORAL 3 TIMES DAILY
COMMUNITY
End: 2020-04-15 | Stop reason: SDUPTHER

## 2020-02-26 RX ORDER — LINACLOTIDE 290 UG/1
290 CAPSULE, GELATIN COATED ORAL DAILY
Qty: 90 CAPSULE | Refills: 1 | Status: SHIPPED | OUTPATIENT
Start: 2020-02-26 | End: 2020-05-26

## 2020-02-26 RX ORDER — LEVOTHYROXINE SODIUM 150 UG/1
150 TABLET ORAL
Qty: 90 TABLET | Refills: 1 | Status: SHIPPED | OUTPATIENT
Start: 2020-02-26 | End: 2020-06-16 | Stop reason: SDUPTHER

## 2020-02-26 RX ORDER — PANTOPRAZOLE SODIUM 40 MG/1
40 TABLET, DELAYED RELEASE ORAL DAILY
Qty: 90 TABLET | Refills: 1 | Status: SHIPPED | OUTPATIENT
Start: 2020-02-26 | End: 2020-06-16 | Stop reason: SDUPTHER

## 2020-02-26 RX ORDER — PEN NEEDLE, DIABETIC 30 GX3/16"
1 NEEDLE, DISPOSABLE MISCELLANEOUS
Qty: 360 EACH | Refills: 1 | Status: SHIPPED | OUTPATIENT
Start: 2020-02-26 | End: 2020-03-18 | Stop reason: DRUGHIGH

## 2020-02-26 RX ORDER — TEMAZEPAM 30 MG/1
30 CAPSULE ORAL NIGHTLY PRN
Qty: 90 CAPSULE | Refills: 1 | Status: SHIPPED | OUTPATIENT
Start: 2020-02-26 | End: 2020-05-07

## 2020-02-26 RX ORDER — LOSARTAN POTASSIUM AND HYDROCHLOROTHIAZIDE 12.5; 5 MG/1; MG/1
0.5 TABLET ORAL DAILY
Qty: 90 TABLET | Refills: 1 | Status: ON HOLD | OUTPATIENT
Start: 2020-02-26 | End: 2021-05-13 | Stop reason: HOSPADM

## 2020-02-26 RX ORDER — POTASSIUM CHLORIDE 750 MG/1
10 TABLET, EXTENDED RELEASE ORAL
Qty: 36 TABLET | Refills: 1 | Status: ON HOLD | OUTPATIENT
Start: 2020-02-26 | End: 2020-03-19 | Stop reason: SDUPTHER

## 2020-02-26 NOTE — TELEPHONE ENCOUNTER
----- Message from Teresa De La Cruz sent at 2/26/2020  3:35 PM CST -----  Contact: Rehana  RX for the Losartan is for 0.5 instaed of 1 . Is that correct?  [facility]. #[phone]

## 2020-02-26 NOTE — TELEPHONE ENCOUNTER
Per Dr Melendez .5 tablet daily. Spoke with Rehana and let her know. She is going to notify the pt

## 2020-02-26 NOTE — PROGRESS NOTES
SUBJECTIVE:    Patient ID: Alanis Mendieta is a 69 y.o. female.    Chief Complaint: Follow-up (Hgb A1C checked. Brought bottles. Blood sugar has been dropping in the mornings. - TK) and Medication Refill (eye exam- Dr. Quiros, had pna shot before 2015 in [place] ac)    This 69-year-old female was discharged from the [facility] 3 months ago.  She now lives in an apartment and has aids 7 hr a day for 6 days a week.  With their assistance they make her meals she has had no recent falls.  She uses a bedside commode for safety when she has no assistance.  She states her blood sugars have been dropping low down in the 40s at night.  This is on Levemir 70 units a.m. and 70 units p.m. she notes her blood pressure also runs low on losartan  q.day      Office Visit on 02/26/2020   Component Date Value Ref Range Status    Hemoglobin A1C 02/26/2020 6.5  % Final   Lab Visit on 12/16/2019   Component Date Value Ref Range Status    Specimen UA 12/16/2019 Urine, Clean Catch   Final    Color, UA 12/16/2019 Yellow  Yellow, Straw, Edilia Final    Appearance, UA 12/16/2019 Hazy* Clear Final    pH, UA 12/16/2019 7.0  5.0 - 8.0 Final    Specific Gravity, UA 12/16/2019 1.010  1.005 - 1.030 Final    Protein, UA 12/16/2019 Negative  Negative Final    Glucose, UA 12/16/2019 Negative  Negative Final    Ketones, UA 12/16/2019 Negative  Negative Final    Bilirubin (UA) 12/16/2019 Negative  Negative Final    Occult Blood UA 12/16/2019 Negative  Negative Final    Nitrite, UA 12/16/2019 Negative  Negative Final    Urobilinogen, UA 12/16/2019 Negative  Negative EU/dL Final    Leukocytes, UA 12/16/2019 3+* Negative Final    Urine Culture, Routine 12/16/2019 *  Final                    Value:ESCHERICHIA COLI  >100,000 cfu/ml      RBC, UA 12/16/2019 1  0 - 4 /hpf Final    WBC, UA 12/16/2019 13* 0 - 5 /hpf Final    Bacteria 12/16/2019 Many* None-Occ /hpf Final    Squam Epithel, UA 12/16/2019 9  /hpf Final     Hyaline Casts, UA 12/16/2019 4* 0-1/lpf /lpf Final    Microscopic Comment 12/16/2019 SEE COMMENT   Final       Past Medical History:   Diagnosis Date    Allergy     Anticoagulant long-term use     Anxiety     Arthritis     Asthma     Bipolar disorder     Cancer     thyroid    Chronic back pain     COPD (chronic obstructive pulmonary disease)     Diabetes mellitus     Diabetes mellitus, type 2     Fibromyalgia     GERD (gastroesophageal reflux disease)     History of fall 4/26/2018    [place] (hard of hearing)     Hx of thyroid cancer     Hyperlipidemia     Hypertension     Hypothyroidism     Neuropathy     On home oxygen therapy     3 l/m 24/7    Restless leg syndrome     Sleep apnea     Urinary tract infection      Past Surgical History:   Procedure Laterality Date    APPENDECTOMY      BACK SURGERY      x 3    broken foot and ankle      CHOLECYSTECTOMY      EYE SURGERY Bilateral     cataract    HYSTERECTOMY      KNEE ARTHROPLASTY Left 9/17/2018    Procedure: ARTHROPLASTY, KNEE;  Surgeon: Yariel Marin MD;  Location: [facility] OR;  Service: Orthopedics;  Laterality: Left;    MYELOGRAPHY N/A 8/7/2019    Procedure: MYELOGRAM;  Surgeon: Sun Diagnostic Provider;  Location: [facility] OR;  Service: General;  Laterality: N/A;    POSTERIOR REPAIR      SPINAL FUSION      x3 BACK, NECK X 4    TOTAL THYROIDECTOMY  2014     Family History   Problem Relation Age of Onset    Diabetes Mother     Heart disease Mother     Hypertension Mother     Diabetes Father     Heart disease Father     Hypertension Father        Marital Status:   Alcohol History:  reports that she does not drink alcohol.  Tobacco History:  reports that she quit smoking about 19 years ago. Her smoking use included cigarettes. She has a 30.00 pack-year smoking history. She has never used smokeless tobacco.  Drug History:  reports that she does not use drugs.    Review of patient's allergies indicates:   Allergen Reactions     Morphine Itching    Tubersol [tuberculin ppd] Other (See Comments)     Site swells bad. Has to have chest xray       Current Outpatient Medications:     ACCU-CHEK LIBERTY PLUS TEST STRP Strp, TEST BLOOD GLUCOSE BEFORE MEALS AND AT BEDTIME, Disp: 400 each, Rfl: 1    ACCU-CHEK SOFTCLIX LANCETS Misc, USE 4 TIMES A DAY, Disp: 400 each, Rfl: 1    albuterol (VENTOLIN HFA) 90 mcg/actuation inhaler, Inhale 2 puffs into the lungs every 6 (six) hours as needed for Wheezing. Rescue, Disp: 1 Inhaler, Rfl: 3    ANORO ELLIPTA 62.5-25 mcg/actuation DsDv, Inhale 2 puffs into the lungs once daily. , Disp: , Rfl:     ARTIFICIAL TEARS,HYPROMELLOSE, OPHT, Place 1 drop into both eyes 3 (three) times daily., Disp: , Rfl:     calcitRIOL (ROCALTROL) 0.5 MCG Cap, Take 1 capsule (0.5 mcg total) by mouth once daily., Disp: 90 capsule, Rfl: 1    clonazePAM (KLONOPIN) 0.5 MG tablet, Take 0.5 mg by mouth once daily. , Disp: , Rfl:     DULoxetine (CYMBALTA) 60 MG capsule, Take 1 capsule (60 mg total) by mouth once daily., Disp: 90 capsule, Rfl: 1    ergocalciferol (VITAMIN D2) 50,000 unit Cap, Take 1 capsule (50,000 Units total) by mouth every Wednesday. Take 1.25 mg every Wednesday, Disp: 90 capsule, Rfl: 1    FLUoxetine 20 MG capsule, Take 1 capsule (20 mg total) by mouth once daily., Disp: 90 capsule, Rfl: 1    gabapentin (NEURONTIN) 400 MG capsule, Take 1 capsule (400 mg total) by mouth once daily., Disp: 90 capsule, Rfl: 1    gabapentin (NEURONTIN) 800 MG tablet, Take 1 tablet (800 mg total) by mouth every evening. In evening, Disp: 90 tablet, Rfl: 1    ibuprofen (ADVIL,MOTRIN) 400 MG tablet, Take 400 mg by mouth every 8 (eight) hours as needed for Other., Disp: , Rfl:     lactulose (CHRONULAC) 10 gram/15 mL solution, Take 20 g by mouth daily as needed. , Disp: , Rfl:     lamoTRIgine (LAMICTAL) 150 MG Tab, Take 1 tablet (150 mg total) by mouth 2 (two) times daily., Disp: 180 tablet, Rfl: 1    latanoprost 0.005 % ophthalmic  "solution, Place 1 drop into both eyes every evening. , Disp: , Rfl:     LEVEMIR FLEXTOUCH U-100 INSULN 100 unit/mL (3 mL) InPn pen, Inject 70 Units into the skin 2 (two) times daily., Disp: 2 Box, Rfl: 5    levothyroxine (SYNTHROID) 150 MCG tablet, Take 1 tablet (150 mcg total) by mouth before breakfast., Disp: 90 tablet, Rfl: 1    LINZESS 290 mcg Cap capsule, Take 1 capsule (290 mcg total) by mouth once daily., Disp: 90 capsule, Rfl: 1    losartan-hydrochlorothiazide 50-12.5 mg (HYZAAR) 50-12.5 mg per tablet, Take 0.5 tablets by mouth once daily., Disp: 90 tablet, Rfl: 1    meclizine (ANTIVERT) 25 mg tablet, Take 1 tablet (25 mg total) by mouth 2 (two) times daily as needed., Disp: 60 tablet, Rfl: 2    methylnaltrexone (RELISTOR) 150 mg Tab, Take 150 mg by mouth once daily., Disp: 270 tablet, Rfl: 1    NOVOFINE AUTOCOVER 30 gauge x 1/3" Ndle, 1 each by In Vitro route 2 (two) times daily., Disp: 100 each, Rfl: 2    NOVOLOG FLEXPEN U-100 INSULIN 100 unit/mL (3 mL) InPn pen, Sliding scale with meals, Disp: 1 Box, Rfl: 5    ondansetron (ZOFRAN-ODT) 4 MG TbDL, Take 4 mg by mouth every 6 (six) hours as needed (nausea)., Disp: , Rfl:     oxyCODONE (ROXICODONE) 10 mg Tab immediate release tablet, Take 10 mg by mouth every 8 (eight) hours as needed for Pain. , Disp: , Rfl:     pantoprazole (PROTONIX) 40 MG tablet, Take 1 tablet (40 mg total) by mouth once daily., Disp: 90 tablet, Rfl: 1    potassium chloride SA (K-DUR,KLOR-CON) 10 MEQ tablet, Take 1 tablet (10 mEq total) by mouth 3 (three) times a week. Mon , Wed, and Fri., Disp: 36 tablet, Rfl: 1    rOPINIRole (REQUIP) 4 MG tablet, Take 1 tablet (4 mg total) by mouth 2 (two) times daily., Disp: 180 tablet, Rfl: 1    temazepam (RESTORIL) 30 mg capsule, Take 1 capsule (30 mg total) by mouth nightly as needed., Disp: 90 capsule, Rfl: 1    tiZANidine (ZANAFLEX) 4 MG tablet, Take 4 mg by mouth 3 (three) times daily., Disp: , Rfl:     pen needle, diabetic 31 " "gauge x 3/16" Ndle, 1 Dose by Misc.(Non-Drug; Combo Route) route 2 hours after meals and at bedtime., Disp: 360 each, Rfl: 1    Review of Systems   Constitutional: Negative for appetite change, chills, fatigue, fever and unexpected weight change.   HENT: Negative for congestion, ear pain, sinus pain, sore throat and trouble swallowing.    Eyes: Negative for pain, discharge and visual disturbance.   Respiratory: Negative for apnea, cough, shortness of breath and wheezing.    Cardiovascular: Negative for chest pain, palpitations and leg swelling.   Gastrointestinal: Negative for abdominal pain, blood in stool, constipation, diarrhea, nausea and vomiting.   Endocrine: Negative for heat intolerance, polydipsia and polyuria.   Genitourinary: Negative for difficulty urinating, dyspareunia, dysuria, frequency, hematuria and menstrual problem.   Musculoskeletal: Positive for neck pain (Arthritis pains in the neck, status post cervical fusion). Negative for arthralgias (Right hip aches daily), back pain (Lower back pains daily), gait problem, joint swelling and myalgias.   Allergic/Immunologic: Negative for environmental allergies, food allergies and immunocompromised state.   Neurological: Negative for dizziness, tremors, seizures, numbness (Left wrist has carpal tunnel syndrome, status post right carpal tunnel surgery) and headaches.   Psychiatric/Behavioral: Negative for behavioral problems, confusion, hallucinations and suicidal ideas. The patient is not nervous/anxious.           Objective:      Vitals:    02/26/20 0934   BP: (!) 100/58   Pulse: 68   Weight: 98.4 kg (217 lb)     Body mass index is 37.25 kg/m² (pended).  Physical Exam   Constitutional: She is oriented to person, place, and time. She appears well-developed and well-nourished.   Obese white female sitting in a wheelchair.   HENT:   Head: Normocephalic and atraumatic.   Right Ear: External ear normal.   Left Ear: External ear normal.   Nose: Nose normal. "   Mouth/Throat: Oropharynx is clear and moist.   Poor dentition   Eyes: Pupils are equal, round, and reactive to light. EOM are normal.   Neck: Normal range of motion. Neck supple. Carotid bruit is not present. No thyromegaly present.   Cardiovascular: Normal rate, regular rhythm, normal heart sounds and intact distal pulses.   No murmur heard.  Pulmonary/Chest: Effort normal and breath sounds normal. She has no wheezes. She has no rales.   Abdominal: Soft. Bowel sounds are normal. She exhibits no distension. There is no hepatosplenomegaly. There is no tenderness.   Musculoskeletal: Normal range of motion. She exhibits no tenderness or deformity.        Lumbar back: Normal. She exhibits no pain and no spasm.   Bends 90 degrees at  waist shoulders have good range of motion, legs are very weak.  She is unable to stand or walk any distance, she is able to pivot transfer   Lymphadenopathy:     She has no cervical adenopathy.   Neurological: She is alert and oriented to person, place, and time. No cranial nerve deficit. Coordination normal.   Skin: Skin is warm and dry. No rash noted.   Psychiatric: She has a normal mood and affect. Her behavior is normal. Judgment and thought content normal.   Nursing note and vitals reviewed.        Assessment:       1. Type 2 diabetes mellitus with other specified complication, with long-term current use of insulin    2. Primary insomnia    3. Gastroesophageal reflux disease without esophagitis    4. Essential hypertension    5. Constipation, unspecified constipation type    6. Postoperative hypothyroidism    7. Panlobular emphysema    8. Anxiety    9. Mixed hyperlipidemia    10. Acquired hypothyroidism    11. Obesity, unspecified classification, unspecified obesity type, unspecified whether serious comorbidity present    12. S/P total knee arthroplasty, left    13. Primary osteoarthritis of left knee    14. Lisfranc dislocation, left, initial encounter    15. Restless legs syndrome   "  16. Slow transit constipation         Plan:       Type 2 diabetes mellitus with other specified complication, with long-term current use of insulin  -     Hemoglobin A1C, POCT  -     pen needle, diabetic 31 gauge x 3/16" Ndle; 1 Dose by Misc.(Non-Drug; Combo Route) route 2 hours after meals and at bedtime.  Dispense: 360 each; Refill: 1  A1c has decreased down to 6.5.  Apparently patient is eating less at home that she was in the nursing home.  We will reduce her Levemir to 60 units b.i.d.  Primary insomnia  -     temazepam (RESTORIL) 30 mg capsule; Take 1 capsule (30 mg total) by mouth nightly as needed.  Dispense: 90 capsule; Refill: 1  -     rOPINIRole (REQUIP) 4 MG tablet; Take 1 tablet (4 mg total) by mouth 2 (two) times daily.  Dispense: 180 tablet; Refill: 1  Continue temazepam and ropinirole  Gastroesophageal reflux disease without esophagitis  -     pantoprazole (PROTONIX) 40 MG tablet; Take 1 tablet (40 mg total) by mouth once daily.  Dispense: 90 tablet; Refill: 1  Continue pantoprazole  Essential hypertension  -     potassium chloride SA (K-DUR,KLOR-CON) 10 MEQ tablet; Take 1 tablet (10 mEq total) by mouth 3 (three) times a week. Mon , Wed, and Fri.  Dispense: 36 tablet; Refill: 1  -     losartan-hydrochlorothiazide 50-12.5 mg (HYZAAR) 50-12.5 mg per tablet; Take 0.5 tablets by mouth once daily.  Dispense: 90 tablet; Refill: 1  Blood pressure is low so we will decrease the losartan to half a tablet once a day  Constipation, unspecified constipation type  -     LINZESS 290 mcg Cap capsule; Take 1 capsule (290 mcg total) by mouth once daily.  Dispense: 90 capsule; Refill: 1    Postoperative hypothyroidism  -     levothyroxine (SYNTHROID) 150 MCG tablet; Take 1 tablet (150 mcg total) by mouth before breakfast.  Dispense: 90 tablet; Refill: 1  TSH at goal continue current levothyroxine  Panlobular emphysema    Anxiety    Mixed hyperlipidemia    Acquired hypothyroidism    Obesity, unspecified " classification, unspecified obesity type, unspecified whether serious comorbidity present    S/P total knee arthroplasty, left    Primary osteoarthritis of left knee    Lisfranc dislocation, left, initial encounter    Restless legs syndrome  Continue ropinirole  Slow transit constipation  Continue Linzess  Patient needs a pulmonary nebulizer machine for p.r.n. use for wheezing and congestion  Follow up in about 3 months (around 5/26/2020) for Nurse practitioner, Diabetic Check-Up.

## 2020-02-26 NOTE — LETTER
[address]  [place] [address]  Phone: [phone]   [phone]                        MD Tabitha Keating MD Chequita Williams, MD Matthew Bassett, PA-C Allison Hoffritz, STEFAN Barrientos, STEFAN      Date: 02/26/2020        Patient: Alanis Mendieta  YOB: 1950      Please fax a copy of pt's recent eye exam.         Sincerely,     Ada Valle MA

## 2020-02-27 ENCOUNTER — TELEPHONE (OUTPATIENT)
Dept: FAMILY MEDICINE | Facility: CLINIC | Age: 70
End: 2020-02-27

## 2020-02-27 DIAGNOSIS — J43.1 PANLOBULAR EMPHYSEMA: Primary | ICD-10-CM

## 2020-02-28 ENCOUNTER — TELEPHONE (OUTPATIENT)
Dept: FAMILY MEDICINE | Facility: CLINIC | Age: 70
End: 2020-02-28

## 2020-03-04 ENCOUNTER — EXTERNAL HOME HEALTH (OUTPATIENT)
Dept: HOME HEALTH SERVICES | Facility: HOSPITAL | Age: 70
End: 2020-03-04

## 2020-03-04 ENCOUNTER — TELEPHONE (OUTPATIENT)
Dept: FAMILY MEDICINE | Facility: CLINIC | Age: 70
End: 2020-03-04

## 2020-03-04 NOTE — TELEPHONE ENCOUNTER
----- Message from Remington Westfall sent at 3/4/2020 11:46 AM CST -----  Pt is saying she is needing a nebulizer machine   Pharm [facility]   Pt [phone]

## 2020-03-13 ENCOUNTER — HOSPITAL ENCOUNTER (EMERGENCY)
Facility: HOSPITAL | Age: 70
Discharge: HOME | End: 2020-03-13
Attending: EMERGENCY MEDICINE
Payer: MEDICARE

## 2020-03-13 VITALS
OXYGEN SATURATION: 98 % | TEMPERATURE: 98 F | WEIGHT: 217 LBS | DIASTOLIC BLOOD PRESSURE: 64 MMHG | RESPIRATION RATE: 20 BRPM | SYSTOLIC BLOOD PRESSURE: 143 MMHG | HEART RATE: 85 BPM | HEIGHT: 64 IN | BODY MASS INDEX: 37.05 KG/M2

## 2020-03-13 DIAGNOSIS — M25.551 RIGHT HIP PAIN: ICD-10-CM

## 2020-03-13 DIAGNOSIS — R52 PAIN: ICD-10-CM

## 2020-03-13 DIAGNOSIS — J44.1 COPD EXACERBATION: ICD-10-CM

## 2020-03-13 DIAGNOSIS — R42 VERTIGO: ICD-10-CM

## 2020-03-13 DIAGNOSIS — R42 DIZZINESS: Primary | ICD-10-CM

## 2020-03-13 DIAGNOSIS — R06.2 WHEEZING: ICD-10-CM

## 2020-03-13 LAB
ALBUMIN SERPL BCP-MCNC: 3.4 G/DL (ref 3.5–5.2)
ALP SERPL-CCNC: 66 U/L (ref 55–135)
ALT SERPL W/O P-5'-P-CCNC: 18 U/L (ref 10–44)
ANION GAP SERPL CALC-SCNC: 9 MMOL/L (ref 8–16)
AST SERPL-CCNC: 20 U/L (ref 10–40)
BASOPHILS # BLD AUTO: 0.03 K/UL (ref 0–0.2)
BASOPHILS NFR BLD: 0.4 % (ref 0–1.9)
BILIRUB SERPL-MCNC: 0.5 MG/DL (ref 0.1–1)
BNP SERPL-MCNC: 21 PG/ML (ref 0–99)
BUN SERPL-MCNC: 17 MG/DL (ref 8–23)
CALCIUM SERPL-MCNC: 8.7 MG/DL (ref 8.7–10.5)
CHLORIDE SERPL-SCNC: 97 MMOL/L (ref 95–110)
CO2 SERPL-SCNC: 33 MMOL/L (ref 23–29)
CREAT SERPL-MCNC: 0.7 MG/DL (ref 0.5–1.4)
DIFFERENTIAL METHOD: NORMAL
EOSINOPHIL # BLD AUTO: 0.1 K/UL (ref 0–0.5)
EOSINOPHIL NFR BLD: 1.5 % (ref 0–8)
ERYTHROCYTE [DISTWIDTH] IN BLOOD BY AUTOMATED COUNT: 13.3 % (ref 11.5–14.5)
EST. GFR  (AFRICAN AMERICAN): >60 ML/MIN/1.73 M^2
EST. GFR  (NON AFRICAN AMERICAN): >60 ML/MIN/1.73 M^2
GLUCOSE SERPL-MCNC: 215 MG/DL (ref 70–110)
GLUCOSE SERPL-MCNC: 224 MG/DL (ref 70–110)
HCT VFR BLD AUTO: 40.3 % (ref 37–48.5)
HGB BLD-MCNC: 13.3 G/DL (ref 12–16)
IMM GRANULOCYTES # BLD AUTO: 0.03 K/UL (ref 0–0.04)
IMM GRANULOCYTES NFR BLD AUTO: 0.4 % (ref 0–0.5)
LYMPHOCYTES # BLD AUTO: 3.1 K/UL (ref 1–4.8)
LYMPHOCYTES NFR BLD: 41.1 % (ref 18–48)
MCH RBC QN AUTO: 30.4 PG (ref 27–31)
MCHC RBC AUTO-ENTMCNC: 33 G/DL (ref 32–36)
MCV RBC AUTO: 92 FL (ref 82–98)
MONOCYTES # BLD AUTO: 0.4 K/UL (ref 0.3–1)
MONOCYTES NFR BLD: 5.8 % (ref 4–15)
NEUTROPHILS # BLD AUTO: 3.8 K/UL (ref 1.8–7.7)
NEUTROPHILS NFR BLD: 50.8 % (ref 38–73)
NRBC BLD-RTO: 0 /100 WBC
PLATELET # BLD AUTO: 256 K/UL (ref 150–350)
PMV BLD AUTO: 10.7 FL (ref 9.2–12.9)
POTASSIUM SERPL-SCNC: 3.9 MMOL/L (ref 3.5–5.1)
PROT SERPL-MCNC: 6.9 G/DL (ref 6–8.4)
RBC # BLD AUTO: 4.38 M/UL (ref 4–5.4)
SODIUM SERPL-SCNC: 139 MMOL/L (ref 136–145)
TROPONIN I SERPL DL<=0.01 NG/ML-MCNC: <0.03 NG/ML
WBC # BLD AUTO: 7.47 K/UL (ref 3.9–12.7)

## 2020-03-13 PROCEDURE — 82962 GLUCOSE BLOOD TEST: CPT

## 2020-03-13 PROCEDURE — 63600175 PHARM REV CODE 636 W HCPCS: Performed by: STUDENT IN AN ORGANIZED HEALTH CARE EDUCATION/TRAINING PROGRAM

## 2020-03-13 PROCEDURE — 94640 AIRWAY INHALATION TREATMENT: CPT

## 2020-03-13 PROCEDURE — 80053 COMPREHEN METABOLIC PANEL: CPT

## 2020-03-13 PROCEDURE — 25000003 PHARM REV CODE 250: Performed by: STUDENT IN AN ORGANIZED HEALTH CARE EDUCATION/TRAINING PROGRAM

## 2020-03-13 PROCEDURE — 84484 ASSAY OF TROPONIN QUANT: CPT

## 2020-03-13 PROCEDURE — 85025 COMPLETE CBC W/AUTO DIFF WBC: CPT

## 2020-03-13 PROCEDURE — 94761 N-INVAS EAR/PLS OXIMETRY MLT: CPT

## 2020-03-13 PROCEDURE — 93005 ELECTROCARDIOGRAM TRACING: CPT | Performed by: INTERNAL MEDICINE

## 2020-03-13 PROCEDURE — 25000242 PHARM REV CODE 250 ALT 637 W/ HCPCS: Performed by: STUDENT IN AN ORGANIZED HEALTH CARE EDUCATION/TRAINING PROGRAM

## 2020-03-13 PROCEDURE — 99285 EMERGENCY DEPT VISIT HI MDM: CPT | Mod: 25

## 2020-03-13 PROCEDURE — 83880 ASSAY OF NATRIURETIC PEPTIDE: CPT

## 2020-03-13 PROCEDURE — 96360 HYDRATION IV INFUSION INIT: CPT

## 2020-03-13 RX ORDER — IPRATROPIUM BROMIDE AND ALBUTEROL SULFATE 2.5; .5 MG/3ML; MG/3ML
3 SOLUTION RESPIRATORY (INHALATION)
Status: COMPLETED | OUTPATIENT
Start: 2020-03-13 | End: 2020-03-13

## 2020-03-13 RX ORDER — SODIUM CHLORIDE 9 MG/ML
500 INJECTION, SOLUTION INTRAVENOUS
Status: COMPLETED | OUTPATIENT
Start: 2020-03-13 | End: 2020-03-13

## 2020-03-13 RX ORDER — PREDNISONE 20 MG/1
40 TABLET ORAL DAILY
Qty: 10 TABLET | Refills: 0 | Status: SHIPPED | OUTPATIENT
Start: 2020-03-13 | End: 2020-03-18 | Stop reason: ALTCHOICE

## 2020-03-13 RX ORDER — IBUPROFEN 400 MG/1
400 TABLET ORAL
Status: COMPLETED | OUTPATIENT
Start: 2020-03-13 | End: 2020-03-13

## 2020-03-13 RX ORDER — MECLIZINE HCL 12.5 MG 12.5 MG/1
25 TABLET ORAL
Status: COMPLETED | OUTPATIENT
Start: 2020-03-13 | End: 2020-03-13

## 2020-03-13 RX ADMIN — IPRATROPIUM BROMIDE AND ALBUTEROL SULFATE 3 ML: .5; 3 SOLUTION RESPIRATORY (INHALATION) at 05:03

## 2020-03-13 RX ADMIN — SODIUM CHLORIDE 500 ML: 0.9 INJECTION, SOLUTION INTRAVENOUS at 05:03

## 2020-03-13 RX ADMIN — IBUPROFEN 400 MG: 400 TABLET ORAL at 06:03

## 2020-03-13 RX ADMIN — MECLIZINE HYDROCHLORIDE 25 MG: 12.5 TABLET ORAL at 05:03

## 2020-03-13 NOTE — DISCHARGE INSTRUCTIONS
Take steroids as prescribed.  Use your inhaler every 4 hr as needed.  Take meclizine as prescribed.  Follow up closely with your PCP.

## 2020-03-13 NOTE — ED PROVIDER NOTES
"Encounter Date: 3/13/2020       History     Chief Complaint   Patient presents with    Dizziness     "dizzy" after calling for assistance for "valve leaking noises"      HPI   Ms. Mendieta is a 70 yo F with a past medical history of diabetes, fibromyalgia, COPD, bipolar disorder, restless leg syndrome, presenting to the emergency department for evaluation of multiple symptoms, patient states that she has been having some shortness of breath at home, has not been able to acquire a nebulizer machine, and has been using her albuterol pump without significant relief, states that this feels like her COPD.  Patient denies fever chills, cough, congestion, recent travel or infectious contacts.  Patient also endorses feeling dizzy tonight, states that she had an acute episode of feeling vertiginous dizziness, fall issues in the fall along did not fall, did not strike her head, patient did not take her meclizine.  Does have a history of vertigo, states that this feels similar.    Review of patient's allergies indicates:   Allergen Reactions    Morphine Itching    Tubersol [tuberculin ppd] Other (See Comments)     Site swells bad. Has to have chest xray     Past Medical History:   Diagnosis Date    Allergy     Anticoagulant long-term use     Anxiety     Arthritis     Asthma     Bipolar disorder     Cancer     thyroid    Chronic back pain     COPD (chronic obstructive pulmonary disease)     Diabetes mellitus     Diabetes mellitus, type 2     Fibromyalgia     GERD (gastroesophageal reflux disease)     History of fall 4/26/2018    [place]hard of hearing[place]     Hx of thyroid cancer     Hyperlipidemia     Hypertension     Hypothyroidism     Neuropathy     On home oxygen therapy     3 l/m 24/7    Restless leg syndrome     Sleep apnea     Urinary tract infection      Past Surgical History:   Procedure Laterality Date    APPENDECTOMY      BACK SURGERY      x 3    broken foot and ankle      CHOLECYSTECTOMY   "    EYE SURGERY Bilateral     cataract    HYSTERECTOMY      KNEE ARTHROPLASTY Left 2018    Procedure: ARTHROPLASTY, KNEE;  Surgeon: Yariel Marin MD;  Location: [facility] OR;  Service: Orthopedics;  Laterality: Left;    MYELOGRAPHY N/A 2019    Procedure: MYELOGRAM;  Surgeon: Sun Diagnostic Provider;  Location: [facility] OR;  Service: General;  Laterality: N/A;    POSTERIOR REPAIR      SPINAL FUSION      x3 BACK, NECK X 4    TOTAL THYROIDECTOMY       Family History   Problem Relation Age of Onset    Diabetes Mother     Heart disease Mother     Hypertension Mother     Diabetes Father     Heart disease Father     Hypertension Father      Social History     Tobacco Use    Smoking status: Former Smoker     Packs/day: 1.00     Years: 30.00     Pack years: 30.00     Types: Cigarettes     Last attempt to quit: 2000     Years since quittin.8    Smokeless tobacco: Never Used   Substance Use Topics    Alcohol use: No    Drug use: No     Review of Systems   Constitutional: Negative for chills, diaphoresis and fever.   HENT: Negative for congestion, sinus pressure, sneezing and sore throat.    Eyes: Negative for photophobia and visual disturbance.   Respiratory: Positive for shortness of breath. Negative for cough, wheezing and stridor.    Cardiovascular: Negative for chest pain, palpitations and leg swelling.   Gastrointestinal: Negative for abdominal pain, diarrhea, nausea and vomiting.   Genitourinary: Negative for dysuria, flank pain and frequency.   Musculoskeletal: Negative for back pain and neck pain.   Skin: Negative for pallor, rash and wound.   Neurological: Positive for dizziness. Negative for tremors, syncope, weakness, light-headedness and numbness.   Psychiatric/Behavioral: Negative for agitation and confusion.       Physical Exam     Initial Vitals [20 0441]   BP Pulse Resp Temp SpO2   (!) 151/68 88 16 98.3 °F (36.8 °C) 97 %      MAP       --         Physical Exam    Nursing note  and vitals reviewed.  Constitutional: She appears well-developed and well-nourished. She is not diaphoretic. She is cooperative.  Non-toxic appearance. She does not have a sickly appearance. She does not appear ill. No distress.   HENT:   Head: Normocephalic and atraumatic.   Eyes: Conjunctivae and EOM are normal. Pupils are equal, round, and reactive to light. No scleral icterus.   Neck: Normal range of motion. No JVD present.   Cardiovascular: Normal rate and regular rhythm. Exam reveals no gallop and no friction rub.    No murmur heard.  Pulmonary/Chest: Effort normal. No stridor. No tachypnea and no bradypnea. No respiratory distress. She has no decreased breath sounds. She has wheezes (scattered). She has no rhonchi. She has no rales.   Abdominal: Soft. Bowel sounds are normal. She exhibits no distension. There is no tenderness. There is no rebound and no guarding.   Musculoskeletal: Normal range of motion. She exhibits no edema or tenderness.   Neurological: She is alert and oriented to person, place, and time. She has normal strength. She is not disoriented. She displays no atrophy and no tremor. No cranial nerve deficit or sensory deficit. She exhibits normal muscle tone. She displays no seizure activity. Coordination normal. GCS score is 15. GCS eye subscore is 4. GCS verbal subscore is 5. GCS motor subscore is 6.   Finger to nose without dysmetria, no past pointing, no dysdiadochokinesia on physical exam, HINTS exam with no concerning cerebellar findings, patient has 2-3 beats of horizontal nystagmus that is extinguishable.    Skin: Skin is warm. Capillary refill takes less than 2 seconds. No erythema.       ED Course   Procedures  Labs Reviewed   CBC W/ AUTO DIFFERENTIAL   COMPREHENSIVE METABOLIC PANEL   TROPONIN I   URINALYSIS, REFLEX TO URINE CULTURE   B-TYPE NATRIURETIC PEPTIDE   POCT GLUCOSE MONITORING CONTINUOUS         HO4  69-year-old female with a history of vertigo, currently taking meclizine  present to the emergency department for evaluation of shortness of breath and dizziness.  Given patient's history of vertigo, recent meclizine, under similar symptoms to her prior vertiginous dizziness spells, at this time believe the patient's symptoms are most consistent with vertigo, patient's exam is without concerning neurological findings were findings consistent with cerebellar stroke, labs ordered, currently pending.  Fluids ordered, on Antivert and DuoNeb treatment order.  Shaun Prince MD PGY4  03/13/2020 5:35 AM      Imaging Results    None                       Attending Attestation:   Physician Attestation Statement for Resident:  As the supervising MD   Physician Attestation Statement: I have personally seen and examined this patient.   I agree with the above history. -: I saw and examined the patient.  I have reviewed and agree with the resident's findings, including all diagnostic interpretations and plans as written.  I was present for the key portions of the separately billed procedures.    69-year-old female presents with multiple complaints.  She complains of dizziness described as the room spinning around her.  She also complains of mild shortness of breath with wheezing.  Additionally she has right hip pain for 2 weeks.  Her workup in the emergency department has been encouraging.  Labs are essentially unremarkable except for hyperglycemia.  Chest x-ray and hip x-ray are also unremarkable.  Patient feels much better after a DuoNeb.  She will be discharged with a steroid burst for a mild COPD exacerbation.  Follow-up with PCP for hip pain.  Dizziness resolved with meclizine here.  She has these medications at home.  Detailed return precautions were discussed.    Precious Rao MD  Emergency Medicine  03/13/2020 7:37 AM                                   Clinical Impression:       ICD-10-CM ICD-9-CM   1. Dizziness R42 780.4   2. Vertigo R42 780.4   3. Pain R52 780.96   4. Wheezing R06.2  786.07   5. COPD exacerbation J44.1 491.21   6. Right hip pain M25.551 719.45                                Precious Rao MD  03/13/20 0738

## 2020-03-18 ENCOUNTER — HOSPITAL ENCOUNTER (OUTPATIENT)
Facility: HOSPITAL | Age: 70
Discharge: HOME | End: 2020-03-19
Attending: EMERGENCY MEDICINE | Admitting: INTERNAL MEDICINE
Payer: MEDICARE

## 2020-03-18 ENCOUNTER — TELEPHONE (OUTPATIENT)
Dept: FAMILY MEDICINE | Facility: CLINIC | Age: 70
End: 2020-03-18

## 2020-03-18 DIAGNOSIS — R06.02 SHORTNESS OF BREATH: ICD-10-CM

## 2020-03-18 DIAGNOSIS — E11.00 TYPE 2 DIABETES MELLITUS WITH HYPEROSMOLARITY WITHOUT COMA, WITH LONG-TERM CURRENT USE OF INSULIN: ICD-10-CM

## 2020-03-18 DIAGNOSIS — I10 ESSENTIAL HYPERTENSION: ICD-10-CM

## 2020-03-18 DIAGNOSIS — Z79.4 TYPE 2 DIABETES MELLITUS WITH HYPEROSMOLARITY WITHOUT COMA, WITH LONG-TERM CURRENT USE OF INSULIN: ICD-10-CM

## 2020-03-18 DIAGNOSIS — J44.1 COPD EXACERBATION: Primary | ICD-10-CM

## 2020-03-18 LAB
ALBUMIN SERPL BCP-MCNC: 3.6 G/DL (ref 3.5–5.2)
ALP SERPL-CCNC: 61 U/L (ref 55–135)
ALT SERPL W/O P-5'-P-CCNC: 17 U/L (ref 10–44)
ANION GAP SERPL CALC-SCNC: 11 MMOL/L (ref 8–16)
AST SERPL-CCNC: 21 U/L (ref 10–40)
BASOPHILS # BLD AUTO: 0.02 K/UL (ref 0–0.2)
BASOPHILS NFR BLD: 0.2 % (ref 0–1.9)
BILIRUB SERPL-MCNC: 0.5 MG/DL (ref 0.1–1)
BNP SERPL-MCNC: 32 PG/ML (ref 0–99)
BUN SERPL-MCNC: 21 MG/DL (ref 8–23)
CALCIUM SERPL-MCNC: 8.6 MG/DL (ref 8.7–10.5)
CHLORIDE SERPL-SCNC: 93 MMOL/L (ref 95–110)
CO2 SERPL-SCNC: 32 MMOL/L (ref 23–29)
CREAT SERPL-MCNC: 0.7 MG/DL (ref 0.5–1.4)
DIFFERENTIAL METHOD: ABNORMAL
EOSINOPHIL # BLD AUTO: 0 K/UL (ref 0–0.5)
EOSINOPHIL NFR BLD: 0.1 % (ref 0–8)
ERYTHROCYTE [DISTWIDTH] IN BLOOD BY AUTOMATED COUNT: 13.6 % (ref 11.5–14.5)
EST. GFR  (AFRICAN AMERICAN): >60 ML/MIN/1.73 M^2
EST. GFR  (NON AFRICAN AMERICAN): >60 ML/MIN/1.73 M^2
GLUCOSE SERPL-MCNC: 210 MG/DL (ref 70–110)
GLUCOSE SERPL-MCNC: 227 MG/DL (ref 70–110)
GLUCOSE SERPL-MCNC: 257 MG/DL (ref 70–110)
HCT VFR BLD AUTO: 39.9 % (ref 37–48.5)
HGB BLD-MCNC: 13.1 G/DL (ref 12–16)
IMM GRANULOCYTES # BLD AUTO: 0.05 K/UL (ref 0–0.04)
IMM GRANULOCYTES NFR BLD AUTO: 0.6 % (ref 0–0.5)
INR PPP: 1.1
LACTATE SERPL-SCNC: 2.2 MMOL/L (ref 0.5–1.9)
LACTATE SERPL-SCNC: 2.7 MMOL/L (ref 0.5–1.9)
LYMPHOCYTES # BLD AUTO: 1.7 K/UL (ref 1–4.8)
LYMPHOCYTES NFR BLD: 20.3 % (ref 18–48)
MAGNESIUM SERPL-MCNC: 1.7 MG/DL (ref 1.6–2.6)
MCH RBC QN AUTO: 30.2 PG (ref 27–31)
MCHC RBC AUTO-ENTMCNC: 32.8 G/DL (ref 32–36)
MCV RBC AUTO: 92 FL (ref 82–98)
MONOCYTES # BLD AUTO: 0.1 K/UL (ref 0.3–1)
MONOCYTES NFR BLD: 1 % (ref 4–15)
NEUTROPHILS # BLD AUTO: 6.4 K/UL (ref 1.8–7.7)
NEUTROPHILS NFR BLD: 77.8 % (ref 38–73)
NRBC BLD-RTO: 0 /100 WBC
PLATELET # BLD AUTO: 242 K/UL (ref 150–350)
PMV BLD AUTO: 10.9 FL (ref 9.2–12.9)
POTASSIUM SERPL-SCNC: 3.6 MMOL/L (ref 3.5–5.1)
PROT SERPL-MCNC: 7 G/DL (ref 6–8.4)
PROTHROMBIN TIME: 13.6 SEC (ref 10.6–14.8)
RBC # BLD AUTO: 4.34 M/UL (ref 4–5.4)
SODIUM SERPL-SCNC: 136 MMOL/L (ref 136–145)
TROPONIN I SERPL DL<=0.01 NG/ML-MCNC: <0.03 NG/ML
TROPONIN I SERPL DL<=0.01 NG/ML-MCNC: <0.03 NG/ML
TSH SERPL DL<=0.005 MIU/L-ACNC: 0.43 UIU/ML (ref 0.34–5.6)
WBC # BLD AUTO: 8.17 K/UL (ref 3.9–12.7)

## 2020-03-18 PROCEDURE — G0378 HOSPITAL OBSERVATION PER HR: HCPCS

## 2020-03-18 PROCEDURE — 25000003 PHARM REV CODE 250: Performed by: INTERNAL MEDICINE

## 2020-03-18 PROCEDURE — 87040 BLOOD CULTURE FOR BACTERIA: CPT | Mod: 59

## 2020-03-18 PROCEDURE — 25000242 PHARM REV CODE 250 ALT 637 W/ HCPCS: Performed by: EMERGENCY MEDICINE

## 2020-03-18 PROCEDURE — 99285 EMERGENCY DEPT VISIT HI MDM: CPT | Mod: 25

## 2020-03-18 PROCEDURE — 83880 ASSAY OF NATRIURETIC PEPTIDE: CPT

## 2020-03-18 PROCEDURE — 27000221 HC OXYGEN, UP TO 24 HOURS

## 2020-03-18 PROCEDURE — 94644 CONT INHLJ TX 1ST HOUR: CPT

## 2020-03-18 PROCEDURE — 83605 ASSAY OF LACTIC ACID: CPT

## 2020-03-18 PROCEDURE — 63600175 PHARM REV CODE 636 W HCPCS: Performed by: EMERGENCY MEDICINE

## 2020-03-18 PROCEDURE — 84443 ASSAY THYROID STIM HORMONE: CPT

## 2020-03-18 PROCEDURE — C9399 UNCLASSIFIED DRUGS OR BIOLOG: HCPCS | Performed by: INTERNAL MEDICINE

## 2020-03-18 PROCEDURE — 96374 THER/PROPH/DIAG INJ IV PUSH: CPT

## 2020-03-18 PROCEDURE — 83605 ASSAY OF LACTIC ACID: CPT | Mod: 91

## 2020-03-18 PROCEDURE — 36415 COLL VENOUS BLD VENIPUNCTURE: CPT

## 2020-03-18 PROCEDURE — 96372 THER/PROPH/DIAG INJ SC/IM: CPT | Mod: 59

## 2020-03-18 PROCEDURE — 82962 GLUCOSE BLOOD TEST: CPT

## 2020-03-18 PROCEDURE — 63600175 PHARM REV CODE 636 W HCPCS: Performed by: INTERNAL MEDICINE

## 2020-03-18 PROCEDURE — 84145 PROCALCITONIN (PCT): CPT | Mod: 91

## 2020-03-18 PROCEDURE — 84145 PROCALCITONIN (PCT): CPT

## 2020-03-18 PROCEDURE — 85025 COMPLETE CBC W/AUTO DIFF WBC: CPT

## 2020-03-18 PROCEDURE — 96375 TX/PRO/DX INJ NEW DRUG ADDON: CPT

## 2020-03-18 PROCEDURE — 83735 ASSAY OF MAGNESIUM: CPT

## 2020-03-18 PROCEDURE — 84484 ASSAY OF TROPONIN QUANT: CPT

## 2020-03-18 PROCEDURE — 85610 PROTHROMBIN TIME: CPT

## 2020-03-18 PROCEDURE — 80053 COMPREHEN METABOLIC PANEL: CPT

## 2020-03-18 PROCEDURE — 93005 ELECTROCARDIOGRAM TRACING: CPT | Performed by: INTERNAL MEDICINE

## 2020-03-18 PROCEDURE — 84484 ASSAY OF TROPONIN QUANT: CPT | Mod: 91

## 2020-03-18 RX ORDER — DULOXETIN HYDROCHLORIDE 30 MG/1
60 CAPSULE, DELAYED RELEASE ORAL NIGHTLY
Status: DISCONTINUED | OUTPATIENT
Start: 2020-03-18 | End: 2020-03-19 | Stop reason: HOSPADM

## 2020-03-18 RX ORDER — SODIUM CHLORIDE 0.9 % (FLUSH) 0.9 %
10 SYRINGE (ML) INJECTION
Status: DISCONTINUED | OUTPATIENT
Start: 2020-03-18 | End: 2020-03-19 | Stop reason: HOSPADM

## 2020-03-18 RX ORDER — IPRATROPIUM BROMIDE AND ALBUTEROL SULFATE 2.5; .5 MG/3ML; MG/3ML
3 SOLUTION RESPIRATORY (INHALATION) EVERY 6 HOURS
Status: DISCONTINUED | OUTPATIENT
Start: 2020-03-18 | End: 2020-03-19 | Stop reason: HOSPADM

## 2020-03-18 RX ORDER — INSULIN ASPART 100 [IU]/ML
2-10 INJECTION, SOLUTION INTRAVENOUS; SUBCUTANEOUS
Status: ON HOLD | COMMUNITY
End: 2021-09-02 | Stop reason: SDUPTHER

## 2020-03-18 RX ORDER — CLONAZEPAM 0.5 MG/1
0.5 TABLET ORAL DAILY
Status: DISCONTINUED | OUTPATIENT
Start: 2020-03-19 | End: 2020-03-19 | Stop reason: HOSPADM

## 2020-03-18 RX ORDER — ONDANSETRON 4 MG/1
4 TABLET, ORALLY DISINTEGRATING ORAL EVERY 6 HOURS PRN
Status: DISCONTINUED | OUTPATIENT
Start: 2020-03-18 | End: 2020-03-19 | Stop reason: HOSPADM

## 2020-03-18 RX ORDER — ENOXAPARIN SODIUM 100 MG/ML
40 INJECTION SUBCUTANEOUS EVERY 24 HOURS
Status: DISCONTINUED | OUTPATIENT
Start: 2020-03-18 | End: 2020-03-19 | Stop reason: HOSPADM

## 2020-03-18 RX ORDER — LOSARTAN POTASSIUM AND HYDROCHLOROTHIAZIDE 12.5; 5 MG/1; MG/1
0.5 TABLET ORAL DAILY
Status: DISCONTINUED | OUTPATIENT
Start: 2020-03-19 | End: 2020-03-19 | Stop reason: HOSPADM

## 2020-03-18 RX ORDER — INSULIN DETEMIR 100 [IU]/ML
65 INJECTION, SOLUTION SUBCUTANEOUS 2 TIMES DAILY
COMMUNITY
Start: 2020-02-29 | End: 2020-07-01 | Stop reason: SDUPTHER

## 2020-03-18 RX ORDER — DULOXETIN HYDROCHLORIDE 60 MG/1
60 CAPSULE, DELAYED RELEASE ORAL NIGHTLY
COMMUNITY
End: 2020-04-15 | Stop reason: SDUPTHER

## 2020-03-18 RX ORDER — MECLIZINE HCL 12.5 MG 12.5 MG/1
25 TABLET ORAL 2 TIMES DAILY PRN
Status: DISCONTINUED | OUTPATIENT
Start: 2020-03-18 | End: 2020-03-19 | Stop reason: HOSPADM

## 2020-03-18 RX ORDER — LEVOFLOXACIN 5 MG/ML
750 INJECTION, SOLUTION INTRAVENOUS
Status: COMPLETED | OUTPATIENT
Start: 2020-03-18 | End: 2020-03-18

## 2020-03-18 RX ORDER — PANTOPRAZOLE SODIUM 40 MG/1
40 TABLET, DELAYED RELEASE ORAL
Status: DISCONTINUED | OUTPATIENT
Start: 2020-03-19 | End: 2020-03-19 | Stop reason: HOSPADM

## 2020-03-18 RX ORDER — ACETAMINOPHEN 325 MG/1
650 TABLET ORAL EVERY 8 HOURS PRN
Status: DISCONTINUED | OUTPATIENT
Start: 2020-03-18 | End: 2020-03-19 | Stop reason: HOSPADM

## 2020-03-18 RX ORDER — CALCITRIOL 0.5 UG/1
0.5 CAPSULE ORAL NIGHTLY
COMMUNITY
End: 2020-06-16 | Stop reason: SDUPTHER

## 2020-03-18 RX ORDER — CLONAZEPAM 1 MG/1
1 TABLET ORAL NIGHTLY
Status: ON HOLD | COMMUNITY
Start: 2020-02-19 | End: 2020-03-19 | Stop reason: HOSPADM

## 2020-03-18 RX ORDER — IBUPROFEN 400 MG/1
400 TABLET ORAL 3 TIMES DAILY PRN
Status: DISCONTINUED | OUTPATIENT
Start: 2020-03-18 | End: 2020-03-19 | Stop reason: HOSPADM

## 2020-03-18 RX ORDER — CALCITRIOL 0.25 UG/1
0.5 CAPSULE ORAL NIGHTLY
Status: DISCONTINUED | OUTPATIENT
Start: 2020-03-18 | End: 2020-03-19 | Stop reason: HOSPADM

## 2020-03-18 RX ORDER — ROPINIROLE 1 MG/1
4 TABLET, FILM COATED ORAL 2 TIMES DAILY
Status: DISCONTINUED | OUTPATIENT
Start: 2020-03-18 | End: 2020-03-19 | Stop reason: HOSPADM

## 2020-03-18 RX ORDER — LEVALBUTEROL 1.25 MG/.5ML
1.25 SOLUTION, CONCENTRATE RESPIRATORY (INHALATION)
Status: COMPLETED | OUTPATIENT
Start: 2020-03-18 | End: 2020-03-18

## 2020-03-18 RX ORDER — METHYLPREDNISOLONE SOD SUCC 125 MG
125 VIAL (EA) INJECTION
Status: COMPLETED | OUTPATIENT
Start: 2020-03-18 | End: 2020-03-18

## 2020-03-18 RX ORDER — ONDANSETRON 4 MG/1
8 TABLET, ORALLY DISINTEGRATING ORAL EVERY 8 HOURS PRN
Status: DISCONTINUED | OUTPATIENT
Start: 2020-03-18 | End: 2020-03-19 | Stop reason: HOSPADM

## 2020-03-18 RX ORDER — IPRATROPIUM BROMIDE 0.5 MG/2.5ML
1 SOLUTION RESPIRATORY (INHALATION)
Status: COMPLETED | OUTPATIENT
Start: 2020-03-18 | End: 2020-03-18

## 2020-03-18 RX ORDER — LATANOPROST 50 UG/ML
1 SOLUTION/ DROPS OPHTHALMIC NIGHTLY
Status: DISCONTINUED | OUTPATIENT
Start: 2020-03-18 | End: 2020-03-19 | Stop reason: HOSPADM

## 2020-03-18 RX ORDER — LEVOFLOXACIN 5 MG/ML
500 INJECTION, SOLUTION INTRAVENOUS
Status: DISCONTINUED | OUTPATIENT
Start: 2020-03-18 | End: 2020-03-19

## 2020-03-18 RX ORDER — MECLIZINE HYDROCHLORIDE 25 MG/1
25 TABLET ORAL 2 TIMES DAILY PRN
COMMUNITY
Start: 2020-03-04 | End: 2020-10-15

## 2020-03-18 RX ADMIN — ENOXAPARIN SODIUM 40 MG: 100 INJECTION SUBCUTANEOUS at 11:03

## 2020-03-18 RX ADMIN — ROPINIROLE HYDROCHLORIDE 4 MG: 1 TABLET, FILM COATED ORAL at 11:03

## 2020-03-18 RX ADMIN — METHYLPREDNISOLONE SODIUM SUCCINATE 125 MG: 125 INJECTION, POWDER, FOR SOLUTION INTRAMUSCULAR; INTRAVENOUS at 04:03

## 2020-03-18 RX ADMIN — INSULIN DETEMIR 60 UNITS: 100 INJECTION, SOLUTION SUBCUTANEOUS at 11:03

## 2020-03-18 RX ADMIN — LEVALBUTEROL HYDROCHLORIDE 1.25 MG: 1.25 SOLUTION, CONCENTRATE RESPIRATORY (INHALATION) at 05:03

## 2020-03-18 RX ADMIN — LATANOPROST 1 DROP: 50 SOLUTION OPHTHALMIC at 11:03

## 2020-03-18 RX ADMIN — IPRATROPIUM BROMIDE 1 MG: 0.5 SOLUTION RESPIRATORY (INHALATION) at 05:03

## 2020-03-18 RX ADMIN — GABAPENTIN 800 MG: 300 CAPSULE ORAL at 11:03

## 2020-03-18 RX ADMIN — LAMOTRIGINE 150 MG: 100 TABLET ORAL at 11:03

## 2020-03-18 RX ADMIN — DULOXETINE 60 MG: 30 CAPSULE, DELAYED RELEASE ORAL at 11:03

## 2020-03-18 RX ADMIN — CALCITRIOL CAPSULES 0.25 MCG 0.5 MCG: 0.25 CAPSULE ORAL at 11:03

## 2020-03-18 RX ADMIN — LEVOFLOXACIN 750 MG: 5 INJECTION, SOLUTION INTRAVENOUS at 09:03

## 2020-03-18 NOTE — PLAN OF CARE
03/18/20 1701   Patient Assessment/Suction   Level of Consciousness (AVPU) alert   Respiratory Effort Unlabored;Normal   Expansion/Accessory Muscles/Retractions no use of accessory muscles;no retractions;expansion symmetric   All Lung Fields Breath Sounds clear;diminished   Rhythm/Pattern, Respiratory pattern regular;depth regular;no shortness of breath reported   Cough Frequency infrequent   Cough Type good   PRE-TX-O2   O2 Device (Oxygen Therapy) nasal cannula   $ Is the patient on Low Flow Oxygen? Yes   Flow (L/min) 2   SpO2 98 %   Pulse 89   Resp 18   BP (!) 154/67   Aerosol Therapy   $ Aerosol Therapy Charges Initial Continuous   Daily Review of Necessity (SVN) completed   Respiratory Treatment Status (SVN) given   Treatment Route (SVN) mask   Patient Position (SVN) HOB elevated   Post Treatment Assessment (SVN) increased aeration   Signs of Intolerance (SVN) none   Breath Sounds Post-Respiratory Treatment   Throughout All Fields Post-Treatment All Fields   Throughout All Fields Post-Treatment aeration increased   Post-treatment Heart Rate (beats/min) 90   Post-treatment Resp Rate (breaths/min) 20

## 2020-03-18 NOTE — TELEPHONE ENCOUNTER
----- Message from Rosa Hoffmann sent at 3/18/2020  9:05 AM CDT -----  Patient is calling stating that the order for her nebulizer needs to be sent to [facility] so she can pick it up today. [phone]

## 2020-03-18 NOTE — ED PROVIDER NOTES
Encounter Date: 3/18/2020       History     Chief Complaint   Patient presents with    Shortness of Breath     COPD, no fever     Patient here with reported shortness of breath, cough no fever chills pain has a history of COPD wound was evaluated in the emergency department this weekend for this comes states she was given breathing treatments and steroids patient reports she felt better after breathing treatments and was able to be discharged she started taking her steroids on Tuesday states that she is taking however she continues have shortness of breath worsened today she does have continued cough had no significant sputum no fever chills no chest pain no abdominal pain nausea vomiting diarrhea        Review of patient's allergies indicates:   Allergen Reactions    Morphine Itching    Tubersol [tuberculin ppd] Other (See Comments)     Site swells bad. Has to have chest xray     Past Medical History:   Diagnosis Date    Allergy     Anticoagulant long-term use     Anxiety     Arthritis     Asthma     Bipolar disorder     Cancer     thyroid    Chronic back pain     COPD (chronic obstructive pulmonary disease)     Diabetes mellitus     Diabetes mellitus, type 2     Fibromyalgia     GERD (gastroesophageal reflux disease)     History of fall 4/26/2018    [place] (hard of hearing)     Hx of thyroid cancer     Hyperlipidemia     Hypertension     Hypothyroidism     Neuropathy     On home oxygen therapy     3 l/m 24/7    Restless leg syndrome     Sleep apnea     Urinary tract infection      Past Surgical History:   Procedure Laterality Date    APPENDECTOMY      BACK SURGERY      x 3    broken foot and ankle      CHOLECYSTECTOMY      EYE SURGERY Bilateral     cataract    HYSTERECTOMY      KNEE ARTHROPLASTY Left 9/17/2018    Procedure: ARTHROPLASTY, KNEE;  Surgeon: Yariel Marin MD;  Location: [facility];  Service: Orthopedics;  Laterality: Left;    MYELOGRAPHY N/A 8/7/2019    Procedure:  MYELOGRAM;  Surgeon: Dosc Diagnostic Provider;  Location: [facility] OR;  Service: General;  Laterality: N/A;    POSTERIOR REPAIR      SPINAL FUSION      x3 BACK, NECK X 4    TOTAL THYROIDECTOMY       Family History   Problem Relation Age of Onset    Diabetes Mother     Heart disease Mother     Hypertension Mother     Diabetes Father     Heart disease Father     Hypertension Father      Social History     Tobacco Use    Smoking status: Former Smoker     Packs/day: 1.00     Years: 30.00     Pack years: 30.00     Types: Cigarettes     Last attempt to quit: 2000     Years since quittin.8    Smokeless tobacco: Never Used   Substance Use Topics    Alcohol use: No    Drug use: No     Review of Systems   Constitutional: Positive for fatigue. Negative for chills and fever.   HENT: Negative.    Eyes: Negative.    Respiratory: Positive for cough, shortness of breath and wheezing.    Cardiovascular: Negative.    Gastrointestinal: Negative.    Endocrine: Negative.    Genitourinary: Negative.    Musculoskeletal: Negative.    Skin: Negative.    Allergic/Immunologic: Negative.    Neurological: Negative.    Hematological: Negative.    Psychiatric/Behavioral: Negative.        Physical Exam     Initial Vitals [20 1321]   BP Pulse Resp Temp SpO2   (!) 153/65 82 (!) 24 98.7 °F (37.1 °C) 96 %      MAP       --         Physical Exam    Constitutional: She appears well-developed and well-nourished. She appears distressed.   HENT:   Head: Normocephalic and atraumatic.   Right Ear: External ear normal.   Left Ear: External ear normal.   Mouth/Throat: Oropharynx is clear and moist.   Eyes: Conjunctivae and EOM are normal. Pupils are equal, round, and reactive to light.   Neck: Normal range of motion. Neck supple.   Cardiovascular: Normal rate, regular rhythm, normal heart sounds and intact distal pulses.   Pulmonary/Chest: She is in respiratory distress. She has wheezes.   Decreased breath sounds throughout    Abdominal: Soft. Bowel sounds are normal. There is no tenderness.   Musculoskeletal: Normal range of motion.   Neurological: She is alert and oriented to person, place, and time. She has normal strength. GCS score is 15. GCS eye subscore is 4. GCS verbal subscore is 5. GCS motor subscore is 6.   Skin: Skin is warm and dry. Capillary refill takes less than 2 seconds.   Psychiatric: She has a normal mood and affect. Her behavior is normal.         ED Course   Procedures  Labs Reviewed   CBC W/ AUTO DIFFERENTIAL - Abnormal; Notable for the following components:       Result Value    Immature Granulocytes 0.6 (*)     Immature Grans (Abs) 0.05 (*)     Mono # 0.1 (*)     Gran% 77.8 (*)     Mono% 1.0 (*)     All other components within normal limits   COMPREHENSIVE METABOLIC PANEL - Abnormal; Notable for the following components:    Chloride 93 (*)     CO2 32 (*)     Glucose 227 (*)     Calcium 8.6 (*)     All other components within normal limits   POCT GLUCOSE - Abnormal; Notable for the following components:    POC Glucose 210 (*)     All other components within normal limits   CULTURE, BLOOD   CULTURE, BLOOD   TROPONIN I   B-TYPE NATRIURETIC PEPTIDE   PROTIME-INR   MAGNESIUM   LACTIC ACID, PLASMA   PROCALCITONIN        ECG Results          EKG 12-lead (In process)  Result time 03/18/20 14:33:58    In process by Interface, Lab In [facility] (03/18/20 14:33:58)                 Narrative:    Test Reason : R06.02,    Vent. Rate : 081 BPM     Atrial Rate : 081 BPM     P-R Int : 168 ms          QRS Dur : 090 ms      QT Int : 426 ms       P-R-T Axes : 044 001 076 degrees     QTc Int : 494 ms    Normal sinus rhythm  Prolonged QT  Abnormal ECG  When compared with ECG of 26-APR-2019 11:31,  No significant change was found    Referred By: [email or web address]   SELF           Confirmed By:                             Imaging Results          X-Ray Chest AP Portable (Final result)  Result time 03/18/20 14:25:59    Final result by Chinedu  MD Anderson (03/18/20 14:25:59)                 Impression:      1. Minimal right basilar atelectasis or infiltrate.  2. Moderate chronic elevation of the right hemidiaphragm.  3. Aortic atherosclerosis.      Electronically signed by: Chinedu Barron MD  Date:    03/18/2020  Time:    14:25             Narrative:    EXAMINATION:  XR CHEST AP PORTABLE    CLINICAL HISTORY:  Shortness of breath    COMPARISON:  March 13, 2020    FINDINGS:  Heart size is normal.  The aortic arch is calcified.  There is minimal atelectasis or infiltrate at the right lung base.  There are no pleural effusions.  There is moderate chronic elevation of the right hemidiaphragm.  Osseous structures are intact.                                 Medical Decision Making:   ED Management:  Patient received breathing treatments in route with some improvement continued to have dyspnea emergency department she was given Solu-Medrol and Xopenex and Atrovent discussed case with hospitalist to evaluate patient for admission patient did have some evidence of atelectasis versus infiltrate on chest x-ray blood cultures lactate were ordered addition to IV Levaquin                                 Clinical Impression:       ICD-10-CM ICD-9-CM   1. COPD exacerbation J44.1 491.21   2. Shortness of breath R06.02 786.05             ED Disposition Condition    Admit                           Ilan Downey MD  03/18/20 0619

## 2020-03-19 VITALS
SYSTOLIC BLOOD PRESSURE: 126 MMHG | HEIGHT: 63 IN | HEART RATE: 95 BPM | RESPIRATION RATE: 18 BRPM | DIASTOLIC BLOOD PRESSURE: 70 MMHG | BODY MASS INDEX: 36.52 KG/M2 | WEIGHT: 206.13 LBS | TEMPERATURE: 97 F | OXYGEN SATURATION: 98 %

## 2020-03-19 PROBLEM — E66.9 CLASS 2 OBESITY IN ADULT: Chronic | Status: ACTIVE | Noted: 2020-03-19

## 2020-03-19 PROBLEM — E87.6 HYPOKALEMIA: Status: ACTIVE | Noted: 2020-03-19

## 2020-03-19 PROBLEM — J96.10 CHRONIC RESPIRATORY FAILURE: Chronic | Status: ACTIVE | Noted: 2020-03-19

## 2020-03-19 LAB
ANION GAP SERPL CALC-SCNC: 11 MMOL/L (ref 8–16)
BASOPHILS # BLD AUTO: 0.01 K/UL (ref 0–0.2)
BASOPHILS NFR BLD: 0.1 % (ref 0–1.9)
BUN SERPL-MCNC: 19 MG/DL (ref 8–23)
CALCIUM SERPL-MCNC: 8.9 MG/DL (ref 8.7–10.5)
CHLORIDE SERPL-SCNC: 96 MMOL/L (ref 95–110)
CO2 SERPL-SCNC: 34 MMOL/L (ref 23–29)
CREAT SERPL-MCNC: 0.7 MG/DL (ref 0.5–1.4)
DIFFERENTIAL METHOD: ABNORMAL
EOSINOPHIL # BLD AUTO: 0 K/UL (ref 0–0.5)
EOSINOPHIL NFR BLD: 0 % (ref 0–8)
ERYTHROCYTE [DISTWIDTH] IN BLOOD BY AUTOMATED COUNT: 13.7 % (ref 11.5–14.5)
EST. GFR  (AFRICAN AMERICAN): >60 ML/MIN/1.73 M^2
EST. GFR  (NON AFRICAN AMERICAN): >60 ML/MIN/1.73 M^2
GLUCOSE SERPL-MCNC: 190 MG/DL (ref 70–110)
GLUCOSE SERPL-MCNC: 219 MG/DL (ref 70–110)
HCT VFR BLD AUTO: 39.8 % (ref 37–48.5)
HGB BLD-MCNC: 13.1 G/DL (ref 12–16)
IMM GRANULOCYTES # BLD AUTO: 0.05 K/UL (ref 0–0.04)
IMM GRANULOCYTES NFR BLD AUTO: 0.6 % (ref 0–0.5)
LYMPHOCYTES # BLD AUTO: 1.6 K/UL (ref 1–4.8)
LYMPHOCYTES NFR BLD: 18.1 % (ref 18–48)
MCH RBC QN AUTO: 30.4 PG (ref 27–31)
MCHC RBC AUTO-ENTMCNC: 32.9 G/DL (ref 32–36)
MCV RBC AUTO: 92 FL (ref 82–98)
MONOCYTES # BLD AUTO: 0.5 K/UL (ref 0.3–1)
MONOCYTES NFR BLD: 5 % (ref 4–15)
NEUTROPHILS # BLD AUTO: 6.9 K/UL (ref 1.8–7.7)
NEUTROPHILS NFR BLD: 76.2 % (ref 38–73)
NRBC BLD-RTO: 0 /100 WBC
PLATELET # BLD AUTO: 272 K/UL (ref 150–350)
PMV BLD AUTO: 10.7 FL (ref 9.2–12.9)
POTASSIUM SERPL-SCNC: 3.4 MMOL/L (ref 3.5–5.1)
PROCALCITONIN SERPL IA-MCNC: <0.05 NG/ML (ref 0–0.5)
PROCALCITONIN SERPL IA-MCNC: <0.05 NG/ML (ref 0–0.5)
RBC # BLD AUTO: 4.31 M/UL (ref 4–5.4)
SODIUM SERPL-SCNC: 141 MMOL/L (ref 136–145)
TROPONIN I SERPL DL<=0.01 NG/ML-MCNC: <0.03 NG/ML
WBC # BLD AUTO: 9.08 K/UL (ref 3.9–12.7)

## 2020-03-19 PROCEDURE — 96372 THER/PROPH/DIAG INJ SC/IM: CPT | Mod: 59

## 2020-03-19 PROCEDURE — 36415 COLL VENOUS BLD VENIPUNCTURE: CPT

## 2020-03-19 PROCEDURE — 27000221 HC OXYGEN, UP TO 24 HOURS

## 2020-03-19 PROCEDURE — 25000242 PHARM REV CODE 250 ALT 637 W/ HCPCS: Performed by: INTERNAL MEDICINE

## 2020-03-19 PROCEDURE — 94640 AIRWAY INHALATION TREATMENT: CPT

## 2020-03-19 PROCEDURE — 25000242 PHARM REV CODE 250 ALT 637 W/ HCPCS

## 2020-03-19 PROCEDURE — 94761 N-INVAS EAR/PLS OXIMETRY MLT: CPT

## 2020-03-19 PROCEDURE — 25000003 PHARM REV CODE 250: Performed by: INTERNAL MEDICINE

## 2020-03-19 PROCEDURE — G0378 HOSPITAL OBSERVATION PER HR: HCPCS

## 2020-03-19 PROCEDURE — 94640 AIRWAY INHALATION TREATMENT: CPT | Mod: 76

## 2020-03-19 PROCEDURE — 84484 ASSAY OF TROPONIN QUANT: CPT

## 2020-03-19 PROCEDURE — 85025 COMPLETE CBC W/AUTO DIFF WBC: CPT

## 2020-03-19 PROCEDURE — 80048 BASIC METABOLIC PNL TOTAL CA: CPT

## 2020-03-19 RX ORDER — POTASSIUM CHLORIDE 20 MEQ/1
40 TABLET, EXTENDED RELEASE ORAL
Status: DISCONTINUED | OUTPATIENT
Start: 2020-03-19 | End: 2020-03-19 | Stop reason: HOSPADM

## 2020-03-19 RX ORDER — POTASSIUM CHLORIDE 20 MEQ/1
40 TABLET, EXTENDED RELEASE ORAL ONCE
Status: COMPLETED | OUTPATIENT
Start: 2020-03-19 | End: 2020-03-19

## 2020-03-19 RX ORDER — IPRATROPIUM BROMIDE AND ALBUTEROL SULFATE 2.5; .5 MG/3ML; MG/3ML
SOLUTION RESPIRATORY (INHALATION)
Status: COMPLETED
Start: 2020-03-19 | End: 2020-03-19

## 2020-03-19 RX ORDER — POTASSIUM CHLORIDE 7.45 MG/ML
40 INJECTION INTRAVENOUS
Status: DISCONTINUED | OUTPATIENT
Start: 2020-03-19 | End: 2020-03-19 | Stop reason: HOSPADM

## 2020-03-19 RX ORDER — LANOLIN ALCOHOL/MO/W.PET/CERES
800 CREAM (GRAM) TOPICAL
Status: DISCONTINUED | OUTPATIENT
Start: 2020-03-19 | End: 2020-03-19 | Stop reason: HOSPADM

## 2020-03-19 RX ORDER — POTASSIUM CHLORIDE 20 MEQ/1
20 TABLET, EXTENDED RELEASE ORAL
Status: DISCONTINUED | OUTPATIENT
Start: 2020-03-19 | End: 2020-03-19 | Stop reason: HOSPADM

## 2020-03-19 RX ORDER — MAGNESIUM SULFATE 1 G/100ML
1 INJECTION INTRAVENOUS
Status: DISCONTINUED | OUTPATIENT
Start: 2020-03-19 | End: 2020-03-19 | Stop reason: HOSPADM

## 2020-03-19 RX ORDER — POTASSIUM CHLORIDE 750 MG/1
10 TABLET, EXTENDED RELEASE ORAL
Qty: 12 TABLET | Refills: 0 | Status: SHIPPED | OUTPATIENT
Start: 2020-03-20 | End: 2020-04-15 | Stop reason: SDUPTHER

## 2020-03-19 RX ORDER — POTASSIUM CHLORIDE 7.45 MG/ML
20 INJECTION INTRAVENOUS
Status: DISCONTINUED | OUTPATIENT
Start: 2020-03-19 | End: 2020-03-19 | Stop reason: HOSPADM

## 2020-03-19 RX ORDER — PREDNISONE 10 MG/1
20 TABLET ORAL DAILY
Qty: 8 TABLET | Refills: 0 | Status: SHIPPED | OUTPATIENT
Start: 2020-03-19 | End: 2020-03-23

## 2020-03-19 RX ORDER — DOXYCYCLINE 100 MG/1
100 CAPSULE ORAL EVERY 12 HOURS
Status: DISCONTINUED | OUTPATIENT
Start: 2020-03-19 | End: 2020-03-19 | Stop reason: HOSPADM

## 2020-03-19 RX ORDER — MAGNESIUM SULFATE HEPTAHYDRATE 40 MG/ML
4 INJECTION, SOLUTION INTRAVENOUS
Status: DISCONTINUED | OUTPATIENT
Start: 2020-03-19 | End: 2020-03-19 | Stop reason: HOSPADM

## 2020-03-19 RX ORDER — MAGNESIUM SULFATE HEPTAHYDRATE 40 MG/ML
2 INJECTION, SOLUTION INTRAVENOUS
Status: DISCONTINUED | OUTPATIENT
Start: 2020-03-19 | End: 2020-03-19 | Stop reason: HOSPADM

## 2020-03-19 RX ORDER — IPRATROPIUM BROMIDE AND ALBUTEROL SULFATE 2.5; .5 MG/3ML; MG/3ML
3 SOLUTION RESPIRATORY (INHALATION) EVERY 6 HOURS PRN
Qty: 1 BOX | Refills: 0 | Status: SHIPPED | OUTPATIENT
Start: 2020-03-19 | End: 2020-05-11 | Stop reason: SDUPTHER

## 2020-03-19 RX ADMIN — LAMOTRIGINE 150 MG: 100 TABLET ORAL at 08:03

## 2020-03-19 RX ADMIN — LEVOTHYROXINE SODIUM 150 MCG: 25 TABLET ORAL at 06:03

## 2020-03-19 RX ADMIN — POTASSIUM CHLORIDE 40 MEQ: 20 TABLET, EXTENDED RELEASE ORAL at 08:03

## 2020-03-19 RX ADMIN — PANTOPRAZOLE SODIUM 40 MG: 40 TABLET, DELAYED RELEASE ORAL at 06:03

## 2020-03-19 RX ADMIN — ROPINIROLE HYDROCHLORIDE 4 MG: 1 TABLET, FILM COATED ORAL at 08:03

## 2020-03-19 RX ADMIN — CLONAZEPAM 0.5 MG: 0.5 TABLET ORAL at 08:03

## 2020-03-19 RX ADMIN — IPRATROPIUM BROMIDE AND ALBUTEROL SULFATE 3 ML: .5; 3 SOLUTION RESPIRATORY (INHALATION) at 07:03

## 2020-03-19 RX ADMIN — IBUPROFEN 400 MG: 400 TABLET, FILM COATED ORAL at 04:03

## 2020-03-19 RX ADMIN — GABAPENTIN 400 MG: 300 CAPSULE ORAL at 08:03

## 2020-03-19 RX ADMIN — IPRATROPIUM BROMIDE AND ALBUTEROL SULFATE 3 ML: .5; 3 SOLUTION RESPIRATORY (INHALATION) at 02:03

## 2020-03-19 RX ADMIN — DOXYCYCLINE HYCLATE 100 MG: 100 CAPSULE ORAL at 08:03

## 2020-03-19 RX ADMIN — INSULIN DETEMIR 60 UNITS: 100 INJECTION, SOLUTION SUBCUTANEOUS at 08:03

## 2020-03-19 NOTE — PLAN OF CARE
03/19/20 0939   Final Note   Assessment Type Final Discharge Note   Anticipated Discharge Disposition Home  (Patient refused Home Health.)     Patient refused Home Health services due to having MEDICAID waiver program. Pt reports having a healthcare professional that comes to the home.

## 2020-03-19 NOTE — HOSPITAL COURSE
Patient was admitted with shortness of breath.  Known history of chronic respiratory failure on 3 L home oxygen.  Denies fever, productive cough, no contact with covid 19 and low suspicion.  States for the last 3 weeks she has been trying to acquire home nebulizer machine however was unable to but today confirms that her son was now able to obtain nebulizer machine.  In the ED she was afebrile.  WBC non elevated, troponin by 3-, procalcitonin <0.05, chest x-ray with chronic elevation of right hemidiaphragm.  She was admitted for mild exacerbation of COPD.  In the ED she received IV steroids, breathing treatment as well as IV Levaquin.  On hospital day 1 patient return to baseline respiratory status and deemed stable for discharge. Patient voiced no further concerns and all questions answered. Discharge plan explained to the patient including return precautions and she expressed understanding.     Discharge examination   Sitting in bed, NAD, cooperative  On supplemental O2 via NC, speaking in full sentences, not using accessory muscles of respiration, no wheeze on examination  No LE edema  Abdomen soft and non tender

## 2020-03-19 NOTE — ASSESSMENT & PLAN NOTE
Mild exacerbation of COPD without any signs of superimposed pneumonia.  At baseline respiratory status prior to discharge.  Short course of steroids, prescribed DuoNebs for p.r.n. use.  Patient confirms now has nebulizer machine at home.

## 2020-03-19 NOTE — SUBJECTIVE & OBJECTIVE
Interval History: Pt failed outpatient therapy    Review of Systems   Constitutional: Positive for fatigue. Negative for chills and fever.   HENT: Positive for congestion.    Respiratory: Positive for cough, shortness of breath and wheezing.    Cardiovascular: Negative.    Gastrointestinal: Negative.    Genitourinary: Negative.    Musculoskeletal: Positive for arthralgias.   Neurological: Positive for weakness.   Psychiatric/Behavioral: Negative.      Objective:     Vital Signs (Most Recent):  Temp: 98.5 °F (36.9 °C) (03/18/20 1552)  Pulse: 89 (03/18/20 1701)  Resp: 18 (03/18/20 1701)  BP: (!) 154/67 (03/18/20 1701)  SpO2: 98 % (03/18/20 1701) Vital Signs (24h Range):  Temp:  [98.5 °F (36.9 °C)-98.7 °F (37.1 °C)] 98.5 °F (36.9 °C)  Pulse:  [82-89] 89  Resp:  [18-24] 18  SpO2:  [96 %-99 %] 98 %  BP: (153-159)/(65-71) 154/67     Weight: 97.5 kg (215 lb)  Body mass index is 36.9 kg/m².  No intake or output data in the 24 hours ending 03/18/20 1907   Physical Exam   Constitutional: She is oriented to person, place, and time. She appears distressed.   HENT:   Head: Normocephalic and atraumatic.   Eyes: Pupils are equal, round, and reactive to light. Conjunctivae and EOM are normal.   Neck: Normal range of motion. Neck supple.   Cardiovascular: Normal rate, regular rhythm and normal heart sounds.   Pulmonary/Chest: Effort normal and breath sounds normal.   Abdominal: Soft. Bowel sounds are normal.   Musculoskeletal: Normal range of motion.   Neurological: She is alert and oriented to person, place, and time.   Skin: Skin is warm. She is diaphoretic.   Psychiatric:   Anxious       Significant Labs:   ABGs: No results for input(s): PH, PCO2, HCO3, POCSATURATED, BE, TOTALHB, COHB, METHB, O2HB, POCFIO2 in the last 48 hours.  Blood Culture: No results for input(s): LABBLOO in the last 48 hours.  BMP:   Recent Labs   Lab 03/18/20  1404   *      K 3.6   CL 93*   CO2 32*   BUN 21   CREATININE 0.7   CALCIUM 8.6*   MG  1.7     CBC:   Recent Labs   Lab 03/18/20  1404   WBC 8.17   HGB 13.1   HCT 39.9        CMP:   Recent Labs   Lab 03/18/20  1404      K 3.6   CL 93*   CO2 32*   *   BUN 21   CREATININE 0.7   CALCIUM 8.6*   PROT 7.0   ALBUMIN 3.6   BILITOT 0.5   ALKPHOS 61   AST 21   ALT 17   ANIONGAP 11   EGFRNONAA >60.0     Magnesium:   Recent Labs   Lab 03/18/20  1404   MG 1.7     POCT Glucose: No results for input(s): POCTGLUCOSE in the last 48 hours.  TSH: No results for input(s): TSH in the last 4320 hours.    Significant Imaging: I have reviewed all pertinent imaging results/findings within the past 24 hours.

## 2020-03-19 NOTE — DISCHARGE SUMMARY
[facility] Medicine  Discharge Summary      Patient Name: Alanis Mendieta  MRN: [id number]  Admission Date: 3/18/2020  Hospital Length of Stay: 0 days  Discharge Date and Time:  03/19/2020 8:39 AM  Attending Physician: Evy Cronin MD   Discharging Provider: Evy Cronin MD  Primary Care Provider: Delio Melendez MD      HPI:    Shortness of Breath       COPD, no fever      Patient here with reported shortness of breath, cough no fever chills pain has a history of COPD wound was evaluated in the emergency department this weekend for this comes states she was given breathing treatments and steroids patient reports she felt better after breathing treatments and was able to be discharged she started taking her steroids on Tuesday states that she is taking however she continues have shortness of breath worsened today she does have continued cough had no significant sputum no fever chills no chest pain no abdominal pain nausea vomiting diarrhea      * No surgery found *      Hospital Course:   Patient was admitted with shortness of breath.  Known history of chronic respiratory failure on 3 L home oxygen.  Denies fever, productive cough, no contact with covid 19 and low suspicion.  States for the last 3 weeks she has been trying to acquire home nebulizer machine however was unable to but today confirms that her son was now able to obtain nebulizer machine.  In the ED she was afebrile.  WBC non elevated, troponin by 3-, procalcitonin <0.05, chest x-ray with chronic elevation of right hemidiaphragm.  She was admitted for mild exacerbation of COPD.  In the ED she received IV steroids, breathing treatment as well as IV Levaquin.  On hospital day 1 patient return to baseline respiratory status and deemed stable for discharge. Patient voiced no further concerns and all questions answered. Discharge plan explained to the patient including return precautions and she expressed understanding.      Discharge examination   Sitting in bed, NAD, cooperative  On supplemental O2 via NC, speaking in full sentences, not using accessory muscles of respiration, no wheeze on examination  No LE edema  Abdomen soft and non tender     Consults:   Consults (From admission, onward)        Status Ordering Provider     Inpatient consult to Hospitalist  Once     Provider:  Price Pichardo MD    Acknowledged PRICE PICHARDO     IP consult to case management  Once     Provider:  (Not yet assigned)    Acknowledged PRICE PICHARDO          * COPD exacerbation  Mild exacerbation of COPD without any signs of superimposed pneumonia.  At baseline respiratory status prior to discharge.  Short course of steroids, prescribed DuoNebs for p.r.n. use.  Patient confirms now has nebulizer machine at home.      Class 2 obesity in adult  BMI 36.5.  With  associated diabetes, hypertension.      Chronic respiratory failure  On 3 L chronic home oxygen.      Hypokalemia  Replaced.  Patient on 10 mEq 3 times a week chronic home supplementation.      Slow transit constipation        Restless legs syndrome        Hypothyroidism  TSH checked and within normal.  Continue levothyroxine supplementation.      Anxiety  Chronic medical condition on chronic benzodiazepine therapy.      Type 2 diabetes mellitus, with long-term current use of insulin  HbA1c within the last month 6.5%.  Continue basal bolus insulin.  Expect mild increase in glucose with short course of steroids.      Mixed incontinence  Chronic medical condition.        Final Active Diagnoses:    Diagnosis Date Noted POA    PRINCIPAL PROBLEM:  COPD exacerbation [J44.1] 05/05/2018 Yes    Hypokalemia [E87.6] 03/19/2020 No    Chronic respiratory failure [J96.10] 03/19/2020 Yes     Chronic    Class 2 obesity in adult [E66.9] 03/19/2020 Yes     Chronic    Restless legs syndrome [G25.81] 02/26/2020 Yes    Slow transit constipation [K59.01] 02/26/2020 Yes    Hypothyroidism [E03.9]  "09/17/2018 Yes    Anxiety [F41.9] 05/05/2018 Yes    Essential hypertension [I10] 05/05/2018 Yes    Type 2 diabetes mellitus, with long-term current use of insulin [E11.9, Z79.4] 04/26/2018 Not Applicable    Mixed incontinence [N39.46] 10/02/2017 Yes      Problems Resolved During this Admission:       Discharged Condition: good    Disposition: [facility]    Follow Up:  Follow-up Information     Delio Melendez MD. Schedule an appointment as soon as possible for a visit in 2 weeks.    Specialty:  Family Medicine  Why:  post hospital follow up  Contact information:  [address]  [address]  [place] [address]  [phone]                 Patient Instructions:      NEBULIZER FOR HOME USE     Order Specific Question Answer Comments   Height: 5' 3" (1.6 m)    Weight: 93.5 kg (206 lb 2.1 oz)    Does patient have medical equipment at home? glucometer    Does patient have medical equipment at home? hospital bed    Does patient have medical equipment at home? oxygen    Does patient have medical equipment at home? shower chair    Does patient have medical equipment at home? walker, standard    Does patient have medical equipment at home? wheelchair    Length of need (1-99 months): 99      Diet diabetic     Activity as tolerated       Significant Diagnostic Studies: Labs:   BMP:   Recent Labs   Lab 03/18/20  1404 03/19/20  0453   * 219*    141   K 3.6 3.4*   CL 93* 96   CO2 32* 34*   BUN 21 19   CREATININE 0.7 0.7   CALCIUM 8.6* 8.9   MG 1.7  --    , CMP   Recent Labs   Lab 03/18/20  1404 03/19/20  0453    141   K 3.6 3.4*   CL 93* 96   CO2 32* 34*   * 219*   BUN 21 19   CREATININE 0.7 0.7   CALCIUM 8.6* 8.9   PROT 7.0  --    ALBUMIN 3.6  --    BILITOT 0.5  --    ALKPHOS 61  --    AST 21  --    ALT 17  --    ANIONGAP 11 11   ESTGFRAFRICA >60.0 >60.0   EGFRNONAA >60.0 >60.0   , CBC   Recent Labs   Lab 03/18/20  1404 03/19/20  0453   WBC 8.17 9.08   HGB 13.1 " 13.1   HCT 39.9 39.8    272   , INR   Lab Results   Component Value Date    INR 1.1 03/18/2020   , Lipid Panel No results found for: CHOL, HDL, LDLCALC, TRIG, CHOLHDL, Troponin   Recent Labs   Lab 03/19/20  0453   TROPONINI <0.030    and A1C:   Recent Labs   Lab 02/26/20  1359   HGBA1C 6.5     X-ray Hip 2 View Right    Result Date: 3/13/2020  EXAMINATION: XR HIP 2 VIEW RIGHT CLINICAL HISTORY: Pain, unspecified COMPARISON: 03/02/2016. FINDINGS: The bones are appropriately mineralized.  No acute fracture, dislocation or osseous destruction is observed.  There is a mild-to-moderate degree of diffuse joint space narrowing.     No acute osseous abnormality. Mild-moderate narrowing of the hip joint space. Electronically signed by: Michelle Hong IV., MD Date:    03/13/2020 Time:    06:57    X-ray Chest Ap Portable    Result Date: 3/18/2020  EXAMINATION: XR CHEST AP PORTABLE CLINICAL HISTORY: Shortness of breath COMPARISON: March 13, 2020 FINDINGS: Heart size is normal.  The aortic arch is calcified.  There is minimal atelectasis or infiltrate at the right lung base.  There are no pleural effusions.  There is moderate chronic elevation of the right hemidiaphragm.  Osseous structures are intact.     1. Minimal right basilar atelectasis or infiltrate. 2. Moderate chronic elevation of the right hemidiaphragm. 3. Aortic atherosclerosis. Electronically signed by: Chinedu Barron MD Date:    03/18/2020 Time:    14:25    X-ray Chest Ap Portable    Result Date: 3/13/2020  EXAMINATION: XR CHEST AP PORTABLE CLINICAL HISTORY: Wheezing COMPARISON: December 2018 FINDINGS: Heart size is normal.  There is mild aortic atherosclerosis.  There is minor scarring or atelectasis at the left lung base.  Chronic elevation of the right hemidiaphragm is noted, unchanged.  No acute osseous abnormality is identified.     1. Chronic elevation of the right hemidiaphragm, stable. 2. Mild left basilar scarring or atelectasis. Electronically  signed by: Chinedu Barron MD Date:    03/13/2020 Time:    08:03    Pending Diagnostic Studies:     None         Medications:  Reconciled Home Medications:      Medication List      START taking these medications    albuterol-ipratropium 2.5 mg-0.5 mg/3 mL nebulizer solution  Commonly known as:  DUO-NEB  Take 3 mLs by nebulization every 6 (six) hours as needed for Wheezing or Shortness of Breath. Rescue     predniSONE 10 MG tablet  Commonly known as:  DELTASONE  Take 2 tablets (20 mg total) by mouth once daily. for 4 days        CHANGE how you take these medications    clonazePAM 0.5 MG tablet  Commonly known as:  KLONOPIN  Take 0.5 mg by mouth once daily.  What changed:  Another medication with the same name was removed. Continue taking this medication, and follow the directions you see here.        CONTINUE taking these medications    albuterol 90 mcg/actuation inhaler  Commonly known as:  VENTOLIN HFA  Inhale 2 puffs into the lungs every 6 (six) hours as needed for Wheezing. Rescue     ANORO ELLIPTA 62.5-25 mcg/actuation Dsdv  Generic drug:  umeclidinium-vilanteroL  Inhale 2 puffs into the lungs once daily.     ARTIFICIAL TEARS(HYPROMELLOSE) OPHT  Place 1 drop into both eyes 3 (three) times daily.     calcitRIOL 0.5 MCG Cap  Commonly known as:  ROCALTROL  Take 0.5 mcg by mouth every evening.     DULoxetine 60 MG capsule  Commonly known as:  CYMBALTA  Take 60 mg by mouth every evening.     FLUoxetine 20 MG capsule  Take 1 capsule (20 mg total) by mouth once daily.     * gabapentin 400 MG capsule  Commonly known as:  NEURONTIN  Take 1 capsule (400 mg total) by mouth once daily.     * gabapentin 800 MG tablet  Commonly known as:  NEURONTIN  Take 1 tablet (800 mg total) by mouth every evening. In evening     ibuprofen 400 MG tablet  Commonly known as:  ADVIL,MOTRIN  Take 400 mg by mouth every 8 (eight) hours as needed for Other.     insulin aspart U-100 100 unit/mL injection  Commonly known as:  NOVOLOG  Inject  2-8 Units into the skin 4 (four) times daily as needed for High Blood Sugar.     lactulose 10 gram/15 mL solution  Commonly known as:  CHRONULAC  Take 20 g by mouth daily as needed.     lamoTRIgine 150 MG Tab  Commonly known as:  LAMICTAL  Take 1 tablet (150 mg total) by mouth 2 (two) times daily.     latanoprost 0.005 % ophthalmic solution  Place 1 drop into both eyes every evening.     LEVEMIR FLEXTOUCH U-100 INSULN 100 unit/mL (3 mL) Inpn pen  Generic drug:  insulin detemir U-100  Inject 60 Units into the skin 2 (two) times daily.     levothyroxine 150 MCG tablet  Commonly known as:  SYNTHROID  Take 1 tablet (150 mcg total) by mouth before breakfast.     LINZESS 290 mcg Cap capsule  Generic drug:  linaCLOtide  Take 1 capsule (290 mcg total) by mouth once daily.     losartan-hydrochlorothiazide 50-12.5 mg 50-12.5 mg per tablet  Commonly known as:  HYZAAR  Take 0.5 tablets by mouth once daily.     meclizine 25 mg tablet  Commonly known as:  ANTIVERT  Take 25 mg by mouth 2 (two) times daily as needed for Dizziness.     methylnaltrexone 150 mg Tab  Commonly known as:  RELISTOR  Take 150 mg by mouth once daily.     ondansetron 4 MG Tbdl  Commonly known as:  ZOFRAN-ODT  Take 4 mg by mouth every 6 (six) hours as needed (nausea).     oxyCODONE 10 mg Tab immediate release tablet  Commonly known as:  ROXICODONE  Take 10 mg by mouth 3 (three) times daily.     pantoprazole 40 MG tablet  Commonly known as:  PROTONIX  Take 1 tablet (40 mg total) by mouth once daily.     potassium chloride SA 10 MEQ tablet  Commonly known as:  K-DUR,KLOR-CON  Take 1 tablet (10 mEq total) by mouth 3 (three) times a week. Mon , Wed, and Fri.  Start taking on:  March 20, 2020     rOPINIRole 4 MG tablet  Commonly known as:  REQUIP  Take 1 tablet (4 mg total) by mouth 2 (two) times daily.     temazepam 30 mg capsule  Commonly known as:  RESTORIL  Take 1 capsule (30 mg total) by mouth nightly as needed.     tiZANidine 4 MG tablet  Commonly known as:   ZANAFLEX  Take 4 mg by mouth 3 (three) times daily.         * This list has 2 medication(s) that are the same as other medications prescribed for you. Read the directions carefully, and ask your doctor or other care provider to review them with you.                Indwelling Lines/Drains at time of discharge:   Lines/Drains/Airways     None                 Time spent on the discharge of patient: 29 minutes  Patient was seen and examined on the date of discharge and determined to be suitable for discharge.         Evy Cronin MD  Department of Hospital Medicine  [facility]

## 2020-03-19 NOTE — H&P
[facility] Medicine  History & Physical    Patient Name: Alanis Mendieta  MRN: [id number]  Admission Date: 3/18/2020  Attending Physician: Todd Pichardo MD  Primary Care Provider: Delio Melendez MD         Patient information was obtained from patient and ER records.     Subjective:     Principal Problem:COPD exacerbation    Chief Complaint:   Chief Complaint   Patient presents with    Shortness of Breath     COPD, no fever        HPI:    Shortness of Breath       COPD, no fever      Patient here with reported shortness of breath, cough no fever chills pain has a history of COPD wound was evaluated in the emergency department this weekend for this comes states she was given breathing treatments and steroids patient reports she felt better after breathing treatments and was able to be discharged she started taking her steroids on Tuesday states that she is taking however she continues have shortness of breath worsened today she does have continued cough had no significant sputum no fever chills no chest pain no abdominal pain nausea vomiting diarrhea      Interval History: Pt failed outpatient therapy    Review of Systems   Constitutional: Positive for fatigue. Negative for chills and fever.   HENT: Positive for congestion.    Respiratory: Positive for cough, shortness of breath and wheezing.    Cardiovascular: Negative.    Gastrointestinal: Negative.    Genitourinary: Negative.    Musculoskeletal: Positive for arthralgias.   Neurological: Positive for weakness.   Psychiatric/Behavioral: Negative.      Objective:     Vital Signs (Most Recent):  Temp: 98.5 °F (36.9 °C) (03/18/20 1552)  Pulse: 89 (03/18/20 1701)  Resp: 18 (03/18/20 1701)  BP: (!) 154/67 (03/18/20 1701)  SpO2: 98 % (03/18/20 1701) Vital Signs (24h Range):  Temp:  [98.5 °F (36.9 °C)-98.7 °F (37.1 °C)] 98.5 °F (36.9 °C)  Pulse:  [82-89] 89  Resp:  [18-24] 18  SpO2:  [96 %-99 %] 98 %  BP: (153-159)/(65-71) 154/67      Weight: 97.5 kg (215 lb)  Body mass index is 36.9 kg/m².  No intake or output data in the 24 hours ending 03/18/20 1907   Physical Exam   Constitutional: She is oriented to person, place, and time. She appears distressed.   HENT:   Head: Normocephalic and atraumatic.   Eyes: Pupils are equal, round, and reactive to light. Conjunctivae and EOM are normal.   Neck: Normal range of motion. Neck supple.   Cardiovascular: Normal rate, regular rhythm and normal heart sounds.   Pulmonary/Chest: Effort normal and breath sounds normal.   Abdominal: Soft. Bowel sounds are normal.   Musculoskeletal: Normal range of motion.   Neurological: She is alert and oriented to person, place, and time.   Skin: Skin is warm. She is diaphoretic.   Psychiatric:   Anxious       Significant Labs:   ABGs: No results for input(s): PH, PCO2, HCO3, POCSATURATED, BE, TOTALHB, COHB, METHB, O2HB, POCFIO2 in the last 48 hours.  Blood Culture: No results for input(s): LABBLOO in the last 48 hours.  BMP:   Recent Labs   Lab 03/18/20  1404   *      K 3.6   CL 93*   CO2 32*   BUN 21   CREATININE 0.7   CALCIUM 8.6*   MG 1.7     CBC:   Recent Labs   Lab 03/18/20  1404   WBC 8.17   HGB 13.1   HCT 39.9        CMP:   Recent Labs   Lab 03/18/20  1404      K 3.6   CL 93*   CO2 32*   *   BUN 21   CREATININE 0.7   CALCIUM 8.6*   PROT 7.0   ALBUMIN 3.6   BILITOT 0.5   ALKPHOS 61   AST 21   ALT 17   ANIONGAP 11   EGFRNONAA >60.0     Magnesium:   Recent Labs   Lab 03/18/20  1404   MG 1.7     POCT Glucose: No results for input(s): POCTGLUCOSE in the last 48 hours.  TSH: No results for input(s): TSH in the last 4320 hours.    Significant Imaging: I have reviewed all pertinent imaging results/findings within the past 24 hours.    Assessment/Plan:     * COPD exacerbation  Pt has failed outpatient treatment with steroids and nebulizer treatments      Hypothyroidism  TSH ordered      Elevated Lactic acid of uncertain etiology. Pt's  vitals are stable  Plan: I have ordered a procalcitonin and the patient has been placed on Levaquin. If the procalcitonin is positive I will cover her more broadly with antibiotics.  VTE Risk Mitigation (From admission, onward)    None             Todd Pichardo MD  Department of Hospital Medicine   [facility]

## 2020-03-19 NOTE — PLAN OF CARE
03/19/20 0748   Patient Assessment/Suction   Level of Consciousness (AVPU) alert   Respiratory Effort Unlabored;Normal   Expansion/Accessory Muscles/Retractions no use of accessory muscles;no retractions;expansion symmetric   All Lung Fields Breath Sounds clear   Rhythm/Pattern, Respiratory pattern regular;depth regular;no shortness of breath reported   Cough Frequency infrequent   Cough Type good   PRE-TX-O2   O2 Device (Oxygen Therapy) nasal cannula   $ Is the patient on Low Flow Oxygen? Yes   Flow (L/min) 2   SpO2 98 %   Pulse Oximetry Type Intermittent   $ Pulse Oximetry - Multiple Charge Pulse Oximetry - Multiple   Pulse 95   Resp 18   Aerosol Therapy   $ Aerosol Therapy Charges Aerosol Treatment   Daily Review of Necessity (SVN) completed   Respiratory Treatment Status (SVN) given   Treatment Route (SVN) mask   Patient Position (SVN) HOB elevated   Post Treatment Assessment (SVN) increased aeration   Signs of Intolerance (SVN) none   Breath Sounds Post-Respiratory Treatment   Throughout All Fields Post-Treatment All Fields   Throughout All Fields Post-Treatment aeration increased   Post-treatment Heart Rate (beats/min) 98   Post-treatment Resp Rate (breaths/min) 17

## 2020-03-19 NOTE — ASSESSMENT & PLAN NOTE
HbA1c within the last month 6.5%.  Continue basal bolus insulin.  Expect mild increase in glucose with short course of steroids.

## 2020-03-19 NOTE — HPI
 Shortness of Breath       COPD, no fever      Patient here with reported shortness of breath, cough no fever chills pain has a history of COPD wound was evaluated in the emergency department this weekend for this comes states she was given breathing treatments and steroids patient reports she felt better after breathing treatments and was able to be discharged she started taking her steroids on Tuesday states that she is taking however she continues have shortness of breath worsened today she does have continued cough had no significant sputum no fever chills no chest pain no abdominal pain nausea vomiting diarrhea

## 2020-03-19 NOTE — PLAN OF CARE
03/19/20 1044   KEYES Message   Medicare Outpatient and Observation Notification regarding financial responsibility Given to patient/caregiver;Explained to patient/caregiver;Signed/date by patient/caregiver   Date KEYES was signed 03/19/20   Time KEYES was signed 0935       FUENTES met with Pt at bedside to discuss observation status.  KEYES Form explained and signed by Pt.  Copy and information sheet left at bedside, and original will be scanned to chart.  Pt verbalized no further questions at this time.

## 2020-03-19 NOTE — PROGRESS NOTES
Pt is requesting Oxycodone and temazepam. This patient is admitted for respiratory distress. I believe that this combination could cause the patient to hypoventilate and is not indicated at this time.   no

## 2020-03-23 LAB
BACTERIA BLD CULT: NORMAL
BACTERIA BLD CULT: NORMAL

## 2020-03-24 ENCOUNTER — TELEPHONE (OUTPATIENT)
Dept: FAMILY MEDICINE | Facility: CLINIC | Age: 70
End: 2020-03-24

## 2020-03-24 NOTE — TELEPHONE ENCOUNTER
----- Message from Trixie Wilson MA sent at 3/24/2020  3:18 PM CDT -----  Contact: Sarah with [facility]   Pt was discharged from hospital on March 19 and [facility] is calling to get her scheduled for a hospital follow up.  [facility] is requesting we call pt as well as [facility] to let them know the date an time of romeo.    [facility] - Sarah - [phone]    Pt - [phone]

## 2020-03-25 ENCOUNTER — TELEPHONE (OUTPATIENT)
Dept: FAMILY MEDICINE | Facility: CLINIC | Age: 70
End: 2020-03-25

## 2020-03-25 ENCOUNTER — OFFICE VISIT (OUTPATIENT)
Dept: FAMILY MEDICINE | Facility: CLINIC | Age: 70
End: 2020-03-25
Payer: MEDICARE

## 2020-03-25 DIAGNOSIS — Z79.4 TYPE 2 DIABETES MELLITUS WITH HYPEROSMOLARITY WITHOUT COMA, WITH LONG-TERM CURRENT USE OF INSULIN: Primary | ICD-10-CM

## 2020-03-25 DIAGNOSIS — J96.11 CHRONIC RESPIRATORY FAILURE WITH HYPOXIA: Chronic | ICD-10-CM

## 2020-03-25 DIAGNOSIS — K59.01 SLOW TRANSIT CONSTIPATION: ICD-10-CM

## 2020-03-25 DIAGNOSIS — I10 ESSENTIAL HYPERTENSION: ICD-10-CM

## 2020-03-25 DIAGNOSIS — E03.9 ACQUIRED HYPOTHYROIDISM: ICD-10-CM

## 2020-03-25 DIAGNOSIS — E11.00 TYPE 2 DIABETES MELLITUS WITH HYPEROSMOLARITY WITHOUT COMA, WITH LONG-TERM CURRENT USE OF INSULIN: Primary | ICD-10-CM

## 2020-03-25 DIAGNOSIS — F41.9 ANXIETY: ICD-10-CM

## 2020-03-25 DIAGNOSIS — G25.81 RESTLESS LEGS SYNDROME: ICD-10-CM

## 2020-03-25 DIAGNOSIS — J44.1 COPD EXACERBATION: ICD-10-CM

## 2020-03-25 DIAGNOSIS — Z96.652 S/P TOTAL KNEE ARTHROPLASTY, LEFT: ICD-10-CM

## 2020-03-25 DIAGNOSIS — K21.9 GASTROESOPHAGEAL REFLUX DISEASE WITHOUT ESOPHAGITIS: ICD-10-CM

## 2020-03-25 DIAGNOSIS — E89.0 POSTOPERATIVE HYPOTHYROIDISM: ICD-10-CM

## 2020-03-25 DIAGNOSIS — E78.2 MIXED HYPERLIPIDEMIA: ICD-10-CM

## 2020-03-25 PROCEDURE — 3044F HG A1C LEVEL LT 7.0%: CPT | Mod: 95,,, | Performed by: FAMILY MEDICINE

## 2020-03-25 PROCEDURE — 1101F PR PT FALLS ASSESS DOC 0-1 FALLS W/OUT INJ PAST YR: ICD-10-PCS | Mod: 95,,, | Performed by: FAMILY MEDICINE

## 2020-03-25 PROCEDURE — 3044F PR MOST RECENT HEMOGLOBIN A1C LEVEL <7.0%: ICD-10-PCS | Mod: 95,,, | Performed by: FAMILY MEDICINE

## 2020-03-25 PROCEDURE — 1159F MED LIST DOCD IN RCRD: CPT | Mod: 95,,, | Performed by: FAMILY MEDICINE

## 2020-03-25 PROCEDURE — 1101F PT FALLS ASSESS-DOCD LE1/YR: CPT | Mod: 95,,, | Performed by: FAMILY MEDICINE

## 2020-03-25 PROCEDURE — 99213 OFFICE O/P EST LOW 20 MIN: CPT | Mod: 95,,, | Performed by: FAMILY MEDICINE

## 2020-03-25 PROCEDURE — 99213 PR OFFICE/OUTPT VISIT, EST, LEVL III, 20-29 MIN: ICD-10-PCS | Mod: 95,,, | Performed by: FAMILY MEDICINE

## 2020-03-25 PROCEDURE — 1159F PR MEDICATION LIST DOCUMENTED IN MEDICAL RECORD: ICD-10-PCS | Mod: 95,,, | Performed by: FAMILY MEDICINE

## 2020-03-25 NOTE — PROGRESS NOTES
SUBJECTIVE:    Patient ID: Alanis Mendieta is a 69 y.o. female.    Chief Complaint: No chief complaint on file.    This 69-year-old female is calling on a telemedicine visit.  She is  a type 2 diabetic and had some severe hypoglycemic symptoms the last day or 2.  Her blood sugars bottomed out in the 46 range and she took blue oral glucose supplements and soft drinks and a pearl only up to 81.  She had symptoms of sweating nausea and confusion.  She has lost weight down to 206, she has a sitter 6 days a week who is fixing her healthy food.  She has been maintained on 60 units of Levemir twice a day, NovoLog 2-4 units with meals if blood sugar greater than 151.  She would like to reduce her insulin at this time.    She recently got out of the hospital 1 week ago.  She had a COPD flare up and was admitted to the hospital for 24 hr for IV steroids and nebulizer treatments.  She states she has ordered a nebulizer but has not received 1.  She has the medication ago in the machine but her son has ordered her a machine.  She wears oxygen 24/7.  She uses a walker for mobility at home and has had no recent falls.  She sees Dr. Benjamin Almazan for pain management issues.  She has constipation from her pain medication and has only been taking 1 Relistor daily.  She has carpal tunnel syndrome bilaterally and surgery has been planned but postponed.      Admission on 03/18/2020, Discharged on 03/19/2020   Component Date Value Ref Range Status    WBC 03/18/2020 8.17  3.90 - 12.70 K/uL Final    RBC 03/18/2020 4.34  4.00 - 5.40 M/uL Final    Hemoglobin 03/18/2020 13.1  12.0 - 16.0 g/dL Final    Hematocrit 03/18/2020 39.9  37.0 - 48.5 % Final    Mean Corpuscular Volume 03/18/2020 92  82 - 98 fL Final    Mean Corpuscular Hemoglobin 03/18/2020 30.2  27.0 - 31.0 pg Final    Mean Corpuscular Hemoglobin Conc 03/18/2020 32.8  32.0 - 36.0 g/dL Final    RDW 03/18/2020 13.6  11.5 - 14.5 % Final    Platelets 03/18/2020 242  150  - 350 K/uL Final    MPV 03/18/2020 10.9  9.2 - 12.9 fL Final    Immature Granulocytes 03/18/2020 0.6* 0.0 - 0.5 % Final    Gran # (ANC) 03/18/2020 6.4  1.8 - 7.7 K/uL Final    Immature Grans (Abs) 03/18/2020 0.05* 0.00 - 0.04 K/uL Final    Lymph # 03/18/2020 1.7  1.0 - 4.8 K/uL Final    Mono # 03/18/2020 0.1* 0.3 - 1.0 K/uL Final    Eos # 03/18/2020 0.0  0.0 - 0.5 K/uL Final    Baso # 03/18/2020 0.02  0.00 - 0.20 K/uL Final    nRBC 03/18/2020 0  0 /100 WBC Final    Gran% 03/18/2020 77.8* 38.0 - 73.0 % Final    Lymph% 03/18/2020 20.3  18.0 - 48.0 % Final    Mono% 03/18/2020 1.0* 4.0 - 15.0 % Final    Eosinophil% 03/18/2020 0.1  0.0 - 8.0 % Final    Basophil% 03/18/2020 0.2  0.0 - 1.9 % Final    Differential Method 03/18/2020 Automated   Final    Sodium 03/18/2020 136  136 - 145 mmol/L Final    Potassium 03/18/2020 3.6  3.5 - 5.1 mmol/L Final    Chloride 03/18/2020 93* 95 - 110 mmol/L Final    CO2 03/18/2020 32* 23 - 29 mmol/L Final    Glucose 03/18/2020 227* 70 - 110 mg/dL Final    BUN, Bld 03/18/2020 21  8 - 23 mg/dL Final    Creatinine 03/18/2020 0.7  0.5 - 1.4 mg/dL Final    Calcium 03/18/2020 8.6* 8.7 - 10.5 mg/dL Final    Total Protein 03/18/2020 7.0  6.0 - 8.4 g/dL Final    Albumin 03/18/2020 3.6  3.5 - 5.2 g/dL Final    Total Bilirubin 03/18/2020 0.5  0.1 - 1.0 mg/dL Final    Alkaline Phosphatase 03/18/2020 61  55 - 135 U/L Final    AST 03/18/2020 21  10 - 40 U/L Final    ALT 03/18/2020 17  10 - 44 U/L Final    Anion Gap 03/18/2020 11  8 - 16 mmol/L Final    eGFR if African American 03/18/2020 >60.0  >60 mL/min/1.73 m^2 Final    eGFR if non African American 03/18/2020 >60.0  >60 mL/min/1.73 m^2 Final    Troponin I 03/18/2020 <0.030  <=0.040 ng/mL Final    BNP 03/18/2020 32  0 - 99 pg/mL Final    PT 03/18/2020 13.6  10.6 - 14.8 sec Final    INR 03/18/2020 1.1   Final    Magnesium 03/18/2020 1.7  1.6 - 2.6 mg/dL Final    POC Glucose 03/18/2020 210* 70 - 110 Final    Blood  Culture, Routine 03/18/2020 No growth after 5 days.   Final    Blood Culture, Routine 03/18/2020 No growth after 5 days.   Final    Lactate (Lactic Acid) 03/18/2020 2.7* 0.5 - 1.9 mmol/L Final    Procalcitonin 03/18/2020 <0.05  0.00 - 0.50 ng/mL Final    TSH 03/18/2020 0.430  0.340 - 5.600 uIU/mL Final    Procalcitonin 03/18/2020 <0.05  0.00 - 0.50 ng/mL Final    Lactate (Lactic Acid) 03/18/2020 2.2* 0.5 - 1.9 mmol/L Final    Troponin I 03/18/2020 <0.030  <=0.040 ng/mL Final    POC Glucose 03/18/2020 257* 70 - 110 Final    WBC 03/19/2020 9.08  3.90 - 12.70 K/uL Final    RBC 03/19/2020 4.31  4.00 - 5.40 M/uL Final    Hemoglobin 03/19/2020 13.1  12.0 - 16.0 g/dL Final    Hematocrit 03/19/2020 39.8  37.0 - 48.5 % Final    Mean Corpuscular Volume 03/19/2020 92  82 - 98 fL Final    Mean Corpuscular Hemoglobin 03/19/2020 30.4  27.0 - 31.0 pg Final    Mean Corpuscular Hemoglobin Conc 03/19/2020 32.9  32.0 - 36.0 g/dL Final    RDW 03/19/2020 13.7  11.5 - 14.5 % Final    Platelets 03/19/2020 272  150 - 350 K/uL Final    MPV 03/19/2020 10.7  9.2 - 12.9 fL Final    Immature Granulocytes 03/19/2020 0.6* 0.0 - 0.5 % Final    Gran # (ANC) 03/19/2020 6.9  1.8 - 7.7 K/uL Final    Immature Grans (Abs) 03/19/2020 0.05* 0.00 - 0.04 K/uL Final    Lymph # 03/19/2020 1.6  1.0 - 4.8 K/uL Final    Mono # 03/19/2020 0.5  0.3 - 1.0 K/uL Final    Eos # 03/19/2020 0.0  0.0 - 0.5 K/uL Final    Baso # 03/19/2020 0.01  0.00 - 0.20 K/uL Final    nRBC 03/19/2020 0  0 /100 WBC Final    Gran% 03/19/2020 76.2* 38.0 - 73.0 % Final    Lymph% 03/19/2020 18.1  18.0 - 48.0 % Final    Mono% 03/19/2020 5.0  4.0 - 15.0 % Final    Eosinophil% 03/19/2020 0.0  0.0 - 8.0 % Final    Basophil% 03/19/2020 0.1  0.0 - 1.9 % Final    Differential Method 03/19/2020 Automated   Final    Sodium 03/19/2020 141  136 - 145 mmol/L Final    Potassium 03/19/2020 3.4* 3.5 - 5.1 mmol/L Final    Chloride 03/19/2020 96  95 - 110 mmol/L Final     CO2 03/19/2020 34* 23 - 29 mmol/L Final    Glucose 03/19/2020 219* 70 - 110 mg/dL Final    BUN, Bld 03/19/2020 19  8 - 23 mg/dL Final    Creatinine 03/19/2020 0.7  0.5 - 1.4 mg/dL Final    Calcium 03/19/2020 8.9  8.7 - 10.5 mg/dL Final    Anion Gap 03/19/2020 11  8 - 16 mmol/L Final    eGFR if African American 03/19/2020 >60.0  >60 mL/min/1.73 m^2 Final    eGFR if non African American 03/19/2020 >60.0  >60 mL/min/1.73 m^2 Final    Troponin I 03/19/2020 <0.030  <=0.040 ng/mL Final    POC Glucose 03/19/2020 190* 70 - 110 Final   Admission on 03/13/2020, Discharged on 03/13/2020   Component Date Value Ref Range Status    WBC 03/13/2020 7.47  3.90 - 12.70 K/uL Final    RBC 03/13/2020 4.38  4.00 - 5.40 M/uL Final    Hemoglobin 03/13/2020 13.3  12.0 - 16.0 g/dL Final    Hematocrit 03/13/2020 40.3  37.0 - 48.5 % Final    Mean Corpuscular Volume 03/13/2020 92  82 - 98 fL Final    Mean Corpuscular Hemoglobin 03/13/2020 30.4  27.0 - 31.0 pg Final    Mean Corpuscular Hemoglobin Conc 03/13/2020 33.0  32.0 - 36.0 g/dL Final    RDW 03/13/2020 13.3  11.5 - 14.5 % Final    Platelets 03/13/2020 256  150 - 350 K/uL Final    MPV 03/13/2020 10.7  9.2 - 12.9 fL Final    Immature Granulocytes 03/13/2020 0.4  0.0 - 0.5 % Final    Gran # (ANC) 03/13/2020 3.8  1.8 - 7.7 K/uL Final    Immature Grans (Abs) 03/13/2020 0.03  0.00 - 0.04 K/uL Final    Lymph # 03/13/2020 3.1  1.0 - 4.8 K/uL Final    Mono # 03/13/2020 0.4  0.3 - 1.0 K/uL Final    Eos # 03/13/2020 0.1  0.0 - 0.5 K/uL Final    Baso # 03/13/2020 0.03  0.00 - 0.20 K/uL Final    nRBC 03/13/2020 0  0 /100 WBC Final    Gran% 03/13/2020 50.8  38.0 - 73.0 % Final    Lymph% 03/13/2020 41.1  18.0 - 48.0 % Final    Mono% 03/13/2020 5.8  4.0 - 15.0 % Final    Eosinophil% 03/13/2020 1.5  0.0 - 8.0 % Final    Basophil% 03/13/2020 0.4  0.0 - 1.9 % Final    Differential Method 03/13/2020 Automated   Final    Sodium 03/13/2020 139  136 - 145 mmol/L Final     Potassium 03/13/2020 3.9  3.5 - 5.1 mmol/L Final    Chloride 03/13/2020 97  95 - 110 mmol/L Final    CO2 03/13/2020 33* 23 - 29 mmol/L Final    Glucose 03/13/2020 215* 70 - 110 mg/dL Final    BUN, Bld 03/13/2020 17  8 - 23 mg/dL Final    Creatinine 03/13/2020 0.7  0.5 - 1.4 mg/dL Final    Calcium 03/13/2020 8.7  8.7 - 10.5 mg/dL Final    Total Protein 03/13/2020 6.9  6.0 - 8.4 g/dL Final    Albumin 03/13/2020 3.4* 3.5 - 5.2 g/dL Final    Total Bilirubin 03/13/2020 0.5  0.1 - 1.0 mg/dL Final    Alkaline Phosphatase 03/13/2020 66  55 - 135 U/L Final    AST 03/13/2020 20  10 - 40 U/L Final    ALT 03/13/2020 18  10 - 44 U/L Final    Anion Gap 03/13/2020 9  8 - 16 mmol/L Final    eGFR if African American 03/13/2020 >60.0  >60 mL/min/1.73 m^2 Final    eGFR if non African American 03/13/2020 >60.0  >60 mL/min/1.73 m^2 Final    Troponin I 03/13/2020 <0.030  <=0.040 ng/mL Final    BNP 03/13/2020 21  0 - 99 pg/mL Final    POC Glucose 03/13/2020 224* 70 - 110 Final   Office Visit on 02/26/2020   Component Date Value Ref Range Status    Hemoglobin A1C 02/26/2020 6.5  % Final   Lab Visit on 12/16/2019   Component Date Value Ref Range Status    Specimen UA 12/16/2019 Urine, Clean Catch   Final    Color, UA 12/16/2019 Yellow  Yellow, Straw, Edilia Final    Appearance, UA 12/16/2019 Hazy* Clear Final    pH, UA 12/16/2019 7.0  5.0 - 8.0 Final    Specific Gravity, UA 12/16/2019 1.010  1.005 - 1.030 Final    Protein, UA 12/16/2019 Negative  Negative Final    Glucose, UA 12/16/2019 Negative  Negative Final    Ketones, UA 12/16/2019 Negative  Negative Final    Bilirubin (UA) 12/16/2019 Negative  Negative Final    Occult Blood UA 12/16/2019 Negative  Negative Final    Nitrite, UA 12/16/2019 Negative  Negative Final    Urobilinogen, UA 12/16/2019 Negative  Negative EU/dL Final    Leukocytes, UA 12/16/2019 3+* Negative Final    Urine Culture, Routine 12/16/2019 *  Final                    Value:ESCHERICHIA  COLI  >100,000 cfu/ml      RBC, UA 12/16/2019 1  0 - 4 /hpf Final    WBC, UA 12/16/2019 13* 0 - 5 /hpf Final    Bacteria 12/16/2019 Many* None-Occ /hpf Final    Squam Epithel, UA 12/16/2019 9  /hpf Final    Hyaline Casts, UA 12/16/2019 4* 0-1/lpf /lpf Final    Microscopic Comment 12/16/2019 SEE COMMENT   Final       Past Medical History:   Diagnosis Date    Allergy     Anxiety     Arthritis     Asthma     Bipolar disorder     Cancer     thyroid    Chronic back pain     COPD (chronic obstructive pulmonary disease)     Diabetes mellitus     Diabetes mellitus, type 2     Fibromyalgia     GERD (gastroesophageal reflux disease)     History of fall 4/26/2018    [place] (hard of hearing)     Hx of thyroid cancer     Hyperlipidemia     Hypertension     Hypothyroidism     Neuropathy     On home oxygen therapy     3 l/m 24/7    Restless leg syndrome     Sleep apnea     Urinary tract infection      Past Surgical History:   Procedure Laterality Date    APPENDECTOMY      BACK SURGERY      x 3    broken foot and ankle      CHOLECYSTECTOMY      EYE SURGERY Bilateral     cataract    HYSTERECTOMY      JOINT REPLACEMENT Left 09/17/2018    knee    KNEE ARTHROPLASTY Left 9/17/2018    Procedure: ARTHROPLASTY, KNEE;  Surgeon: Yariel Marin MD;  Location: [facility] OR;  Service: Orthopedics;  Laterality: Left;    MYELOGRAPHY N/A 8/7/2019    Procedure: MYELOGRAM;  Surgeon: [facility] Provider;  Location: [facility] OR;  Service: General;  Laterality: N/A;    POSTERIOR REPAIR      SPINAL FUSION      x3 BACK, NECK X 4    TOTAL THYROIDECTOMY  2014     Family History   Problem Relation Age of Onset    Diabetes Mother     Heart disease Mother     Hypertension Mother     Diabetes Father     Heart disease Father     Hypertension Father        Marital Status:   Alcohol History:  reports that she does not drink alcohol.  Tobacco History:  reports that she quit smoking about 19 years ago. Her smoking use  included cigarettes. She has a 30.00 pack-year smoking history. She has never used smokeless tobacco.  Drug History:  reports that she does not use drugs.    Review of patient's allergies indicates:   Allergen Reactions    Morphine Itching    Tubersol [tuberculin ppd] Other (See Comments)     Site swells bad. Has to have chest xray       Current Outpatient Medications:     albuterol (VENTOLIN HFA) 90 mcg/actuation inhaler, Inhale 2 puffs into the lungs every 6 (six) hours as needed for Wheezing. Rescue, Disp: 1 Inhaler, Rfl: 3    albuterol-ipratropium (DUO-NEB) 2.5 mg-0.5 mg/3 mL nebulizer solution, Take 3 mLs by nebulization every 6 (six) hours as needed for Wheezing or Shortness of Breath. Rescue, Disp: 1 Box, Rfl: 0    ANORO ELLIPTA 62.5-25 mcg/actuation DsDv, Inhale 2 puffs into the lungs once daily. , Disp: , Rfl:     ARTIFICIAL TEARS,HYPROMELLOSE, OPHT, Place 1 drop into both eyes 3 (three) times daily., Disp: , Rfl:     calcitRIOL (ROCALTROL) 0.5 MCG Cap, Take 0.5 mcg by mouth every evening., Disp: , Rfl:     clonazePAM (KLONOPIN) 0.5 MG tablet, Take 0.5 mg by mouth once daily. , Disp: , Rfl:     DULoxetine (CYMBALTA) 60 MG capsule, Take 60 mg by mouth every evening., Disp: , Rfl:     FLUoxetine 20 MG capsule, Take 1 capsule (20 mg total) by mouth once daily., Disp: 90 capsule, Rfl: 1    gabapentin (NEURONTIN) 400 MG capsule, Take 1 capsule (400 mg total) by mouth once daily., Disp: 90 capsule, Rfl: 1    gabapentin (NEURONTIN) 800 MG tablet, Take 1 tablet (800 mg total) by mouth every evening. In evening, Disp: 90 tablet, Rfl: 1    ibuprofen (ADVIL,MOTRIN) 400 MG tablet, Take 400 mg by mouth every 8 (eight) hours as needed for Other., Disp: , Rfl:     insulin aspart U-100 (NOVOLOG) 100 unit/mL injection, Inject 2-8 Units into the skin 4 (four) times daily as needed for High Blood Sugar., Disp: , Rfl:     lactulose (CHRONULAC) 10 gram/15 mL solution, Take 20 g by mouth daily as needed. , Disp: ,  Rfl:     lamoTRIgine (LAMICTAL) 150 MG Tab, Take 1 tablet (150 mg total) by mouth 2 (two) times daily., Disp: 180 tablet, Rfl: 1    latanoprost 0.005 % ophthalmic solution, Place 1 drop into both eyes every evening. , Disp: , Rfl:     LEVEMIR FLEXTOUCH U-100 INSULN 100 unit/mL (3 mL) InPn pen, Inject 60 Units into the skin 2 (two) times daily. , Disp: , Rfl:     levothyroxine (SYNTHROID) 150 MCG tablet, Take 1 tablet (150 mcg total) by mouth before breakfast., Disp: 90 tablet, Rfl: 1    LINZESS 290 mcg Cap capsule, Take 1 capsule (290 mcg total) by mouth once daily., Disp: 90 capsule, Rfl: 1    losartan-hydrochlorothiazide 50-12.5 mg (HYZAAR) 50-12.5 mg per tablet, Take 0.5 tablets by mouth once daily., Disp: 90 tablet, Rfl: 1    meclizine (ANTIVERT) 25 mg tablet, Take 25 mg by mouth 2 (two) times daily as needed for Dizziness. , Disp: , Rfl:     methylnaltrexone (RELISTOR) 150 mg Tab, Take 150 mg by mouth once daily., Disp: 270 tablet, Rfl: 1    ondansetron (ZOFRAN-ODT) 4 MG TbDL, Take 4 mg by mouth every 6 (six) hours as needed (nausea)., Disp: , Rfl:     oxyCODONE (ROXICODONE) 10 mg Tab immediate release tablet, Take 10 mg by mouth 3 (three) times daily. , Disp: , Rfl:     pantoprazole (PROTONIX) 40 MG tablet, Take 1 tablet (40 mg total) by mouth once daily., Disp: 90 tablet, Rfl: 1    potassium chloride SA (K-DUR,KLOR-CON) 10 MEQ tablet, Take 1 tablet (10 mEq total) by mouth 3 (three) times a week. Mon , Wed, and Fri., Disp: 12 tablet, Rfl: 0    rOPINIRole (REQUIP) 4 MG tablet, Take 1 tablet (4 mg total) by mouth 2 (two) times daily., Disp: 180 tablet, Rfl: 1    temazepam (RESTORIL) 30 mg capsule, Take 1 capsule (30 mg total) by mouth nightly as needed., Disp: 90 capsule, Rfl: 1    tiZANidine (ZANAFLEX) 4 MG tablet, Take 4 mg by mouth 3 (three) times daily., Disp: , Rfl:     Review of Systems   Constitutional: Negative for appetite change, chills, fatigue, fever and unexpected weight change.    HENT: Negative for congestion, ear pain, sinus pain, sore throat and trouble swallowing.    Eyes: Negative for pain, discharge and visual disturbance.   Respiratory: Negative for apnea, cough, shortness of breath and wheezing.         On oxygen 24/7.  No shortness of breath lately however.  She denies fever or cough   Cardiovascular: Negative for chest pain, palpitations and leg swelling.   Gastrointestinal: Negative for abdominal pain, blood in stool, constipation (Constipation, on Relistor), diarrhea, nausea and vomiting.   Endocrine: Negative for heat intolerance, polydipsia and polyuria.   Genitourinary: Negative for difficulty urinating, dyspareunia, dysuria, frequency, hematuria and menstrual problem.   Musculoskeletal: Negative for arthralgias, back pain (back pains  daily rad  rt  hip & leg), gait problem, joint swelling and myalgias.   Allergic/Immunologic: Negative for environmental allergies, food allergies and immunocompromised state.   Neurological: Negative for dizziness, tremors, seizures, numbness and headaches.   Psychiatric/Behavioral: Negative for behavioral problems, confusion, hallucinations and suicidal ideas. The patient is not nervous/anxious.           Objective:      There were no vitals filed for this visit.  There is no height or weight on file to calculate BMI.  Physical Exam      Assessment:       1. Type 2 diabetes mellitus with hyperosmolarity without coma, with long-term current use of insulin    2. Restless legs syndrome    3. Anxiety    4. COPD exacerbation    5. Chronic respiratory failure with hypoxia    6. Mixed hyperlipidemia    7. Essential hypertension    8. Postoperative hypothyroidism    9. Acquired hypothyroidism    10. Slow transit constipation    11. Gastroesophageal reflux disease without esophagitis    12. S/P total knee arthroplasty, left         Plan:       Type 2 diabetes mellitus with hyperosmolarity without coma, with long-term current use of insulin  Patient  now becoming hypoglycemic.  This is due to weight loss and healthier diet.  We will reduce her Levemir to 60 units q.a.m. only and none in the evening.  Continue NovoLog 2-4 units with meals if blood sugar greater than 151.  Restless legs syndrome    Anxiety  Continue current medication  COPD exacerbation  Hospital follow-up.  All medications reconciled.  Chronic respiratory failure with hypoxia  Continue O2 concentrator at home 2-3 L  Mixed hyperlipidemia  RTC 3 months with lab work  Essential hypertension  No issues with blood pressure lately  Postoperative hypothyroidism  Continue levothyroxine dosing  Acquired hypothyroidism    Slow transit constipation  Increased Relistor to 2 tablets per day, maximum 3 a day  Gastroesophageal reflux disease without esophagitis    S/P total knee arthroplasty, left  Stable, continue pain management with Dr. Benjamin Almazan.    Follow up in about 3 months (around 6/25/2020) for Diabetic Check-Up.    The patient location is:  home  Visit type: Virtual visit with synchronous audio and video    Total time spent with patient: 20     Each patient to whom he or she provides medical services by telemedicine is:  (1) informed of the relationship between the physician and patient and the respective role of any other health care provider with respect to management of the patient; and (2) notified that he or she may decline to receive medical services by telemedicine and may withdraw from such care at any time.     This note was created using [employer] voice recognition software that occasionally misinterprets phrases or words.

## 2020-04-07 ENCOUNTER — OFFICE VISIT (OUTPATIENT)
Dept: FAMILY MEDICINE | Facility: CLINIC | Age: 70
End: 2020-04-07
Payer: MEDICARE

## 2020-04-07 ENCOUNTER — PATIENT MESSAGE (OUTPATIENT)
Dept: FAMILY MEDICINE | Facility: CLINIC | Age: 70
End: 2020-04-07

## 2020-04-07 ENCOUNTER — PATIENT MESSAGE (OUTPATIENT)
Dept: UROGYNECOLOGY | Facility: CLINIC | Age: 70
End: 2020-04-07

## 2020-04-07 DIAGNOSIS — Z79.4 TYPE 2 DIABETES MELLITUS WITH HYPERGLYCEMIA, WITH LONG-TERM CURRENT USE OF INSULIN: ICD-10-CM

## 2020-04-07 DIAGNOSIS — R42 VERTIGO: Primary | ICD-10-CM

## 2020-04-07 DIAGNOSIS — J44.9 CHRONIC OBSTRUCTIVE PULMONARY DISEASE, UNSPECIFIED COPD TYPE: ICD-10-CM

## 2020-04-07 DIAGNOSIS — J96.11 CHRONIC RESPIRATORY FAILURE WITH HYPOXIA: Chronic | ICD-10-CM

## 2020-04-07 DIAGNOSIS — E11.65 TYPE 2 DIABETES MELLITUS WITH HYPERGLYCEMIA, WITH LONG-TERM CURRENT USE OF INSULIN: ICD-10-CM

## 2020-04-07 PROCEDURE — 1101F PT FALLS ASSESS-DOCD LE1/YR: CPT | Mod: 95,,, | Performed by: NURSE PRACTITIONER

## 2020-04-07 PROCEDURE — 99213 PR OFFICE/OUTPT VISIT, EST, LEVL III, 20-29 MIN: ICD-10-PCS | Mod: 95,,, | Performed by: NURSE PRACTITIONER

## 2020-04-07 PROCEDURE — 99213 OFFICE O/P EST LOW 20 MIN: CPT | Mod: 95,,, | Performed by: NURSE PRACTITIONER

## 2020-04-07 PROCEDURE — 1159F PR MEDICATION LIST DOCUMENTED IN MEDICAL RECORD: ICD-10-PCS | Mod: 95,,, | Performed by: NURSE PRACTITIONER

## 2020-04-07 PROCEDURE — 3044F HG A1C LEVEL LT 7.0%: CPT | Mod: 95,,, | Performed by: NURSE PRACTITIONER

## 2020-04-07 PROCEDURE — 3044F PR MOST RECENT HEMOGLOBIN A1C LEVEL <7.0%: ICD-10-PCS | Mod: 95,,, | Performed by: NURSE PRACTITIONER

## 2020-04-07 PROCEDURE — 1101F PR PT FALLS ASSESS DOC 0-1 FALLS W/OUT INJ PAST YR: ICD-10-PCS | Mod: 95,,, | Performed by: NURSE PRACTITIONER

## 2020-04-07 PROCEDURE — 1159F MED LIST DOCD IN RCRD: CPT | Mod: 95,,, | Performed by: NURSE PRACTITIONER

## 2020-04-07 RX ORDER — UMECLIDINIUM BROMIDE AND VILANTEROL TRIFENATATE 62.5; 25 UG/1; UG/1
1 POWDER RESPIRATORY (INHALATION) DAILY
Qty: 3 EACH | Refills: 1 | Status: SHIPPED | OUTPATIENT
Start: 2020-04-07 | End: 2020-05-14 | Stop reason: ALTCHOICE

## 2020-04-07 NOTE — TELEPHONE ENCOUNTER
Spoke to pt regarding message below. I was able to schedule her a virtual visit with Teresa dacosta

## 2020-04-07 NOTE — PROGRESS NOTES
Subjective:        The chief complaint leading to consultation is: dizziness  The patient location is:  Home  Visit type: Virtual visit with synchronous audio/video or audio only  This was a video visit in lieu of in-person visit due to the coronavirus emergency. Patient acknowledged and consented to the video visit encounter.     Pt reports the past 2-3 days has been very dizzy and fatigued. Describes spinning sensation when she moves around or rolls over in bed. Admits to previous similar symptoms- has meclizine and took a couple doses yesterday and one dose today which seems to be help a little. Denies any vision/speech change. Almost fell this am getting out of bed, fell backwards onto bed. Denies LOC or head trauma.   Pt reports since her last visit with Dr. Melendez last week blood sugars have been running higher- up to 200 in the AM. Last week she was advised to cut insulin dose to 60u daily in AM from 60units BID d/t hypoglycemia. She reports after reduced dose she noted her blood sugars jumped up so resumed taking 30u in the evening in addition to 60u in am though FBS this am was still high 204. Normally only uses novolog with meals occasionally but has taken it several days now, only uses 2-4 units each time. Denies any recent hypoglycemic episodes.  Reports breathing status is stable- hx of COPD and wears continuous O2 3lnc. Had hospital stay a few weeks ago for COPD exacerbation but feeling better now from breathing standpoint.      Past Surgical History:   Procedure Laterality Date    APPENDECTOMY      BACK SURGERY      x 3    broken foot and ankle      CHOLECYSTECTOMY      EYE SURGERY Bilateral     cataract    HYSTERECTOMY      JOINT REPLACEMENT Left 09/17/2018    knee    KNEE ARTHROPLASTY Left 9/17/2018    Procedure: ARTHROPLASTY, KNEE;  Surgeon: Yariel Marin MD;  Location: [facility];  Service: Orthopedics;  Laterality: Left;    MYELOGRAPHY N/A 8/7/2019    Procedure: MYELOGRAM;  Surgeon: Sun  Diagnostic Provider;  Location: [facility] OR;  Service: General;  Laterality: N/A;    POSTERIOR REPAIR      SPINAL FUSION      x3 BACK, NECK X 4    TOTAL THYROIDECTOMY  2014     Past Medical History:   Diagnosis Date    Allergy     Anxiety     Arthritis     Asthma     Bipolar disorder     Cancer     thyroid    Chronic back pain     COPD (chronic obstructive pulmonary disease)     Diabetes mellitus     Diabetes mellitus, type 2     Fibromyalgia     GERD (gastroesophageal reflux disease)     History of fall 4/26/2018    [place] (hard of hearing)     Hx of thyroid cancer     Hyperlipidemia     Hypertension     Hypothyroidism     Neuropathy     On home oxygen therapy     3 l/m 24/7    Restless leg syndrome     Sleep apnea     Urinary tract infection      Family History   Problem Relation Age of Onset    Diabetes Mother     Heart disease Mother     Hypertension Mother     Diabetes Father     Heart disease Father     Hypertension Father         Social History:   Marital Status:   Alcohol History:  reports that she does not drink alcohol.  Tobacco History:  reports that she quit smoking about 19 years ago. Her smoking use included cigarettes. She has a 30.00 pack-year smoking history. She has never used smokeless tobacco.  Drug History:  reports that she does not use drugs.    Review of patient's allergies indicates:   Allergen Reactions    Morphine Itching    Tubersol [tuberculin ppd] Other (See Comments)     Site swells bad. Has to have chest xray       Current Outpatient Medications   Medication Sig Dispense Refill    albuterol-ipratropium (DUO-NEB) 2.5 mg-0.5 mg/3 mL nebulizer solution Take 3 mLs by nebulization every 6 (six) hours as needed for Wheezing or Shortness of Breath. Rescue 1 Box 0    meclizine (ANTIVERT) 25 mg tablet Take 25 mg by mouth 2 (two) times daily as needed for Dizziness.       albuterol (VENTOLIN HFA) 90 mcg/actuation inhaler Inhale 2 puffs into the lungs every  6 (six) hours as needed for Wheezing. Rescue 1 Inhaler 3    ANORO ELLIPTA 62.5-25 mcg/actuation DsDv Inhale 1 puff into the lungs once daily. 3 each 1    ARTIFICIAL TEARS,HYPROMELLOSE, OPHT Place 1 drop into both eyes 3 (three) times daily.      calcitRIOL (ROCALTROL) 0.5 MCG Cap Take 0.5 mcg by mouth every evening.      clonazePAM (KLONOPIN) 0.5 MG tablet Take 0.5 mg by mouth once daily.       DULoxetine (CYMBALTA) 60 MG capsule Take 60 mg by mouth every evening.      FLUoxetine 20 MG capsule Take 1 capsule (20 mg total) by mouth once daily. 90 capsule 1    gabapentin (NEURONTIN) 400 MG capsule Take 1 capsule (400 mg total) by mouth once daily. 90 capsule 1    gabapentin (NEURONTIN) 800 MG tablet Take 1 tablet (800 mg total) by mouth every evening. In evening 90 tablet 1    ibuprofen (ADVIL,MOTRIN) 400 MG tablet Take 400 mg by mouth every 8 (eight) hours as needed for Other.      insulin aspart U-100 (NOVOLOG) 100 unit/mL injection Inject 2-8 Units into the skin 4 (four) times daily as needed for High Blood Sugar.      lactulose (CHRONULAC) 10 gram/15 mL solution Take 20 g by mouth daily as needed.       lamoTRIgine (LAMICTAL) 150 MG Tab Take 1 tablet (150 mg total) by mouth 2 (two) times daily. 180 tablet 1    latanoprost 0.005 % ophthalmic solution Place 1 drop into both eyes every evening.       LEVEMIR FLEXTOUCH U-100 INSULN 100 unit/mL (3 mL) InPn pen Inject 60 Units into the skin 2 (two) times daily. 50units       levothyroxine (SYNTHROID) 150 MCG tablet Take 1 tablet (150 mcg total) by mouth before breakfast. 90 tablet 1    LINZESS 290 mcg Cap capsule Take 1 capsule (290 mcg total) by mouth once daily. 90 capsule 1    losartan-hydrochlorothiazide 50-12.5 mg (HYZAAR) 50-12.5 mg per tablet Take 0.5 tablets by mouth once daily. 90 tablet 1    methylnaltrexone (RELISTOR) 150 mg Tab Take 150 mg by mouth once daily. 270 tablet 1    ondansetron (ZOFRAN-ODT) 4 MG TbDL Take 4 mg by mouth every 6  (six) hours as needed (nausea).      oxyCODONE (ROXICODONE) 10 mg Tab immediate release tablet Take 10 mg by mouth 3 (three) times daily.       pantoprazole (PROTONIX) 40 MG tablet Take 1 tablet (40 mg total) by mouth once daily. 90 tablet 1    potassium chloride SA (K-DUR,KLOR-CON) 10 MEQ tablet Take 1 tablet (10 mEq total) by mouth 3 (three) times a week. Mon , Wed, and Fri. 12 tablet 0    rOPINIRole (REQUIP) 4 MG tablet Take 1 tablet (4 mg total) by mouth 2 (two) times daily. 180 tablet 1    temazepam (RESTORIL) 30 mg capsule Take 1 capsule (30 mg total) by mouth nightly as needed. 90 capsule 1    tiZANidine (ZANAFLEX) 4 MG tablet Take 4 mg by mouth 3 (three) times daily.       No current facility-administered medications for this visit.        Review of Systems   Constitutional: Positive for fatigue. Negative for chills and fever.   HENT: Negative for sore throat.    Eyes: Negative for visual disturbance.   Respiratory: Positive for cough (chronic, occasional white sputum) and shortness of breath. Negative for wheezing.    Cardiovascular: Negative for chest pain, palpitations and leg swelling.   Gastrointestinal: Negative for abdominal pain, nausea and vomiting.   Genitourinary: Negative for dysuria.   Neurological: Positive for dizziness. Negative for speech difficulty and headaches.         Objective:        Physical Exam:   Physical Exam   Constitutional: She is oriented to person, place, and time. She appears well-developed and well-nourished.   Obese WF laying in hospital bed at home, wearing O2 NC   Neurological: She is alert and oriented to person, place, and time.            Assessment:       1. Vertigo    2. Type 2 diabetes mellitus with hyperglycemia, with long-term current use of insulin    3. Chronic obstructive pulmonary disease, unspecified COPD type    4. Chronic respiratory failure with hypoxia      Plan:   Vertigo  -pt advised to continue with meclizine prn however cautioned if she's not  feeling better or starts feeling worse to call us back. Encouraged to increase water intake also    Type 2 diabetes mellitus with hyperglycemia, with long-term current use of insulin  -advised to increase levemir to 50units BID and continue to monitor    Chronic obstructive pulmonary disease, unspecified COPD type  -     ANORO ELLIPTA 62.5-25 mcg/actuation DsDv; Inhale 1 puff into the lungs once daily.  Dispense: 3 each; Refill: 1  -stable    Chronic respiratory failure with hypoxia  -stable on O2 3lnc            Follow up if symptoms worsen or fail to improve, for as scheduled.    Total time spent with patient: 20    Each patient to whom he or she provides medical services by telemedicine is:  (1) informed of the relationship between the physician and patient and the respective role of any other health care provider with respect to management of the patient; and (2) notified that he or she may decline to receive medical services by telemedicine and may withdraw from such care at any time.    This note was created using [employer] voice recognition software that occasionally misinterprets phrases or words.

## 2020-04-15 ENCOUNTER — EXTERNAL HOME HEALTH (OUTPATIENT)
Dept: HOME HEALTH SERVICES | Facility: HOSPITAL | Age: 70
End: 2020-04-15

## 2020-04-15 ENCOUNTER — PATIENT MESSAGE (OUTPATIENT)
Dept: FAMILY MEDICINE | Facility: CLINIC | Age: 70
End: 2020-04-15

## 2020-04-15 DIAGNOSIS — I10 ESSENTIAL HYPERTENSION: ICD-10-CM

## 2020-04-15 DIAGNOSIS — M79.10 MUSCLE PAIN: ICD-10-CM

## 2020-04-15 DIAGNOSIS — F32.A DEPRESSION, UNSPECIFIED DEPRESSION TYPE: Primary | ICD-10-CM

## 2020-04-15 DIAGNOSIS — F41.9 ANXIETY: ICD-10-CM

## 2020-04-15 RX ORDER — DULOXETIN HYDROCHLORIDE 60 MG/1
60 CAPSULE, DELAYED RELEASE ORAL NIGHTLY
Qty: 90 CAPSULE | Refills: 1 | Status: SHIPPED | OUTPATIENT
Start: 2020-04-15 | End: 2020-06-16 | Stop reason: SDUPTHER

## 2020-04-15 RX ORDER — POTASSIUM CHLORIDE 750 MG/1
10 TABLET, EXTENDED RELEASE ORAL
Qty: 36 TABLET | Refills: 1 | Status: SHIPPED | OUTPATIENT
Start: 2020-04-15 | End: 2020-06-16 | Stop reason: SDUPTHER

## 2020-04-15 RX ORDER — TIZANIDINE 4 MG/1
4 TABLET ORAL 3 TIMES DAILY
Qty: 270 TABLET | Refills: 1 | Status: SHIPPED | OUTPATIENT
Start: 2020-04-15 | End: 2020-05-19

## 2020-04-15 RX ORDER — CLONAZEPAM 0.5 MG/1
0.5 TABLET ORAL DAILY
Qty: 90 TABLET | Refills: 1 | Status: SHIPPED | OUTPATIENT
Start: 2020-04-15 | End: 2020-07-02 | Stop reason: SDUPTHER

## 2020-04-16 ENCOUNTER — TELEPHONE (OUTPATIENT)
Dept: FAMILY MEDICINE | Facility: CLINIC | Age: 70
End: 2020-04-16

## 2020-04-16 NOTE — TELEPHONE ENCOUNTER
Spoke with pt and sent her to the ER per Teresa. Pt was having a lot of trouble breathing, sounded very SOB over the phone. Does not know if she is running fever. States she has someone to take her to the ER now and will call when she gets out.

## 2020-04-16 NOTE — TELEPHONE ENCOUNTER
----- Message from Ruby Ahn sent at 4/16/2020 10:21 AM CDT -----  Pt having a lot of trouble breathing even using nebulizer, dry cough, no fever, body aches, chest pain times two days  # [phone]

## 2020-04-23 ENCOUNTER — TELEPHONE (OUTPATIENT)
Dept: FAMILY MEDICINE | Facility: CLINIC | Age: 70
End: 2020-04-23

## 2020-04-23 NOTE — TELEPHONE ENCOUNTER
Spoke to pt. She said her sugar 179 this morning. But that was the lowest. Last blood sugar checked today was 216 at lunch time. She checked it while on the phone at 5pm and it was 200. Still just doesn't feel good. She feels hot but her skin is cool. Tired. Advised Dr Melendez.

## 2020-04-23 NOTE — TELEPHONE ENCOUNTER
After hours call: Talked with pt via [employer] [facility] around 3:40am. Pt called complaining of concern of low blood sugar. She woke up feeling like she had chills and clammy, as she has when she has has low blood sugar in the past.  However when she checked her blood sugar was greater than 300.  Patient then called for consultation.  Patient denies any increased shortness of breath cough and does not have a thermometer to check temperature.  Patient also denies sore throat.  Patient did say that she took 5 units of insulin about 30 min prior to calling.  Patient also mentioned that she was concerned because she was wanting to have a visit with Dr. Melendez for fluctuating blood sugars but it was canceled due to the COVID 19 pandemic.  Advised patient to monitor her symptoms, especially her shortness of breath and any other upper respiratory symptoms.  Also let advised patient to try to procure a thermometer in order to monitor temperature as she has an outside worker that comes to her house daily.  Also advised patient to eat a small snack if she will be going back to sleep this morning.  Will give message to nurse to do a well check on her today.

## 2020-05-06 DIAGNOSIS — R41.3 MEMORY LOSS: Primary | ICD-10-CM

## 2020-05-06 DIAGNOSIS — F41.9 ANXIETY: ICD-10-CM

## 2020-05-06 RX ORDER — CLONAZEPAM 0.5 MG/1
0.5 TABLET ORAL DAILY
Qty: 90 TABLET | Refills: 1 | Status: CANCELLED | OUTPATIENT
Start: 2020-05-06

## 2020-05-06 NOTE — TELEPHONE ENCOUNTER
----- Message from Yee Chatterjee sent at 5/6/2020 11:49 AM CDT -----  Contact: Alanis Mendieta  Needs a refill on her Clonazepam 1mg   Send to [facility] on [address]  Pt# [phone]  Pt also needs the nurse to give her a call back, she has a few questions in regards to the Clonazepam .

## 2020-05-06 NOTE — TELEPHONE ENCOUNTER
Dr. Melendez just refilled this med for 90 day with 1 refill 3 weeks ago. Please call pt because message says she had question about clonazepam

## 2020-05-07 RX ORDER — TEMAZEPAM 15 MG/1
15 CAPSULE ORAL NIGHTLY PRN
Qty: 90 CAPSULE | Refills: 1 | Status: SHIPPED | OUTPATIENT
Start: 2020-05-07 | End: 2020-06-16 | Stop reason: SDUPTHER

## 2020-05-07 NOTE — TELEPHONE ENCOUNTER
Spoke to patient, advised that Dr jara wants her to STOP zanaflex, Decrease Restoril to 15 mg  QHS and see Dr Irby neurology for memory issues.       Please sign referral and new rx for 1 mg due to current dose being capsule. Thanks

## 2020-05-11 RX ORDER — IPRATROPIUM BROMIDE AND ALBUTEROL SULFATE 2.5; .5 MG/3ML; MG/3ML
3 SOLUTION RESPIRATORY (INHALATION) EVERY 6 HOURS PRN
Qty: 360 ML | Refills: 5 | Status: SHIPPED | OUTPATIENT
Start: 2020-05-11 | End: 2020-06-16 | Stop reason: SDUPTHER

## 2020-05-11 NOTE — TELEPHONE ENCOUNTER
----- Message from Rosa Linder sent at 5/11/2020 12:46 PM CDT -----  Contact: Alanis  Refill the medication that goes in her nebulizer. She could not remember the name of it. She has been SOB.  [facility] [place]  Pharmacy # [id number]  pt's # [phone] GH

## 2020-05-12 ENCOUNTER — PATIENT MESSAGE (OUTPATIENT)
Dept: FAMILY MEDICINE | Facility: CLINIC | Age: 70
End: 2020-05-12

## 2020-05-13 ENCOUNTER — OFFICE VISIT (OUTPATIENT)
Dept: FAMILY MEDICINE | Facility: CLINIC | Age: 70
End: 2020-05-13
Payer: MEDICARE

## 2020-05-13 ENCOUNTER — TELEPHONE (OUTPATIENT)
Dept: FAMILY MEDICINE | Facility: CLINIC | Age: 70
End: 2020-05-13

## 2020-05-13 DIAGNOSIS — J96.20 ACUTE ON CHRONIC RESPIRATORY FAILURE, UNSPECIFIED WHETHER WITH HYPOXIA OR HYPERCAPNIA: Primary | ICD-10-CM

## 2020-05-13 DIAGNOSIS — J44.9 CHRONIC OBSTRUCTIVE PULMONARY DISEASE, UNSPECIFIED COPD TYPE: ICD-10-CM

## 2020-05-13 PROCEDURE — 99213 OFFICE O/P EST LOW 20 MIN: CPT | Mod: 95,,, | Performed by: NURSE PRACTITIONER

## 2020-05-13 PROCEDURE — 1101F PT FALLS ASSESS-DOCD LE1/YR: CPT | Mod: 95,,, | Performed by: NURSE PRACTITIONER

## 2020-05-13 PROCEDURE — 1159F PR MEDICATION LIST DOCUMENTED IN MEDICAL RECORD: ICD-10-PCS | Mod: 95,,, | Performed by: NURSE PRACTITIONER

## 2020-05-13 PROCEDURE — 1101F PR PT FALLS ASSESS DOC 0-1 FALLS W/OUT INJ PAST YR: ICD-10-PCS | Mod: 95,,, | Performed by: NURSE PRACTITIONER

## 2020-05-13 PROCEDURE — 99213 PR OFFICE/OUTPT VISIT, EST, LEVL III, 20-29 MIN: ICD-10-PCS | Mod: 95,,, | Performed by: NURSE PRACTITIONER

## 2020-05-13 PROCEDURE — 1159F MED LIST DOCD IN RCRD: CPT | Mod: 95,,, | Performed by: NURSE PRACTITIONER

## 2020-05-13 NOTE — TELEPHONE ENCOUNTER
----- Message from Teresa De La Cruz sent at 5/13/2020  9:12 AM CDT -----  Contact: [employer]  Lucretia is calling concerning SOB for the pt. Pt's vital signs are stable but pt states that she has SOB and can not get enough air. Lucretia @[phone]. Pt #[phone]

## 2020-05-13 NOTE — PROGRESS NOTES
Subjective:        The chief complaint leading to consultation is: SOB  The patient location is:  Home  Visit type: Virtual visit with synchronous audio/video or audio only  This was a video visit in lieu of in-person visit due to the coronavirus emergency. Patient acknowledged and consented to the video visit encounter.     Patient complaining of progressive shortness of breath the last 2 days.  Patient describes SOB as severe even at rest with no relief with nebulizer treatment.  She has a dry cough.  She states she feels warm though when her caregivers have taken her temperature she does not have a fever.  Has a mild sore throat.  Denies nausea, vomiting, loss of smell or taste.  Tells me this shortness of breath is more severe than she has ever had with her previous COPD exacerbations.  Wearing oxygen 3 L nasal cannula continuously.  Home health nurse was out this morning and told her her breath sounds were diminished in the right base, O2 sats were 97% on oxygen      Past Surgical History:   Procedure Laterality Date    APPENDECTOMY      BACK SURGERY      x 3    broken foot and ankle      CHOLECYSTECTOMY      EYE SURGERY Bilateral     cataract    HYSTERECTOMY      JOINT REPLACEMENT Left 09/17/2018    knee    KNEE ARTHROPLASTY Left 9/17/2018    Procedure: ARTHROPLASTY, KNEE;  Surgeon: Yariel Marin MD;  Location: [facility] OR;  Service: Orthopedics;  Laterality: Left;    MYELOGRAPHY N/A 8/7/2019    Procedure: MYELOGRAM;  Surgeon: Sun Diagnostic Provider;  Location: [facility] OR;  Service: General;  Laterality: N/A;    POSTERIOR REPAIR      SPINAL FUSION      x3 BACK, NECK X 4    TOTAL THYROIDECTOMY  2014     Past Medical History:   Diagnosis Date    Allergy     Anxiety     Arthritis     Asthma     Bipolar disorder     Cancer     thyroid    Chronic back pain     COPD (chronic obstructive pulmonary disease)     Diabetes mellitus     Diabetes mellitus, type 2     Fibromyalgia     GERD  (gastroesophageal reflux disease)     History of fall 4/26/2018    [place] (hard of hearing)     Hx of thyroid cancer     Hyperlipidemia     Hypertension     Hypothyroidism     Neuropathy     On home oxygen therapy     3 l/m 24/7    Restless leg syndrome     Sleep apnea     Urinary tract infection      Family History   Problem Relation Age of Onset    Diabetes Mother     Heart disease Mother     Hypertension Mother     Diabetes Father     Heart disease Father     Hypertension Father         Social History:   Marital Status:   Alcohol History:  reports that she does not drink alcohol.  Tobacco History:  reports that she quit smoking about 20 years ago. Her smoking use included cigarettes. She has a 30.00 pack-year smoking history. She has never used smokeless tobacco.  Drug History:  reports that she does not use drugs.    Review of patient's allergies indicates:   Allergen Reactions    Morphine Itching    Tubersol [tuberculin ppd] Other (See Comments)     Site swells bad. Has to have chest xray       Current Outpatient Medications   Medication Sig Dispense Refill    albuterol (VENTOLIN HFA) 90 mcg/actuation inhaler Inhale 2 puffs into the lungs every 6 (six) hours as needed for Wheezing. Rescue 1 Inhaler 3    albuterol-ipratropium (DUO-NEB) 2.5 mg-0.5 mg/3 mL nebulizer solution Take 3 mLs by nebulization every 6 (six) hours as needed for Wheezing or Shortness of Breath. Rescue 360 mL 5    ANORO ELLIPTA 62.5-25 mcg/actuation DsDv Inhale 1 puff into the lungs once daily. 3 each 1    ARTIFICIAL TEARS,HYPROMELLOSE, OPHT Place 1 drop into both eyes 3 (three) times daily.      calcitRIOL (ROCALTROL) 0.5 MCG Cap Take 0.5 mcg by mouth every evening.      clonazePAM (KLONOPIN) 0.5 MG tablet Take 1 tablet (0.5 mg total) by mouth once daily. 90 tablet 1    DULoxetine (CYMBALTA) 60 MG capsule Take 1 capsule (60 mg total) by mouth every evening. 90 capsule 1    FLUoxetine 20 MG capsule Take 1  capsule (20 mg total) by mouth once daily. 90 capsule 1    gabapentin (NEURONTIN) 400 MG capsule Take 1 capsule (400 mg total) by mouth once daily. 90 capsule 1    gabapentin (NEURONTIN) 800 MG tablet Take 1 tablet (800 mg total) by mouth every evening. In evening 90 tablet 1    ibuprofen (ADVIL,MOTRIN) 400 MG tablet Take 400 mg by mouth every 8 (eight) hours as needed for Other.      insulin aspart U-100 (NOVOLOG) 100 unit/mL injection Inject 2-8 Units into the skin 4 (four) times daily as needed for High Blood Sugar.      lactulose (CHRONULAC) 10 gram/15 mL solution Take 20 g by mouth daily as needed.       lamoTRIgine (LAMICTAL) 150 MG Tab Take 1 tablet (150 mg total) by mouth 2 (two) times daily. 180 tablet 1    latanoprost 0.005 % ophthalmic solution Place 1 drop into both eyes every evening.       LEVEMIR FLEXTOUCH U-100 INSULN 100 unit/mL (3 mL) InPn pen Inject 60 Units into the skin 2 (two) times daily. 50units       levothyroxine (SYNTHROID) 150 MCG tablet Take 1 tablet (150 mcg total) by mouth before breakfast. 90 tablet 1    LINZESS 290 mcg Cap capsule Take 1 capsule (290 mcg total) by mouth once daily. 90 capsule 1    losartan-hydrochlorothiazide 50-12.5 mg (HYZAAR) 50-12.5 mg per tablet Take 0.5 tablets by mouth once daily. 90 tablet 1    meclizine (ANTIVERT) 25 mg tablet Take 25 mg by mouth 2 (two) times daily as needed for Dizziness.       ondansetron (ZOFRAN-ODT) 4 MG TbDL Take 4 mg by mouth every 6 (six) hours as needed (nausea).      oxyCODONE (ROXICODONE) 10 mg Tab immediate release tablet Take 10 mg by mouth 3 (three) times daily.       pantoprazole (PROTONIX) 40 MG tablet Take 1 tablet (40 mg total) by mouth once daily. 90 tablet 1    potassium chloride SA (K-DUR,KLOR-CON) 10 MEQ tablet Take 1 tablet (10 mEq total) by mouth 3 (three) times a week. Mon , Wed, and Fri. 36 tablet 1    rOPINIRole (REQUIP) 4 MG tablet Take 1 tablet (4 mg total) by mouth 2 (two) times daily. 180  tablet 1    temazepam (RESTORIL) 15 mg Cap Take 1 capsule (15 mg total) by mouth nightly as needed. 90 capsule 1    tiZANidine (ZANAFLEX) 4 MG tablet Take 1 tablet (4 mg total) by mouth 3 (three) times daily. 270 tablet 1     No current facility-administered medications for this visit.        Review of Systems   Constitutional: Positive for activity change. Negative for unexpected weight change.   HENT: Positive for hearing loss.    Eyes: Negative for discharge and visual disturbance.   Respiratory: Positive for cough, chest tightness, shortness of breath and wheezing.    Cardiovascular: Positive for chest pain and palpitations.   Gastrointestinal: Negative for blood in stool, constipation, diarrhea and vomiting.   Endocrine: Negative for polydipsia and polyuria.   Genitourinary: Negative for difficulty urinating, dysuria, hematuria and menstrual problem.   Musculoskeletal: Positive for arthralgias and joint swelling. Negative for neck pain.   Neurological: Positive for weakness and headaches.   Psychiatric/Behavioral: Negative for confusion and dysphoric mood.         Objective:        Physical Exam:   Physical Exam   Constitutional: She is oriented to person, place, and time. She appears well-developed and well-nourished.   Obese white female wearing oxygen nasal cannula   Pulmonary/Chest: She is in respiratory distress (pt dyspnic with conversation to the point where she has to stop talk, labored respirations).   Neurological: She is alert and oriented to person, place, and time.            Assessment:       1. Acute on chronic respiratory failure, unspecified whether with hypoxia or hypercapnia    2. Chronic obstructive pulmonary disease, unspecified COPD type      Plan:   Acute on chronic respiratory failure, unspecified whether with hypoxia or hypercapnia  - pt with progressive worsening of SOB with obvious respiratory distress during conversation, pt advised she needs to call 911 immediately for further  trt/evaluation    Chronic obstructive pulmonary disease, unspecified COPD type      Follow up for pt to call 911 now.    Total time spent with patient: 14    Each patient to whom he or she provides medical services by telemedicine is:  (1) informed of the relationship between the physician and patient and the respective role of any other health care provider with respect to management of the patient; and (2) notified that he or she may decline to receive medical services by telemedicine and may withdraw from such care at any time.    This note was created using [email or web address] voice recognition software that occasionally misinterprets phrases or words.

## 2020-05-13 NOTE — TELEPHONE ENCOUNTER
Please call and see if she's having fever, green/bloody sputum, wheezing, diarrhea?- if her SOB is severe I recommend ER evaluation. Can she do a virtual visit?

## 2020-05-14 DIAGNOSIS — J44.1 COPD EXACERBATION: Primary | ICD-10-CM

## 2020-05-14 DIAGNOSIS — F51.01 PRIMARY INSOMNIA: ICD-10-CM

## 2020-05-14 RX ORDER — PREDNISONE 20 MG/1
TABLET ORAL
Qty: 16 TABLET | Refills: 0 | Status: SHIPPED | OUTPATIENT
Start: 2020-05-14 | End: 2020-05-26

## 2020-05-14 RX ORDER — ROPINIROLE 4 MG/1
4 TABLET, FILM COATED ORAL 2 TIMES DAILY
Qty: 180 TABLET | Refills: 1 | Status: SHIPPED | OUTPATIENT
Start: 2020-05-14 | End: 2020-06-16 | Stop reason: SDUPTHER

## 2020-05-14 RX ORDER — DOXYCYCLINE 100 MG/1
100 CAPSULE ORAL EVERY 12 HOURS
Qty: 14 CAPSULE | Refills: 0 | Status: SHIPPED | OUTPATIENT
Start: 2020-05-14 | End: 2020-05-26

## 2020-05-14 NOTE — TELEPHONE ENCOUNTER
Please call her and tell her I'll send in prednisone taper and doxycycline abx. Also recommend switching her from anoro to trelegy one puff a day

## 2020-05-14 NOTE — TELEPHONE ENCOUNTER
----- Message from Faith Pace sent at 5/14/2020  9:04 AM CDT -----  vm- pt did not go to er last night. She was able to get her breathing under control. She is able to take deep breaths   [phone]

## 2020-05-19 RX ORDER — BLOOD SUGAR DIAGNOSTIC
STRIP MISCELLANEOUS
COMMUNITY
Start: 2020-04-14

## 2020-05-26 ENCOUNTER — OFFICE VISIT (OUTPATIENT)
Dept: FAMILY MEDICINE | Facility: CLINIC | Age: 70
End: 2020-05-26
Payer: MEDICARE

## 2020-05-26 DIAGNOSIS — Z79.4 TYPE 2 DIABETES MELLITUS WITH HYPERGLYCEMIA, WITH LONG-TERM CURRENT USE OF INSULIN: ICD-10-CM

## 2020-05-26 DIAGNOSIS — M15.9 PRIMARY OSTEOARTHRITIS INVOLVING MULTIPLE JOINTS: ICD-10-CM

## 2020-05-26 DIAGNOSIS — F41.9 ANXIETY: ICD-10-CM

## 2020-05-26 DIAGNOSIS — E11.65 TYPE 2 DIABETES MELLITUS WITH HYPERGLYCEMIA, WITH LONG-TERM CURRENT USE OF INSULIN: ICD-10-CM

## 2020-05-26 DIAGNOSIS — J44.1 COPD EXACERBATION: Primary | ICD-10-CM

## 2020-05-26 PROCEDURE — 99213 PR OFFICE/OUTPT VISIT, EST, LEVL III, 20-29 MIN: ICD-10-PCS | Mod: 95,,, | Performed by: NURSE PRACTITIONER

## 2020-05-26 PROCEDURE — 3044F PR MOST RECENT HEMOGLOBIN A1C LEVEL <7.0%: ICD-10-PCS | Mod: 95,,, | Performed by: NURSE PRACTITIONER

## 2020-05-26 PROCEDURE — 1159F PR MEDICATION LIST DOCUMENTED IN MEDICAL RECORD: ICD-10-PCS | Mod: 95,,, | Performed by: NURSE PRACTITIONER

## 2020-05-26 PROCEDURE — 3044F HG A1C LEVEL LT 7.0%: CPT | Mod: 95,,, | Performed by: NURSE PRACTITIONER

## 2020-05-26 PROCEDURE — 99213 OFFICE O/P EST LOW 20 MIN: CPT | Mod: 95,,, | Performed by: NURSE PRACTITIONER

## 2020-05-26 PROCEDURE — 1159F MED LIST DOCD IN RCRD: CPT | Mod: 95,,, | Performed by: NURSE PRACTITIONER

## 2020-05-26 PROCEDURE — 1101F PT FALLS ASSESS-DOCD LE1/YR: CPT | Mod: 95,,, | Performed by: NURSE PRACTITIONER

## 2020-05-26 PROCEDURE — 1101F PR PT FALLS ASSESS DOC 0-1 FALLS W/OUT INJ PAST YR: ICD-10-PCS | Mod: 95,,, | Performed by: NURSE PRACTITIONER

## 2020-05-26 RX ORDER — METHOCARBAMOL 750 MG/1
750 TABLET, FILM COATED ORAL 2 TIMES DAILY PRN
Qty: 60 TABLET | Refills: 2 | Status: SHIPPED | OUTPATIENT
Start: 2020-05-26 | End: 2020-08-31 | Stop reason: SDUPTHER

## 2020-05-26 RX ORDER — FLUOXETINE HYDROCHLORIDE 40 MG/1
40 CAPSULE ORAL DAILY
Qty: 90 CAPSULE | Refills: 1 | Status: SHIPPED | OUTPATIENT
Start: 2020-05-26 | End: 2020-06-16 | Stop reason: SDUPTHER

## 2020-05-26 NOTE — PROGRESS NOTES
Subjective:        The chief complaint leading to consultation is: COPD f/u  The patient location is:  Home  Visit type: Virtual visit with synchronous audio/video or audio only  This was a video visit in lieu of in-person visit due to the coronavirus emergency. Patient acknowledged and consented to the video visit encounter.     Pt seen for f/u COPD exacerbation- at last visit was advised to go to ER d/t severe SOB/tachypnea however she reports she doubled up on nebulizer trt and started feeling better so never went- called here the next day and we send in steroids/abx. Reports overall breathing much better since abx/steroids. Had to use nebs every 4 hours for several days before she started feeling better but now only using once or twice a day.  Reports blood sugars have been high- 's and sugar up to 400 one night. Taking novolog with meals 4-8 units. Denies any hypoglycemic episodes.  Admits to being under a lot of stress right now= reports she's had a couple panic attacks where she feels her heart racing and feels very anxious. Asking if clonazepam can be increased.  Also c/oing of chronic back pain and right hip pain recently. Sees Dr. Almazan, pain mgmt- was previously prescribed tizanidine however not taking as it caused hallucinations      Past Surgical History:   Procedure Laterality Date    APPENDECTOMY      BACK SURGERY      x 3    broken foot and ankle      CHOLECYSTECTOMY      EYE SURGERY Bilateral     cataract    HYSTERECTOMY      JOINT REPLACEMENT Left 09/17/2018    knee    KNEE ARTHROPLASTY Left 9/17/2018    Procedure: ARTHROPLASTY, KNEE;  Surgeon: Yariel Marin MD;  Location: [facility] OR;  Service: Orthopedics;  Laterality: Left;    MYELOGRAPHY N/A 8/7/2019    Procedure: MYELOGRAM;  Surgeon: [facility] Provider;  Location: [facility] OR;  Service: General;  Laterality: N/A;    POSTERIOR REPAIR      SPINAL FUSION      x3 BACK, NECK X 4    TOTAL THYROIDECTOMY  2014     Past Medical  History:   Diagnosis Date    Allergy     Anxiety     Arthritis     Asthma     Bipolar disorder     Cancer     thyroid    Chronic back pain     COPD (chronic obstructive pulmonary disease)     Diabetes mellitus     Diabetes mellitus, type 2     Fibromyalgia     GERD (gastroesophageal reflux disease)     History of fall 4/26/2018    [place] (hard of hearing)     Hx of thyroid cancer     Hyperlipidemia     Hypertension     Hypothyroidism     Neuropathy     On home oxygen therapy     3 l/m 24/7    Restless leg syndrome     Sleep apnea     Urinary tract infection      Family History   Problem Relation Age of Onset    Diabetes Mother     Heart disease Mother     Hypertension Mother     Diabetes Father     Heart disease Father     Hypertension Father         Social History:   Marital Status:   Alcohol History:  reports that she does not drink alcohol.  Tobacco History:  reports that she quit smoking about 20 years ago. Her smoking use included cigarettes. She has a 30.00 pack-year smoking history. She has never used smokeless tobacco.  Drug History:  reports that she does not use drugs.    Review of patient's allergies indicates:   Allergen Reactions    Morphine Itching    Tubersol [tuberculin ppd] Other (See Comments)     Site swells bad. Has to have chest xray       Current Outpatient Medications   Medication Sig Dispense Refill    ACCU-CHEK LIBERTY PLUS TEST STRP Strp       albuterol (VENTOLIN HFA) 90 mcg/actuation inhaler Inhale 2 puffs into the lungs every 6 (six) hours as needed for Wheezing. Rescue 1 Inhaler 3    albuterol-ipratropium (DUO-NEB) 2.5 mg-0.5 mg/3 mL nebulizer solution Take 3 mLs by nebulization every 6 (six) hours as needed for Wheezing or Shortness of Breath. Rescue 360 mL 5    ARTIFICIAL TEARS,HYPROMELLOSE, OPHT Place 1 drop into both eyes 3 (three) times daily.      calcitRIOL (ROCALTROL) 0.5 MCG Cap Take 0.5 mcg by mouth every evening.      clonazePAM  (KLONOPIN) 0.5 MG tablet Take 1 tablet (0.5 mg total) by mouth once daily. 90 tablet 1    DULoxetine (CYMBALTA) 60 MG capsule Take 1 capsule (60 mg total) by mouth every evening. 90 capsule 1    FLUoxetine 40 MG capsule Take 1 capsule (40 mg total) by mouth once daily. 90 capsule 1    fluticasone-umeclidin-vilanter (TRELEGY ELLIPTA) 100-62.5-25 mcg DsDv Inhale 1 puff into the lungs once daily. 3 each 1    gabapentin (NEURONTIN) 400 MG capsule Take 1 capsule (400 mg total) by mouth once daily. 90 capsule 1    gabapentin (NEURONTIN) 800 MG tablet Take 1 tablet (800 mg total) by mouth every evening. In evening 90 tablet 1    ibuprofen (ADVIL,MOTRIN) 400 MG tablet Take 400 mg by mouth every 8 (eight) hours as needed for Other.      insulin aspart U-100 (NOVOLOG) 100 unit/mL injection Inject 2-8 Units into the skin 4 (four) times daily as needed for High Blood Sugar.      lactulose (CHRONULAC) 10 gram/15 mL solution Take 20 g by mouth daily as needed.       lamoTRIgine (LAMICTAL) 150 MG Tab Take 1 tablet (150 mg total) by mouth 2 (two) times daily. 180 tablet 1    latanoprost 0.005 % ophthalmic solution Place 1 drop into both eyes every evening.       LEVEMIR FLEXTOUCH U-100 INSULN 100 unit/mL (3 mL) InPn pen Inject 60 Units into the skin 2 (two) times daily. 50units       levothyroxine (SYNTHROID) 150 MCG tablet Take 1 tablet (150 mcg total) by mouth before breakfast. 90 tablet 1    LINZESS 290 mcg Cap capsule Take 1 capsule (290 mcg total) by mouth once daily. 90 capsule 1    losartan-hydrochlorothiazide 50-12.5 mg (HYZAAR) 50-12.5 mg per tablet Take 0.5 tablets by mouth once daily. 90 tablet 1    meclizine (ANTIVERT) 25 mg tablet Take 25 mg by mouth 2 (two) times daily as needed for Dizziness.       ondansetron (ZOFRAN-ODT) 4 MG TbDL Take 4 mg by mouth every 6 (six) hours as needed (nausea).      oxyCODONE (ROXICODONE) 10 mg Tab immediate release tablet Take 10 mg by mouth 3 (three) times daily.        pantoprazole (PROTONIX) 40 MG tablet Take 1 tablet (40 mg total) by mouth once daily. 90 tablet 1    potassium chloride SA (K-DUR,KLOR-CON) 10 MEQ tablet Take 1 tablet (10 mEq total) by mouth 3 (three) times a week. Mon , Wed, and Fri. 36 tablet 1    rOPINIRole (REQUIP) 4 MG tablet Take 1 tablet (4 mg total) by mouth 2 (two) times daily. 180 tablet 1    temazepam (RESTORIL) 15 mg Cap Take 1 capsule (15 mg total) by mouth nightly as needed. 90 capsule 1    methocarbamoL (ROBAXIN) 750 MG Tab Take 1 tablet (750 mg total) by mouth 2 (two) times daily as needed. 60 tablet 2     No current facility-administered medications for this visit.        Review of Systems   Constitutional: Negative for chills and fever.   HENT: Negative for sore throat.    Respiratory: Positive for cough (mild dry cough), shortness of breath (much improved) and wheezing (much improved).    Cardiovascular: Negative for chest pain and palpitations.   Gastrointestinal: Negative for blood in stool, constipation, diarrhea and vomiting.   Genitourinary: Negative for dysuria and hematuria.   Musculoskeletal: Positive for arthralgias and neck pain. Negative for joint swelling.   Neurological: Positive for weakness. Negative for headaches.   Psychiatric/Behavioral: Positive for dysphoric mood. Negative for confusion. The patient is nervous/anxious.          Objective:        Physical Exam:   Physical Exam   Constitutional: She is oriented to person, place, and time. She appears well-developed and well-nourished. No distress.   Pulmonary/Chest: Effort normal.   Neurological: She is alert and oriented to person, place, and time.   Psychiatric: She has a normal mood and affect.            Assessment:       1. COPD exacerbation    2. Type 2 diabetes mellitus with hyperglycemia, with long-term current use of insulin    3. Anxiety    4. Primary osteoarthritis involving multiple joints      Plan:   COPD exacerbation  -much improved    Type 2 diabetes  mellitus with hyperglycemia, with long-term current use of insulin  -pt advised to increase levemir to 65units BID and continue with humalog coverage with meals. Pt requesting to come in for appt soon to have A1C checked and wants a couple things checked out in person- will schedule visit for next week    Anxiety  -     FLUoxetine 40 MG capsule; Take 1 capsule (40 mg total) by mouth once daily.  Dispense: 90 capsule; Refill: 1  -pt advised given opioid and temazepam I don't recommend increasing clonazepam- will increase fluoxetine    Primary osteoarthritis involving multiple joints  -     methocarbamoL (ROBAXIN) 750 MG Tab; Take 1 tablet (750 mg total) by mouth 2 (two) times daily as needed.  Dispense: 60 tablet; Refill: 2  -will add robaxin for muscle relaxer- f/u with Dr. Almazan as scheduled    Follow up in about 1 week (around 6/2/2020) for Diabetes.    Total time spent with patient: 22    Each patient to whom he or she provides medical services by telemedicine is:  (1) informed of the relationship between the physician and patient and the respective role of any other health care provider with respect to management of the patient; and (2) notified that he or she may decline to receive medical services by telemedicine and may withdraw from such care at any time.    This note was created using [employer] voice recognition software that occasionally misinterprets phrases or words.

## 2020-05-28 ENCOUNTER — TELEPHONE (OUTPATIENT)
Dept: FAMILY MEDICINE | Facility: CLINIC | Age: 70
End: 2020-05-28

## 2020-05-28 NOTE — TELEPHONE ENCOUNTER
Patient says she had burning in her hip, and thinks its fibromyalgia, muscle spasms in back, and has started in her thighs. She is unable to sleep.       Genna 06/01 at 2:20     40 minute slot

## 2020-05-28 NOTE — TELEPHONE ENCOUNTER
----- Message from Teresa Bryan NP sent at 5/26/2020 12:29 PM CDT -----  Needs f/u in office visit in 1 week (40 minute appt)

## 2020-06-11 ENCOUNTER — EXTERNAL HOME HEALTH (OUTPATIENT)
Dept: HOME HEALTH SERVICES | Facility: HOSPITAL | Age: 70
End: 2020-06-11

## 2020-06-15 ENCOUNTER — TELEPHONE (OUTPATIENT)
Dept: FAMILY MEDICINE | Facility: CLINIC | Age: 70
End: 2020-06-15

## 2020-06-15 NOTE — TELEPHONE ENCOUNTER
----- Message from Yee Chatterjee sent at 6/15/2020 12:12 PM CDT -----  Contact: Alanis Mendieta  Pt would like to schedule an afternoon appt with Teresa as soon as possible. She has been having trouble breathing with her COPD and she would like to get her A1c checked as well. Please give the patient a call # [phone]

## 2020-06-16 ENCOUNTER — OFFICE VISIT (OUTPATIENT)
Dept: FAMILY MEDICINE | Facility: CLINIC | Age: 70
End: 2020-06-16
Payer: MEDICARE

## 2020-06-16 VITALS
OXYGEN SATURATION: 97 % | SYSTOLIC BLOOD PRESSURE: 120 MMHG | DIASTOLIC BLOOD PRESSURE: 78 MMHG | WEIGHT: 213 LBS | HEART RATE: 74 BPM | BODY MASS INDEX: 37.73 KG/M2

## 2020-06-16 DIAGNOSIS — E11.42 TYPE 2 DIABETES MELLITUS WITH DIABETIC POLYNEUROPATHY, WITH LONG-TERM CURRENT USE OF INSULIN: ICD-10-CM

## 2020-06-16 DIAGNOSIS — K21.9 GASTROESOPHAGEAL REFLUX DISEASE WITHOUT ESOPHAGITIS: ICD-10-CM

## 2020-06-16 DIAGNOSIS — I10 ESSENTIAL HYPERTENSION: ICD-10-CM

## 2020-06-16 DIAGNOSIS — Z79.4 TYPE 2 DIABETES MELLITUS WITH DIABETIC POLYNEUROPATHY, WITH LONG-TERM CURRENT USE OF INSULIN: ICD-10-CM

## 2020-06-16 DIAGNOSIS — E89.0 POSTOPERATIVE HYPOTHYROIDISM: ICD-10-CM

## 2020-06-16 DIAGNOSIS — F32.A DEPRESSION, UNSPECIFIED DEPRESSION TYPE: ICD-10-CM

## 2020-06-16 DIAGNOSIS — E44.1 MILD PROTEIN-CALORIE MALNUTRITION: ICD-10-CM

## 2020-06-16 DIAGNOSIS — J44.1 CHRONIC OBSTRUCTIVE PULMONARY DISEASE WITH ACUTE EXACERBATION: ICD-10-CM

## 2020-06-16 DIAGNOSIS — F41.9 ANXIETY: Primary | ICD-10-CM

## 2020-06-16 DIAGNOSIS — F51.01 PRIMARY INSOMNIA: ICD-10-CM

## 2020-06-16 DIAGNOSIS — K59.01 SLOW TRANSIT CONSTIPATION: ICD-10-CM

## 2020-06-16 LAB — HBA1C MFR BLD: 8.4 %

## 2020-06-16 PROCEDURE — 83036 POCT HEMOGLOBIN A1C: ICD-10-PCS | Mod: QW,,, | Performed by: NURSE PRACTITIONER

## 2020-06-16 PROCEDURE — 3074F PR MOST RECENT SYSTOLIC BLOOD PRESSURE < 130 MM HG: ICD-10-PCS | Mod: S$GLB,,, | Performed by: NURSE PRACTITIONER

## 2020-06-16 PROCEDURE — 3074F SYST BP LT 130 MM HG: CPT | Mod: S$GLB,,, | Performed by: NURSE PRACTITIONER

## 2020-06-16 PROCEDURE — 99214 PR OFFICE/OUTPT VISIT, EST, LEVL IV, 30-39 MIN: ICD-10-PCS | Mod: S$GLB,,, | Performed by: NURSE PRACTITIONER

## 2020-06-16 PROCEDURE — 83036 HEMOGLOBIN GLYCOSYLATED A1C: CPT | Mod: QW,,, | Performed by: NURSE PRACTITIONER

## 2020-06-16 PROCEDURE — 1159F MED LIST DOCD IN RCRD: CPT | Mod: S$GLB,,, | Performed by: NURSE PRACTITIONER

## 2020-06-16 PROCEDURE — 3052F HG A1C>EQUAL 8.0%<EQUAL 9.0%: CPT | Mod: S$GLB,,, | Performed by: NURSE PRACTITIONER

## 2020-06-16 PROCEDURE — 1101F PT FALLS ASSESS-DOCD LE1/YR: CPT | Mod: S$GLB,,, | Performed by: NURSE PRACTITIONER

## 2020-06-16 PROCEDURE — 3052F PR MOST RECENT HEMOGLOBIN A1C LEVEL 8.0 - < 9.0%: ICD-10-PCS | Mod: S$GLB,,, | Performed by: NURSE PRACTITIONER

## 2020-06-16 PROCEDURE — 1101F PR PT FALLS ASSESS DOC 0-1 FALLS W/OUT INJ PAST YR: ICD-10-PCS | Mod: S$GLB,,, | Performed by: NURSE PRACTITIONER

## 2020-06-16 PROCEDURE — 3078F PR MOST RECENT DIASTOLIC BLOOD PRESSURE < 80 MM HG: ICD-10-PCS | Mod: S$GLB,,, | Performed by: NURSE PRACTITIONER

## 2020-06-16 PROCEDURE — 99214 OFFICE O/P EST MOD 30 MIN: CPT | Mod: S$GLB,,, | Performed by: NURSE PRACTITIONER

## 2020-06-16 PROCEDURE — 3078F DIAST BP <80 MM HG: CPT | Mod: S$GLB,,, | Performed by: NURSE PRACTITIONER

## 2020-06-16 PROCEDURE — 1159F PR MEDICATION LIST DOCUMENTED IN MEDICAL RECORD: ICD-10-PCS | Mod: S$GLB,,, | Performed by: NURSE PRACTITIONER

## 2020-06-16 RX ORDER — DULOXETIN HYDROCHLORIDE 60 MG/1
60 CAPSULE, DELAYED RELEASE ORAL NIGHTLY
Qty: 90 CAPSULE | Refills: 1 | Status: SHIPPED | OUTPATIENT
Start: 2020-06-16 | End: 2020-09-11

## 2020-06-16 RX ORDER — GABAPENTIN 800 MG/1
800 TABLET ORAL NIGHTLY
Qty: 90 TABLET | Refills: 1 | Status: SHIPPED | OUTPATIENT
Start: 2020-06-16 | End: 2020-08-17 | Stop reason: SDUPTHER

## 2020-06-16 RX ORDER — FLUOXETINE HYDROCHLORIDE 40 MG/1
40 CAPSULE ORAL DAILY
Qty: 90 CAPSULE | Refills: 1 | Status: SHIPPED | OUTPATIENT
Start: 2020-06-16 | End: 2020-12-11 | Stop reason: SDUPTHER

## 2020-06-16 RX ORDER — PANTOPRAZOLE SODIUM 40 MG/1
40 TABLET, DELAYED RELEASE ORAL DAILY
Qty: 90 TABLET | Refills: 1 | Status: SHIPPED | OUTPATIENT
Start: 2020-06-16 | End: 2020-12-11 | Stop reason: SDUPTHER

## 2020-06-16 RX ORDER — GABAPENTIN 400 MG/1
400 CAPSULE ORAL DAILY
Qty: 90 CAPSULE | Refills: 1 | Status: SHIPPED | OUTPATIENT
Start: 2020-06-16 | End: 2020-10-15

## 2020-06-16 RX ORDER — IPRATROPIUM BROMIDE AND ALBUTEROL SULFATE 2.5; .5 MG/3ML; MG/3ML
3 SOLUTION RESPIRATORY (INHALATION) EVERY 6 HOURS PRN
Qty: 360 ML | Refills: 5 | Status: SHIPPED | OUTPATIENT
Start: 2020-06-16 | End: 2020-12-11 | Stop reason: SDUPTHER

## 2020-06-16 RX ORDER — ROPINIROLE 4 MG/1
4 TABLET, FILM COATED ORAL 2 TIMES DAILY
Qty: 180 TABLET | Refills: 1 | Status: SHIPPED | OUTPATIENT
Start: 2020-06-16 | End: 2020-11-20 | Stop reason: SDUPTHER

## 2020-06-16 RX ORDER — LAMOTRIGINE 150 MG/1
150 TABLET ORAL 2 TIMES DAILY
Qty: 180 TABLET | Refills: 1 | Status: SHIPPED | OUTPATIENT
Start: 2020-06-16 | End: 2021-06-22 | Stop reason: SDUPTHER

## 2020-06-16 RX ORDER — PREDNISONE 10 MG/1
10 TABLET ORAL DAILY
Qty: 90 TABLET | Refills: 0 | Status: SHIPPED | OUTPATIENT
Start: 2020-06-16 | End: 2020-10-13

## 2020-06-16 RX ORDER — CALCITRIOL 0.5 UG/1
0.5 CAPSULE ORAL NIGHTLY
Qty: 90 CAPSULE | Refills: 1 | Status: SHIPPED | OUTPATIENT
Start: 2020-06-16 | End: 2020-12-11 | Stop reason: SDUPTHER

## 2020-06-16 RX ORDER — LEVOTHYROXINE SODIUM 150 UG/1
150 TABLET ORAL
Qty: 90 TABLET | Refills: 1 | Status: SHIPPED | OUTPATIENT
Start: 2020-06-16 | End: 2020-12-11 | Stop reason: SDUPTHER

## 2020-06-16 RX ORDER — POTASSIUM CHLORIDE 750 MG/1
10 TABLET, EXTENDED RELEASE ORAL
Qty: 36 TABLET | Refills: 1 | Status: SHIPPED | OUTPATIENT
Start: 2020-06-17 | End: 2020-12-11 | Stop reason: SDUPTHER

## 2020-06-16 RX ORDER — TEMAZEPAM 15 MG/1
30 CAPSULE ORAL NIGHTLY PRN
Qty: 30 CAPSULE | Refills: 4 | Status: SHIPPED | OUTPATIENT
Start: 2020-06-16 | End: 2020-07-27 | Stop reason: SDUPTHER

## 2020-06-16 NOTE — PROGRESS NOTES
SUBJECTIVE:    Patient ID: Alanis Mendieta is a 69 y.o. female.    Chief Complaint: COPD (check up//brought bottles tb )    Pt presents with continued complaints of SOB. Significant history of COPD. Was seen by Teresa twice last month. Had some improvement but is now starting to have difficulty again. Feels extremely winded with anything she tries to do. Is using nebulizers and rescue inhaler. Also uses Trelegy daily. Currently wearing 4L O2 NC in office. SpO2 97%. Also concerned about her blood sugar. Reports her diet has not been the best. Has in-home care that frequently brings her fast food. Reports this morning she was brought donuts. Using levemir 60 units bid and novolog sliding scale. Reports CBG in am usually around 140. Needs many refills as well.      Office Visit on 06/16/2020   Component Date Value Ref Range Status    Hemoglobin A1C 06/16/2020 8.4  % Final   Admission on 03/18/2020, Discharged on 03/19/2020   Component Date Value Ref Range Status    WBC 03/18/2020 8.17  3.90 - 12.70 K/uL Final    RBC 03/18/2020 4.34  4.00 - 5.40 M/uL Final    Hemoglobin 03/18/2020 13.1  12.0 - 16.0 g/dL Final    Hematocrit 03/18/2020 39.9  37.0 - 48.5 % Final    Mean Corpuscular Volume 03/18/2020 92  82 - 98 fL Final    Mean Corpuscular Hemoglobin 03/18/2020 30.2  27.0 - 31.0 pg Final    Mean Corpuscular Hemoglobin Conc 03/18/2020 32.8  32.0 - 36.0 g/dL Final    RDW 03/18/2020 13.6  11.5 - 14.5 % Final    Platelets 03/18/2020 242  150 - 350 K/uL Final    MPV 03/18/2020 10.9  9.2 - 12.9 fL Final    Immature Granulocytes 03/18/2020 0.6* 0.0 - 0.5 % Final    Gran # (ANC) 03/18/2020 6.4  1.8 - 7.7 K/uL Final    Immature Grans (Abs) 03/18/2020 0.05* 0.00 - 0.04 K/uL Final    Lymph # 03/18/2020 1.7  1.0 - 4.8 K/uL Final    Mono # 03/18/2020 0.1* 0.3 - 1.0 K/uL Final    Eos # 03/18/2020 0.0  0.0 - 0.5 K/uL Final    Baso # 03/18/2020 0.02  0.00 - 0.20 K/uL Final    nRBC 03/18/2020 0  0 /100 WBC Final     Gran% 03/18/2020 77.8* 38.0 - 73.0 % Final    Lymph% 03/18/2020 20.3  18.0 - 48.0 % Final    Mono% 03/18/2020 1.0* 4.0 - 15.0 % Final    Eosinophil% 03/18/2020 0.1  0.0 - 8.0 % Final    Basophil% 03/18/2020 0.2  0.0 - 1.9 % Final    Differential Method 03/18/2020 Automated   Final    Sodium 03/18/2020 136  136 - 145 mmol/L Final    Potassium 03/18/2020 3.6  3.5 - 5.1 mmol/L Final    Chloride 03/18/2020 93* 95 - 110 mmol/L Final    CO2 03/18/2020 32* 23 - 29 mmol/L Final    Glucose 03/18/2020 227* 70 - 110 mg/dL Final    BUN, Bld 03/18/2020 21  8 - 23 mg/dL Final    Creatinine 03/18/2020 0.7  0.5 - 1.4 mg/dL Final    Calcium 03/18/2020 8.6* 8.7 - 10.5 mg/dL Final    Total Protein 03/18/2020 7.0  6.0 - 8.4 g/dL Final    Albumin 03/18/2020 3.6  3.5 - 5.2 g/dL Final    Total Bilirubin 03/18/2020 0.5  0.1 - 1.0 mg/dL Final    Alkaline Phosphatase 03/18/2020 61  55 - 135 U/L Final    AST 03/18/2020 21  10 - 40 U/L Final    ALT 03/18/2020 17  10 - 44 U/L Final    Anion Gap 03/18/2020 11  8 - 16 mmol/L Final    eGFR if African American 03/18/2020 >60.0  >60 mL/min/1.73 m^2 Final    eGFR if non African American 03/18/2020 >60.0  >60 mL/min/1.73 m^2 Final    Troponin I 03/18/2020 <0.030  <=0.040 ng/mL Final    BNP 03/18/2020 32  0 - 99 pg/mL Final    PT 03/18/2020 13.6  10.6 - 14.8 sec Final    INR 03/18/2020 1.1   Final    Magnesium 03/18/2020 1.7  1.6 - 2.6 mg/dL Final    POC Glucose 03/18/2020 210* 70 - 110 Final    Blood Culture, Routine 03/18/2020 No growth after 5 days.   Final    Blood Culture, Routine 03/18/2020 No growth after 5 days.   Final    Lactate (Lactic Acid) 03/18/2020 2.7* 0.5 - 1.9 mmol/L Final    Procalcitonin 03/18/2020 <0.05  0.00 - 0.50 ng/mL Final    TSH 03/18/2020 0.430  0.340 - 5.600 uIU/mL Final    Procalcitonin 03/18/2020 <0.05  0.00 - 0.50 ng/mL Final    Lactate (Lactic Acid) 03/18/2020 2.2* 0.5 - 1.9 mmol/L Final    Troponin I 03/18/2020 <0.030  <=0.040 ng/mL  Final    POC Glucose 03/18/2020 257* 70 - 110 Final    WBC 03/19/2020 9.08  3.90 - 12.70 K/uL Final    RBC 03/19/2020 4.31  4.00 - 5.40 M/uL Final    Hemoglobin 03/19/2020 13.1  12.0 - 16.0 g/dL Final    Hematocrit 03/19/2020 39.8  37.0 - 48.5 % Final    Mean Corpuscular Volume 03/19/2020 92  82 - 98 fL Final    Mean Corpuscular Hemoglobin 03/19/2020 30.4  27.0 - 31.0 pg Final    Mean Corpuscular Hemoglobin Conc 03/19/2020 32.9  32.0 - 36.0 g/dL Final    RDW 03/19/2020 13.7  11.5 - 14.5 % Final    Platelets 03/19/2020 272  150 - 350 K/uL Final    MPV 03/19/2020 10.7  9.2 - 12.9 fL Final    Immature Granulocytes 03/19/2020 0.6* 0.0 - 0.5 % Final    Gran # (ANC) 03/19/2020 6.9  1.8 - 7.7 K/uL Final    Immature Grans (Abs) 03/19/2020 0.05* 0.00 - 0.04 K/uL Final    Lymph # 03/19/2020 1.6  1.0 - 4.8 K/uL Final    Mono # 03/19/2020 0.5  0.3 - 1.0 K/uL Final    Eos # 03/19/2020 0.0  0.0 - 0.5 K/uL Final    Baso # 03/19/2020 0.01  0.00 - 0.20 K/uL Final    nRBC 03/19/2020 0  0 /100 WBC Final    Gran% 03/19/2020 76.2* 38.0 - 73.0 % Final    Lymph% 03/19/2020 18.1  18.0 - 48.0 % Final    Mono% 03/19/2020 5.0  4.0 - 15.0 % Final    Eosinophil% 03/19/2020 0.0  0.0 - 8.0 % Final    Basophil% 03/19/2020 0.1  0.0 - 1.9 % Final    Differential Method 03/19/2020 Automated   Final    Sodium 03/19/2020 141  136 - 145 mmol/L Final    Potassium 03/19/2020 3.4* 3.5 - 5.1 mmol/L Final    Chloride 03/19/2020 96  95 - 110 mmol/L Final    CO2 03/19/2020 34* 23 - 29 mmol/L Final    Glucose 03/19/2020 219* 70 - 110 mg/dL Final    BUN, Bld 03/19/2020 19  8 - 23 mg/dL Final    Creatinine 03/19/2020 0.7  0.5 - 1.4 mg/dL Final    Calcium 03/19/2020 8.9  8.7 - 10.5 mg/dL Final    Anion Gap 03/19/2020 11  8 - 16 mmol/L Final    eGFR if African American 03/19/2020 >60.0  >60 mL/min/1.73 m^2 Final    eGFR if non African American 03/19/2020 >60.0  >60 mL/min/1.73 m^2 Final    Troponin I 03/19/2020 <0.030  <=0.040  ng/mL Final    POC Glucose 03/19/2020 190* 70 - 110 Final   Admission on 03/13/2020, Discharged on 03/13/2020   Component Date Value Ref Range Status    WBC 03/13/2020 7.47  3.90 - 12.70 K/uL Final    RBC 03/13/2020 4.38  4.00 - 5.40 M/uL Final    Hemoglobin 03/13/2020 13.3  12.0 - 16.0 g/dL Final    Hematocrit 03/13/2020 40.3  37.0 - 48.5 % Final    Mean Corpuscular Volume 03/13/2020 92  82 - 98 fL Final    Mean Corpuscular Hemoglobin 03/13/2020 30.4  27.0 - 31.0 pg Final    Mean Corpuscular Hemoglobin Conc 03/13/2020 33.0  32.0 - 36.0 g/dL Final    RDW 03/13/2020 13.3  11.5 - 14.5 % Final    Platelets 03/13/2020 256  150 - 350 K/uL Final    MPV 03/13/2020 10.7  9.2 - 12.9 fL Final    Immature Granulocytes 03/13/2020 0.4  0.0 - 0.5 % Final    Gran # (ANC) 03/13/2020 3.8  1.8 - 7.7 K/uL Final    Immature Grans (Abs) 03/13/2020 0.03  0.00 - 0.04 K/uL Final    Lymph # 03/13/2020 3.1  1.0 - 4.8 K/uL Final    Mono # 03/13/2020 0.4  0.3 - 1.0 K/uL Final    Eos # 03/13/2020 0.1  0.0 - 0.5 K/uL Final    Baso # 03/13/2020 0.03  0.00 - 0.20 K/uL Final    nRBC 03/13/2020 0  0 /100 WBC Final    Gran% 03/13/2020 50.8  38.0 - 73.0 % Final    Lymph% 03/13/2020 41.1  18.0 - 48.0 % Final    Mono% 03/13/2020 5.8  4.0 - 15.0 % Final    Eosinophil% 03/13/2020 1.5  0.0 - 8.0 % Final    Basophil% 03/13/2020 0.4  0.0 - 1.9 % Final    Differential Method 03/13/2020 Automated   Final    Sodium 03/13/2020 139  136 - 145 mmol/L Final    Potassium 03/13/2020 3.9  3.5 - 5.1 mmol/L Final    Chloride 03/13/2020 97  95 - 110 mmol/L Final    CO2 03/13/2020 33* 23 - 29 mmol/L Final    Glucose 03/13/2020 215* 70 - 110 mg/dL Final    BUN, Bld 03/13/2020 17  8 - 23 mg/dL Final    Creatinine 03/13/2020 0.7  0.5 - 1.4 mg/dL Final    Calcium 03/13/2020 8.7  8.7 - 10.5 mg/dL Final    Total Protein 03/13/2020 6.9  6.0 - 8.4 g/dL Final    Albumin 03/13/2020 3.4* 3.5 - 5.2 g/dL Final    Total Bilirubin 03/13/2020 0.5  0.1  - 1.0 mg/dL Final    Alkaline Phosphatase 03/13/2020 66  55 - 135 U/L Final    AST 03/13/2020 20  10 - 40 U/L Final    ALT 03/13/2020 18  10 - 44 U/L Final    Anion Gap 03/13/2020 9  8 - 16 mmol/L Final    eGFR if African American 03/13/2020 >60.0  >60 mL/min/1.73 m^2 Final    eGFR if non African American 03/13/2020 >60.0  >60 mL/min/1.73 m^2 Final    Troponin I 03/13/2020 <0.030  <=0.040 ng/mL Final    BNP 03/13/2020 21  0 - 99 pg/mL Final    POC Glucose 03/13/2020 224* 70 - 110 Final   Office Visit on 02/26/2020   Component Date Value Ref Range Status    Hemoglobin A1C 02/26/2020 6.5  % Final       Past Medical History:   Diagnosis Date    Allergy     Anxiety     Arthritis     Asthma     Bipolar disorder     Cancer     thyroid    Chronic back pain     COPD (chronic obstructive pulmonary disease)     Diabetes mellitus     Diabetes mellitus, type 2     Fibromyalgia     GERD (gastroesophageal reflux disease)     History of fall 4/26/2018    [place]ski (hard of hearing)     Hx of thyroid cancer     Hyperlipidemia     Hypertension     Hypothyroidism     Neuropathy     On home oxygen therapy     3 l/m 24/7    Restless leg syndrome     Sleep apnea     Urinary tract infection      Past Surgical History:   Procedure Laterality Date    APPENDECTOMY      BACK SURGERY      x 3    broken foot and ankle      CHOLECYSTECTOMY      EYE SURGERY Bilateral     cataract    HYSTERECTOMY      JOINT REPLACEMENT Left 09/17/2018    knee    KNEE ARTHROPLASTY Left 9/17/2018    Procedure: ARTHROPLASTY, KNEE;  Surgeon: Yariel Marin MD;  Location: [facility];  Service: Orthopedics;  Laterality: Left;    MYELOGRAPHY N/A 8/7/2019    Procedure: MYELOGRAM;  Surgeon: Sun Diagnostic Provider;  Location: [facility];  Service: General;  Laterality: N/A;    POSTERIOR REPAIR      SPINAL FUSION      x3 BACK, NECK X 4    TOTAL THYROIDECTOMY  2014     Family History   Problem Relation Age of Onset    Diabetes Mother      Heart disease Mother     Hypertension Mother     Diabetes Father     Heart disease Father     Hypertension Father        Marital Status:   Alcohol History:  reports no history of alcohol use.  Tobacco History:  reports that she quit smoking about 20 years ago. Her smoking use included cigarettes. She has a 30.00 pack-year smoking history. She has never used smokeless tobacco.  Drug History:  reports no history of drug use.    Review of patient's allergies indicates:   Allergen Reactions    Morphine Itching    Tubersol [tuberculin ppd] Other (See Comments)     Site swells bad. Has to have chest xray       Current Outpatient Medications:     ACCU-CHEK LIBERTY PLUS TEST STRP Strp, , Disp: , Rfl:     albuterol (VENTOLIN HFA) 90 mcg/actuation inhaler, Inhale 2 puffs into the lungs every 6 (six) hours as needed for Wheezing. Rescue, Disp: 1 Inhaler, Rfl: 3    albuterol-ipratropium (DUO-NEB) 2.5 mg-0.5 mg/3 mL nebulizer solution, Take 3 mLs by nebulization every 6 (six) hours as needed for Wheezing or Shortness of Breath. Rescue, Disp: 360 mL, Rfl: 5    ARTIFICIAL TEARS,HYPROMELLOSE, OPHT, Place 1 drop into both eyes 3 (three) times daily., Disp: , Rfl:     calcitRIOL (ROCALTROL) 0.5 MCG Cap, Take 1 capsule (0.5 mcg total) by mouth every evening., Disp: 90 capsule, Rfl: 1    clonazePAM (KLONOPIN) 0.5 MG tablet, Take 1 tablet (0.5 mg total) by mouth once daily., Disp: 90 tablet, Rfl: 1    DULoxetine (CYMBALTA) 60 MG capsule, Take 1 capsule (60 mg total) by mouth every evening., Disp: 90 capsule, Rfl: 1    FLUoxetine 40 MG capsule, Take 1 capsule (40 mg total) by mouth once daily., Disp: 90 capsule, Rfl: 1    fluticasone-umeclidin-vilanter (TRELEGY ELLIPTA) 100-62.5-25 mcg DsDv, Inhale 1 puff into the lungs once daily., Disp: 3 each, Rfl: 1    gabapentin (NEURONTIN) 400 MG capsule, Take 1 capsule (400 mg total) by mouth once daily., Disp: 90 capsule, Rfl: 1    gabapentin (NEURONTIN) 800 MG tablet, Take  1 tablet (800 mg total) by mouth every evening. In evening, Disp: 90 tablet, Rfl: 1    ibuprofen (ADVIL,MOTRIN) 400 MG tablet, Take 400 mg by mouth every 8 (eight) hours as needed for Other., Disp: , Rfl:     insulin aspart U-100 (NOVOLOG) 100 unit/mL injection, Inject 2-8 Units into the skin 4 (four) times daily as needed for High Blood Sugar., Disp: , Rfl:     lactulose (CHRONULAC) 10 gram/15 mL solution, Take 20 g by mouth daily as needed. , Disp: , Rfl:     lamoTRIgine (LAMICTAL) 150 MG Tab, Take 1 tablet (150 mg total) by mouth 2 (two) times daily., Disp: 180 tablet, Rfl: 1    latanoprost 0.005 % ophthalmic solution, Place 1 drop into both eyes every evening. , Disp: , Rfl:     LEVEMIR FLEXTOUCH U-100 INSULN 100 unit/mL (3 mL) InPn pen, Inject 60 Units into the skin 2 (two) times daily. , Disp: , Rfl:     levothyroxine (SYNTHROID) 150 MCG tablet, Take 1 tablet (150 mcg total) by mouth before breakfast., Disp: 90 tablet, Rfl: 1    linaCLOtide (LINZESS) 290 mcg Cap capsule, Take 1 capsule (290 mcg total) by mouth once daily., Disp: 90 capsule, Rfl: 1    losartan-hydrochlorothiazide 50-12.5 mg (HYZAAR) 50-12.5 mg per tablet, Take 0.5 tablets by mouth once daily., Disp: 90 tablet, Rfl: 1    meclizine (ANTIVERT) 25 mg tablet, Take 25 mg by mouth 2 (two) times daily as needed for Dizziness. , Disp: , Rfl:     methocarbamoL (ROBAXIN) 750 MG Tab, Take 1 tablet (750 mg total) by mouth 2 (two) times daily as needed., Disp: 60 tablet, Rfl: 2    ondansetron (ZOFRAN-ODT) 4 MG TbDL, Take 4 mg by mouth every 6 (six) hours as needed (nausea)., Disp: , Rfl:     oxyCODONE (ROXICODONE) 10 mg Tab immediate release tablet, Take 10 mg by mouth 3 (three) times daily. , Disp: , Rfl:     pantoprazole (PROTONIX) 40 MG tablet, Take 1 tablet (40 mg total) by mouth once daily., Disp: 90 tablet, Rfl: 1    [START ON 6/17/2020] potassium chloride SA (K-DUR,KLOR-CON) 10 MEQ tablet, Take 1 tablet (10 mEq total) by mouth 3 (three)  times a week. Mon , Wed, and Fri., Disp: 36 tablet, Rfl: 1    rOPINIRole (REQUIP) 4 MG tablet, Take 1 tablet (4 mg total) by mouth 2 (two) times daily., Disp: 180 tablet, Rfl: 1    temazepam (RESTORIL) 15 mg Cap, Take 2 capsules (30 mg total) by mouth nightly as needed., Disp: 30 capsule, Rfl: 4    predniSONE (DELTASONE) 10 MG tablet, Take 1 tablet (10 mg total) by mouth once daily., Disp: 90 tablet, Rfl: 0    Review of Systems   Constitutional: Positive for activity change and fatigue. Negative for appetite change, chills and fever.   HENT: Negative for congestion and postnasal drip.    Respiratory: Positive for shortness of breath and wheezing. Negative for chest tightness.    Cardiovascular: Negative for chest pain and palpitations.   Endocrine: Positive for polyphagia.   Musculoskeletal: Positive for back pain.   Psychiatric/Behavioral: Positive for sleep disturbance (having difficulty sleeping. Not sleeping more than 3 hours a night).          Objective:      Vitals:    06/16/20 1227   BP: 120/78   Pulse: 74   SpO2: 97%   Weight: 96.6 kg (213 lb)     Body mass index is 37.73 kg/m².  Physical Exam  Constitutional:       Appearance: She is overweight. She is not ill-appearing.   HENT:      Head: Normocephalic and atraumatic.      Mouth/Throat:      Mouth: Mucous membranes are moist.   Cardiovascular:      Rate and Rhythm: Regular rhythm. Tachycardia present.   Pulmonary:      Effort: Tachypnea present. No respiratory distress.      Breath sounds: Decreased air movement present. Examination of the right-lower field reveals decreased breath sounds. Examination of the left-lower field reveals decreased breath sounds. Decreased breath sounds and wheezing (very faint) present. No rhonchi.   Skin:     General: Skin is warm and dry.      Capillary Refill: Capillary refill takes less than 2 seconds.   Neurological:      Mental Status: She is alert.   Psychiatric:         Mood and Affect: Mood normal.            Assessment:       1. Anxiety    2. Essential hypertension    3. Gastroesophageal reflux disease without esophagitis    4. Postoperative hypothyroidism    5. Primary insomnia    6. Type 2 diabetes mellitus with diabetic polyneuropathy, with long-term current use of insulin    7. Depression, unspecified depression type    8. Chronic obstructive pulmonary disease with acute exacerbation    9. Slow transit constipation    10. Mild protein-calorie malnutrition         Plan:       Anxiety  -     FLUoxetine 40 MG capsule; Take 1 capsule (40 mg total) by mouth once daily.  Dispense: 90 capsule; Refill: 1    Essential hypertension  -     potassium chloride SA (K-DUR,KLOR-CON) 10 MEQ tablet; Take 1 tablet (10 mEq total) by mouth 3 (three) times a week. Mon , Wed, and Fri.  Dispense: 36 tablet; Refill: 1    Gastroesophageal reflux disease without esophagitis  -     pantoprazole (PROTONIX) 40 MG tablet; Take 1 tablet (40 mg total) by mouth once daily.  Dispense: 90 tablet; Refill: 1    Postoperative hypothyroidism  -     levothyroxine (SYNTHROID) 150 MCG tablet; Take 1 tablet (150 mcg total) by mouth before breakfast.  Dispense: 90 tablet; Refill: 1    Primary insomnia  -     rOPINIRole (REQUIP) 4 MG tablet; Take 1 tablet (4 mg total) by mouth 2 (two) times daily.  Dispense: 180 tablet; Refill: 1  -     temazepam (RESTORIL) 15 mg Cap; Take 2 capsules (30 mg total) by mouth nightly as needed.  Dispense: 30 capsule; Refill: 4    Type 2 diabetes mellitus with diabetic polyneuropathy, with long-term current use of insulin  -     POCT HEMOGLOBIN A1C  -     gabapentin (NEURONTIN) 400 MG capsule; Take 1 capsule (400 mg total) by mouth once daily.  Dispense: 90 capsule; Refill: 1  -     gabapentin (NEURONTIN) 800 MG tablet; Take 1 tablet (800 mg total) by mouth every evening. In evening  Dispense: 90 tablet; Refill: 1    Depression, unspecified depression type  -     lamoTRIgine (LAMICTAL) 150 MG Tab; Take 1 tablet (150 mg total)  by mouth 2 (two) times daily.  Dispense: 180 tablet; Refill: 1  -     DULoxetine (CYMBALTA) 60 MG capsule; Take 1 capsule (60 mg total) by mouth every evening.  Dispense: 90 capsule; Refill: 1    Chronic obstructive pulmonary disease with acute exacerbation  -     albuterol-ipratropium (DUO-NEB) 2.5 mg-0.5 mg/3 mL nebulizer solution; Take 3 mLs by nebulization every 6 (six) hours as needed for Wheezing or Shortness of Breath. Rescue  Dispense: 360 mL; Refill: 5  -     predniSONE (DELTASONE) 10 MG tablet; Take 1 tablet (10 mg total) by mouth once daily.  Dispense: 90 tablet; Refill: 0  -     Ambulatory referral/consult to Pulmonology; Future; Expected date: 06/23/2020  - Pt given several prednisone tabs and provided with taper schedule. Advised that if she completes taper and still having issues, please let us know, do not just continue taking prednisone. At this point, I feel that pt is in need of input from Pulmonology. Will place consult.     Slow transit constipation  -     linaCLOtide (LINZESS) 290 mcg Cap capsule; Take 1 capsule (290 mcg total) by mouth once daily.  Dispense: 90 capsule; Refill: 1    Mild protein-calorie malnutrition  -     calcitRIOL (ROCALTROL) 0.5 MCG Cap; Take 1 capsule (0.5 mcg total) by mouth every evening.  Dispense: 90 capsule; Refill: 1      Follow up in about 2 months (around 8/16/2020).

## 2020-06-22 ENCOUNTER — TELEPHONE (OUTPATIENT)
Dept: FAMILY MEDICINE | Facility: CLINIC | Age: 70
End: 2020-06-22

## 2020-06-22 DIAGNOSIS — J96.20 ACUTE ON CHRONIC RESPIRATORY FAILURE, UNSPECIFIED WHETHER WITH HYPOXIA OR HYPERCAPNIA: ICD-10-CM

## 2020-06-22 DIAGNOSIS — J44.1 CHRONIC OBSTRUCTIVE PULMONARY DISEASE WITH ACUTE EXACERBATION: Primary | ICD-10-CM

## 2020-06-22 NOTE — TELEPHONE ENCOUNTER
We had discussed her CBG increases being r/t steroid use and I advised her that they would come back down as she tapered off the steroids. Order signed.

## 2020-06-22 NOTE — TELEPHONE ENCOUNTER
Please call and see who put her on the steroids? How long is going to be on them? and what is her sugars?

## 2020-06-22 NOTE — TELEPHONE ENCOUNTER
----- Message from Ruby Ahn sent at 6/22/2020  9:57 AM CDT -----  Patient stated her insurance requires a prescription written for her nebulizer mask.   [facility]. Pt is taking oral steroid said her sugar jumped to the 500's and has since stabilized and she is doing well    # [phone]

## 2020-06-22 NOTE — TELEPHONE ENCOUNTER
Order set up for mask    We prescribed the steroid, prednisone. Teresa called in a small supply on 5/14 & then Allison called in a supply on 6/16.     Blood sugar yesterday was 125, this morning it was 150. She had like a 170 per pt. She said only spiked for about a day and now is back towards her normal.

## 2020-06-26 ENCOUNTER — HOSPITAL ENCOUNTER (OUTPATIENT)
Facility: HOSPITAL | Age: 70
Discharge: HOME | End: 2020-06-29
Attending: EMERGENCY MEDICINE | Admitting: INTERNAL MEDICINE
Payer: MEDICARE

## 2020-06-26 ENCOUNTER — OFFICE VISIT (OUTPATIENT)
Dept: PULMONOLOGY | Facility: CLINIC | Age: 70
End: 2020-06-26
Payer: MEDICARE

## 2020-06-26 ENCOUNTER — CLINICAL SUPPORT (OUTPATIENT)
Dept: CARDIOLOGY | Facility: HOSPITAL | Age: 70
End: 2020-06-26
Attending: INTERNAL MEDICINE
Payer: MEDICARE

## 2020-06-26 VITALS — HEIGHT: 63 IN | BODY MASS INDEX: 38.09 KG/M2 | WEIGHT: 215 LBS

## 2020-06-26 DIAGNOSIS — E66.9 OBESITY, UNSPECIFIED CLASSIFICATION, UNSPECIFIED OBESITY TYPE, UNSPECIFIED WHETHER SERIOUS COMORBIDITY PRESENT: ICD-10-CM

## 2020-06-26 DIAGNOSIS — Z87.09 HISTORY OF COPD: ICD-10-CM

## 2020-06-26 DIAGNOSIS — J98.6 DIAPHRAGMATIC DISORDER: ICD-10-CM

## 2020-06-26 DIAGNOSIS — R07.9 CHEST PAIN: Primary | ICD-10-CM

## 2020-06-26 DIAGNOSIS — J44.9 COPD (CHRONIC OBSTRUCTIVE PULMONARY DISEASE): ICD-10-CM

## 2020-06-26 DIAGNOSIS — E11.00 TYPE 2 DIABETES MELLITUS WITH HYPEROSMOLARITY WITHOUT COMA, WITH LONG-TERM CURRENT USE OF INSULIN: ICD-10-CM

## 2020-06-26 DIAGNOSIS — J96.11 CHRONIC RESPIRATORY FAILURE WITH HYPOXIA: Chronic | ICD-10-CM

## 2020-06-26 DIAGNOSIS — J44.1 COPD EXACERBATION: ICD-10-CM

## 2020-06-26 DIAGNOSIS — J96.21 ACUTE ON CHRONIC RESPIRATORY FAILURE WITH HYPOXIA: ICD-10-CM

## 2020-06-26 DIAGNOSIS — J44.1 CHRONIC OBSTRUCTIVE PULMONARY DISEASE WITH ACUTE EXACERBATION: ICD-10-CM

## 2020-06-26 DIAGNOSIS — I70.0 CALCIFICATION OF AORTA: ICD-10-CM

## 2020-06-26 DIAGNOSIS — J44.89 ASTHMA-COPD OVERLAP SYNDROME: ICD-10-CM

## 2020-06-26 DIAGNOSIS — R06.00 DYSPNEA: ICD-10-CM

## 2020-06-26 DIAGNOSIS — R06.02 SHORTNESS OF BREATH: ICD-10-CM

## 2020-06-26 DIAGNOSIS — T17.908A ASPIRATION INTO AIRWAY, INITIAL ENCOUNTER: ICD-10-CM

## 2020-06-26 DIAGNOSIS — Z79.4 TYPE 2 DIABETES MELLITUS WITH HYPEROSMOLARITY WITHOUT COMA, WITH LONG-TERM CURRENT USE OF INSULIN: ICD-10-CM

## 2020-06-26 LAB
ALBUMIN SERPL BCP-MCNC: 3.7 G/DL (ref 3.5–5.2)
ALP SERPL-CCNC: 63 U/L (ref 55–135)
ALT SERPL W/O P-5'-P-CCNC: 18 U/L (ref 10–44)
ANION GAP SERPL CALC-SCNC: 12 MMOL/L (ref 8–16)
AST SERPL-CCNC: 20 U/L (ref 10–40)
BACTERIA #/AREA URNS HPF: ABNORMAL /HPF
BASOPHILS # BLD AUTO: 0.02 K/UL (ref 0–0.2)
BASOPHILS NFR BLD: 0.2 % (ref 0–1.9)
BILIRUB SERPL-MCNC: 0.6 MG/DL (ref 0.1–1)
BILIRUB UR QL STRIP: NEGATIVE
BNP SERPL-MCNC: 18 PG/ML (ref 0–99)
BNP SERPL-MCNC: 24 PG/ML (ref 0–99)
BUN SERPL-MCNC: 18 MG/DL (ref 8–23)
CALCIUM SERPL-MCNC: 8.4 MG/DL (ref 8.7–10.5)
CHLORIDE SERPL-SCNC: 95 MMOL/L (ref 95–110)
CLARITY UR: CLEAR
CO2 SERPL-SCNC: 30 MMOL/L (ref 23–29)
COLOR UR: YELLOW
CREAT SERPL-MCNC: 0.8 MG/DL (ref 0.5–1.4)
D DIMER PPP IA.FEU-MCNC: 0.33 UG/ML FEU
DIFFERENTIAL METHOD: ABNORMAL
EOSINOPHIL # BLD AUTO: 0.1 K/UL (ref 0–0.5)
EOSINOPHIL NFR BLD: 0.6 % (ref 0–8)
ERYTHROCYTE [DISTWIDTH] IN BLOOD BY AUTOMATED COUNT: 13.5 % (ref 11.5–14.5)
EST. GFR  (AFRICAN AMERICAN): >60 ML/MIN/1.73 M^2
EST. GFR  (NON AFRICAN AMERICAN): >60 ML/MIN/1.73 M^2
GLUCOSE SERPL-MCNC: 194 MG/DL (ref 70–110)
GLUCOSE SERPL-MCNC: 274 MG/DL (ref 70–110)
GLUCOSE UR QL STRIP: ABNORMAL
HCT VFR BLD AUTO: 40.1 % (ref 37–48.5)
HGB BLD-MCNC: 13.2 G/DL (ref 12–16)
HGB UR QL STRIP: NEGATIVE
HYALINE CASTS #/AREA URNS LPF: 0 /LPF
IMM GRANULOCYTES # BLD AUTO: 0.11 K/UL (ref 0–0.04)
IMM GRANULOCYTES NFR BLD AUTO: 1 % (ref 0–0.5)
INR PPP: 1
KETONES UR QL STRIP: NEGATIVE
LEUKOCYTE ESTERASE UR QL STRIP: ABNORMAL
LYMPHOCYTES # BLD AUTO: 1.8 K/UL (ref 1–4.8)
LYMPHOCYTES NFR BLD: 16.6 % (ref 18–48)
MCH RBC QN AUTO: 30.3 PG (ref 27–31)
MCHC RBC AUTO-ENTMCNC: 32.9 G/DL (ref 32–36)
MCV RBC AUTO: 92 FL (ref 82–98)
MICROSCOPIC COMMENT: ABNORMAL
MONOCYTES # BLD AUTO: 0.5 K/UL (ref 0.3–1)
MONOCYTES NFR BLD: 4.4 % (ref 4–15)
NEUTROPHILS # BLD AUTO: 8.4 K/UL (ref 1.8–7.7)
NEUTROPHILS NFR BLD: 77.2 % (ref 38–73)
NITRITE UR QL STRIP: NEGATIVE
NRBC BLD-RTO: 0 /100 WBC
PH UR STRIP: 7 [PH] (ref 5–8)
PLATELET # BLD AUTO: 356 K/UL (ref 150–350)
PMV BLD AUTO: 9.9 FL (ref 9.2–12.9)
POTASSIUM SERPL-SCNC: 4.2 MMOL/L (ref 3.5–5.1)
PROT SERPL-MCNC: 7.1 G/DL (ref 6–8.4)
PROT UR QL STRIP: NEGATIVE
PROTHROMBIN TIME: 12.6 SEC (ref 10.6–14.8)
RBC # BLD AUTO: 4.36 M/UL (ref 4–5.4)
RBC #/AREA URNS HPF: 0 /HPF (ref 0–4)
SARS-COV-2 RDRP RESP QL NAA+PROBE: NEGATIVE
SODIUM SERPL-SCNC: 137 MMOL/L (ref 136–145)
SP GR UR STRIP: 1.01 (ref 1–1.03)
SQUAMOUS #/AREA URNS HPF: 1 /HPF
TROPONIN I SERPL DL<=0.01 NG/ML-MCNC: <0.03 NG/ML
TSH SERPL DL<=0.005 MIU/L-ACNC: 2.65 UIU/ML (ref 0.34–5.6)
URN SPEC COLLECT METH UR: ABNORMAL
UROBILINOGEN UR STRIP-ACNC: NEGATIVE EU/DL
WBC # BLD AUTO: 10.86 K/UL (ref 3.9–12.7)
WBC #/AREA URNS HPF: 15 /HPF (ref 0–5)
YEAST URNS QL MICRO: ABNORMAL

## 2020-06-26 PROCEDURE — 96372 THER/PROPH/DIAG INJ SC/IM: CPT | Mod: 59

## 2020-06-26 PROCEDURE — 96376 TX/PRO/DX INJ SAME DRUG ADON: CPT | Mod: 59

## 2020-06-26 PROCEDURE — 87077 CULTURE AEROBIC IDENTIFY: CPT

## 2020-06-26 PROCEDURE — G0378 HOSPITAL OBSERVATION PER HR: HCPCS

## 2020-06-26 PROCEDURE — 93306 TTE W/DOPPLER COMPLETE: CPT

## 2020-06-26 PROCEDURE — 1159F PR MEDICATION LIST DOCUMENTED IN MEDICAL RECORD: ICD-10-PCS | Mod: S$GLB,,, | Performed by: INTERNAL MEDICINE

## 2020-06-26 PROCEDURE — 25000242 PHARM REV CODE 250 ALT 637 W/ HCPCS: Performed by: INTERNAL MEDICINE

## 2020-06-26 PROCEDURE — 85610 PROTHROMBIN TIME: CPT

## 2020-06-26 PROCEDURE — 3078F DIAST BP <80 MM HG: CPT | Mod: S$GLB,,, | Performed by: INTERNAL MEDICINE

## 2020-06-26 PROCEDURE — 3078F PR MOST RECENT DIASTOLIC BLOOD PRESSURE < 80 MM HG: ICD-10-PCS | Mod: S$GLB,,, | Performed by: INTERNAL MEDICINE

## 2020-06-26 PROCEDURE — 80053 COMPREHEN METABOLIC PANEL: CPT

## 2020-06-26 PROCEDURE — 99285 EMERGENCY DEPT VISIT HI MDM: CPT | Mod: 25

## 2020-06-26 PROCEDURE — 87086 URINE CULTURE/COLONY COUNT: CPT

## 2020-06-26 PROCEDURE — 93005 ELECTROCARDIOGRAM TRACING: CPT | Performed by: INTERNAL MEDICINE

## 2020-06-26 PROCEDURE — 99204 OFFICE O/P NEW MOD 45 MIN: CPT | Mod: S$GLB,,, | Performed by: INTERNAL MEDICINE

## 2020-06-26 PROCEDURE — 85379 FIBRIN DEGRADATION QUANT: CPT

## 2020-06-26 PROCEDURE — 84443 ASSAY THYROID STIM HORMONE: CPT

## 2020-06-26 PROCEDURE — 94640 AIRWAY INHALATION TREATMENT: CPT

## 2020-06-26 PROCEDURE — 83880 ASSAY OF NATRIURETIC PEPTIDE: CPT

## 2020-06-26 PROCEDURE — 96374 THER/PROPH/DIAG INJ IV PUSH: CPT

## 2020-06-26 PROCEDURE — 3052F HG A1C>EQUAL 8.0%<EQUAL 9.0%: CPT | Mod: S$GLB,,, | Performed by: INTERNAL MEDICINE

## 2020-06-26 PROCEDURE — 1159F MED LIST DOCD IN RCRD: CPT | Mod: S$GLB,,, | Performed by: INTERNAL MEDICINE

## 2020-06-26 PROCEDURE — 99204 PR OFFICE/OUTPT VISIT, NEW, LEVL IV, 45-59 MIN: ICD-10-PCS | Mod: S$GLB,,, | Performed by: INTERNAL MEDICINE

## 2020-06-26 PROCEDURE — 3074F SYST BP LT 130 MM HG: CPT | Mod: S$GLB,,, | Performed by: INTERNAL MEDICINE

## 2020-06-26 PROCEDURE — 3074F PR MOST RECENT SYSTOLIC BLOOD PRESSURE < 130 MM HG: ICD-10-PCS | Mod: S$GLB,,, | Performed by: INTERNAL MEDICINE

## 2020-06-26 PROCEDURE — 3052F PR MOST RECENT HEMOGLOBIN A1C LEVEL 8.0 - < 9.0%: ICD-10-PCS | Mod: S$GLB,,, | Performed by: INTERNAL MEDICINE

## 2020-06-26 PROCEDURE — 81001 URINALYSIS AUTO W/SCOPE: CPT

## 2020-06-26 PROCEDURE — 1101F PR PT FALLS ASSESS DOC 0-1 FALLS W/OUT INJ PAST YR: ICD-10-PCS | Mod: S$GLB,,, | Performed by: INTERNAL MEDICINE

## 2020-06-26 PROCEDURE — 1101F PT FALLS ASSESS-DOCD LE1/YR: CPT | Mod: S$GLB,,, | Performed by: INTERNAL MEDICINE

## 2020-06-26 PROCEDURE — 85025 COMPLETE CBC W/AUTO DIFF WBC: CPT

## 2020-06-26 PROCEDURE — G0378 HOSPITAL OBSERVATION PER HR: HCPCS | Mod: CS

## 2020-06-26 PROCEDURE — 82962 GLUCOSE BLOOD TEST: CPT

## 2020-06-26 PROCEDURE — 94760 N-INVAS EAR/PLS OXIMETRY 1: CPT

## 2020-06-26 PROCEDURE — 84484 ASSAY OF TROPONIN QUANT: CPT

## 2020-06-26 PROCEDURE — U0002 COVID-19 LAB TEST NON-CDC: HCPCS

## 2020-06-26 PROCEDURE — 27000221 HC OXYGEN, UP TO 24 HOURS

## 2020-06-26 PROCEDURE — 99900035 HC TECH TIME PER 15 MIN (STAT)

## 2020-06-26 PROCEDURE — 63600175 PHARM REV CODE 636 W HCPCS: Performed by: INTERNAL MEDICINE

## 2020-06-26 PROCEDURE — 87186 SC STD MICRODIL/AGAR DIL: CPT

## 2020-06-26 PROCEDURE — 25000003 PHARM REV CODE 250: Performed by: INTERNAL MEDICINE

## 2020-06-26 RX ORDER — IPRATROPIUM BROMIDE AND ALBUTEROL SULFATE 2.5; .5 MG/3ML; MG/3ML
3 SOLUTION RESPIRATORY (INHALATION) EVERY 6 HOURS
Status: DISCONTINUED | OUTPATIENT
Start: 2020-06-26 | End: 2020-06-27

## 2020-06-26 RX ORDER — ENOXAPARIN SODIUM 100 MG/ML
40 INJECTION SUBCUTANEOUS EVERY 24 HOURS
Status: DISCONTINUED | OUTPATIENT
Start: 2020-06-26 | End: 2020-06-29 | Stop reason: HOSPADM

## 2020-06-26 RX ORDER — GLUCAGON 1 MG
1 KIT INJECTION
Status: DISCONTINUED | OUTPATIENT
Start: 2020-06-26 | End: 2020-06-29 | Stop reason: HOSPADM

## 2020-06-26 RX ORDER — ROPINIROLE 2 MG/1
4 TABLET, FILM COATED ORAL 2 TIMES DAILY
Status: DISCONTINUED | OUTPATIENT
Start: 2020-06-26 | End: 2020-06-29 | Stop reason: HOSPADM

## 2020-06-26 RX ORDER — TALC
8 POWDER (GRAM) TOPICAL NIGHTLY PRN
Status: DISCONTINUED | OUTPATIENT
Start: 2020-06-26 | End: 2020-06-29 | Stop reason: HOSPADM

## 2020-06-26 RX ORDER — ONDANSETRON 4 MG/1
8 TABLET, ORALLY DISINTEGRATING ORAL EVERY 8 HOURS PRN
Status: DISCONTINUED | OUTPATIENT
Start: 2020-06-26 | End: 2020-06-29 | Stop reason: HOSPADM

## 2020-06-26 RX ORDER — IBUPROFEN 200 MG
24 TABLET ORAL
Status: DISCONTINUED | OUTPATIENT
Start: 2020-06-26 | End: 2020-06-29 | Stop reason: HOSPADM

## 2020-06-26 RX ORDER — IBUPROFEN 200 MG
16 TABLET ORAL
Status: DISCONTINUED | OUTPATIENT
Start: 2020-06-26 | End: 2020-06-29 | Stop reason: HOSPADM

## 2020-06-26 RX ORDER — ACETAMINOPHEN 325 MG/1
650 TABLET ORAL EVERY 4 HOURS PRN
Status: DISCONTINUED | OUTPATIENT
Start: 2020-06-26 | End: 2020-06-29 | Stop reason: HOSPADM

## 2020-06-26 RX ORDER — OXYCODONE HYDROCHLORIDE 5 MG/1
10 TABLET ORAL 3 TIMES DAILY
Status: DISCONTINUED | OUTPATIENT
Start: 2020-06-26 | End: 2020-06-29 | Stop reason: HOSPADM

## 2020-06-26 RX ORDER — PANTOPRAZOLE SODIUM 40 MG/1
40 TABLET, DELAYED RELEASE ORAL DAILY
Status: DISCONTINUED | OUTPATIENT
Start: 2020-06-27 | End: 2020-06-29 | Stop reason: HOSPADM

## 2020-06-26 RX ORDER — DULOXETIN HYDROCHLORIDE 30 MG/1
60 CAPSULE, DELAYED RELEASE ORAL NIGHTLY
Status: DISCONTINUED | OUTPATIENT
Start: 2020-06-26 | End: 2020-06-29 | Stop reason: HOSPADM

## 2020-06-26 RX ORDER — GABAPENTIN 800 MG/1
800 TABLET ORAL NIGHTLY
Status: DISCONTINUED | OUTPATIENT
Start: 2020-06-26 | End: 2020-06-26

## 2020-06-26 RX ORDER — CLONAZEPAM 0.5 MG/1
0.5 TABLET ORAL DAILY
Status: DISCONTINUED | OUTPATIENT
Start: 2020-06-26 | End: 2020-06-29 | Stop reason: HOSPADM

## 2020-06-26 RX ORDER — HYDROCODONE BITARTRATE AND ACETAMINOPHEN 5; 325 MG/1; MG/1
1 TABLET ORAL EVERY 6 HOURS PRN
Status: DISCONTINUED | OUTPATIENT
Start: 2020-06-26 | End: 2020-06-29 | Stop reason: HOSPADM

## 2020-06-26 RX ORDER — FLUOXETINE HYDROCHLORIDE 20 MG/1
40 CAPSULE ORAL DAILY
Status: DISCONTINUED | OUTPATIENT
Start: 2020-06-26 | End: 2020-06-29 | Stop reason: HOSPADM

## 2020-06-26 RX ORDER — SODIUM CHLORIDE 0.9 % (FLUSH) 0.9 %
3 SYRINGE (ML) INJECTION EVERY 6 HOURS PRN
Status: DISCONTINUED | OUTPATIENT
Start: 2020-06-26 | End: 2020-06-29 | Stop reason: HOSPADM

## 2020-06-26 RX ADMIN — CLONAZEPAM 0.5 MG: 0.5 TABLET ORAL at 08:06

## 2020-06-26 RX ADMIN — OXYCODONE HYDROCHLORIDE 10 MG: 5 TABLET ORAL at 08:06

## 2020-06-26 RX ADMIN — LAMOTRIGINE 150 MG: 100 TABLET ORAL at 08:06

## 2020-06-26 RX ADMIN — ROPINIROLE 4 MG: 2 TABLET, FILM COATED ORAL at 10:06

## 2020-06-26 RX ADMIN — METHYLPREDNISOLONE SODIUM SUCCINATE 40 MG: 40 INJECTION, POWDER, FOR SOLUTION INTRAMUSCULAR; INTRAVENOUS at 03:06

## 2020-06-26 RX ADMIN — FLUOXETINE 40 MG: 20 CAPSULE ORAL at 08:06

## 2020-06-26 RX ADMIN — GABAPENTIN 800 MG: 300 CAPSULE ORAL at 08:06

## 2020-06-26 RX ADMIN — OXYCODONE HYDROCHLORIDE 10 MG: 5 TABLET ORAL at 03:06

## 2020-06-26 RX ADMIN — METHYLPREDNISOLONE SODIUM SUCCINATE 40 MG: 40 INJECTION, POWDER, FOR SOLUTION INTRAMUSCULAR; INTRAVENOUS at 09:06

## 2020-06-26 RX ADMIN — ENOXAPARIN SODIUM 40 MG: 100 INJECTION SUBCUTANEOUS at 08:06

## 2020-06-26 RX ADMIN — GABAPENTIN 800 MG: 800 TABLET ORAL at 03:06

## 2020-06-26 RX ADMIN — IPRATROPIUM BROMIDE AND ALBUTEROL SULFATE 3 ML: .5; 3 SOLUTION RESPIRATORY (INHALATION) at 07:06

## 2020-06-26 RX ADMIN — HUMAN INSULIN 9 UNITS: 100 INJECTION, SOLUTION SUBCUTANEOUS at 10:06

## 2020-06-26 RX ADMIN — DULOXETINE 60 MG: 30 CAPSULE, DELAYED RELEASE ORAL at 08:06

## 2020-06-26 NOTE — ED PROVIDER NOTES
Encounter Date: 6/26/2020       History   No chief complaint on file.    69-year-old female who has a history of COPD, asthma, hypertension, hyperlipidemia, hypothyroidism, type 2 diabetes mellitus, and thyroid cancer, presents emergency room with a history of for the last several days she has had shortness of breath but became much worse this morning.  Patient admits that her symptoms are worse with exertion.  She states she felt somewhat dizzy and is if she was going to faint but did not lose consciousness.  She has some associated anterior chest pain also.  She describes her pain is heavy in nature.  She has no known history of any coronary disease or congestive failure.  She has not had any nausea vomiting.  No coughing.  She has had some sweats and wheezing.  Patient states uses Ventolin as well as Trelegy for respiratory problems at home.  She is also home O2 dependent.  No fever or chills.        Review of patient's allergies indicates:   Allergen Reactions    Morphine Itching    Tubersol [tuberculin ppd] Other (See Comments)     Site swells bad. Has to have chest xray     Past Medical History:   Diagnosis Date    Allergy     Anxiety     Arthritis     Asthma     Bipolar disorder     Cancer     thyroid    Chronic back pain     COPD (chronic obstructive pulmonary disease)     Diabetes mellitus     Diabetes mellitus, type 2     Fibromyalgia     GERD (gastroesophageal reflux disease)     History of fall 4/26/2018    [place] (hard of hearing)     Hx of thyroid cancer     Hyperlipidemia     Hypertension     Hypothyroidism     Neuropathy     On home oxygen therapy     3 l/m 24/7    Restless leg syndrome     Sleep apnea     Urinary tract infection      Past Surgical History:   Procedure Laterality Date    APPENDECTOMY      BACK SURGERY      x 3    broken foot and ankle      CHOLECYSTECTOMY      EYE SURGERY Bilateral     cataract    HYSTERECTOMY      JOINT REPLACEMENT Left 09/17/2018     knee    KNEE ARTHROPLASTY Left 2018    Procedure: ARTHROPLASTY, KNEE;  Surgeon: Yariel Marin MD;  Location: [facility] OR;  Service: Orthopedics;  Laterality: Left;    MYELOGRAPHY N/A 2019    Procedure: MYELOGRAM;  Surgeon: [facility];  Location: [facility] OR;  Service: General;  Laterality: N/A;    POSTERIOR REPAIR      SPINAL FUSION      x3 BACK, NECK X 4    TOTAL THYROIDECTOMY       Family History   Problem Relation Age of Onset    Diabetes Mother     Heart disease Mother     Hypertension Mother     Diabetes Father     Heart disease Father     Hypertension Father      Social History     Tobacco Use    Smoking status: Former Smoker     Packs/day: 1.00     Years: 30.00     Pack years: 30.00     Types: Cigarettes     Quit date: 2000     Years since quittin.1    Smokeless tobacco: Never Used   Substance Use Topics    Alcohol use: No    Drug use: No     Review of Systems   Constitutional: Negative for activity change, appetite change, chills, diaphoresis and fever.   HENT: Negative for congestion, ear pain, facial swelling, sinus pressure, sinus pain, sore throat and trouble swallowing.    Respiratory: Positive for chest tightness, shortness of breath and wheezing. Negative for cough.    Cardiovascular: Negative for chest pain.   Gastrointestinal: Negative for abdominal pain, constipation, diarrhea, nausea and vomiting.   Genitourinary: Positive for frequency. Negative for difficulty urinating, dysuria, flank pain and hematuria.   Musculoskeletal: Negative for arthralgias, back pain, gait problem and myalgias.   Skin: Negative for rash.   Neurological: Positive for light-headedness. Negative for seizures, syncope, weakness, numbness and headaches.   Hematological: Does not bruise/bleed easily.   All other systems reviewed and are negative.      Physical Exam     Initial Vitals   BP Pulse Resp Temp SpO2   -- -- -- -- --      MAP       --         Physical Exam    Constitutional:  She appears well-developed and well-nourished. She is not diaphoretic. No distress.   HENT:   Head: Normocephalic and atraumatic.   Nose: Nose normal.   Mouth/Throat: Oropharynx is clear and moist. No oropharyngeal exudate.   Eyes: Conjunctivae are normal. Pupils are equal, round, and reactive to light. Right eye exhibits no discharge. Left eye exhibits no discharge.   Neck: Normal range of motion. Neck supple. No JVD present.   Cardiovascular: Normal rate, regular rhythm, normal heart sounds and intact distal pulses. Exam reveals no gallop and no friction rub.    No murmur heard.  Pulmonary/Chest: No respiratory distress. She has no wheezes. She has no rhonchi. She has rales. She exhibits no tenderness.   Rales left base   Abdominal: Soft. Bowel sounds are normal. She exhibits no distension. There is no abdominal tenderness. There is no rebound and no guarding.   Musculoskeletal: Normal range of motion. No tenderness or edema.   Lymphadenopathy:     She has no cervical adenopathy.   Neurological: She is alert and oriented to person, place, and time. She has normal strength. GCS score is 15. GCS eye subscore is 4. GCS verbal subscore is 5. GCS motor subscore is 6.   Skin: Skin is warm and dry. Capillary refill takes less than 2 seconds. No rash noted. No erythema. No pallor.   Psychiatric: She has a normal mood and affect. Her behavior is normal. Judgment and thought content normal.         ED Course   Procedures  Labs Reviewed - No data to display       Imaging Results    None                       Attending Attestation:             Attending ED Notes:   This 69-year-old female who has a history of COPD and asthma but no known history of any coronary disease or CHF, had presented with complaints of shortness of breath and some chest discomfort of days duration that became worse today.  Patient was being seen in Dr. Thomas's office for the 1st time and recommended the patient be brought to the emergency room for  hospitalization.  The patient's workup at this time shows unremarkable chest x-ray.  Her labs reveal a normal troponin, D-dimer and BNP.  Her TSH was also normal.  The only abnormal labs were mildly elevated blood sugar 194.  Her white count was 10.9.  The patient's EKG showed a sinus rhythm with an anterior scar but no evidence of any other acute abnormalities.  Axis is normal.  No ST-T changes during the ED course I did speak again to Dr. Thomas and he again requested the patient be admitted overnight for observation.  He did mention that she has been taking a tapering dose of steroids and is also taking Trelegy and Ventolin p.r.n..  The patient is not aware of having had any echocardiograms previously.  Hospital Medicine is being consulted for potential admission.                        Clinical Impression:       ICD-10-CM ICD-9-CM   1. Chest pain  R07.9 786.50   2. Dyspnea  R06.00 786.09   3. History of COPD  Z87.09 V12.69                                Maykel Bae Jr., MD  06/26/20 2130

## 2020-06-26 NOTE — ASSESSMENT & PLAN NOTE
· Transfer to ER  · Will need to be admitted for further treatment as she is not responding to outpt treatment  · Will need further evaluation of her COPD once stabilized

## 2020-06-26 NOTE — H&P
[facility] History & Physical Examination   Patient Name: Alanis Mendieta  MRN: [id number]  Patient Class: OP- Observation   Admission Date: 6/26/2020 10:19 AM  Length of Stay: 0  Attending Physician: Darren Hinton MD  Primary Care Provider: Delio Melendez MD  Face-to-Face encounter date: 06/26/2020  Code Status: Full code  MPOA: [place]  Chief Complaint: Shortness of Breath (on o2 at 4L) and Chest Pain        Patient information was obtained from patient, past medical records and ER records.   HISTORY OF PRESENT ILLNESS:   Alanis Mendieta is a 69 y.o. White female who  has a past medical history of Allergy, Anxiety, Arthritis, Asthma, Bipolar disorder, Cancer, Chronic back pain, COPD (chronic obstructive pulmonary disease), Diabetes mellitus, Diabetes mellitus, type 2, Fibromyalgia, GERD (gastroesophageal reflux disease), History of fall (4/26/2018), [place] (hard of hearing), thyroid cancer, Hyperlipidemia, Hypertension, Hypothyroidism, Neuropathy, On home oxygen therapy, Restless leg syndrome, Sleep apnea, and Urinary tract infection.. The patient presented to [facility] on 6/26/2020 with a primary complaint of Shortness of Breath (on o2 at 4L)    History was obtained from the patient and ER physician Sign-out.  Patient presented with shortness of breath and dyspnea on exertion to her 1st visit at Dr. Reis office.  She was so short of breath that she got presyncopal and was sent to the emergency room for further evaluation.  As per the patient her dyspnea is getting worse and now she cannot even talk without getting short of breath.  She cannot perform her activities of daily living including taking shower, changing clothes, moving from one place to another.  She has chronic hypoxemic respiratory failure and uses 4 L oxygen that was prescribed in 2012 by a doctor in [facility].  She is on Trelegy and as needed albuterol.  She uses them as needed  however only gets relief for few hours.  She denies any history of heart failure or coronary artery disease. No change in vision, hearing, headache, fever, cough, congestion, runny nose, chest pain, shortness of breath, palpitations, abdominal pain, diarrhea, constipation, vomiting, dysuria, hematuria, joint pain and back pain.       In the emergency room, patient CBC was unremarkable. CMP was unremarkable. BNP and troponin was negative. Reviewed EKG and does not show new ischemic changes. No concerning finding on chest x ray.     Decision to admit was taken and patient was informed about the plan of care.   REVIEW OF SYSTEMS:   10 Point Review of System was performed and was found to be negative except for that mentioned already in the HPI above.     PAST MEDICAL HISTORY:     Past Medical History:   Diagnosis Date    Allergy     Anxiety     Arthritis     Asthma     Bipolar disorder     Cancer     thyroid    Chronic back pain     COPD (chronic obstructive pulmonary disease)     Diabetes mellitus     Diabetes mellitus, type 2     Fibromyalgia     GERD (gastroesophageal reflux disease)     History of fall 4/26/2018    [place] (hard of hearing)     Hx of thyroid cancer     Hyperlipidemia     Hypertension     Hypothyroidism     Neuropathy     On home oxygen therapy     3 l/m 24/7    Restless leg syndrome     Sleep apnea     Urinary tract infection        PAST SURGICAL HISTORY:     Past Surgical History:   Procedure Laterality Date    APPENDECTOMY      BACK SURGERY      x 3    broken foot and ankle      CHOLECYSTECTOMY      EYE SURGERY Bilateral     cataract    HYSTERECTOMY      JOINT REPLACEMENT Left 09/17/2018    knee    KNEE ARTHROPLASTY Left 9/17/2018    Procedure: ARTHROPLASTY, KNEE;  Surgeon: Yariel Marin MD;  Location: [facility] OR;  Service: Orthopedics;  Laterality: Left;    MYELOGRAPHY N/A 8/7/2019    Procedure: MYELOGRAM;  Surgeon: Sun Diagnostic Provider;  Location: [facility] OR;  Service:  General;  Laterality: N/A;    POSTERIOR REPAIR      SPINAL FUSION      x3 BACK, NECK X 4    TOTAL THYROIDECTOMY         ALLERGIES:   Morphine and Tubersol [tuberculin ppd]    FAMILY HISTORY:     Family History   Problem Relation Age of Onset    Diabetes Mother     Heart disease Mother     Hypertension Mother     Diabetes Father     Heart disease Father     Hypertension Father        SOCIAL HISTORY:     Social History     Tobacco Use    Smoking status: Former Smoker     Packs/day: 1.00     Years: 30.00     Pack years: 30.00     Types: Cigarettes     Quit date: 2000     Years since quittin.1    Smokeless tobacco: Never Used   Substance Use Topics    Alcohol use: No        Social History     Substance and Sexual Activity   Sexual Activity Never        HOME MEDICATIONS:     Prior to Admission medications    Medication Sig Start Date End Date Taking? Authorizing Provider   albuterol (VENTOLIN HFA) 90 mcg/actuation inhaler Inhale 2 puffs into the lungs every 6 (six) hours as needed for Wheezing. Rescue 1/15/20  Yes Delio Melendez MD   albuterol-ipratropium (DUO-NEB) 2.5 mg-0.5 mg/3 mL nebulizer solution Take 3 mLs by nebulization every 6 (six) hours as needed for Wheezing or Shortness of Breath. Rescue 20 Yes Michelle Matthew NP   ARTIFICIAL TEARS,HYPROMELLOSE, OPHT Place 1 drop into both eyes 3 (three) times daily.   Yes Historical Provider, MD   calcitRIOL (ROCALTROL) 0.5 MCG Cap Take 1 capsule (0.5 mcg total) by mouth every evening. 20  Yes Michelle Matthew NP   clonazePAM (KLONOPIN) 0.5 MG tablet Take 1 tablet (0.5 mg total) by mouth once daily. 4/15/20  Yes Delio Melendez MD   DULoxetine (CYMBALTA) 60 MG capsule Take 1 capsule (60 mg total) by mouth every evening. 20  Yes Michelle Matthew NP   FLUoxetine 40 MG capsule Take 1 capsule (40 mg total) by mouth once daily. 20 Yes Michelle Matthew NP   fluticasone-umeclidin-vilanter (TRELEGY  ELLIPTA) 100-62.5-25 mcg DsDv Inhale 1 puff into the lungs once daily. 5/14/20  Yes Teresa Bryan NP   gabapentin (NEURONTIN) 400 MG capsule Take 1 capsule (400 mg total) by mouth once daily. 6/16/20  Yes Michelle Matthew NP   gabapentin (NEURONTIN) 800 MG tablet Take 1 tablet (800 mg total) by mouth every evening. In evening 6/16/20  Yes Michelle Matthew NP   ibuprofen (ADVIL,MOTRIN) 400 MG tablet Take 400 mg by mouth every 8 (eight) hours as needed for Other.   Yes Historical Provider, MD   insulin aspart U-100 (NOVOLOG) 100 unit/mL injection Inject 2-8 Units into the skin 4 (four) times daily as needed for High Blood Sugar.   Yes Historical Provider, MD   lactulose (CHRONULAC) 10 gram/15 mL solution Take 20 g by mouth daily as needed.  4/23/19  Yes Historical Provider, MD   lamoTRIgine (LAMICTAL) 150 MG Tab Take 1 tablet (150 mg total) by mouth 2 (two) times daily. 6/16/20 9/14/20 Yes Michelle Matthew NP   latanoprost 0.005 % ophthalmic solution Place 1 drop into both eyes every evening.  10/23/17  Yes Historical Provider, MD   LEVEMIR FLEXTOUCH U-100 INSULN 100 unit/mL (3 mL) InPn pen Inject 65 Units into the skin 2 (two) times daily.  2/29/20  Yes Historical Provider, MD   levothyroxine (SYNTHROID) 150 MCG tablet Take 1 tablet (150 mcg total) by mouth before breakfast. 6/16/20  Yes Michelle Matthew NP   linaCLOtide (LINZESS) 290 mcg Cap capsule Take 1 capsule (290 mcg total) by mouth once daily. 6/16/20  Yes Michelle Matthew NP   losartan-hydrochlorothiazide 50-12.5 mg (HYZAAR) 50-12.5 mg per tablet Take 0.5 tablets by mouth once daily. 2/26/20 6/26/20 Yes Delio Melendez MD   meclizine (ANTIVERT) 25 mg tablet Take 25 mg by mouth 2 (two) times daily as needed for Dizziness.  3/4/20  Yes Historical Provider, MD   methocarbamoL (ROBAXIN) 750 MG Tab Take 1 tablet (750 mg total) by mouth 2 (two) times daily as needed. 5/26/20  Yes Teresa Bryan NP   ondansetron (ZOFRAN-ODT) 4 MG TbDL  "Take 4 mg by mouth every 6 (six) hours as needed (nausea).   Yes Historical Provider, MD   oxyCODONE (ROXICODONE) 10 mg Tab immediate release tablet Take 10 mg by mouth 3 (three) times daily.  11/10/15  Yes Historical Provider, MD   pantoprazole (PROTONIX) 40 MG tablet Take 1 tablet (40 mg total) by mouth once daily. 6/16/20 9/14/20 Yes Michelle Matthew NP   potassium chloride SA (K-DUR,KLOR-CON) 10 MEQ tablet Take 1 tablet (10 mEq total) by mouth 3 (three) times a week. Mon , Wed, and Fri. 6/17/20  Yes Michelle Matthew NP   predniSONE (DELTASONE) 10 MG tablet Take 1 tablet (10 mg total) by mouth once daily. 6/16/20  Yes Michelle Matthew NP   rOPINIRole (REQUIP) 4 MG tablet Take 1 tablet (4 mg total) by mouth 2 (two) times daily. 6/16/20  Yes Michelle Matthew NP   temazepam (RESTORIL) 15 mg Cap Take 2 capsules (30 mg total) by mouth nightly as needed.  Patient taking differently: Take 15 mg by mouth nightly as needed.  6/16/20 12/13/20 Yes Michelle Matthew NP   ACCU-CHEK LIBERTY PLUS TEST STRP Strp  4/14/20   Historical Provider, MD         PHYSICAL EXAM:   /60   Pulse 92   Resp 20   Ht 5' 3" (1.6 m)   Wt 97.5 kg (215 lb)   SpO2 100%   BMI 38.09 kg/m²   Vitals Reviewed  General appearance:  White female in mild respiratory distress.  Skin: No Rash.   Neuro: Motor and sensory exams grossly intact. Good tone. Power in all 4 extremities 5/5.   HENT: Atraumatic head. Moist mucous membranes of oral cavity.  Eyes: Normal extraocular movements.   Neck: Supple. No evidence of lymphadenopathy. No thyroidomegaly.  Lungs: I can hardly appreciate any air movement in her chest. No wheezing  Heart: Regular rate and rhythm. S1 and S2 present with no murmurs/gallop/rub. No pedal edema. No JVD present.   Abdomen: Soft, non-distended, non-tender. No rebound tenderness/guarding. No masses or organomegaly. Bowel sounds are normal. Bladder is not palpable.   Extremities: No cyanosis, clubbing.  Psych/mental " status: Alert and oriented. Cooperative. Responds appropriately to questions.   EMERGENCY DEPARTMENT LABS AND IMAGING:     Labs Reviewed   CBC W/ AUTO DIFFERENTIAL - Abnormal; Notable for the following components:       Result Value    Platelets 356 (*)     Immature Granulocytes 1.0 (*)     Gran # (ANC) 8.4 (*)     Immature Grans (Abs) 0.11 (*)     Gran% 77.2 (*)     Lymph% 16.6 (*)     All other components within normal limits   COMPREHENSIVE METABOLIC PANEL - Abnormal; Notable for the following components:    CO2 30 (*)     Glucose 194 (*)     Calcium 8.4 (*)     All other components within normal limits   B-TYPE NATRIURETIC PEPTIDE   TROPONIN I   PROTIME-INR   D DIMER, QUANTITATIVE   B-TYPE NATRIURETIC PEPTIDE   TSH   URINALYSIS, REFLEX TO URINE CULTURE   SARS-COV-2 RNA AMPLIFICATION, QUAL   POCT GLUCOSE MONITORING CONTINUOUS       X-Ray Chest AP Portable   Final Result          ASSESSMENT & PLAN:   Alanis Mendieta is a 69 y.o. female admitted for    1.  Acute on chronic hypoxemic respiratory failure  2. COPD exacerbation  3.  Past history of cigarette smoking  4. diabetes mellitus  5.  Mood disorders  6.  Diabetic neuropathy  7.  Obesity  8.  Restless leg syndrome  9.  Gastroesophageal reflux disease  9.  [facility]    Plan admit to med [facility]  Consult Pulmonary Medicine for further recommendations  DuoNebs every 6 hr  Supplement oxygen as needed  IV Solu-Medrol 80 mg q.8 hours  Continue home medications  Echocardiography      DVT Prophylaxis: will be placed on Lovenox for DVT prophylaxis and will be advised to be as mobile as possible and sit in a chair as tolerated.   ________________________________________________________________________________    Discharge Planning and Disposition: No mobility needs. Ambulating well. Patient will be discharged in 48 hours  Face-to-Face encounter date: 06/26/2020  Encounter included review of the medical records, interviewing and examining the patient face-to-face,  discussion with family and other health care providers including emergency medicine physician, admission orders, interpreting lab/test results and formulating a plan of care.   Medical Decision Making during this encounter was  [_] Low Complexity  [_] Moderate Complexity  [x] High Complexity  _________________________________________________________________________________    INPATIENT LIST OF MEDICATIONS     Current Facility-Administered Medications:     acetaminophen tablet 650 mg, 650 mg, Oral, Q4H PRN, Darren Hinton MD    albuterol-ipratropium 2.5 mg-0.5 mg/3 mL nebulizer solution 3 mL, 3 mL, Nebulization, Q6H, Darren Hinton MD    clonazePAM tablet 0.5 mg, 0.5 mg, Oral, Daily, Darren Hinton MD    dextrose 50% injection 12.5 g, 12.5 g, Intravenous, PRN, Darren Hinton MD    dextrose 50% injection 12.5 g, 12.5 g, Intravenous, PRN, Darren Hinton MD    dextrose 50% injection 25 g, 25 g, Intravenous, PRN, Draren Hinton MD    dextrose 50% injection 25 g, 25 g, Intravenous, PRN, Darren Hinton MD    DULoxetine DR capsule 60 mg, 60 mg, Oral, QHS, Darren Hinton MD    enoxaparin injection 40 mg, 40 mg, Subcutaneous, Q24H, Darren Hinton MD    FLUoxetine capsule 40 mg, 40 mg, Oral, Daily, Darren Hinton MD    gabapentin capsule 400 mg, 400 mg, Oral, Daily, Darren Hinton MD    gabapentin tablet 800 mg, 800 mg, Oral, QHS, Darren Hinton MD    glucagon (human recombinant) injection 1 mg, 1 mg, Intramuscular, PRN, Draren Hinton MD    glucose chewable tablet 16 g, 16 g, Oral, PRN, Darren Hinton MD    glucose chewable tablet 24 g, 24 g, Oral, PRN, Darren Hinton MD    HYDROcodone-acetaminophen 5-325 mg per tablet 1 tablet, 1 tablet, Oral, Q6H PRN, Darren Hinton MD    insulin regular injection 1-18 Units, 1-18 Units, Subcutaneous, PRN, Darren Hinton MD    lamoTRIgine tablet 150 mg, 150 mg, Oral, BID, Darren FORD  MD Jamaal    [START ON 6/27/2020] levothyroxine tablet 150 mcg, 150 mcg, Oral, Before breakfast, Darren Hinton MD    melatonin tablet 9 mg, 9 mg, Oral, Nightly PRN, Darren Hinton MD    methylPREDNISolone sodium succinate injection 40 mg, 40 mg, Intravenous, Q8H, Darren Hinton MD    ondansetron disintegrating tablet 8 mg, 8 mg, Oral, Q8H PRN, Darren Hinton MD    oxyCODONE immediate release tablet 10 mg, 10 mg, Oral, TID, Darren Hinton MD    pantoprazole EC tablet 40 mg, 40 mg, Oral, Daily, Darren Hinton MD    rOPINIRole tablet 4 mg, 4 mg, Oral, BID, Darren Hinton MD    sodium chloride 0.9% flush 3 mL, 3 mL, Intravenous, Q6H PRN, Darren Hinton MD    Current Outpatient Medications:     albuterol (VENTOLIN HFA) 90 mcg/actuation inhaler, Inhale 2 puffs into the lungs every 6 (six) hours as needed for Wheezing. Rescue, Disp: 1 Inhaler, Rfl: 3    albuterol-ipratropium (DUO-NEB) 2.5 mg-0.5 mg/3 mL nebulizer solution, Take 3 mLs by nebulization every 6 (six) hours as needed for Wheezing or Shortness of Breath. Rescue, Disp: 360 mL, Rfl: 5    ARTIFICIAL TEARS,HYPROMELLOSE, OPHT, Place 1 drop into both eyes 3 (three) times daily., Disp: , Rfl:     calcitRIOL (ROCALTROL) 0.5 MCG Cap, Take 1 capsule (0.5 mcg total) by mouth every evening., Disp: 90 capsule, Rfl: 1    clonazePAM (KLONOPIN) 0.5 MG tablet, Take 1 tablet (0.5 mg total) by mouth once daily., Disp: 90 tablet, Rfl: 1    DULoxetine (CYMBALTA) 60 MG capsule, Take 1 capsule (60 mg total) by mouth every evening., Disp: 90 capsule, Rfl: 1    FLUoxetine 40 MG capsule, Take 1 capsule (40 mg total) by mouth once daily., Disp: 90 capsule, Rfl: 1    fluticasone-umeclidin-vilanter (TRELEGY ELLIPTA) 100-62.5-25 mcg DsDv, Inhale 1 puff into the lungs once daily., Disp: 3 each, Rfl: 1    gabapentin (NEURONTIN) 400 MG capsule, Take 1 capsule (400 mg total) by mouth once daily., Disp: 90 capsule, Rfl: 1    gabapentin  (NEURONTIN) 800 MG tablet, Take 1 tablet (800 mg total) by mouth every evening. In evening, Disp: 90 tablet, Rfl: 1    ibuprofen (ADVIL,MOTRIN) 400 MG tablet, Take 400 mg by mouth every 8 (eight) hours as needed for Other., Disp: , Rfl:     insulin aspart U-100 (NOVOLOG) 100 unit/mL injection, Inject 2-8 Units into the skin 4 (four) times daily as needed for High Blood Sugar., Disp: , Rfl:     lactulose (CHRONULAC) 10 gram/15 mL solution, Take 20 g by mouth daily as needed. , Disp: , Rfl:     lamoTRIgine (LAMICTAL) 150 MG Tab, Take 1 tablet (150 mg total) by mouth 2 (two) times daily., Disp: 180 tablet, Rfl: 1    latanoprost 0.005 % ophthalmic solution, Place 1 drop into both eyes every evening. , Disp: , Rfl:     LEVEMIR FLEXTOUCH U-100 INSULN 100 unit/mL (3 mL) InPn pen, Inject 65 Units into the skin 2 (two) times daily. , Disp: , Rfl:     levothyroxine (SYNTHROID) 150 MCG tablet, Take 1 tablet (150 mcg total) by mouth before breakfast., Disp: 90 tablet, Rfl: 1    linaCLOtide (LINZESS) 290 mcg Cap capsule, Take 1 capsule (290 mcg total) by mouth once daily., Disp: 90 capsule, Rfl: 1    losartan-hydrochlorothiazide 50-12.5 mg (HYZAAR) 50-12.5 mg per tablet, Take 0.5 tablets by mouth once daily., Disp: 90 tablet, Rfl: 1    meclizine (ANTIVERT) 25 mg tablet, Take 25 mg by mouth 2 (two) times daily as needed for Dizziness. , Disp: , Rfl:     methocarbamoL (ROBAXIN) 750 MG Tab, Take 1 tablet (750 mg total) by mouth 2 (two) times daily as needed., Disp: 60 tablet, Rfl: 2    ondansetron (ZOFRAN-ODT) 4 MG TbDL, Take 4 mg by mouth every 6 (six) hours as needed (nausea)., Disp: , Rfl:     oxyCODONE (ROXICODONE) 10 mg Tab immediate release tablet, Take 10 mg by mouth 3 (three) times daily. , Disp: , Rfl:     pantoprazole (PROTONIX) 40 MG tablet, Take 1 tablet (40 mg total) by mouth once daily., Disp: 90 tablet, Rfl: 1    potassium chloride SA (K-DUR,KLOR-CON) 10 MEQ tablet, Take 1 tablet (10 mEq total) by  mouth 3 (three) times a week. Mon , Wed, and Fri., Disp: 36 tablet, Rfl: 1    predniSONE (DELTASONE) 10 MG tablet, Take 1 tablet (10 mg total) by mouth once daily., Disp: 90 tablet, Rfl: 0    rOPINIRole (REQUIP) 4 MG tablet, Take 1 tablet (4 mg total) by mouth 2 (two) times daily., Disp: 180 tablet, Rfl: 1    temazepam (RESTORIL) 15 mg Cap, Take 2 capsules (30 mg total) by mouth nightly as needed. (Patient taking differently: Take 15 mg by mouth nightly as needed. ), Disp: 30 capsule, Rfl: 4    ACCU-CHEK LIBERTY PLUS TEST STRP Strp, , Disp: , Rfl:       Scheduled Meds:   albuterol-ipratropium  3 mL Nebulization Q6H    clonazePAM  0.5 mg Oral Daily    DULoxetine  60 mg Oral QHS    enoxaparin  40 mg Subcutaneous Q24H    FLUoxetine  40 mg Oral Daily    gabapentin  400 mg Oral Daily    gabapentin  800 mg Oral QHS    lamoTRIgine  150 mg Oral BID    [START ON 6/27/2020] levothyroxine  150 mcg Oral Before breakfast    methylPREDNISolone sodium succinate  40 mg Intravenous Q8H    oxyCODONE  10 mg Oral TID    pantoprazole  40 mg Oral Daily    rOPINIRole  4 mg Oral BID     Continuous Infusions:  PRN Meds:.acetaminophen, dextrose 50%, dextrose 50%, dextrose 50%, dextrose 50%, glucagon (human recombinant), glucose, glucose, HYDROcodone-acetaminophen, insulin regular, melatonin, ondansetron, sodium chloride 0.9%      Darren Hinton  [facility] Hospitalist  06/26/2020

## 2020-06-26 NOTE — LETTER
June 26, 2020      Michelle Matthew, NP  [address]  [address]  [place] [address]           [facility] - Pulmonology  [address]  [address]  [place] [address]  Phone: [phone]          Patient: Alanis Mendieta   MR Number: [id number]   YOB: 1950   Date of Visit: 6/26/2020       Dear Michelle Matthew:    Thank you for referring Alanis Mendieta to me for evaluation. Attached you will find relevant portions of my assessment and plan of care.    If you have questions, please do not hesitate to call me. I look forward to following Alanis Mendieta along with you.    Sincerely,    Sanya Reis MD    Enclosure  CC:  No Recipients    If you would like to receive this communication electronically, please contact [email or web address] or [phone] to request more information on [employer] Link access.    For providers and/or their staff who would like to refer a patient to [facility], please contact us through our one-stop-shop provider referral line, [facility], at [phone].    If you feel you have received this communication in error or would no longer like to receive these types of communications, please e-mail [email or web address]

## 2020-06-26 NOTE — PROGRESS NOTES
"  New Office Visit/Consultation Note *    Patient Name: Alanis Mendieta  MRN: [id number]  : 1950      Reason for visit: COPD    HPI:     2020 - Pt came to office for a first visit.  She has known COPD (which I suspect is severe).  Has been having increased SOB, CRUZ for the last few weeks worse over the last week.  In getting to my office she got so dyspneic that she got presyncopal.  She reports dyspnea at minimal exertion ("a few feet walking") that did not respond to a steroid ntaper ordered by her primary MD at home.  She is on TRELEGY and prn albuterol or duonebs.  She gets some relief from nebulizer but only lasts an hour or so.  After seeing her in the office we brought her to the ER for evaluation/admission for COPD exacerbation which is not responding to outpt management.    Past Medical History    Past Medical History:   Diagnosis Date    Allergy     Anxiety     Arthritis     Asthma     Bipolar disorder     Cancer     thyroid    Chronic back pain     COPD (chronic obstructive pulmonary disease)     Diabetes mellitus     Diabetes mellitus, type 2     Fibromyalgia     GERD (gastroesophageal reflux disease)     History of fall 2018    [place] (hard of hearing)     Hx of thyroid cancer     Hyperlipidemia     Hypertension     Hypothyroidism     Neuropathy     On home oxygen therapy     3 l/m     Restless leg syndrome     Sleep apnea     Urinary tract infection        Past Surgical History    Past Surgical History:   Procedure Laterality Date    APPENDECTOMY      BACK SURGERY      x 3    broken foot and ankle      CHOLECYSTECTOMY      EYE SURGERY Bilateral     cataract    HYSTERECTOMY      JOINT REPLACEMENT Left 2018    knee    KNEE ARTHROPLASTY Left 2018    Procedure: ARTHROPLASTY, KNEE;  Surgeon: Yariel Marin MD;  Location: [facility];  Service: Orthopedics;  Laterality: Left;    MYELOGRAPHY N/A 2019    Procedure: MYELOGRAM;  Surgeon: Sun " Diagnostic Provider;  Location: [facility] OR;  Service: General;  Laterality: N/A;    POSTERIOR REPAIR      SPINAL FUSION      x3 BACK, NECK X 4    TOTAL THYROIDECTOMY  2014       Medications      Current Outpatient Medications:     ACCU-CHEK LIBERTY PLUS TEST STRP Strp, , Disp: , Rfl:     albuterol (VENTOLIN HFA) 90 mcg/actuation inhaler, Inhale 2 puffs into the lungs every 6 (six) hours as needed for Wheezing. Rescue, Disp: 1 Inhaler, Rfl: 3    albuterol-ipratropium (DUO-NEB) 2.5 mg-0.5 mg/3 mL nebulizer solution, Take 3 mLs by nebulization every 6 (six) hours as needed for Wheezing or Shortness of Breath. Rescue, Disp: 360 mL, Rfl: 5    ARTIFICIAL TEARS,HYPROMELLOSE, OPHT, Place 1 drop into both eyes 3 (three) times daily., Disp: , Rfl:     calcitRIOL (ROCALTROL) 0.5 MCG Cap, Take 1 capsule (0.5 mcg total) by mouth every evening., Disp: 90 capsule, Rfl: 1    clonazePAM (KLONOPIN) 0.5 MG tablet, Take 1 tablet (0.5 mg total) by mouth once daily., Disp: 90 tablet, Rfl: 1    DULoxetine (CYMBALTA) 60 MG capsule, Take 1 capsule (60 mg total) by mouth every evening., Disp: 90 capsule, Rfl: 1    FLUoxetine 40 MG capsule, Take 1 capsule (40 mg total) by mouth once daily., Disp: 90 capsule, Rfl: 1    fluticasone-umeclidin-vilanter (TRELEGY ELLIPTA) 100-62.5-25 mcg DsDv, Inhale 1 puff into the lungs once daily., Disp: 3 each, Rfl: 1    gabapentin (NEURONTIN) 400 MG capsule, Take 1 capsule (400 mg total) by mouth once daily., Disp: 90 capsule, Rfl: 1    gabapentin (NEURONTIN) 800 MG tablet, Take 1 tablet (800 mg total) by mouth every evening. In evening, Disp: 90 tablet, Rfl: 1    ibuprofen (ADVIL,MOTRIN) 400 MG tablet, Take 400 mg by mouth every 8 (eight) hours as needed for Other., Disp: , Rfl:     insulin aspart U-100 (NOVOLOG) 100 unit/mL injection, Inject 2-8 Units into the skin 4 (four) times daily as needed for High Blood Sugar., Disp: , Rfl:     lactulose (CHRONULAC) 10 gram/15 mL solution, Take 20 g by  mouth daily as needed. , Disp: , Rfl:     lamoTRIgine (LAMICTAL) 150 MG Tab, Take 1 tablet (150 mg total) by mouth 2 (two) times daily., Disp: 180 tablet, Rfl: 1    latanoprost 0.005 % ophthalmic solution, Place 1 drop into both eyes every evening. , Disp: , Rfl:     LEVEMIR FLEXTOUCH U-100 INSULN 100 unit/mL (3 mL) InPn pen, Inject 60 Units into the skin 2 (two) times daily. , Disp: , Rfl:     levothyroxine (SYNTHROID) 150 MCG tablet, Take 1 tablet (150 mcg total) by mouth before breakfast., Disp: 90 tablet, Rfl: 1    linaCLOtide (LINZESS) 290 mcg Cap capsule, Take 1 capsule (290 mcg total) by mouth once daily., Disp: 90 capsule, Rfl: 1    meclizine (ANTIVERT) 25 mg tablet, Take 25 mg by mouth 2 (two) times daily as needed for Dizziness. , Disp: , Rfl:     methocarbamoL (ROBAXIN) 750 MG Tab, Take 1 tablet (750 mg total) by mouth 2 (two) times daily as needed., Disp: 60 tablet, Rfl: 2    ondansetron (ZOFRAN-ODT) 4 MG TbDL, Take 4 mg by mouth every 6 (six) hours as needed (nausea)., Disp: , Rfl:     oxyCODONE (ROXICODONE) 10 mg Tab immediate release tablet, Take 10 mg by mouth 3 (three) times daily. , Disp: , Rfl:     pantoprazole (PROTONIX) 40 MG tablet, Take 1 tablet (40 mg total) by mouth once daily., Disp: 90 tablet, Rfl: 1    potassium chloride SA (K-DUR,KLOR-CON) 10 MEQ tablet, Take 1 tablet (10 mEq total) by mouth 3 (three) times a week. Mon , Wed, and Fri., Disp: 36 tablet, Rfl: 1    predniSONE (DELTASONE) 10 MG tablet, Take 1 tablet (10 mg total) by mouth once daily., Disp: 90 tablet, Rfl: 0    rOPINIRole (REQUIP) 4 MG tablet, Take 1 tablet (4 mg total) by mouth 2 (two) times daily., Disp: 180 tablet, Rfl: 1    temazepam (RESTORIL) 15 mg Cap, Take 2 capsules (30 mg total) by mouth nightly as needed., Disp: 30 capsule, Rfl: 4    losartan-hydrochlorothiazide 50-12.5 mg (HYZAAR) 50-12.5 mg per tablet, Take 0.5 tablets by mouth once daily., Disp: 90 tablet, Rfl: 1    Allergies    Review of  "patient's allergies indicates:   Allergen Reactions    Morphine Itching    Tubersol [tuberculin ppd] Other (See Comments)     Site swells bad. Has to have chest xray       SocHx    Social History     Tobacco Use   Smoking Status Former Smoker    Packs/day: 1.00    Years: 30.00    Pack years: 30.00    Types: Cigarettes    Quit date: 2000    Years since quittin.1   Smokeless Tobacco Never Used       Social History     Substance and Sexual Activity   Alcohol Use No       Drug Use - no  Occupation - not working  Asbestos exposure - no  Pets - no    FMHx    Family History   Problem Relation Age of Onset    Diabetes Mother     Heart disease Mother     Hypertension Mother     Diabetes Father     Heart disease Father     Hypertension Father          Review of Systems  Review of Systems   Constitutional: Positive for malaise/fatigue. Negative for chills, diaphoresis, fever and weight loss.   HENT: Positive for hearing loss. Negative for congestion.    Eyes: Negative for pain.   Respiratory: Positive for cough and shortness of breath. Negative for hemoptysis, sputum production, wheezing and stridor.    Cardiovascular: Positive for chest pain ("tightness") and palpitations (at times). Negative for orthopnea, claudication, leg swelling and PND.   Gastrointestinal: Negative for abdominal pain, blood in stool, constipation, diarrhea, heartburn, nausea and vomiting.   Genitourinary: Negative for dysuria, frequency, hematuria and urgency.   Musculoskeletal: Negative for falls and myalgias.   Skin: Negative for itching and rash.   Neurological: Negative for dizziness, tingling, tremors, sensory change, speech change, focal weakness, seizures, loss of consciousness, weakness and headaches.   Psychiatric/Behavioral: Negative for depression, substance abuse and suicidal ideas. The patient is nervous/anxious.        Physical Exam    Vitals:    20 0926   BP: (!) (P) 146/88   Pulse: (P) 106   Temp: (P) 98.2 °F " (36.8 °C)   SpO2: (!) (P) 94%   Weight: (P) 97.5 kg (215 lb)       Physical Exam  Vitals signs and nursing note reviewed.   Constitutional:       General: She is not in acute distress.     Appearance: Normal appearance. She is well-developed. She is not ill-appearing, toxic-appearing or diaphoretic.      Comments: overweight   HENT:      Head: Normocephalic and atraumatic.      Right Ear: External ear normal.      Left Ear: External ear normal.      Nose: Nose normal.      Mouth/Throat:      Mouth: Mucous membranes are moist.      Pharynx: Oropharynx is clear.   Eyes:      Conjunctiva/sclera: Conjunctivae normal.      Pupils: Pupils are equal, round, and reactive to light.   Neck:      Musculoskeletal: Normal range of motion and neck supple.      Thyroid: No thyromegaly.      Vascular: No JVD.      Trachea: No tracheal deviation.   Cardiovascular:      Rate and Rhythm: Regular rhythm. Tachycardia present.      Pulses: Normal pulses.      Heart sounds: Normal heart sounds. No murmur. No friction rub. No gallop.    Pulmonary:      Effort: Pulmonary effort is normal. No respiratory distress.      Breath sounds: Normal breath sounds. No stridor. No wheezing, rhonchi or rales.      Comments: Decreased BS throughout  No wheezes  No acc m use  Dyspneic with minimal exertion  Chest:      Chest wall: No tenderness.   Abdominal:      General: Bowel sounds are normal. There is no distension.      Palpations: Abdomen is soft.      Tenderness: There is no abdominal tenderness.   Musculoskeletal: Normal range of motion.         General: No swelling, tenderness or deformity.      Right lower leg: No edema.      Left lower leg: No edema.   Lymphadenopathy:      Cervical: No cervical adenopathy.   Skin:     General: Skin is warm and dry.      Coloration: Skin is not jaundiced.      Findings: No bruising or lesion.   Neurological:      General: No focal deficit present.      Mental Status: She is alert and oriented to person, place,  and time. Mental status is at baseline.      Cranial Nerves: No cranial nerve deficit.   Psychiatric:         Mood and Affect: Mood normal.         Behavior: Behavior normal.         Thought Content: Thought content normal.         Judgment: Judgment normal.      Comments: At bit anxious         Labs    Lab Results   Component Value Date    WBC 10.86 06/26/2020    HGB 13.2 06/26/2020    HCT 40.1 06/26/2020     (H) 06/26/2020       Sodium   Date Value Ref Range Status   03/19/2020 141 136 - 145 mmol/L Final   04/03/2018 141 134 - 144 mmol/L      Potassium   Date Value Ref Range Status   03/19/2020 3.4 (L) 3.5 - 5.1 mmol/L Final     Chloride   Date Value Ref Range Status   03/19/2020 96 95 - 110 mmol/L Final   04/03/2018 95 (L) 98 - 110 mmol/L      CO2   Date Value Ref Range Status   03/19/2020 34 (H) 23 - 29 mmol/L Final     Glucose   Date Value Ref Range Status   03/19/2020 219 (H) 70 - 110 mg/dL Final   04/03/2018 80 70 - 99 mg/dL      BUN, Bld   Date Value Ref Range Status   03/19/2020 19 8 - 23 mg/dL Final     Creatinine   Date Value Ref Range Status   03/19/2020 0.7 0.5 - 1.4 mg/dL Final   04/03/2018 0.56 (L) 0.60 - 1.40 mg/dL      Calcium   Date Value Ref Range Status   03/19/2020 8.9 8.7 - 10.5 mg/dL Final     Total Protein   Date Value Ref Range Status   03/18/2020 7.0 6.0 - 8.4 g/dL Final     Albumin   Date Value Ref Range Status   03/18/2020 3.6 3.5 - 5.2 g/dL Final   04/03/2018 3.6 3.1 - 4.7 g/dL      Total Bilirubin   Date Value Ref Range Status   03/18/2020 0.5 0.1 - 1.0 mg/dL Final     Comment:     For infants and newborns, interpretation of results should be based  on gestational age, weight and in agreement with clinical  observations.  Premature Infant recommended reference ranges:  Up to 24 hours.............<8.0 mg/dL  Up to 48 hours............<12.0 mg/dL  3-5 days..................<15.0 mg/dL  6-29 days.................<15.0 mg/dL       Alkaline Phosphatase   Date Value Ref Range Status    03/18/2020 61 55 - 135 U/L Final     AST   Date Value Ref Range Status   03/18/2020 21 10 - 40 U/L Final     ALT   Date Value Ref Range Status   03/18/2020 17 10 - 44 U/L Final     Anion Gap   Date Value Ref Range Status   03/19/2020 11 8 - 16 mmol/L Final       Xrays        Impression/Plan    Problem List Items Addressed This Visit        Pulmonary    Chronic respiratory failure (Chronic)     · On home O2 4 LPM  · Watch sats and adjust as needed         COPD exacerbation     · Transfer to ER  · Will need to be admitted for further treatment as she is not responding to outpt treatment  · Will need further evaluation of her COPD once stabilized            Endocrine    Type 2 diabetes mellitus, with long-term current use of insulin     · Aware, watch BS with steroids         Obesity     · Would benefit from weight loss and exercise (pulm rehabilitation)           Other Visit Diagnoses     Chronic obstructive pulmonary disease with acute exacerbation              I have spent about 45 minutes with the patient taking the history and examining the patient.  We have discussed the diagnoses and current plan and all questions have been answered.  We have discussed the follow up plan.  The patient and family (if present) know to contact the office with any questions they may have.        Sanya Reis MD

## 2020-06-27 PROBLEM — J96.21 ACUTE ON CHRONIC RESPIRATORY FAILURE WITH HYPOXIA: Status: ACTIVE | Noted: 2020-03-19

## 2020-06-27 PROBLEM — J98.6 DIAPHRAGMATIC DISORDER: Status: ACTIVE | Noted: 2020-06-27

## 2020-06-27 PROBLEM — J44.89 ASTHMA-COPD OVERLAP SYNDROME: Status: ACTIVE | Noted: 2020-06-27

## 2020-06-27 PROBLEM — T17.908A ASPIRATION INTO AIRWAY: Status: ACTIVE | Noted: 2020-06-27

## 2020-06-27 PROBLEM — I70.0 CALCIFICATION OF AORTA: Status: ACTIVE | Noted: 2020-06-27

## 2020-06-27 LAB
ALBUMIN SERPL BCP-MCNC: 3.8 G/DL (ref 3.5–5.2)
ALP SERPL-CCNC: 70 U/L (ref 55–135)
ALT SERPL W/O P-5'-P-CCNC: 17 U/L (ref 10–44)
ANION GAP SERPL CALC-SCNC: 10 MMOL/L (ref 8–16)
AST SERPL-CCNC: 18 U/L (ref 10–40)
BASOPHILS # BLD AUTO: 0.02 K/UL (ref 0–0.2)
BASOPHILS NFR BLD: 0.2 % (ref 0–1.9)
BILIRUB SERPL-MCNC: 0.7 MG/DL (ref 0.1–1)
BUN SERPL-MCNC: 15 MG/DL (ref 8–23)
CALCIUM SERPL-MCNC: 8.4 MG/DL (ref 8.7–10.5)
CHLORIDE SERPL-SCNC: 94 MMOL/L (ref 95–110)
CHOLEST SERPL-MCNC: 242 MG/DL (ref 120–199)
CHOLEST/HDLC SERPL: 3.3 {RATIO} (ref 2–5)
CO2 SERPL-SCNC: 35 MMOL/L (ref 23–29)
CREAT SERPL-MCNC: 0.7 MG/DL (ref 0.5–1.4)
DIFFERENTIAL METHOD: ABNORMAL
EOSINOPHIL # BLD AUTO: 0 K/UL (ref 0–0.5)
EOSINOPHIL NFR BLD: 0 % (ref 0–8)
ERYTHROCYTE [DISTWIDTH] IN BLOOD BY AUTOMATED COUNT: 13.5 % (ref 11.5–14.5)
EST. GFR  (AFRICAN AMERICAN): >60 ML/MIN/1.73 M^2
EST. GFR  (NON AFRICAN AMERICAN): >60 ML/MIN/1.73 M^2
ESTIMATED AVG GLUCOSE: 194 MG/DL (ref 68–131)
GLUCOSE SERPL-MCNC: 193 MG/DL (ref 70–110)
GLUCOSE SERPL-MCNC: 255 MG/DL (ref 70–110)
GLUCOSE SERPL-MCNC: 266 MG/DL (ref 70–110)
GLUCOSE SERPL-MCNC: 354 MG/DL (ref 70–110)
GLUCOSE SERPL-MCNC: 369 MG/DL (ref 70–110)
HBA1C MFR BLD HPLC: 8.4 % (ref 4.5–6.2)
HCT VFR BLD AUTO: 40.8 % (ref 37–48.5)
HDLC SERPL-MCNC: 74 MG/DL (ref 40–75)
HDLC SERPL: 30.6 % (ref 20–50)
HGB BLD-MCNC: 13.3 G/DL (ref 12–16)
IMM GRANULOCYTES # BLD AUTO: 0.1 K/UL (ref 0–0.04)
IMM GRANULOCYTES NFR BLD AUTO: 0.8 % (ref 0–0.5)
LDLC SERPL CALC-MCNC: 149.8 MG/DL (ref 63–159)
LYMPHOCYTES # BLD AUTO: 2 K/UL (ref 1–4.8)
LYMPHOCYTES NFR BLD: 16 % (ref 18–48)
MAGNESIUM SERPL-MCNC: 2 MG/DL (ref 1.6–2.6)
MCH RBC QN AUTO: 30 PG (ref 27–31)
MCHC RBC AUTO-ENTMCNC: 32.6 G/DL (ref 32–36)
MCV RBC AUTO: 92 FL (ref 82–98)
MONOCYTES # BLD AUTO: 0.4 K/UL (ref 0.3–1)
MONOCYTES NFR BLD: 3.2 % (ref 4–15)
NEUTROPHILS # BLD AUTO: 10 K/UL (ref 1.8–7.7)
NEUTROPHILS NFR BLD: 79.8 % (ref 38–73)
NONHDLC SERPL-MCNC: 168 MG/DL
NRBC BLD-RTO: 0 /100 WBC
PLATELET # BLD AUTO: 385 K/UL (ref 150–350)
PMV BLD AUTO: 9.8 FL (ref 9.2–12.9)
POTASSIUM SERPL-SCNC: 4 MMOL/L (ref 3.5–5.1)
PROT SERPL-MCNC: 7.5 G/DL (ref 6–8.4)
RBC # BLD AUTO: 4.44 M/UL (ref 4–5.4)
SODIUM SERPL-SCNC: 139 MMOL/L (ref 136–145)
TRIGL SERPL-MCNC: 91 MG/DL (ref 30–150)
WBC # BLD AUTO: 12.56 K/UL (ref 3.9–12.7)

## 2020-06-27 PROCEDURE — 25000242 PHARM REV CODE 250 ALT 637 W/ HCPCS: Performed by: INTERNAL MEDICINE

## 2020-06-27 PROCEDURE — 99900035 HC TECH TIME PER 15 MIN (STAT)

## 2020-06-27 PROCEDURE — 85025 COMPLETE CBC W/AUTO DIFF WBC: CPT

## 2020-06-27 PROCEDURE — 36415 COLL VENOUS BLD VENIPUNCTURE: CPT

## 2020-06-27 PROCEDURE — 80053 COMPREHEN METABOLIC PANEL: CPT

## 2020-06-27 PROCEDURE — 96372 THER/PROPH/DIAG INJ SC/IM: CPT | Mod: 59

## 2020-06-27 PROCEDURE — 63600175 PHARM REV CODE 636 W HCPCS: Performed by: INTERNAL MEDICINE

## 2020-06-27 PROCEDURE — 94640 AIRWAY INHALATION TREATMENT: CPT

## 2020-06-27 PROCEDURE — 27000221 HC OXYGEN, UP TO 24 HOURS

## 2020-06-27 PROCEDURE — G0378 HOSPITAL OBSERVATION PER HR: HCPCS

## 2020-06-27 PROCEDURE — 83735 ASSAY OF MAGNESIUM: CPT

## 2020-06-27 PROCEDURE — 80061 LIPID PANEL: CPT

## 2020-06-27 PROCEDURE — 99204 PR OFFICE/OUTPT VISIT, NEW, LEVL IV, 45-59 MIN: ICD-10-PCS | Mod: ,,, | Performed by: INTERNAL MEDICINE

## 2020-06-27 PROCEDURE — 94761 N-INVAS EAR/PLS OXIMETRY MLT: CPT

## 2020-06-27 PROCEDURE — 99204 OFFICE O/P NEW MOD 45 MIN: CPT | Mod: ,,, | Performed by: INTERNAL MEDICINE

## 2020-06-27 PROCEDURE — 83036 HEMOGLOBIN GLYCOSYLATED A1C: CPT

## 2020-06-27 PROCEDURE — 25000003 PHARM REV CODE 250: Performed by: INTERNAL MEDICINE

## 2020-06-27 PROCEDURE — 82962 GLUCOSE BLOOD TEST: CPT

## 2020-06-27 PROCEDURE — 96376 TX/PRO/DX INJ SAME DRUG ADON: CPT

## 2020-06-27 RX ORDER — IPRATROPIUM BROMIDE AND ALBUTEROL SULFATE 2.5; .5 MG/3ML; MG/3ML
3 SOLUTION RESPIRATORY (INHALATION) EVERY 4 HOURS
Status: DISCONTINUED | OUTPATIENT
Start: 2020-06-27 | End: 2020-06-29

## 2020-06-27 RX ADMIN — METHYLPREDNISOLONE SODIUM SUCCINATE 40 MG: 40 INJECTION, POWDER, FOR SOLUTION INTRAMUSCULAR; INTRAVENOUS at 02:06

## 2020-06-27 RX ADMIN — GABAPENTIN 400 MG: 300 CAPSULE ORAL at 09:06

## 2020-06-27 RX ADMIN — IPRATROPIUM BROMIDE AND ALBUTEROL SULFATE 3 ML: .5; 3 SOLUTION RESPIRATORY (INHALATION) at 12:06

## 2020-06-27 RX ADMIN — FLUOXETINE 40 MG: 20 CAPSULE ORAL at 09:06

## 2020-06-27 RX ADMIN — PANTOPRAZOLE SODIUM 40 MG: 40 TABLET, DELAYED RELEASE ORAL at 05:06

## 2020-06-27 RX ADMIN — OXYCODONE HYDROCHLORIDE 10 MG: 5 TABLET ORAL at 08:06

## 2020-06-27 RX ADMIN — HUMAN INSULIN 2 UNITS: 100 INJECTION, SOLUTION SUBCUTANEOUS at 05:06

## 2020-06-27 RX ADMIN — DULOXETINE 60 MG: 30 CAPSULE, DELAYED RELEASE ORAL at 08:06

## 2020-06-27 RX ADMIN — IPRATROPIUM BROMIDE AND ALBUTEROL SULFATE 3 ML: .5; 3 SOLUTION RESPIRATORY (INHALATION) at 11:06

## 2020-06-27 RX ADMIN — ROPINIROLE 4 MG: 2 TABLET, FILM COATED ORAL at 09:06

## 2020-06-27 RX ADMIN — METHYLPREDNISOLONE SODIUM SUCCINATE 40 MG: 40 INJECTION, POWDER, FOR SOLUTION INTRAMUSCULAR; INTRAVENOUS at 09:06

## 2020-06-27 RX ADMIN — IPRATROPIUM BROMIDE AND ALBUTEROL SULFATE 3 ML: .5; 3 SOLUTION RESPIRATORY (INHALATION) at 02:06

## 2020-06-27 RX ADMIN — HUMAN INSULIN 10 UNITS: 100 INJECTION, SOLUTION SUBCUTANEOUS at 08:06

## 2020-06-27 RX ADMIN — GABAPENTIN 800 MG: 300 CAPSULE ORAL at 08:06

## 2020-06-27 RX ADMIN — METHYLPREDNISOLONE SODIUM SUCCINATE 40 MG: 40 INJECTION, POWDER, FOR SOLUTION INTRAMUSCULAR; INTRAVENOUS at 05:06

## 2020-06-27 RX ADMIN — LEVOTHYROXINE SODIUM 150 MCG: 25 TABLET ORAL at 05:06

## 2020-06-27 RX ADMIN — OXYCODONE HYDROCHLORIDE 10 MG: 5 TABLET ORAL at 09:06

## 2020-06-27 RX ADMIN — ROPINIROLE 4 MG: 2 TABLET, FILM COATED ORAL at 08:06

## 2020-06-27 RX ADMIN — HYDROCODONE BITARTRATE AND ACETAMINOPHEN 1 TABLET: 5; 325 TABLET ORAL at 09:06

## 2020-06-27 RX ADMIN — LAMOTRIGINE 150 MG: 100 TABLET ORAL at 09:06

## 2020-06-27 RX ADMIN — MELATONIN 9 MG: at 08:06

## 2020-06-27 RX ADMIN — IPRATROPIUM BROMIDE AND ALBUTEROL SULFATE 3 ML: .5; 3 SOLUTION RESPIRATORY (INHALATION) at 07:06

## 2020-06-27 RX ADMIN — OXYCODONE HYDROCHLORIDE 10 MG: 5 TABLET ORAL at 03:06

## 2020-06-27 RX ADMIN — HUMAN INSULIN 9 UNITS: 100 INJECTION, SOLUTION SUBCUTANEOUS at 08:06

## 2020-06-27 RX ADMIN — IPRATROPIUM BROMIDE AND ALBUTEROL SULFATE 3 ML: .5; 3 SOLUTION RESPIRATORY (INHALATION) at 08:06

## 2020-06-27 RX ADMIN — CLONAZEPAM 0.5 MG: 0.5 TABLET ORAL at 09:06

## 2020-06-27 RX ADMIN — LAMOTRIGINE 150 MG: 100 TABLET ORAL at 08:06

## 2020-06-27 RX ADMIN — ENOXAPARIN SODIUM 40 MG: 100 INJECTION SUBCUTANEOUS at 05:06

## 2020-06-27 RX ADMIN — HUMAN INSULIN 15 UNITS: 100 INJECTION, SOLUTION SUBCUTANEOUS at 12:06

## 2020-06-27 NOTE — CONSULTS
06/27/2020      Admit Date: 6/26/2020  Alanis Davalos Benjimatt  New Patient Consult    Chief Complaint   Patient presents with    Shortness of Breath     on o2 at 4L    Chest Pain       History of Present Illness:    Pt had asthma as teenager,smoked from [age], has had cough/wheeze/sob/chest tightness waldemar this yr. Worsens at night, and has had 3 exacerbations needing steroids this yr.  Started on trelegy few wks pta.     Pt ambulates with walker, no pets, no falls, uses 02 3-4 lpm at home.      Pt  Had neck surg x 3 - last 4 yrs ago,  Relates daily aspiration.      No pets, apartment no mold suspected.  Has chr sinus drip.  Uses ibuprofen regular,good sense smell.    Pt moving to [place] July7, lives in [place] - moved to this area to be near son.  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          Pt was seen by Dr Reis yesterday, was presyncopal due to sob.  Pt was on trelegy, uses nebs that only palliate for an hr.        PFSH:  Past Medical History:   Diagnosis Date    Allergy     Anxiety     Arthritis     Asthma     Bipolar disorder     Cancer     thyroid    Chronic back pain     COPD (chronic obstructive pulmonary disease)     Diabetes mellitus     Diabetes mellitus, type 2     Fibromyalgia     GERD (gastroesophageal reflux disease)     History of fall 4/26/2018    [place]     Hx of thyroid cancer     Hyperlipidemia     Hypertension     Hypothyroidism     Neuropathy     On home oxygen therapy     3 l/m 24/7    Restless leg syndrome     Sleep apnea     Urinary tract infection      Past Surgical History:   Procedure Laterality Date    APPENDECTOMY      BACK SURGERY      x 3    broken foot and ankle      CHOLECYSTECTOMY      EYE SURGERY Bilateral     cataract    HYSTERECTOMY      JOINT REPLACEMENT Left 09/17/2018    knee    KNEE ARTHROPLASTY Left 9/17/2018    Procedure: ARTHROPLASTY, KNEE;  Surgeon: Yariel Marin MD;  [facility];  Service: Orthopedics;   "Laterality: Left;    MYELOGRAPHY N/A 2019    Procedure: MYELOGRAM;  Surgeon: Sun Diagnostic Provider;  Location: [facility] OR;  Service: General;  Laterality: N/A;    POSTERIOR REPAIR      SPINAL FUSION      x3 BACK, NECK X 4    TOTAL THYROIDECTOMY       Social History     Tobacco Use    Smoking status: Former Smoker     Packs/day: 1.00     Years: 30.00     Pack years: 30.00     Types: Cigarettes     Quit date: 2000     Years since quittin.1    Smokeless tobacco: Never Used   Substance Use Topics    Alcohol use: No    Drug use: No     Family History   Problem Relation Age of Onset    Diabetes Mother     Heart disease Mother     Hypertension Mother     Diabetes Father     Heart disease Father     Hypertension Father      Review of patient's allergies indicates:   Allergen Reactions    Morphine Itching    Tubersol [tuberculin ppd] Other (See Comments)     Site swells bad. Has to have chest xray       Performance Status:Performance Status:The patient's activity level is mobility with asit devices.    Review of Systems:  a review of eleven systems covering constitutional, Psych, Eye, HEENT, Respiratory, Cardiac, GI, , Musculoskeletal, Endocrine, Dermatologicwas negative except the above mentioned abnormalities and for any pertinent findings as listed below:  pertinent positive as above, rest is good         Exam:Comprehensive exam done. /67   Pulse 87   Temp 98.1 °F (36.7 °C) (Oral)   Resp 16   Ht 5' 3" (1.6 m)   Wt 98.7 kg (217 lb 9.5 oz)   SpO2 100%   Breastfeeding No   BMI 38.55 kg/m²   Exam included Vitals as listed, and patient's appearance and affect and alertness and mood, oral exam for yeast and hygiene and pharynx lesions and Mallapatti (M) score, neck with inspection for jvd and masses and thyroid abnormalities and lymph nodes (supraclavicular and infraclavicular nodes also examined and noted if abn), chest exam included symmetry and effort and fremitus and " percussion and auscultation, cardiac exam included rhythm and gallops and murmur and rubs and jvd and edema, abdominal exam for mass and hepatosplenomegaly and tenderness and hernias and bowel sounds, Musculoskeletal exam with muscle tone and posture and mobility/gait and  strenght, and skin for rashes and cyanosis and pallor and turgor, extremity for clubbing.  Findings were normal except as listed below:  M3, jovial, faint ant wheezes, chest is symmetric, no distress, normal percussion, normal fremitus and no edema, good voice, no clubbing, rest good.    Radiographs reviewed: view by direct vision cxr no infiltrates, high right diaphragm, katarina aorta.      No results found for this or any previous visit.]    Labs     Recent Labs   Lab 06/27/20  0701   WBC 12.56   HGB 13.3   HCT 40.8   *   HGBA1C 8.4*     Recent Labs   Lab 06/27/20  0701      K 4.0   CL 94*   CO2 35*   BUN 15   CREATININE 0.7   *   CALCIUM 8.4*   MG 2.0   AST 18   ALT 17   ALKPHOS 70   BILITOT 0.7   PROT 7.5   ALBUMIN 3.8   No results for input(s): PH, PCO2, PO2, HCO3 in the last 24 hours.  Microbiology Results (last 7 days)     Procedure Component Value Units Date/Time    Urine culture [[id number]] Collected: 06/26/20 2212    Order Status: Completed Specimen: Urine Updated: 06/27/20 9252     Urine Culture, Routine Insufficient incubation, culture in progress    Narrative:      Preferred Collection Type->Urine, Clean Catch  Specimen Source->Urine          Impression:  Active Hospital Problems    Diagnosis  POA    *COPD exacerbation [J44.1]  Yes    Calcification of aorta [I70.0]  Yes     Defer to primary control of cholesterol and bp.         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               Plan:   Sinus rx, speech eval , asthma/copd rx, therapy as high risk fall.  Moving to [place] soon.

## 2020-06-27 NOTE — PLAN OF CARE
Problem: Adult Inpatient Plan of Care  Goal: Plan of Care Review  Outcome: Ongoing, Progressing  Goal: Absence of Hospital-Acquired Illness or Injury  Outcome: Ongoing, Progressing  Goal: Optimal Comfort and Wellbeing  Outcome: Ongoing, Progressing     Problem: Diabetes Comorbidity  Goal: Blood Glucose Level Within Desired Range  Outcome: Ongoing, Progressing     Problem: Fall Injury Risk  Goal: Absence of Fall and Fall-Related Injury  Outcome: Ongoing, Progressing     Problem: Skin Injury Risk Increased  Goal: Skin Health and Integrity  Outcome: Ongoing, Progressing

## 2020-06-27 NOTE — PLAN OF CARE
06/27/20 0856   Patient Assessment/Suction   Level of Consciousness (AVPU) alert   Respiratory Effort Unlabored;Normal   Expansion/Accessory Muscles/Retractions no use of accessory muscles   All Lung Fields Breath Sounds clear;diminished   Rhythm/Pattern, Respiratory unlabored;pattern regular;depth regular   Cough Frequency infrequent   Cough Type dry   PRE-TX-O2   O2 Device (Oxygen Therapy) nasal cannula   $ Is the patient on Low Flow Oxygen? Yes   Flow (L/min) 2   SpO2 97 %   Pulse Oximetry Type Intermittent   $ Pulse Oximetry - Multiple Charge Pulse Oximetry - Multiple   Pulse 86   Resp 16   Aerosol Therapy   $ Aerosol Therapy Charges Aerosol Treatment   Daily Review of Necessity (SVN) completed   Respiratory Treatment Status (SVN) given   Treatment Route (SVN) mask   Patient Position (SVN) semi-Pretty's   Post Treatment Assessment (SVN) increased aeration   Signs of Intolerance (SVN) none   Breath Sounds Post-Respiratory Treatment   Throughout All Fields Post-Treatment All Fields   Throughout All Fields Post-Treatment aeration increased   Post-treatment Heart Rate (beats/min) 96   Post-treatment Resp Rate (breaths/min) 16   Respiratory Evaluation   $ Care Plan Tech Time 15 min

## 2020-06-27 NOTE — PROGRESS NOTES
"[facility] Medicine Progress Note  Patient Name: Alanis Mendieta MRN: [id number]   Patient Class: OP- Observation  Length of Stay: 0   Admission Date: 6/26/2020 10:19 AM Attending Physician: Darren Hinton MD   Primary Care Provider: Delio Melendez MD Face-to-Face encounter date: 06/27/2020   Chief Complaint: Shortness of Breath (on o2 at 4L) and Chest Pain    Assessment & Plan:   Alanis Mendieta is a 69 y.o. female admitted for    1.  Acute on chronic hypoxemic respiratory failure  2. COPD exacerbation  3.  Past history of cigarette smoking  4. diabetes mellitus  5.  Mood disorders  6.  Diabetic neuropathy  7.  Obesity  8.  Restless leg syndrome  9.  Gastroesophageal reflux disease  9.  [facility]       Pulmonary medicine evaluated the patient  Recommended evaluation by speech therapy  DuoNebs every 6 hr  Supplement oxygen as needed  IV Solu-Medrol 80 mg q.8 hours  Continue home medications  Echocardiography pending    Subjective:    Interval History: Patient is doing fairly well. Breathing is much better. Family present at bedside.No concerns/issues overnight reported by the patient or the nursing staff.    Review of Systems All other Review of Systems were found to be negative expect for that mentioned already in HPI.   Objective:   Physical Exam  /67   Pulse 87   Temp 98.1 °F (36.7 °C) (Oral)   Resp 18   Ht 5' 3" (1.6 m)   Wt 98.7 kg (217 lb 9.5 oz)   SpO2 100%   Breastfeeding No   BMI 38.55 kg/m²   Vitals reviewed.    Constitutional: No distress.   HENT: Atraumatic.   Cardiovascular: Normal rate, regular rhythm and normal heart sounds.   Pulmonary/Chest: Effort normal. Good air entry bilaterally. No wheezing  Abdominal: Soft. Bowel sounds are normal. Exhibits no distension and no mass. No tenderness  Neurological: Alert.   Skin: Skin is warm and dry.     Following labs were Reviewed   Recent Labs   Lab 06/27/20  0701   WBC 12.56   HGB 13.3   HCT 40.8   PLT " 385*   CALCIUM 8.4*   ALBUMIN 3.8   PROT 7.5      K 4.0   CO2 35*   CL 94*   BUN 15   CREATININE 0.7   ALKPHOS 70   ALT 17   AST 18   BILITOT 0.7     No results found for: POCTGLUCOSE     All labs within the past 24 hours have been reviewed  Microbiology Results (last 7 days)     Procedure Component Value Units Date/Time    Urine culture [[id number]] Collected: 06/26/20 4511    Order Status: Completed Specimen: Urine Updated: 06/27/20 0758     Urine Culture, Routine Insufficient incubation, culture in progress    Narrative:      Preferred Collection Type->Urine, Clean Catch  Specimen Source->Urine        X-Ray Chest AP Portable   Final Result          Inpatient medications  Scheduled Meds:   albuterol-ipratropium  3 mL Nebulization Q4H    clonazePAM  0.5 mg Oral Daily    DULoxetine  60 mg Oral QHS    enoxaparin  40 mg Subcutaneous Q24H    FLUoxetine  40 mg Oral Daily    gabapentin  400 mg Oral Daily    gabapentin  800 mg Oral QHS    lamoTRIgine  150 mg Oral BID    levothyroxine  150 mcg Oral Before breakfast    methylPREDNISolone sodium succinate  40 mg Intravenous Q8H    oxyCODONE  10 mg Oral TID    pantoprazole  40 mg Oral Daily    rOPINIRole  4 mg Oral BID     Continuous Infusions:  PRN Meds:.acetaminophen, dextrose 50%, dextrose 50%, dextrose 50%, dextrose 50%, dextrose 50%, dextrose 50%, glucagon (human recombinant), glucose, glucose, HYDROcodone-acetaminophen, insulin regular, melatonin, ondansetron, sodium chloride 0.9%    Above encounter included review of the medical records, interviewing and examining the patient face-to-face, discussion with family and other health care providers, ordering and interpreting lab/test results and formulating a plan of care.     Medical Decision Making:    [] Low Complexity  [x] Moderate Complexity  [] High Complexity    Darren Hinton  [facility] Hospitalist  06/27/2020

## 2020-06-27 NOTE — RESPIRATORY THERAPY
"   06/26/20 1935   Patient Assessment/Suction   Level of Consciousness (AVPU) alert   Respiratory Effort Normal;Unlabored   Expansion/Accessory Muscles/Retractions no use of accessory muscles;no retractions;expansion symmetric   All Lung Fields Breath Sounds Anterior:;wheezes, high-pitched;diminished   LUIS MIGUEL Breath Sounds clear   LLL Breath Sounds diminished   RUL Breath Sounds clear;diminished   RML Breath Sounds diminished   RLL Breath Sounds diminished   Rhythm/Pattern, Respiratory unlabored;pattern regular;depth regular   PRE-TX-O2   O2 Device (Oxygen Therapy) nasal cannula   $ Is the patient on Low Flow Oxygen? Yes   Flow (L/min) 2   Oxygen Concentration (%) 28   SpO2 98 %   Pulse Oximetry Type Intermittent   $ Pulse Oximetry - Single Charge Pulse Oximetry - Single   Pulse 90   Resp 17   Aerosol Therapy   $ Aerosol Therapy Charges Aerosol Treatment   Daily Review of Necessity (SVN) completed   Respiratory Treatment Status (SVN) given   Treatment Route (SVN) mask   Patient Position (SVN) semi-Pretty's   Post Treatment Assessment (SVN) increased aeration   Signs of Intolerance (SVN) none   Breath Sounds Post-Respiratory Treatment   Throughout All Fields Post-Treatment All Fields   Throughout All Fields Post-Treatment aeration increased   Post-treatment Heart Rate (beats/min) 89   Post-treatment Resp Rate (breaths/min) 18   Ready to Wean/Extubation Screen   FIO2<=50 (chart decimal) 0.28   Respiratory Evaluation   $ Care Plan Tech Time 15 min   Evaluation For New Orders     Patient states she was diagnosed with sleep apnea through a sleep study in 2012; however, she was not granted CPAP equipment. She also complained that she did not get much sleep last night due to being "air hungry" and "not being able to breath."  "

## 2020-06-28 LAB
ANION GAP SERPL CALC-SCNC: 12 MMOL/L (ref 8–16)
BASOPHILS # BLD AUTO: 0.02 K/UL (ref 0–0.2)
BASOPHILS NFR BLD: 0.2 % (ref 0–1.9)
BUN SERPL-MCNC: 20 MG/DL (ref 8–23)
CALCIUM SERPL-MCNC: 8.9 MG/DL (ref 8.7–10.5)
CHLORIDE SERPL-SCNC: 95 MMOL/L (ref 95–110)
CO2 SERPL-SCNC: 31 MMOL/L (ref 23–29)
CREAT SERPL-MCNC: 0.7 MG/DL (ref 0.5–1.4)
DIFFERENTIAL METHOD: ABNORMAL
EOSINOPHIL # BLD AUTO: 0 K/UL (ref 0–0.5)
EOSINOPHIL NFR BLD: 0 % (ref 0–8)
ERYTHROCYTE [DISTWIDTH] IN BLOOD BY AUTOMATED COUNT: 13.7 % (ref 11.5–14.5)
EST. GFR  (AFRICAN AMERICAN): >60 ML/MIN/1.73 M^2
EST. GFR  (NON AFRICAN AMERICAN): >60 ML/MIN/1.73 M^2
GLUCOSE SERPL-MCNC: 220 MG/DL (ref 70–110)
GLUCOSE SERPL-MCNC: 316 MG/DL (ref 70–110)
GLUCOSE SERPL-MCNC: 318 MG/DL (ref 70–110)
GLUCOSE SERPL-MCNC: 353 MG/DL (ref 70–110)
GLUCOSE SERPL-MCNC: 363 MG/DL (ref 70–110)
HCT VFR BLD AUTO: 39.7 % (ref 37–48.5)
HGB BLD-MCNC: 12.8 G/DL (ref 12–16)
IMM GRANULOCYTES # BLD AUTO: 0.11 K/UL (ref 0–0.04)
IMM GRANULOCYTES NFR BLD AUTO: 1 % (ref 0–0.5)
LYMPHOCYTES # BLD AUTO: 1.6 K/UL (ref 1–4.8)
LYMPHOCYTES NFR BLD: 14.1 % (ref 18–48)
MAGNESIUM SERPL-MCNC: 2 MG/DL (ref 1.6–2.6)
MCH RBC QN AUTO: 29.2 PG (ref 27–31)
MCHC RBC AUTO-ENTMCNC: 32.2 G/DL (ref 32–36)
MCV RBC AUTO: 91 FL (ref 82–98)
MONOCYTES # BLD AUTO: 0.3 K/UL (ref 0.3–1)
MONOCYTES NFR BLD: 3 % (ref 4–15)
NEUTROPHILS # BLD AUTO: 9.3 K/UL (ref 1.8–7.7)
NEUTROPHILS NFR BLD: 81.7 % (ref 38–73)
NRBC BLD-RTO: 0 /100 WBC
PLATELET # BLD AUTO: 378 K/UL (ref 150–350)
PMV BLD AUTO: 10 FL (ref 9.2–12.9)
POTASSIUM SERPL-SCNC: 4.9 MMOL/L (ref 3.5–5.1)
RBC # BLD AUTO: 4.38 M/UL (ref 4–5.4)
SODIUM SERPL-SCNC: 138 MMOL/L (ref 136–145)
WBC # BLD AUTO: 11.39 K/UL (ref 3.9–12.7)

## 2020-06-28 PROCEDURE — 92610 EVALUATE SWALLOWING FUNCTION: CPT

## 2020-06-28 PROCEDURE — 97161 PT EVAL LOW COMPLEX 20 MIN: CPT

## 2020-06-28 PROCEDURE — 25000003 PHARM REV CODE 250: Performed by: INTERNAL MEDICINE

## 2020-06-28 PROCEDURE — 63600175 PHARM REV CODE 636 W HCPCS: Performed by: INTERNAL MEDICINE

## 2020-06-28 PROCEDURE — 96376 TX/PRO/DX INJ SAME DRUG ADON: CPT

## 2020-06-28 PROCEDURE — 27000221 HC OXYGEN, UP TO 24 HOURS

## 2020-06-28 PROCEDURE — 36415 COLL VENOUS BLD VENIPUNCTURE: CPT

## 2020-06-28 PROCEDURE — 85025 COMPLETE CBC W/AUTO DIFF WBC: CPT

## 2020-06-28 PROCEDURE — 80048 BASIC METABOLIC PNL TOTAL CA: CPT

## 2020-06-28 PROCEDURE — 83735 ASSAY OF MAGNESIUM: CPT

## 2020-06-28 PROCEDURE — 99900035 HC TECH TIME PER 15 MIN (STAT)

## 2020-06-28 PROCEDURE — 25000242 PHARM REV CODE 250 ALT 637 W/ HCPCS: Performed by: INTERNAL MEDICINE

## 2020-06-28 PROCEDURE — 99214 OFFICE O/P EST MOD 30 MIN: CPT | Mod: ,,, | Performed by: INTERNAL MEDICINE

## 2020-06-28 PROCEDURE — 82962 GLUCOSE BLOOD TEST: CPT | Mod: 91

## 2020-06-28 PROCEDURE — G0378 HOSPITAL OBSERVATION PER HR: HCPCS | Mod: CS

## 2020-06-28 PROCEDURE — 99214 PR OFFICE/OUTPT VISIT, EST, LEVL IV, 30-39 MIN: ICD-10-PCS | Mod: ,,, | Performed by: INTERNAL MEDICINE

## 2020-06-28 PROCEDURE — 96372 THER/PROPH/DIAG INJ SC/IM: CPT | Mod: 59

## 2020-06-28 PROCEDURE — 97116 GAIT TRAINING THERAPY: CPT

## 2020-06-28 PROCEDURE — 94761 N-INVAS EAR/PLS OXIMETRY MLT: CPT

## 2020-06-28 PROCEDURE — 94640 AIRWAY INHALATION TREATMENT: CPT

## 2020-06-28 RX ADMIN — ROPINIROLE 4 MG: 2 TABLET, FILM COATED ORAL at 09:06

## 2020-06-28 RX ADMIN — METHYLPREDNISOLONE SODIUM SUCCINATE 40 MG: 40 INJECTION, POWDER, FOR SOLUTION INTRAMUSCULAR; INTRAVENOUS at 01:06

## 2020-06-28 RX ADMIN — HUMAN INSULIN 10 UNITS: 100 INJECTION, SOLUTION SUBCUTANEOUS at 09:06

## 2020-06-28 RX ADMIN — PANTOPRAZOLE SODIUM 40 MG: 40 TABLET, DELAYED RELEASE ORAL at 05:06

## 2020-06-28 RX ADMIN — IPRATROPIUM BROMIDE AND ALBUTEROL SULFATE 3 ML: .5; 3 SOLUTION RESPIRATORY (INHALATION) at 11:06

## 2020-06-28 RX ADMIN — LAMOTRIGINE 150 MG: 100 TABLET ORAL at 09:06

## 2020-06-28 RX ADMIN — OXYCODONE HYDROCHLORIDE 10 MG: 5 TABLET ORAL at 03:06

## 2020-06-28 RX ADMIN — LEVOTHYROXINE SODIUM 150 MCG: 25 TABLET ORAL at 05:06

## 2020-06-28 RX ADMIN — FLUOXETINE 40 MG: 20 CAPSULE ORAL at 09:06

## 2020-06-28 RX ADMIN — CLONAZEPAM 0.5 MG: 0.5 TABLET ORAL at 09:06

## 2020-06-28 RX ADMIN — GABAPENTIN 400 MG: 300 CAPSULE ORAL at 09:06

## 2020-06-28 RX ADMIN — METHYLPREDNISOLONE SODIUM SUCCINATE 40 MG: 40 INJECTION, POWDER, FOR SOLUTION INTRAMUSCULAR; INTRAVENOUS at 09:06

## 2020-06-28 RX ADMIN — METHYLPREDNISOLONE SODIUM SUCCINATE 40 MG: 40 INJECTION, POWDER, FOR SOLUTION INTRAMUSCULAR; INTRAVENOUS at 05:06

## 2020-06-28 RX ADMIN — OXYCODONE HYDROCHLORIDE 10 MG: 5 TABLET ORAL at 09:06

## 2020-06-28 RX ADMIN — ENOXAPARIN SODIUM 40 MG: 100 INJECTION SUBCUTANEOUS at 04:06

## 2020-06-28 RX ADMIN — HUMAN INSULIN 4 UNITS: 100 INJECTION, SOLUTION SUBCUTANEOUS at 04:06

## 2020-06-28 RX ADMIN — IPRATROPIUM BROMIDE AND ALBUTEROL SULFATE 3 ML: .5; 3 SOLUTION RESPIRATORY (INHALATION) at 03:06

## 2020-06-28 RX ADMIN — IPRATROPIUM BROMIDE AND ALBUTEROL SULFATE 3 ML: .5; 3 SOLUTION RESPIRATORY (INHALATION) at 08:06

## 2020-06-28 RX ADMIN — IPRATROPIUM BROMIDE AND ALBUTEROL SULFATE 3 ML: .5; 3 SOLUTION RESPIRATORY (INHALATION) at 04:06

## 2020-06-28 RX ADMIN — GABAPENTIN 800 MG: 300 CAPSULE ORAL at 09:06

## 2020-06-28 RX ADMIN — HUMAN INSULIN 8 UNITS: 100 INJECTION, SOLUTION SUBCUTANEOUS at 08:06

## 2020-06-28 RX ADMIN — HUMAN INSULIN 10 UNITS: 100 INJECTION, SOLUTION SUBCUTANEOUS at 12:06

## 2020-06-28 RX ADMIN — DULOXETINE 60 MG: 30 CAPSULE, DELAYED RELEASE ORAL at 09:06

## 2020-06-28 NOTE — NURSING
Pt states she is upset about her breathing.  RT was here and pt just received a tx.  Care discussed with pt and RT.  RT states breathing tx increased and she plans to be in pt room frequently.  Will monitor.  Pt states she might go home Monday.

## 2020-06-28 NOTE — PT/OT/SLP EVAL
Physical Therapy Evaluation    Patient Name:  Alanis Mendieta   MRN:  [id number]    Recommendations:     Discharge Recommendations:  home health PT, home with home health   Discharge Equipment Recommendations: none   Barriers to discharge: None    Assessment:     Alanis Mendieta is a 69 y.o. female admitted with a medical diagnosis of COPD exacerbation.  Patient admitted w/ SOB and dyspnea on exertion. She presents with the following impairments/functional limitations:  weakness, impaired endurance, impaired self care skills, impaired functional mobilty, gait instability, impaired balance, decreased lower extremity function, pain, impaired cardiopulmonary response to activity.  Patient agreeable to gait training.    Rehab Prognosis: Fair; patient would benefit from acute skilled PT services to address these deficits and reach maximum level of function.    Recent Surgery: * No surgery found *      Plan:     During this hospitalization, patient to be seen 6 x/week to address the identified rehab impairments via gait training, therapeutic activities, therapeutic exercises and progress toward the following goals:    · Plan of Care Expires:  07/12/20    Subjective     Chief Complaint: generalized weakness  Patient/Family Comments/goals: improve overall strength/mobility  Pain/Comfort:  · Pain Rating 1: 7/10  · Location - Side 1: Bilateral  · Location - Orientation 1: lower  · Location 1: back  · Pain Addressed 1: Cessation of Activity  · Pain Rating Post-Intervention 1: 5/10  · Pain Rating 2: 0/10    Patients cultural, spiritual, Nondenominational conflicts given the current situation:      Living Environment:  Lives alone in ground floor apartment (3 steps out the back, one step in the front).  Has sitter for 7 hrs/day, 7 days/wk.  Prior to admission, patients level of function was supervision needed.  Equipment used at home: rollator, wheelchair, bedside commode, oxygen, bath bench.  DME owned (not currently used):  none.  Upon discharge, patient will have assistance from sitter.    Objective:     Communicated with nurse prior to session.  Patient found up in chair with oxygen, peripheral IV  upon PT entry to room.    General Precautions: Standard, fall   Orthopedic Precautions:N/A   Braces: N/A     Exams:  · RLE ROM: WFL  · RLE Strength: Deficits: grossly 4/5  · LLE ROM: WFL  · LLE Strength: Deficits: grossly 4/5    Functional Mobility:  · Bed Mobility:     · Sit to Supine: stand by assistance  · Transfers:     · Sit to Stand:  stand by assistance with rolling walker  · Gait: 75 ft w/ RW and CGA/3 liters of oxy    Therapeutic Activities and Exercises:   n/a    AM-PAC 6 CLICK MOBILITY  Total Score:16     Patient left supine with all lines intact and call button in reach.    GOALS:   Multidisciplinary Problems     Physical Therapy Goals        Problem: Physical Therapy Goal    Goal Priority Disciplines Outcome Goal Variances Interventions   Physical Therapy Goal     PT, PT/OT      Description: Goals to be met by: 2020     Patient will increase functional independence with mobility by performin. Gait  x 125 feet with Contact Guard Assistance/oxy using Rolling Walker.   2. Ascend/descend 3 stairs with bilateral Handrails Minimal Assistance.   3. Lower extremity exercise program x 20 reps, with supervision                     History:     Past Medical History:   Diagnosis Date    Allergy     Anxiety     Arthritis     Asthma     Bipolar disorder     Cancer     thyroid    Chronic back pain     COPD (chronic obstructive pulmonary disease)     Diabetes mellitus     Diabetes mellitus, type 2     Fibromyalgia     GERD (gastroesophageal reflux disease)     History of fall 2018    [place] (hard of hearing)     Hx of thyroid cancer     Hyperlipidemia     Hypertension     Hypothyroidism     Neuropathy     On home oxygen therapy     3 l/m 24/7    Restless leg syndrome     Sleep apnea     Urinary tract  infection        Past Surgical History:   Procedure Laterality Date    APPENDECTOMY      BACK SURGERY      x 3    broken foot and ankle      CHOLECYSTECTOMY      EYE SURGERY Bilateral     cataract    HYSTERECTOMY      JOINT REPLACEMENT Left 09/17/2018    knee    KNEE ARTHROPLASTY Left 9/17/2018    Procedure: ARTHROPLASTY, KNEE;  Surgeon: Yariel Marin MD;  Location: [facility];  Service: Orthopedics;  Laterality: Left;    MYELOGRAPHY N/A 8/7/2019    Procedure: MYELOGRAM;  Surgeon: Sun Diagnostic Provider;  Location: [facility] OR;  Service: General;  Laterality: N/A;    POSTERIOR REPAIR      SPINAL FUSION      x3 BACK, NECK X 4    TOTAL THYROIDECTOMY  2014       Time Tracking:     PT Received On: 06/28/20  PT Start Time: 0850     PT Stop Time: 0920  PT Total Time (min): 30 min     Billable Minutes: Evaluation 15 and Gait Training 15      Camden Arshad, PT  06/28/2020

## 2020-06-28 NOTE — PLAN OF CARE
Goals to be met by: 2020     Patient will increase functional independence with mobility by performin. Gait  x 125 feet with Contact Guard Assistance/oxy using Rolling Walker.   2. Ascend/descend 3 stairs with bilateral Handrails Minimal Assistance.   3. Lower extremity exercise program x 20 reps, with supervision

## 2020-06-28 NOTE — NURSING
Pt medicated for pain.  Pt states she is shaky.  Pt reminded of side effects of steroids.  Pt voiced understanding. Pt states she is praying to calm her anxiety.

## 2020-06-28 NOTE — PLAN OF CARE
Problem: Adult Inpatient Plan of Care  Goal: Plan of Care Review  Outcome: Ongoing, Progressing  Goal: Patient-Specific Goal (Individualization)  Outcome: Ongoing, Progressing  Goal: Absence of Hospital-Acquired Illness or Injury  Outcome: Ongoing, Progressing  Goal: Optimal Comfort and Wellbeing  Outcome: Ongoing, Progressing  Goal: Readiness for Transition of Care  Outcome: Ongoing, Progressing     Problem: Diabetes Comorbidity  Goal: Blood Glucose Level Within Desired Range  Outcome: Ongoing, Progressing     Problem: Fall Injury Risk  Goal: Absence of Fall and Fall-Related Injury  Outcome: Ongoing, Progressing     Problem: Skin Injury Risk Increased  Goal: Skin Health and Integrity  Outcome: Ongoing, Progressing

## 2020-06-28 NOTE — RESPIRATORY THERAPY
06/28/20 0428   Patient Assessment/Suction   Level of Consciousness (AVPU) alert   Respiratory Effort Unlabored;Normal   Expansion/Accessory Muscles/Retractions no use of accessory muscles;no retractions;expansion symmetric   All Lung Fields Breath Sounds Anterior:;clear   LUIS MIGUEL Breath Sounds clear   LLL Breath Sounds diminished;clear   RUL Breath Sounds diminished;clear   RML Breath Sounds clear   RLL Breath Sounds diminished   Rhythm/Pattern, Respiratory unlabored;pattern regular;depth regular   Cough Frequency infrequent   Cough Type good   Secretions Amount small   Secretions Color clear   Secretions Characteristics thin   PRE-TX-O2   O2 Device (Oxygen Therapy) nasal cannula w/ humidification   $ Is the patient on Low Flow Oxygen? Yes   Flow (L/min) 2   Oxygen Concentration (%) 28   SpO2 99 %   Pulse Oximetry Type Intermittent   Pulse 80   Resp 18   Positioning HOB elevated 45 degrees   Aerosol Therapy   $ Aerosol Therapy Charges Aerosol Treatment   Daily Review of Necessity (SVN) completed   Respiratory Treatment Status (SVN) given   Treatment Route (SVN) mask   Patient Position (SVN) Pretty's   Post Treatment Assessment (SVN) increased aeration   Signs of Intolerance (SVN) none   Breath Sounds Post-Respiratory Treatment   Throughout All Fields Post-Treatment All Fields   Throughout All Fields Post-Treatment aeration increased   Post-treatment Heart Rate (beats/min) 84   Post-treatment Resp Rate (breaths/min) 16   Equipment Change   $ RT Equipment humidifier   Ready to Wean/Extubation Screen   FIO2<=50 (chart decimal) 0.28   Respiratory Evaluation   $ Care Plan Tech Time 15 min

## 2020-06-28 NOTE — RESPIRATORY THERAPY
06/27/20 1959   Patient Assessment/Suction   Level of Consciousness (AVPU) alert   Respiratory Effort Unlabored;Normal   Expansion/Accessory Muscles/Retractions no use of accessory muscles;expansion symmetric;no retractions   All Lung Fields Breath Sounds Anterior:;clear;diminished   LUIS MIGUEL Breath Sounds clear   LLL Breath Sounds diminished;coarse   RUL Breath Sounds clear   RML Breath Sounds diminished;clear   RLL Breath Sounds diminished   Rhythm/Pattern, Respiratory unlabored   Cough Frequency infrequent   Cough Type good;congested   Secretions Amount small   Secretions Color tan;cloudy   Secretions Characteristics thick   PRE-TX-O2   O2 Device (Oxygen Therapy) room air   $ Is the patient on Low Flow Oxygen? Yes   Flow (L/min) 2   Oxygen Concentration (%) 28   SpO2 (!) 85 %  (patient found without flowmeter turned on)   Pulse Oximetry Type Intermittent   $ Pulse Oximetry - Multiple Charge Pulse Oximetry - Multiple   Pulse 110   Resp (!) 22   Positioning HOB elevated 45 degrees   Aerosol Therapy   $ Aerosol Therapy Charges Aerosol Treatment   Daily Review of Necessity (SVN) completed   Respiratory Treatment Status (SVN) given   Treatment Route (SVN) mask   Patient Position (SVN) Pretty's   Post Treatment Assessment (SVN) breath sounds improved   Signs of Intolerance (SVN) none   Breath Sounds Post-Respiratory Treatment   Throughout All Fields Post-Treatment All Fields   Throughout All Fields Post-Treatment aeration increased   Post-treatment Heart Rate (beats/min) 89   Post-treatment Resp Rate (breaths/min) 26   Ready to Wean/Extubation Screen   FIO2<=50 (chart decimal) 0.28   Respiratory Evaluation   $ Care Plan Tech Time 15 min     Patient found tachypenic with  RR>22, she was wearing an nasal cannula without the flow meter turned on essentially receiving ZERO oxygen. Oxygen saturation <86. Patient was put back on NC of 2 L and Oxygen saturations normalized.

## 2020-06-28 NOTE — PT/OT/SLP EVAL
Speech Language Pathology Evaluation  Bedside Swallow    Patient Name:  Alanis Mendieta   MRN:  [id number]  Admitting Diagnosis: COPD exacerbation    Recommendations:                 General Recommendations:  Follow-up not indicated  Diet recommendations:  Regular, Thin   Aspiration Precautions: Standard aspiration precautions and Wear oxygen during intake   General Precautions: Standard,    Communication strategies:  none    History:     Past Medical History:   Diagnosis Date    Allergy     Anxiety     Arthritis     Asthma     Bipolar disorder     Cancer     thyroid    Chronic back pain     COPD (chronic obstructive pulmonary disease)     Diabetes mellitus     Diabetes mellitus, type 2     Fibromyalgia     GERD (gastroesophageal reflux disease)     History of fall 4/26/2018    [place] (hard of hearing)     Hx of thyroid cancer     Hyperlipidemia     Hypertension     Hypothyroidism     Neuropathy     On home oxygen therapy     3 l/m 24/7    Restless leg syndrome     Sleep apnea     Urinary tract infection        Past Surgical History:   Procedure Laterality Date    APPENDECTOMY      BACK SURGERY      x 3    broken foot and ankle      CHOLECYSTECTOMY      EYE SURGERY Bilateral     cataract    HYSTERECTOMY      JOINT REPLACEMENT Left 09/17/2018    knee    KNEE ARTHROPLASTY Left 9/17/2018    Procedure: ARTHROPLASTY, KNEE;  Surgeon: Yariel Marin MD;  [facility];  Service: Orthopedics;  Laterality: Left;    MYELOGRAPHY N/A 8/7/2019    Procedure: MYELOGRAM;  Surgeon: Sun Diagnostic Provider;  Location: [facility] OR;  Service: General;  Laterality: N/A;    POSTERIOR REPAIR      SPINAL FUSION      x3 BACK, NECK X 4    TOTAL THYROIDECTOMY  2014       Social History: Patient lives alone.    Prior Intubation HX:  none    Modified Barium Swallow: none    Imaging Results          X-Ray Chest AP Portable (Final result)  Result time 06/26/20 10:48:04    Final result by Pierre Sy MD  "(06/26/20 10:48:04)                 Narrative:    HISTORY: Chest pain and dyspnea.    FINDINGS: Portable chest radiograph at 1035 hours compared to multiple  prior exams shows the cardiomediastinal silhouette and pulmonary  vasculature are within normal limits. There are aortic vascular  calcifications.    There is asymmetric elevation of the right hemidiaphragm, with the  lungs otherwise normally expanded. No consolidation, large pleural  effusion, evidence of pulmonary edema, or pneumothorax.    There are no acute osseous normalities, with intervertebral disc space  narrowing in the spine, with osteophytes. There is lower anterior  cervical fusion hardware.    IMPRESSION: No evidence of acute cardiopulmonary disease.    Electronically Signed by Pierre MCNAMARA on 6/26/2020 10:58 AM                                Prior diet: unrestricted.    Subjective     "I have trouble with bread because of the crumbs"  Patient goals: none stated     Pain/Comfort:  · Pain Rating 1: 0/10    Objective:     Cognitive Communication: Alert and oriented x4. Able to follow commands within unstructured context. Fluent and appropriate at the conversational level. Recalls recent medical events and procedures.     Oral Musculature Evaluation  · Oral Musculature: WFL  · Dentition: edentulous  · Secretion Management: adequate  · Mucosal Quality: good  · Mandibular Strength and Mobility: WFL  · Oral Labial Strength and Mobility: WFL  · Lingual Strength and Mobility: WFL  · Velar Elevation: WFL  · Buccal Strength and Mobility: WFL  · Volitional Cough: Present  · Volitional Swallow: Hyolaryngeal movement present  · Voice Prior to PO Intake: Clear; no dysphonia    Bedside Swallow Eval:   Consistencies Assessed:  · Thin liquids 3oz via sequential cup sips  · Solids x2     Oral Phase:   · Pt with adequate oral containment and coordination across consistencies. Timely mastication, adequate bolus formation and oral clearance which resulted in no " evidence of appreciable oral residue with solid consistencies.    Pharyngeal Phase:   · Adequate hyolaryngeal movement to digital palpation. Pt tolerated 3oz thin liquid via sequential sips with no overt s/s of aspiration or changes in vocal quality, passing 3oz water challenge. Therefore, risk of aspiration not indicated. Multiple swallows not observed across consistencies trialled.     Compensatory Strategies  · None       Assessment:     Alanis Mendieta is a 69 y.o. female with an SLP diagnosis of reported baseline dysphagia secondary to neck surgeries c/b globus sensation alleviated with liquid. Pt promotes use of compensatory strategies t/o meal including reduction in bolus size, alternating bites and sips and precaution with difficult textures (meat and bread). Discussed importance of wearing oxygen with meals & oral hygiene as well as benefits of medications buried in puree. No further assessment of treatment needed at this time.       Plan:     · Plan of Care reviewed with:  patient   · SLP Follow-Up:  No       Discharge recommendations:  home     Time Tracking:     SLP Treatment Date:   06/28/20  Speech Start Time:  1226  Speech Stop Time:  1242     Speech Total Time (min):  16 min    Billable Minutes: Eval Swallow and Oral Function 16    Zay Joyner CCC-SLP  06/28/2020

## 2020-06-28 NOTE — PROGRESS NOTES
"[facility] Medicine Progress Note  Patient Name: Alanis Mendieta MRN: [id number]   Patient Class: OP- Observation  Length of Stay: 0   Admission Date: 6/26/2020 10:19 AM Attending Physician: Darren Hinton MD   Primary Care Provider: Delio Melendez MD Face-to-Face encounter date: 06/28/2020   Chief Complaint: Shortness of Breath (on o2 at 4L) and Chest Pain    Assessment & Plan:   Alanis Mendieta is a 69 y.o. female admitted for    1.  Acute on chronic hypoxemic respiratory failure  2. COPD exacerbation  3.  Past history of cigarette smoking  4. diabetes mellitus  5.  Mood disorders  6.  Diabetic neuropathy  7.  Obesity  8.  Restless leg syndrome  9.  Gastroesophageal reflux disease  9.  [facility]     Speech evaluated the patient and recommended regular diet. No follow up needed.  DuoNebs every 6 hr  Supplement oxygen as needed  IV Solu-Medrol 80 mg q.8 hours  Continue home medications  Echocardiography pending  Likely d.c home tomorrow with slow taper of steroids    Subjective:    Interval History: Patient is doing fairly well. Breathing is much better. Discussed with pulmonary.  No Family present at bedside.No concerns/issues overnight reported by the patient or the nursing staff.    Review of Systems All other Review of Systems were found to be negative expect for that mentioned already in HPI.   Objective:   Physical Exam  BP (!) 175/79   Pulse 85   Temp 98.2 °F (36.8 °C)   Resp (!) 22   Ht 5' 3" (1.6 m)   Wt 98.7 kg (217 lb 9.5 oz)   SpO2 99%   Breastfeeding No   BMI 38.55 kg/m²   Vitals reviewed.    Constitutional: No distress.   HENT: Atraumatic.   Cardiovascular: Normal rate, regular rhythm and normal heart sounds.   Pulmonary/Chest: Effort normal. Good air entry bilaterally. No wheezing  Abdominal: Soft. Bowel sounds are normal. Exhibits no distension and no mass. No tenderness  Neurological: Alert.   Skin: Skin is warm and dry.     Following labs were " Reviewed   Recent Labs   Lab 06/28/20  0657   WBC 11.39   HGB 12.8   HCT 39.7   *   CALCIUM 8.9      K 4.9   CO2 31*   CL 95   BUN 20   CREATININE 0.7     No results found for: POCTGLUCOSE     All labs within the past 24 hours have been reviewed  Microbiology Results (last 7 days)     Procedure Component Value Units Date/Time    Urine culture [[id number]]  (Abnormal) Collected: 06/26/20 2215    Order Status: Completed Specimen: Urine Updated: 06/28/20 0817     Urine Culture, Routine GRAM NEGATIVE BERNARDO, NON-LACTOSE   >100,000 cfu/ml  Identification and susceptibility pending      Narrative:      Preferred Collection Type->Urine, Clean Catch  Specimen Source->Urine        X-Ray Chest AP Portable   Final Result          Inpatient medications  Scheduled Meds:   albuterol-ipratropium  3 mL Nebulization Q4H    clonazePAM  0.5 mg Oral Daily    DULoxetine  60 mg Oral QHS    enoxaparin  40 mg Subcutaneous Q24H    FLUoxetine  40 mg Oral Daily    gabapentin  400 mg Oral Daily    gabapentin  800 mg Oral QHS    lamoTRIgine  150 mg Oral BID    levothyroxine  150 mcg Oral Before breakfast    methylPREDNISolone sodium succinate  40 mg Intravenous Q8H    oxyCODONE  10 mg Oral TID    pantoprazole  40 mg Oral Daily    rOPINIRole  4 mg Oral BID     Continuous Infusions:  PRN Meds:.acetaminophen, dextrose 50%, dextrose 50%, dextrose 50%, dextrose 50%, dextrose 50%, dextrose 50%, glucagon (human recombinant), glucose, glucose, HYDROcodone-acetaminophen, insulin regular, melatonin, ondansetron, sodium chloride 0.9%    Above encounter included review of the medical records, interviewing and examining the patient face-to-face, discussion with family and other health care providers, ordering and interpreting lab/test results and formulating a plan of care.     Medical Decision Making:    [] Low Complexity  [x] Moderate Complexity  [] High Complexity    Darren Hinton  [facility] Hospitalist  06/28/2020

## 2020-06-28 NOTE — PLAN OF CARE
06/28/20 0817   Patient Assessment/Suction   Level of Consciousness (AVPU) alert   Respiratory Effort Unlabored;Normal   Expansion/Accessory Muscles/Retractions no use of accessory muscles   All Lung Fields Breath Sounds coarse;clear   Rhythm/Pattern, Respiratory unlabored;pattern regular;depth regular   Cough Frequency infrequent   Cough Type good   PRE-TX-O2   O2 Device (Oxygen Therapy) nasal cannula w/ humidification   $ Is the patient on Low Flow Oxygen? Yes   Flow (L/min) 2   Oxygen Concentration (%) 28   SpO2 99 %   Pulse Oximetry Type Intermittent   $ Pulse Oximetry - Multiple Charge Pulse Oximetry - Multiple   Pulse 85   Resp 16   Aerosol Therapy   $ Aerosol Therapy Charges Aerosol Treatment   Daily Review of Necessity (SVN) completed   Respiratory Treatment Status (SVN) given   Treatment Route (SVN) mask   Patient Position (SVN) sitting in chair   Post Treatment Assessment (SVN) breath sounds improved   Signs of Intolerance (SVN) none   Breath Sounds Post-Respiratory Treatment   Throughout All Fields Post-Treatment All Fields   Throughout All Fields Post-Treatment aeration increased;clear   Post-treatment Heart Rate (beats/min) 88   Post-treatment Resp Rate (breaths/min) 14   Respiratory Evaluation   $ Care Plan Tech Time 15 min

## 2020-06-28 NOTE — NURSING
Pt medicated for sleep.  Pt states she wet her diaper and took it off and needs assist with new diaper.  Incontinent care given prn per staff.

## 2020-06-28 NOTE — PROGRESS NOTES
"Progress Note  PULMONARY    Admit Date: 6/26/2020 06/28/2020    From consult this admit:"History of Present Illness:    Pt had asthma as teenager,smoked from [age], has had cough/wheeze/sob/chest tightness waldemar this yr. Worsens at night, and has had 3 exacerbations needing steroids this yr.  Started on trelegy few wks pta.    Pt ambulates with walker, no pets, no falls, uses 02 3-4 lpm at home.     Pt  Had neck surg x 3 - last 4 yrs ago,  Relates daily aspiration.     No pets, apartment no mold suspected.  Has chr sinus drip.  Uses ibuprofen regular,good sense smell.   Pt moving to [place] July7, lives in [place] - moved to this area to be near son.  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     Pt was seen by Dr Reis yesterday, was presyncopal due to sob.  Pt was on trelegy, uses nebs that only palliate for an hr.    Plan:   Sinus rx, speech eval , asthma/copd rx, therapy as high risk fall.  Moving to [place] soon."  SUBJECTIVE:     June 28 no new c/o- get menendez       PFSH and Allergies reviewed.    OBJECTIVE:     Vitals (Most recent):  Vitals:    06/28/20 0428   BP:    Pulse: 80   Resp: 18   Temp:        Vitals (24 hour range):  Temp:  [98 °F (36.7 °C)-98.5 °F (36.9 °C)]   Pulse:  []   Resp:  [16-22]   BP: (110-183)/(58-84)   SpO2:  [85 %-100 %]       Intake/Output Summary (Last 24 hours) at 6/28/2020 0750  Last data filed at 6/27/2020 1700  Gross per 24 hour   Intake 840 ml   Output --   Net 840 ml          Physical Exam:  The patient's neuro status (alertness,orientation,cognitive function,motor skills,), pharyngeal exam (oral lesions, hygiene, abn dentition,), Neck (jvd,mass,thyroid,nodes in neck and above/below clavicle),RESPIRATORY(symmetry,effort,fremitus,percussion,auscultation),  Cor(rhythm,heart tones including gallops,perfusion,edema)ABD(distention,hepatic&splenomegaly,tenderness,masses), Skin(rash,cyanosis),Psyc(affect,judgement,).  Exam negative except for these pertinent findings:    Wheezes " apparent,no distress at rest, no edema    Radiographs reviewed: view by direct vision  High right diaphragm  No results found for this or any previous visit.]    Labs     No results for input(s): WBC, HGB, HCT, PLT, BAND, METAMYELOCYT, MYELOPCT, HGBA1C in the last 24 hours.No results for input(s): NA, K, CL, CO2, BUN, CREATININE, GLU, CALCIUM, CAION, MG, PHOS, AST, ALT, ALKPHOS, BILITOT, BILIDIR, PROT, ALBUMIN, PREALBUMIN, AMYLASE, LIPASE, CRP, HSCRP, SEDRATE, PROCAL, INR, PTT, LABHEPA, LACTATE, TROPONINI, CPK, CPKMB, MB, BNP in the last 24 hours.No results for input(s): PH, PCO2, PO2, HCO3 in the last 24 hours.  Microbiology Results (last 7 days)     Procedure Component Value Units Date/Time    Urine culture [[id number]] Collected: 06/26/20 6507    Order Status: Completed Specimen: Urine Updated: 06/27/20 0753     Urine Culture, Routine Insufficient incubation, culture in progress    Narrative:      Preferred Collection Type->Urine, Clean Catch  Specimen Source->Urine          Impression:  Active Hospital Problems    Diagnosis  POA    *COPD exacerbation [J44.1]  Yes    Calcification of aorta [I70.0]  Yes     Defer to primary control of cholesterol and bp.         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               Plan:   6/28 - consider outpt when mobility/breathing reasonable.  Should have steroid action plan.  Dc in am reasonable - would recommend slow steroid taper and steroid action plan- moving to [place] soon.                                    .

## 2020-06-29 VITALS
TEMPERATURE: 98 F | SYSTOLIC BLOOD PRESSURE: 162 MMHG | HEIGHT: 63 IN | OXYGEN SATURATION: 98 % | RESPIRATION RATE: 18 BRPM | BODY MASS INDEX: 38.56 KG/M2 | DIASTOLIC BLOOD PRESSURE: 59 MMHG | WEIGHT: 217.63 LBS | HEART RATE: 82 BPM

## 2020-06-29 LAB
ANION GAP SERPL CALC-SCNC: 11 MMOL/L (ref 8–16)
BACTERIA UR CULT: ABNORMAL
BASOPHILS # BLD AUTO: 0.01 K/UL (ref 0–0.2)
BASOPHILS NFR BLD: 0.1 % (ref 0–1.9)
BUN SERPL-MCNC: 18 MG/DL (ref 8–23)
CALCIUM SERPL-MCNC: 8.7 MG/DL (ref 8.7–10.5)
CHLORIDE SERPL-SCNC: 93 MMOL/L (ref 95–110)
CO2 SERPL-SCNC: 31 MMOL/L (ref 23–29)
CREAT SERPL-MCNC: 0.7 MG/DL (ref 0.5–1.4)
DIFFERENTIAL METHOD: ABNORMAL
EOSINOPHIL # BLD AUTO: 0 K/UL (ref 0–0.5)
EOSINOPHIL NFR BLD: 0 % (ref 0–8)
ERYTHROCYTE [DISTWIDTH] IN BLOOD BY AUTOMATED COUNT: 13.8 % (ref 11.5–14.5)
EST. GFR  (AFRICAN AMERICAN): >60 ML/MIN/1.73 M^2
EST. GFR  (NON AFRICAN AMERICAN): >60 ML/MIN/1.73 M^2
GLUCOSE SERPL-MCNC: 395 MG/DL (ref 70–110)
GLUCOSE SERPL-MCNC: 415 MG/DL (ref 70–110)
HCT VFR BLD AUTO: 41.1 % (ref 37–48.5)
HGB BLD-MCNC: 13.5 G/DL (ref 12–16)
IMM GRANULOCYTES # BLD AUTO: 0.18 K/UL (ref 0–0.04)
IMM GRANULOCYTES NFR BLD AUTO: 1.8 % (ref 0–0.5)
LYMPHOCYTES # BLD AUTO: 1.4 K/UL (ref 1–4.8)
LYMPHOCYTES NFR BLD: 13.5 % (ref 18–48)
MAGNESIUM SERPL-MCNC: 2 MG/DL (ref 1.6–2.6)
MCH RBC QN AUTO: 30.1 PG (ref 27–31)
MCHC RBC AUTO-ENTMCNC: 32.8 G/DL (ref 32–36)
MCV RBC AUTO: 92 FL (ref 82–98)
MONOCYTES # BLD AUTO: 0.3 K/UL (ref 0.3–1)
MONOCYTES NFR BLD: 2.7 % (ref 4–15)
NEUTROPHILS # BLD AUTO: 8.4 K/UL (ref 1.8–7.7)
NEUTROPHILS NFR BLD: 81.9 % (ref 38–73)
NRBC BLD-RTO: 0 /100 WBC
PLATELET # BLD AUTO: 359 K/UL (ref 150–350)
PMV BLD AUTO: 10 FL (ref 9.2–12.9)
POTASSIUM SERPL-SCNC: 4 MMOL/L (ref 3.5–5.1)
RBC # BLD AUTO: 4.49 M/UL (ref 4–5.4)
SODIUM SERPL-SCNC: 135 MMOL/L (ref 136–145)
WBC # BLD AUTO: 10.26 K/UL (ref 3.9–12.7)

## 2020-06-29 PROCEDURE — 63600175 PHARM REV CODE 636 W HCPCS: Mod: GZ | Performed by: INTERNAL MEDICINE

## 2020-06-29 PROCEDURE — 27000221 HC OXYGEN, UP TO 24 HOURS

## 2020-06-29 PROCEDURE — 94761 N-INVAS EAR/PLS OXIMETRY MLT: CPT

## 2020-06-29 PROCEDURE — 96376 TX/PRO/DX INJ SAME DRUG ADON: CPT

## 2020-06-29 PROCEDURE — 99900035 HC TECH TIME PER 15 MIN (STAT)

## 2020-06-29 PROCEDURE — 96372 THER/PROPH/DIAG INJ SC/IM: CPT | Mod: 59

## 2020-06-29 PROCEDURE — 25000242 PHARM REV CODE 250 ALT 637 W/ HCPCS: Performed by: INTERNAL MEDICINE

## 2020-06-29 PROCEDURE — 83735 ASSAY OF MAGNESIUM: CPT

## 2020-06-29 PROCEDURE — G0378 HOSPITAL OBSERVATION PER HR: HCPCS | Mod: CS

## 2020-06-29 PROCEDURE — 36415 COLL VENOUS BLD VENIPUNCTURE: CPT

## 2020-06-29 PROCEDURE — 85025 COMPLETE CBC W/AUTO DIFF WBC: CPT

## 2020-06-29 PROCEDURE — 80048 BASIC METABOLIC PNL TOTAL CA: CPT

## 2020-06-29 PROCEDURE — 25000003 PHARM REV CODE 250: Performed by: INTERNAL MEDICINE

## 2020-06-29 PROCEDURE — 94640 AIRWAY INHALATION TREATMENT: CPT

## 2020-06-29 RX ORDER — IPRATROPIUM BROMIDE AND ALBUTEROL SULFATE 2.5; .5 MG/3ML; MG/3ML
3 SOLUTION RESPIRATORY (INHALATION) EVERY 6 HOURS
Status: DISCONTINUED | OUTPATIENT
Start: 2020-06-29 | End: 2020-06-29 | Stop reason: HOSPADM

## 2020-06-29 RX ORDER — FLUTICASONE PROPIONATE 50 MCG
1 SPRAY, SUSPENSION (ML) NASAL DAILY
Qty: 1 G | Refills: 3 | Status: SHIPPED | OUTPATIENT
Start: 2020-06-29 | End: 2020-07-30

## 2020-06-29 RX ORDER — NITROFURANTOIN (MACROCRYSTALS) 100 MG/1
100 CAPSULE ORAL EVERY 12 HOURS
Qty: 10 CAPSULE | Refills: 0 | Status: SHIPPED | OUTPATIENT
Start: 2020-06-29 | End: 2020-07-04

## 2020-06-29 RX ORDER — PREDNISONE 20 MG/1
40 TABLET ORAL DAILY
Status: DISCONTINUED | OUTPATIENT
Start: 2020-06-29 | End: 2020-06-29 | Stop reason: HOSPADM

## 2020-06-29 RX ORDER — PREDNISONE 10 MG/1
TABLET ORAL
Qty: 120 TABLET | Refills: 0 | Status: SHIPPED | OUTPATIENT
Start: 2020-06-29 | End: 2020-09-11

## 2020-06-29 RX ADMIN — LAMOTRIGINE 150 MG: 100 TABLET ORAL at 09:06

## 2020-06-29 RX ADMIN — FLUOXETINE 40 MG: 20 CAPSULE ORAL at 09:06

## 2020-06-29 RX ADMIN — HUMAN INSULIN 5 UNITS: 100 INJECTION, SOLUTION SUBCUTANEOUS at 10:06

## 2020-06-29 RX ADMIN — GABAPENTIN 400 MG: 300 CAPSULE ORAL at 09:06

## 2020-06-29 RX ADMIN — OXYCODONE HYDROCHLORIDE 10 MG: 5 TABLET ORAL at 09:06

## 2020-06-29 RX ADMIN — LEVOTHYROXINE SODIUM 150 MCG: 25 TABLET ORAL at 05:06

## 2020-06-29 RX ADMIN — METHYLPREDNISOLONE SODIUM SUCCINATE 40 MG: 40 INJECTION, POWDER, FOR SOLUTION INTRAMUSCULAR; INTRAVENOUS at 05:06

## 2020-06-29 RX ADMIN — HUMAN INSULIN 12 UNITS: 100 INJECTION, SOLUTION SUBCUTANEOUS at 07:06

## 2020-06-29 RX ADMIN — PANTOPRAZOLE SODIUM 40 MG: 40 TABLET, DELAYED RELEASE ORAL at 05:06

## 2020-06-29 RX ADMIN — IPRATROPIUM BROMIDE AND ALBUTEROL SULFATE 3 ML: .5; 3 SOLUTION RESPIRATORY (INHALATION) at 08:06

## 2020-06-29 RX ADMIN — IPRATROPIUM BROMIDE AND ALBUTEROL SULFATE 3 ML: .5; 3 SOLUTION RESPIRATORY (INHALATION) at 04:06

## 2020-06-29 RX ADMIN — HYDROCODONE BITARTRATE AND ACETAMINOPHEN 1 TABLET: 5; 325 TABLET ORAL at 02:06

## 2020-06-29 RX ADMIN — CLONAZEPAM 0.5 MG: 0.5 TABLET ORAL at 09:06

## 2020-06-29 RX ADMIN — ROPINIROLE 4 MG: 2 TABLET, FILM COATED ORAL at 09:06

## 2020-06-29 RX ADMIN — MELATONIN 9 MG: at 02:06

## 2020-06-29 NOTE — PLAN OF CARE
06/29/20 0808   Patient Assessment/Suction   Level of Consciousness (AVPU) alert   Respiratory Effort Normal;Unlabored   Expansion/Accessory Muscles/Retractions expansion symmetric;no retractions;no use of accessory muscles   All Lung Fields Breath Sounds clear;equal bilaterally;diminished   PRE-TX-O2   O2 Device (Oxygen Therapy) nasal cannula   $ Is the patient on Low Flow Oxygen? Yes   Flow (L/min) 3   SpO2 98 %   Pulse Oximetry Type Intermittent   $ Pulse Oximetry - Multiple Charge Pulse Oximetry - Multiple   Pulse 82   Resp 18   Aerosol Therapy   $ Aerosol Therapy Charges Aerosol Treatment   Daily Review of Necessity (SVN) completed   Respiratory Treatment Status (SVN) given   Treatment Route (SVN) mask   Patient Position (SVN) HOB elevated   Post Treatment Assessment (SVN) breath sounds unchanged   Signs of Intolerance (SVN) none   Breath Sounds Post-Respiratory Treatment   Post-treatment Heart Rate (beats/min) 85   Post-treatment Resp Rate (breaths/min) 18   Respiratory Evaluation   $ Care Plan Tech Time 15 min

## 2020-06-29 NOTE — PLAN OF CARE
Problem: Adult Inpatient Plan of Care  Goal: Plan of Care Review  Outcome: Ongoing, Progressing  Goal: Absence of Hospital-Acquired Illness or Injury  Outcome: Ongoing, Progressing  Goal: Readiness for Transition of Care  Outcome: Ongoing, Progressing     Problem: Diabetes Comorbidity  Goal: Blood Glucose Level Within Desired Range  Outcome: Ongoing, Progressing     Problem: Fall Injury Risk  Goal: Absence of Fall and Fall-Related Injury  Outcome: Ongoing, Progressing     Problem: Skin Injury Risk Increased  Goal: Skin Health and Integrity  Outcome: Ongoing, Progressing

## 2020-06-29 NOTE — PLAN OF CARE
06/29/20 1210   Discharge Assessment   Assessment Type Discharge Planning Assessment   Confirmed/corrected address and phone number on facesheet? Yes   Assessment information obtained from? Patient   Communicated expected length of stay with patient/caregiver yes   Prior to hospitilization cognitive status: Alert/Oriented   Prior to hospitalization functional status: Needs Assistance   Current cognitive status: Alert/Oriented   Current Functional Status: Needs Assistance   Facility Arrived From: Home   Lives With alone   Able to Return to Prior Arrangements yes   Is patient able to care for self after discharge? Yes   Who are your caregiver(s) and their phone number(s)? She gets [employer] and has caregivers come 7hours a  day. 7 days a week.   Patient's perception of discharge disposition home or selfcare   Patient currently receives any other outside agency services? Yes   Equipment Currently Used at Home walker, rolling;oxygen;bedside commode   Part D Coverage [facility]   Do you have any problems affording any of your prescribed medications? No   Is the patient taking medications as prescribed? yes   Does the patient have transportation home? Yes   Transportation Anticipated other (see comments)  (Caregiver will  around 1:00pm.)   Discharge Plan A Home   Discharge Plan B Home   Patient/Family in Agreement with Plan yes

## 2020-06-29 NOTE — DISCHARGE SUMMARY
[facility]  Discharge Summary  Patient Name: Alanis Mendieta MRN: [id number]   Patient Class: OP- Observation  Length of Stay: 0   Admission Date: 6/26/2020 10:19 AM Attending Physician: Darren Hinton MD   Primary Care Provider: Delio Melendez MD Face-to-Face encounter date: 06/29/2020   Chief Complaint: Shortness of Breath (on o2 at 4L) and Chest Pain    Date of Discharge: 6/29/2020  Discharge Disposition: Home  Condition: Stable    Reason for Hospitalization   Primary Diagnosis: COPD exacerbation    Secondary Diagnosis: Chronic hypoxemic respiratory failure    3.  Past history of cigarette smoking  4. diabetes mellitus  5.  Mood disorders  6.  Diabetic neuropathy  7.  Obesity  8.  Restless leg syndrome  9.  Gastroesophageal reflux disease  9.  [facility]        Patient Active Problem List   Diagnosis    Mixed incontinence    Nocturia    Prolapse of anterior vaginal wall    Posterior vaginal wall prolapse    COPD exacerbation    Postoperative hypothyroidism    Type 2 diabetes mellitus, with long-term current use of insulin    GERD (gastroesophageal reflux disease)    Anxiety    Obesity    Protein calorie malnutrition    Essential hypertension    S/P total knee arthroplasty, left    Hypothyroidism    Hyperlipidemia    Restless legs syndrome    Slow transit constipation    Hypokalemia    Acute on chronic respiratory failure with hypoxia    Class 2 obesity in adult    Type 2 diabetes mellitus with hyperglycemia, with long-term current use of insulin    Shortness of breath    Calcification of aorta    Diaphragmatic disorder    Asthma-COPD overlap syndrome    Aspiration into airway       Brief History of Present Illness    Alanis Mendieta is a 69 year female who  has a past medical history of Allergy, Anxiety, Arthritis, Asthma, Bipolar disorder, Cancer, Chronic back pain, COPD (chronic obstructive pulmonary disease), Diabetes mellitus, Diabetes mellitus, type 2,  "Fibromyalgia, GERD (gastroesophageal reflux disease), History of fall (4/26/2018), [place] (hard of hearing), thyroid cancer, Hyperlipidemia, Hypertension, Hypothyroidism, Neuropathy, On home oxygen therapy, Restless leg syndrome, Sleep apnea, and Urinary tract infection.. The patient presented to [facility] on 6/26/2020 with a primary complaint of Shortness of Breath (on o2 at 4L) and Chest Pain      For the full HPI please refer to the History & Physical from this admission.    Hospital Course By Problem with Pertinent Findings     This is a 69-year-old female admitted from pulmonologist office for COPD exacerbation. She was treated with IV steroids. She rapidly improved. Pulmonary evaluated the patient. She was evaluated by the speech therapist but no further treatment was advised. Patient will be going home on steroid taper. Her urinary cultures grew E. Coli. She was given a course of Nitrofurantoin. She was in stable condition at the time of discharge.       Physical Exam  BP (!) 162/59   Pulse 82   Temp 98.3 °F (36.8 °C) (Oral)   Resp 18   Ht 5' 3" (1.6 m)   Wt 98.7 kg (217 lb 9.5 oz)   SpO2 98%   Breastfeeding No   BMI 38.55 kg/m²   Vitals reviewed.    Constitutional: No distress.   HENT: Atraumatic.   Cardiovascular: Normal rate, regular rhythm and normal heart sounds.   Pulmonary/Chest: Effort normal. Clear to auscultation bilaterally. No wheezes.   Abdominal: Soft. Bowel sounds are normal. Exhibits no distension and no mass. No tenderness  Neurological: Alert.   Skin: Skin is warm and dry.     Following labs were Reviewed   Recent Labs   Lab 06/29/20  0531   WBC 10.26   HGB 13.5   HCT 41.1   *   CALCIUM 8.7   *   K 4.0   CO2 31*   CL 93*   BUN 18   CREATININE 0.7     No results found for: POCTGLUCOSE     All labs within the past 24 hours have been reviewed    Microbiology Results (last 7 days)     Procedure Component Value Units Date/Time    Urine culture [[id number]]  " (Abnormal)  (Susceptibility) Collected: 06/26/20 221    Order Status: Completed Specimen: Urine Updated: 06/29/20 0707     Urine Culture, Routine ESCHERICHIA COLI  >100,000 cfu/ml      Narrative:      Preferred Collection Type->Urine, Clean Catch  Specimen Source->Urine        X-Ray Chest AP Portable   Final Result          No results found for this or any previous visit.      Consultants and Procedures   Consultants:  Pulmonary Medicine    Procedures:   None    Discharge Information:   Diet:  Resume regular diet    Physical Activity:  Activity as tolerated    Instructions:  1. Take all medications as prescribed  2. Keep all follow-up appointments  3. Return to the hospital or call your primary care physicians if any worsening symptoms such as chest pain, shortness of breath occur.      Follow-Up Appointments:  1. Please call your primary care physician to schedule an appointment in 1 week time.    Pending laboratory work/Tests to be performed/followed by the Primary care Physician: None    The patient was discharged in the care of her parents//wife/family/caregiver, with discharge instructions were reviewed in written and verbal form. All pertinent questions were discussed and prescriptions were provided. The importance of making follow up appointments and compliance of medications has been stressed repeatedly. The patient will follow up in 1 week or sooner as needed with the PCP, and the patient is on board with the plan. Upon discharge, patient needs to be on following medications.    Discharge Medications:     Medication List      START taking these medications    fluticasone propionate 50 mcg/actuation nasal spray  Commonly known as: FLONASE  1 spray (50 mcg total) by Each Nostril route once daily.        CHANGE how you take these medications    * predniSONE 10 MG tablet  Commonly known as: DELTASONE  Take 1 tablet (10 mg total) by mouth once daily.  What changed: Another medication with the same name  was added. Make sure you understand how and when to take each.     * predniSONE 10 MG tablet  Commonly known as: DELTASONE  40mg for first 3 days, 30 mg for 3 days, 20 mg for 3 days, and 10mg for 3 days  What changed: You were already taking a medication with the same name, and this prescription was added. Make sure you understand how and when to take each.     temazepam 15 mg Cap  Commonly known as: RESTORIL  Take 2 capsules (30 mg total) by mouth nightly as needed.  What changed: how much to take         * This list has 2 medication(s) that are the same as other medications prescribed for you. Read the directions carefully, and ask your doctor or other care provider to review them with you.            CONTINUE taking these medications    ACCU-CHEK LIBERTY PLUS TEST STRP Strp  Generic drug: blood sugar diagnostic     albuterol 90 mcg/actuation inhaler  Commonly known as: VENTOLIN HFA  Inhale 2 puffs into the lungs every 6 (six) hours as needed for Wheezing. Rescue     albuterol-ipratropium 2.5 mg-0.5 mg/3 mL nebulizer solution  Commonly known as: DUO-NEB  Take 3 mLs by nebulization every 6 (six) hours as needed for Wheezing or Shortness of Breath. Rescue     ARTIFICIAL TEARS(HYPROMELLOSE) OPHT     calcitRIOL 0.5 MCG Cap  Commonly known as: ROCALTROL  Take 1 capsule (0.5 mcg total) by mouth every evening.     clonazePAM 0.5 MG tablet  Commonly known as: KLONOPIN  Take 1 tablet (0.5 mg total) by mouth once daily.     DULoxetine 60 MG capsule  Commonly known as: CYMBALTA  Take 1 capsule (60 mg total) by mouth every evening.     FLUoxetine 40 MG capsule  Take 1 capsule (40 mg total) by mouth once daily.     fluticasone-umeclidin-vilanter 100-62.5-25 mcg Dsdv  Commonly known as: TRELEGY ELLIPTA  Inhale 1 puff into the lungs once daily.     * gabapentin 400 MG capsule  Commonly known as: NEURONTIN  Take 1 capsule (400 mg total) by mouth once daily.     * gabapentin 800 MG tablet  Commonly known as: NEURONTIN  Take 1 tablet  (800 mg total) by mouth every evening. In evening     ibuprofen 400 MG tablet  Commonly known as: ADVIL,MOTRIN     insulin aspart U-100 100 unit/mL injection  Commonly known as: NOVOLOG     lactulose 10 gram/15 mL solution  Commonly known as: CHRONULAC     lamoTRIgine 150 MG Tab  Commonly known as: LAMICTAL  Take 1 tablet (150 mg total) by mouth 2 (two) times daily.     latanoprost 0.005 % ophthalmic solution     LEVEMIR FLEXTOUCH U-100 INSULN 100 unit/mL (3 mL) Inpn pen  Generic drug: insulin detemir U-100     levothyroxine 150 MCG tablet  Commonly known as: SYNTHROID  Take 1 tablet (150 mcg total) by mouth before breakfast.     linaCLOtide 290 mcg Cap capsule  Commonly known as: LINZESS  Take 1 capsule (290 mcg total) by mouth once daily.     losartan-hydrochlorothiazide 50-12.5 mg 50-12.5 mg per tablet  Commonly known as: HYZAAR  Take 0.5 tablets by mouth once daily.     meclizine 25 mg tablet  Commonly known as: ANTIVERT     methocarbamoL 750 MG Tab  Commonly known as: ROBAXIN  Take 1 tablet (750 mg total) by mouth 2 (two) times daily as needed.     ondansetron 4 MG Tbdl  Commonly known as: ZOFRAN-ODT     oxyCODONE 10 mg Tab immediate release tablet  Commonly known as: ROXICODONE     pantoprazole 40 MG tablet  Commonly known as: PROTONIX  Take 1 tablet (40 mg total) by mouth once daily.     potassium chloride SA 10 MEQ tablet  Commonly known as: K-DUR,KLOR-CON  Take 1 tablet (10 mEq total) by mouth 3 (three) times a week. Mon , Wed, and Fri.     rOPINIRole 4 MG tablet  Commonly known as: REQUIP  Take 1 tablet (4 mg total) by mouth 2 (two) times daily.         * This list has 2 medication(s) that are the same as other medications prescribed for you. Read the directions carefully, and ask your doctor or other care provider to review them with you.               Where to Get Your Medications      These medications were sent to [facility] - [place] - [address]  [address] [address]     Phone: [phone]   · fluticasone propionate 50 mcg/actuation nasal spray  · predniSONE 10 MG tablet           I spent 30 minutes preparing the discharge including reviewing records from previous encounters, preparation of discharge summary, assessing and final examination of the patient, discharge medicine reconciliation, discussing plan of care, follow up and education and prescriptions.       Darren Hinton  [facility] Hospitalist  06/29/2020

## 2020-06-29 NOTE — PLAN OF CARE
06/29/20 1217   Final Note   Assessment Type Final Discharge Note   Anticipated Discharge Disposition Home

## 2020-06-29 NOTE — RESPIRATORY THERAPY
06/28/20 2007   Patient Assessment/Suction   Level of Consciousness (AVPU) alert   Respiratory Effort Unlabored   Expansion/Accessory Muscles/Retractions expansion symmetric;no retractions;no use of accessory muscles   All Lung Fields Breath Sounds diminished;clear   Rhythm/Pattern, Respiratory no shortness of breath reported;pattern regular;unlabored   PRE-TX-O2   O2 Device (Oxygen Therapy) nasal cannula   Flow (L/min) 3   SpO2 98 %   Pulse Oximetry Type Intermittent   Pulse 84   Resp 20   Aerosol Therapy   $ Aerosol Therapy Charges Aerosol Treatment   Daily Review of Necessity (SVN) completed   Respiratory Treatment Status (SVN) given   Treatment Route (SVN) mask;oxygen   Patient Position (SVN) sitting on edge of bed   Post Treatment Assessment (SVN) breath sounds unchanged   Signs of Intolerance (SVN) none   Breath Sounds Post-Respiratory Treatment   Throughout All Fields Post-Treatment All Fields   Throughout All Fields Post-Treatment no change   Post-treatment Heart Rate (beats/min) 84   Post-treatment Resp Rate (breaths/min) 18   Respiratory Interventions   Cough And Deep Breathing done with encouragement   Breathing Techniques/Airway Clearance deep/controlled cough encouraged;diaphragmatic breathing promoted   Respiratory Evaluation   $ Care Plan Tech Time 15 min   Evaluation For   (care plan)   Admitting Diagnosis COPD exacerbation   Pulmonary Diagnosis Asthma-COPD   Home Oxygen   Has Home Oxygen? Yes   Liter Flow 3   Duration continuous   Route nasal cannula   Mode continuous   Device home concentrator   Home Aerosol, MDI, DPI, and Other Treatments/Therapies   Home Respiratory Therapy Per Patient/Review of Chart Yes   Aerosol Home Meds/Freq Duoneb/tid   MDI Home Meds/Freq Albuterol hfa/prn   DPI Home Meds/Freq Trelegy/qd   Oxygen Care Plan   Oxygen Care Plan Per Protocol   SPO2 Goal (%) 92% non-cardiac   Rationale SpO2 is <92% (Non-cardiac Pt.);Maintain Home oxygen   Bronchodilator Care Plan    Bronchodilator Care Plan Aerosol   Aerosol Meds w/ frequency Albuterol - Ipratropium (DUO-NEB) 0.5mg-3mg(2.5ml) 3ml Nebulizer Solution2.5mg Q 4Hr

## 2020-06-29 NOTE — RESPIRATORY THERAPY
06/29/20 0000   Patient Assessment/Suction   Level of Consciousness (AVPU) alert   Respiratory Effort Unlabored   Expansion/Accessory Muscles/Retractions expansion symmetric;no retractions;no use of accessory muscles   All Lung Fields Breath Sounds diminished   Rhythm/Pattern, Respiratory pattern regular;unlabored   PRE-TX-O2   O2 Device (Oxygen Therapy) nasal cannula   Flow (L/min) 3   SpO2 98 %   Pulse Oximetry Type Intermittent   Pulse 84   Resp 20   Aerosol Therapy   $ Aerosol Therapy Charges Aerosol Treatment   Daily Review of Necessity (SVN) completed   Respiratory Treatment Status (SVN) given   Treatment Route (SVN) mask;oxygen   Patient Position (SVN) Pretty's   Post Treatment Assessment (SVN) breath sounds unchanged   Signs of Intolerance (SVN) none   Breath Sounds Post-Respiratory Treatment   Throughout All Fields Post-Treatment All Fields   Throughout All Fields Post-Treatment no change   Post-treatment Heart Rate (beats/min) 84   Post-treatment Resp Rate (breaths/min) 20   Respiratory Interventions   Cough And Deep Breathing done with encouragement   Breathing Techniques/Airway Clearance deep/controlled cough encouraged;diaphragmatic breathing promoted   Respiratory Evaluation   $ Care Plan Tech Time 15 min   Evaluation For   (care plan)

## 2020-06-29 NOTE — PT/OT/SLP PROGRESS
Physical Therapy      Patient Name:  Alanis Mendieta   MRN:  [id number]    Patient not seen today secondary to Other - patient is being discharged and is getting dressed to go home. Will follow-up this afternoon to see if she did go home.    Faith Mathew, PTA

## 2020-06-29 NOTE — NURSING
Pt medicated for pain and sleep.  Pt states she has bipolar disorder and is having trouble sleeping right now being on steroids.

## 2020-06-29 NOTE — RESPIRATORY THERAPY
06/29/20 0414   Patient Assessment/Suction   Level of Consciousness (AVPU) alert   Respiratory Effort Unlabored   Expansion/Accessory Muscles/Retractions expansion symmetric;no retractions;no use of accessory muscles   All Lung Fields Breath Sounds diminished;clear   Rhythm/Pattern, Respiratory no shortness of breath reported;pattern regular;unlabored   PRE-TX-O2   O2 Device (Oxygen Therapy) nasal cannula   Flow (L/min) 3   SpO2 98 %   Pulse Oximetry Type Intermittent   $ Pulse Oximetry - Multiple Charge Pulse Oximetry - Multiple   Pulse 88   Resp 16   Aerosol Therapy   $ Aerosol Therapy Charges Aerosol Treatment   Daily Review of Necessity (SVN) completed   Respiratory Treatment Status (SVN) given   Treatment Route (SVN) mask;oxygen   Patient Position (SVN) Pretty's;semi-Pretty's   Post Treatment Assessment (SVN) breath sounds unchanged   Signs of Intolerance (SVN) none   Breath Sounds Post-Respiratory Treatment   Throughout All Fields Post-Treatment All Fields   Throughout All Fields Post-Treatment no change   Post-treatment Heart Rate (beats/min) 86   Post-treatment Resp Rate (breaths/min) 16   Respiratory Interventions   Cough And Deep Breathing done with encouragement   Breathing Techniques/Airway Clearance deep/controlled cough encouraged;diaphragmatic breathing promoted   Respiratory Evaluation   $ Care Plan Tech Time 15 min   Evaluation For   (CARE PLAN)

## 2020-06-29 NOTE — DISCHARGE INSTRUCTIONS
Diet:  Resume regular diet    Physical Activity:  Activity as tolerated    Instructions:  1. Take all medications as prescribed  2. Keep all follow-up appointments  3. Return to the hospital or call your primary care physicians if any worsening symptoms such as chest pain, shortness of breath occur.      Follow-Up Appointments:  1. Please call your primary care physician to schedule an appointment in 1 week time.      Pending laboratory work/Tests to be performed/followed by the Primary care Physician: None

## 2020-07-01 ENCOUNTER — TELEPHONE (OUTPATIENT)
Dept: FAMILY MEDICINE | Facility: CLINIC | Age: 70
End: 2020-07-01

## 2020-07-01 ENCOUNTER — PATIENT OUTREACH (OUTPATIENT)
Dept: FAMILY MEDICINE | Facility: CLINIC | Age: 70
End: 2020-07-01

## 2020-07-01 NOTE — TELEPHONE ENCOUNTER
----- Message from Remington Westfall sent at 7/1/2020  3:52 PM CDT -----  Regarding: hosp f u appt/refills  Pt is needing hosp f.u appt. Pt was in [facility] dx copd , asthma.  Refills on relistor and levamir   Pharm [facility]   Pt [phone]

## 2020-07-01 NOTE — PROGRESS NOTES
"  .  Discharge Information     Discharge Date:  06/29/2020          Primary Discharge Diagnosis:  COPD Exacerbation          How patient is feeling since discharge from the hospital?  amado          Medication & Order Review     Discharge Medication Review: start Flonase, Change deltasone    Medication reconciliation performed Yes   If no, state reason why not performed: N/A       Did patient have any difficulty/problems filling prescriptions?  No           If yes, state reason and steps taken to assist in resolving issue: N/A     Does patient have any questions regarding medications?  No           If yes, state question and steps taken to answer question:  N/A    Was Home Health and/or any equipment ordered for patient upon discharge?  No          Home Health Yes   If yes, has home health contacted patient and/or initiated services? N/A   Name of Home Health Agency N/A   Durable Medical Equipment (DME)  No (Example: walker, wheelchair, nebulizer)   If yes, has the DME provider contacted patient and delivered equipment? N/A    DME Company N/A     Red Flag Review     Was patient educated on "red flags" or things to watch for?  Yes       If yes:  "Red flags" patient was told to watch for:     Cant keep liquids down                 Nausea, vomiting, diarrhea                Fever 100.4 or higher               Other:              Is patient experiencing any red flags today (or any of these symptoms) (List examples of red flags specifically)?  No       Notes:                      If no:    Educated the patient on 3 "red flags" to watch for:     Trouble breathing                Cant stay awake                Cant think clearly               Other:                  Is patient experiencing any red flags today (or any of these symptoms) (List examples of red flags specifically)?  No      Notes:        Patient Education & Follow Up               Phone number patient will call if having any questions or problems:  " [phone]    Appointment scheduled?  Yes      Follow-up/transition of care appointment, including the date/time and location of your appointment:  Patient was able to state the follow-up appointment correctly.        Notes:            Provided patient with date, time and location of follow-up appointment if they do not have it:  Yes      Rescheduled transition of care appointment if the patient has a conflict:    Appointment re-scheduled?  No        Patient informed of this appointment?  Yes      Notes:            3 questions patient would like to ask physician during appointment:                      

## 2020-07-02 ENCOUNTER — OFFICE VISIT (OUTPATIENT)
Dept: FAMILY MEDICINE | Facility: CLINIC | Age: 70
End: 2020-07-02
Payer: MEDICARE

## 2020-07-02 VITALS
WEIGHT: 209 LBS | SYSTOLIC BLOOD PRESSURE: 130 MMHG | TEMPERATURE: 100 F | DIASTOLIC BLOOD PRESSURE: 70 MMHG | HEIGHT: 63 IN | BODY MASS INDEX: 37.03 KG/M2 | HEART RATE: 80 BPM

## 2020-07-02 DIAGNOSIS — F41.9 ANXIETY: ICD-10-CM

## 2020-07-02 DIAGNOSIS — E11.42 TYPE 2 DIABETES MELLITUS WITH DIABETIC POLYNEUROPATHY, WITH LONG-TERM CURRENT USE OF INSULIN: Primary | ICD-10-CM

## 2020-07-02 DIAGNOSIS — J44.1 CHRONIC OBSTRUCTIVE PULMONARY DISEASE WITH ACUTE EXACERBATION: ICD-10-CM

## 2020-07-02 DIAGNOSIS — J43.1 PANLOBULAR EMPHYSEMA: ICD-10-CM

## 2020-07-02 DIAGNOSIS — Z79.4 TYPE 2 DIABETES MELLITUS WITH DIABETIC POLYNEUROPATHY, WITH LONG-TERM CURRENT USE OF INSULIN: Primary | ICD-10-CM

## 2020-07-02 PROCEDURE — 99495 TCM SERVICES (MODERATE COMPLEXITY): ICD-10-PCS | Mod: S$GLB,,, | Performed by: NURSE PRACTITIONER

## 2020-07-02 PROCEDURE — 99495 TRANSJ CARE MGMT MOD F2F 14D: CPT | Mod: S$GLB,,, | Performed by: NURSE PRACTITIONER

## 2020-07-02 RX ORDER — METHYLNALTREXONE BROMIDE 150 MG/1
150 TABLET ORAL DAILY
COMMUNITY
Start: 2020-07-01 | End: 2020-07-02 | Stop reason: SDUPTHER

## 2020-07-02 RX ORDER — METHYLNALTREXONE BROMIDE 150 MG/1
150 TABLET ORAL DAILY
Qty: 30 TABLET | Refills: 2 | Status: SHIPPED | OUTPATIENT
Start: 2020-07-02 | End: 2020-08-31 | Stop reason: SDUPTHER

## 2020-07-02 RX ORDER — INSULIN DETEMIR 100 [IU]/ML
65 INJECTION, SOLUTION SUBCUTANEOUS 2 TIMES DAILY
Qty: 3 BOX | Refills: 3 | Status: SHIPPED | OUTPATIENT
Start: 2020-07-02 | End: 2020-12-29 | Stop reason: SDUPTHER

## 2020-07-02 RX ORDER — CLONAZEPAM 0.5 MG/1
0.5 TABLET ORAL 2 TIMES DAILY
Qty: 180 TABLET | Refills: 0 | Status: SHIPPED | OUTPATIENT
Start: 2020-07-02 | End: 2020-10-06 | Stop reason: SDUPTHER

## 2020-07-02 NOTE — PROGRESS NOTES
SUBJECTIVE:    Patient ID: Alanis Mendieta is a 69 y.o. female.    Chief Complaint: Hospital Follow Up ((Brought bottles))    Pt presents for HFU. Was seen about 2 weeks ago for ongoing COPD exacerbation that has been ongoing and worsening over the last few months.  She was given a prednisone taper and refer to Dr. Reis.  When she went to Dr. Reis's office, she was found to have difficulty moving air and was immediately sent to the ER.  Was treated with IV steroids and fluids, and kept in observation.  No acute pulmonary infectious process is found.  Patient released with slow prednisone taper.  Presents today saying she feels somewhat better.  Still with shortness of breath and dyspnea with walking.  Wearing 4 L oxygen nasal cannula.  No follow-up scheduled with pulmonology.  Using DuoNeb every 4 hr, prednisone taper, and trelegy.  Denies cough.  Patient reports she is planning to move to [place] next week.  Moved to [place] a few years ago to be near son.  However, she states she is having a hard time and is ready to go back home where she has more support. Has already lined up primary care and specialists in the area.       No results displayed because visit has over 200 results.      Office Visit on 06/16/2020   Component Date Value Ref Range Status    Hemoglobin A1C 06/16/2020 8.4  % Final   Admission on 03/18/2020, Discharged on 03/19/2020   Component Date Value Ref Range Status    WBC 03/18/2020 8.17  3.90 - 12.70 K/uL Final    RBC 03/18/2020 4.34  4.00 - 5.40 M/uL Final    Hemoglobin 03/18/2020 13.1  12.0 - 16.0 g/dL Final    Hematocrit 03/18/2020 39.9  37.0 - 48.5 % Final    Mean Corpuscular Volume 03/18/2020 92  82 - 98 fL Final    Mean Corpuscular Hemoglobin 03/18/2020 30.2  27.0 - 31.0 pg Final    Mean Corpuscular Hemoglobin Conc 03/18/2020 32.8  32.0 - 36.0 g/dL Final    RDW 03/18/2020 13.6  11.5 - 14.5 % Final    Platelets 03/18/2020 242  150 - 350 K/uL Final    MPV  03/18/2020 10.9  9.2 - 12.9 fL Final    Immature Granulocytes 03/18/2020 0.6* 0.0 - 0.5 % Final    Gran # (ANC) 03/18/2020 6.4  1.8 - 7.7 K/uL Final    Immature Grans (Abs) 03/18/2020 0.05* 0.00 - 0.04 K/uL Final    Lymph # 03/18/2020 1.7  1.0 - 4.8 K/uL Final    Mono # 03/18/2020 0.1* 0.3 - 1.0 K/uL Final    Eos # 03/18/2020 0.0  0.0 - 0.5 K/uL Final    Baso # 03/18/2020 0.02  0.00 - 0.20 K/uL Final    nRBC 03/18/2020 0  0 /100 WBC Final    Gran% 03/18/2020 77.8* 38.0 - 73.0 % Final    Lymph% 03/18/2020 20.3  18.0 - 48.0 % Final    Mono% 03/18/2020 1.0* 4.0 - 15.0 % Final    Eosinophil% 03/18/2020 0.1  0.0 - 8.0 % Final    Basophil% 03/18/2020 0.2  0.0 - 1.9 % Final    Differential Method 03/18/2020 Automated   Final    Sodium 03/18/2020 136  136 - 145 mmol/L Final    Potassium 03/18/2020 3.6  3.5 - 5.1 mmol/L Final    Chloride 03/18/2020 93* 95 - 110 mmol/L Final    CO2 03/18/2020 32* 23 - 29 mmol/L Final    Glucose 03/18/2020 227* 70 - 110 mg/dL Final    BUN, Bld 03/18/2020 21  8 - 23 mg/dL Final    Creatinine 03/18/2020 0.7  0.5 - 1.4 mg/dL Final    Calcium 03/18/2020 8.6* 8.7 - 10.5 mg/dL Final    Total Protein 03/18/2020 7.0  6.0 - 8.4 g/dL Final    Albumin 03/18/2020 3.6  3.5 - 5.2 g/dL Final    Total Bilirubin 03/18/2020 0.5  0.1 - 1.0 mg/dL Final    Alkaline Phosphatase 03/18/2020 61  55 - 135 U/L Final    AST 03/18/2020 21  10 - 40 U/L Final    ALT 03/18/2020 17  10 - 44 U/L Final    Anion Gap 03/18/2020 11  8 - 16 mmol/L Final    eGFR if African American 03/18/2020 >60.0  >60 mL/min/1.73 m^2 Final    eGFR if non African American 03/18/2020 >60.0  >60 mL/min/1.73 m^2 Final    Troponin I 03/18/2020 <0.030  <=0.040 ng/mL Final    BNP 03/18/2020 32  0 - 99 pg/mL Final    PT 03/18/2020 13.6  10.6 - 14.8 sec Final    INR 03/18/2020 1.1   Final    Magnesium 03/18/2020 1.7  1.6 - 2.6 mg/dL Final    POC Glucose 03/18/2020 210* 70 - 110 Final    Blood Culture, Routine  03/18/2020 No growth after 5 days.   Final    Blood Culture, Routine 03/18/2020 No growth after 5 days.   Final    Lactate (Lactic Acid) 03/18/2020 2.7* 0.5 - 1.9 mmol/L Final    Procalcitonin 03/18/2020 <0.05  0.00 - 0.50 ng/mL Final    TSH 03/18/2020 0.430  0.340 - 5.600 uIU/mL Final    Procalcitonin 03/18/2020 <0.05  0.00 - 0.50 ng/mL Final    Lactate (Lactic Acid) 03/18/2020 2.2* 0.5 - 1.9 mmol/L Final    Troponin I 03/18/2020 <0.030  <=0.040 ng/mL Final    POC Glucose 03/18/2020 257* 70 - 110 Final    WBC 03/19/2020 9.08  3.90 - 12.70 K/uL Final    RBC 03/19/2020 4.31  4.00 - 5.40 M/uL Final    Hemoglobin 03/19/2020 13.1  12.0 - 16.0 g/dL Final    Hematocrit 03/19/2020 39.8  37.0 - 48.5 % Final    Mean Corpuscular Volume 03/19/2020 92  82 - 98 fL Final    Mean Corpuscular Hemoglobin 03/19/2020 30.4  27.0 - 31.0 pg Final    Mean Corpuscular Hemoglobin Conc 03/19/2020 32.9  32.0 - 36.0 g/dL Final    RDW 03/19/2020 13.7  11.5 - 14.5 % Final    Platelets 03/19/2020 272  150 - 350 K/uL Final    MPV 03/19/2020 10.7  9.2 - 12.9 fL Final    Immature Granulocytes 03/19/2020 0.6* 0.0 - 0.5 % Final    Gran # (ANC) 03/19/2020 6.9  1.8 - 7.7 K/uL Final    Immature Grans (Abs) 03/19/2020 0.05* 0.00 - 0.04 K/uL Final    Lymph # 03/19/2020 1.6  1.0 - 4.8 K/uL Final    Mono # 03/19/2020 0.5  0.3 - 1.0 K/uL Final    Eos # 03/19/2020 0.0  0.0 - 0.5 K/uL Final    Baso # 03/19/2020 0.01  0.00 - 0.20 K/uL Final    nRBC 03/19/2020 0  0 /100 WBC Final    Gran% 03/19/2020 76.2* 38.0 - 73.0 % Final    Lymph% 03/19/2020 18.1  18.0 - 48.0 % Final    Mono% 03/19/2020 5.0  4.0 - 15.0 % Final    Eosinophil% 03/19/2020 0.0  0.0 - 8.0 % Final    Basophil% 03/19/2020 0.1  0.0 - 1.9 % Final    Differential Method 03/19/2020 Automated   Final    Sodium 03/19/2020 141  136 - 145 mmol/L Final    Potassium 03/19/2020 3.4* 3.5 - 5.1 mmol/L Final    Chloride 03/19/2020 96  95 - 110 mmol/L Final    CO2 03/19/2020 34*  23 - 29 mmol/L Final    Glucose 03/19/2020 219* 70 - 110 mg/dL Final    BUN, Bld 03/19/2020 19  8 - 23 mg/dL Final    Creatinine 03/19/2020 0.7  0.5 - 1.4 mg/dL Final    Calcium 03/19/2020 8.9  8.7 - 10.5 mg/dL Final    Anion Gap 03/19/2020 11  8 - 16 mmol/L Final    eGFR if African American 03/19/2020 >60.0  >60 mL/min/1.73 m^2 Final    eGFR if non African American 03/19/2020 >60.0  >60 mL/min/1.73 m^2 Final    Troponin I 03/19/2020 <0.030  <=0.040 ng/mL Final    POC Glucose 03/19/2020 190* 70 - 110 Final   Admission on 03/13/2020, Discharged on 03/13/2020   Component Date Value Ref Range Status    WBC 03/13/2020 7.47  3.90 - 12.70 K/uL Final    RBC 03/13/2020 4.38  4.00 - 5.40 M/uL Final    Hemoglobin 03/13/2020 13.3  12.0 - 16.0 g/dL Final    Hematocrit 03/13/2020 40.3  37.0 - 48.5 % Final    Mean Corpuscular Volume 03/13/2020 92  82 - 98 fL Final    Mean Corpuscular Hemoglobin 03/13/2020 30.4  27.0 - 31.0 pg Final    Mean Corpuscular Hemoglobin Conc 03/13/2020 33.0  32.0 - 36.0 g/dL Final    RDW 03/13/2020 13.3  11.5 - 14.5 % Final    Platelets 03/13/2020 256  150 - 350 K/uL Final    MPV 03/13/2020 10.7  9.2 - 12.9 fL Final    Immature Granulocytes 03/13/2020 0.4  0.0 - 0.5 % Final    Gran # (ANC) 03/13/2020 3.8  1.8 - 7.7 K/uL Final    Immature Grans (Abs) 03/13/2020 0.03  0.00 - 0.04 K/uL Final    Lymph # 03/13/2020 3.1  1.0 - 4.8 K/uL Final    Mono # 03/13/2020 0.4  0.3 - 1.0 K/uL Final    Eos # 03/13/2020 0.1  0.0 - 0.5 K/uL Final    Baso # 03/13/2020 0.03  0.00 - 0.20 K/uL Final    nRBC 03/13/2020 0  0 /100 WBC Final    Gran% 03/13/2020 50.8  38.0 - 73.0 % Final    Lymph% 03/13/2020 41.1  18.0 - 48.0 % Final    Mono% 03/13/2020 5.8  4.0 - 15.0 % Final    Eosinophil% 03/13/2020 1.5  0.0 - 8.0 % Final    Basophil% 03/13/2020 0.4  0.0 - 1.9 % Final    Differential Method 03/13/2020 Automated   Final    Sodium 03/13/2020 139  136 - 145 mmol/L Final    Potassium 03/13/2020 3.9   3.5 - 5.1 mmol/L Final    Chloride 03/13/2020 97  95 - 110 mmol/L Final    CO2 03/13/2020 33* 23 - 29 mmol/L Final    Glucose 03/13/2020 215* 70 - 110 mg/dL Final    BUN, Bld 03/13/2020 17  8 - 23 mg/dL Final    Creatinine 03/13/2020 0.7  0.5 - 1.4 mg/dL Final    Calcium 03/13/2020 8.7  8.7 - 10.5 mg/dL Final    Total Protein 03/13/2020 6.9  6.0 - 8.4 g/dL Final    Albumin 03/13/2020 3.4* 3.5 - 5.2 g/dL Final    Total Bilirubin 03/13/2020 0.5  0.1 - 1.0 mg/dL Final    Alkaline Phosphatase 03/13/2020 66  55 - 135 U/L Final    AST 03/13/2020 20  10 - 40 U/L Final    ALT 03/13/2020 18  10 - 44 U/L Final    Anion Gap 03/13/2020 9  8 - 16 mmol/L Final    eGFR if African American 03/13/2020 >60.0  >60 mL/min/1.73 m^2 Final    eGFR if non African American 03/13/2020 >60.0  >60 mL/min/1.73 m^2 Final    Troponin I 03/13/2020 <0.030  <=0.040 ng/mL Final    BNP 03/13/2020 21  0 - 99 pg/mL Final    POC Glucose 03/13/2020 224* 70 - 110 Final   Office Visit on 02/26/2020   Component Date Value Ref Range Status    Hemoglobin A1C 02/26/2020 6.5  % Final       Past Medical History:   Diagnosis Date    Allergy     Anxiety     Arthritis     Asthma     Bipolar disorder     Cancer     thyroid    Chronic back pain     COPD (chronic obstructive pulmonary disease)     Diabetes mellitus     Diabetes mellitus, type 2     Fibromyalgia     GERD (gastroesophageal reflux disease)     History of fall 4/26/2018    [place] (hard of hearing)     Hx of thyroid cancer     Hyperlipidemia     Hypertension     Hypothyroidism     Neuropathy     On home oxygen therapy     3 l/m 24/7    Restless leg syndrome     Sleep apnea     Urinary tract infection      Past Surgical History:   Procedure Laterality Date    APPENDECTOMY      BACK SURGERY      x 3    broken foot and ankle      CHOLECYSTECTOMY      EYE SURGERY Bilateral     cataract    HYSTERECTOMY      JOINT REPLACEMENT Left 09/17/2018    knee    KNEE  ARTHROPLASTY Left 9/17/2018    Procedure: ARTHROPLASTY, KNEE;  Surgeon: Yariel Marin MD;  Location: [facility];  Service: Orthopedics;  Laterality: Left;    MYELOGRAPHY N/A 8/7/2019    Procedure: MYELOGRAM;  Surgeon: [facility] Diagnostic Provider;  Location: [facility] OR;  Service: General;  Laterality: N/A;    POSTERIOR REPAIR      SPINAL FUSION      x3 BACK, NECK X 4    TOTAL THYROIDECTOMY  2014     Family History   Problem Relation Age of Onset    Diabetes Mother     Heart disease Mother     Hypertension Mother     Diabetes Father     Heart disease Father     Hypertension Father        Marital Status:   Alcohol History:  reports no history of alcohol use.  Tobacco History:  reports that she quit smoking about 20 years ago. Her smoking use included cigarettes. She has a 30.00 pack-year smoking history. She has never used smokeless tobacco.  Drug History:  reports no history of drug use.    Review of patient's allergies indicates:   Allergen Reactions    Morphine Itching    Tubersol [tuberculin ppd] Other (See Comments)     Site swells bad. Has to have chest xray       Current Outpatient Medications:     ACCU-CHEK LIBERTY PLUS TEST STRP Strp, , Disp: , Rfl:     albuterol (VENTOLIN HFA) 90 mcg/actuation inhaler, Inhale 2 puffs into the lungs every 6 (six) hours as needed for Wheezing. Rescue, Disp: 1 Inhaler, Rfl: 3    albuterol-ipratropium (DUO-NEB) 2.5 mg-0.5 mg/3 mL nebulizer solution, Take 3 mLs by nebulization every 6 (six) hours as needed for Wheezing or Shortness of Breath. Rescue, Disp: 360 mL, Rfl: 5    ARTIFICIAL TEARS,HYPROMELLOSE, OPHT, Place 1 drop into both eyes 3 (three) times daily., Disp: , Rfl:     calcitRIOL (ROCALTROL) 0.5 MCG Cap, Take 1 capsule (0.5 mcg total) by mouth every evening., Disp: 90 capsule, Rfl: 1    clonazePAM (KLONOPIN) 0.5 MG tablet, Take 1 tablet (0.5 mg total) by mouth 2 (two) times daily., Disp: 180 tablet, Rfl: 0    DULoxetine (CYMBALTA) 60 MG capsule, Take 1  capsule (60 mg total) by mouth every evening., Disp: 90 capsule, Rfl: 1    FLUoxetine 40 MG capsule, Take 1 capsule (40 mg total) by mouth once daily., Disp: 90 capsule, Rfl: 1    fluticasone propionate (FLONASE) 50 mcg/actuation nasal spray, 1 spray (50 mcg total) by Each Nostril route once daily., Disp: 1 g, Rfl: 3    fluticasone-umeclidin-vilanter (TRELEGY ELLIPTA) 100-62.5-25 mcg DsDv, Inhale 1 puff into the lungs once daily., Disp: 3 each, Rfl: 1    gabapentin (NEURONTIN) 400 MG capsule, Take 1 capsule (400 mg total) by mouth once daily., Disp: 90 capsule, Rfl: 1    gabapentin (NEURONTIN) 800 MG tablet, Take 1 tablet (800 mg total) by mouth every evening. In evening, Disp: 90 tablet, Rfl: 1    ibuprofen (ADVIL,MOTRIN) 400 MG tablet, Take 400 mg by mouth every 8 (eight) hours as needed for Other., Disp: , Rfl:     insulin aspart U-100 (NOVOLOG) 100 unit/mL injection, Inject 2-8 Units into the skin 4 (four) times daily as needed for High Blood Sugar., Disp: , Rfl:     lactulose (CHRONULAC) 10 gram/15 mL solution, Take 20 g by mouth daily as needed. , Disp: , Rfl:     lamoTRIgine (LAMICTAL) 150 MG Tab, Take 1 tablet (150 mg total) by mouth 2 (two) times daily., Disp: 180 tablet, Rfl: 1    latanoprost 0.005 % ophthalmic solution, Place 1 drop into both eyes every evening. , Disp: , Rfl:     levothyroxine (SYNTHROID) 150 MCG tablet, Take 1 tablet (150 mcg total) by mouth before breakfast., Disp: 90 tablet, Rfl: 1    linaCLOtide (LINZESS) 290 mcg Cap capsule, Take 1 capsule (290 mcg total) by mouth once daily., Disp: 90 capsule, Rfl: 1    losartan-hydrochlorothiazide 50-12.5 mg (HYZAAR) 50-12.5 mg per tablet, Take 0.5 tablets by mouth once daily., Disp: 90 tablet, Rfl: 1    meclizine (ANTIVERT) 25 mg tablet, Take 25 mg by mouth 2 (two) times daily as needed for Dizziness. , Disp: , Rfl:     methocarbamoL (ROBAXIN) 750 MG Tab, Take 1 tablet (750 mg total) by mouth 2 (two) times daily as needed., Disp:  60 tablet, Rfl: 2    nitrofurantoin (MACRODANTIN) 100 MG capsule, Take 1 capsule (100 mg total) by mouth every 12 (twelve) hours. for 5 days, Disp: 10 capsule, Rfl: 0    ondansetron (ZOFRAN-ODT) 4 MG TbDL, Take 4 mg by mouth every 6 (six) hours as needed (nausea)., Disp: , Rfl:     oxyCODONE (ROXICODONE) 10 mg Tab immediate release tablet, Take 10 mg by mouth 3 (three) times daily. , Disp: , Rfl:     pantoprazole (PROTONIX) 40 MG tablet, Take 1 tablet (40 mg total) by mouth once daily., Disp: 90 tablet, Rfl: 1    potassium chloride SA (K-DUR,KLOR-CON) 10 MEQ tablet, Take 1 tablet (10 mEq total) by mouth 3 (three) times a week. Mon , Wed, and Fri., Disp: 36 tablet, Rfl: 1    predniSONE (DELTASONE) 10 MG tablet, Take 1 tablet (10 mg total) by mouth once daily., Disp: 90 tablet, Rfl: 0    predniSONE (DELTASONE) 10 MG tablet, 40mg for first 3 days, 30 mg for 3 days, 20 mg for 3 days, and 10mg for 3 days, Disp: 120 tablet, Rfl: 0    RELISTOR 150 mg Tab, Take 150 mg by mouth once daily., Disp: 30 tablet, Rfl: 2    rOPINIRole (REQUIP) 4 MG tablet, Take 1 tablet (4 mg total) by mouth 2 (two) times daily., Disp: 180 tablet, Rfl: 1    temazepam (RESTORIL) 15 mg Cap, Take 2 capsules (30 mg total) by mouth nightly as needed. (Patient taking differently: Take 15 mg by mouth nightly as needed. ), Disp: 30 capsule, Rfl: 4    LEVEMIR FLEXTOUCH U-100 INSULN 100 unit/mL (3 mL) InPn pen, Inject 65 Units into the skin 2 (two) times daily., Disp: 3 Box, Rfl: 3    nitrofurantoin (MACRODANTIN) 100 MG capsule, Take 1 capsule (100 mg total) by mouth every 12 (twelve) hours. for 5 days, Disp: 10 capsule, Rfl: 0    Review of Systems   Constitutional: Positive for activity change and fatigue. Negative for chills and fever.   HENT: Positive for postnasal drip. Negative for congestion, ear pain, sinus pressure, sinus pain, tinnitus and trouble swallowing.    Respiratory: Positive for chest tightness, shortness of breath and wheezing.  "Negative for cough.    Cardiovascular: Negative for chest pain and palpitations.   Gastrointestinal: Negative for abdominal pain, nausea and vomiting.   Genitourinary: Negative for dysuria, frequency and urgency.   Musculoskeletal: Negative for arthralgias, back pain and myalgias.   Skin: Negative for rash and wound.   Neurological: Negative for dizziness, weakness, light-headedness and headaches.   Psychiatric/Behavioral: Negative.           Transitional Care Note    Family and/or Caretaker present at visit?  No.  Diagnostic tests reviewed/disposition: No diagnosic tests pending after this hospitalization.  Disease/illness education: Mechanism of COPD  Home health/community services discussion/referrals: Patient does not have home health established from hospital visit.  They do not need home health.  If needed, we will set up home health for the patient.   Establishment or re-establishment of referral orders for community resources: No other necessary community resources.   Discussion with other health care providers: No discussion with other health care providers necessary.         Objective:      Vitals:    07/02/20 1213   BP: 130/70   Pulse: 80   Temp: 99.6 °F (37.6 °C)   Weight: 94.8 kg (209 lb)   Height: 5' 3" (1.6 m)     Physical Exam  Vitals signs reviewed.   Constitutional:       General: She is not in acute distress.     Appearance: Normal appearance. She is well-developed.   HENT:      Head: Normocephalic and atraumatic.      Right Ear: Tympanic membrane normal.      Left Ear: Tympanic membrane normal.      Nose: Nose normal. No nasal deformity or mucosal edema.      Mouth/Throat:      Dentition: No dental caries or dental abscesses.      Pharynx: No oropharyngeal exudate.   Eyes:      General: Lids are normal.      Conjunctiva/sclera: Conjunctivae normal.      Pupils: Pupils are equal, round, and reactive to light.   Neck:      Musculoskeletal: Normal range of motion.      Thyroid: No thyromegaly.      " Vascular: No JVD.      Trachea: No tracheal tenderness or tracheal deviation.   Cardiovascular:      Rate and Rhythm: Normal rate and regular rhythm.      Heart sounds: Normal heart sounds. No murmur.   Pulmonary:      Effort: Accessory muscle usage present. No respiratory distress.      Breath sounds: Decreased air movement present. No stridor. Examination of the right-lower field reveals decreased breath sounds. Decreased breath sounds present.   Abdominal:      General: Bowel sounds are normal.      Palpations: Abdomen is soft.      Tenderness: There is no abdominal tenderness.   Musculoskeletal: Normal range of motion.         General: No tenderness.   Lymphadenopathy:      Cervical: No cervical adenopathy.   Skin:     General: Skin is warm and dry.      Capillary Refill: Capillary refill takes less than 2 seconds.      Findings: No ecchymosis.   Neurological:      Mental Status: She is alert and oriented to person, place, and time.   Psychiatric:         Mood and Affect: Mood normal.         Behavior: Behavior normal.           Assessment:       1. Type 2 diabetes mellitus with diabetic polyneuropathy, with long-term current use of insulin    2. Anxiety    3. Chronic obstructive pulmonary disease with acute exacerbation    4. Panlobular emphysema         Plan:       Type 2 diabetes mellitus with diabetic polyneuropathy, with long-term current use of insulin  -     LEVEMIR FLEXTOUCH U-100 INSULN 100 unit/mL (3 mL) InPn pen; Inject 65 Units into the skin 2 (two) times daily.  Dispense: 3 Box; Refill: 3    Anxiety  -     clonazePAM (KLONOPIN) 0.5 MG tablet; Take 1 tablet (0.5 mg total) by mouth 2 (two) times daily.  Dispense: 180 tablet; Refill: 0  - Requesting increasing Klonopin to bid d/t increased anxiety with worsening breathing. Will oblige.    Chronic obstructive pulmonary disease with acute exacerbation    Panlobular emphysema    Other orders  -     RELISTOR 150 mg Tab; Take 150 mg by mouth once daily.   Dispense: 30 tablet; Refill: 2    Has prednisone taper plus more I gave her at previous visit. Strongly encouraged her to please follow-up with Pulmonology once more before leaving as I want to be sure that she is stable enough to make the car trip to [place].     Follow up if symptoms worsen or fail to improve.

## 2020-07-05 LAB
AORTIC ROOT ANNULUS: 2.97 CM
AORTIC VALVE CUSP SEPERATION: 1.9 CM
AV INDEX (PROSTH): 0.77
AV MEAN GRADIENT: 5 MMHG
AV PEAK GRADIENT: 7 MMHG
AV VALVE AREA: 2.65 CM2
AV VELOCITY RATIO: 69.78
BSA FOR ECHO PROCEDURE: 2.08 M2
CV ECHO LV RWT: 0.58 CM
DOP CALC AO PEAK VEL: 1.34 M/S
DOP CALC AO VTI: 24.04 CM
DOP CALC LVOT AREA: 3.4 CM2
DOP CALC LVOT DIAMETER: 2.09 CM
DOP CALC LVOT PEAK VEL: 93.5 M/S
DOP CALC LVOT STROKE VOLUME: 63.74 CM3
DOP CALCLVOT PEAK VEL VTI: 18.59 CM
E WAVE DECELERATION TIME: 211.34 MSEC
E/A RATIO: 0.7
E/E' RATIO: 13 M/S
ECHO LV POSTERIOR WALL: 1.15 CM (ref 0.6–1.1)
FRACTIONAL SHORTENING: 31 % (ref 28–44)
INTERVENTRICULAR SEPTUM: 1.12 CM (ref 0.6–1.1)
IVRT: 99.45 MSEC
LEFT ATRIUM SIZE: 3.15 CM
LEFT INTERNAL DIMENSION IN SYSTOLE: 2.73 CM (ref 2.1–4)
LEFT VENTRICLE MASS INDEX: 76 G/M2
LEFT VENTRICULAR INTERNAL DIMENSION IN DIASTOLE: 3.97 CM (ref 3.5–6)
LEFT VENTRICULAR MASS: 150.69 G
LV LATERAL E/E' RATIO: 13 M/S
LV SEPTAL E/E' RATIO: 13 M/S
MV PEAK A VEL: 1.12 M/S
MV PEAK E VEL: 0.78 M/S
PISA TR MAX VEL: 2 M/S
PV PEAK VELOCITY: 121 CM/S
RA PRESSURE: 3 MMHG
RIGHT VENTRICULAR END-DIASTOLIC DIMENSION: 219 CM
TDI LATERAL: 0.06 M/S
TDI SEPTAL: 0.06 M/S
TDI: 0.06 M/S
TR MAX PG: 16 MMHG
TV REST PULMONARY ARTERY PRESSURE: 19 MMHG

## 2020-07-17 ENCOUNTER — HOSPITAL ENCOUNTER (EMERGENCY)
Facility: HOSPITAL | Age: 70
Discharge: HOME | End: 2020-07-17
Attending: EMERGENCY MEDICINE
Payer: MEDICARE

## 2020-07-17 VITALS
WEIGHT: 206 LBS | BODY MASS INDEX: 35.17 KG/M2 | RESPIRATION RATE: 18 BRPM | TEMPERATURE: 99 F | DIASTOLIC BLOOD PRESSURE: 63 MMHG | HEART RATE: 83 BPM | OXYGEN SATURATION: 100 % | SYSTOLIC BLOOD PRESSURE: 129 MMHG | HEIGHT: 64 IN

## 2020-07-17 DIAGNOSIS — R42 DIZZINESS: ICD-10-CM
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
## 2020-07-17 DIAGNOSIS — S09.90XA INJURY OF HEAD, INITIAL ENCOUNTER: ICD-10-CM

## 2020-07-17 DIAGNOSIS — S80.02XA CONTUSION OF LEFT KNEE, INITIAL ENCOUNTER: ICD-10-CM

## 2020-07-17 DIAGNOSIS — R53.1 WEAKNESS: ICD-10-CM

## 2020-07-17 DIAGNOSIS — S01.01XA LACERATION OF SCALP, INITIAL ENCOUNTER: ICD-10-CM

## 2020-07-17 DIAGNOSIS — S50.01XA CONTUSION OF RIGHT ELBOW, INITIAL ENCOUNTER: ICD-10-CM

## 2020-07-17 LAB
ALBUMIN SERPL BCP-MCNC: 3.3 G/DL (ref 3.5–5.2)
ALP SERPL-CCNC: 57 U/L (ref 55–135)
ALT SERPL W/O P-5'-P-CCNC: 14 U/L (ref 10–44)
ANION GAP SERPL CALC-SCNC: 9 MMOL/L (ref 8–16)
AST SERPL-CCNC: 17 U/L (ref 10–40)
BASOPHILS # BLD AUTO: 0.02 K/UL (ref 0–0.2)
BASOPHILS NFR BLD: 0.3 % (ref 0–1.9)
BILIRUB SERPL-MCNC: 0.3 MG/DL (ref 0.1–1)
BNP SERPL-MCNC: 20 PG/ML (ref 0–99)
BUN SERPL-MCNC: 14 MG/DL (ref 8–23)
CALCIUM SERPL-MCNC: 8 MG/DL (ref 8.7–10.5)
CHLORIDE SERPL-SCNC: 98 MMOL/L (ref 95–110)
CO2 SERPL-SCNC: 34 MMOL/L (ref 23–29)
CREAT SERPL-MCNC: 0.7 MG/DL (ref 0.5–1.4)
DIFFERENTIAL METHOD: ABNORMAL
EOSINOPHIL # BLD AUTO: 0.1 K/UL (ref 0–0.5)
EOSINOPHIL NFR BLD: 0.7 % (ref 0–8)
ERYTHROCYTE [DISTWIDTH] IN BLOOD BY AUTOMATED COUNT: 13.9 % (ref 11.5–14.5)
EST. GFR  (AFRICAN AMERICAN): >60 ML/MIN/1.73 M^2
EST. GFR  (NON AFRICAN AMERICAN): >60 ML/MIN/1.73 M^2
GLUCOSE SERPL-MCNC: 103 MG/DL (ref 70–110)
HCT VFR BLD AUTO: 38.2 % (ref 37–48.5)
HGB BLD-MCNC: 12.1 G/DL (ref 12–16)
IMM GRANULOCYTES # BLD AUTO: 0.03 K/UL (ref 0–0.04)
IMM GRANULOCYTES NFR BLD AUTO: 0.4 % (ref 0–0.5)
LYMPHOCYTES # BLD AUTO: 1.4 K/UL (ref 1–4.8)
LYMPHOCYTES NFR BLD: 18.7 % (ref 18–48)
MCH RBC QN AUTO: 29.5 PG (ref 27–31)
MCHC RBC AUTO-ENTMCNC: 31.7 G/DL (ref 32–36)
MCV RBC AUTO: 93 FL (ref 82–98)
MONOCYTES # BLD AUTO: 0.3 K/UL (ref 0.3–1)
MONOCYTES NFR BLD: 4.5 % (ref 4–15)
NEUTROPHILS # BLD AUTO: 5.8 K/UL (ref 1.8–7.7)
NEUTROPHILS NFR BLD: 75.4 % (ref 38–73)
NRBC BLD-RTO: 0 /100 WBC
PLATELET # BLD AUTO: 290 K/UL (ref 150–350)
PMV BLD AUTO: 10.2 FL (ref 9.2–12.9)
POTASSIUM SERPL-SCNC: 3.7 MMOL/L (ref 3.5–5.1)
PROT SERPL-MCNC: 6.3 G/DL (ref 6–8.4)
RBC # BLD AUTO: 4.1 M/UL (ref 4–5.4)
SODIUM SERPL-SCNC: 141 MMOL/L (ref 136–145)
TROPONIN I SERPL DL<=0.01 NG/ML-MCNC: <0.03 NG/ML
WBC # BLD AUTO: 7.63 K/UL (ref 3.9–12.7)

## 2020-07-17 PROCEDURE — 83880 ASSAY OF NATRIURETIC PEPTIDE: CPT

## 2020-07-17 PROCEDURE — 93005 ELECTROCARDIOGRAM TRACING: CPT | Performed by: INTERNAL MEDICINE

## 2020-07-17 PROCEDURE — 85025 COMPLETE CBC W/AUTO DIFF WBC: CPT

## 2020-07-17 PROCEDURE — 12002 RPR S/N/AX/GEN/TRNK2.6-7.5CM: CPT

## 2020-07-17 PROCEDURE — 63600175 PHARM REV CODE 636 W HCPCS: Performed by: EMERGENCY MEDICINE

## 2020-07-17 PROCEDURE — 90471 IMMUNIZATION ADMIN: CPT | Performed by: EMERGENCY MEDICINE

## 2020-07-17 PROCEDURE — 84484 ASSAY OF TROPONIN QUANT: CPT

## 2020-07-17 PROCEDURE — 80053 COMPREHEN METABOLIC PANEL: CPT

## 2020-07-17 PROCEDURE — 99285 EMERGENCY DEPT VISIT HI MDM: CPT | Mod: 25

## 2020-07-17 PROCEDURE — 90715 TDAP VACCINE 7 YRS/> IM: CPT | Performed by: EMERGENCY MEDICINE

## 2020-07-17 RX ORDER — UMECLIDINIUM BROMIDE AND VILANTEROL TRIFENATATE 62.5; 25 UG/1; UG/1
1 POWDER RESPIRATORY (INHALATION) DAILY
COMMUNITY
End: 2020-09-11

## 2020-07-17 RX ADMIN — CLOSTRIDIUM TETANI TOXOID ANTIGEN (FORMALDEHYDE INACTIVATED), CORYNEBACTERIUM DIPHTHERIAE TOXOID ANTIGEN (FORMALDEHYDE INACTIVATED), BORDETELLA PERTUSSIS TOXOID ANTIGEN (GLUTARALDEHYDE INACTIVATED), BORDETELLA PERTUSSIS FILAMENTOUS HEMAGGLUTININ ANTIGEN (FORMALDEHYDE INACTIVATED), BORDETELLA PERTUSSIS PERTACTIN ANTIGEN, AND BORDETELLA PERTUSSIS FIMBRIAE 2/3 ANTIGEN 0.5 ML: 5; 2; 2.5; 5; 3; 5 INJECTION, SUSPENSION INTRAMUSCULAR at 01:07

## 2020-07-17 NOTE — ED TRIAGE NOTES
Pt states feeling weak and falling. Pt hit the back of her head with no LOC. Pt has a laceration to the back of her head.

## 2020-07-17 NOTE — ED PROVIDER NOTES
Encounter Date: 7/17/2020       History     Chief Complaint   Patient presents with    Fall     69-year-old female who has a history of anxiety, bipolar disorder, chronic back pain, COPD, diabetes, GERD, fibromyalgia, hypertension, hyperlipidemia, hypothyroidism, presents emergency room with a history of having fall at home.  Patient states that she became entangled in her walker when she lost her balance and fell to the floor.  She states she then crawled to the door as she was going to answer it when the event occurred.  The patient states she had some very transient dizziness.  She had no loss of consciousness.  No nausea vomiting.  She denies any nuchal pain.  She did sustain a scalp laceration.  Tetanus status is not up-to-date.  She has some slight exacerbation of lower back pain.  No complaints of any anterior chest pain or abdominal pain.  No complaints of pain to the left arm or right leg.  She did bump her right elbow and her left knee when falling.  She denies any visual changes.  No complaint of headache at this time.        Review of patient's allergies indicates:   Allergen Reactions    Morphine Itching    Tubersol [tuberculin ppd] Other (See Comments)     Site swells bad. Has to have chest xray     Past Medical History:   Diagnosis Date    Allergy     Anxiety     Arthritis     Asthma     Bipolar disorder     Cancer     thyroid    Chronic back pain     COPD (chronic obstructive pulmonary disease)     Diabetes mellitus     Diabetes mellitus, type 2     Fibromyalgia     GERD (gastroesophageal reflux disease)     History of fall 4/26/2018    [place]     Hx of thyroid cancer     Hyperlipidemia     Hypertension     Hypothyroidism     Neuropathy     On home oxygen therapy     3 l/m 24/7    Restless leg syndrome     Sleep apnea     Urinary tract infection      Past Surgical History:   Procedure Laterality Date    APPENDECTOMY      BACK SURGERY      x 3    broken foot  and ankle      CHOLECYSTECTOMY      EYE SURGERY Bilateral     cataract    HYSTERECTOMY      JOINT REPLACEMENT Left 2018    knee    KNEE ARTHROPLASTY Left 2018    Procedure: ARTHROPLASTY, KNEE;  Surgeon: Yariel Marin MD;  Location: [facility] OR;  Service: Orthopedics;  Laterality: Left;    MYELOGRAPHY N/A 2019    Procedure: MYELOGRAM;  Surgeon: [facility];  Location: [facility] OR;  Service: General;  Laterality: N/A;    POSTERIOR REPAIR      SPINAL FUSION      x3 BACK, NECK X 4    TOTAL THYROIDECTOMY       Family History   Problem Relation Age of Onset    Diabetes Mother     Heart disease Mother     Hypertension Mother     Diabetes Father     Heart disease Father     Hypertension Father      Social History     Tobacco Use    Smoking status: Former Smoker     Packs/day: 1.00     Years: 30.00     Pack years: 30.00     Types: Cigarettes     Quit date: 2000     Years since quittin.2    Smokeless tobacco: Never Used   Substance Use Topics    Alcohol use: No    Drug use: No     Review of Systems   Constitutional: Negative for chills, diaphoresis and fever.   HENT: Negative for congestion, sore throat and trouble swallowing.    Respiratory: Negative for cough and shortness of breath.    Cardiovascular: Negative for chest pain and palpitations.   Gastrointestinal: Negative for abdominal pain, nausea and vomiting.   Genitourinary: Negative for difficulty urinating and dysuria.   Musculoskeletal: Negative for back pain and neck pain.   Skin: Positive for wound. Negative for pallor.   Neurological: Negative for weakness, numbness and headaches.   Hematological: Does not bruise/bleed easily.   All other systems reviewed and are negative.      Physical Exam     Initial Vitals [20 1131]   BP Pulse Resp Temp SpO2   136/62 72 18 98.3 °F (36.8 °C) (!) 92 %      MAP       --         Physical Exam    Vitals reviewed.  Constitutional: She appears well-developed and  well-nourished. She is not diaphoretic. No distress.   HENT:   Head: Normocephalic and atraumatic.   Nose: Nose normal.   Mouth/Throat: Oropharynx is clear and moist. No oropharyngeal exudate.   Stellate 5 cm laceration in the right posterior parietal area.  No active bleeding.   Eyes: Conjunctivae are normal. Pupils are equal, round, and reactive to light. Right eye exhibits no discharge. Left eye exhibits no discharge.   Neck: Normal range of motion. Neck supple. No JVD present.   Cardiovascular: Normal rate, regular rhythm, normal heart sounds and intact distal pulses. Exam reveals no gallop and no friction rub.    No murmur heard.  Pulmonary/Chest: Breath sounds normal. No respiratory distress. She has no wheezes. She has no rhonchi. She has no rales. She exhibits no tenderness.   Abdominal: Soft. Bowel sounds are normal. She exhibits no distension. There is no abdominal tenderness. There is no rebound and no guarding.   Musculoskeletal: Normal range of motion. No tenderness or edema.   Lymphadenopathy:     She has no cervical adenopathy.   Neurological: She is alert and oriented to person, place, and time. She has normal strength. No cranial nerve deficit or sensory deficit. GCS score is 15. GCS eye subscore is 4. GCS verbal subscore is 5. GCS motor subscore is 6.   Skin: Skin is warm and dry. Capillary refill takes less than 2 seconds. No rash noted. No erythema. No pallor.   Psychiatric: She has a normal mood and affect. Her behavior is normal. Judgment and thought content normal.         ED Course   Procedures  Labs Reviewed   CBC W/ AUTO DIFFERENTIAL - Abnormal; Notable for the following components:       Result Value    Mean Corpuscular Hemoglobin Conc 31.7 (*)     Gran% 75.4 (*)     All other components within normal limits   COMPREHENSIVE METABOLIC PANEL   TROPONIN I   B-TYPE NATRIURETIC PEPTIDE        ECG Results          EKG 12-lead (In process)  Result time 07/17/20 11:56:37    In process by  Interface, Lab In [facility] (07/17/20 11:56:37)                 Narrative:    Test Reason : R53.1,    Vent. Rate : 072 BPM     Atrial Rate : 072 BPM     P-R Int : 158 ms          QRS Dur : 086 ms      QT Int : 438 ms       P-R-T Axes : 061 065 094 degrees     QTc Int : 479 ms    Normal sinus rhythm  Normal ECG  When compared with ECG of 26-JUN-2020 10:24,  No significant change was found    Referred By: [email or web address]   SELF           Confirmed By:                             Imaging Results          X-Ray Lumbar Spine Complete 5 View (In process)                               Attending Attestation:             Attending ED Notes:   Procedure note the patient's right posterior parietal area with laceration was located was prepped with peroxide.  The hair was clipped with a shaver.  Closure was obtained with 9 staples.  The area was locally anesthetized with 1% lidocaine with epinephrine prior to stapling.    The patient's x-ray of lower back showed no acute findings.  CT of the head showed no evidence of any bleed or fracture.  CT of the cervical spine showed 0 postoperative changes but no acute fracture.  Labs were reviewed and all unremarkable.  During the ED course the patient was given a DT immunization.  She will be able to be discharged and given head injury precautions and wound care precautions.  Staples are to be removed in 7-10 days.  The patient's EKG showed a sinus rhythm rate of 72 with no evidence of any injury or ischemia.  Axis is normal.  ST segments are normal.                            Clinical Impression:       ICD-10-CM ICD-9-CM   1. Fall, initial encounter  W19.XXXA E888.9   2. Weakness  R53.1 780.79   3. Dizziness  R42 780.4   4. Injury of head, initial encounter  S09.90XA 959.01   5. Laceration of scalp, initial encounter  S01.01XA 873.0   6. Contusion of left knee, initial encounter  S80.02XA 924.11   7. Contusion of right elbow, initial encounter  S50.01XA 923.11                                 Maykel Bae Jr., MD  07/17/20 1444       Maykel Bae Jr., MD  07/17/20 1446

## 2020-07-20 ENCOUNTER — TELEPHONE (OUTPATIENT)
Dept: FAMILY MEDICINE | Facility: CLINIC | Age: 70
End: 2020-07-20

## 2020-07-20 NOTE — TELEPHONE ENCOUNTER
----- Message from Yee Chatterjee sent at 7/20/2020 11:09 AM CDT -----  Regarding: Needs ER follow up  Contact: Alanis Mendieta  Pt fell on Friday and split her head open. She went to [facility] and now she needs a follow up appt to get her staples out .  Pt# [phone]

## 2020-07-24 ENCOUNTER — DOCUMENT SCAN (OUTPATIENT)
Dept: HOME HEALTH SERVICES | Facility: HOSPITAL | Age: 70
End: 2020-07-24

## 2020-07-27 ENCOUNTER — OFFICE VISIT (OUTPATIENT)
Dept: FAMILY MEDICINE | Facility: CLINIC | Age: 70
End: 2020-07-27
Payer: MEDICARE

## 2020-07-27 VITALS
WEIGHT: 214 LBS | HEART RATE: 108 BPM | BODY MASS INDEX: 37.92 KG/M2 | HEIGHT: 63 IN | DIASTOLIC BLOOD PRESSURE: 56 MMHG | TEMPERATURE: 98 F | SYSTOLIC BLOOD PRESSURE: 110 MMHG

## 2020-07-27 DIAGNOSIS — S01.01XD LACERATION OF SCALP, SUBSEQUENT ENCOUNTER: Primary | ICD-10-CM

## 2020-07-27 DIAGNOSIS — J43.9 PULMONARY EMPHYSEMA, UNSPECIFIED EMPHYSEMA TYPE: ICD-10-CM

## 2020-07-27 DIAGNOSIS — F51.01 PRIMARY INSOMNIA: ICD-10-CM

## 2020-07-27 PROCEDURE — 3078F PR MOST RECENT DIASTOLIC BLOOD PRESSURE < 80 MM HG: ICD-10-PCS | Mod: S$GLB,,, | Performed by: NURSE PRACTITIONER

## 2020-07-27 PROCEDURE — 99024 POSTOP FOLLOW-UP VISIT: CPT | Mod: S$GLB,,, | Performed by: NURSE PRACTITIONER

## 2020-07-27 PROCEDURE — 99213 PR OFFICE/OUTPT VISIT, EST, LEVL III, 20-29 MIN: ICD-10-PCS | Mod: S$GLB,,, | Performed by: NURSE PRACTITIONER

## 2020-07-27 PROCEDURE — 1101F PT FALLS ASSESS-DOCD LE1/YR: CPT | Mod: S$GLB,,, | Performed by: NURSE PRACTITIONER

## 2020-07-27 PROCEDURE — 3074F PR MOST RECENT SYSTOLIC BLOOD PRESSURE < 130 MM HG: ICD-10-PCS | Mod: S$GLB,,, | Performed by: NURSE PRACTITIONER

## 2020-07-27 PROCEDURE — 3078F DIAST BP <80 MM HG: CPT | Mod: S$GLB,,, | Performed by: NURSE PRACTITIONER

## 2020-07-27 PROCEDURE — 1159F PR MEDICATION LIST DOCUMENTED IN MEDICAL RECORD: ICD-10-PCS | Mod: S$GLB,,, | Performed by: NURSE PRACTITIONER

## 2020-07-27 PROCEDURE — 1101F PR PT FALLS ASSESS DOC 0-1 FALLS W/OUT INJ PAST YR: ICD-10-PCS | Mod: S$GLB,,, | Performed by: NURSE PRACTITIONER

## 2020-07-27 PROCEDURE — 3074F SYST BP LT 130 MM HG: CPT | Mod: S$GLB,,, | Performed by: NURSE PRACTITIONER

## 2020-07-27 PROCEDURE — 3008F PR BODY MASS INDEX (BMI) DOCUMENTED: ICD-10-PCS | Mod: S$GLB,,, | Performed by: NURSE PRACTITIONER

## 2020-07-27 PROCEDURE — 99024 SUTURE REMOVAL: ICD-10-PCS | Mod: S$GLB,,, | Performed by: NURSE PRACTITIONER

## 2020-07-27 PROCEDURE — 1159F MED LIST DOCD IN RCRD: CPT | Mod: S$GLB,,, | Performed by: NURSE PRACTITIONER

## 2020-07-27 PROCEDURE — 99213 OFFICE O/P EST LOW 20 MIN: CPT | Mod: S$GLB,,, | Performed by: NURSE PRACTITIONER

## 2020-07-27 PROCEDURE — 3008F BODY MASS INDEX DOCD: CPT | Mod: S$GLB,,, | Performed by: NURSE PRACTITIONER

## 2020-07-27 RX ORDER — INSULIN ADMIN. SUPPLIES
INSULIN PEN (EA) SUBCUTANEOUS
COMMUNITY
Start: 2020-07-14 | End: 2020-10-06 | Stop reason: SDUPTHER

## 2020-07-27 RX ORDER — TEMAZEPAM 15 MG/1
30 CAPSULE ORAL NIGHTLY PRN
Qty: 60 CAPSULE | Refills: 1 | Status: SHIPPED | OUTPATIENT
Start: 2020-07-27 | End: 2020-09-11

## 2020-07-27 NOTE — PROCEDURES
Suture Removal    Date/Time: 7/27/2020 8:30 AM  Location procedure was performed: [address]  Performed by: Michelle Matthew NP  Authorized by: Michelle Matthew NP   Body area: head/neck  Location details: scalp  Wound Appearance: clean, well healed, nontender and no drainage  Staples Removed: 9  Post-removal: no dressing applied and antibiotic ointment applied  Complications: No  Specimens: No  Implants: No  Patient tolerance: Patient tolerated the procedure well with no immediate complications

## 2020-07-27 NOTE — PROGRESS NOTES
SUBJECTIVE:    Patient ID: Alanis Mendieta is a 69 y.o. female.    Chief Complaint: Suture / Staple Removal (ER f/u, Mammo and DEXA frank for now// SW)    Pt presents for staple removal. Presented to ER on 7/17 after tripping and falling when trying to get up to answer the door. Denies loss of consciousness. X-rays unremarkable. Received 9 staples to laceration of scalp. Denies any vision changes, dizziness, or slurred speech. Reports some increased back pain since fall along with right leg tingling and burning. Sees Dr. Almazan for spine. Pt was supposed to move back to [place] this month but the person who was supposed to bring her never showed up so she plans to stay. Has not followed up with Pulm since hospitalization d/t plans to move. Breathing stable. Not currently taking any steroids. Concerns about difficulty getting to sleep and staying asleep. Thinks it may be r/t her sleep apnea. Was supposed to get a new mask for machine but never did.       Admission on 07/17/2020, Discharged on 07/17/2020   Component Date Value Ref Range Status    WBC 07/17/2020 7.63  3.90 - 12.70 K/uL Final    RBC 07/17/2020 4.10  4.00 - 5.40 M/uL Final    Hemoglobin 07/17/2020 12.1  12.0 - 16.0 g/dL Final    Hematocrit 07/17/2020 38.2  37.0 - 48.5 % Final    Mean Corpuscular Volume 07/17/2020 93  82 - 98 fL Final    Mean Corpuscular Hemoglobin 07/17/2020 29.5  27.0 - 31.0 pg Final    Mean Corpuscular Hemoglobin Conc 07/17/2020 31.7* 32.0 - 36.0 g/dL Final    RDW 07/17/2020 13.9  11.5 - 14.5 % Final    Platelets 07/17/2020 290  150 - 350 K/uL Final    MPV 07/17/2020 10.2  9.2 - 12.9 fL Final    Immature Granulocytes 07/17/2020 0.4  0.0 - 0.5 % Final    Gran # (ANC) 07/17/2020 5.8  1.8 - 7.7 K/uL Final    Immature Grans (Abs) 07/17/2020 0.03  0.00 - 0.04 K/uL Final    Lymph # 07/17/2020 1.4  1.0 - 4.8 K/uL Final    Mono # 07/17/2020 0.3  0.3 - 1.0 K/uL Final    Eos # 07/17/2020 0.1  0.0 - 0.5 K/uL Final     Baso # 07/17/2020 0.02  0.00 - 0.20 K/uL Final    nRBC 07/17/2020 0  0 /100 WBC Final    Gran% 07/17/2020 75.4* 38.0 - 73.0 % Final    Lymph% 07/17/2020 18.7  18.0 - 48.0 % Final    Mono% 07/17/2020 4.5  4.0 - 15.0 % Final    Eosinophil% 07/17/2020 0.7  0.0 - 8.0 % Final    Basophil% 07/17/2020 0.3  0.0 - 1.9 % Final    Differential Method 07/17/2020 Automated   Final    Sodium 07/17/2020 141  136 - 145 mmol/L Final    Potassium 07/17/2020 3.7  3.5 - 5.1 mmol/L Final    Chloride 07/17/2020 98  95 - 110 mmol/L Final    CO2 07/17/2020 34* 23 - 29 mmol/L Final    Glucose 07/17/2020 103  70 - 110 mg/dL Final    BUN, Bld 07/17/2020 14  8 - 23 mg/dL Final    Creatinine 07/17/2020 0.7  0.5 - 1.4 mg/dL Final    Calcium 07/17/2020 8.0* 8.7 - 10.5 mg/dL Final    Total Protein 07/17/2020 6.3  6.0 - 8.4 g/dL Final    Albumin 07/17/2020 3.3* 3.5 - 5.2 g/dL Final    Total Bilirubin 07/17/2020 0.3  0.1 - 1.0 mg/dL Final    Alkaline Phosphatase 07/17/2020 57  55 - 135 U/L Final    AST 07/17/2020 17  10 - 40 U/L Final    ALT 07/17/2020 14  10 - 44 U/L Final    Anion Gap 07/17/2020 9  8 - 16 mmol/L Final    eGFR if African American 07/17/2020 >60.0  >60 mL/min/1.73 m^2 Final    eGFR if non African American 07/17/2020 >60.0  >60 mL/min/1.73 m^2 Final    Troponin I 07/17/2020 <0.030  <=0.040 ng/mL Final    BNP 07/17/2020 20  0 - 99 pg/mL Final   No results displayed because visit has over 200 results.      Office Visit on 06/16/2020   Component Date Value Ref Range Status    Hemoglobin A1C 06/16/2020 8.4  % Final   Admission on 03/18/2020, Discharged on 03/19/2020   Component Date Value Ref Range Status    WBC 03/18/2020 8.17  3.90 - 12.70 K/uL Final    RBC 03/18/2020 4.34  4.00 - 5.40 M/uL Final    Hemoglobin 03/18/2020 13.1  12.0 - 16.0 g/dL Final    Hematocrit 03/18/2020 39.9  37.0 - 48.5 % Final    Mean Corpuscular Volume 03/18/2020 92  82 - 98 fL Final    Mean Corpuscular Hemoglobin 03/18/2020 30.2   27.0 - 31.0 pg Final    Mean Corpuscular Hemoglobin Conc 03/18/2020 32.8  32.0 - 36.0 g/dL Final    RDW 03/18/2020 13.6  11.5 - 14.5 % Final    Platelets 03/18/2020 242  150 - 350 K/uL Final    MPV 03/18/2020 10.9  9.2 - 12.9 fL Final    Immature Granulocytes 03/18/2020 0.6* 0.0 - 0.5 % Final    Gran # (ANC) 03/18/2020 6.4  1.8 - 7.7 K/uL Final    Immature Grans (Abs) 03/18/2020 0.05* 0.00 - 0.04 K/uL Final    Lymph # 03/18/2020 1.7  1.0 - 4.8 K/uL Final    Mono # 03/18/2020 0.1* 0.3 - 1.0 K/uL Final    Eos # 03/18/2020 0.0  0.0 - 0.5 K/uL Final    Baso # 03/18/2020 0.02  0.00 - 0.20 K/uL Final    nRBC 03/18/2020 0  0 /100 WBC Final    Gran% 03/18/2020 77.8* 38.0 - 73.0 % Final    Lymph% 03/18/2020 20.3  18.0 - 48.0 % Final    Mono% 03/18/2020 1.0* 4.0 - 15.0 % Final    Eosinophil% 03/18/2020 0.1  0.0 - 8.0 % Final    Basophil% 03/18/2020 0.2  0.0 - 1.9 % Final    Differential Method 03/18/2020 Automated   Final    Sodium 03/18/2020 136  136 - 145 mmol/L Final    Potassium 03/18/2020 3.6  3.5 - 5.1 mmol/L Final    Chloride 03/18/2020 93* 95 - 110 mmol/L Final    CO2 03/18/2020 32* 23 - 29 mmol/L Final    Glucose 03/18/2020 227* 70 - 110 mg/dL Final    BUN, Bld 03/18/2020 21  8 - 23 mg/dL Final    Creatinine 03/18/2020 0.7  0.5 - 1.4 mg/dL Final    Calcium 03/18/2020 8.6* 8.7 - 10.5 mg/dL Final    Total Protein 03/18/2020 7.0  6.0 - 8.4 g/dL Final    Albumin 03/18/2020 3.6  3.5 - 5.2 g/dL Final    Total Bilirubin 03/18/2020 0.5  0.1 - 1.0 mg/dL Final    Alkaline Phosphatase 03/18/2020 61  55 - 135 U/L Final    AST 03/18/2020 21  10 - 40 U/L Final    ALT 03/18/2020 17  10 - 44 U/L Final    Anion Gap 03/18/2020 11  8 - 16 mmol/L Final    eGFR if African American 03/18/2020 >60.0  >60 mL/min/1.73 m^2 Final    eGFR if non African American 03/18/2020 >60.0  >60 mL/min/1.73 m^2 Final    Troponin I 03/18/2020 <0.030  <=0.040 ng/mL Final    BNP 03/18/2020 32  0 - 99 pg/mL Final    PT  03/18/2020 13.6  10.6 - 14.8 sec Final    INR 03/18/2020 1.1   Final    Magnesium 03/18/2020 1.7  1.6 - 2.6 mg/dL Final    POC Glucose 03/18/2020 210* 70 - 110 Final    Blood Culture, Routine 03/18/2020 No growth after 5 days.   Final    Blood Culture, Routine 03/18/2020 No growth after 5 days.   Final    Lactate (Lactic Acid) 03/18/2020 2.7* 0.5 - 1.9 mmol/L Final    Procalcitonin 03/18/2020 <0.05  0.00 - 0.50 ng/mL Final    TSH 03/18/2020 0.430  0.340 - 5.600 uIU/mL Final    Procalcitonin 03/18/2020 <0.05  0.00 - 0.50 ng/mL Final    Lactate (Lactic Acid) 03/18/2020 2.2* 0.5 - 1.9 mmol/L Final    Troponin I 03/18/2020 <0.030  <=0.040 ng/mL Final    POC Glucose 03/18/2020 257* 70 - 110 Final    WBC 03/19/2020 9.08  3.90 - 12.70 K/uL Final    RBC 03/19/2020 4.31  4.00 - 5.40 M/uL Final    Hemoglobin 03/19/2020 13.1  12.0 - 16.0 g/dL Final    Hematocrit 03/19/2020 39.8  37.0 - 48.5 % Final    Mean Corpuscular Volume 03/19/2020 92  82 - 98 fL Final    Mean Corpuscular Hemoglobin 03/19/2020 30.4  27.0 - 31.0 pg Final    Mean Corpuscular Hemoglobin Conc 03/19/2020 32.9  32.0 - 36.0 g/dL Final    RDW 03/19/2020 13.7  11.5 - 14.5 % Final    Platelets 03/19/2020 272  150 - 350 K/uL Final    MPV 03/19/2020 10.7  9.2 - 12.9 fL Final    Immature Granulocytes 03/19/2020 0.6* 0.0 - 0.5 % Final    Gran # (ANC) 03/19/2020 6.9  1.8 - 7.7 K/uL Final    Immature Grans (Abs) 03/19/2020 0.05* 0.00 - 0.04 K/uL Final    Lymph # 03/19/2020 1.6  1.0 - 4.8 K/uL Final    Mono # 03/19/2020 0.5  0.3 - 1.0 K/uL Final    Eos # 03/19/2020 0.0  0.0 - 0.5 K/uL Final    Baso # 03/19/2020 0.01  0.00 - 0.20 K/uL Final    nRBC 03/19/2020 0  0 /100 WBC Final    Gran% 03/19/2020 76.2* 38.0 - 73.0 % Final    Lymph% 03/19/2020 18.1  18.0 - 48.0 % Final    Mono% 03/19/2020 5.0  4.0 - 15.0 % Final    Eosinophil% 03/19/2020 0.0  0.0 - 8.0 % Final    Basophil% 03/19/2020 0.1  0.0 - 1.9 % Final    Differential Method  03/19/2020 Automated   Final    Sodium 03/19/2020 141  136 - 145 mmol/L Final    Potassium 03/19/2020 3.4* 3.5 - 5.1 mmol/L Final    Chloride 03/19/2020 96  95 - 110 mmol/L Final    CO2 03/19/2020 34* 23 - 29 mmol/L Final    Glucose 03/19/2020 219* 70 - 110 mg/dL Final    BUN, Bld 03/19/2020 19  8 - 23 mg/dL Final    Creatinine 03/19/2020 0.7  0.5 - 1.4 mg/dL Final    Calcium 03/19/2020 8.9  8.7 - 10.5 mg/dL Final    Anion Gap 03/19/2020 11  8 - 16 mmol/L Final    eGFR if African American 03/19/2020 >60.0  >60 mL/min/1.73 m^2 Final    eGFR if non African American 03/19/2020 >60.0  >60 mL/min/1.73 m^2 Final    Troponin I 03/19/2020 <0.030  <=0.040 ng/mL Final    POC Glucose 03/19/2020 190* 70 - 110 Final   Admission on 03/13/2020, Discharged on 03/13/2020   Component Date Value Ref Range Status    WBC 03/13/2020 7.47  3.90 - 12.70 K/uL Final    RBC 03/13/2020 4.38  4.00 - 5.40 M/uL Final    Hemoglobin 03/13/2020 13.3  12.0 - 16.0 g/dL Final    Hematocrit 03/13/2020 40.3  37.0 - 48.5 % Final    Mean Corpuscular Volume 03/13/2020 92  82 - 98 fL Final    Mean Corpuscular Hemoglobin 03/13/2020 30.4  27.0 - 31.0 pg Final    Mean Corpuscular Hemoglobin Conc 03/13/2020 33.0  32.0 - 36.0 g/dL Final    RDW 03/13/2020 13.3  11.5 - 14.5 % Final    Platelets 03/13/2020 256  150 - 350 K/uL Final    MPV 03/13/2020 10.7  9.2 - 12.9 fL Final    Immature Granulocytes 03/13/2020 0.4  0.0 - 0.5 % Final    Gran # (ANC) 03/13/2020 3.8  1.8 - 7.7 K/uL Final    Immature Grans (Abs) 03/13/2020 0.03  0.00 - 0.04 K/uL Final    Lymph # 03/13/2020 3.1  1.0 - 4.8 K/uL Final    Mono # 03/13/2020 0.4  0.3 - 1.0 K/uL Final    Eos # 03/13/2020 0.1  0.0 - 0.5 K/uL Final    Baso # 03/13/2020 0.03  0.00 - 0.20 K/uL Final    nRBC 03/13/2020 0  0 /100 WBC Final    Gran% 03/13/2020 50.8  38.0 - 73.0 % Final    Lymph% 03/13/2020 41.1  18.0 - 48.0 % Final    Mono% 03/13/2020 5.8  4.0 - 15.0 % Final    Eosinophil%  03/13/2020 1.5  0.0 - 8.0 % Final    Basophil% 03/13/2020 0.4  0.0 - 1.9 % Final    Differential Method 03/13/2020 Automated   Final    Sodium 03/13/2020 139  136 - 145 mmol/L Final    Potassium 03/13/2020 3.9  3.5 - 5.1 mmol/L Final    Chloride 03/13/2020 97  95 - 110 mmol/L Final    CO2 03/13/2020 33* 23 - 29 mmol/L Final    Glucose 03/13/2020 215* 70 - 110 mg/dL Final    BUN, Bld 03/13/2020 17  8 - 23 mg/dL Final    Creatinine 03/13/2020 0.7  0.5 - 1.4 mg/dL Final    Calcium 03/13/2020 8.7  8.7 - 10.5 mg/dL Final    Total Protein 03/13/2020 6.9  6.0 - 8.4 g/dL Final    Albumin 03/13/2020 3.4* 3.5 - 5.2 g/dL Final    Total Bilirubin 03/13/2020 0.5  0.1 - 1.0 mg/dL Final    Alkaline Phosphatase 03/13/2020 66  55 - 135 U/L Final    AST 03/13/2020 20  10 - 40 U/L Final    ALT 03/13/2020 18  10 - 44 U/L Final    Anion Gap 03/13/2020 9  8 - 16 mmol/L Final    eGFR if African American 03/13/2020 >60.0  >60 mL/min/1.73 m^2 Final    eGFR if non African American 03/13/2020 >60.0  >60 mL/min/1.73 m^2 Final    Troponin I 03/13/2020 <0.030  <=0.040 ng/mL Final    BNP 03/13/2020 21  0 - 99 pg/mL Final    POC Glucose 03/13/2020 224* 70 - 110 Final   Office Visit on 02/26/2020   Component Date Value Ref Range Status    Hemoglobin A1C 02/26/2020 6.5  % Final       Past Medical History:   Diagnosis Date    Allergy     Anxiety     Arthritis     Asthma     Bipolar disorder     Cancer     thyroid    Chronic back pain     COPD (chronic obstructive pulmonary disease)     Diabetes mellitus     Diabetes mellitus, type 2     Fibromyalgia     GERD (gastroesophageal reflux disease)     History of fall 4/26/2018    [place] (hard of hearing)     Hx of thyroid cancer     Hyperlipidemia     Hypertension     Hypothyroidism     Neuropathy     On home oxygen therapy     3 l/m 24/7    Restless leg syndrome     Sleep apnea     Urinary tract infection      Past Surgical History:   Procedure Laterality Date     APPENDECTOMY      BACK SURGERY      x 3    broken foot and ankle      CHOLECYSTECTOMY      EYE SURGERY Bilateral     cataract    HYSTERECTOMY      JOINT REPLACEMENT Left 09/17/2018    knee    KNEE ARTHROPLASTY Left 9/17/2018    Procedure: ARTHROPLASTY, KNEE;  Surgeon: Yariel Marin MD;  [facility];  Service: Orthopedics;  Laterality: Left;    MYELOGRAPHY N/A 8/7/2019    Procedure: MYELOGRAM;  Surgeon: Sun Diagnostic Provider;  Location: [facility] OR;  Service: General;  Laterality: N/A;    POSTERIOR REPAIR      SPINAL FUSION      x3 BACK, NECK X 4    TOTAL THYROIDECTOMY  2014     Family History   Problem Relation Age of Onset    Diabetes Mother     Heart disease Mother     Hypertension Mother     Diabetes Father     Heart disease Father     Hypertension Father        Marital Status:   Alcohol History:  reports no history of alcohol use.  Tobacco History:  reports that she quit smoking about 20 years ago. Her smoking use included cigarettes. She has a 30.00 pack-year smoking history. She has never used smokeless tobacco.  Drug History:  reports no history of drug use.    Review of patient's allergies indicates:   Allergen Reactions    Morphine Itching    Tubersol [tuberculin ppd] Other (See Comments)     Site swells bad. Has to have chest xray       Current Outpatient Medications:     ACCU-CHEK LIBERTY PLUS TEST STRP Strp, , Disp: , Rfl:     albuterol (VENTOLIN HFA) 90 mcg/actuation inhaler, Inhale 2 puffs into the lungs every 6 (six) hours as needed for Wheezing. Rescue, Disp: 1 Inhaler, Rfl: 3    albuterol-ipratropium (DUO-NEB) 2.5 mg-0.5 mg/3 mL nebulizer solution, Take 3 mLs by nebulization every 6 (six) hours as needed for Wheezing or Shortness of Breath. Rescue, Disp: 360 mL, Rfl: 5    ARTIFICIAL TEARS,HYPROMELLOSE, OPHT, Place 1 drop into both eyes 3 (three) times daily., Disp: , Rfl:     calcitRIOL (ROCALTROL) 0.5 MCG Cap, Take 1 capsule (0.5 mcg total) by mouth every  evening., Disp: 90 capsule, Rfl: 1    clonazePAM (KLONOPIN) 0.5 MG tablet, Take 1 tablet (0.5 mg total) by mouth 2 (two) times daily., Disp: 180 tablet, Rfl: 0    DULoxetine (CYMBALTA) 60 MG capsule, Take 1 capsule (60 mg total) by mouth every evening., Disp: 90 capsule, Rfl: 1    FLUoxetine 40 MG capsule, Take 1 capsule (40 mg total) by mouth once daily., Disp: 90 capsule, Rfl: 1    fluticasone propionate (FLONASE) 50 mcg/actuation nasal spray, 1 spray (50 mcg total) by Each Nostril route once daily., Disp: 1 g, Rfl: 3    fluticasone-umeclidin-vilanter (TRELEGY ELLIPTA) 100-62.5-25 mcg DsDv, Inhale 1 puff into the lungs once daily., Disp: 3 each, Rfl: 1    gabapentin (NEURONTIN) 400 MG capsule, Take 1 capsule (400 mg total) by mouth once daily., Disp: 90 capsule, Rfl: 1    gabapentin (NEURONTIN) 800 MG tablet, Take 1 tablet (800 mg total) by mouth every evening. In evening, Disp: 90 tablet, Rfl: 1    ibuprofen (ADVIL,MOTRIN) 400 MG tablet, Take 400 mg by mouth every 8 (eight) hours as needed for Other., Disp: , Rfl:     insulin aspart U-100 (NOVOLOG) 100 unit/mL injection, Inject 2-8 Units into the skin 4 (four) times daily with meals and nightly. Sliding scale, Disp: , Rfl:     lactulose (CHRONULAC) 10 gram/15 mL solution, Take 20 g by mouth daily as needed. , Disp: , Rfl:     lamoTRIgine (LAMICTAL) 150 MG Tab, Take 1 tablet (150 mg total) by mouth 2 (two) times daily., Disp: 180 tablet, Rfl: 1    latanoprost 0.005 % ophthalmic solution, Place 1 drop into both eyes every evening. , Disp: , Rfl:     LEVEMIR FLEXTOUCH U-100 INSULN 100 unit/mL (3 mL) InPn pen, Inject 65 Units into the skin 2 (two) times daily., Disp: 3 Box, Rfl: 3    levothyroxine (SYNTHROID) 150 MCG tablet, Take 1 tablet (150 mcg total) by mouth before breakfast., Disp: 90 tablet, Rfl: 1    linaCLOtide (LINZESS) 290 mcg Cap capsule, Take 1 capsule (290 mcg total) by mouth once daily., Disp: 90 capsule, Rfl: 1     "losartan-hydrochlorothiazide 50-12.5 mg (HYZAAR) 50-12.5 mg per tablet, Take 0.5 tablets by mouth once daily., Disp: 90 tablet, Rfl: 1    meclizine (ANTIVERT) 25 mg tablet, Take 25 mg by mouth 2 (two) times daily as needed for Dizziness. , Disp: , Rfl:     methocarbamoL (ROBAXIN) 750 MG Tab, Take 1 tablet (750 mg total) by mouth 2 (two) times daily as needed., Disp: 60 tablet, Rfl: 2    ondansetron (ZOFRAN-ODT) 4 MG TbDL, Take 4 mg by mouth every 6 (six) hours as needed (nausea)., Disp: , Rfl:     oxyCODONE (ROXICODONE) 10 mg Tab immediate release tablet, Take 10 mg by mouth 3 (three) times daily. , Disp: , Rfl:     pantoprazole (PROTONIX) 40 MG tablet, Take 1 tablet (40 mg total) by mouth once daily., Disp: 90 tablet, Rfl: 1    potassium chloride SA (K-DUR,KLOR-CON) 10 MEQ tablet, Take 1 tablet (10 mEq total) by mouth 3 (three) times a week. Mon , Wed, and Fri. (Patient taking differently: Take 10 mEq by mouth every Mon, Wed, Fri. Mon , Wed, and Fri.), Disp: 36 tablet, Rfl: 1    RELISTOR 150 mg Tab, Take 150 mg by mouth once daily., Disp: 30 tablet, Rfl: 2    rOPINIRole (REQUIP) 4 MG tablet, Take 1 tablet (4 mg total) by mouth 2 (two) times daily., Disp: 180 tablet, Rfl: 1    temazepam (RESTORIL) 15 mg Cap, Take 2 capsules (30 mg total) by mouth nightly as needed., Disp: 60 capsule, Rfl: 1    NOVOFINE AUTOCOVER 30 gauge x 1/3" Ndle, , Disp: , Rfl:     predniSONE (DELTASONE) 10 MG tablet, Take 1 tablet (10 mg total) by mouth once daily., Disp: 90 tablet, Rfl: 0    predniSONE (DELTASONE) 10 MG tablet, 40mg for first 3 days, 30 mg for 3 days, 20 mg for 3 days, and 10mg for 3 days, Disp: 120 tablet, Rfl: 0    umeclidinium-vilanteroL (ANORO ELLIPTA) 62.5-25 mcg/actuation DsDv, Inhale 1 puff into the lungs once daily. Controller, Disp: , Rfl:     Review of Systems   Constitutional: Negative for activity change, chills and fever.   HENT: Negative.    Respiratory: Positive for shortness of breath (chronic). " "Negative for cough and wheezing.    Gastrointestinal: Negative.    Musculoskeletal: Positive for back pain.   Skin: Positive for wound.   Neurological: Negative for dizziness, speech difficulty, weakness and headaches.          Objective:      Vitals:    07/27/20 0906   BP: (!) 110/56   Pulse: 108   Temp: 97.9 °F (36.6 °C)   Weight: 97.1 kg (214 lb)   Height: 5' 3" (1.6 m)     Body mass index is 37.91 kg/m².  Physical Exam  Constitutional:       General: She is not in acute distress.     Appearance: Normal appearance.   HENT:      Head: Normocephalic and atraumatic.        Mouth/Throat:      Mouth: Mucous membranes are moist.   Cardiovascular:      Rate and Rhythm: Normal rate and regular rhythm.      Heart sounds: Normal heart sounds.   Pulmonary:      Effort: Pulmonary effort is normal.      Breath sounds: Decreased breath sounds present.   Skin:     Capillary Refill: Capillary refill takes less than 2 seconds.   Neurological:      Mental Status: She is alert.           Assessment:       1. Laceration of scalp, subsequent encounter    2. Primary insomnia    3. Pulmonary emphysema, unspecified emphysema type         Plan:       Laceration of scalp, subsequent encounter  -     Suture Removal  - Site well-healed.    Primary insomnia  -     temazepam (RESTORIL) 15 mg Cap; Take 2 capsules (30 mg total) by mouth nightly as needed.  Dispense: 60 capsule; Refill: 1    Pulmonary emphysema, unspecified emphysema type  - Advised pt to please follow-up with Dr. Campos since she is no longer moving.    Follow up as scheduled next month.              "

## 2020-08-13 ENCOUNTER — TELEPHONE (OUTPATIENT)
Dept: FAMILY MEDICINE | Facility: CLINIC | Age: 70
End: 2020-08-13

## 2020-08-13 NOTE — TELEPHONE ENCOUNTER
----- Message from Remington Westfall sent at 8/13/2020  3:41 PM CDT -----  Regarding: needing call back  Pt wants to change her Monday appt to a virtual   Pt [phone]

## 2020-08-14 ENCOUNTER — EXTERNAL HOME HEALTH (OUTPATIENT)
Dept: HOME HEALTH SERVICES | Facility: HOSPITAL | Age: 70
End: 2020-08-14

## 2020-08-17 ENCOUNTER — OFFICE VISIT (OUTPATIENT)
Dept: FAMILY MEDICINE | Facility: CLINIC | Age: 70
End: 2020-08-17
Payer: MEDICARE

## 2020-08-17 ENCOUNTER — TELEPHONE (OUTPATIENT)
Dept: FAMILY MEDICINE | Facility: CLINIC | Age: 70
End: 2020-08-17

## 2020-08-17 DIAGNOSIS — E11.42 TYPE 2 DIABETES MELLITUS WITH DIABETIC POLYNEUROPATHY, WITH LONG-TERM CURRENT USE OF INSULIN: ICD-10-CM

## 2020-08-17 DIAGNOSIS — F51.01 PRIMARY INSOMNIA: Primary | ICD-10-CM

## 2020-08-17 DIAGNOSIS — J43.9 PULMONARY EMPHYSEMA, UNSPECIFIED EMPHYSEMA TYPE: ICD-10-CM

## 2020-08-17 DIAGNOSIS — Z79.4 TYPE 2 DIABETES MELLITUS WITH DIABETIC POLYNEUROPATHY, WITH LONG-TERM CURRENT USE OF INSULIN: ICD-10-CM

## 2020-08-17 DIAGNOSIS — F41.9 ANXIETY: ICD-10-CM

## 2020-08-17 PROCEDURE — 1159F PR MEDICATION LIST DOCUMENTED IN MEDICAL RECORD: ICD-10-PCS | Mod: 95,,, | Performed by: NURSE PRACTITIONER

## 2020-08-17 PROCEDURE — 1101F PR PT FALLS ASSESS DOC 0-1 FALLS W/OUT INJ PAST YR: ICD-10-PCS | Mod: 95,,, | Performed by: NURSE PRACTITIONER

## 2020-08-17 PROCEDURE — 1159F MED LIST DOCD IN RCRD: CPT | Mod: 95,,, | Performed by: NURSE PRACTITIONER

## 2020-08-17 PROCEDURE — 99213 PR OFFICE/OUTPT VISIT, EST, LEVL III, 20-29 MIN: ICD-10-PCS | Mod: 95,,, | Performed by: NURSE PRACTITIONER

## 2020-08-17 PROCEDURE — 1101F PT FALLS ASSESS-DOCD LE1/YR: CPT | Mod: 95,,, | Performed by: NURSE PRACTITIONER

## 2020-08-17 PROCEDURE — 3052F PR MOST RECENT HEMOGLOBIN A1C LEVEL 8.0 - < 9.0%: ICD-10-PCS | Mod: 95,,, | Performed by: NURSE PRACTITIONER

## 2020-08-17 PROCEDURE — 99213 OFFICE O/P EST LOW 20 MIN: CPT | Mod: 95,,, | Performed by: NURSE PRACTITIONER

## 2020-08-17 PROCEDURE — 3052F HG A1C>EQUAL 8.0%<EQUAL 9.0%: CPT | Mod: 95,,, | Performed by: NURSE PRACTITIONER

## 2020-08-17 RX ORDER — GABAPENTIN 800 MG/1
800 TABLET ORAL 2 TIMES DAILY
Qty: 180 TABLET | Refills: 1 | Status: SHIPPED | OUTPATIENT
Start: 2020-08-17 | End: 2020-10-30 | Stop reason: SDUPTHER

## 2020-08-17 NOTE — PROGRESS NOTES
Subjective:        The chief complaint leading to consultation is: COPD follow-up  The patient location is:  Home  Visit type: Virtual visit with synchronous audio/video or audio only  This was a video visit in lieu of in-person visit due to the coronavirus emergency. Patient acknowledged and consented to the video visit encounter.     Pt presents for routine follow-up. Feeling better than last month's visit. Still having difficulty sleeping. Has not been taking increased Restoril dose, appears pharmacy gave refill of old RX instead of new RX. States CBG has been well-controlled. Last A1C 8.4- was down to 6.5 earlier in the year. Getting around better, breathing has been better. Thinking of getting dehumidifier for her room since she seems to struggle most when it is humid. C/o lots of neuropathy in feet. Burning, tingling, and numbness. Currently takes 400mg neurontin in am and 800mg in pm. Lots of burning of right leg that she describes as a muscle fatigue feeling. Has not yet followed up with Dr. Reis.         Past Surgical History:   Procedure Laterality Date    APPENDECTOMY      BACK SURGERY      x 3    broken foot and ankle      CHOLECYSTECTOMY      EYE SURGERY Bilateral     cataract    HYSTERECTOMY      JOINT REPLACEMENT Left 09/17/2018    knee    KNEE ARTHROPLASTY Left 9/17/2018    Procedure: ARTHROPLASTY, KNEE;  Surgeon: Yariel Marin MD;  Location: [facility] OR;  Service: Orthopedics;  Laterality: Left;    MYELOGRAPHY N/A 8/7/2019    Procedure: MYELOGRAM;  Surgeon: Sun Diagnostic Provider;  Location: [facility] OR;  Service: General;  Laterality: N/A;    POSTERIOR REPAIR      SPINAL FUSION      x3 BACK, NECK X 4    TOTAL THYROIDECTOMY  2014     Past Medical History:   Diagnosis Date    Allergy     Anxiety     Arthritis     Asthma     Bipolar disorder     Cancer     thyroid    Chronic back pain     COPD (chronic obstructive pulmonary disease)     Diabetes mellitus     Diabetes mellitus,  type 2     Fibromyalgia     GERD (gastroesophageal reflux disease)     History of fall 4/26/2018    [place] (hard of hearing[place]     Hx of thyroid cancer     Hyperlipidemia     Hypertension     Hypothyroidism     Neuropathy     On home oxygen therapy     3 l/m 24/7    Restless leg syndrome     Sleep apnea     Urinary tract infection      Family History   Problem Relation Age of Onset    Diabetes Mother     Heart disease Mother     Hypertension Mother     Diabetes Father     Heart disease Father     Hypertension Father         Social History:   Marital Status:   Alcohol History:  reports no history of alcohol use.  Tobacco History:  reports that she quit smoking about 20 years ago. Her smoking use included cigarettes. She has a 30.00 pack-year smoking history. She has never used smokeless tobacco.  Drug History:  reports no history of drug use.    Review of patient's allergies indicates:   Allergen Reactions    Morphine Itching    Tubersol [tuberculin ppd] Other (See Comments)     Site swells bad. Has to have chest xray       Current Outpatient Medications   Medication Sig Dispense Refill    ACCU-CHEK LIBERTY PLUS TEST STRP Strp       albuterol (VENTOLIN HFA) 90 mcg/actuation inhaler Inhale 2 puffs into the lungs every 6 (six) hours as needed for Wheezing. Rescue 1 Inhaler 3    albuterol-ipratropium (DUO-NEB) 2.5 mg-0.5 mg/3 mL nebulizer solution Take 3 mLs by nebulization every 6 (six) hours as needed for Wheezing or Shortness of Breath. Rescue 360 mL 5    ARTIFICIAL TEARS,HYPROMELLOSE, OPHT Place 1 drop into both eyes 3 (three) times daily.      calcitRIOL (ROCALTROL) 0.5 MCG Cap Take 1 capsule (0.5 mcg total) by mouth every evening. 90 capsule 1    clonazePAM (KLONOPIN) 0.5 MG tablet Take 1 tablet (0.5 mg total) by mouth 2 (two) times daily. 180 tablet 0    DULoxetine (CYMBALTA) 60 MG capsule Take 1 capsule (60 mg total) by mouth every evening. 90 capsule 1    FLUoxetine 40 MG capsule  "Take 1 capsule (40 mg total) by mouth once daily. 90 capsule 1    fluticasone-umeclidin-vilanter (TRELEGY ELLIPTA) 100-62.5-25 mcg DsDv Inhale 1 puff into the lungs once daily. 3 each 1    gabapentin (NEURONTIN) 400 MG capsule Take 1 capsule (400 mg total) by mouth once daily. 90 capsule 1    gabapentin (NEURONTIN) 800 MG tablet Take 1 tablet (800 mg total) by mouth 2 (two) times daily. 180 tablet 1    ibuprofen (ADVIL,MOTRIN) 400 MG tablet Take 400 mg by mouth every 8 (eight) hours as needed for Other.      insulin aspart U-100 (NOVOLOG) 100 unit/mL injection Inject 2-8 Units into the skin 4 (four) times daily with meals and nightly. Sliding scale      lactulose (CHRONULAC) 10 gram/15 mL solution Take 20 g by mouth daily as needed.       lamoTRIgine (LAMICTAL) 150 MG Tab Take 1 tablet (150 mg total) by mouth 2 (two) times daily. 180 tablet 1    latanoprost 0.005 % ophthalmic solution Place 1 drop into both eyes every evening.       LEVEMIR FLEXTOUCH U-100 INSULN 100 unit/mL (3 mL) InPn pen Inject 65 Units into the skin 2 (two) times daily. 3 Box 3    levothyroxine (SYNTHROID) 150 MCG tablet Take 1 tablet (150 mcg total) by mouth before breakfast. 90 tablet 1    linaCLOtide (LINZESS) 290 mcg Cap capsule Take 1 capsule (290 mcg total) by mouth once daily. 90 capsule 1    losartan-hydrochlorothiazide 50-12.5 mg (HYZAAR) 50-12.5 mg per tablet Take 0.5 tablets by mouth once daily. 90 tablet 1    meclizine (ANTIVERT) 25 mg tablet Take 25 mg by mouth 2 (two) times daily as needed for Dizziness.       methocarbamoL (ROBAXIN) 750 MG Tab Take 1 tablet (750 mg total) by mouth 2 (two) times daily as needed. 60 tablet 2    NOVOFINE AUTOCOVER 30 gauge x 1/3" Ndle       ondansetron (ZOFRAN-ODT) 4 MG TbDL Take 4 mg by mouth every 6 (six) hours as needed (nausea).      oxyCODONE (ROXICODONE) 10 mg Tab immediate release tablet Take 10 mg by mouth 3 (three) times daily.       pantoprazole (PROTONIX) 40 MG tablet " Take 1 tablet (40 mg total) by mouth once daily. 90 tablet 1    potassium chloride SA (K-DUR,KLOR-CON) 10 MEQ tablet Take 1 tablet (10 mEq total) by mouth 3 (three) times a week. Mon , Wed, and Fri. (Patient taking differently: Take 10 mEq by mouth every Mon, Wed, Fri. Mon , Wed, and Fri.) 36 tablet 1    predniSONE (DELTASONE) 10 MG tablet Take 1 tablet (10 mg total) by mouth once daily. 90 tablet 0    predniSONE (DELTASONE) 10 MG tablet 40mg for first 3 days, 30 mg for 3 days, 20 mg for 3 days, and 10mg for 3 days 120 tablet 0    RELISTOR 150 mg Tab Take 150 mg by mouth once daily. 30 tablet 2    rOPINIRole (REQUIP) 4 MG tablet Take 1 tablet (4 mg total) by mouth 2 (two) times daily. 180 tablet 1    temazepam (RESTORIL) 15 mg Cap Take 2 capsules (30 mg total) by mouth nightly as needed. 60 capsule 1    umeclidinium-vilanteroL (ANORO ELLIPTA) 62.5-25 mcg/actuation DsDv Inhale 1 puff into the lungs once daily. Controller       No current facility-administered medications for this visit.        Review of Systems   Constitutional: Positive for fatigue.   HENT: Negative for congestion, ear pain, sinus pressure, sinus pain, tinnitus and trouble swallowing.    Eyes: Negative for pain and redness.   Respiratory: Positive for shortness of breath and wheezing. Negative for cough and chest tightness.    Cardiovascular: Negative for chest pain and palpitations.   Gastrointestinal: Negative for abdominal pain, nausea and vomiting.   Genitourinary: Negative for dysuria, frequency and urgency.   Musculoskeletal: Positive for arthralgias. Negative for back pain and myalgias.   Skin: Negative for rash and wound.   Neurological: Positive for numbness. Negative for dizziness, weakness, light-headedness and headaches.   Psychiatric/Behavioral: Positive for sleep disturbance.         Objective:        Physical Exam:   Physical Exam  Constitutional:       Appearance: Normal appearance.   HENT:      Head: Normocephalic and  atraumatic.   Neurological:      Mental Status: She is alert and oriented to person, place, and time.   Psychiatric:         Mood and Affect: Mood normal.              Assessment:       1. Primary insomnia    2. Type 2 diabetes mellitus with diabetic polyneuropathy, with long-term current use of insulin    3. Anxiety    4. Pulmonary emphysema, unspecified emphysema type      Plan:   Primary insomnia  - Advised to call pharmacy about error in refill    Type 2 diabetes mellitus with diabetic polyneuropathy, with long-term current use of insulin  -     gabapentin (NEURONTIN) 800 MG tablet; Take 1 tablet (800 mg total) by mouth 2 (two) times daily.  Dispense: 180 tablet; Refill: 1    Anxiety    Pulmonary emphysema, unspecified emphysema type  - Encouraged to follow-up with Dr. Reis.    Pt appears in better spirits than when last seen. No new COPD exacerbations. Will need to check A1C at next follow-up    Follow up in about 3 months (around 11/17/2020) for Follow-up with Dr. Melendez. Needs A1C.    Total time spent with patient: 16 minutes    Each patient to whom he or she provides medical services by telemedicine is:  (1) informed of the relationship between the physician and patient and the respective role of any other health care provider with respect to management of the patient; and (2) notified that he or she may decline to receive medical services by telemedicine and may withdraw from such care at any time.    This note was created using [employer] voice recognition software that occasionally misinterprets phrases or words.

## 2020-08-17 NOTE — TELEPHONE ENCOUNTER
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 Pt is isnt showing any signs at the moment. Pt doesn't have any energy and body aches for about a week. Pt wont have a helper with home health for about 14 days. Pt is a little scared   Pt [phone]

## 2020-08-31 ENCOUNTER — TELEPHONE (OUTPATIENT)
Dept: FAMILY MEDICINE | Facility: CLINIC | Age: 70
End: 2020-08-31

## 2020-08-31 DIAGNOSIS — R05.9 COUGH: Primary | ICD-10-CM

## 2020-08-31 DIAGNOSIS — R50.9 FEVER: ICD-10-CM

## 2020-08-31 DIAGNOSIS — M79.10 MUSCLE PAIN: ICD-10-CM

## 2020-08-31 DIAGNOSIS — R68.83 CHILLS: ICD-10-CM

## 2020-08-31 DIAGNOSIS — M15.9 PRIMARY OSTEOARTHRITIS INVOLVING MULTIPLE JOINTS: ICD-10-CM

## 2020-08-31 RX ORDER — METHYLNALTREXONE BROMIDE 150 MG/1
150 TABLET ORAL DAILY
Qty: 30 TABLET | Refills: 2 | Status: SHIPPED | OUTPATIENT
Start: 2020-08-31 | End: 2020-12-11 | Stop reason: SDUPTHER

## 2020-08-31 RX ORDER — METHOCARBAMOL 750 MG/1
750 TABLET, FILM COATED ORAL 2 TIMES DAILY PRN
Qty: 60 TABLET | Refills: 2 | Status: SHIPPED | OUTPATIENT
Start: 2020-08-31 | End: 2020-12-11 | Stop reason: SDUPTHER

## 2020-08-31 NOTE — TELEPHONE ENCOUNTER
----- Message from Remington Westfall sent at 8/31/2020  2:11 PM CDT -----  Regarding: needing call back  Body aches, unsure of fever, sore throat, coughing but as copd. Been going since last couple days. Has been around someone with covid was known after he was around her he was a worker.   Pt [phone]

## 2020-08-31 NOTE — TELEPHONE ENCOUNTER
----- Message from Remington Westfall sent at 8/31/2020  2:16 PM CDT -----  Regarding: refills  Muscle relaxer  Relistor   Pharm [facility]   Pt [phone]

## 2020-09-01 ENCOUNTER — TELEPHONE (OUTPATIENT)
Dept: FAMILY MEDICINE | Facility: CLINIC | Age: 70
End: 2020-09-01

## 2020-09-01 NOTE — TELEPHONE ENCOUNTER
----- Message from Remington Westfall sent at 9/1/2020  9:51 AM CDT -----  Regarding: r/s test  Pt said she cant go get her covid test done today doesn't a ride needs to reschedule unsure when she can go please call pt   Pt [phone]

## 2020-09-11 ENCOUNTER — OFFICE VISIT (OUTPATIENT)
Dept: ORTHOPEDICS | Facility: CLINIC | Age: 70
End: 2020-09-11
Payer: MEDICARE

## 2020-09-11 ENCOUNTER — OFFICE VISIT (OUTPATIENT)
Dept: FAMILY MEDICINE | Facility: CLINIC | Age: 70
End: 2020-09-11
Payer: MEDICARE

## 2020-09-11 VITALS
WEIGHT: 212 LBS | SYSTOLIC BLOOD PRESSURE: 138 MMHG | BODY MASS INDEX: 37.56 KG/M2 | HEART RATE: 88 BPM | HEIGHT: 63 IN | DIASTOLIC BLOOD PRESSURE: 66 MMHG

## 2020-09-11 VITALS — DIASTOLIC BLOOD PRESSURE: 86 MMHG | BODY MASS INDEX: 37.55 KG/M2 | WEIGHT: 212 LBS | SYSTOLIC BLOOD PRESSURE: 147 MMHG

## 2020-09-11 DIAGNOSIS — S46.911A STRAIN OF RIGHT SHOULDER, INITIAL ENCOUNTER: ICD-10-CM

## 2020-09-11 DIAGNOSIS — S46.911A STRAIN OF RIGHT SHOULDER, INITIAL ENCOUNTER: Primary | ICD-10-CM

## 2020-09-11 DIAGNOSIS — J44.9 CHRONIC OBSTRUCTIVE PULMONARY DISEASE, UNSPECIFIED COPD TYPE: ICD-10-CM

## 2020-09-11 DIAGNOSIS — M75.101 ROTATOR CUFF TEAR, NON-TRAUMATIC, RIGHT: ICD-10-CM

## 2020-09-11 DIAGNOSIS — M19.011 PRIMARY OSTEOARTHRITIS OF RIGHT SHOULDER: ICD-10-CM

## 2020-09-11 DIAGNOSIS — M25.511 ACUTE PAIN OF RIGHT SHOULDER: Primary | ICD-10-CM

## 2020-09-11 DIAGNOSIS — Z79.4 TYPE 2 DIABETES MELLITUS WITH DIABETIC POLYNEUROPATHY, WITH LONG-TERM CURRENT USE OF INSULIN: ICD-10-CM

## 2020-09-11 DIAGNOSIS — E11.42 TYPE 2 DIABETES MELLITUS WITH DIABETIC POLYNEUROPATHY, WITH LONG-TERM CURRENT USE OF INSULIN: ICD-10-CM

## 2020-09-11 LAB — HBA1C MFR BLD: 8.1 %

## 2020-09-11 PROCEDURE — 3078F PR MOST RECENT DIASTOLIC BLOOD PRESSURE < 80 MM HG: ICD-10-PCS | Mod: S$GLB,,, | Performed by: NURSE PRACTITIONER

## 2020-09-11 PROCEDURE — 3079F DIAST BP 80-89 MM HG: CPT | Mod: S$GLB,,, | Performed by: PHYSICIAN ASSISTANT

## 2020-09-11 PROCEDURE — 1101F PR PT FALLS ASSESS DOC 0-1 FALLS W/OUT INJ PAST YR: ICD-10-PCS | Mod: S$GLB,,, | Performed by: PHYSICIAN ASSISTANT

## 2020-09-11 PROCEDURE — 3008F PR BODY MASS INDEX (BMI) DOCUMENTED: ICD-10-PCS | Mod: S$GLB,,, | Performed by: PHYSICIAN ASSISTANT

## 2020-09-11 PROCEDURE — 1126F AMNT PAIN NOTED NONE PRSNT: CPT | Mod: S$GLB,,, | Performed by: PHYSICIAN ASSISTANT

## 2020-09-11 PROCEDURE — 3008F BODY MASS INDEX DOCD: CPT | Mod: S$GLB,,, | Performed by: PHYSICIAN ASSISTANT

## 2020-09-11 PROCEDURE — 3075F PR MOST RECENT SYSTOLIC BLOOD PRESS GE 130-139MM HG: ICD-10-PCS | Mod: S$GLB,,, | Performed by: NURSE PRACTITIONER

## 2020-09-11 PROCEDURE — 99214 PR OFFICE/OUTPT VISIT, EST, LEVL IV, 30-39 MIN: ICD-10-PCS | Mod: 25,S$GLB,, | Performed by: PHYSICIAN ASSISTANT

## 2020-09-11 PROCEDURE — 1159F PR MEDICATION LIST DOCUMENTED IN MEDICAL RECORD: ICD-10-PCS | Mod: S$GLB,,, | Performed by: PHYSICIAN ASSISTANT

## 2020-09-11 PROCEDURE — 1159F PR MEDICATION LIST DOCUMENTED IN MEDICAL RECORD: ICD-10-PCS | Mod: S$GLB,,, | Performed by: NURSE PRACTITIONER

## 2020-09-11 PROCEDURE — 99214 OFFICE O/P EST MOD 30 MIN: CPT | Mod: 25,S$GLB,, | Performed by: PHYSICIAN ASSISTANT

## 2020-09-11 PROCEDURE — 3008F PR BODY MASS INDEX (BMI) DOCUMENTED: ICD-10-PCS | Mod: S$GLB,,, | Performed by: NURSE PRACTITIONER

## 2020-09-11 PROCEDURE — 83036 POCT HEMOGLOBIN A1C: ICD-10-PCS | Mod: QW,,, | Performed by: NURSE PRACTITIONER

## 2020-09-11 PROCEDURE — 1159F MED LIST DOCD IN RCRD: CPT | Mod: S$GLB,,, | Performed by: PHYSICIAN ASSISTANT

## 2020-09-11 PROCEDURE — 20610 LARGE JOINT ASPIRATION/INJECTION: R SUBACROMIAL BURSA: ICD-10-PCS | Mod: RT,S$GLB,, | Performed by: PHYSICIAN ASSISTANT

## 2020-09-11 PROCEDURE — 3075F SYST BP GE 130 - 139MM HG: CPT | Mod: S$GLB,,, | Performed by: NURSE PRACTITIONER

## 2020-09-11 PROCEDURE — 3008F BODY MASS INDEX DOCD: CPT | Mod: S$GLB,,, | Performed by: NURSE PRACTITIONER

## 2020-09-11 PROCEDURE — 1101F PT FALLS ASSESS-DOCD LE1/YR: CPT | Mod: S$GLB,,, | Performed by: PHYSICIAN ASSISTANT

## 2020-09-11 PROCEDURE — 3052F HG A1C>EQUAL 8.0%<EQUAL 9.0%: CPT | Mod: S$GLB,,, | Performed by: NURSE PRACTITIONER

## 2020-09-11 PROCEDURE — 3074F PR MOST RECENT SYSTOLIC BLOOD PRESSURE < 130 MM HG: ICD-10-PCS | Mod: S$GLB,,, | Performed by: PHYSICIAN ASSISTANT

## 2020-09-11 PROCEDURE — 20610 DRAIN/INJ JOINT/BURSA W/O US: CPT | Mod: RT,S$GLB,, | Performed by: PHYSICIAN ASSISTANT

## 2020-09-11 PROCEDURE — 1101F PT FALLS ASSESS-DOCD LE1/YR: CPT | Mod: S$GLB,,, | Performed by: NURSE PRACTITIONER

## 2020-09-11 PROCEDURE — 3052F PR MOST RECENT HEMOGLOBIN A1C LEVEL 8.0 - < 9.0%: ICD-10-PCS | Mod: S$GLB,,, | Performed by: NURSE PRACTITIONER

## 2020-09-11 PROCEDURE — 99213 OFFICE O/P EST LOW 20 MIN: CPT | Mod: S$GLB,,, | Performed by: NURSE PRACTITIONER

## 2020-09-11 PROCEDURE — 3079F PR MOST RECENT DIASTOLIC BLOOD PRESSURE 80-89 MM HG: ICD-10-PCS | Mod: S$GLB,,, | Performed by: PHYSICIAN ASSISTANT

## 2020-09-11 PROCEDURE — 1159F MED LIST DOCD IN RCRD: CPT | Mod: S$GLB,,, | Performed by: NURSE PRACTITIONER

## 2020-09-11 PROCEDURE — 1101F PR PT FALLS ASSESS DOC 0-1 FALLS W/OUT INJ PAST YR: ICD-10-PCS | Mod: S$GLB,,, | Performed by: NURSE PRACTITIONER

## 2020-09-11 PROCEDURE — 99213 PR OFFICE/OUTPT VISIT, EST, LEVL III, 20-29 MIN: ICD-10-PCS | Mod: S$GLB,,, | Performed by: NURSE PRACTITIONER

## 2020-09-11 PROCEDURE — 83036 HEMOGLOBIN GLYCOSYLATED A1C: CPT | Mod: QW,,, | Performed by: NURSE PRACTITIONER

## 2020-09-11 PROCEDURE — 3078F DIAST BP <80 MM HG: CPT | Mod: S$GLB,,, | Performed by: NURSE PRACTITIONER

## 2020-09-11 PROCEDURE — 3074F SYST BP LT 130 MM HG: CPT | Mod: S$GLB,,, | Performed by: PHYSICIAN ASSISTANT

## 2020-09-11 PROCEDURE — 1126F PR PAIN SEVERITY QUANTIFIED, NO PAIN PRESENT: ICD-10-PCS | Mod: S$GLB,,, | Performed by: PHYSICIAN ASSISTANT

## 2020-09-11 RX ORDER — DULOXETIN HYDROCHLORIDE 20 MG/1
20 CAPSULE, DELAYED RELEASE ORAL DAILY
Status: ON HOLD | COMMUNITY
Start: 2020-09-02 | End: 2021-05-13 | Stop reason: HOSPADM

## 2020-09-11 RX ORDER — METHYLPREDNISOLONE ACETATE 40 MG/ML
40 INJECTION, SUSPENSION INTRA-ARTICULAR; INTRALESIONAL; INTRAMUSCULAR; SOFT TISSUE
Status: DISCONTINUED | OUTPATIENT
Start: 2020-09-11 | End: 2020-09-11 | Stop reason: HOSPADM

## 2020-09-11 RX ORDER — QUETIAPINE FUMARATE 25 MG/1
TABLET, FILM COATED ORAL
COMMUNITY
Start: 2020-09-02 | End: 2020-10-13 | Stop reason: CLARIF

## 2020-09-11 RX ADMIN — METHYLPREDNISOLONE ACETATE 40 MG: 40 INJECTION, SUSPENSION INTRA-ARTICULAR; INTRALESIONAL; INTRAMUSCULAR; SOFT TISSUE at 09:09

## 2020-09-11 NOTE — PROGRESS NOTES
SUBJECTIVE:    Patient ID: Alanis Mendieta is a 69 y.o. female.    Chief Complaint: Pain (right shoulder x1 week, no bottles, Mammo and DEXA declined// SW)    Pt presents with c/o right shoulder pain and decreased ROM for the last week. Denies any recent trauma or falls. Pt has significant medical history including DMII and COPD. On chronic prednisone. Sees Dr. Pantoja for pain and takes Oxycodone and Robaxin. Sleeps in hospital bed which she finds very uncomfortable. Unable to lift arm without pain and cannot reach over head. Additionally, pt reports that her blood sugars have been quite elevated. Over 200 in am. Currently on Levemir 65 units bid plus Novolog sliding scale at meal time. She is worried the prednisone is causing her CBG to remain elevated.       Office Visit on 09/11/2020   Component Date Value Ref Range Status    Hemoglobin A1C 09/11/2020 8.1  % Final   Admission on 07/17/2020, Discharged on 07/17/2020   Component Date Value Ref Range Status    WBC 07/17/2020 7.63  3.90 - 12.70 K/uL Final    RBC 07/17/2020 4.10  4.00 - 5.40 M/uL Final    Hemoglobin 07/17/2020 12.1  12.0 - 16.0 g/dL Final    Hematocrit 07/17/2020 38.2  37.0 - 48.5 % Final    Mean Corpuscular Volume 07/17/2020 93  82 - 98 fL Final    Mean Corpuscular Hemoglobin 07/17/2020 29.5  27.0 - 31.0 pg Final    Mean Corpuscular Hemoglobin Conc 07/17/2020 31.7* 32.0 - 36.0 g/dL Final    RDW 07/17/2020 13.9  11.5 - 14.5 % Final    Platelets 07/17/2020 290  150 - 350 K/uL Final    MPV 07/17/2020 10.2  9.2 - 12.9 fL Final    Immature Granulocytes 07/17/2020 0.4  0.0 - 0.5 % Final    Gran # (ANC) 07/17/2020 5.8  1.8 - 7.7 K/uL Final    Immature Grans (Abs) 07/17/2020 0.03  0.00 - 0.04 K/uL Final    Lymph # 07/17/2020 1.4  1.0 - 4.8 K/uL Final    Mono # 07/17/2020 0.3  0.3 - 1.0 K/uL Final    Eos # 07/17/2020 0.1  0.0 - 0.5 K/uL Final    Baso # 07/17/2020 0.02  0.00 - 0.20 K/uL Final    nRBC 07/17/2020 0  0 /100 WBC Final     Gran% 07/17/2020 75.4* 38.0 - 73.0 % Final    Lymph% 07/17/2020 18.7  18.0 - 48.0 % Final    Mono% 07/17/2020 4.5  4.0 - 15.0 % Final    Eosinophil% 07/17/2020 0.7  0.0 - 8.0 % Final    Basophil% 07/17/2020 0.3  0.0 - 1.9 % Final    Differential Method 07/17/2020 Automated   Final    Sodium 07/17/2020 141  136 - 145 mmol/L Final    Potassium 07/17/2020 3.7  3.5 - 5.1 mmol/L Final    Chloride 07/17/2020 98  95 - 110 mmol/L Final    CO2 07/17/2020 34* 23 - 29 mmol/L Final    Glucose 07/17/2020 103  70 - 110 mg/dL Final    BUN, Bld 07/17/2020 14  8 - 23 mg/dL Final    Creatinine 07/17/2020 0.7  0.5 - 1.4 mg/dL Final    Calcium 07/17/2020 8.0* 8.7 - 10.5 mg/dL Final    Total Protein 07/17/2020 6.3  6.0 - 8.4 g/dL Final    Albumin 07/17/2020 3.3* 3.5 - 5.2 g/dL Final    Total Bilirubin 07/17/2020 0.3  0.1 - 1.0 mg/dL Final    Alkaline Phosphatase 07/17/2020 57  55 - 135 U/L Final    AST 07/17/2020 17  10 - 40 U/L Final    ALT 07/17/2020 14  10 - 44 U/L Final    Anion Gap 07/17/2020 9  8 - 16 mmol/L Final    eGFR if African American 07/17/2020 >60.0  >60 mL/min/1.73 m^2 Final    eGFR if non African American 07/17/2020 >60.0  >60 mL/min/1.73 m^2 Final    Troponin I 07/17/2020 <0.030  <=0.040 ng/mL Final    BNP 07/17/2020 20  0 - 99 pg/mL Final   No results displayed because visit has over 200 results.      Office Visit on 06/16/2020   Component Date Value Ref Range Status    Hemoglobin A1C 06/16/2020 8.4  % Final   Admission on 03/18/2020, Discharged on 03/19/2020   Component Date Value Ref Range Status    WBC 03/18/2020 8.17  3.90 - 12.70 K/uL Final    RBC 03/18/2020 4.34  4.00 - 5.40 M/uL Final    Hemoglobin 03/18/2020 13.1  12.0 - 16.0 g/dL Final    Hematocrit 03/18/2020 39.9  37.0 - 48.5 % Final    Mean Corpuscular Volume 03/18/2020 92  82 - 98 fL Final    Mean Corpuscular Hemoglobin 03/18/2020 30.2  27.0 - 31.0 pg Final    Mean Corpuscular Hemoglobin Conc 03/18/2020 32.8  32.0 - 36.0  g/dL Final    RDW 03/18/2020 13.6  11.5 - 14.5 % Final    Platelets 03/18/2020 242  150 - 350 K/uL Final    MPV 03/18/2020 10.9  9.2 - 12.9 fL Final    Immature Granulocytes 03/18/2020 0.6* 0.0 - 0.5 % Final    Gran # (ANC) 03/18/2020 6.4  1.8 - 7.7 K/uL Final    Immature Grans (Abs) 03/18/2020 0.05* 0.00 - 0.04 K/uL Final    Lymph # 03/18/2020 1.7  1.0 - 4.8 K/uL Final    Mono # 03/18/2020 0.1* 0.3 - 1.0 K/uL Final    Eos # 03/18/2020 0.0  0.0 - 0.5 K/uL Final    Baso # 03/18/2020 0.02  0.00 - 0.20 K/uL Final    nRBC 03/18/2020 0  0 /100 WBC Final    Gran% 03/18/2020 77.8* 38.0 - 73.0 % Final    Lymph% 03/18/2020 20.3  18.0 - 48.0 % Final    Mono% 03/18/2020 1.0* 4.0 - 15.0 % Final    Eosinophil% 03/18/2020 0.1  0.0 - 8.0 % Final    Basophil% 03/18/2020 0.2  0.0 - 1.9 % Final    Differential Method 03/18/2020 Automated   Final    Sodium 03/18/2020 136  136 - 145 mmol/L Final    Potassium 03/18/2020 3.6  3.5 - 5.1 mmol/L Final    Chloride 03/18/2020 93* 95 - 110 mmol/L Final    CO2 03/18/2020 32* 23 - 29 mmol/L Final    Glucose 03/18/2020 227* 70 - 110 mg/dL Final    BUN, Bld 03/18/2020 21  8 - 23 mg/dL Final    Creatinine 03/18/2020 0.7  0.5 - 1.4 mg/dL Final    Calcium 03/18/2020 8.6* 8.7 - 10.5 mg/dL Final    Total Protein 03/18/2020 7.0  6.0 - 8.4 g/dL Final    Albumin 03/18/2020 3.6  3.5 - 5.2 g/dL Final    Total Bilirubin 03/18/2020 0.5  0.1 - 1.0 mg/dL Final    Alkaline Phosphatase 03/18/2020 61  55 - 135 U/L Final    AST 03/18/2020 21  10 - 40 U/L Final    ALT 03/18/2020 17  10 - 44 U/L Final    Anion Gap 03/18/2020 11  8 - 16 mmol/L Final    eGFR if African American 03/18/2020 >60.0  >60 mL/min/1.73 m^2 Final    eGFR if non African American 03/18/2020 >60.0  >60 mL/min/1.73 m^2 Final    Troponin I 03/18/2020 <0.030  <=0.040 ng/mL Final    BNP 03/18/2020 32  0 - 99 pg/mL Final    PT 03/18/2020 13.6  10.6 - 14.8 sec Final    INR 03/18/2020 1.1   Final    Magnesium  03/18/2020 1.7  1.6 - 2.6 mg/dL Final    POC Glucose 03/18/2020 210* 70 - 110 Final    Blood Culture, Routine 03/18/2020 No growth after 5 days.   Final    Blood Culture, Routine 03/18/2020 No growth after 5 days.   Final    Lactate (Lactic Acid) 03/18/2020 2.7* 0.5 - 1.9 mmol/L Final    Procalcitonin 03/18/2020 <0.05  0.00 - 0.50 ng/mL Final    TSH 03/18/2020 0.430  0.340 - 5.600 uIU/mL Final    Procalcitonin 03/18/2020 <0.05  0.00 - 0.50 ng/mL Final    Lactate (Lactic Acid) 03/18/2020 2.2* 0.5 - 1.9 mmol/L Final    Troponin I 03/18/2020 <0.030  <=0.040 ng/mL Final    POC Glucose 03/18/2020 257* 70 - 110 Final    WBC 03/19/2020 9.08  3.90 - 12.70 K/uL Final    RBC 03/19/2020 4.31  4.00 - 5.40 M/uL Final    Hemoglobin 03/19/2020 13.1  12.0 - 16.0 g/dL Final    Hematocrit 03/19/2020 39.8  37.0 - 48.5 % Final    Mean Corpuscular Volume 03/19/2020 92  82 - 98 fL Final    Mean Corpuscular Hemoglobin 03/19/2020 30.4  27.0 - 31.0 pg Final    Mean Corpuscular Hemoglobin Conc 03/19/2020 32.9  32.0 - 36.0 g/dL Final    RDW 03/19/2020 13.7  11.5 - 14.5 % Final    Platelets 03/19/2020 272  150 - 350 K/uL Final    MPV 03/19/2020 10.7  9.2 - 12.9 fL Final    Immature Granulocytes 03/19/2020 0.6* 0.0 - 0.5 % Final    Gran # (ANC) 03/19/2020 6.9  1.8 - 7.7 K/uL Final    Immature Grans (Abs) 03/19/2020 0.05* 0.00 - 0.04 K/uL Final    Lymph # 03/19/2020 1.6  1.0 - 4.8 K/uL Final    Mono # 03/19/2020 0.5  0.3 - 1.0 K/uL Final    Eos # 03/19/2020 0.0  0.0 - 0.5 K/uL Final    Baso # 03/19/2020 0.01  0.00 - 0.20 K/uL Final    nRBC 03/19/2020 0  0 /100 WBC Final    Gran% 03/19/2020 76.2* 38.0 - 73.0 % Final    Lymph% 03/19/2020 18.1  18.0 - 48.0 % Final    Mono% 03/19/2020 5.0  4.0 - 15.0 % Final    Eosinophil% 03/19/2020 0.0  0.0 - 8.0 % Final    Basophil% 03/19/2020 0.1  0.0 - 1.9 % Final    Differential Method 03/19/2020 Automated   Final    Sodium 03/19/2020 141  136 - 145 mmol/L Final    Potassium  03/19/2020 3.4* 3.5 - 5.1 mmol/L Final    Chloride 03/19/2020 96  95 - 110 mmol/L Final    CO2 03/19/2020 34* 23 - 29 mmol/L Final    Glucose 03/19/2020 219* 70 - 110 mg/dL Final    BUN, Bld 03/19/2020 19  8 - 23 mg/dL Final    Creatinine 03/19/2020 0.7  0.5 - 1.4 mg/dL Final    Calcium 03/19/2020 8.9  8.7 - 10.5 mg/dL Final    Anion Gap 03/19/2020 11  8 - 16 mmol/L Final    eGFR if African American 03/19/2020 >60.0  >60 mL/min/1.73 m^2 Final    eGFR if non African American 03/19/2020 >60.0  >60 mL/min/1.73 m^2 Final    Troponin I 03/19/2020 <0.030  <=0.040 ng/mL Final    POC Glucose 03/19/2020 190* 70 - 110 Final   Admission on 03/13/2020, Discharged on 03/13/2020   Component Date Value Ref Range Status    WBC 03/13/2020 7.47  3.90 - 12.70 K/uL Final    RBC 03/13/2020 4.38  4.00 - 5.40 M/uL Final    Hemoglobin 03/13/2020 13.3  12.0 - 16.0 g/dL Final    Hematocrit 03/13/2020 40.3  37.0 - 48.5 % Final    Mean Corpuscular Volume 03/13/2020 92  82 - 98 fL Final    Mean Corpuscular Hemoglobin 03/13/2020 30.4  27.0 - 31.0 pg Final    Mean Corpuscular Hemoglobin Conc 03/13/2020 33.0  32.0 - 36.0 g/dL Final    RDW 03/13/2020 13.3  11.5 - 14.5 % Final    Platelets 03/13/2020 256  150 - 350 K/uL Final    MPV 03/13/2020 10.7  9.2 - 12.9 fL Final    Immature Granulocytes 03/13/2020 0.4  0.0 - 0.5 % Final    Gran # (ANC) 03/13/2020 3.8  1.8 - 7.7 K/uL Final    Immature Grans (Abs) 03/13/2020 0.03  0.00 - 0.04 K/uL Final    Lymph # 03/13/2020 3.1  1.0 - 4.8 K/uL Final    Mono # 03/13/2020 0.4  0.3 - 1.0 K/uL Final    Eos # 03/13/2020 0.1  0.0 - 0.5 K/uL Final    Baso # 03/13/2020 0.03  0.00 - 0.20 K/uL Final    nRBC 03/13/2020 0  0 /100 WBC Final    Gran% 03/13/2020 50.8  38.0 - 73.0 % Final    Lymph% 03/13/2020 41.1  18.0 - 48.0 % Final    Mono% 03/13/2020 5.8  4.0 - 15.0 % Final    Eosinophil% 03/13/2020 1.5  0.0 - 8.0 % Final    Basophil% 03/13/2020 0.4  0.0 - 1.9 % Final    Differential  Method 03/13/2020 Automated   Final    Sodium 03/13/2020 139  136 - 145 mmol/L Final    Potassium 03/13/2020 3.9  3.5 - 5.1 mmol/L Final    Chloride 03/13/2020 97  95 - 110 mmol/L Final    CO2 03/13/2020 33* 23 - 29 mmol/L Final    Glucose 03/13/2020 215* 70 - 110 mg/dL Final    BUN, Bld 03/13/2020 17  8 - 23 mg/dL Final    Creatinine 03/13/2020 0.7  0.5 - 1.4 mg/dL Final    Calcium 03/13/2020 8.7  8.7 - 10.5 mg/dL Final    Total Protein 03/13/2020 6.9  6.0 - 8.4 g/dL Final    Albumin 03/13/2020 3.4* 3.5 - 5.2 g/dL Final    Total Bilirubin 03/13/2020 0.5  0.1 - 1.0 mg/dL Final    Alkaline Phosphatase 03/13/2020 66  55 - 135 U/L Final    AST 03/13/2020 20  10 - 40 U/L Final    ALT 03/13/2020 18  10 - 44 U/L Final    Anion Gap 03/13/2020 9  8 - 16 mmol/L Final    eGFR if African American 03/13/2020 >60.0  >60 mL/min/1.73 m^2 Final    eGFR if non African American 03/13/2020 >60.0  >60 mL/min/1.73 m^2 Final    Troponin I 03/13/2020 <0.030  <=0.040 ng/mL Final    BNP 03/13/2020 21  0 - 99 pg/mL Final    POC Glucose 03/13/2020 224* 70 - 110 Final       Past Medical History:   Diagnosis Date    Allergy     Anxiety     Arthritis     Asthma     Bipolar disorder     Cancer     thyroid    Chronic back pain     COPD (chronic obstructive pulmonary disease)     Diabetes mellitus     Diabetes mellitus, type 2     Fibromyalgia     GERD (gastroesophageal reflux disease)     History of fall 4/26/2018    [place] (hard of hearing)     Hx of thyroid cancer     Hyperlipidemia     Hypertension     Hypothyroidism     Neuropathy     On home oxygen therapy     3 l/m 24/7    Restless leg syndrome     Sleep apnea     Urinary tract infection      Past Surgical History:   Procedure Laterality Date    APPENDECTOMY      BACK SURGERY      x 3    broken foot and ankle      CHOLECYSTECTOMY      EYE SURGERY Bilateral     cataract    HYSTERECTOMY      JOINT REPLACEMENT Left 09/17/2018    knee    KNEE  ARTHROPLASTY Left 9/17/2018    Procedure: ARTHROPLASTY, KNEE;  Surgeon: Yariel Marin MD;  Location: [facility] OR;  Service: Orthopedics;  Laterality: Left;    MYELOGRAPHY N/A 8/7/2019    Procedure: MYELOGRAM;  Surgeon: Sun Diagnostic Provider;  Location: [facility] OR;  Service: General;  Laterality: N/A;    POSTERIOR REPAIR      SPINAL FUSION      x3 BACK, NECK X 4    TOTAL THYROIDECTOMY  2014     Family History   Problem Relation Age of Onset    Diabetes Mother     Heart disease Mother     Hypertension Mother     Diabetes Father     Heart disease Father     Hypertension Father        Marital Status:   Alcohol History:  reports no history of alcohol use.  Tobacco History:  reports that she quit smoking about 20 years ago. Her smoking use included cigarettes. She has a 30.00 pack-year smoking history. She has never used smokeless tobacco.  Drug History:  reports no history of drug use.    Review of patient's allergies indicates:   Allergen Reactions    Morphine Itching    Tubersol [tuberculin ppd] Other (See Comments)     Site swells bad. Has to have chest xray       Current Outpatient Medications:     ACCU-CHEK LIBERTY PLUS TEST STRP Strp, , Disp: , Rfl:     albuterol (VENTOLIN HFA) 90 mcg/actuation inhaler, Inhale 2 puffs into the lungs every 6 (six) hours as needed for Wheezing. Rescue, Disp: 1 Inhaler, Rfl: 3    albuterol-ipratropium (DUO-NEB) 2.5 mg-0.5 mg/3 mL nebulizer solution, Take 3 mLs by nebulization every 6 (six) hours as needed for Wheezing or Shortness of Breath. Rescue, Disp: 360 mL, Rfl: 5    ARTIFICIAL TEARS,HYPROMELLOSE, OPHT, Place 1 drop into both eyes 3 (three) times daily., Disp: , Rfl:     calcitRIOL (ROCALTROL) 0.5 MCG Cap, Take 1 capsule (0.5 mcg total) by mouth every evening., Disp: 90 capsule, Rfl: 1    clonazePAM (KLONOPIN) 0.5 MG tablet, Take 1 tablet (0.5 mg total) by mouth 2 (two) times daily., Disp: 180 tablet, Rfl: 0    DULoxetine (CYMBALTA) 20 MG capsule, , Disp: ,  "Rfl:     FLUoxetine 40 MG capsule, Take 1 capsule (40 mg total) by mouth once daily., Disp: 90 capsule, Rfl: 1    fluticasone-umeclidin-vilanter (TRELEGY ELLIPTA) 100-62.5-25 mcg DsDv, Inhale 1 puff into the lungs once daily., Disp: 3 each, Rfl: 1    gabapentin (NEURONTIN) 400 MG capsule, Take 1 capsule (400 mg total) by mouth once daily., Disp: 90 capsule, Rfl: 1    gabapentin (NEURONTIN) 800 MG tablet, Take 1 tablet (800 mg total) by mouth 2 (two) times daily., Disp: 180 tablet, Rfl: 1    ibuprofen (ADVIL,MOTRIN) 400 MG tablet, Take 400 mg by mouth every 8 (eight) hours as needed for Other., Disp: , Rfl:     insulin aspart U-100 (NOVOLOG) 100 unit/mL injection, Inject 2-8 Units into the skin 4 (four) times daily with meals and nightly. Sliding scale, Disp: , Rfl:     lactulose (CHRONULAC) 10 gram/15 mL solution, Take 20 g by mouth daily as needed. , Disp: , Rfl:     lamoTRIgine (LAMICTAL) 150 MG Tab, Take 1 tablet (150 mg total) by mouth 2 (two) times daily., Disp: 180 tablet, Rfl: 1    latanoprost 0.005 % ophthalmic solution, Place 1 drop into both eyes every evening. , Disp: , Rfl:     LEVEMIR FLEXTOUCH U-100 INSULN 100 unit/mL (3 mL) InPn pen, Inject 65 Units into the skin 2 (two) times daily., Disp: 3 Box, Rfl: 3    levothyroxine (SYNTHROID) 150 MCG tablet, Take 1 tablet (150 mcg total) by mouth before breakfast., Disp: 90 tablet, Rfl: 1    linaCLOtide (LINZESS) 290 mcg Cap capsule, Take 1 capsule (290 mcg total) by mouth once daily., Disp: 90 capsule, Rfl: 1    losartan-hydrochlorothiazide 50-12.5 mg (HYZAAR) 50-12.5 mg per tablet, Take 0.5 tablets by mouth once daily., Disp: 90 tablet, Rfl: 1    meclizine (ANTIVERT) 25 mg tablet, Take 25 mg by mouth 2 (two) times daily as needed for Dizziness. , Disp: , Rfl:     methocarbamoL (ROBAXIN) 750 MG Tab, Take 1 tablet (750 mg total) by mouth 2 (two) times daily as needed., Disp: 60 tablet, Rfl: 2    NOVOFINE AUTOCOVER 30 gauge x 1/3" Ndle, , Disp: , " "Rfl:     ondansetron (ZOFRAN-ODT) 4 MG TbDL, Take 4 mg by mouth every 6 (six) hours as needed (nausea)., Disp: , Rfl:     oxyCODONE (ROXICODONE) 10 mg Tab immediate release tablet, Take 10 mg by mouth 3 (three) times daily. , Disp: , Rfl:     pantoprazole (PROTONIX) 40 MG tablet, Take 1 tablet (40 mg total) by mouth once daily., Disp: 90 tablet, Rfl: 1    potassium chloride SA (K-DUR,KLOR-CON) 10 MEQ tablet, Take 1 tablet (10 mEq total) by mouth 3 (three) times a week. Mon , Wed, and Fri. (Patient taking differently: Take 10 mEq by mouth every Mon, Wed, Fri. Mon , Wed, and Fri.), Disp: 36 tablet, Rfl: 1    predniSONE (DELTASONE) 10 MG tablet, Take 1 tablet (10 mg total) by mouth once daily., Disp: 90 tablet, Rfl: 0    QUEtiapine (SEROQUEL) 25 MG Tab, , Disp: , Rfl:     RELISTOR 150 mg Tab, Take 150 mg by mouth once daily., Disp: 30 tablet, Rfl: 2    rOPINIRole (REQUIP) 4 MG tablet, Take 1 tablet (4 mg total) by mouth 2 (two) times daily., Disp: 180 tablet, Rfl: 1  No current facility-administered medications for this visit.     Facility-Administered Medications Ordered in Other Visits:     methylPREDNISolone acetate injection 40 mg, 40 mg, Intra-articular, , RUMA Cristobal, 40 mg at 09/11/20 0945    Review of Systems   Constitutional: Positive for activity change (decreased ROM in right shoulder) and fatigue. Negative for chills and fever.   HENT: Negative.    Respiratory: Positive for cough, shortness of breath and wheezing.    Cardiovascular: Negative for chest pain and palpitations.   Musculoskeletal: Positive for arthralgias and gait problem.   Neurological: Negative for dizziness and headaches.   Psychiatric/Behavioral: Negative.           Objective:      Vitals:    09/11/20 0829   BP: 138/66   Pulse: 88   Weight: 96.2 kg (212 lb)   Height: 5' 3" (1.6 m)     Body mass index is 37.55 kg/m².  Physical Exam  Constitutional:       Appearance: Normal appearance.   HENT:      Head: Normocephalic and " atraumatic.   Cardiovascular:      Rate and Rhythm: Normal rate and regular rhythm.   Pulmonary:      Effort: Tachypnea present. No accessory muscle usage or respiratory distress.      Breath sounds: Decreased air movement present.   Musculoskeletal:      Right shoulder: She exhibits decreased range of motion and tenderness.   Skin:     General: Skin is warm and dry.      Capillary Refill: Capillary refill takes less than 2 seconds.   Neurological:      Mental Status: She is alert and oriented to person, place, and time.   Psychiatric:         Mood and Affect: Mood normal.           Assessment:       1. Strain of right shoulder, initial encounter    2. Type 2 diabetes mellitus with diabetic polyneuropathy, with long-term current use of insulin    3. Chronic obstructive pulmonary disease, unspecified COPD type         Plan:       Strain of right shoulder, initial encounter  -     Ambulatory referral/consult to Orthopedics; Future; Expected date: 09/18/2020  - Called to get pt appointment with ortho today as she is limited with transportation and has trouble getting to and from her home.    Type 2 diabetes mellitus with diabetic polyneuropathy, with long-term current use of insulin  -     Hemoglobin A1C, POCT  - A1c improved at 8.1 despite high CBG readings. Advised pt to increase Levemir to 70 units bid.    Chronic obstructive pulmonary disease, unspecified COPD type      Follow up for as scheduled.

## 2020-09-11 NOTE — PROCEDURES
Large Joint Aspiration/Injection: R subacromial bursa    Date/Time: 9/11/2020 9:45 AM  Performed by: RUMA Cristobal  Authorized by: RUMA Cristobal     Consent Done?:  Yes (Verbal)  Indications:  Pain  Site marked: the procedure site was marked    Timeout: prior to procedure the correct patient, procedure, and site was verified    Prep: patient was prepped and draped in usual sterile fashion      Local anesthesia used?: Yes    Local anesthetic:  Lidocaine 1% without epinephrine    Details:  Needle Size:  22 G  Ultrasonic Guidance for needle placement?: No    Location:  Shoulder  Site:  R subacromial bursa  Medications:  40 mg methylPREDNISolone acetate 40 mg/mL  Patient tolerance:  Patient tolerated the procedure well with no immediate complications     Only 40mg (1cc) given,  Patient is diabetic.

## 2020-09-11 NOTE — PROGRESS NOTES
Subjective:       Patient ID: Alanis Mendieta is a 69 y.o. female.    Chief Complaint: Pain of the Right Shoulder (Right shoulder pain x years off and on but she has not been able to use it the last two weeks. She fell 6 weeks ago at home and hit her head. She states she has numbness down right arm and both hands. Neck pain off and on)      History of Present Illness  Patient is here for initial evaluation today with chief complaint of right shoulder pain acutely increased over the last 6 weeks or so since a fall.  She is concerned about the shoulder because her active range of motion is currently limited which hinders some of her activities of daily life.  She was down stairs with her primary care provider when they called an axial we could evaluate her today.    She has known multi joint degenerative joint disease.  She has had multiple orthopedic surgeries including cervical and lumbar spine surgery    Current Medications  Current Outpatient Medications   Medication Sig Dispense Refill    ACCU-CHEK LIBERTY PLUS TEST STRP Strp       albuterol (VENTOLIN HFA) 90 mcg/actuation inhaler Inhale 2 puffs into the lungs every 6 (six) hours as needed for Wheezing. Rescue 1 Inhaler 3    ARTIFICIAL TEARS,HYPROMELLOSE, OPHT Place 1 drop into both eyes 3 (three) times daily.      calcitRIOL (ROCALTROL) 0.5 MCG Cap Take 1 capsule (0.5 mcg total) by mouth every evening. 90 capsule 1    clonazePAM (KLONOPIN) 0.5 MG tablet Take 1 tablet (0.5 mg total) by mouth 2 (two) times daily. 180 tablet 0    DULoxetine (CYMBALTA) 20 MG capsule       FLUoxetine 40 MG capsule Take 1 capsule (40 mg total) by mouth once daily. 90 capsule 1    fluticasone-umeclidin-vilanter (TRELEGY ELLIPTA) 100-62.5-25 mcg DsDv Inhale 1 puff into the lungs once daily. 3 each 1    gabapentin (NEURONTIN) 400 MG capsule Take 1 capsule (400 mg total) by mouth once daily. 90 capsule 1    gabapentin (NEURONTIN) 800 MG tablet Take 1 tablet (800 mg total) by  "mouth 2 (two) times daily. 180 tablet 1    ibuprofen (ADVIL,MOTRIN) 400 MG tablet Take 400 mg by mouth every 8 (eight) hours as needed for Other.      insulin aspart U-100 (NOVOLOG) 100 unit/mL injection Inject 2-8 Units into the skin 4 (four) times daily with meals and nightly. Sliding scale      lactulose (CHRONULAC) 10 gram/15 mL solution Take 20 g by mouth daily as needed.       lamoTRIgine (LAMICTAL) 150 MG Tab Take 1 tablet (150 mg total) by mouth 2 (two) times daily. 180 tablet 1    latanoprost 0.005 % ophthalmic solution Place 1 drop into both eyes every evening.       LEVEMIR FLEXTOUCH U-100 INSULN 100 unit/mL (3 mL) InPn pen Inject 65 Units into the skin 2 (two) times daily. 3 Box 3    levothyroxine (SYNTHROID) 150 MCG tablet Take 1 tablet (150 mcg total) by mouth before breakfast. 90 tablet 1    linaCLOtide (LINZESS) 290 mcg Cap capsule Take 1 capsule (290 mcg total) by mouth once daily. 90 capsule 1    meclizine (ANTIVERT) 25 mg tablet Take 25 mg by mouth 2 (two) times daily as needed for Dizziness.       methocarbamoL (ROBAXIN) 750 MG Tab Take 1 tablet (750 mg total) by mouth 2 (two) times daily as needed. 60 tablet 2    NOVOFINE AUTOCOVER 30 gauge x 1/3" Ndle       ondansetron (ZOFRAN-ODT) 4 MG TbDL Take 4 mg by mouth every 6 (six) hours as needed (nausea).      oxyCODONE (ROXICODONE) 10 mg Tab immediate release tablet Take 10 mg by mouth 3 (three) times daily.       pantoprazole (PROTONIX) 40 MG tablet Take 1 tablet (40 mg total) by mouth once daily. 90 tablet 1    potassium chloride SA (K-DUR,KLOR-CON) 10 MEQ tablet Take 1 tablet (10 mEq total) by mouth 3 (three) times a week. Mon , Wed, and Fri. (Patient taking differently: Take 10 mEq by mouth every Mon, Wed, Fri. Mon , Wed, and Fri.) 36 tablet 1    predniSONE (DELTASONE) 10 MG tablet Take 1 tablet (10 mg total) by mouth once daily. 90 tablet 0    QUEtiapine (SEROQUEL) 25 MG Tab       RELISTOR 150 mg Tab Take 150 mg by mouth once " daily. 30 tablet 2    rOPINIRole (REQUIP) 4 MG tablet Take 1 tablet (4 mg total) by mouth 2 (two) times daily. 180 tablet 1    albuterol-ipratropium (DUO-NEB) 2.5 mg-0.5 mg/3 mL nebulizer solution Take 3 mLs by nebulization every 6 (six) hours as needed for Wheezing or Shortness of Breath. Rescue 360 mL 5    losartan-hydrochlorothiazide 50-12.5 mg (HYZAAR) 50-12.5 mg per tablet Take 0.5 tablets by mouth once daily. 90 tablet 1     No current facility-administered medications for this visit.        Allergies  Review of patient's allergies indicates:   Allergen Reactions    Morphine Itching    Tubersol [tuberculin ppd] Other (See Comments)     Site swells bad. Has to have chest xray       Past Medical History  Past Medical History:   Diagnosis Date    Allergy     Anxiety     Arthritis     Asthma     Bipolar disorder     Cancer     thyroid    Chronic back pain     COPD (chronic obstructive pulmonary disease)     Diabetes mellitus     Diabetes mellitus, type 2     Fibromyalgia     GERD (gastroesophageal reflux disease)     History of fall 4/26/2018    [place]     Hx of thyroid cancer     Hyperlipidemia     Hypertension     Hypothyroidism     Neuropathy     On home oxygen therapy     3 l/m 24/7    Restless leg syndrome     Sleep apnea     Urinary tract infection        Surgical History  Past Surgical History:   Procedure Laterality Date    APPENDECTOMY      BACK SURGERY      x 3    broken foot and ankle      CHOLECYSTECTOMY      EYE SURGERY Bilateral     cataract    HYSTERECTOMY      JOINT REPLACEMENT Left 09/17/2018    knee    KNEE ARTHROPLASTY Left 9/17/2018    Procedure: ARTHROPLASTY, KNEE;  Surgeon: Yariel Marin MD;  Location: [facility] OR;  Service: Orthopedics;  Laterality: Left;    MYELOGRAPHY N/A 8/7/2019    Procedure: MYELOGRAM;  Surgeon: Sun Diagnostic Provider;  Location: [facility] OR;  Service: General;  Laterality: N/A;    POSTERIOR REPAIR      SPINAL FUSION       x3 BACK, NECK X 4    TOTAL THYROIDECTOMY         Family History:   Family History   Problem Relation Age of Onset    Diabetes Mother     Heart disease Mother     Hypertension Mother     Diabetes Father     Heart disease Father     Hypertension Father        Social History:   Social History     Socioeconomic History    Marital status:      Spouse name: Not on file    Number of children: Not on file    Years of education: Not on file    Highest education level: Not on file   Occupational History    Not on file   Social Needs    Financial resource strain: Very hard    Food insecurity     Worry: Not on file     Inability: Not on file    Transportation needs     Medical: Yes     Non-medical: Yes   Tobacco Use    Smoking status: Former Smoker     Packs/day: 1.00     Years: 30.00     Pack years: 30.00     Types: Cigarettes     Quit date: 2000     Years since quittin.3    Smokeless tobacco: Never Used   Substance and Sexual Activity    Alcohol use: No     Frequency: Never     Drinks per session: Patient refused     Binge frequency: Never    Drug use: No    Sexual activity: Never   Lifestyle    Physical activity     Days per week: Not on file     Minutes per session: Not on file    Stress: Very much   Relationships    Social connections     Talks on phone: Patient refused     Gets together: Never     Attends [employer] service: Not on file     Active member of club or organization: Patient refused     Attends meetings of clubs or organizations: Patient refused     Relationship status:    Other Topics Concern    Not on file   Social History Narrative    Not on file       Hospitalization/Major Diagnostic Procedure:     Review of Systems     General/Constitutional:  Chills denies. Fatigue denies. Fever denies. Weight gain denies. Weight loss denies.    Respiratory:  Shortness of breath denies.    Cardiovascular:  Chest pain denies.    Gastrointestinal:  Constipation denies.  Diarrhea denies. Nausea denies. Vomiting denies.     Hematology:  Easy bruising denies. Prolonged bleeding denies.     Genitourinary:  Frequent urination denies. Pain in lower back denies. Painful urination denies.     Musculoskeletal:  See HPI for details    Skin:  Rash denies.    Neurologic:  Dizziness denies. Gait abnormalities denies. Seizures denies. Tingling/Numbess denies.    Psychiatric:  Anxiety denies. Depressed mood denies.     Objective:   Vital Signs:   Vitals:    09/11/20 0910   BP: (!) 147/86        Physical Exam      General Examination:     Constitutional: The patient is alert and oriented to lace person and time. Mood is pleasant.     Head/Face: Normal facial features normal eyebrows    Eyes: Normal extraocular motion bilaterally    Lungs: Respirations are equal and unlabored    Gait is coordinated.    Cardiovascular: There are no swelling or varicosities present.    Lymphatic: Negative for adenopathy    Skin: Normal    Neurological: Level of consciousness normal. Oriented to place person and time and situation    Psychiatric: Oriented to time place person and situation    Right shoulder exam demonstrates significantly limited active range of motion above 90° in all planes of mobility.  Positive Neer's and positive Craig.  Tenderness palpation about the entire shoulder anterior, lateral, and posterior.    XRAY Report/ Interpretation :  Right shoulder x-rays AP and axillary view taken in the office today and reviewed the patient demonstrates significant degenerative joint disease and a high riding humeral head which is indicative of a chronic and retracted rotator cuff tear.  There are multiple osteophytes as well.      Assessment:       1. Acute pain of right shoulder    2. Primary osteoarthritis of right shoulder    3. Rotator cuff tear, non-traumatic, right        Plan:       Alanis was seen today for pain.    Diagnoses and all orders for this visit:    Acute pain of right shoulder  -      X-ray Shoulder 2 or More Views Right    Primary osteoarthritis of right shoulder    Rotator cuff tear, non-traumatic, right         No follow-ups on file.    She definitely has some pre-existing arthritic change of the right shoulder with signs of a chronic and retracted rotator cuff tear.  However this is pre-existing prior to the fall and I think the fall just exacerbated her pain and inflammation.  Therefore today I am going to give her a subacromial injection with 40 mg of Depo-Medrol, because she is diabetic, and 2 cc lidocaine.  Follow up in 2 weeks if symptoms persist and we will order an CT and structured physical therapy    This note was created using Dragon voice recognition software that occasionally misinterpreted phrases or words.

## 2020-10-05 DIAGNOSIS — F41.9 ANXIETY: ICD-10-CM

## 2020-10-05 DIAGNOSIS — E11.00 TYPE 2 DIABETES MELLITUS WITH HYPEROSMOLARITY WITHOUT COMA, WITH LONG-TERM CURRENT USE OF INSULIN: Primary | ICD-10-CM

## 2020-10-05 DIAGNOSIS — Z79.4 TYPE 2 DIABETES MELLITUS WITH HYPEROSMOLARITY WITHOUT COMA, WITH LONG-TERM CURRENT USE OF INSULIN: Primary | ICD-10-CM

## 2020-10-05 NOTE — TELEPHONE ENCOUNTER
----- Message from Ruby Ahn sent at 10/5/2020  4:19 PM CDT -----  Pt calling refills on insulin needles to [facility] on [address]  # [phone]

## 2020-10-06 RX ORDER — INSULIN ADMIN. SUPPLIES
1 INSULIN PEN (EA) SUBCUTANEOUS 4 TIMES DAILY
Qty: 360 EACH | Refills: 3 | Status: SHIPPED | OUTPATIENT
Start: 2020-10-06

## 2020-10-06 RX ORDER — CLONAZEPAM 0.5 MG/1
0.5 TABLET ORAL 2 TIMES DAILY
Qty: 180 TABLET | Refills: 0 | Status: SHIPPED | OUTPATIENT
Start: 2020-10-06 | End: 2021-02-03 | Stop reason: SDUPTHER

## 2020-10-13 ENCOUNTER — HOSPITAL ENCOUNTER (EMERGENCY)
Facility: HOSPITAL | Age: 70
Discharge: HOME | End: 2020-10-13
Attending: EMERGENCY MEDICINE
Payer: MEDICAID

## 2020-10-13 VITALS
OXYGEN SATURATION: 97 % | WEIGHT: 212 LBS | BODY MASS INDEX: 36.19 KG/M2 | HEIGHT: 64 IN | DIASTOLIC BLOOD PRESSURE: 56 MMHG | TEMPERATURE: 99 F | HEART RATE: 94 BPM | RESPIRATION RATE: 23 BRPM | SYSTOLIC BLOOD PRESSURE: 125 MMHG

## 2020-10-13 DIAGNOSIS — J44.1 COPD WITH ACUTE EXACERBATION: Primary | ICD-10-CM

## 2020-10-13 DIAGNOSIS — R06.02 SHORTNESS OF BREATH: ICD-10-CM

## 2020-10-13 LAB
ALBUMIN SERPL BCP-MCNC: 3.3 G/DL (ref 3.5–5.2)
ALP SERPL-CCNC: 61 U/L (ref 55–135)
ALT SERPL W/O P-5'-P-CCNC: 15 U/L (ref 10–44)
ANION GAP SERPL CALC-SCNC: 13 MMOL/L (ref 8–16)
AST SERPL-CCNC: 22 U/L (ref 10–40)
BASOPHILS # BLD AUTO: 0.02 K/UL (ref 0–0.2)
BASOPHILS NFR BLD: 0.3 % (ref 0–1.9)
BILIRUB SERPL-MCNC: 0.2 MG/DL (ref 0.1–1)
BNP SERPL-MCNC: 59 PG/ML (ref 0–99)
BUN SERPL-MCNC: 13 MG/DL (ref 8–23)
CALCIUM SERPL-MCNC: 8.3 MG/DL (ref 8.7–10.5)
CHLORIDE SERPL-SCNC: 93 MMOL/L (ref 95–110)
CO2 SERPL-SCNC: 31 MMOL/L (ref 23–29)
CREAT SERPL-MCNC: 0.7 MG/DL (ref 0.5–1.4)
D DIMER PPP IA.FEU-MCNC: 0.57 UG/ML FEU
DIFFERENTIAL METHOD: ABNORMAL
EOSINOPHIL # BLD AUTO: 0 K/UL (ref 0–0.5)
EOSINOPHIL NFR BLD: 0 % (ref 0–8)
ERYTHROCYTE [DISTWIDTH] IN BLOOD BY AUTOMATED COUNT: 13.6 % (ref 11.5–14.5)
EST. GFR  (AFRICAN AMERICAN): >60 ML/MIN/1.73 M^2
EST. GFR  (NON AFRICAN AMERICAN): >60 ML/MIN/1.73 M^2
GLUCOSE SERPL-MCNC: 326 MG/DL (ref 70–110)
HCT VFR BLD AUTO: 37 % (ref 37–48.5)
HGB BLD-MCNC: 11.9 G/DL (ref 12–16)
IMM GRANULOCYTES # BLD AUTO: 0.05 K/UL (ref 0–0.04)
IMM GRANULOCYTES NFR BLD AUTO: 0.8 % (ref 0–0.5)
INR PPP: 1
LYMPHOCYTES # BLD AUTO: 0.6 K/UL (ref 1–4.8)
LYMPHOCYTES NFR BLD: 8.8 % (ref 18–48)
MCH RBC QN AUTO: 29 PG (ref 27–31)
MCHC RBC AUTO-ENTMCNC: 32.2 G/DL (ref 32–36)
MCV RBC AUTO: 90 FL (ref 82–98)
MONOCYTES # BLD AUTO: 0.1 K/UL (ref 0.3–1)
MONOCYTES NFR BLD: 1.1 % (ref 4–15)
NEUTROPHILS # BLD AUTO: 5.9 K/UL (ref 1.8–7.7)
NEUTROPHILS NFR BLD: 89 % (ref 38–73)
NRBC BLD-RTO: 0 /100 WBC
PLATELET # BLD AUTO: 308 K/UL (ref 150–350)
PMV BLD AUTO: 10.2 FL (ref 9.2–12.9)
POTASSIUM SERPL-SCNC: 3.9 MMOL/L (ref 3.5–5.1)
PROT SERPL-MCNC: 6.3 G/DL (ref 6–8.4)
PROTHROMBIN TIME: 12.6 SEC (ref 10.6–14.8)
RBC # BLD AUTO: 4.1 M/UL (ref 4–5.4)
SODIUM SERPL-SCNC: 137 MMOL/L (ref 136–145)
TROPONIN I SERPL DL<=0.01 NG/ML-MCNC: <0.03 NG/ML
WBC # BLD AUTO: 6.58 K/UL (ref 3.9–12.7)

## 2020-10-13 PROCEDURE — 36415 COLL VENOUS BLD VENIPUNCTURE: CPT

## 2020-10-13 PROCEDURE — 83880 ASSAY OF NATRIURETIC PEPTIDE: CPT

## 2020-10-13 PROCEDURE — 93010 ELECTROCARDIOGRAM REPORT: CPT | Mod: ,,, | Performed by: INTERNAL MEDICINE

## 2020-10-13 PROCEDURE — 94761 N-INVAS EAR/PLS OXIMETRY MLT: CPT

## 2020-10-13 PROCEDURE — 80053 COMPREHEN METABOLIC PANEL: CPT

## 2020-10-13 PROCEDURE — 85025 COMPLETE CBC W/AUTO DIFF WBC: CPT

## 2020-10-13 PROCEDURE — 63600175 PHARM REV CODE 636 W HCPCS: Performed by: EMERGENCY MEDICINE

## 2020-10-13 PROCEDURE — 99900031 HC PATIENT EDUCATION (STAT)

## 2020-10-13 PROCEDURE — 96365 THER/PROPH/DIAG IV INF INIT: CPT

## 2020-10-13 PROCEDURE — 93010 EKG 12-LEAD: ICD-10-PCS | Mod: ,,, | Performed by: INTERNAL MEDICINE

## 2020-10-13 PROCEDURE — 93005 ELECTROCARDIOGRAM TRACING: CPT | Performed by: INTERNAL MEDICINE

## 2020-10-13 PROCEDURE — 85379 FIBRIN DEGRADATION QUANT: CPT

## 2020-10-13 PROCEDURE — 99285 EMERGENCY DEPT VISIT HI MDM: CPT | Mod: 25

## 2020-10-13 PROCEDURE — 25000242 PHARM REV CODE 250 ALT 637 W/ HCPCS: Performed by: EMERGENCY MEDICINE

## 2020-10-13 PROCEDURE — 94644 CONT INHLJ TX 1ST HOUR: CPT

## 2020-10-13 PROCEDURE — 27000221 HC OXYGEN, UP TO 24 HOURS

## 2020-10-13 PROCEDURE — 85610 PROTHROMBIN TIME: CPT

## 2020-10-13 PROCEDURE — 84484 ASSAY OF TROPONIN QUANT: CPT

## 2020-10-13 PROCEDURE — 99900035 HC TECH TIME PER 15 MIN (STAT)

## 2020-10-13 RX ORDER — QUETIAPINE FUMARATE 50 MG/1
25 TABLET, FILM COATED ORAL NIGHTLY
COMMUNITY
Start: 2020-09-28 | End: 2020-12-11

## 2020-10-13 RX ORDER — DOXEPIN HYDROCHLORIDE 50 MG/G
1 CREAM TOPICAL 3 TIMES DAILY PRN
COMMUNITY
Start: 2020-10-05

## 2020-10-13 RX ORDER — IPRATROPIUM BROMIDE AND ALBUTEROL SULFATE 2.5; .5 MG/3ML; MG/3ML
3 SOLUTION RESPIRATORY (INHALATION)
Status: COMPLETED | OUTPATIENT
Start: 2020-10-13 | End: 2020-10-13

## 2020-10-13 RX ORDER — TEMAZEPAM 15 MG/1
15 CAPSULE ORAL NIGHTLY
COMMUNITY
Start: 2020-10-08 | End: 2021-06-22

## 2020-10-13 RX ORDER — PREDNISONE 20 MG/1
40 TABLET ORAL DAILY
Qty: 10 TABLET | Refills: 0 | Status: SHIPPED | OUTPATIENT
Start: 2020-10-13 | End: 2020-10-15

## 2020-10-13 RX ORDER — MAGNESIUM SULFATE HEPTAHYDRATE 40 MG/ML
2 INJECTION, SOLUTION INTRAVENOUS ONCE
Status: COMPLETED | OUTPATIENT
Start: 2020-10-13 | End: 2020-10-13

## 2020-10-13 RX ORDER — FLUTICASONE PROPIONATE 50 MCG
1 SPRAY, SUSPENSION (ML) NASAL DAILY
COMMUNITY

## 2020-10-13 RX ADMIN — MAGNESIUM SULFATE IN WATER 2 G: 40 INJECTION, SOLUTION INTRAVENOUS at 03:10

## 2020-10-13 RX ADMIN — IPRATROPIUM BROMIDE AND ALBUTEROL SULFATE 3 ML: .5; 2.5 SOLUTION RESPIRATORY (INHALATION) at 03:10

## 2020-10-13 NOTE — DISCHARGE INSTRUCTIONS
RETURN TO EMERGENCY DEPARTMENT WITHOUT FAIL, IF YOUR SYMPTOMS WORSEN, IF YOU GET NEW OR DIFFERENT SYMPTOMS, IF YOU ARE UNABLE TO FOLLOW UP AS DIRECTED, OR IF YOU HAVE ANY CONCERNS OR WORRIES.    Take steroids and inhalers as directed.  Take

## 2020-10-13 NOTE — ED PROVIDER NOTES
"Encounter Date: 10/13/2020       History     Chief Complaint   Patient presents with    Shortness of Breath     This is a 69-year-old female with a past medical history of COPD, bipolar, hypertension, hyperlipidemia, who presents emergency department with shortness of breath.  She states that she has COPD and her "COPD is flaring up".  She says that she has been short of breath for the last day and half for so.  She says she is short of breath at rest.  When she talks she gets short of breath.  She endorses a dry cough which is new for her nonproductive.  No associated fever or chills.  She denies any chest pain or tightness.  No one else at home sick.  No exposure to coronavirus.  No muscle aches body aches.  She has not had any vomiting diarrhea or any abdominal pain.  No swelling in her legs no weight gain reported.  She says that she took 40 mg of prednisone this morning and she also took 2 breathing treatments today , one at 8:30 a.m. in the morning, at 12:30 p.m. and nothing help so she called EMS.        Review of patient's allergies indicates:   Allergen Reactions    Morphine Itching    Tubersol [tuberculin ppd] Other (See Comments)     Site swells bad. Has to have chest xray     Past Medical History:   Diagnosis Date    Allergy     Anxiety     Arthritis     Asthma     Bipolar disorder     Cancer     thyroid    Chronic back pain     COPD (chronic obstructive pulmonary disease)     Diabetes mellitus     Diabetes mellitus, type 2     Fibromyalgia     GERD (gastroesophageal reflux disease)     History of fall 4/26/2018    [place] (hard of hearing)     Hx of thyroid cancer     Hyperlipidemia     Hypertension     Hypothyroidism     Neuropathy     On home oxygen therapy     3 l/m 24/7    Restless leg syndrome     Sleep apnea     Urinary tract infection      Past Surgical History:   Procedure Laterality Date    APPENDECTOMY      BACK SURGERY      x 3    broken foot and ankle      " CHOLECYSTECTOMY      EYE SURGERY Bilateral     cataract    HYSTERECTOMY      JOINT REPLACEMENT Left 2018    knee    KNEE ARTHROPLASTY Left 2018    Procedure: ARTHROPLASTY, KNEE;  Surgeon: Yariel Marin MD;  Location: [facility] OR;  Service: Orthopedics;  Laterality: Left;    MYELOGRAPHY N/A 2019    Procedure: MYELOGRAM;  Surgeon: Sun Diagnostic Provider;  Location: [facility] OR;  Service: General;  Laterality: N/A;    POSTERIOR REPAIR      SPINAL FUSION      x3 BACK, NECK X 4    TOTAL THYROIDECTOMY       Family History   Problem Relation Age of Onset    Diabetes Mother     Heart disease Mother     Hypertension Mother     Diabetes Father     Heart disease Father     Hypertension Father      Social History     Tobacco Use    Smoking status: Former Smoker     Packs/day: 1.00     Years: 30.00     Pack years: 30.00     Types: Cigarettes     Quit date: 2000     Years since quittin.4    Smokeless tobacco: Never Used   Substance Use Topics    Alcohol use: No     Frequency: Never     Drinks per session: Patient refused     Binge frequency: Never    Drug use: No     Review of Systems   Constitutional: Negative for fever.   HENT: Negative for sore throat.    Respiratory: Positive for cough and shortness of breath.    Cardiovascular: Negative for chest pain, palpitations and leg swelling.   Gastrointestinal: Negative for abdominal pain, nausea and vomiting.   Genitourinary: Negative for dysuria and flank pain.   Musculoskeletal: Negative for back pain.   Skin: Negative for rash.   Neurological: Negative for weakness and headaches.   Hematological: Does not bruise/bleed easily.   All other systems reviewed and are negative.      Physical Exam     Initial Vitals   BP Pulse Resp Temp SpO2   10/13/20 1430 10/13/20 1421 10/13/20 1421 10/13/20 1421 10/13/20 1421   (!) 141/65 92 (!) 24 99.4 °F (37.4 °C) 100 %      MAP       --                Physical Exam    Nursing note and vitals  reviewed.  Constitutional: She appears well-developed and well-nourished. She is Obese . No distress.   HENT:   Head: Normocephalic and atraumatic.   Mouth/Throat: No oropharyngeal exudate.   Eyes: Conjunctivae and EOM are normal. Pupils are equal, round, and reactive to light.   Neck: Neck supple. No tracheal deviation present.   Cardiovascular: Normal rate, regular rhythm, normal heart sounds and intact distal pulses.   No murmur heard.  Pulmonary/Chest: Breath sounds normal. No stridor. She is in respiratory distress (Mild, speaking in short phrases.  Tachypneic). She has no wheezes. She has no rhonchi. She has no rales.   Abdominal: Soft. She exhibits no distension. There is no abdominal tenderness. There is no rebound and no guarding.   Musculoskeletal: Normal range of motion. No tenderness or edema.   Neurological: She is alert and oriented to person, place, and time. She has normal strength. No cranial nerve deficit or sensory deficit.   Skin: Skin is warm and dry. Capillary refill takes less than 2 seconds. No rash noted. No erythema. No pallor.   Psychiatric: She has a normal mood and affect. Thought content normal.          ED Course   Procedures  Labs Reviewed   CBC W/ AUTO DIFFERENTIAL - Abnormal; Notable for the following components:       Result Value    Hemoglobin 11.9 (*)     Immature Granulocytes 0.8 (*)     Immature Grans (Abs) 0.05 (*)     Lymph # 0.6 (*)     Mono # 0.1 (*)     Gran% 89.0 (*)     Lymph% 8.8 (*)     Mono% 1.1 (*)     All other components within normal limits   COMPREHENSIVE METABOLIC PANEL - Abnormal; Notable for the following components:    Chloride 93 (*)     CO2 31 (*)     Glucose 326 (*)     Calcium 8.3 (*)     Albumin 3.3 (*)     All other components within normal limits   D DIMER, QUANTITATIVE - Abnormal; Notable for the following components:    D-Dimer 0.57 (*)     All other components within normal limits   TROPONIN I   B-TYPE NATRIURETIC PEPTIDE   PROTIME-INR   D DIMER,  QUANTITATIVE        ECG Results          EKG 12-lead (In process)  Result time 10/13/20 15:06:21    In process by Interface, Lab In [facility] (10/13/20 15:06:21)                 Narrative:    Test Reason : R06.02,    Vent. Rate : 088 BPM     Atrial Rate : 088 BPM     P-R Int : 154 ms          QRS Dur : 090 ms      QT Int : 430 ms       P-R-T Axes : 032 018 071 degrees     QTc Int : 520 ms    Normal sinus rhythm  Possible Anterior infarct ,age undetermined  Prolonged QT  Abnormal ECG  When compared with ECG of 17-JUL-2020 11:39,  No significant change was found    Referred By: [email or web address]   SELF           Confirmed By:                              Results for orders placed or performed during the hospital encounter of 10/13/20   CBC auto differential   Result Value Ref Range    WBC 6.58 3.90 - 12.70 K/uL    RBC 4.10 4.00 - 5.40 M/uL    Hemoglobin 11.9 (L) 12.0 - 16.0 g/dL    Hematocrit 37.0 37.0 - 48.5 %    Mean Corpuscular Volume 90 82 - 98 fL    Mean Corpuscular Hemoglobin 29.0 27.0 - 31.0 pg    Mean Corpuscular Hemoglobin Conc 32.2 32.0 - 36.0 g/dL    RDW 13.6 11.5 - 14.5 %    Platelets 308 150 - 350 K/uL    MPV 10.2 9.2 - 12.9 fL    Immature Granulocytes 0.8 (H) 0.0 - 0.5 %    Gran # (ANC) 5.9 1.8 - 7.7 K/uL    Immature Grans (Abs) 0.05 (H) 0.00 - 0.04 K/uL    Lymph # 0.6 (L) 1.0 - 4.8 K/uL    Mono # 0.1 (L) 0.3 - 1.0 K/uL    Eos # 0.0 0.0 - 0.5 K/uL    Baso # 0.02 0.00 - 0.20 K/uL    nRBC 0 0 /100 WBC    Gran% 89.0 (H) 38.0 - 73.0 %    Lymph% 8.8 (L) 18.0 - 48.0 %    Mono% 1.1 (L) 4.0 - 15.0 %    Eosinophil% 0.0 0.0 - 8.0 %    Basophil% 0.3 0.0 - 1.9 %    Differential Method Automated    Comprehensive metabolic panel   Result Value Ref Range    Sodium 137 136 - 145 mmol/L    Potassium 3.9 3.5 - 5.1 mmol/L    Chloride 93 (L) 95 - 110 mmol/L    CO2 31 (H) 23 - 29 mmol/L    Glucose 326 (H) 70 - 110 mg/dL    BUN, Bld 13 8 - 23 mg/dL    Creatinine 0.7 0.5 - 1.4 mg/dL    Calcium 8.3 (L) 8.7 - 10.5 mg/dL    Total Protein  6.3 6.0 - 8.4 g/dL    Albumin 3.3 (L) 3.5 - 5.2 g/dL    Total Bilirubin 0.2 0.1 - 1.0 mg/dL    Alkaline Phosphatase 61 55 - 135 U/L    AST 22 10 - 40 U/L    ALT 15 10 - 44 U/L    Anion Gap 13 8 - 16 mmol/L    eGFR if African American >60.0 >60 mL/min/1.73 m^2    eGFR if non African American >60.0 >60 mL/min/1.73 m^2   Troponin I   Result Value Ref Range    Troponin I <0.030 <=0.040 ng/mL   Brain natriuretic peptide   Result Value Ref Range    BNP 59 0 - 99 pg/mL   Protime-INR   Result Value Ref Range    PT 12.6 10.6 - 14.8 sec    INR 1.0    D-Dimer, Quantitative   Result Value Ref Range    D-Dimer 0.57 (H) <0.50 ug/mL FEU     X-Ray Chest AP Portable   Final Result          Imaging Results          X-Ray Chest AP Portable (Final result)  Result time 10/13/20 15:13:48    Final result by Gutierrez Mathew MD (10/13/20 15:13:48)                 Narrative:    XR CHEST AP PORTABLE    CLINICAL HISTORY:  69 years Female shortness of breath    COMPARISON: June 26, 2020    FINDINGS: Cardiac silhouette size is stable from prior.  Atherosclerotic calcification of the aorta. Right hemidiaphragm  elevation, stable, with associated right basilar atelectasis. No  confluent airspace disease. No pleural effusion or pneumothorax. No  acute osseous abnormality.    IMPRESSION:    Stable exam demonstrating right hemidiaphragm elevation and right  basilar atelectasis.    No acute pulmonary process.    Electronically Signed by Gutierrez MCNAMARA on 10/13/2020 3:19 PM                            Results for orders placed or performed during the hospital encounter of 10/13/20   CBC auto differential   Result Value Ref Range    WBC 6.58 3.90 - 12.70 K/uL    RBC 4.10 4.00 - 5.40 M/uL    Hemoglobin 11.9 (L) 12.0 - 16.0 g/dL    Hematocrit 37.0 37.0 - 48.5 %    Mean Corpuscular Volume 90 82 - 98 fL    Mean Corpuscular Hemoglobin 29.0 27.0 - 31.0 pg    Mean Corpuscular Hemoglobin Conc 32.2 32.0 - 36.0 g/dL    RDW 13.6 11.5 - 14.5 %    Platelets  308 150 - 350 K/uL    MPV 10.2 9.2 - 12.9 fL    Immature Granulocytes 0.8 (H) 0.0 - 0.5 %    Gran # (ANC) 5.9 1.8 - 7.7 K/uL    Immature Grans (Abs) 0.05 (H) 0.00 - 0.04 K/uL    Lymph # 0.6 (L) 1.0 - 4.8 K/uL    Mono # 0.1 (L) 0.3 - 1.0 K/uL    Eos # 0.0 0.0 - 0.5 K/uL    Baso # 0.02 0.00 - 0.20 K/uL    nRBC 0 0 /100 WBC    Gran% 89.0 (H) 38.0 - 73.0 %    Lymph% 8.8 (L) 18.0 - 48.0 %    Mono% 1.1 (L) 4.0 - 15.0 %    Eosinophil% 0.0 0.0 - 8.0 %    Basophil% 0.3 0.0 - 1.9 %    Differential Method Automated    Comprehensive metabolic panel   Result Value Ref Range    Sodium 137 136 - 145 mmol/L    Potassium 3.9 3.5 - 5.1 mmol/L    Chloride 93 (L) 95 - 110 mmol/L    CO2 31 (H) 23 - 29 mmol/L    Glucose 326 (H) 70 - 110 mg/dL    BUN, Bld 13 8 - 23 mg/dL    Creatinine 0.7 0.5 - 1.4 mg/dL    Calcium 8.3 (L) 8.7 - 10.5 mg/dL    Total Protein 6.3 6.0 - 8.4 g/dL    Albumin 3.3 (L) 3.5 - 5.2 g/dL    Total Bilirubin 0.2 0.1 - 1.0 mg/dL    Alkaline Phosphatase 61 55 - 135 U/L    AST 22 10 - 40 U/L    ALT 15 10 - 44 U/L    Anion Gap 13 8 - 16 mmol/L    eGFR if African American >60.0 >60 mL/min/1.73 m^2    eGFR if non African American >60.0 >60 mL/min/1.73 m^2   Troponin I   Result Value Ref Range    Troponin I <0.030 <=0.040 ng/mL   Brain natriuretic peptide   Result Value Ref Range    BNP 59 0 - 99 pg/mL   Protime-INR   Result Value Ref Range    PT 12.6 10.6 - 14.8 sec    INR 1.0    D-Dimer, Quantitative   Result Value Ref Range    D-Dimer 0.57 (H) <0.50 ug/mL FEU     X-Ray Chest AP Portable   Final Result             Medical Decision Making:   ED Management:  Patient presents emergency department feeling like she can not breathe.  Says she was wheezing all throughout the morning.  Breathing treatments did help.  Here in the ER we gave her hour long breathing treatment and she had already taken steroids prior to arrival.  Says she felt much better after the hour long breathing treatment.  Labs are relatively unremarkable  including troponin and D-dimer is negative for age adjusted.  Low suspicion for PE more than likely has COPD exacerbation.  Chest x-ray does not show any evidence of pneumonia pneumothorax or any other acute pathology.  In addition BNP not elevated, low suspicion was CHF.  Will discharge home with prednisone and recommend she follow up with her primary care provider.    I had a detailed discussion with the patient and/or guardian regarding: The historical points, exam findings, and diagnostic results supporting the discharge diagnosis, lab results, pertinent radiology results, and the need for outpatient follow-up, for definitive care with a family practitioner and to return to the emergency department if symptoms worsen or persist or if there are any questions or concerns that arise at home. All questions have been answered in detail. Strict return to Emergency Department precautions have been provided.    A dictation software program was used for this note.  Please expect some simple typographical  errors in this note.                    ED Course as of Oct 13 2238   Tue Oct 13, 2020   1445 EKG 2:40 p.m. normal sinus rhythm rate of 88.  Prolonged QT interval.  No ST elevation or depression.  No STEMI.  EKG interpreted independently by me.    [JR]   1820 Age adjusted D-dimer is negative    [JR]   1843 Patient re-evaluated, speaking in full sentences, lungs seem clear.  D-dimer added on as I have never heard wheezing throughout her ER stay in the off chance PE and age adjusted is negative.  Patient's swears she was wheezing all day long with the breathing treatments better.  Wheezing.    [JR]      ED Course User Index  [JR] Nguyễn De La Paz DO            Clinical Impression:       ICD-10-CM ICD-9-CM   1. COPD with acute exacerbation  J44.1 491.21   2. Shortness of breath  R06.02 786.05                          ED Disposition Condition    Discharge Stable        ED Prescriptions     Medication Sig Dispense Start Date  End Date Auth. Provider    predniSONE (DELTASONE) 20 MG tablet Take 2 tablets (40 mg total) by mouth once daily. for 5 days 10 tablet 10/13/2020 10/18/2020 Nguyễn De La Paz DO        Follow-up Information     Follow up With Specialties Details Why Contact Info Additional Information    [facility] Emergency Medicine  If symptoms worsen [address] [address]  [phone] 1st floor    Michelle Matthew NP Family Medicine In 3 days  [address]  [address]  [place] [address]  [phone]                                          Nguyễn De La Paz DO  10/13/20 0187

## 2020-10-13 NOTE — ED TRIAGE NOTES
"Present to the ER with c/o " couldn't breath" since a couple of days, reports non productive cough x 2 days, denies fever/chills, Hx: COPD, pt is on 3LNC at home at all time, reports chest pain with coughing, reports chronic back pain , Hx: back surgeries, back fusion, neck surgeries   "

## 2020-10-15 ENCOUNTER — TELEPHONE (OUTPATIENT)
Dept: FAMILY MEDICINE | Facility: CLINIC | Age: 70
End: 2020-10-15

## 2020-10-15 ENCOUNTER — HOSPITAL ENCOUNTER (OUTPATIENT)
Facility: HOSPITAL | Age: 70
Discharge: HOME | End: 2020-10-17
Attending: EMERGENCY MEDICINE | Admitting: INTERNAL MEDICINE
Payer: MEDICARE

## 2020-10-15 DIAGNOSIS — J44.1 COPD EXACERBATION: Primary | ICD-10-CM

## 2020-10-15 DIAGNOSIS — R09.02 HYPOXIA: ICD-10-CM

## 2020-10-15 DIAGNOSIS — I10 ESSENTIAL HYPERTENSION: ICD-10-CM

## 2020-10-15 DIAGNOSIS — E11.00 TYPE 2 DIABETES MELLITUS WITH HYPEROSMOLARITY WITHOUT COMA, WITH LONG-TERM CURRENT USE OF INSULIN: ICD-10-CM

## 2020-10-15 DIAGNOSIS — Z79.4 TYPE 2 DIABETES MELLITUS WITH HYPEROSMOLARITY WITHOUT COMA, WITH LONG-TERM CURRENT USE OF INSULIN: ICD-10-CM

## 2020-10-15 DIAGNOSIS — R06.02 SHORTNESS OF BREATH: ICD-10-CM

## 2020-10-15 LAB
ALBUMIN SERPL BCP-MCNC: 3.7 G/DL (ref 3.5–5.2)
ALLENS TEST: ABNORMAL
ALP SERPL-CCNC: 58 U/L (ref 55–135)
ALT SERPL W/O P-5'-P-CCNC: 16 U/L (ref 10–44)
ANION GAP SERPL CALC-SCNC: 12 MMOL/L (ref 8–16)
AST SERPL-CCNC: 20 U/L (ref 10–40)
BACTERIA #/AREA URNS HPF: ABNORMAL /HPF
BASOPHILS # BLD AUTO: 0.01 K/UL (ref 0–0.2)
BASOPHILS NFR BLD: 0.1 % (ref 0–1.9)
BILIRUB SERPL-MCNC: 0.5 MG/DL (ref 0.1–1)
BILIRUB UR QL STRIP: NEGATIVE
BNP SERPL-MCNC: 33 PG/ML (ref 0–99)
BNP SERPL-MCNC: 33 PG/ML (ref 0–99)
BUN SERPL-MCNC: 13 MG/DL (ref 8–23)
CALCIUM SERPL-MCNC: 8.5 MG/DL (ref 8.7–10.5)
CHLORIDE SERPL-SCNC: 93 MMOL/L (ref 95–110)
CK MB SERPL-MCNC: 3.5 NG/ML (ref 0.1–6.5)
CK SERPL-CCNC: 90 U/L (ref 20–180)
CLARITY UR: ABNORMAL
CO2 SERPL-SCNC: 32 MMOL/L (ref 23–29)
COLOR UR: YELLOW
CREAT SERPL-MCNC: 0.7 MG/DL (ref 0.5–1.4)
DELSYS: ABNORMAL
DIFFERENTIAL METHOD: ABNORMAL
EOSINOPHIL # BLD AUTO: 0 K/UL (ref 0–0.5)
EOSINOPHIL NFR BLD: 0 % (ref 0–8)
ERYTHROCYTE [DISTWIDTH] IN BLOOD BY AUTOMATED COUNT: 13.9 % (ref 11.5–14.5)
EST. GFR  (AFRICAN AMERICAN): >60 ML/MIN/1.73 M^2
EST. GFR  (NON AFRICAN AMERICAN): >60 ML/MIN/1.73 M^2
FIO2: 36
FLOW: 4
GLUCOSE SERPL-MCNC: 156 MG/DL (ref 70–110)
GLUCOSE SERPL-MCNC: 222 MG/DL (ref 70–110)
GLUCOSE UR QL STRIP: ABNORMAL
HCO3 UR-SCNC: 35.1 MMOL/L (ref 24–28)
HCT VFR BLD AUTO: 36.7 % (ref 37–48.5)
HGB BLD-MCNC: 11.9 G/DL (ref 12–16)
HGB UR QL STRIP: NEGATIVE
HYALINE CASTS #/AREA URNS LPF: 0 /LPF
IMM GRANULOCYTES # BLD AUTO: 0.04 K/UL (ref 0–0.04)
IMM GRANULOCYTES NFR BLD AUTO: 0.6 % (ref 0–0.5)
INFLUENZA A, MOLECULAR: NEGATIVE
INFLUENZA B, MOLECULAR: NEGATIVE
KETONES UR QL STRIP: NEGATIVE
LACTATE SERPL-SCNC: 1.8 MMOL/L (ref 0.5–1.9)
LEUKOCYTE ESTERASE UR QL STRIP: ABNORMAL
LYMPHOCYTES # BLD AUTO: 1.2 K/UL (ref 1–4.8)
LYMPHOCYTES NFR BLD: 16.1 % (ref 18–48)
MAGNESIUM SERPL-MCNC: 1.6 MG/DL (ref 1.6–2.6)
MCH RBC QN AUTO: 29.6 PG (ref 27–31)
MCHC RBC AUTO-ENTMCNC: 32.4 G/DL (ref 32–36)
MCV RBC AUTO: 91 FL (ref 82–98)
MICROSCOPIC COMMENT: ABNORMAL
MODE: ABNORMAL
MONOCYTES # BLD AUTO: 0.2 K/UL (ref 0.3–1)
MONOCYTES NFR BLD: 2.3 % (ref 4–15)
NEUTROPHILS # BLD AUTO: 5.9 K/UL (ref 1.8–7.7)
NEUTROPHILS NFR BLD: 80.9 % (ref 38–73)
NITRITE UR QL STRIP: POSITIVE
NRBC BLD-RTO: 0 /100 WBC
PCO2 BLDA: 52.8 MMHG (ref 35–45)
PH SMN: 7.43 [PH] (ref 7.35–7.45)
PH UR STRIP: 7 [PH] (ref 5–8)
PLATELET # BLD AUTO: 374 K/UL (ref 150–350)
PMV BLD AUTO: 10.1 FL (ref 9.2–12.9)
PO2 BLDA: 154 MMHG (ref 80–100)
POC BE: 11 MMOL/L
POC PCO2 TEMP: 52.8 MMHG
POC PH TEMP: 7.43
POC PO2 TEMP: 154 MMHG
POC SATURATED O2: 99 % (ref 95–100)
POC TCO2: 37 MMOL/L (ref 23–27)
POC TEMPERATURE: ABNORMAL
POTASSIUM SERPL-SCNC: 3.9 MMOL/L (ref 3.5–5.1)
PROT SERPL-MCNC: 7.2 G/DL (ref 6–8.4)
PROT UR QL STRIP: NEGATIVE
RBC # BLD AUTO: 4.02 M/UL (ref 4–5.4)
RBC #/AREA URNS HPF: 11 /HPF (ref 0–4)
SAMPLE: ABNORMAL
SARS-COV-2 RDRP RESP QL NAA+PROBE: NEGATIVE
SITE: ABNORMAL
SODIUM SERPL-SCNC: 137 MMOL/L (ref 136–145)
SP GR UR STRIP: 1.01 (ref 1–1.03)
SPECIMEN SOURCE: NORMAL
SQUAMOUS #/AREA URNS HPF: 3 /HPF
TROPONIN I SERPL DL<=0.01 NG/ML-MCNC: <0.03 NG/ML
URN SPEC COLLECT METH UR: ABNORMAL
UROBILINOGEN UR STRIP-ACNC: NEGATIVE EU/DL
WBC # BLD AUTO: 7.25 K/UL (ref 3.9–12.7)
WBC #/AREA URNS HPF: 13 /HPF (ref 0–5)

## 2020-10-15 PROCEDURE — 99900035 HC TECH TIME PER 15 MIN (STAT)

## 2020-10-15 PROCEDURE — 93010 ELECTROCARDIOGRAM REPORT: CPT | Mod: ,,, | Performed by: INTERNAL MEDICINE

## 2020-10-15 PROCEDURE — 82553 CREATINE MB FRACTION: CPT

## 2020-10-15 PROCEDURE — 83605 ASSAY OF LACTIC ACID: CPT

## 2020-10-15 PROCEDURE — 96365 THER/PROPH/DIAG IV INF INIT: CPT

## 2020-10-15 PROCEDURE — 36600 WITHDRAWAL OF ARTERIAL BLOOD: CPT

## 2020-10-15 PROCEDURE — 27000221 HC OXYGEN, UP TO 24 HOURS

## 2020-10-15 PROCEDURE — 87040 BLOOD CULTURE FOR BACTERIA: CPT | Mod: 59

## 2020-10-15 PROCEDURE — 83880 ASSAY OF NATRIURETIC PEPTIDE: CPT

## 2020-10-15 PROCEDURE — 36415 COLL VENOUS BLD VENIPUNCTURE: CPT

## 2020-10-15 PROCEDURE — 93005 ELECTROCARDIOGRAM TRACING: CPT | Performed by: INTERNAL MEDICINE

## 2020-10-15 PROCEDURE — G0378 HOSPITAL OBSERVATION PER HR: HCPCS

## 2020-10-15 PROCEDURE — 63600175 PHARM REV CODE 636 W HCPCS: Performed by: INTERNAL MEDICINE

## 2020-10-15 PROCEDURE — C9399 UNCLASSIFIED DRUGS OR BIOLOG: HCPCS | Performed by: INTERNAL MEDICINE

## 2020-10-15 PROCEDURE — 96376 TX/PRO/DX INJ SAME DRUG ADON: CPT | Mod: 59

## 2020-10-15 PROCEDURE — 84484 ASSAY OF TROPONIN QUANT: CPT

## 2020-10-15 PROCEDURE — 96375 TX/PRO/DX INJ NEW DRUG ADDON: CPT

## 2020-10-15 PROCEDURE — 25000003 PHARM REV CODE 250: Performed by: INTERNAL MEDICINE

## 2020-10-15 PROCEDURE — 87086 URINE CULTURE/COLONY COUNT: CPT

## 2020-10-15 PROCEDURE — 96372 THER/PROPH/DIAG INJ SC/IM: CPT | Mod: 59

## 2020-10-15 PROCEDURE — 94644 CONT INHLJ TX 1ST HOUR: CPT

## 2020-10-15 PROCEDURE — 25000003 PHARM REV CODE 250: Performed by: EMERGENCY MEDICINE

## 2020-10-15 PROCEDURE — 99285 EMERGENCY DEPT VISIT HI MDM: CPT | Mod: 25,CS

## 2020-10-15 PROCEDURE — 80053 COMPREHEN METABOLIC PANEL: CPT

## 2020-10-15 PROCEDURE — 83735 ASSAY OF MAGNESIUM: CPT

## 2020-10-15 PROCEDURE — 81001 URINALYSIS AUTO W/SCOPE: CPT

## 2020-10-15 PROCEDURE — 87502 INFLUENZA DNA AMP PROBE: CPT

## 2020-10-15 PROCEDURE — 82550 ASSAY OF CK (CPK): CPT

## 2020-10-15 PROCEDURE — 63600175 PHARM REV CODE 636 W HCPCS: Performed by: EMERGENCY MEDICINE

## 2020-10-15 PROCEDURE — G0378 HOSPITAL OBSERVATION PER HR: HCPCS | Mod: CS

## 2020-10-15 PROCEDURE — U0002 COVID-19 LAB TEST NON-CDC: HCPCS

## 2020-10-15 PROCEDURE — 25000242 PHARM REV CODE 250 ALT 637 W/ HCPCS: Performed by: EMERGENCY MEDICINE

## 2020-10-15 PROCEDURE — 93010 EKG 12-LEAD: ICD-10-PCS | Mod: ,,, | Performed by: INTERNAL MEDICINE

## 2020-10-15 PROCEDURE — 94761 N-INVAS EAR/PLS OXIMETRY MLT: CPT

## 2020-10-15 PROCEDURE — 85025 COMPLETE CBC W/AUTO DIFF WBC: CPT

## 2020-10-15 PROCEDURE — 82803 BLOOD GASES ANY COMBINATION: CPT

## 2020-10-15 RX ORDER — IPRATROPIUM BROMIDE AND ALBUTEROL SULFATE 2.5; .5 MG/3ML; MG/3ML
3 SOLUTION RESPIRATORY (INHALATION)
Status: DISCONTINUED | OUTPATIENT
Start: 2020-10-15 | End: 2020-10-17 | Stop reason: HOSPADM

## 2020-10-15 RX ORDER — SODIUM CHLORIDE 0.9 % (FLUSH) 0.9 %
10 SYRINGE (ML) INJECTION
Status: DISCONTINUED | OUTPATIENT
Start: 2020-10-15 | End: 2020-10-17 | Stop reason: HOSPADM

## 2020-10-15 RX ORDER — ALBUTEROL SULFATE 90 UG/1
2 AEROSOL, METERED RESPIRATORY (INHALATION) EVERY 6 HOURS PRN
Status: DISCONTINUED | OUTPATIENT
Start: 2020-10-15 | End: 2020-10-17 | Stop reason: HOSPADM

## 2020-10-15 RX ORDER — INSULIN ASPART 100 [IU]/ML
0-5 INJECTION, SOLUTION INTRAVENOUS; SUBCUTANEOUS
Status: DISCONTINUED | OUTPATIENT
Start: 2020-10-15 | End: 2020-10-17 | Stop reason: HOSPADM

## 2020-10-15 RX ORDER — PANTOPRAZOLE SODIUM 40 MG/1
40 TABLET, DELAYED RELEASE ORAL DAILY
Status: DISCONTINUED | OUTPATIENT
Start: 2020-10-16 | End: 2020-10-17 | Stop reason: HOSPADM

## 2020-10-15 RX ORDER — IBUPROFEN 200 MG
16 TABLET ORAL
Status: DISCONTINUED | OUTPATIENT
Start: 2020-10-15 | End: 2020-10-17 | Stop reason: HOSPADM

## 2020-10-15 RX ORDER — IBUPROFEN 200 MG
24 TABLET ORAL
Status: DISCONTINUED | OUTPATIENT
Start: 2020-10-15 | End: 2020-10-17 | Stop reason: HOSPADM

## 2020-10-15 RX ORDER — TALC
9 POWDER (GRAM) TOPICAL NIGHTLY PRN
Status: DISCONTINUED | OUTPATIENT
Start: 2020-10-15 | End: 2020-10-17 | Stop reason: HOSPADM

## 2020-10-15 RX ORDER — AZITHROMYCIN 250 MG/1
250 TABLET, FILM COATED ORAL DAILY
Status: DISCONTINUED | OUTPATIENT
Start: 2020-10-16 | End: 2020-10-17 | Stop reason: HOSPADM

## 2020-10-15 RX ORDER — FLUOXETINE HYDROCHLORIDE 20 MG/1
40 CAPSULE ORAL DAILY
Status: DISCONTINUED | OUTPATIENT
Start: 2020-10-16 | End: 2020-10-17 | Stop reason: HOSPADM

## 2020-10-15 RX ORDER — LATANOPROST 50 UG/ML
1 SOLUTION/ DROPS OPHTHALMIC NIGHTLY
Status: DISCONTINUED | OUTPATIENT
Start: 2020-10-15 | End: 2020-10-17 | Stop reason: HOSPADM

## 2020-10-15 RX ORDER — ENOXAPARIN SODIUM 100 MG/ML
40 INJECTION SUBCUTANEOUS EVERY 24 HOURS
Status: DISCONTINUED | OUTPATIENT
Start: 2020-10-15 | End: 2020-10-15

## 2020-10-15 RX ORDER — GUAIFENESIN 600 MG/1
600 TABLET, EXTENDED RELEASE ORAL 2 TIMES DAILY
Status: DISCONTINUED | OUTPATIENT
Start: 2020-10-15 | End: 2020-10-17 | Stop reason: HOSPADM

## 2020-10-15 RX ORDER — POLYETHYLENE GLYCOL 3350 17 G/17G
17 POWDER, FOR SOLUTION ORAL 2 TIMES DAILY PRN
Status: DISCONTINUED | OUTPATIENT
Start: 2020-10-15 | End: 2020-10-17 | Stop reason: HOSPADM

## 2020-10-15 RX ORDER — FLUTICASONE PROPIONATE 50 MCG
1 SPRAY, SUSPENSION (ML) NASAL DAILY
Status: DISCONTINUED | OUTPATIENT
Start: 2020-10-16 | End: 2020-10-17 | Stop reason: HOSPADM

## 2020-10-15 RX ORDER — METHYLPREDNISOLONE SOD SUCC 125 MG
125 VIAL (EA) INJECTION
Status: COMPLETED | OUTPATIENT
Start: 2020-10-15 | End: 2020-10-15

## 2020-10-15 RX ORDER — QUETIAPINE FUMARATE 25 MG/1
50 TABLET, FILM COATED ORAL NIGHTLY
Status: DISCONTINUED | OUTPATIENT
Start: 2020-10-15 | End: 2020-10-17 | Stop reason: HOSPADM

## 2020-10-15 RX ORDER — GLUCAGON 1 MG
1 KIT INJECTION
Status: DISCONTINUED | OUTPATIENT
Start: 2020-10-15 | End: 2020-10-17 | Stop reason: HOSPADM

## 2020-10-15 RX ORDER — HYDROCHLOROTHIAZIDE 12.5 MG/1
12.5 TABLET ORAL DAILY
Status: DISCONTINUED | OUTPATIENT
Start: 2020-10-16 | End: 2020-10-17 | Stop reason: HOSPADM

## 2020-10-15 RX ORDER — ACETAMINOPHEN 325 MG/1
650 TABLET ORAL
Status: COMPLETED | OUTPATIENT
Start: 2020-10-15 | End: 2020-10-15

## 2020-10-15 RX ORDER — IPRATROPIUM BROMIDE 0.5 MG/2.5ML
1 SOLUTION RESPIRATORY (INHALATION)
Status: COMPLETED | OUTPATIENT
Start: 2020-10-15 | End: 2020-10-15

## 2020-10-15 RX ORDER — DULOXETIN HYDROCHLORIDE 20 MG/1
20 CAPSULE, DELAYED RELEASE ORAL DAILY
Status: DISCONTINUED | OUTPATIENT
Start: 2020-10-16 | End: 2020-10-17 | Stop reason: HOSPADM

## 2020-10-15 RX ORDER — METHOCARBAMOL 750 MG/1
750 TABLET, FILM COATED ORAL 2 TIMES DAILY PRN
Status: DISCONTINUED | OUTPATIENT
Start: 2020-10-15 | End: 2020-10-17 | Stop reason: HOSPADM

## 2020-10-15 RX ORDER — ACETAMINOPHEN 325 MG/1
650 TABLET ORAL EVERY 4 HOURS PRN
Status: DISCONTINUED | OUTPATIENT
Start: 2020-10-15 | End: 2020-10-17 | Stop reason: HOSPADM

## 2020-10-15 RX ORDER — LEVALBUTEROL 1.25 MG/.5ML
3.75 SOLUTION, CONCENTRATE RESPIRATORY (INHALATION)
Status: COMPLETED | OUTPATIENT
Start: 2020-10-15 | End: 2020-10-15

## 2020-10-15 RX ORDER — CALCITRIOL 0.25 UG/1
0.5 CAPSULE ORAL NIGHTLY
Status: DISCONTINUED | OUTPATIENT
Start: 2020-10-15 | End: 2020-10-17 | Stop reason: HOSPADM

## 2020-10-15 RX ORDER — ENOXAPARIN SODIUM 100 MG/ML
40 INJECTION SUBCUTANEOUS EVERY 24 HOURS
Status: DISCONTINUED | OUTPATIENT
Start: 2020-10-16 | End: 2020-10-17 | Stop reason: HOSPADM

## 2020-10-15 RX ORDER — BENZONATATE 100 MG/1
200 CAPSULE ORAL 3 TIMES DAILY PRN
Status: DISCONTINUED | OUTPATIENT
Start: 2020-10-15 | End: 2020-10-17 | Stop reason: HOSPADM

## 2020-10-15 RX ORDER — OXYCODONE HYDROCHLORIDE 5 MG/1
10 TABLET ORAL 3 TIMES DAILY
Status: DISCONTINUED | OUTPATIENT
Start: 2020-10-15 | End: 2020-10-17 | Stop reason: HOSPADM

## 2020-10-15 RX ORDER — FLUTICASONE FUROATE AND VILANTEROL 100; 25 UG/1; UG/1
1 POWDER RESPIRATORY (INHALATION) DAILY
Status: DISCONTINUED | OUTPATIENT
Start: 2020-10-16 | End: 2020-10-17 | Stop reason: HOSPADM

## 2020-10-15 RX ORDER — LOSARTAN POTASSIUM 50 MG/1
50 TABLET ORAL DAILY
Status: DISCONTINUED | OUTPATIENT
Start: 2020-10-16 | End: 2020-10-17 | Stop reason: HOSPADM

## 2020-10-15 RX ORDER — ROPINIROLE 2 MG/1
4 TABLET, FILM COATED ORAL 2 TIMES DAILY
Status: DISCONTINUED | OUTPATIENT
Start: 2020-10-15 | End: 2020-10-17 | Stop reason: HOSPADM

## 2020-10-15 RX ORDER — CLONAZEPAM 0.5 MG/1
0.5 TABLET ORAL 2 TIMES DAILY
Status: DISCONTINUED | OUTPATIENT
Start: 2020-10-15 | End: 2020-10-17 | Stop reason: HOSPADM

## 2020-10-15 RX ORDER — POTASSIUM CHLORIDE 750 MG/1
10 CAPSULE, EXTENDED RELEASE ORAL
Status: DISCONTINUED | OUTPATIENT
Start: 2020-10-16 | End: 2020-10-17 | Stop reason: HOSPADM

## 2020-10-15 RX ADMIN — LATANOPROST 1 DROP: 50 SOLUTION OPHTHALMIC at 10:10

## 2020-10-15 RX ADMIN — CLONAZEPAM 0.5 MG: 0.5 TABLET ORAL at 10:10

## 2020-10-15 RX ADMIN — METHYLPREDNISOLONE SODIUM SUCCINATE 80 MG: 40 INJECTION, POWDER, FOR SOLUTION INTRAMUSCULAR; INTRAVENOUS at 10:10

## 2020-10-15 RX ADMIN — OXYCODONE HYDROCHLORIDE 10 MG: 5 TABLET ORAL at 10:10

## 2020-10-15 RX ADMIN — ENOXAPARIN SODIUM 40 MG: 100 INJECTION SUBCUTANEOUS at 10:10

## 2020-10-15 RX ADMIN — LEVALBUTEROL HYDROCHLORIDE 3.75 MG: 1.25 SOLUTION, CONCENTRATE RESPIRATORY (INHALATION) at 05:10

## 2020-10-15 RX ADMIN — QUETIAPINE 50 MG: 25 TABLET ORAL at 10:10

## 2020-10-15 RX ADMIN — AZITHROMYCIN MONOHYDRATE 500 MG: 500 INJECTION, POWDER, LYOPHILIZED, FOR SOLUTION INTRAVENOUS at 07:10

## 2020-10-15 RX ADMIN — INSULIN DETEMIR 70 UNITS: 100 INJECTION, SOLUTION SUBCUTANEOUS at 10:10

## 2020-10-15 RX ADMIN — LAMOTRIGINE 150 MG: 100 TABLET ORAL at 10:10

## 2020-10-15 RX ADMIN — ROPINIROLE 4 MG: 2 TABLET, FILM COATED ORAL at 10:10

## 2020-10-15 RX ADMIN — GUAIFENESIN 600 MG: 600 TABLET, EXTENDED RELEASE ORAL at 10:10

## 2020-10-15 RX ADMIN — CEFTRIAXONE 2 G: 2 INJECTION, SOLUTION INTRAVENOUS at 06:10

## 2020-10-15 RX ADMIN — ACETAMINOPHEN 650 MG: 325 TABLET ORAL at 04:10

## 2020-10-15 RX ADMIN — GABAPENTIN 800 MG: 300 CAPSULE ORAL at 10:10

## 2020-10-15 RX ADMIN — METHYLPREDNISOLONE SODIUM SUCCINATE 125 MG: 125 INJECTION, POWDER, FOR SOLUTION INTRAMUSCULAR; INTRAVENOUS at 04:10

## 2020-10-15 RX ADMIN — CALCITRIOL CAPSULES 0.25 MCG 0.5 MCG: 0.25 CAPSULE ORAL at 10:10

## 2020-10-15 RX ADMIN — IPRATROPIUM BROMIDE 1 MG: 0.5 SOLUTION RESPIRATORY (INHALATION) at 05:10

## 2020-10-15 NOTE — TELEPHONE ENCOUNTER
----- Message from Ruby Ahn sent at 10/15/2020  9:40 AM CDT -----  Pt care giver called pt needs a HFU within 3 days of D/C on 10-   # [phone]

## 2020-10-15 NOTE — TELEPHONE ENCOUNTER
----- Message from Yee Chatterjee sent at 10/15/2020 11:35 AM CDT -----  Regarding: Needs HFU  Contact: Alanis Mendieta  Pt says that she needs a HFU and she still isn;t feeling well. She was told to follow up in 3 days. She was discharged from [facility] yesterday   Pt# [phone]

## 2020-10-15 NOTE — RESPIRATORY THERAPY
Results for KATY WOLFF (MRN [id number]) as of 10/15/2020 17:10   Ref. Range 10/15/2020 17:10   POC PH Latest Ref Range: 7.35 - 7.45  7.431   POC PCO2 Latest Ref Range: 35 - 45 mmHg 52.8 (H)   POC PO2 Latest Ref Range: 80 - 100 mmHg 154 (H)   POC BE Latest Ref Range: -2 to 2 mmol/L 11   POC HCO3 Latest Ref Range: 24 - 28 mmol/L 35.1 (H)   POC SATURATED O2 Latest Ref Range: 95 - 100 % 99   POC TCO2 Latest Ref Range: 23 - 27 mmol/L 37 (H)   FiO2 Unknown 36   Flow Unknown 4   Sample Unknown ARTERIAL   DelSys Unknown Nasal Can   Allens Test Unknown Pass   Site Unknown RR   Mode Unknown SPONT   POC pH Temp Unknown 7.431   POC pCO2 Temp Latest Units: mmHg 52.8   POC pO2 Temp Latest Units: mmHg 154   ABG RESULTS GIVEN TO DR. KIM. PT WEANED BACK TO 3L NC.

## 2020-10-15 NOTE — ED PROVIDER NOTES
Encounter Date: 10/15/2020       History     Chief Complaint   Patient presents with    Shortness of Breath     hx of copd on home oxygen at 3l    Chest Pain     Patient with a history of COPD.  Patient presents with her caregiver.  Patient with similar presentation approximately 3 days ago.  Patient reports shortness of breath at home.  Home O2 and nebulizer treatment not helping.  No fever or chills.  No chest pain.  No pleurisy hemoptysis.  This is similar to previous COPD exacerbation.  Symptoms are worsening despite aggressive treatment at home.        Review of patient's allergies indicates:   Allergen Reactions    Morphine Itching    Tubersol [tuberculin ppd] Other (See Comments)     Site swells bad. Has to have chest xray     Past Medical History:   Diagnosis Date    Allergy     Anxiety     Arthritis     Asthma     Bipolar disorder     Cancer     thyroid    Chronic back pain     COPD (chronic obstructive pulmonary disease)     Diabetes mellitus     Diabetes mellitus, type 2     Fibromyalgia     GERD (gastroesophageal reflux disease)     History of fall 4/26/2018    [place] (hard of hearing)     Hx of thyroid cancer     Hyperlipidemia     Hypertension     Hypothyroidism     Neuropathy     On home oxygen therapy     3 l/m 24/7    Restless leg syndrome     Sleep apnea     Urinary tract infection      Past Surgical History:   Procedure Laterality Date    APPENDECTOMY      BACK SURGERY      x 3    broken foot and ankle      CHOLECYSTECTOMY      EYE SURGERY Bilateral     cataract    HYSTERECTOMY      JOINT REPLACEMENT Left 09/17/2018    knee    KNEE ARTHROPLASTY Left 9/17/2018    Procedure: ARTHROPLASTY, KNEE;  Surgeon: Yariel Marin MD;  Location: [facility] OR;  Service: Orthopedics;  Laterality: Left;    MYELOGRAPHY N/A 8/7/2019    Procedure: MYELOGRAM;  Surgeon: Sun Diagnostic Provider;  Location: [facility] OR;  Service: General;  Laterality: N/A;    POSTERIOR REPAIR      SPINAL FUSION       x3 BACK, NECK X 4    TOTAL THYROIDECTOMY  2014     Family History   Problem Relation Age of Onset    Diabetes Mother     Heart disease Mother     Hypertension Mother     Diabetes Father     Heart disease Father     Hypertension Father      Social History     Tobacco Use    Smoking status: Former Smoker     Packs/day: 1.00     Years: 30.00     Pack years: 30.00     Types: Cigarettes     Quit date: 2000     Years since quittin.4    Smokeless tobacco: Never Used   Substance Use Topics    Alcohol use: No     Frequency: Never     Drinks per session: Patient refused     Binge frequency: Never    Drug use: No     Review of Systems   Constitutional: Negative for chills and fever.   HENT: Negative for congestion.    Eyes: Negative for visual disturbance.   Respiratory: Positive for cough and shortness of breath.    Cardiovascular: Negative for chest pain and palpitations.   Gastrointestinal: Negative for abdominal pain and vomiting.   Genitourinary: Negative for dysuria.   Musculoskeletal: Negative for joint swelling.   Neurological: Negative for headaches.   Psychiatric/Behavioral:        Patient at her baseline mental status per caregiver       Physical Exam     Initial Vitals [10/15/20 1503]   BP Pulse Resp Temp SpO2   (!) 126/58 100 (!) 22 100.3 °F (37.9 °C) 99 %      MAP       --         Physical Exam    Nursing note and vitals reviewed.  Constitutional: She is not diaphoretic. No distress.   HENT:   Head: Normocephalic and atraumatic.   Eyes: Conjunctivae are normal.   Neck: Normal range of motion.   Cardiovascular: Normal rate.   Pulmonary/Chest:   Moderate air movement with diffuse inspiratory expiratory wheezing   Abdominal: Soft. There is no abdominal tenderness.   Musculoskeletal: Normal range of motion.   Neurological: She is alert. She has normal strength. No cranial nerve deficit or sensory deficit.   No gross deficits   Skin: No rash noted.   Psychiatric:   Patient is pleasant but  somewhat anxious.  Poor historian.  Poor insight to her illness         ED Course   Procedures  Labs Reviewed   CBC W/ AUTO DIFFERENTIAL - Abnormal; Notable for the following components:       Result Value    Hemoglobin 11.9 (*)     Hematocrit 36.7 (*)     Platelets 374 (*)     Immature Granulocytes 0.6 (*)     Mono # 0.2 (*)     Gran% 80.9 (*)     Lymph% 16.1 (*)     Mono% 2.3 (*)     All other components within normal limits   COMPREHENSIVE METABOLIC PANEL - Abnormal; Notable for the following components:    Chloride 93 (*)     CO2 32 (*)     Glucose 222 (*)     Calcium 8.5 (*)     All other components within normal limits   ISTAT PROCEDURE - Abnormal; Notable for the following components:    POC PCO2 52.8 (*)     POC PO2 154 (*)     POC HCO3 35.1 (*)     POC TCO2 37 (*)     All other components within normal limits   CULTURE, BLOOD   CULTURE, BLOOD   LACTIC ACID, PLASMA   SARS-COV-2 RNA AMPLIFICATION, QUAL   B-TYPE NATRIURETIC PEPTIDE   INFLUENZA A AND B ANTIGEN    Narrative:     Specimen Source->Nasopharyngeal Swab   TROPONIN I   B-TYPE NATRIURETIC PEPTIDE   CK-MB   CK   MAGNESIUM   URINALYSIS, REFLEX TO URINE CULTURE        ECG Results          EKG 12-lead (In process)  Result time 10/15/20 15:39:40    In process by Interface, Lab In [facility] (10/15/20 15:39:40)                 Narrative:    Test Reason : R06.02,    Vent. Rate : 097 BPM     Atrial Rate : 097 BPM     P-R Int : 116 ms          QRS Dur : 076 ms      QT Int : 388 ms       P-R-T Axes : 047 -09 071 degrees     QTc Int : 492 ms    Sinus rhythm with occasional Premature ventricular complexes  Anterior infarct (cited on or before 13-OCT-2020)  Abnormal ECG  When compared with ECG of 13-OCT-2020 14:40,  Premature ventricular complexes are now Present    Referred By: [email or web address]   SELF           Confirmed By:                             Imaging Results          X-Ray Chest AP Portable (Final result)  Result time 10/15/20 15:54:46    Final result by Brianna  DONALDO Knight MD (10/15/20 15:54:46)                 Narrative:    Portable chest x-ray at 3:53 PM is compared to prior study dated  10/13/2020    Clinical history is shortness of breath, COPD    There is stable elevation the right hemidiaphragm. There are no  confluent infiltrates or pleural effusions. Cardiomediastinal  silhouette is normal in size. There are no acute osseous  abnormalities.    IMPRESSION: Stable elevation the right hemidiaphragm with no acute  pulmonary process    Electronically Signed by Brianna Knihgt M.D. on 10/15/2020 3:56 PM                               Medical Decision Making:   History:   Old Medical Records: I decided to obtain old medical records.  Clinical Tests:   Lab Tests: Reviewed  Radiological Study: Reviewed  Medical Tests: Reviewed  ED Management:  Patient presents with COPD exacerbation.  Patient was hypoxic at triage with low-grade fever 100.3° F. I suspect occult pneumonia given influenza and COVID are negative here.  Will begin broad-spectrum antibiotics after appropriate cultures.  Patient reexamined with improvement of air movement.  Given patient is obtaining maximal outpatient treatment and worsening with caregivers uncomfortable with outpatient treatment, hospitalist consulted for admission                             Clinical Impression:     ICD-10-CM ICD-9-CM   1. COPD exacerbation  J44.1 491.21   2. Shortness of breath  R06.02 786.05   3. Hypoxia  R09.02 799.02                          ED Disposition Condition    Admit                             Avery Bueno MD  10/15/20 1820       Avery Bueno MD  10/15/20 1828

## 2020-10-16 LAB
ALBUMIN SERPL BCP-MCNC: 3.5 G/DL (ref 3.5–5.2)
ALP SERPL-CCNC: 56 U/L (ref 55–135)
ALT SERPL W/O P-5'-P-CCNC: 16 U/L (ref 10–44)
ANION GAP SERPL CALC-SCNC: 11 MMOL/L (ref 8–16)
AST SERPL-CCNC: 19 U/L (ref 10–40)
BASOPHILS # BLD AUTO: 0.01 K/UL (ref 0–0.2)
BASOPHILS NFR BLD: 0.1 % (ref 0–1.9)
BILIRUB SERPL-MCNC: 0.5 MG/DL (ref 0.1–1)
BUN SERPL-MCNC: 15 MG/DL (ref 8–23)
CALCIUM SERPL-MCNC: 8.3 MG/DL (ref 8.7–10.5)
CHLORIDE SERPL-SCNC: 93 MMOL/L (ref 95–110)
CO2 SERPL-SCNC: 34 MMOL/L (ref 23–29)
CREAT SERPL-MCNC: 0.6 MG/DL (ref 0.5–1.4)
DIFFERENTIAL METHOD: ABNORMAL
EOSINOPHIL # BLD AUTO: 0 K/UL (ref 0–0.5)
EOSINOPHIL NFR BLD: 0 % (ref 0–8)
ERYTHROCYTE [DISTWIDTH] IN BLOOD BY AUTOMATED COUNT: 14 % (ref 11.5–14.5)
EST. GFR  (AFRICAN AMERICAN): >60 ML/MIN/1.73 M^2
EST. GFR  (NON AFRICAN AMERICAN): >60 ML/MIN/1.73 M^2
GLUCOSE SERPL-MCNC: 194 MG/DL (ref 70–110)
GLUCOSE SERPL-MCNC: 208 MG/DL (ref 70–110)
GLUCOSE SERPL-MCNC: 232 MG/DL (ref 70–110)
GLUCOSE SERPL-MCNC: 294 MG/DL (ref 70–110)
HCT VFR BLD AUTO: 37.7 % (ref 37–48.5)
HGB BLD-MCNC: 12 G/DL (ref 12–16)
IMM GRANULOCYTES # BLD AUTO: 0.05 K/UL (ref 0–0.04)
IMM GRANULOCYTES NFR BLD AUTO: 0.7 % (ref 0–0.5)
LYMPHOCYTES # BLD AUTO: 1.2 K/UL (ref 1–4.8)
LYMPHOCYTES NFR BLD: 16.4 % (ref 18–48)
MAGNESIUM SERPL-MCNC: 1.8 MG/DL (ref 1.6–2.6)
MCH RBC QN AUTO: 28.7 PG (ref 27–31)
MCHC RBC AUTO-ENTMCNC: 31.8 G/DL (ref 32–36)
MCV RBC AUTO: 90 FL (ref 82–98)
MONOCYTES # BLD AUTO: 0.1 K/UL (ref 0.3–1)
MONOCYTES NFR BLD: 1.2 % (ref 4–15)
NEUTROPHILS # BLD AUTO: 6.2 K/UL (ref 1.8–7.7)
NEUTROPHILS NFR BLD: 81.6 % (ref 38–73)
NRBC BLD-RTO: 0 /100 WBC
PLATELET # BLD AUTO: 342 K/UL (ref 150–350)
PMV BLD AUTO: 9.9 FL (ref 9.2–12.9)
POTASSIUM SERPL-SCNC: 3.9 MMOL/L (ref 3.5–5.1)
PROCALCITONIN SERPL IA-MCNC: <0.05 NG/ML (ref 0–0.5)
PROT SERPL-MCNC: 6.8 G/DL (ref 6–8.4)
RBC # BLD AUTO: 4.18 M/UL (ref 4–5.4)
SODIUM SERPL-SCNC: 138 MMOL/L (ref 136–145)
TSH SERPL DL<=0.005 MIU/L-ACNC: 0.45 UIU/ML (ref 0.34–5.6)
WBC # BLD AUTO: 7.54 K/UL (ref 3.9–12.7)

## 2020-10-16 PROCEDURE — 25000003 PHARM REV CODE 250: Performed by: INTERNAL MEDICINE

## 2020-10-16 PROCEDURE — 80053 COMPREHEN METABOLIC PANEL: CPT

## 2020-10-16 PROCEDURE — 94761 N-INVAS EAR/PLS OXIMETRY MLT: CPT

## 2020-10-16 PROCEDURE — 63600175 PHARM REV CODE 636 W HCPCS: Performed by: INTERNAL MEDICINE

## 2020-10-16 PROCEDURE — 84443 ASSAY THYROID STIM HORMONE: CPT

## 2020-10-16 PROCEDURE — 94640 AIRWAY INHALATION TREATMENT: CPT | Mod: 76

## 2020-10-16 PROCEDURE — 83735 ASSAY OF MAGNESIUM: CPT

## 2020-10-16 PROCEDURE — 27000221 HC OXYGEN, UP TO 24 HOURS

## 2020-10-16 PROCEDURE — 99900035 HC TECH TIME PER 15 MIN (STAT)

## 2020-10-16 PROCEDURE — 96376 TX/PRO/DX INJ SAME DRUG ADON: CPT

## 2020-10-16 PROCEDURE — G0378 HOSPITAL OBSERVATION PER HR: HCPCS | Mod: CS

## 2020-10-16 PROCEDURE — 36415 COLL VENOUS BLD VENIPUNCTURE: CPT

## 2020-10-16 PROCEDURE — 94640 AIRWAY INHALATION TREATMENT: CPT

## 2020-10-16 PROCEDURE — 85025 COMPLETE CBC W/AUTO DIFF WBC: CPT

## 2020-10-16 PROCEDURE — 96372 THER/PROPH/DIAG INJ SC/IM: CPT | Mod: 59

## 2020-10-16 PROCEDURE — 25000242 PHARM REV CODE 250 ALT 637 W/ HCPCS: Performed by: INTERNAL MEDICINE

## 2020-10-16 PROCEDURE — 25500020 PHARM REV CODE 255: Performed by: INTERNAL MEDICINE

## 2020-10-16 PROCEDURE — 63700000 PHARM REV CODE 250 ALT 637 W/O HCPCS: Performed by: INTERNAL MEDICINE

## 2020-10-16 PROCEDURE — 84145 PROCALCITONIN (PCT): CPT

## 2020-10-16 RX ADMIN — GUAIFENESIN 600 MG: 600 TABLET, EXTENDED RELEASE ORAL at 08:10

## 2020-10-16 RX ADMIN — GABAPENTIN 800 MG: 300 CAPSULE ORAL at 08:10

## 2020-10-16 RX ADMIN — FLUOXETINE 40 MG: 20 CAPSULE ORAL at 09:10

## 2020-10-16 RX ADMIN — OXYCODONE HYDROCHLORIDE 10 MG: 5 TABLET ORAL at 02:10

## 2020-10-16 RX ADMIN — METHYLPREDNISOLONE SODIUM SUCCINATE 80 MG: 40 INJECTION, POWDER, FOR SOLUTION INTRAMUSCULAR; INTRAVENOUS at 09:10

## 2020-10-16 RX ADMIN — CEFTRIAXONE 1 G: 1 INJECTION, SOLUTION INTRAVENOUS at 06:10

## 2020-10-16 RX ADMIN — ROPINIROLE 4 MG: 2 TABLET, FILM COATED ORAL at 08:10

## 2020-10-16 RX ADMIN — OXYCODONE HYDROCHLORIDE 10 MG: 5 TABLET ORAL at 09:10

## 2020-10-16 RX ADMIN — QUETIAPINE 50 MG: 25 TABLET ORAL at 08:10

## 2020-10-16 RX ADMIN — PANTOPRAZOLE SODIUM 40 MG: 40 TABLET, DELAYED RELEASE ORAL at 09:10

## 2020-10-16 RX ADMIN — IPRATROPIUM BROMIDE AND ALBUTEROL SULFATE 3 ML: .5; 3 SOLUTION RESPIRATORY (INHALATION) at 11:10

## 2020-10-16 RX ADMIN — INSULIN DETEMIR 70 UNITS: 100 INJECTION, SOLUTION SUBCUTANEOUS at 10:10

## 2020-10-16 RX ADMIN — CLONAZEPAM 0.5 MG: 0.5 TABLET ORAL at 09:10

## 2020-10-16 RX ADMIN — LOSARTAN POTASSIUM 50 MG: 50 TABLET, FILM COATED ORAL at 09:10

## 2020-10-16 RX ADMIN — HYDROCHLOROTHIAZIDE 12.5 MG: 12.5 TABLET ORAL at 09:10

## 2020-10-16 RX ADMIN — INSULIN ASPART 1 UNITS: 100 INJECTION, SOLUTION INTRAVENOUS; SUBCUTANEOUS at 10:10

## 2020-10-16 RX ADMIN — LAMOTRIGINE 150 MG: 100 TABLET ORAL at 09:10

## 2020-10-16 RX ADMIN — IOHEXOL 100 ML: 350 INJECTION, SOLUTION INTRAVENOUS at 12:10

## 2020-10-16 RX ADMIN — CLONAZEPAM 0.5 MG: 0.5 TABLET ORAL at 08:10

## 2020-10-16 RX ADMIN — LATANOPROST 1 DROP: 50 SOLUTION OPHTHALMIC at 10:10

## 2020-10-16 RX ADMIN — LEVOTHYROXINE SODIUM 150 MCG: 25 TABLET ORAL at 06:10

## 2020-10-16 RX ADMIN — AZITHROMYCIN 250 MG: 250 TABLET, FILM COATED ORAL at 09:10

## 2020-10-16 RX ADMIN — GUAIFENESIN 600 MG: 600 TABLET, EXTENDED RELEASE ORAL at 09:10

## 2020-10-16 RX ADMIN — FLUTICASONE PROPIONATE 50 MCG: 50 SPRAY, METERED NASAL at 09:10

## 2020-10-16 RX ADMIN — INSULIN DETEMIR 70 UNITS: 100 INJECTION, SOLUTION SUBCUTANEOUS at 09:10

## 2020-10-16 RX ADMIN — ENOXAPARIN SODIUM 40 MG: 100 INJECTION SUBCUTANEOUS at 04:10

## 2020-10-16 RX ADMIN — IPRATROPIUM BROMIDE AND ALBUTEROL SULFATE 3 ML: .5; 3 SOLUTION RESPIRATORY (INHALATION) at 08:10

## 2020-10-16 RX ADMIN — DULOXETINE 20 MG: 20 CAPSULE, DELAYED RELEASE ORAL at 09:10

## 2020-10-16 RX ADMIN — LAMOTRIGINE 150 MG: 100 TABLET ORAL at 08:10

## 2020-10-16 RX ADMIN — ROPINIROLE 4 MG: 2 TABLET, FILM COATED ORAL at 09:10

## 2020-10-16 RX ADMIN — CALCITRIOL CAPSULES 0.25 MCG 0.5 MCG: 0.25 CAPSULE ORAL at 08:10

## 2020-10-16 RX ADMIN — IPRATROPIUM BROMIDE AND ALBUTEROL SULFATE 3 ML: .5; 3 SOLUTION RESPIRATORY (INHALATION) at 07:10

## 2020-10-16 RX ADMIN — GABAPENTIN 800 MG: 300 CAPSULE ORAL at 09:10

## 2020-10-16 RX ADMIN — OXYCODONE HYDROCHLORIDE 10 MG: 5 TABLET ORAL at 08:10

## 2020-10-16 RX ADMIN — POTASSIUM CHLORIDE 10 MEQ: 750 CAPSULE, EXTENDED RELEASE ORAL at 04:10

## 2020-10-16 RX ADMIN — IPRATROPIUM BROMIDE AND ALBUTEROL SULFATE 3 ML: .5; 3 SOLUTION RESPIRATORY (INHALATION) at 03:10

## 2020-10-16 NOTE — PLAN OF CARE
10/16/20 0717   Patient Assessment/Suction   Level of Consciousness (AVPU) alert   Respiratory Effort Normal;Unlabored   Expansion/Accessory Muscles/Retractions expansion symmetric;no retractions;no use of accessory muscles   All Lung Fields Breath Sounds coarse   PRE-TX-O2   O2 Device (Oxygen Therapy) nasal cannula   $ Is the patient on Low Flow Oxygen? Yes   Flow (L/min) 3   SpO2 97 %   Pulse Oximetry Type Continuous   $ Pulse Oximetry - Multiple Charge Pulse Oximetry - Multiple   Pulse 80   Resp 20   Aerosol Therapy   $ Aerosol Therapy Charges Aerosol Treatment   Daily Review of Necessity (SVN) completed   Respiratory Treatment Status (SVN) given   Treatment Route (SVN) mask   Patient Position (SVN) HOB elevated   Post Treatment Assessment (SVN) patient reports breathing improved;increased aeration   Signs of Intolerance (SVN) none   Breath Sounds Post-Respiratory Treatment   Post-treatment Heart Rate (beats/min) 84   Post-treatment Resp Rate (breaths/min) 20

## 2020-10-16 NOTE — PLAN OF CARE
Pt lives alone but has personal care attendant 8 hrs a day.      RX= [facility]  PCP Delio Melendez       10/16/20 1114   Discharge Assessment   Assessment Type Discharge Planning Assessment   Confirmed/corrected address and phone number on facesheet? Yes   Assessment information obtained from? Patient;Medical Record   Communicated expected length of stay with patient/caregiver no   Prior to hospitilization cognitive status: Alert/Oriented   Prior to hospitalization functional status: Needs Assistance   Current cognitive status: Alert/Oriented   Current Functional Status: Needs Assistance   Facility Arrived From: home   Lives With alone;other (see comments)  (has personal care assistant 8 hrs/day)   Able to Return to Prior Arrangements yes   Is patient able to care for self after discharge? Unable to determine at this time (comments)   Who are your caregiver(s) and their phone number(s)? Kj Hester [phone]   Patient's perception of discharge disposition home or selfcare   Readmission Within the Last 30 Days no previous admission in last 30 days   Patient currently being followed by outpatient case management? No   Patient currently receives any other outside agency services? No   Equipment Currently Used at Home walker, rolling;cane, straight;nebulizer;oxygen;bedside commode;rollator   Do you have any problems affording any of your prescribed medications? No   Is the patient taking medications as prescribed? yes   Does the patient have transportation home? Yes   Transportation Anticipated family or friend will provide   Dialysis Name and Scheduled days n/a   Does the patient receive services at the [facility]? No   Discharge Plan A Home with family   Discharge Plan B Home with family;Home Health   DME Needed Upon Discharge  none   Patient/Family in Agreement with Plan unable to assess

## 2020-10-16 NOTE — HPI
69-year-old female with history of bipolar disorder, COPD and chronic hypoxemic respiratory failure on 3 L home oxygen, diabetes mellitus type 2, GERD, hypertension, hyperlipidemia, neuropathy, and anxiety, who presents the hospital with shortness of breath.  The patient actually presented to the emergency department about 3 days ago with similar symptoms for which she was discharged with prednisone for COPD exacerbation.  Over the last several days the patient reports that her shortness of breath has progressively worsened.  Her shortness of breath is now severe in intensity and constant in timing.  Shortness of breath is worse with exertion.  She has associated wheezing and dry cough.  No sputum production or hemoptysis.  No fever, chills, or chest pain.  No pleurisy.  The patient reports that at home despite oxygen and nebulizers she got no relief.  Based on her symptoms she presented to the emergency room for evaluation. She denies abdominal pain, nausea, vomiting, diarrhea, or bleeding.  No headache, focal weakness, or syncope.  She reports some foul odor to urine recently.    In the emergency department COVID and influenza testing were negative.  Urinalysis abnormal.  Chest x-ray without infiltrate.  The patient will be admitted for COPD exacerbation treatment.

## 2020-10-16 NOTE — PROGRESS NOTES
"[facility]  Progress Note    Patient Name: Alanis Mendieta  MRN: [id number]  Patient Class: OP- Observation   Admission Date: 10/15/2020  Length of Stay: 0 days  Attending Physician: Todd Rivera MD  Primary Care Provider: Michelle Matthew NP        Subjective:     Principal Problem:COPD exacerbation        HPI:  69-year-old female with history of bipolar disorder, COPD and chronic hypoxemic respiratory failure on 3 L home oxygen, diabetes mellitus type 2, GERD, hypertension, hyperlipidemia, neuropathy, and anxiety, who presents the hospital with shortness of breath.  The patient actually presented to the emergency department about 3 days ago with similar symptoms for which she was discharged with prednisone for COPD exacerbation.  Over the last several days the patient reports that her shortness of breath has progressively worsened.  Her shortness of breath is now severe in intensity and constant in timing.  Shortness of breath is worse with exertion.  She has associated wheezing and dry cough.  No sputum production or hemoptysis.  No fever, chills, or chest pain.  No pleurisy.  The patient reports that at home despite oxygen and nebulizers she got no relief.  Based on her symptoms she presented to the emergency room for evaluation. She denies abdominal pain, nausea, vomiting, diarrhea, or bleeding.  No headache, focal weakness, or syncope.  She reports some foul odor to urine recently.    In the emergency department COVID and influenza testing were negative.  Urinalysis abnormal.  Chest x-ray without infiltrate.  The patient will be admitted for COPD exacerbation treatment.    Overview/Hospital Course:  10/16  Assumed care. Chart and labs reviewed: CBC and BMP are normal with expected CO2 retention. CTA Chest: no PE. Patient feels "A little better, maybe"    Interval History: wheeze    Review of Systems   Constitutional: Positive for fatigue.   HENT: Negative.    Eyes: " Negative.    Respiratory: Positive for shortness of breath and wheezing.    Cardiovascular: Negative.    Gastrointestinal: Negative.    Endocrine: Negative.    Genitourinary: Negative.    Musculoskeletal: Negative.    Skin: Negative.    Allergic/Immunologic: Negative.    Neurological: Negative.    Hematological: Negative.    All other systems reviewed and are negative.    Objective:     Vital Signs (Most Recent):  Temp: 98.4 °F (36.9 °C) (10/16/20 0710)  Pulse: 83 (10/16/20 1153)  Resp: 17 (10/16/20 1153)  BP: 119/69 (10/16/20 1153)  SpO2: 100 % (10/16/20 1153) Vital Signs (24h Range):  Temp:  [98.2 °F (36.8 °C)-100.3 °F (37.9 °C)] 98.4 °F (36.9 °C)  Pulse:  [] 83  Resp:  [16-22] 17  SpO2:  [95 %-100 %] 100 %  BP: (119-185)/(58-84) 119/69     Weight: 99.5 kg (219 lb 5.7 oz)  Body mass index is 37.65 kg/m².    Intake/Output Summary (Last 24 hours) at 10/16/2020 1352  Last data filed at 10/15/2020 1948  Gross per 24 hour   Intake 50 ml   Output --   Net 50 ml      Physical Exam  Vitals signs and nursing note reviewed.   Constitutional:       Appearance: She is well-developed.   HENT:      Head: Normocephalic and atraumatic.      Right Ear: External ear normal.      Left Ear: External ear normal.      Nose: Nose normal.   Eyes:      Conjunctiva/sclera: Conjunctivae normal.      Pupils: Pupils are equal, round, and reactive to light.   Neck:      Musculoskeletal: Normal range of motion and neck supple.   Cardiovascular:      Rate and Rhythm: Normal rate and regular rhythm.      Heart sounds: Normal heart sounds.   Pulmonary:      Comments: Very decreased entry bases with expiratory wheezes, no large airway sounds, no opening crepitations  Abdominal:      General: Bowel sounds are normal.      Palpations: Abdomen is soft.   Musculoskeletal: Normal range of motion.   Skin:     General: Skin is warm and dry.      Capillary Refill: Capillary refill takes less than 2 seconds.   Neurological:      Mental Status: She is  alert and oriented to person, place, and time.   Psychiatric:         Behavior: Behavior normal.         Thought Content: Thought content normal.         Judgment: Judgment normal.         Significant Labs:   BMP:   Recent Labs   Lab 10/16/20  0420   *      K 3.9   CL 93*   CO2 34*   BUN 15   CREATININE 0.6   CALCIUM 8.3*   MG 1.8     CBC:   Recent Labs   Lab 10/15/20  1526 10/16/20  0420   WBC 7.25 7.54   HGB 11.9* 12.0   HCT 36.7* 37.7   * 342       Significant Imaging: I have reviewed and interpreted all pertinent imaging results/findings within the past 24 hours.      Assessment/Plan:      * COPD exacerbation  Nebs, steroids, supplemental oxygen        VTE Risk Mitigation (From admission, onward)         Ordered     enoxaparin injection 40 mg  Every 24 hours      10/15/20 2309     IP VTE HIGH RISK PATIENT  Once      10/15/20 2030     Place sequential compression device  Until discontinued      10/15/20 2030                Discharge Planning   KELSEY:      Code Status: Full Code   Is the patient medically ready for discharge?:     Reason for patient still in hospital (select all that apply): Treatment  Discharge Plan A: Home with family                  Todd Rivera MD  Department of Hospital Medicine   [facility]

## 2020-10-16 NOTE — SUBJECTIVE & OBJECTIVE
Past Medical History:   Diagnosis Date    Allergy     Anxiety     Arthritis     Asthma     Bipolar disorder     Cancer     thyroid    Chronic back pain     COPD (chronic obstructive pulmonary disease)     Diabetes mellitus     Diabetes mellitus, type 2     Fibromyalgia     GERD (gastroesophageal reflux disease)     History of fall 4/26/2018    Lime (hard of hearing)     Hx of thyroid cancer     Hyperlipidemia     Hypertension     Hypothyroidism     Neuropathy     On home oxygen therapy     3 l/m 24/7    Restless leg syndrome     Sleep apnea     Urinary tract infection        Past Surgical History:   Procedure Laterality Date    APPENDECTOMY      BACK SURGERY      x 3    broken foot and ankle      CHOLECYSTECTOMY      EYE SURGERY Bilateral     cataract    HYSTERECTOMY      JOINT REPLACEMENT Left 09/17/2018    knee    KNEE ARTHROPLASTY Left 9/17/2018    Procedure: ARTHROPLASTY, KNEE;  Surgeon: Yariel Marin MD;  Location: [facility] OR;  Service: Orthopedics;  Laterality: Left;    MYELOGRAPHY N/A 8/7/2019    Procedure: MYELOGRAM;  Surgeon: [facility] Diagnostic Provider;  Location: [facility] OR;  Service: General;  Laterality: N/A;    POSTERIOR REPAIR      SPINAL FUSION      x3 BACK, NECK X 4    TOTAL THYROIDECTOMY  2014       Review of patient's allergies indicates:   Allergen Reactions    Morphine Itching    Tubersol [tuberculin ppd] Other (See Comments)     Site swells bad. Has to have chest xray       No current facility-administered medications on file prior to encounter.      Current Outpatient Medications on File Prior to Encounter   Medication Sig    albuterol-ipratropium (DUO-NEB) 2.5 mg-0.5 mg/3 mL nebulizer solution Take 3 mLs by nebulization every 6 (six) hours as needed for Wheezing or Shortness of Breath. Rescue    ARTIFICIAL TEARS,HYPROMELLOSE, OPHT Place 1 drop into both eyes 3 (three) times daily.    calcitRIOL (ROCALTROL) 0.5 MCG Cap Take 1 capsule (0.5 mcg total) by mouth every  evening.    clonazePAM (KLONOPIN) 0.5 MG tablet Take 1 tablet (0.5 mg total) by mouth 2 (two) times daily.    doxepin (ZONALON) 5 % cream Apply 1 application topically 3 (three) times daily as needed.    DULoxetine (CYMBALTA) 20 MG capsule Take 20 mg by mouth once daily.     FLUoxetine 40 MG capsule Take 1 capsule (40 mg total) by mouth once daily.    fluticasone propionate (FLONASE) 50 mcg/actuation nasal spray 1 spray by Each Nostril route once daily.    fluticasone-umeclidin-vilanter (TRELEGY ELLIPTA) 100-62.5-25 mcg DsDv Inhale 1 puff into the lungs once daily.    gabapentin (NEURONTIN) 800 MG tablet Take 1 tablet (800 mg total) by mouth 2 (two) times daily.    insulin aspart U-100 (NOVOLOG) 100 unit/mL injection Inject 2-10 Units into the skin 4 (four) times daily with meals and nightly. Sliding scale 151-400    lamoTRIgine (LAMICTAL) 150 MG Tab Take 1 tablet (150 mg total) by mouth 2 (two) times daily.    latanoprost 0.005 % ophthalmic solution Place 1 drop into both eyes every evening.     LEVEMIR FLEXTOUCH U-100 INSULN 100 unit/mL (3 mL) InPn pen Inject 65 Units into the skin 2 (two) times daily. (Patient taking differently: Inject 70 Units into the skin 2 (two) times daily. )    levothyroxine (SYNTHROID) 150 MCG tablet Take 1 tablet (150 mcg total) by mouth before breakfast.    linaCLOtide (LINZESS) 290 mcg Cap capsule Take 1 capsule (290 mcg total) by mouth once daily.    methocarbamoL (ROBAXIN) 750 MG Tab Take 1 tablet (750 mg total) by mouth 2 (two) times daily as needed.    oxyCODONE (ROXICODONE) 10 mg Tab immediate release tablet Take 10 mg by mouth 3 (three) times daily.     pantoprazole (PROTONIX) 40 MG tablet Take 1 tablet (40 mg total) by mouth once daily.    potassium chloride SA (K-DUR,KLOR-CON) 10 MEQ tablet Take 1 tablet (10 mEq total) by mouth 3 (three) times a week. Mon , Wed, and Fri. (Patient taking differently: Take 10 mEq by mouth every Mon, Wed, Fri. Mon , Wed, and Fri.)  "   QUEtiapine (SEROQUEL) 50 MG tablet Take 50 mg by mouth every evening.     RELISTOR 150 mg Tab Take 150 mg by mouth once daily.    rOPINIRole (REQUIP) 4 MG tablet Take 1 tablet (4 mg total) by mouth 2 (two) times daily.    temazepam (RESTORIL) 15 mg Cap Take 15 mg by mouth every evening.     ACCU-CHEK LIBERTY PLUS TEST STRP Strp     albuterol (VENTOLIN HFA) 90 mcg/actuation inhaler Inhale 2 puffs into the lungs every 6 (six) hours as needed for Wheezing. Rescue    ibuprofen (ADVIL,MOTRIN) 400 MG tablet Take 400 mg by mouth every 8 (eight) hours as needed for Other.    losartan-hydrochlorothiazide 50-12.5 mg (HYZAAR) 50-12.5 mg per tablet Take 0.5 tablets by mouth once daily.    NOVOFINE AUTOCOVER 30 gauge x 1/3" Ndle 1 each by In Vitro route 4 (four) times daily.     Family History     Problem Relation (Age of Onset)    Diabetes Mother, Father    Heart disease Mother, Father    Hypertension Mother, Father        Tobacco Use    Smoking status: Former Smoker     Packs/day: 1.00     Years: 30.00     Pack years: 30.00     Types: Cigarettes     Quit date: 2000     Years since quittin.4    Smokeless tobacco: Never Used   Substance and Sexual Activity    Alcohol use: No     Frequency: Never     Drinks per session: Patient refused     Binge frequency: Never    Drug use: No    Sexual activity: Never     Review of Systems complete 10 point review of systems negative except as per HPI  Objective:     Vital Signs (Most Recent):  Temp: 98.3 °F (36.8 °C) (10/16/20 040)  Pulse: 87 (10/16/20 0406)  Resp: 16 (10/16/20 0406)  BP: 119/61 (10/16/20 0406)  SpO2: 100 % (10/16/20 0406) Vital Signs (24h Range):  Temp:  [98.2 °F (36.8 °C)-100.3 °F (37.9 °C)] 98.3 °F (36.8 °C)  Pulse:  [] 87  Resp:  [16-22] 16  SpO2:  [95 %-100 %] 100 %  BP: (119-185)/(58-84) 119/61     Weight: 99.5 kg (219 lb 5.7 oz)  Body mass index is 37.65 kg/m².    Physical Exam  General:  Comfortable appearing, nontoxic, no apparent " distress  Head and eyes:  Anicteric sclerae, no conjunctival discharge, PERRLA  ENT:  Moist mucous membranes, no thrush  Cardiovascular:  2+ radial pulses, regular rate and rhythm, trace pedal edema  GI:  Abdomen soft and nontender, nondistended, obese  Pulmonary:  Increased work of breathing with difficulty speaking in complete sentences, diminished breath sounds diffusely with scattered expiratory wheezes, no crackles  Skin:  Dry and warm no jaundice  Psych:  Mood is calm, affect normal, insight fair, in good spirits  Neuro:  Nonfocal motor exam, fluent speech, alert and oriented     Significant Labs:   BMP:   Recent Labs   Lab 10/16/20  0420   *      K 3.9   CL 93*   CO2 34*   BUN 15   CREATININE 0.6   CALCIUM 8.3*   MG 1.8     CBC:   Recent Labs   Lab 10/15/20  1526 10/16/20  0420   WBC 7.25 7.54   HGB 11.9* 12.0   HCT 36.7* 37.7   * 342     CMP:   Recent Labs   Lab 10/15/20  1526 10/16/20  0420    138   K 3.9 3.9   CL 93* 93*   CO2 32* 34*   * 232*   BUN 13 15   CREATININE 0.7 0.6   CALCIUM 8.5* 8.3*   PROT 7.2 6.8   ALBUMIN 3.7 3.5   BILITOT 0.5 0.5   ALKPHOS 58 56   AST 20 19   ALT 16 16   ANIONGAP 12 11   EGFRNONAA >60.0 >60.0     Cardiac Markers:   Recent Labs   Lab 10/15/20  1526   BNP 33  33     All pertinent labs within the past 24 hours have been reviewed.    Chest x-ray personally reviewed:  Elevation of the right hemidiaphragm, otherwise lung fields are clear without any focal consolidation or pulmonary edema    EKG: Normal sinus rhythm, PVC, no ST elevation

## 2020-10-16 NOTE — ED NOTES
Pt presents to the ED with c/o SOB x 1 day. Pt states she has had to increase her home o2 over the last day. Pt states she has a history of COPD. Pt denies cp, headache, abdominal pain, n/v, bladder or bowel issues at this time.

## 2020-10-16 NOTE — H&P
[facility]  History & Physical    Patient Name: Alanis Mendieta  MRN: [id number]  Admission Date: 10/15/2020  Attending Physician: Felipe Mcfadden MD   Primary Care Provider: Michelle Matthew NP  Date of service:  Late entry-the patient was personally seen and examined at 11:50p.m. on 10/15/2020     Patient information was obtained from patient and ER records.     Subjective:     Principal Problem:COPD exacerbation    Chief Complaint:   Chief Complaint   Patient presents with    Shortness of Breath     hx of copd on home oxygen at 3l    Chest Pain        HPI: 69-year-old female with history of bipolar disorder, COPD and chronic hypoxemic respiratory failure on 3 L home oxygen, diabetes mellitus type 2, GERD, hypertension, hyperlipidemia, neuropathy, and anxiety, who presents the hospital with shortness of breath.  The patient actually presented to the emergency department about 3 days ago with similar symptoms for which she was discharged with prednisone for COPD exacerbation.  Over the last several days the patient reports that her shortness of breath has progressively worsened.  Her shortness of breath is now severe in intensity and constant in timing.  The shortness of breath is worse with exertion.  She has associated wheezing and dry cough.  No sputum production or hemoptysis.  No fever, chills, or chest pain.  No pleurisy.  The patient reports that at home despite oxygen and nebulizers she got no relief.  Based on her symptoms she presented to the emergency room for evaluation. She denies abdominal pain, nausea, vomiting, diarrhea, or bleeding.  No headache, focal weakness, or syncope.  She reports some foul odor to urine recently.  In addition the patient had a low-grade fever on arrival to the emergency department which is new.    In the emergency department COVID and influenza testing were negative.  Urinalysis abnormal.  Chest x-ray without infiltrate.  The  patient will be admitted for COPD exacerbation treatment.    Past Medical History:   Diagnosis Date    Allergy     Anxiety     Arthritis     Asthma     Bipolar disorder     Cancer     thyroid    Chronic back pain     COPD (chronic obstructive pulmonary disease)     Diabetes mellitus     Diabetes mellitus, type 2     Fibromyalgia     GERD (gastroesophageal reflux disease)     History of fall 4/26/2018    [place] (hard of hearing)     Hx of thyroid cancer     Hyperlipidemia     Hypertension     Hypothyroidism     Neuropathy     On home oxygen therapy     3 l/m 24/7    Restless leg syndrome     Sleep apnea     Urinary tract infection        Past Surgical History:   Procedure Laterality Date    APPENDECTOMY      BACK SURGERY      x 3    broken foot and ankle      CHOLECYSTECTOMY      EYE SURGERY Bilateral     cataract    HYSTERECTOMY      JOINT REPLACEMENT Left 09/17/2018    knee    KNEE ARTHROPLASTY Left 9/17/2018    Procedure: ARTHROPLASTY, KNEE;  Surgeon: Yariel Marin MD;  Location: [facility] OR;  Service: Orthopedics;  Laterality: Left;    MYELOGRAPHY N/A 8/7/2019    Procedure: MYELOGRAM;  Surgeon: [facility] Provider;  Location: [facility] OR;  Service: General;  Laterality: N/A;    POSTERIOR REPAIR      SPINAL FUSION      x3 BACK, NECK X 4    TOTAL THYROIDECTOMY  2014       Review of patient's allergies indicates:   Allergen Reactions    Morphine Itching    Tubersol [tuberculin ppd] Other (See Comments)     Site swells bad. Has to have chest xray       No current facility-administered medications on file prior to encounter.      Current Outpatient Medications on File Prior to Encounter   Medication Sig    albuterol-ipratropium (DUO-NEB) 2.5 mg-0.5 mg/3 mL nebulizer solution Take 3 mLs by nebulization every 6 (six) hours as needed for Wheezing or Shortness of Breath. Rescue    ARTIFICIAL TEARS,HYPROMELLOSE, OPHT Place 1 drop into both eyes 3 (three) times daily.    calcitRIOL  (ROCALTROL) 0.5 MCG Cap Take 1 capsule (0.5 mcg total) by mouth every evening.    clonazePAM (KLONOPIN) 0.5 MG tablet Take 1 tablet (0.5 mg total) by mouth 2 (two) times daily.    doxepin (ZONALON) 5 % cream Apply 1 application topically 3 (three) times daily as needed.    DULoxetine (CYMBALTA) 20 MG capsule Take 20 mg by mouth once daily.     FLUoxetine 40 MG capsule Take 1 capsule (40 mg total) by mouth once daily.    fluticasone propionate (FLONASE) 50 mcg/actuation nasal spray 1 spray by Each Nostril route once daily.    fluticasone-umeclidin-vilanter (TRELEGY ELLIPTA) 100-62.5-25 mcg DsDv Inhale 1 puff into the lungs once daily.    gabapentin (NEURONTIN) 800 MG tablet Take 1 tablet (800 mg total) by mouth 2 (two) times daily.    insulin aspart U-100 (NOVOLOG) 100 unit/mL injection Inject 2-10 Units into the skin 4 (four) times daily with meals and nightly. Sliding scale 151-400    lamoTRIgine (LAMICTAL) 150 MG Tab Take 1 tablet (150 mg total) by mouth 2 (two) times daily.    latanoprost 0.005 % ophthalmic solution Place 1 drop into both eyes every evening.     LEVEMIR FLEXTOUCH U-100 INSULN 100 unit/mL (3 mL) InPn pen Inject 65 Units into the skin 2 (two) times daily. (Patient taking differently: Inject 70 Units into the skin 2 (two) times daily. )    levothyroxine (SYNTHROID) 150 MCG tablet Take 1 tablet (150 mcg total) by mouth before breakfast.    linaCLOtide (LINZESS) 290 mcg Cap capsule Take 1 capsule (290 mcg total) by mouth once daily.    methocarbamoL (ROBAXIN) 750 MG Tab Take 1 tablet (750 mg total) by mouth 2 (two) times daily as needed.    oxyCODONE (ROXICODONE) 10 mg Tab immediate release tablet Take 10 mg by mouth 3 (three) times daily.     pantoprazole (PROTONIX) 40 MG tablet Take 1 tablet (40 mg total) by mouth once daily.    potassium chloride SA (K-DUR,KLOR-CON) 10 MEQ tablet Take 1 tablet (10 mEq total) by mouth 3 (three) times a week. Mon , Wed, and Fri. (Patient taking  "differently: Take 10 mEq by mouth every Mon, Wed, Fri. Mon , Wed, and Fri.)    QUEtiapine (SEROQUEL) 50 MG tablet Take 50 mg by mouth every evening.     RELISTOR 150 mg Tab Take 150 mg by mouth once daily.    rOPINIRole (REQUIP) 4 MG tablet Take 1 tablet (4 mg total) by mouth 2 (two) times daily.    temazepam (RESTORIL) 15 mg Cap Take 15 mg by mouth every evening.     ACCU-CHEK LIBERTY PLUS TEST STRP Strp     albuterol (VENTOLIN HFA) 90 mcg/actuation inhaler Inhale 2 puffs into the lungs every 6 (six) hours as needed for Wheezing. Rescue    ibuprofen (ADVIL,MOTRIN) 400 MG tablet Take 400 mg by mouth every 8 (eight) hours as needed for Other.    losartan-hydrochlorothiazide 50-12.5 mg (HYZAAR) 50-12.5 mg per tablet Take 0.5 tablets by mouth once daily.    NOVOFINE AUTOCOVER 30 gauge x 1/3" Ndle 1 each by In Vitro route 4 (four) times daily.     Family History     Problem Relation (Age of Onset)    Diabetes Mother, Father    Heart disease Mother, Father    Hypertension Mother, Father        Tobacco Use    Smoking status: Former Smoker     Packs/day: 1.00     Years: 30.00     Pack years: 30.00     Types: Cigarettes     Quit date: 2000     Years since quittin.4    Smokeless tobacco: Never Used   Substance and Sexual Activity    Alcohol use: No     Frequency: Never     Drinks per session: Patient refused     Binge frequency: Never    Drug use: No    Sexual activity: Never     Review of Systems complete 10 point review of systems negative except as per HPI  Objective:     Vital Signs (Most Recent):  Temp: 98.3 °F (36.8 °C) (10/16/20 0406)  Pulse: 87 (10/16/20 0406)  Resp: 16 (10/16/20 0406)  BP: 119/61 (10/16/20 0406)  SpO2: 100 % (10/16/20 0406) Vital Signs (24h Range):  Temp:  [98.2 °F (36.8 °C)-100.3 °F (37.9 °C)] 98.3 °F (36.8 °C)  Pulse:  [] 87  Resp:  [16-22] 16  SpO2:  [95 %-100 %] 100 %  BP: (119-185)/(58-84) 119/61     Weight: 99.5 kg (219 lb 5.7 oz)  Body mass index is 37.65 " kg/m².    Physical Exam  General:  Comfortable appearing, nontoxic, no apparent distress  Head and eyes:  Anicteric sclerae, no conjunctival discharge, PERRLA  ENT:  Moist mucous membranes, no thrush  Cardiovascular:  2+ radial pulses, regular rate and rhythm, trace pedal edema  GI:  Abdomen soft and nontender, nondistended, obese  Pulmonary:  Increased work of breathing with difficulty speaking in complete sentences, diminished breath sounds diffusely with scattered expiratory wheezes, no crackles  Skin:  Dry and warm no jaundice  Psych:  Mood is calm, affect normal, insight fair, in good spirits  Neuro:  Nonfocal motor exam, fluent speech, alert and oriented     Significant Labs:   BMP:   Recent Labs   Lab 10/16/20  0420   *      K 3.9   CL 93*   CO2 34*   BUN 15   CREATININE 0.6   CALCIUM 8.3*   MG 1.8     CBC:   Recent Labs   Lab 10/15/20  1526 10/16/20  0420   WBC 7.25 7.54   HGB 11.9* 12.0   HCT 36.7* 37.7   * 342     CMP:   Recent Labs   Lab 10/15/20  1526 10/16/20  0420    138   K 3.9 3.9   CL 93* 93*   CO2 32* 34*   * 232*   BUN 13 15   CREATININE 0.7 0.6   CALCIUM 8.5* 8.3*   PROT 7.2 6.8   ALBUMIN 3.7 3.5   BILITOT 0.5 0.5   ALKPHOS 58 56   AST 20 19   ALT 16 16   ANIONGAP 12 11   EGFRNONAA >60.0 >60.0     Cardiac Markers:   Recent Labs   Lab 10/15/20  1526   BNP 33  33     All pertinent labs within the past 24 hours have been reviewed.    Chest x-ray personally reviewed:  Elevation of the right hemidiaphragm, otherwise lung fields are clear without any focal consolidation or pulmonary edema    EKG: Normal sinus rhythm, PVC, no ST elevation    Assessment/Plan:     Assessment:  Acute COPD exacerbation  Elevated D-dimer  Acute cystitis/UTI  Hypomagnesemia  Bipolar disorder  COPD with chronic hypoxemic respiratory failure on 3 L nasal cannula oxygen  Chronic lower back pain  Diabetes mellitus type 2  Hypertension  Hyperlipidemia  Diabetic peripheral neuropathy  Obstructive  sleep apnea  Restless leg syndrome  Fibromyalgia  GERD  Osteoarthritis  Generalized anxiety    Plan:  Observation on Med surg.  Suspect low-grade fever secondary to underlying UTI.  No obvious signs of pulmonary infection.  IV Solu-Medrol  Serial bronchodilators  Mucolytic and antitussive as needed  Empiric antibiotics for respiratory coverage including Rocephin and azithromycin (also covers urine)  Follow-up all culture data.  Check procalcitonin  IV magnesium replacement.  Serial labs with electrolyte replacement as needed  Elevated D-dimer on prior lab workup.  Consider CTA chest rule out PE.  Continue medications for chronic issues as able  Sliding scale insulin for euglycemia  GI prophylaxis with PPI  VTE prophylaxis with Lovenox and SCDs  High risk secondary to severe exacerbation of chronic illness    VTE Risk Mitigation (From admission, onward)         Ordered     enoxaparin injection 40 mg  Every 24 hours      10/15/20 2309     IP VTE HIGH RISK PATIENT  Once      10/15/20 2030     Place sequential compression device  Until discontinued      10/15/20 2030              Felipe Mcfadden MD  Department of Hospital Medicine   formerly [facility]

## 2020-10-16 NOTE — PLAN OF CARE
Medicare Outpatient Observation Notice was signed, explained and given to patient/caregiver on 10/16/2020 at 8:25am     spoke with the patient in her room and reviewed the Medicare Outpatient Observation Notice and answered all of her questions.  The patient was unable to sign the form

## 2020-10-16 NOTE — HOSPITAL COURSE
"10/16  Assumed care. Chart and labs reviewed: CBC and BMP are normal with expected CO2 retention. CTA Chest: no PE. Patient feels "A little better, maybe"    10/17  Feels "Much better" this AM. Breathing easily. No cough/sputum. No further wheeze.  VSS  Lungs: decreased entry without adventitious sounds  Heart S1S2  Abdo obese, soft  "

## 2020-10-16 NOTE — SUBJECTIVE & OBJECTIVE
Interval History: wheeze    Review of Systems   Constitutional: Positive for fatigue.   HENT: Negative.    Eyes: Negative.    Respiratory: Positive for shortness of breath and wheezing.    Cardiovascular: Negative.    Gastrointestinal: Negative.    Endocrine: Negative.    Genitourinary: Negative.    Musculoskeletal: Negative.    Skin: Negative.    Allergic/Immunologic: Negative.    Neurological: Negative.    Hematological: Negative.    All other systems reviewed and are negative.    Objective:     Vital Signs (Most Recent):  Temp: 98.4 °F (36.9 °C) (10/16/20 0710)  Pulse: 83 (10/16/20 1153)  Resp: 17 (10/16/20 1153)  BP: 119/69 (10/16/20 1153)  SpO2: 100 % (10/16/20 1153) Vital Signs (24h Range):  Temp:  [98.2 °F (36.8 °C)-100.3 °F (37.9 °C)] 98.4 °F (36.9 °C)  Pulse:  [] 83  Resp:  [16-22] 17  SpO2:  [95 %-100 %] 100 %  BP: (119-185)/(58-84) 119/69     Weight: 99.5 kg (219 lb 5.7 oz)  Body mass index is 37.65 kg/m².    Intake/Output Summary (Last 24 hours) at 10/16/2020 1352  Last data filed at 10/15/2020 1948  Gross per 24 hour   Intake 50 ml   Output --   Net 50 ml      Physical Exam  Vitals signs and nursing note reviewed.   Constitutional:       Appearance: She is well-developed.   HENT:      Head: Normocephalic and atraumatic.      Right Ear: External ear normal.      Left Ear: External ear normal.      Nose: Nose normal.   Eyes:      Conjunctiva/sclera: Conjunctivae normal.      Pupils: Pupils are equal, round, and reactive to light.   Neck:      Musculoskeletal: Normal range of motion and neck supple.   Cardiovascular:      Rate and Rhythm: Normal rate and regular rhythm.      Heart sounds: Normal heart sounds.   Pulmonary:      Comments: Very decreased entry bases with expiratory wheezes, no large airway sounds, no opening crepitations  Abdominal:      General: Bowel sounds are normal.      Palpations: Abdomen is soft.   Musculoskeletal: Normal range of motion.   Skin:     General: Skin is warm and  dry.      Capillary Refill: Capillary refill takes less than 2 seconds.   Neurological:      Mental Status: She is alert and oriented to person, place, and time.   Psychiatric:         Behavior: Behavior normal.         Thought Content: Thought content normal.         Judgment: Judgment normal.         Significant Labs:   BMP:   Recent Labs   Lab 10/16/20  0420   *      K 3.9   CL 93*   CO2 34*   BUN 15   CREATININE 0.6   CALCIUM 8.3*   MG 1.8     CBC:   Recent Labs   Lab 10/15/20  1526 10/16/20  0420   WBC 7.25 7.54   HGB 11.9* 12.0   HCT 36.7* 37.7   * 342       Significant Imaging: I have reviewed and interpreted all pertinent imaging results/findings within the past 24 hours.

## 2020-10-17 VITALS
WEIGHT: 219.38 LBS | HEART RATE: 86 BPM | HEIGHT: 64 IN | BODY MASS INDEX: 37.45 KG/M2 | RESPIRATION RATE: 17 BRPM | OXYGEN SATURATION: 97 % | TEMPERATURE: 98 F | DIASTOLIC BLOOD PRESSURE: 84 MMHG | SYSTOLIC BLOOD PRESSURE: 151 MMHG

## 2020-10-17 PROBLEM — J44.1 COPD EXACERBATION: Status: RESOLVED | Noted: 2018-05-05 | Resolved: 2020-10-17

## 2020-10-17 LAB
ALBUMIN SERPL BCP-MCNC: 3.6 G/DL (ref 3.5–5.2)
ALP SERPL-CCNC: 57 U/L (ref 55–135)
ALT SERPL W/O P-5'-P-CCNC: 14 U/L (ref 10–44)
ANION GAP SERPL CALC-SCNC: 12 MMOL/L (ref 8–16)
AST SERPL-CCNC: 17 U/L (ref 10–40)
BACTERIA UR CULT: ABNORMAL
BASOPHILS # BLD AUTO: 0.01 K/UL (ref 0–0.2)
BASOPHILS NFR BLD: 0.1 % (ref 0–1.9)
BILIRUB SERPL-MCNC: 0.4 MG/DL (ref 0.1–1)
BUN SERPL-MCNC: 15 MG/DL (ref 8–23)
CALCIUM SERPL-MCNC: 8.5 MG/DL (ref 8.7–10.5)
CHLORIDE SERPL-SCNC: 96 MMOL/L (ref 95–110)
CO2 SERPL-SCNC: 31 MMOL/L (ref 23–29)
CREAT SERPL-MCNC: 0.6 MG/DL (ref 0.5–1.4)
DIFFERENTIAL METHOD: ABNORMAL
EOSINOPHIL # BLD AUTO: 0 K/UL (ref 0–0.5)
EOSINOPHIL NFR BLD: 0 % (ref 0–8)
ERYTHROCYTE [DISTWIDTH] IN BLOOD BY AUTOMATED COUNT: 14.1 % (ref 11.5–14.5)
EST. GFR  (AFRICAN AMERICAN): >60 ML/MIN/1.73 M^2
EST. GFR  (NON AFRICAN AMERICAN): >60 ML/MIN/1.73 M^2
GLUCOSE SERPL-MCNC: 154 MG/DL (ref 70–110)
GLUCOSE SERPL-MCNC: 198 MG/DL (ref 70–110)
HCT VFR BLD AUTO: 38.7 % (ref 37–48.5)
HGB BLD-MCNC: 12 G/DL (ref 12–16)
IMM GRANULOCYTES # BLD AUTO: 0.07 K/UL (ref 0–0.04)
IMM GRANULOCYTES NFR BLD AUTO: 0.7 % (ref 0–0.5)
LYMPHOCYTES # BLD AUTO: 1.6 K/UL (ref 1–4.8)
LYMPHOCYTES NFR BLD: 16 % (ref 18–48)
MAGNESIUM SERPL-MCNC: 2 MG/DL (ref 1.6–2.6)
MCH RBC QN AUTO: 28.6 PG (ref 27–31)
MCHC RBC AUTO-ENTMCNC: 31 G/DL (ref 32–36)
MCV RBC AUTO: 92 FL (ref 82–98)
MONOCYTES # BLD AUTO: 0.4 K/UL (ref 0.3–1)
MONOCYTES NFR BLD: 4 % (ref 4–15)
NEUTROPHILS # BLD AUTO: 7.8 K/UL (ref 1.8–7.7)
NEUTROPHILS NFR BLD: 79.2 % (ref 38–73)
NRBC BLD-RTO: 0 /100 WBC
PLATELET # BLD AUTO: 355 K/UL (ref 150–350)
PMV BLD AUTO: 10.4 FL (ref 9.2–12.9)
POTASSIUM SERPL-SCNC: 3.9 MMOL/L (ref 3.5–5.1)
PROT SERPL-MCNC: 6.8 G/DL (ref 6–8.4)
RBC # BLD AUTO: 4.2 M/UL (ref 4–5.4)
SODIUM SERPL-SCNC: 139 MMOL/L (ref 136–145)
WBC # BLD AUTO: 9.84 K/UL (ref 3.9–12.7)

## 2020-10-17 PROCEDURE — 94640 AIRWAY INHALATION TREATMENT: CPT | Mod: 76

## 2020-10-17 PROCEDURE — 80053 COMPREHEN METABOLIC PANEL: CPT

## 2020-10-17 PROCEDURE — 25000242 PHARM REV CODE 250 ALT 637 W/ HCPCS: Performed by: INTERNAL MEDICINE

## 2020-10-17 PROCEDURE — 94761 N-INVAS EAR/PLS OXIMETRY MLT: CPT

## 2020-10-17 PROCEDURE — 85025 COMPLETE CBC W/AUTO DIFF WBC: CPT

## 2020-10-17 PROCEDURE — 27000221 HC OXYGEN, UP TO 24 HOURS

## 2020-10-17 PROCEDURE — 63700000 PHARM REV CODE 250 ALT 637 W/O HCPCS: Performed by: INTERNAL MEDICINE

## 2020-10-17 PROCEDURE — 63600175 PHARM REV CODE 636 W HCPCS: Performed by: INTERNAL MEDICINE

## 2020-10-17 PROCEDURE — 25000003 PHARM REV CODE 250: Performed by: INTERNAL MEDICINE

## 2020-10-17 PROCEDURE — 83735 ASSAY OF MAGNESIUM: CPT

## 2020-10-17 PROCEDURE — 96376 TX/PRO/DX INJ SAME DRUG ADON: CPT | Mod: 59

## 2020-10-17 PROCEDURE — 99900035 HC TECH TIME PER 15 MIN (STAT)

## 2020-10-17 PROCEDURE — 36415 COLL VENOUS BLD VENIPUNCTURE: CPT

## 2020-10-17 PROCEDURE — 96372 THER/PROPH/DIAG INJ SC/IM: CPT | Mod: 59

## 2020-10-17 PROCEDURE — 94640 AIRWAY INHALATION TREATMENT: CPT

## 2020-10-17 PROCEDURE — G0378 HOSPITAL OBSERVATION PER HR: HCPCS

## 2020-10-17 RX ORDER — BENZONATATE 200 MG/1
200 CAPSULE ORAL 3 TIMES DAILY PRN
Qty: 30 CAPSULE | Refills: 1 | Status: SHIPPED | OUTPATIENT
Start: 2020-10-17 | End: 2020-10-27

## 2020-10-17 RX ORDER — LOSARTAN POTASSIUM 50 MG/1
50 TABLET ORAL DAILY
Qty: 90 TABLET | Refills: 3 | Status: SHIPPED | OUTPATIENT
Start: 2020-10-17 | End: 2021-09-09 | Stop reason: SDUPTHER

## 2020-10-17 RX ORDER — METHYLPREDNISOLONE 4 MG/1
TABLET ORAL
Qty: 1 PACKAGE | Refills: 0 | Status: SHIPPED | OUTPATIENT
Start: 2020-10-17 | End: 2020-11-07

## 2020-10-17 RX ORDER — HYDROCHLOROTHIAZIDE 12.5 MG/1
12.5 TABLET ORAL DAILY
Qty: 30 TABLET | Refills: 11 | Status: SHIPPED | OUTPATIENT
Start: 2020-10-17 | End: 2021-10-17

## 2020-10-17 RX ORDER — AZITHROMYCIN 250 MG/1
250 TABLET, FILM COATED ORAL DAILY
Qty: 3 TABLET | Refills: 0 | Status: SHIPPED | OUTPATIENT
Start: 2020-10-17 | End: 2020-10-20

## 2020-10-17 RX ORDER — GUAIFENESIN 600 MG/1
600 TABLET, EXTENDED RELEASE ORAL 2 TIMES DAILY
Qty: 30 TABLET | Refills: 2 | Status: SHIPPED | OUTPATIENT
Start: 2020-10-17 | End: 2021-06-22

## 2020-10-17 RX ADMIN — FLUTICASONE FUROATE AND VILANTEROL TRIFENATATE 1 PUFF: 100; 25 POWDER RESPIRATORY (INHALATION) at 08:10

## 2020-10-17 RX ADMIN — ROPINIROLE 4 MG: 2 TABLET, FILM COATED ORAL at 09:10

## 2020-10-17 RX ADMIN — LEVOTHYROXINE SODIUM 150 MCG: 25 TABLET ORAL at 05:10

## 2020-10-17 RX ADMIN — IPRATROPIUM BROMIDE AND ALBUTEROL SULFATE 3 ML: .5; 3 SOLUTION RESPIRATORY (INHALATION) at 08:10

## 2020-10-17 RX ADMIN — METHYLPREDNISOLONE SODIUM SUCCINATE 80 MG: 40 INJECTION, POWDER, FOR SOLUTION INTRAMUSCULAR; INTRAVENOUS at 09:10

## 2020-10-17 RX ADMIN — INSULIN DETEMIR 70 UNITS: 100 INJECTION, SOLUTION SUBCUTANEOUS at 09:10

## 2020-10-17 RX ADMIN — GABAPENTIN 800 MG: 300 CAPSULE ORAL at 09:10

## 2020-10-17 RX ADMIN — CLONAZEPAM 0.5 MG: 0.5 TABLET ORAL at 09:10

## 2020-10-17 RX ADMIN — LAMOTRIGINE 150 MG: 100 TABLET ORAL at 09:10

## 2020-10-17 RX ADMIN — AZITHROMYCIN 250 MG: 250 TABLET, FILM COATED ORAL at 09:10

## 2020-10-17 RX ADMIN — DULOXETINE 20 MG: 20 CAPSULE, DELAYED RELEASE ORAL at 09:10

## 2020-10-17 RX ADMIN — HYDROCHLOROTHIAZIDE 12.5 MG: 12.5 TABLET ORAL at 09:10

## 2020-10-17 RX ADMIN — FLUOXETINE 40 MG: 20 CAPSULE ORAL at 09:10

## 2020-10-17 RX ADMIN — GUAIFENESIN 600 MG: 600 TABLET, EXTENDED RELEASE ORAL at 09:10

## 2020-10-17 RX ADMIN — OXYCODONE HYDROCHLORIDE 10 MG: 5 TABLET ORAL at 09:10

## 2020-10-17 RX ADMIN — LOSARTAN POTASSIUM 50 MG: 50 TABLET, FILM COATED ORAL at 09:10

## 2020-10-17 RX ADMIN — PANTOPRAZOLE SODIUM 40 MG: 40 TABLET, DELAYED RELEASE ORAL at 09:10

## 2020-10-17 RX ADMIN — FLUTICASONE PROPIONATE 50 MCG: 50 SPRAY, METERED NASAL at 09:10

## 2020-10-17 NOTE — PLAN OF CARE
10/17/20 0824   Patient Assessment/Suction   Level of Consciousness (AVPU) alert   Respiratory Effort Normal;Unlabored   Expansion/Accessory Muscles/Retractions no use of accessory muscles;no retractions   All Lung Fields Breath Sounds clear;diminished   Rhythm/Pattern, Respiratory unlabored;depth regular   Cough Frequency infrequent   Cough Type nonproductive   PRE-TX-O2   O2 Device (Oxygen Therapy) nasal cannula   $ Is the patient on Low Flow Oxygen? Yes   SpO2 97 %   Pulse Oximetry Type Intermittent   $ Pulse Oximetry - Multiple Charge Pulse Oximetry - Multiple   Pulse 86   Resp 16   Aerosol Therapy   $ Aerosol Therapy Charges Aerosol Treatment   Daily Review of Necessity (SVN) completed   Respiratory Treatment Status (SVN) given   Treatment Route (SVN) mask   Patient Position (SVN) sitting on edge of bed   Post Treatment Assessment (SVN) breath sounds unchanged   Signs of Intolerance (SVN) none   Inhaler   $ Inhaler Charges MDI (Metered Dose Inahler) Treatment   Daily Review of Necessity (Inhaler) completed   Respiratory Treatment Status (Inhaler) given   Treatment Route (Inhaler) mask   Patient Position (Inhaler) sitting on edge of bed   Post Treatment Assessment (Inhaler) breath sounds unchanged   Signs of Intolerance (Inhaler) none   Breath Sounds Post-Respiratory Treatment   Throughout All Fields Post-Treatment All Fields   Throughout All Fields Post-Treatment no change   Post-treatment Heart Rate (beats/min) 89   Post-treatment Resp Rate (breaths/min) 16   Respiratory Evaluation   $ Care Plan Tech Time 15 min

## 2020-10-17 NOTE — RESPIRATORY THERAPY
10/16/20 2013   Patient Assessment/Suction   Respiratory Effort Unlabored   All Lung Fields Breath Sounds wheezes, expiratory   Rhythm/Pattern, Respiratory pattern regular   PRE-TX-O2   O2 Device (Oxygen Therapy) nasal cannula   Flow (L/min) 3   SpO2 (!) 93 %   Pulse 86   Resp 16   Aerosol Therapy   $ Aerosol Therapy Charges Aerosol Treatment   Respiratory Treatment Status (SVN) given   Treatment Route (SVN) mask   Patient Position (SVN) HOB elevated   Post Treatment Assessment (SVN) breath sounds unchanged   Signs of Intolerance (SVN) none   Breath Sounds Post-Respiratory Treatment   Throughout All Fields Post-Treatment All Fields   Throughout All Fields Post-Treatment no change   Post-treatment Heart Rate (beats/min) 86   Post-treatment Resp Rate (breaths/min) 16   Respiratory Evaluation   $ Care Plan Tech Time 15 min   Evaluation For New Orders   Admitting Diagnosis COPD EXAC

## 2020-10-17 NOTE — PLAN OF CARE
10/17/20 0831   Final Note   Assessment Type Final Discharge Note   Anticipated Discharge Disposition Home   No needs at this time.

## 2020-10-17 NOTE — DISCHARGE SUMMARY
"[facility] Medicine  Discharge Summary      Patient Name: Alanis Mendieta  MRN: [id number]  Admission Date: 10/15/2020  Hospital Length of Stay: 0 days  Discharge Date and Time:  10/17/2020 8:00 AM  Attending Physician: Todd Rivera MD   Discharging Provider: Todd Rivera MD  Primary Care Provider: Michelle Matthew NP      HPI:   69-year-old female with history of bipolar disorder, COPD and chronic hypoxemic respiratory failure on 3 L home oxygen, diabetes mellitus type 2, GERD, hypertension, hyperlipidemia, neuropathy, and anxiety, who presents the hospital with shortness of breath.  The patient actually presented to the emergency department about 3 days ago with similar symptoms for which she was discharged with prednisone for COPD exacerbation.  Over the last several days the patient reports that her shortness of breath has progressively worsened.  Her shortness of breath is now severe in intensity and constant in timing.  Shortness of breath is worse with exertion.  She has associated wheezing and dry cough.  No sputum production or hemoptysis.  No fever, chills, or chest pain.  No pleurisy.  The patient reports that at home despite oxygen and nebulizers she got no relief.  Based on her symptoms she presented to the emergency room for evaluation. She denies abdominal pain, nausea, vomiting, diarrhea, or bleeding.  No headache, focal weakness, or syncope.  She reports some foul odor to urine recently.    In the emergency department COVID and influenza testing were negative.  Urinalysis abnormal.  Chest x-ray without infiltrate.  The patient will be admitted for COPD exacerbation treatment.    * No surgery found *      Hospital Course:   10/16  Assumed care. Chart and labs reviewed: CBC and BMP are normal with expected CO2 retention. CTA Chest: no PE. Patient feels "A little better, maybe"    10/17  Feels "Much better" this AM. Breathing easily. No cough/sputum. No further " wheeze.  VSS  Lungs: decreased entry without adventitious sounds  Heart S1S2  Abdo obese, soft     Consults:   Consults (From admission, onward)        Status Ordering Provider     IP consult to case management  Once     Provider:  (Not yet assigned)    Completed AMY ROBISON          No new Assessment & Plan notes have been filed under this hospital service since the last note was generated.  Service: Hospital Medicine    Final Active Diagnoses:      Problems Resolved During this Admission:    Diagnosis Date Noted Date Resolved POA    PRINCIPAL PROBLEM:  COPD exacerbation [J44.1] 05/05/2018 10/17/2020 Yes       Discharged Condition: good    Disposition: Home or Self Care    Follow Up:  Follow-up Information     Michelle Matthew NP In 3 days.    Specialty: Family Medicine  Why: Post discharge follow-up  Contact information:  [address]  [address] [place] [id number] [phone]                 Patient Instructions:      Diet diabetic     Diet Cardiac     Activity as tolerated       Significant Diagnostic Studies: Labs:   BMP:   Recent Labs   Lab 10/15/20  1526 10/16/20  0420 10/17/20  0516   * 232* 198*    138 139   K 3.9 3.9 3.9   CL 93* 93* 96   CO2 32* 34* 31*   BUN 13 15 15   CREATININE 0.7 0.6 0.6   CALCIUM 8.5* 8.3* 8.5*   MG 1.6 1.8 2.0    and CBC   Recent Labs   Lab 10/15/20  1526 10/16/20  0420 10/17/20  0516   WBC 7.25 7.54 9.84   HGB 11.9* 12.0 12.0   HCT 36.7* 37.7 38.7   * 342 355*       Pending Diagnostic Studies:     None         Medications:  Reconciled Home Medications:      Medication List      START taking these medications    azithromycin 250 MG tablet  Commonly known as: Z-BETINA  Take 1 tablet (250 mg total) by mouth once daily. for 3 days     benzonatate 200 MG capsule  Commonly known as: TESSALON  Take 1 capsule (200 mg total) by mouth 3 (three) times daily as needed for Cough.     guaiFENesin 600 mg 12 hr tablet  Commonly known as: MUCINEX  Take 1 tablet  (600 mg total) by mouth 2 (two) times daily.     hydroCHLOROthiazide 12.5 MG Tab  Commonly known as: HYDRODIURIL  Take 1 tablet (12.5 mg total) by mouth once daily.     losartan 50 MG tablet  Commonly known as: COZAAR  Take 1 tablet (50 mg total) by mouth once daily.     methylPREDNISolone 4 mg tablet  Commonly known as: MEDROL DOSEPACK  use as directed        CHANGE how you take these medications    gabapentin 800 MG tablet  Commonly known as: NEURONTIN  Take 1 tablet (800 mg total) by mouth 2 (two) times daily.  What changed: Another medication with the same name was removed. Continue taking this medication, and follow the directions you see here.     LEVEMIR FLEXTOUCH U-100 INSULN 100 unit/mL (3 mL) Inpn pen  Generic drug: insulin detemir U-100  Inject 65 Units into the skin 2 (two) times daily.  What changed: how much to take     potassium chloride SA 10 MEQ tablet  Commonly known as: K-DUR,KLOR-CON  Take 1 tablet (10 mEq total) by mouth 3 (three) times a week. Mon , Wed, and Fri.  What changed: when to take this        CONTINUE taking these medications    ACCU-CHEK LIBERTY PLUS TEST STRP Strp  Generic drug: blood sugar diagnostic     albuterol 90 mcg/actuation inhaler  Commonly known as: VENTOLIN HFA  Inhale 2 puffs into the lungs every 6 (six) hours as needed for Wheezing. Rescue     albuterol-ipratropium 2.5 mg-0.5 mg/3 mL nebulizer solution  Commonly known as: DUO-NEB  Take 3 mLs by nebulization every 6 (six) hours as needed for Wheezing or Shortness of Breath. Rescue     ARTIFICIAL TEARS(HYPROMELLOSE) OPHT  Place 1 drop into both eyes 3 (three) times daily.     calcitRIOL 0.5 MCG Cap  Commonly known as: ROCALTROL  Take 1 capsule (0.5 mcg total) by mouth every evening.     clonazePAM 0.5 MG tablet  Commonly known as: KLONOPIN  Take 1 tablet (0.5 mg total) by mouth 2 (two) times daily.     doxepin 5 % cream  Commonly known as: ZONALON  Apply 1 application topically 3 (three) times daily as needed.    "  DULoxetine 20 MG capsule  Commonly known as: CYMBALTA  Take 20 mg by mouth once daily.     FLUoxetine 40 MG capsule  Take 1 capsule (40 mg total) by mouth once daily.     fluticasone propionate 50 mcg/actuation nasal spray  Commonly known as: FLONASE  1 spray by Each Nostril route once daily.     fluticasone-umeclidin-vilanter 100-62.5-25 mcg Dsdv  Commonly known as: TRELEGY ELLIPTA  Inhale 1 puff into the lungs once daily.     ibuprofen 400 MG tablet  Commonly known as: ADVIL,MOTRIN  Take 400 mg by mouth every 8 (eight) hours as needed for Other.     insulin aspart U-100 100 unit/mL injection  Commonly known as: NOVOLOG  Inject 2-10 Units into the skin 4 (four) times daily with meals and nightly. Sliding scale 151-400     lamoTRIgine 150 MG Tab  Commonly known as: LAMICTAL  Take 1 tablet (150 mg total) by mouth 2 (two) times daily.     latanoprost 0.005 % ophthalmic solution  Place 1 drop into both eyes every evening.     levothyroxine 150 MCG tablet  Commonly known as: SYNTHROID  Take 1 tablet (150 mcg total) by mouth before breakfast.     linaCLOtide 290 mcg Cap capsule  Commonly known as: LINZESS  Take 1 capsule (290 mcg total) by mouth once daily.     losartan-hydrochlorothiazide 50-12.5 mg 50-12.5 mg per tablet  Commonly known as: HYZAAR  Take 0.5 tablets by mouth once daily.     methocarbamoL 750 MG Tab  Commonly known as: ROBAXIN  Take 1 tablet (750 mg total) by mouth 2 (two) times daily as needed.     NOVOFINE AUTOCOVER 30 gauge x 1/3" Ndle  Generic drug: pen needle, diabetic, safety  1 each by In Vitro route 4 (four) times daily.     oxyCODONE 10 mg Tab immediate release tablet  Commonly known as: ROXICODONE  Take 10 mg by mouth 3 (three) times daily.     pantoprazole 40 MG tablet  Commonly known as: PROTONIX  Take 1 tablet (40 mg total) by mouth once daily.     QUEtiapine 50 MG tablet  Commonly known as: SEROQUEL  Take 50 mg by mouth every evening.     RELISTOR 150 mg Tab  Generic drug: " methylnaltrexone  Take 150 mg by mouth once daily.     rOPINIRole 4 MG tablet  Commonly known as: REQUIP  Take 1 tablet (4 mg total) by mouth 2 (two) times daily.     temazepam 15 mg Cap  Commonly known as: RESTORIL  Take 15 mg by mouth every evening.        STOP taking these medications    lactulose 10 gram/15 mL solution  Commonly known as: CHRONULAC     meclizine 25 mg tablet  Commonly known as: ANTIVERT     ondansetron 4 MG Tbdl  Commonly known as: ZOFRAN-ODT     predniSONE 20 MG tablet  Commonly known as: DELTASONE            Indwelling Lines/Drains at time of discharge:   Lines/Drains/Airways     None                 Time spent on the discharge of patient: 32 minutes  Patient was seen and examined on the date of discharge and determined to be suitable for discharge.         Todd Rivera MD  Department of Hospital Medicine  Formerly [facility], later [facility]

## 2020-10-20 ENCOUNTER — PATIENT OUTREACH (OUTPATIENT)
Dept: FAMILY MEDICINE | Facility: CLINIC | Age: 70
End: 2020-10-20

## 2020-10-20 ENCOUNTER — TELEPHONE (OUTPATIENT)
Dept: FAMILY MEDICINE | Facility: CLINIC | Age: 70
End: 2020-10-20

## 2020-10-20 LAB
BACTERIA BLD CULT: NORMAL
BACTERIA BLD CULT: NORMAL

## 2020-10-20 NOTE — TELEPHONE ENCOUNTER
----- Message from Yee Chatterjee sent at 10/20/2020 10:42 AM CDT -----  Regarding: Needs Hfu  Contact: Alanis Mendieta  Pt was just discharged from [facility] and she is in need of a HFU. Please give the patient a call back # [phone]  Says to call her back after lunch please.

## 2020-10-20 NOTE — PROGRESS NOTES
Discharge Information     Discharge Date: 10/17/20      Primary Discharge Diagnosis:  UTI/ COPD     Discharge Summary:  Reviewed      Medication & Order Review     Were medication changes made or new medications added?   Yes    If so, has the patient filled the prescriptions?  Yes     Was Home Health ordered? No    If so, has Home Health contacted patient and/or initiated services?  No    Name of Home Health Agency? N/A    Durable Medical Equipment ordered?  No     If so, has the DME provider contacted patient and delivered equipment?  N/A    Follow Up               Any problems since discharge? No    How is the patient feeling since returning home?  Pt states she is feeling fine    Have you set up recommended follow up appointments?      Schedule Hospital Follow-up appointment within 7 days    Notes:              Alesia Chambers

## 2020-10-21 ENCOUNTER — TELEPHONE (OUTPATIENT)
Dept: FAMILY MEDICINE | Facility: CLINIC | Age: 70
End: 2020-10-21

## 2020-10-21 NOTE — TELEPHONE ENCOUNTER
----- Message from Yee Chatterjee sent at 10/21/2020  9:06 AM CDT -----  Regarding: Reschedule HFU  Contact: Alanis Mendieta  Pt would like to re-schedule her HFU for tomorrow  because her transportation is down . She says she can come in Friday after 10am some time will be good for her .   PT# [phone]

## 2020-10-26 ENCOUNTER — TELEPHONE (OUTPATIENT)
Dept: FAMILY MEDICINE | Facility: CLINIC | Age: 70
End: 2020-10-26

## 2020-10-26 NOTE — TELEPHONE ENCOUNTER
----- Message from Yee Chatterjee sent at 10/26/2020 11:46 AM CDT -----  Regarding: Notify Nurse  Contact: Alanis Mendieta  Pt is cancelling her appt today that she had because she is in the process of moving, says she will call back to reschedule.   Pt# [phone]

## 2020-10-30 ENCOUNTER — OFFICE VISIT (OUTPATIENT)
Dept: FAMILY MEDICINE | Facility: CLINIC | Age: 70
End: 2020-10-30
Payer: MEDICARE

## 2020-10-30 ENCOUNTER — TELEPHONE (OUTPATIENT)
Dept: FAMILY MEDICINE | Facility: CLINIC | Age: 70
End: 2020-10-30

## 2020-10-30 DIAGNOSIS — E11.42 TYPE 2 DIABETES MELLITUS WITH DIABETIC POLYNEUROPATHY, WITH LONG-TERM CURRENT USE OF INSULIN: ICD-10-CM

## 2020-10-30 DIAGNOSIS — G47.33 OSA (OBSTRUCTIVE SLEEP APNEA): ICD-10-CM

## 2020-10-30 DIAGNOSIS — Z78.0 MENOPAUSE: ICD-10-CM

## 2020-10-30 DIAGNOSIS — J44.1 CHRONIC OBSTRUCTIVE PULMONARY DISEASE WITH ACUTE EXACERBATION: ICD-10-CM

## 2020-10-30 DIAGNOSIS — Z12.31 ENCOUNTER FOR SCREENING MAMMOGRAM FOR BREAST CANCER: Primary | ICD-10-CM

## 2020-10-30 DIAGNOSIS — Z79.4 TYPE 2 DIABETES MELLITUS WITH DIABETIC POLYNEUROPATHY, WITH LONG-TERM CURRENT USE OF INSULIN: ICD-10-CM

## 2020-10-30 PROCEDURE — 1101F PT FALLS ASSESS-DOCD LE1/YR: CPT | Mod: 95,,, | Performed by: NURSE PRACTITIONER

## 2020-10-30 PROCEDURE — 99213 OFFICE O/P EST LOW 20 MIN: CPT | Mod: 95,,, | Performed by: NURSE PRACTITIONER

## 2020-10-30 PROCEDURE — 1159F PR MEDICATION LIST DOCUMENTED IN MEDICAL RECORD: ICD-10-PCS | Mod: 95,,, | Performed by: NURSE PRACTITIONER

## 2020-10-30 PROCEDURE — 1159F MED LIST DOCD IN RCRD: CPT | Mod: 95,,, | Performed by: NURSE PRACTITIONER

## 2020-10-30 PROCEDURE — 3052F HG A1C>EQUAL 8.0%<EQUAL 9.0%: CPT | Mod: 95,,, | Performed by: NURSE PRACTITIONER

## 2020-10-30 PROCEDURE — 3052F PR MOST RECENT HEMOGLOBIN A1C LEVEL 8.0 - < 9.0%: ICD-10-PCS | Mod: 95,,, | Performed by: NURSE PRACTITIONER

## 2020-10-30 PROCEDURE — 1101F PR PT FALLS ASSESS DOC 0-1 FALLS W/OUT INJ PAST YR: ICD-10-PCS | Mod: 95,,, | Performed by: NURSE PRACTITIONER

## 2020-10-30 PROCEDURE — 99213 PR OFFICE/OUTPT VISIT, EST, LEVL III, 20-29 MIN: ICD-10-PCS | Mod: 95,,, | Performed by: NURSE PRACTITIONER

## 2020-10-30 RX ORDER — GABAPENTIN 800 MG/1
800 TABLET ORAL 3 TIMES DAILY
Qty: 270 TABLET | Refills: 1 | Status: SHIPPED | OUTPATIENT
Start: 2020-10-30 | End: 2020-12-11 | Stop reason: SDUPTHER
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
## 2020-10-30 NOTE — TELEPHONE ENCOUNTER
----- Message from Rosa Linder sent at 10/30/2020  8:58 AM CDT -----   8:20 All I could understand was she needs a nurse to call her. Pts #   [phone] GH

## 2020-10-30 NOTE — PROGRESS NOTES
Subjective:        The chief complaint leading to consultation is: Hospital follow-up  The patient location is:  Home  Visit type: Virtual visit with synchronous audio/video or audio only  This was a video visit in lieu of in-person visit due to the coronavirus emergency. Patient acknowledged and consented to the video visit encounter.     Pt presents for hospital follow-up. Pt has been to ER several times the last few months with COPD flares. Each time she has been treated with duonebs and steroids and released. Has not followed up with Dr. Reis in several months. However, does have appointment scheduled. Current med regimen includes Duonebs tid, Trelegy, and prednisone 10mg daily. Currently maintained on 4 L NC. Gets around with rollator. Does not wear CPAP at night. Says she has had sleep study and equipment has been ordered but has never received it. Still has lots of trouble staying asleep at night despite taking sleep aids. Was placed on Seroquel nightly which still has not helped with sleep. She plans to see her Neurologist, Dr. Colunga, soon because she feels that her memory is declining. She has forgotten food cooking on the stove twice now. Has and aid that helps her do things around her house. She is currently in the process of moving out of her apartment and into a shelter community.           Past Surgical History:   Procedure Laterality Date    APPENDECTOMY      BACK SURGERY      x 3    broken foot and ankle      CHOLECYSTECTOMY      EYE SURGERY Bilateral     cataract    HYSTERECTOMY      JOINT REPLACEMENT Left 09/17/2018    knee    KNEE ARTHROPLASTY Left 9/17/2018    Procedure: ARTHROPLASTY, KNEE;  Surgeon: Yariel Marin MD;  Location: [facility] OR;  Service: Orthopedics;  Laterality: Left;    MYELOGRAPHY N/A 8/7/2019    Procedure: MYELOGRAM;  Surgeon: Sun Diagnostic Provider;  Location: [facility] OR;  Service: General;  Laterality: N/A;    POSTERIOR REPAIR      SPINAL FUSION      x3 BACK,  NECK X 4    TOTAL THYROIDECTOMY  2014     Past Medical History:   Diagnosis Date    Allergy     Anxiety     Arthritis     Asthma     Bipolar disorder     Cancer     thyroid    Chronic back pain     COPD (chronic obstructive pulmonary disease)     Diabetes mellitus     Diabetes mellitus, type 2     Fibromyalgia     GERD (gastroesophageal reflux disease)     History of fall 4/26/2018    [place] (hard of hearing[place]     Hx of thyroid cancer     Hyperlipidemia     Hypertension     Hypothyroidism     Neuropathy     On home oxygen therapy     3 l/m 24/7    Restless leg syndrome     Sleep apnea     Urinary tract infection      Family History   Problem Relation Age of Onset    Diabetes Mother     Heart disease Mother     Hypertension Mother     Diabetes Father     Heart disease Father     Hypertension Father         Social History:   Marital Status:   Alcohol History:  reports no history of alcohol use.  Tobacco History:  reports that she quit smoking about 20 years ago. Her smoking use included cigarettes. She has a 30.00 pack-year smoking history. She has never used smokeless tobacco.  Drug History:  reports no history of drug use.    Review of patient's allergies indicates:   Allergen Reactions    Morphine Itching    Tubersol [tuberculin ppd] Other (See Comments)     Site swells bad. Has to have chest xray       Current Outpatient Medications   Medication Sig Dispense Refill    ACCU-CHEK LIBERTY PLUS TEST STRP Strp       albuterol (VENTOLIN HFA) 90 mcg/actuation inhaler Inhale 2 puffs into the lungs every 6 (six) hours as needed for Wheezing. Rescue 1 Inhaler 3    albuterol-ipratropium (DUO-NEB) 2.5 mg-0.5 mg/3 mL nebulizer solution Take 3 mLs by nebulization every 6 (six) hours as needed for Wheezing or Shortness of Breath. Rescue 360 mL 5    ARTIFICIAL TEARS,HYPROMELLOSE, OPHT Place 1 drop into both eyes 3 (three) times daily.      calcitRIOL (ROCALTROL) 0.5 MCG Cap Take 1 capsule  (0.5 mcg total) by mouth every evening. 90 capsule 1    clonazePAM (KLONOPIN) 0.5 MG tablet Take 1 tablet (0.5 mg total) by mouth 2 (two) times daily. 180 tablet 0    doxepin (ZONALON) 5 % cream Apply 1 application topically 3 (three) times daily as needed.      DULoxetine (CYMBALTA) 20 MG capsule Take 20 mg by mouth once daily.       FLUoxetine 40 MG capsule Take 1 capsule (40 mg total) by mouth once daily. 90 capsule 1    fluticasone propionate (FLONASE) 50 mcg/actuation nasal spray 1 spray by Each Nostril route once daily.      fluticasone-umeclidin-vilanter (TRELEGY ELLIPTA) 100-62.5-25 mcg DsDv Inhale 1 puff into the lungs once daily. 3 each 1    gabapentin (NEURONTIN) 800 MG tablet Take 1 tablet (800 mg total) by mouth 3 (three) times daily. 270 tablet 1    guaiFENesin (MUCINEX) 600 mg 12 hr tablet Take 1 tablet (600 mg total) by mouth 2 (two) times daily. 30 tablet 2    hydroCHLOROthiazide (HYDRODIURIL) 12.5 MG Tab Take 1 tablet (12.5 mg total) by mouth once daily. 30 tablet 11    ibuprofen (ADVIL,MOTRIN) 400 MG tablet Take 400 mg by mouth every 8 (eight) hours as needed for Other.      insulin aspart U-100 (NOVOLOG) 100 unit/mL injection Inject 2-10 Units into the skin 4 (four) times daily with meals and nightly. Sliding scale 151-400      lamoTRIgine (LAMICTAL) 150 MG Tab Take 1 tablet (150 mg total) by mouth 2 (two) times daily. 180 tablet 1    latanoprost 0.005 % ophthalmic solution Place 1 drop into both eyes every evening.       LEVEMIR FLEXTOUCH U-100 INSULN 100 unit/mL (3 mL) InPn pen Inject 65 Units into the skin 2 (two) times daily. (Patient taking differently: Inject 70 Units into the skin 2 (two) times daily. ) 3 Box 3    levothyroxine (SYNTHROID) 150 MCG tablet Take 1 tablet (150 mcg total) by mouth before breakfast. 90 tablet 1    linaCLOtide (LINZESS) 290 mcg Cap capsule Take 1 capsule (290 mcg total) by mouth once daily. 90 capsule 1    losartan (COZAAR) 50 MG tablet Take 1  "tablet (50 mg total) by mouth once daily. 90 tablet 3    losartan-hydrochlorothiazide 50-12.5 mg (HYZAAR) 50-12.5 mg per tablet Take 0.5 tablets by mouth once daily. 90 tablet 1    methocarbamoL (ROBAXIN) 750 MG Tab Take 1 tablet (750 mg total) by mouth 2 (two) times daily as needed. 60 tablet 2    methylPREDNISolone (MEDROL DOSEPACK) 4 mg tablet use as directed 1 Package 0    NOVOFINE AUTOCOVER 30 gauge x 1/3" Ndle 1 each by In Vitro route 4 (four) times daily. 360 each 3    oxyCODONE (ROXICODONE) 10 mg Tab immediate release tablet Take 10 mg by mouth 3 (three) times daily.       pantoprazole (PROTONIX) 40 MG tablet Take 1 tablet (40 mg total) by mouth once daily. 90 tablet 1    potassium chloride SA (K-DUR,KLOR-CON) 10 MEQ tablet Take 1 tablet (10 mEq total) by mouth 3 (three) times a week. Mon , Wed, and Fri. (Patient taking differently: Take 10 mEq by mouth every Mon, Wed, Fri. Mon , Wed, and Fri.) 36 tablet 1    QUEtiapine (SEROQUEL) 50 MG tablet Take 50 mg by mouth every evening.       RELISTOR 150 mg Tab Take 150 mg by mouth once daily. 30 tablet 2    rOPINIRole (REQUIP) 4 MG tablet Take 1 tablet (4 mg total) by mouth 2 (two) times daily. 180 tablet 1    temazepam (RESTORIL) 15 mg Cap Take 15 mg by mouth every evening.        No current facility-administered medications for this visit.        Review of Systems   Constitutional: Negative.    HENT: Negative for congestion, ear pain, sinus pressure, sinus pain, tinnitus and trouble swallowing.    Eyes: Negative for pain and redness.   Respiratory: Positive for shortness of breath and wheezing. Negative for cough and chest tightness.    Cardiovascular: Negative for chest pain and palpitations.   Gastrointestinal: Negative for abdominal pain, nausea and vomiting.   Genitourinary: Negative for dysuria, frequency and urgency.   Musculoskeletal: Positive for back pain. Negative for arthralgias and myalgias.   Skin: Negative for rash and wound. "   Neurological: Negative for dizziness, weakness, light-headedness and headaches.   Psychiatric/Behavioral: Positive for sleep disturbance.        C/o memory issues         Objective:        Physical Exam:   Physical Exam  Constitutional:       Comments: Wearing NC   HENT:      Head: Normocephalic and atraumatic.   Pulmonary:      Effort: No respiratory distress.   Neurological:      Mental Status: She is alert and oriented to person, place, and time.   Psychiatric:         Mood and Affect: Mood normal.              Assessment:       1. Encounter for screening mammogram for breast cancer    2. Menopause    3. Type 2 diabetes mellitus with diabetic polyneuropathy, with long-term current use of insulin    4. Chronic obstructive pulmonary disease with acute exacerbation    5. VIET (obstructive sleep apnea)      Plan:   Encounter for screening mammogram for breast cancer  -     Mammo Digital Screening Bilat w/ Chase; Future; Expected date: 10/30/2020    Menopause  -     DXA Bone Density Spine And Hip; Future; Expected date: 10/30/2020    Type 2 diabetes mellitus with diabetic polyneuropathy, with long-term current use of insulin  -     gabapentin (NEURONTIN) 800 MG tablet; Take 1 tablet (800 mg total) by mouth 3 (three) times daily.  Dispense: 270 tablet; Refill: 1    Chronic obstructive pulmonary disease with acute exacerbation  - Pt continues to have frequent COPD flares despite medication changes. Encouraged to please keep appointment with Dr. Reis next week.    VIET (obstructive sleep apnea)  - Will follow-up with Dr. Reis's office regarding CPAP supplies.    Follow up in about 1 month (around 11/30/2020) for COPD with me, 3 months with Dr. Melendez.    Total time spent with patient: 20 minutes    Each patient to whom he or she provides medical services by telemedicine is:  (1) informed of the relationship between the physician and patient and the respective role of any other health care provider with respect to  management of the patient; and (2) notified that he or she may decline to receive medical services by telemedicine and may withdraw from such care at any time.    This note was created using [employer] voice recognition software that occasionally misinterprets phrases or words.

## 2020-11-04 ENCOUNTER — OFFICE VISIT (OUTPATIENT)
Dept: FAMILY MEDICINE | Facility: CLINIC | Age: 70
End: 2020-11-04
Payer: MEDICARE

## 2020-11-04 ENCOUNTER — TELEPHONE (OUTPATIENT)
Dept: FAMILY MEDICINE | Facility: CLINIC | Age: 70
End: 2020-11-04

## 2020-11-04 VITALS
HEART RATE: 78 BPM | HEIGHT: 64 IN | DIASTOLIC BLOOD PRESSURE: 72 MMHG | WEIGHT: 212 LBS | BODY MASS INDEX: 36.19 KG/M2 | SYSTOLIC BLOOD PRESSURE: 138 MMHG | TEMPERATURE: 99 F

## 2020-11-04 DIAGNOSIS — Z23 FLU VACCINE NEED: Primary | ICD-10-CM

## 2020-11-04 DIAGNOSIS — G89.4 CHRONIC PAIN SYNDROME: ICD-10-CM

## 2020-11-04 DIAGNOSIS — Z23 NEED FOR VACCINATION WITH 13-POLYVALENT PNEUMOCOCCAL CONJUGATE VACCINE: ICD-10-CM

## 2020-11-04 DIAGNOSIS — J34.1 CYST OF NASAL CAVITY: ICD-10-CM

## 2020-11-04 PROCEDURE — G0009 ADMIN PNEUMOCOCCAL VACCINE: HCPCS | Mod: S$GLB,,, | Performed by: NURSE PRACTITIONER

## 2020-11-04 PROCEDURE — 3078F DIAST BP <80 MM HG: CPT | Mod: S$GLB,,, | Performed by: NURSE PRACTITIONER

## 2020-11-04 PROCEDURE — 90670 PNEUMOCOCCAL CONJUGATE VACCINE 13-VALENT LESS THAN 5YO & GREATER THAN: ICD-10-PCS | Mod: S$GLB,,, | Performed by: NURSE PRACTITIONER

## 2020-11-04 PROCEDURE — 3008F BODY MASS INDEX DOCD: CPT | Mod: S$GLB,,, | Performed by: NURSE PRACTITIONER

## 2020-11-04 PROCEDURE — 1101F PR PT FALLS ASSESS DOC 0-1 FALLS W/OUT INJ PAST YR: ICD-10-PCS | Mod: S$GLB,,, | Performed by: NURSE PRACTITIONER

## 2020-11-04 PROCEDURE — 3008F PR BODY MASS INDEX (BMI) DOCUMENTED: ICD-10-PCS | Mod: S$GLB,,, | Performed by: NURSE PRACTITIONER

## 2020-11-04 PROCEDURE — 99213 PR OFFICE/OUTPT VISIT, EST, LEVL III, 20-29 MIN: ICD-10-PCS | Mod: 25,S$GLB,, | Performed by: NURSE PRACTITIONER

## 2020-11-04 PROCEDURE — 3075F PR MOST RECENT SYSTOLIC BLOOD PRESS GE 130-139MM HG: ICD-10-PCS | Mod: S$GLB,,, | Performed by: NURSE PRACTITIONER

## 2020-11-04 PROCEDURE — 90662 IIV NO PRSV INCREASED AG IM: CPT | Mod: S$GLB,,, | Performed by: NURSE PRACTITIONER

## 2020-11-04 PROCEDURE — G0009 PNEUMOCOCCAL CONJUGATE VACCINE 13-VALENT LESS THAN 5YO & GREATER THAN: ICD-10-PCS | Mod: S$GLB,,, | Performed by: NURSE PRACTITIONER

## 2020-11-04 PROCEDURE — 1159F PR MEDICATION LIST DOCUMENTED IN MEDICAL RECORD: ICD-10-PCS | Mod: S$GLB,,, | Performed by: NURSE PRACTITIONER

## 2020-11-04 PROCEDURE — 90662 FLU VACCINE - QUADRIVALENT - HIGH DOSE (65+) PRESERVATIVE FREE IM: ICD-10-PCS | Mod: S$GLB,,, | Performed by: NURSE PRACTITIONER

## 2020-11-04 PROCEDURE — 90670 PCV13 VACCINE IM: CPT | Mod: S$GLB,,, | Performed by: NURSE PRACTITIONER

## 2020-11-04 PROCEDURE — 1101F PT FALLS ASSESS-DOCD LE1/YR: CPT | Mod: S$GLB,,, | Performed by: NURSE PRACTITIONER

## 2020-11-04 PROCEDURE — 99213 OFFICE O/P EST LOW 20 MIN: CPT | Mod: 25,S$GLB,, | Performed by: NURSE PRACTITIONER

## 2020-11-04 PROCEDURE — 3078F PR MOST RECENT DIASTOLIC BLOOD PRESSURE < 80 MM HG: ICD-10-PCS | Mod: S$GLB,,, | Performed by: NURSE PRACTITIONER

## 2020-11-04 PROCEDURE — G0008 FLU VACCINE - QUADRIVALENT - HIGH DOSE (65+) PRESERVATIVE FREE IM: ICD-10-PCS | Mod: S$GLB,,, | Performed by: NURSE PRACTITIONER

## 2020-11-04 PROCEDURE — G0008 ADMIN INFLUENZA VIRUS VAC: HCPCS | Mod: S$GLB,,, | Performed by: NURSE PRACTITIONER

## 2020-11-04 PROCEDURE — 1159F MED LIST DOCD IN RCRD: CPT | Mod: S$GLB,,, | Performed by: NURSE PRACTITIONER

## 2020-11-04 PROCEDURE — 3075F SYST BP GE 130 - 139MM HG: CPT | Mod: S$GLB,,, | Performed by: NURSE PRACTITIONER

## 2020-11-04 RX ORDER — MUPIROCIN 20 MG/G
OINTMENT TOPICAL 3 TIMES DAILY
Qty: 22 G | Refills: 1 | Status: SHIPPED | OUTPATIENT
Start: 2020-11-04 | End: 2021-06-22

## 2020-11-04 RX ORDER — AMOXICILLIN AND CLAVULANATE POTASSIUM 875; 125 MG/1; MG/1
1 TABLET, FILM COATED ORAL 2 TIMES DAILY
Qty: 20 TABLET | Refills: 0 | Status: SHIPPED | OUTPATIENT
Start: 2020-11-04 | End: 2020-11-14

## 2020-11-04 NOTE — TELEPHONE ENCOUNTER
----- Message from Teresa De La Cruz sent at 11/4/2020  3:06 PM CST -----  Pt needed a 3 month Follow Up with Dr. Melendez per Michelle. Please advise. Pt #[phone]

## 2020-11-05 NOTE — PROGRESS NOTES
SUBJECTIVE:    Patient ID: Alanis Mendieta is a 69 y.o. female.    Chief Complaint: Facial Injury (puss coming out of nose, set up foot exam, no bottles, no refills, requesting eye exam, wants flu, pna, dexa and mammo scheduled 12/3//DJ)    Pt presents with c/o a lump in her nose that opened up yesterday with white discharge. Recently had a similar spot that she put Mupirocin ointment on it for several days and it went away. However, this new spot is more painful and not resolving since opening. Swollen area if on the septal side of the left nare. Has also been putting Mupirocin on this one. No fevers or cough. Pt wears chronic O2 via NC. Has not seen Dr. Reis yet since most recent hospitalization. Breathing good today. Plans to have pain pump placed per Dr. Daly on Friday.       Admission on 10/15/2020, Discharged on 10/17/2020   Component Date Value Ref Range Status    Blood Culture, Routine 10/15/2020 No growth after 5 days.   Final    Blood Culture, Routine 10/15/2020 No growth after 5 days.   Final    Lactate (Lactic Acid) 10/15/2020 1.8  0.5 - 1.9 mmol/L Final    SARS-CoV-2 RNA, Amplification, Qual 10/15/2020 Negative  Negative Final    BNP 10/15/2020 33  0 - 99 pg/mL Final    Influenza A, Molecular 10/15/2020 Negative  Negative Final    Influenza B, Molecular 10/15/2020 Negative  Negative Final    Flu A & B Source 10/15/2020 Nasal swab   Final    WBC 10/15/2020 7.25  3.90 - 12.70 K/uL Final    RBC 10/15/2020 4.02  4.00 - 5.40 M/uL Final    Hemoglobin 10/15/2020 11.9* 12.0 - 16.0 g/dL Final    Hematocrit 10/15/2020 36.7* 37.0 - 48.5 % Final    MCV 10/15/2020 91  82 - 98 fL Final    MCH 10/15/2020 29.6  27.0 - 31.0 pg Final    MCHC 10/15/2020 32.4  32.0 - 36.0 g/dL Final    RDW 10/15/2020 13.9  11.5 - 14.5 % Final    Platelets 10/15/2020 374* 150 - 350 K/uL Final    MPV 10/15/2020 10.1  9.2 - 12.9 fL Final    Immature Granulocytes 10/15/2020 0.6* 0.0 - 0.5 % Final    Gran #  (ANC) 10/15/2020 5.9  1.8 - 7.7 K/uL Final    Immature Grans (Abs) 10/15/2020 0.04  0.00 - 0.04 K/uL Final    Lymph # 10/15/2020 1.2  1.0 - 4.8 K/uL Final    Mono # 10/15/2020 0.2* 0.3 - 1.0 K/uL Final    Eos # 10/15/2020 0.0  0.0 - 0.5 K/uL Final    Baso # 10/15/2020 0.01  0.00 - 0.20 K/uL Final    nRBC 10/15/2020 0  0 /100 WBC Final    Gran % 10/15/2020 80.9* 38.0 - 73.0 % Final    Lymph % 10/15/2020 16.1* 18.0 - 48.0 % Final    Mono % 10/15/2020 2.3* 4.0 - 15.0 % Final    Eosinophil % 10/15/2020 0.0  0.0 - 8.0 % Final    Basophil % 10/15/2020 0.1  0.0 - 1.9 % Final    Differential Method 10/15/2020 Automated   Final    Sodium 10/15/2020 137  136 - 145 mmol/L Final    Potassium 10/15/2020 3.9  3.5 - 5.1 mmol/L Final    Chloride 10/15/2020 93* 95 - 110 mmol/L Final    CO2 10/15/2020 32* 23 - 29 mmol/L Final    Glucose 10/15/2020 222* 70 - 110 mg/dL Final    BUN 10/15/2020 13  8 - 23 mg/dL Final    Creatinine 10/15/2020 0.7  0.5 - 1.4 mg/dL Final    Calcium 10/15/2020 8.5* 8.7 - 10.5 mg/dL Final    Total Protein 10/15/2020 7.2  6.0 - 8.4 g/dL Final    Albumin 10/15/2020 3.7  3.5 - 5.2 g/dL Final    Total Bilirubin 10/15/2020 0.5  0.1 - 1.0 mg/dL Final    Alkaline Phosphatase 10/15/2020 58  55 - 135 U/L Final    AST 10/15/2020 20  10 - 40 U/L Final    ALT 10/15/2020 16  10 - 44 U/L Final    Anion Gap 10/15/2020 12  8 - 16 mmol/L Final    eGFR if African American 10/15/2020 >60.0  >60 mL/min/1.73 m^2 Final    eGFR if non African American 10/15/2020 >60.0  >60 mL/min/1.73 m^2 Final    Troponin I 10/15/2020 <0.030  <=0.040 ng/mL Final    BNP 10/15/2020 33  0 - 99 pg/mL Final    CPK MB 10/15/2020 3.5  0.1 - 6.5 ng/mL Final    CPK 10/15/2020 90  20 - 180 U/L Final    Magnesium 10/15/2020 1.6  1.6 - 2.6 mg/dL Final    Specimen UA 10/15/2020 Urine, Clean Catch   Final    Color, UA 10/15/2020 Yellow  Yellow, Straw, Edilia Final    Appearance, UA 10/15/2020 Hazy* Clear Final    pH, UA  10/15/2020 7.0  5.0 - 8.0 Final    Specific [place], UA 10/15/2020 1.010  1.005 - 1.030 Final    Protein, UA 10/15/2020 Negative  Negative Final    Glucose, UA 10/15/2020 1+* Negative Final    Ketones, UA 10/15/2020 Negative  Negative Final    Bilirubin (UA) 10/15/2020 Negative  Negative Final    Occult Blood UA 10/15/2020 Negative  Negative Final    Nitrite, UA 10/15/2020 Positive* Negative Final    Urobilinogen, UA 10/15/2020 Negative  Negative EU/dL Final    Leukocytes, UA 10/15/2020 1+* Negative Final    POC PH 10/15/2020 7.431  7.35 - 7.45 Final    POC PCO2 10/15/2020 52.8* 35 - 45 mmHg Final    POC PO2 10/15/2020 154* 80 - 100 mmHg Final    POC HCO3 10/15/2020 35.1* 24 - 28 mmol/L Final    POC BE 10/15/2020 11  -2 to 2 mmol/L Final    POC SATURATED O2 10/15/2020 99  95 - 100 % Final    POC pH Temp 10/15/2020 7.431   Final    POC pCO2 Temp 10/15/2020 52.8  mmHg Final    POC pO2 Temp 10/15/2020 154  mmHg Final    POC TCO2 10/15/2020 37* 23 - 27 mmol/L Final    Sample 10/15/2020 ARTERIAL   Final    Site 10/15/2020 RR   Final    Allens Test 10/15/2020 Pass   Final    DelSys 10/15/2020 Nasal Can   Final    Mode 10/15/2020 SPONT   Final    Flow 10/15/2020 4   Final    FiO2 10/15/2020 36   Final    POC Temp 10/15/2020 37.0 C   Final    RBC, UA 10/15/2020 11* 0 - 4 /hpf Final    WBC, UA 10/15/2020 13* 0 - 5 /hpf Final    Bacteria 10/15/2020 Many* None-Occ /hpf Final    Squam Epithel, UA 10/15/2020 3  /hpf Final    Hyaline Casts, UA 10/15/2020 0.00* 0-1/lpf /lpf Final    Microscopic Comment 10/15/2020 SEE COMMENT   Final    Urine Culture, Routine 10/15/2020 *  Final                    Value:GRAM NEGATIVE BERNARDO, LACTOSE   10,000 - 49,999 cfu/ml  No further identification  No susceptibility performed      Procalcitonin 10/16/2020 <0.05  0.00 - 0.50 ng/mL Final    POC Glucose 10/15/2020 156* 70 - 110 Final    WBC 10/16/2020 7.54  3.90 - 12.70 K/uL Final    RBC 10/16/2020 4.18   4.00 - 5.40 M/uL Final    Hemoglobin 10/16/2020 12.0  12.0 - 16.0 g/dL Final    Hematocrit 10/16/2020 37.7  37.0 - 48.5 % Final    MCV 10/16/2020 90  82 - 98 fL Final    MCH 10/16/2020 28.7  27.0 - 31.0 pg Final    MCHC 10/16/2020 31.8* 32.0 - 36.0 g/dL Final    RDW 10/16/2020 14.0  11.5 - 14.5 % Final    Platelets 10/16/2020 342  150 - 350 K/uL Final    MPV 10/16/2020 9.9  9.2 - 12.9 fL Final    Immature Granulocytes 10/16/2020 0.7* 0.0 - 0.5 % Final    Gran # (ANC) 10/16/2020 6.2  1.8 - 7.7 K/uL Final    Immature Grans (Abs) 10/16/2020 0.05* 0.00 - 0.04 K/uL Final    Lymph # 10/16/2020 1.2  1.0 - 4.8 K/uL Final    Mono # 10/16/2020 0.1* 0.3 - 1.0 K/uL Final    Eos # 10/16/2020 0.0  0.0 - 0.5 K/uL Final    Baso # 10/16/2020 0.01  0.00 - 0.20 K/uL Final    nRBC 10/16/2020 0  0 /100 WBC Final    Gran % 10/16/2020 81.6* 38.0 - 73.0 % Final    Lymph % 10/16/2020 16.4* 18.0 - 48.0 % Final    Mono % 10/16/2020 1.2* 4.0 - 15.0 % Final    Eosinophil % 10/16/2020 0.0  0.0 - 8.0 % Final    Basophil % 10/16/2020 0.1  0.0 - 1.9 % Final    Differential Method 10/16/2020 Automated   Final    Sodium 10/16/2020 138  136 - 145 mmol/L Final    Potassium 10/16/2020 3.9  3.5 - 5.1 mmol/L Final    Chloride 10/16/2020 93* 95 - 110 mmol/L Final    CO2 10/16/2020 34* 23 - 29 mmol/L Final    Glucose 10/16/2020 232* 70 - 110 mg/dL Final    BUN 10/16/2020 15  8 - 23 mg/dL Final    Creatinine 10/16/2020 0.6  0.5 - 1.4 mg/dL Final    Calcium 10/16/2020 8.3* 8.7 - 10.5 mg/dL Final    Total Protein 10/16/2020 6.8  6.0 - 8.4 g/dL Final    Albumin 10/16/2020 3.5  3.5 - 5.2 g/dL Final    Total Bilirubin 10/16/2020 0.5  0.1 - 1.0 mg/dL Final    Alkaline Phosphatase 10/16/2020 56  55 - 135 U/L Final    AST 10/16/2020 19  10 - 40 U/L Final    ALT 10/16/2020 16  10 - 44 U/L Final    Anion Gap 10/16/2020 11  8 - 16 mmol/L Final    eGFR if African American 10/16/2020 >60.0  >60 mL/min/1.73 m^2 Final    eGFR if non   10/16/2020 >60.0  >60 mL/min/1.73 m^2 Final    Magnesium 10/16/2020 1.8  1.6 - 2.6 mg/dL Final    TSH 10/16/2020 0.450  0.340 - 5.600 uIU/mL Final    POC Glucose 10/16/2020 194* 70 - 110 Final    POC Glucose 10/16/2020 208* 70 - 110 Final    POC Glucose 10/16/2020 294* 70 - 110 Final    WBC 10/17/2020 9.84  3.90 - 12.70 K/uL Final    RBC 10/17/2020 4.20  4.00 - 5.40 M/uL Final    Hemoglobin 10/17/2020 12.0  12.0 - 16.0 g/dL Final    Hematocrit 10/17/2020 38.7  37.0 - 48.5 % Final    MCV 10/17/2020 92  82 - 98 fL Final    MCH 10/17/2020 28.6  27.0 - 31.0 pg Final    MCHC 10/17/2020 31.0* 32.0 - 36.0 g/dL Final    RDW 10/17/2020 14.1  11.5 - 14.5 % Final    Platelets 10/17/2020 355* 150 - 350 K/uL Final    MPV 10/17/2020 10.4  9.2 - 12.9 fL Final    Immature Granulocytes 10/17/2020 0.7* 0.0 - 0.5 % Final    Gran # (ANC) 10/17/2020 7.8* 1.8 - 7.7 K/uL Final    Immature Grans (Abs) 10/17/2020 0.07* 0.00 - 0.04 K/uL Final    Lymph # 10/17/2020 1.6  1.0 - 4.8 K/uL Final    Mono # 10/17/2020 0.4  0.3 - 1.0 K/uL Final    Eos # 10/17/2020 0.0  0.0 - 0.5 K/uL Final    Baso # 10/17/2020 0.01  0.00 - 0.20 K/uL Final    nRBC 10/17/2020 0  0 /100 WBC Final    Gran % 10/17/2020 79.2* 38.0 - 73.0 % Final    Lymph % 10/17/2020 16.0* 18.0 - 48.0 % Final    Mono % 10/17/2020 4.0  4.0 - 15.0 % Final    Eosinophil % 10/17/2020 0.0  0.0 - 8.0 % Final    Basophil % 10/17/2020 0.1  0.0 - 1.9 % Final    Differential Method 10/17/2020 Automated   Final    Sodium 10/17/2020 139  136 - 145 mmol/L Final    Potassium 10/17/2020 3.9  3.5 - 5.1 mmol/L Final    Chloride 10/17/2020 96  95 - 110 mmol/L Final    CO2 10/17/2020 31* 23 - 29 mmol/L Final    Glucose 10/17/2020 198* 70 - 110 mg/dL Final    BUN 10/17/2020 15  8 - 23 mg/dL Final    Creatinine 10/17/2020 0.6  0.5 - 1.4 mg/dL Final    Calcium 10/17/2020 8.5* 8.7 - 10.5 mg/dL Final    Total Protein 10/17/2020 6.8  6.0 - 8.4 g/dL Final     Albumin 10/17/2020 3.6  3.5 - 5.2 g/dL Final    Total Bilirubin 10/17/2020 0.4  0.1 - 1.0 mg/dL Final    Alkaline Phosphatase 10/17/2020 57  55 - 135 U/L Final    AST 10/17/2020 17  10 - 40 U/L Final    ALT 10/17/2020 14  10 - 44 U/L Final    Anion Gap 10/17/2020 12  8 - 16 mmol/L Final    eGFR if African American 10/17/2020 >60.0  >60 mL/min/1.73 m^2 Final    eGFR if non African American 10/17/2020 >60.0  >60 mL/min/1.73 m^2 Final    Magnesium 10/17/2020 2.0  1.6 - 2.6 mg/dL Final    POC Glucose 10/17/2020 154* 70 - 110 Final   Admission on 10/13/2020, Discharged on 10/13/2020   Component Date Value Ref Range Status    WBC 10/13/2020 6.58  3.90 - 12.70 K/uL Final    RBC 10/13/2020 4.10  4.00 - 5.40 M/uL Final    Hemoglobin 10/13/2020 11.9* 12.0 - 16.0 g/dL Final    Hematocrit 10/13/2020 37.0  37.0 - 48.5 % Final    MCV 10/13/2020 90  82 - 98 fL Final    MCH 10/13/2020 29.0  27.0 - 31.0 pg Final    MCHC 10/13/2020 32.2  32.0 - 36.0 g/dL Final    RDW 10/13/2020 13.6  11.5 - 14.5 % Final    Platelets 10/13/2020 308  150 - 350 K/uL Final    MPV 10/13/2020 10.2  9.2 - 12.9 fL Final    Immature Granulocytes 10/13/2020 0.8* 0.0 - 0.5 % Final    Gran # (ANC) 10/13/2020 5.9  1.8 - 7.7 K/uL Final    Immature Grans (Abs) 10/13/2020 0.05* 0.00 - 0.04 K/uL Final    Lymph # 10/13/2020 0.6* 1.0 - 4.8 K/uL Final    Mono # 10/13/2020 0.1* 0.3 - 1.0 K/uL Final    Eos # 10/13/2020 0.0  0.0 - 0.5 K/uL Final    Baso # 10/13/2020 0.02  0.00 - 0.20 K/uL Final    nRBC 10/13/2020 0  0 /100 WBC Final    Gran % 10/13/2020 89.0* 38.0 - 73.0 % Final    Lymph % 10/13/2020 8.8* 18.0 - 48.0 % Final    Mono % 10/13/2020 1.1* 4.0 - 15.0 % Final    Eosinophil % 10/13/2020 0.0  0.0 - 8.0 % Final    Basophil % 10/13/2020 0.3  0.0 - 1.9 % Final    Differential Method 10/13/2020 Automated   Final    Sodium 10/13/2020 137  136 - 145 mmol/L Final    Potassium 10/13/2020 3.9  3.5 - 5.1 mmol/L Final    Chloride 10/13/2020  93* 95 - 110 mmol/L Final    CO2 10/13/2020 31* 23 - 29 mmol/L Final    Glucose 10/13/2020 326* 70 - 110 mg/dL Final    BUN 10/13/2020 13  8 - 23 mg/dL Final    Creatinine 10/13/2020 0.7  0.5 - 1.4 mg/dL Final    Calcium 10/13/2020 8.3* 8.7 - 10.5 mg/dL Final    Total Protein 10/13/2020 6.3  6.0 - 8.4 g/dL Final    Albumin 10/13/2020 3.3* 3.5 - 5.2 g/dL Final    Total Bilirubin 10/13/2020 0.2  0.1 - 1.0 mg/dL Final    Alkaline Phosphatase 10/13/2020 61  55 - 135 U/L Final    AST 10/13/2020 22  10 - 40 U/L Final    ALT 10/13/2020 15  10 - 44 U/L Final    Anion Gap 10/13/2020 13  8 - 16 mmol/L Final    eGFR if African American 10/13/2020 >60.0  >60 mL/min/1.73 m^2 Final    eGFR if non African American 10/13/2020 >60.0  >60 mL/min/1.73 m^2 Final    Troponin I 10/13/2020 <0.030  <=0.040 ng/mL Final    BNP 10/13/2020 59  0 - 99 pg/mL Final    PT 10/13/2020 12.6  10.6 - 14.8 sec Final    INR 10/13/2020 1.0   Final    D-Dimer 10/13/2020 0.57* <0.50 ug/mL FEU Final   Office Visit on 09/11/2020   Component Date Value Ref Range Status    Hemoglobin A1C 09/11/2020 8.1  % Final   Admission on 07/17/2020, Discharged on 07/17/2020   Component Date Value Ref Range Status    WBC 07/17/2020 7.63  3.90 - 12.70 K/uL Final    RBC 07/17/2020 4.10  4.00 - 5.40 M/uL Final    Hemoglobin 07/17/2020 12.1  12.0 - 16.0 g/dL Final    Hematocrit 07/17/2020 38.2  37.0 - 48.5 % Final    MCV 07/17/2020 93  82 - 98 fL Final    MCH 07/17/2020 29.5  27.0 - 31.0 pg Final    MCHC 07/17/2020 31.7* 32.0 - 36.0 g/dL Final    RDW 07/17/2020 13.9  11.5 - 14.5 % Final    Platelets 07/17/2020 290  150 - 350 K/uL Final    MPV 07/17/2020 10.2  9.2 - 12.9 fL Final    Immature Granulocytes 07/17/2020 0.4  0.0 - 0.5 % Final    Gran # (ANC) 07/17/2020 5.8  1.8 - 7.7 K/uL Final    Immature Grans (Abs) 07/17/2020 0.03  0.00 - 0.04 K/uL Final    Lymph # 07/17/2020 1.4  1.0 - 4.8 K/uL Final    Mono # 07/17/2020 0.3  0.3 - 1.0 K/uL  Final    Eos # 07/17/2020 0.1  0.0 - 0.5 K/uL Final    Baso # 07/17/2020 0.02  0.00 - 0.20 K/uL Final    nRBC 07/17/2020 0  0 /100 WBC Final    Gran % 07/17/2020 75.4* 38.0 - 73.0 % Final    Lymph % 07/17/2020 18.7  18.0 - 48.0 % Final    Mono % 07/17/2020 4.5  4.0 - 15.0 % Final    Eosinophil % 07/17/2020 0.7  0.0 - 8.0 % Final    Basophil % 07/17/2020 0.3  0.0 - 1.9 % Final    Differential Method 07/17/2020 Automated   Final    Sodium 07/17/2020 141  136 - 145 mmol/L Final    Potassium 07/17/2020 3.7  3.5 - 5.1 mmol/L Final    Chloride 07/17/2020 98  95 - 110 mmol/L Final    CO2 07/17/2020 34* 23 - 29 mmol/L Final    Glucose 07/17/2020 103  70 - 110 mg/dL Final    BUN 07/17/2020 14  8 - 23 mg/dL Final    Creatinine 07/17/2020 0.7  0.5 - 1.4 mg/dL Final    Calcium 07/17/2020 8.0* 8.7 - 10.5 mg/dL Final    Total Protein 07/17/2020 6.3  6.0 - 8.4 g/dL Final    Albumin 07/17/2020 3.3* 3.5 - 5.2 g/dL Final    Total Bilirubin 07/17/2020 0.3  0.1 - 1.0 mg/dL Final    Alkaline Phosphatase 07/17/2020 57  55 - 135 U/L Final    AST 07/17/2020 17  10 - 40 U/L Final    ALT 07/17/2020 14  10 - 44 U/L Final    Anion Gap 07/17/2020 9  8 - 16 mmol/L Final    eGFR if African American 07/17/2020 >60.0  >60 mL/min/1.73 m^2 Final    eGFR if non African American 07/17/2020 >60.0  >60 mL/min/1.73 m^2 Final    Troponin I 07/17/2020 <0.030  <=0.040 ng/mL Final    BNP 07/17/2020 20  0 - 99 pg/mL Final   No results displayed because visit has over 200 results.      Office Visit on 06/16/2020   Component Date Value Ref Range Status    Hemoglobin A1C 06/16/2020 8.4  % Final       Past Medical History:   Diagnosis Date    Allergy     Anxiety     Arthritis     Asthma     Bipolar disorder     Cancer     thyroid    Chronic back pain     COPD (chronic obstructive pulmonary disease)     Diabetes mellitus     Diabetes mellitus, type 2     Fibromyalgia     GERD (gastroesophageal reflux disease)     History of  fall 4/26/2018    [place] (hard of hearing)     Hx of thyroid cancer     Hyperlipidemia     Hypertension     Hypothyroidism     Neuropathy     On home oxygen therapy     3 l/m 24/7    Restless leg syndrome     Sleep apnea     Urinary tract infection      Past Surgical History:   Procedure Laterality Date    APPENDECTOMY      BACK SURGERY      x 3    broken foot and ankle      CHOLECYSTECTOMY      EYE SURGERY Bilateral     cataract    HYSTERECTOMY      JOINT REPLACEMENT Left 09/17/2018    knee    KNEE ARTHROPLASTY Left 9/17/2018    Procedure: ARTHROPLASTY, KNEE;  Surgeon: Yariel Marin MD;  Location: [facility];  Service: Orthopedics;  Laterality: Left;    MYELOGRAPHY N/A 8/7/2019    Procedure: MYELOGRAM;  Surgeon: Sun Diagnostic Provider;  Location: [facility] OR;  Service: General;  Laterality: N/A;    POSTERIOR REPAIR      SPINAL FUSION      x3 BACK, NECK X 4    TOTAL THYROIDECTOMY  2014     Family History   Problem Relation Age of Onset    Diabetes Mother     Heart disease Mother     Hypertension Mother     Diabetes Father     Heart disease Father     Hypertension Father        Marital Status:   Alcohol History:  reports no history of alcohol use.  Tobacco History:  reports that she quit smoking about 20 years ago. Her smoking use included cigarettes. She has a 30.00 pack-year smoking history. She has never used smokeless tobacco.  Drug History:  reports no history of drug use.    Review of patient's allergies indicates:   Allergen Reactions    Morphine Itching    Tubersol [tuberculin ppd] Other (See Comments)     Site swells bad. Has to have chest xray       Current Outpatient Medications:     ACCU-CHEK LIBERTY PLUS TEST STRP Strp, , Disp: , Rfl:     albuterol (VENTOLIN HFA) 90 mcg/actuation inhaler, Inhale 2 puffs into the lungs every 6 (six) hours as needed for Wheezing. Rescue, Disp: 1 Inhaler, Rfl: 3    albuterol-ipratropium (DUO-NEB) 2.5 mg-0.5 mg/3 mL nebulizer solution, Take 3  mLs by nebulization every 6 (six) hours as needed for Wheezing or Shortness of Breath. Rescue, Disp: 360 mL, Rfl: 5    amoxicillin-clavulanate 875-125mg (AUGMENTIN) 875-125 mg per tablet, Take 1 tablet by mouth 2 (two) times daily. for 10 days, Disp: 20 tablet, Rfl: 0    ARTIFICIAL TEARS,HYPROMELLOSE, OPHT, Place 1 drop into both eyes 3 (three) times daily., Disp: , Rfl:     calcitRIOL (ROCALTROL) 0.5 MCG Cap, Take 1 capsule (0.5 mcg total) by mouth every evening., Disp: 90 capsule, Rfl: 1    clonazePAM (KLONOPIN) 0.5 MG tablet, Take 1 tablet (0.5 mg total) by mouth 2 (two) times daily., Disp: 180 tablet, Rfl: 0    doxepin (ZONALON) 5 % cream, Apply 1 application topically 3 (three) times daily as needed., Disp: , Rfl:     DULoxetine (CYMBALTA) 20 MG capsule, Take 20 mg by mouth once daily. , Disp: , Rfl:     FLUoxetine 40 MG capsule, Take 1 capsule (40 mg total) by mouth once daily., Disp: 90 capsule, Rfl: 1    fluticasone propionate (FLONASE) 50 mcg/actuation nasal spray, 1 spray by Each Nostril route once daily., Disp: , Rfl:     fluticasone-umeclidin-vilanter (TRELEGY ELLIPTA) 100-62.5-25 mcg DsDv, Inhale 1 puff into the lungs once daily., Disp: 3 each, Rfl: 1    gabapentin (NEURONTIN) 800 MG tablet, Take 1 tablet (800 mg total) by mouth 3 (three) times daily., Disp: 270 tablet, Rfl: 1    guaiFENesin (MUCINEX) 600 mg 12 hr tablet, Take 1 tablet (600 mg total) by mouth 2 (two) times daily., Disp: 30 tablet, Rfl: 2    hydroCHLOROthiazide (HYDRODIURIL) 12.5 MG Tab, Take 1 tablet (12.5 mg total) by mouth once daily., Disp: 30 tablet, Rfl: 11    ibuprofen (ADVIL,MOTRIN) 400 MG tablet, Take 400 mg by mouth every 8 (eight) hours as needed for Other., Disp: , Rfl:     insulin aspart U-100 (NOVOLOG) 100 unit/mL injection, Inject 2-10 Units into the skin 4 (four) times daily with meals and nightly. Sliding scale 151-400, Disp: , Rfl:     lamoTRIgine (LAMICTAL) 150 MG Tab, Take 1 tablet (150 mg total) by  "mouth 2 (two) times daily., Disp: 180 tablet, Rfl: 1    latanoprost 0.005 % ophthalmic solution, Place 1 drop into both eyes every evening. , Disp: , Rfl:     LEVEMIR FLEXTOUCH U-100 INSULN 100 unit/mL (3 mL) InPn pen, Inject 65 Units into the skin 2 (two) times daily. (Patient taking differently: Inject 70 Units into the skin 2 (two) times daily. ), Disp: 3 Box, Rfl: 3    levothyroxine (SYNTHROID) 150 MCG tablet, Take 1 tablet (150 mcg total) by mouth before breakfast., Disp: 90 tablet, Rfl: 1    linaCLOtide (LINZESS) 290 mcg Cap capsule, Take 1 capsule (290 mcg total) by mouth once daily., Disp: 90 capsule, Rfl: 1    losartan (COZAAR) 50 MG tablet, Take 1 tablet (50 mg total) by mouth once daily., Disp: 90 tablet, Rfl: 3    losartan-hydrochlorothiazide 50-12.5 mg (HYZAAR) 50-12.5 mg per tablet, Take 0.5 tablets by mouth once daily., Disp: 90 tablet, Rfl: 1    methocarbamoL (ROBAXIN) 750 MG Tab, Take 1 tablet (750 mg total) by mouth 2 (two) times daily as needed., Disp: 60 tablet, Rfl: 2    methylPREDNISolone (MEDROL DOSEPACK) 4 mg tablet, use as directed, Disp: 1 Package, Rfl: 0    mupirocin (BACTROBAN) 2 % ointment, Apply topically 3 (three) times daily., Disp: 22 g, Rfl: 1    NOVOFINE AUTOCOVER 30 gauge x 1/3" Ndle, 1 each by In Vitro route 4 (four) times daily., Disp: 360 each, Rfl: 3    oxyCODONE (ROXICODONE) 10 mg Tab immediate release tablet, Take 10 mg by mouth 3 (three) times daily. , Disp: , Rfl:     pantoprazole (PROTONIX) 40 MG tablet, Take 1 tablet (40 mg total) by mouth once daily., Disp: 90 tablet, Rfl: 1    potassium chloride SA (K-DUR,KLOR-CON) 10 MEQ tablet, Take 1 tablet (10 mEq total) by mouth 3 (three) times a week. Mon , Wed, and Fri. (Patient taking differently: Take 10 mEq by mouth every Mon, Wed, Fri. Mon , Wed, and Fri.), Disp: 36 tablet, Rfl: 1    QUEtiapine (SEROQUEL) 50 MG tablet, Take 50 mg by mouth every evening. , Disp: , Rfl:     RELISTOR 150 mg Tab, Take 150 mg by " "mouth once daily., Disp: 30 tablet, Rfl: 2    rOPINIRole (REQUIP) 4 MG tablet, Take 1 tablet (4 mg total) by mouth 2 (two) times daily., Disp: 180 tablet, Rfl: 1    temazepam (RESTORIL) 15 mg Cap, Take 15 mg by mouth every evening. , Disp: , Rfl:     Review of Systems   Constitutional: Negative for chills and fever.   HENT: Negative for congestion, ear pain, sinus pressure, sinus pain and sore throat.         Pain and discharge from left nare   Eyes: Negative for pain and redness.   Respiratory: Positive for shortness of breath and wheezing. Negative for cough.    Cardiovascular: Negative for chest pain.   Gastrointestinal: Negative for nausea and vomiting.   Genitourinary: Negative for dysuria.   Musculoskeletal: Negative for myalgias and neck pain.   Skin: Negative.    Neurological: Negative for dizziness, weakness and headaches.   Psychiatric/Behavioral: Negative.           Objective:      Vitals:    11/04/20 1419   BP: 138/72   Pulse: 78   Temp: 99.3 °F (37.4 °C)   Weight: 96.2 kg (212 lb)   Height: 5' 4" (1.626 m)     Body mass index is 36.39 kg/m².  Physical Exam  Constitutional:       Appearance: She is well-developed.   HENT:      Head: Normocephalic.      Nose: Nose normal.      Comments: Mild swelling to left septum. Some dried blood noted  Eyes:      Pupils: Pupils are equal, round, and reactive to light.   Neck:      Musculoskeletal: Normal range of motion.   Cardiovascular:      Rate and Rhythm: Normal rate and regular rhythm.   Pulmonary:      Effort: Tachypnea present.      Breath sounds: Decreased air movement present.   Abdominal:      Palpations: Abdomen is soft.   Lymphadenopathy:      Cervical: No cervical adenopathy.   Skin:     General: Skin is warm and dry.   Neurological:      Mental Status: She is alert and oriented to person, place, and time.           Assessment:       1. Flu vaccine need    2. Need for vaccination with 13-polyvalent pneumococcal conjugate vaccine    3. Cyst of nasal " cavity    4. Chronic pain syndrome         Plan:       Flu vaccine need  -     Influenza - Quadrivalent - High Dose (65+) (PF) (IM)    Need for vaccination with 13-polyvalent pneumococcal conjugate vaccine  -     Pneumococcal Conjugate Vaccine (13 Valent) (IM)    Cyst of nasal cavity  -     amoxicillin-clavulanate 875-125mg (AUGMENTIN) 875-125 mg per tablet; Take 1 tablet by mouth 2 (two) times daily. for 10 days  Dispense: 20 tablet; Refill: 0  -     mupirocin (BACTROBAN) 2 % ointment; Apply topically 3 (three) times daily.  Dispense: 22 g; Refill: 1    Chronic pain syndrome  - Pain pump to be placed on Friday.    Follow up in about 1 month (around 12/4/2020) for with me, 3 months with Dr. Melendez.

## 2020-11-20 DIAGNOSIS — F51.01 PRIMARY INSOMNIA: ICD-10-CM

## 2020-11-20 RX ORDER — ROPINIROLE 4 MG/1
4 TABLET, FILM COATED ORAL 2 TIMES DAILY
Qty: 180 TABLET | Refills: 1 | Status: SHIPPED | OUTPATIENT
Start: 2020-11-20 | End: 2020-12-11 | Stop reason: SDUPTHER

## 2020-11-20 NOTE — TELEPHONE ENCOUNTER
----- Message from Yee Chatterjee sent at 11/20/2020  8:57 AM CST -----  Regarding: Refill  Contact: Alanis Mendieta  Needs refill on Ropinirole 4mg  [facility] to [facility]  Pt# [phone]

## 2020-11-24 ENCOUNTER — OFFICE VISIT (OUTPATIENT)
Dept: PULMONOLOGY | Facility: CLINIC | Age: 70
End: 2020-11-24
Payer: MEDICARE

## 2020-11-24 ENCOUNTER — HOSPITAL ENCOUNTER (INPATIENT)
Facility: HOSPITAL | Age: 70
LOS: 6 days | Discharge: HOME HEALTH | DRG: 190 | End: 2020-11-30
Attending: EMERGENCY MEDICINE | Admitting: INTERNAL MEDICINE
Payer: MEDICARE

## 2020-11-24 DIAGNOSIS — J44.89 ASTHMA-COPD OVERLAP SYNDROME: ICD-10-CM

## 2020-11-24 DIAGNOSIS — R94.31 QT PROLONGATION: ICD-10-CM

## 2020-11-24 DIAGNOSIS — R06.02 SHORTNESS OF BREATH: ICD-10-CM

## 2020-11-24 DIAGNOSIS — J96.21 ACUTE ON CHRONIC RESPIRATORY FAILURE WITH HYPOXIA: ICD-10-CM

## 2020-11-24 DIAGNOSIS — J44.1 COPD EXACERBATION: Primary | ICD-10-CM

## 2020-11-24 DIAGNOSIS — J34.1 CYST OF NASAL CAVITY: ICD-10-CM

## 2020-11-24 LAB
ALBUMIN SERPL BCP-MCNC: 3.8 G/DL (ref 3.5–5.2)
ALP SERPL-CCNC: 62 U/L (ref 55–135)
ALT SERPL W/O P-5'-P-CCNC: 18 U/L (ref 10–44)
ANION GAP SERPL CALC-SCNC: 10 MMOL/L (ref 8–16)
AST SERPL-CCNC: 17 U/L (ref 10–40)
BASOPHILS # BLD AUTO: 0.03 K/UL (ref 0–0.2)
BASOPHILS NFR BLD: 0.3 % (ref 0–1.9)
BILIRUB SERPL-MCNC: 0.4 MG/DL (ref 0.1–1)
BNP SERPL-MCNC: 23 PG/ML (ref 0–99)
BUN SERPL-MCNC: 20 MG/DL (ref 8–23)
CALCIUM SERPL-MCNC: 8.3 MG/DL (ref 8.7–10.5)
CHLORIDE SERPL-SCNC: 97 MMOL/L (ref 95–110)
CO2 SERPL-SCNC: 32 MMOL/L (ref 23–29)
CREAT SERPL-MCNC: 0.6 MG/DL (ref 0.5–1.4)
DIFFERENTIAL METHOD: ABNORMAL
EOSINOPHIL # BLD AUTO: 0 K/UL (ref 0–0.5)
EOSINOPHIL NFR BLD: 0.1 % (ref 0–8)
ERYTHROCYTE [DISTWIDTH] IN BLOOD BY AUTOMATED COUNT: 14.4 % (ref 11.5–14.5)
EST. GFR  (AFRICAN AMERICAN): >60 ML/MIN/1.73 M^2
EST. GFR  (NON AFRICAN AMERICAN): >60 ML/MIN/1.73 M^2
GLUCOSE SERPL-MCNC: 274 MG/DL (ref 70–110)
GLUCOSE SERPL-MCNC: 307 MG/DL (ref 70–110)
GLUCOSE SERPL-MCNC: 381 MG/DL (ref 70–110)
HCT VFR BLD AUTO: 37.6 % (ref 37–48.5)
HGB BLD-MCNC: 11.6 G/DL (ref 12–16)
IMM GRANULOCYTES # BLD AUTO: 0.1 K/UL (ref 0–0.04)
IMM GRANULOCYTES NFR BLD AUTO: 1.1 % (ref 0–0.5)
INR PPP: 1.1
LACTATE SERPL-SCNC: 1.4 MMOL/L (ref 0.5–1.9)
LYMPHOCYTES # BLD AUTO: 1.3 K/UL (ref 1–4.8)
LYMPHOCYTES NFR BLD: 14.3 % (ref 18–48)
MCH RBC QN AUTO: 27.8 PG (ref 27–31)
MCHC RBC AUTO-ENTMCNC: 30.9 G/DL (ref 32–36)
MCV RBC AUTO: 90 FL (ref 82–98)
MONOCYTES # BLD AUTO: 0.3 K/UL (ref 0.3–1)
MONOCYTES NFR BLD: 3.3 % (ref 4–15)
NEUTROPHILS # BLD AUTO: 7.4 K/UL (ref 1.8–7.7)
NEUTROPHILS NFR BLD: 80.9 % (ref 38–73)
NRBC BLD-RTO: 0 /100 WBC
PLATELET # BLD AUTO: 352 K/UL (ref 150–350)
PMV BLD AUTO: 10.6 FL (ref 9.2–12.9)
POTASSIUM SERPL-SCNC: 4 MMOL/L (ref 3.5–5.1)
PROCALCITONIN SERPL IA-MCNC: <0.05 NG/ML (ref 0–0.5)
PROT SERPL-MCNC: 6.9 G/DL (ref 6–8.4)
PROTHROMBIN TIME: 13.5 SEC (ref 10.6–14.8)
RBC # BLD AUTO: 4.17 M/UL (ref 4–5.4)
SARS-COV-2 RDRP RESP QL NAA+PROBE: NEGATIVE
SODIUM SERPL-SCNC: 139 MMOL/L (ref 136–145)
TROPONIN I SERPL DL<=0.01 NG/ML-MCNC: <0.03 NG/ML
WBC # BLD AUTO: 9.19 K/UL (ref 3.9–12.7)

## 2020-11-24 PROCEDURE — 3074F SYST BP LT 130 MM HG: CPT | Mod: S$GLB,,, | Performed by: INTERNAL MEDICINE

## 2020-11-24 PROCEDURE — 25000003 PHARM REV CODE 250: Performed by: NURSE PRACTITIONER

## 2020-11-24 PROCEDURE — 85025 COMPLETE CBC W/AUTO DIFF WBC: CPT

## 2020-11-24 PROCEDURE — 3074F PR MOST RECENT SYSTOLIC BLOOD PRESSURE < 130 MM HG: ICD-10-PCS | Mod: S$GLB,,, | Performed by: INTERNAL MEDICINE

## 2020-11-24 PROCEDURE — 83605 ASSAY OF LACTIC ACID: CPT

## 2020-11-24 PROCEDURE — 3078F PR MOST RECENT DIASTOLIC BLOOD PRESSURE < 80 MM HG: ICD-10-PCS | Mod: S$GLB,,, | Performed by: INTERNAL MEDICINE

## 2020-11-24 PROCEDURE — 1159F MED LIST DOCD IN RCRD: CPT | Mod: S$GLB,,, | Performed by: INTERNAL MEDICINE

## 2020-11-24 PROCEDURE — 85610 PROTHROMBIN TIME: CPT

## 2020-11-24 PROCEDURE — 87040 BLOOD CULTURE FOR BACTERIA: CPT

## 2020-11-24 PROCEDURE — 83880 ASSAY OF NATRIURETIC PEPTIDE: CPT

## 2020-11-24 PROCEDURE — 1157F ADVNC CARE PLAN IN RCRD: CPT | Mod: S$GLB,,, | Performed by: INTERNAL MEDICINE

## 2020-11-24 PROCEDURE — 25000242 PHARM REV CODE 250 ALT 637 W/ HCPCS: Performed by: EMERGENCY MEDICINE

## 2020-11-24 PROCEDURE — 93010 ELECTROCARDIOGRAM REPORT: CPT | Mod: ,,, | Performed by: SPECIALIST

## 2020-11-24 PROCEDURE — 99214 OFFICE O/P EST MOD 30 MIN: CPT | Mod: S$GLB,,, | Performed by: INTERNAL MEDICINE

## 2020-11-24 PROCEDURE — 99285 EMERGENCY DEPT VISIT HI MDM: CPT | Mod: 25

## 2020-11-24 PROCEDURE — 82962 GLUCOSE BLOOD TEST: CPT

## 2020-11-24 PROCEDURE — 94760 N-INVAS EAR/PLS OXIMETRY 1: CPT

## 2020-11-24 PROCEDURE — 96374 THER/PROPH/DIAG INJ IV PUSH: CPT

## 2020-11-24 PROCEDURE — U0002 COVID-19 LAB TEST NON-CDC: HCPCS

## 2020-11-24 PROCEDURE — 93005 ELECTROCARDIOGRAM TRACING: CPT | Performed by: SPECIALIST

## 2020-11-24 PROCEDURE — C9399 UNCLASSIFIED DRUGS OR BIOLOG: HCPCS | Performed by: NURSE PRACTITIONER

## 2020-11-24 PROCEDURE — 27000221 HC OXYGEN, UP TO 24 HOURS

## 2020-11-24 PROCEDURE — 84145 PROCALCITONIN (PCT): CPT

## 2020-11-24 PROCEDURE — 1157F PR ADVANCE CARE PLAN OR EQUIV PRESENT IN MEDICAL RECORD: ICD-10-PCS | Mod: S$GLB,,, | Performed by: INTERNAL MEDICINE

## 2020-11-24 PROCEDURE — 63600175 PHARM REV CODE 636 W HCPCS: Performed by: NURSE PRACTITIONER

## 2020-11-24 PROCEDURE — 12000002 HC ACUTE/MED SURGE SEMI-PRIVATE ROOM

## 2020-11-24 PROCEDURE — 63600175 PHARM REV CODE 636 W HCPCS: Performed by: EMERGENCY MEDICINE

## 2020-11-24 PROCEDURE — 93010 EKG 12-LEAD: ICD-10-PCS | Mod: ,,, | Performed by: SPECIALIST

## 2020-11-24 PROCEDURE — 99214 PR OFFICE/OUTPT VISIT, EST, LEVL IV, 30-39 MIN: ICD-10-PCS | Mod: S$GLB,,, | Performed by: INTERNAL MEDICINE

## 2020-11-24 PROCEDURE — 80053 COMPREHEN METABOLIC PANEL: CPT

## 2020-11-24 PROCEDURE — 36415 COLL VENOUS BLD VENIPUNCTURE: CPT

## 2020-11-24 PROCEDURE — 84484 ASSAY OF TROPONIN QUANT: CPT

## 2020-11-24 PROCEDURE — 1159F PR MEDICATION LIST DOCUMENTED IN MEDICAL RECORD: ICD-10-PCS | Mod: S$GLB,,, | Performed by: INTERNAL MEDICINE

## 2020-11-24 PROCEDURE — 94640 AIRWAY INHALATION TREATMENT: CPT

## 2020-11-24 PROCEDURE — 3078F DIAST BP <80 MM HG: CPT | Mod: S$GLB,,, | Performed by: INTERNAL MEDICINE

## 2020-11-24 RX ORDER — MECLIZINE HYDROCHLORIDE 25 MG/1
25 TABLET ORAL 3 TIMES DAILY PRN
COMMUNITY
End: 2020-12-11 | Stop reason: SDUPTHER

## 2020-11-24 RX ORDER — OXYCODONE HYDROCHLORIDE 5 MG/1
10 TABLET ORAL EVERY 8 HOURS PRN
Status: DISCONTINUED | OUTPATIENT
Start: 2020-11-24 | End: 2020-11-30 | Stop reason: HOSPADM

## 2020-11-24 RX ORDER — HYDROCHLOROTHIAZIDE 12.5 MG/1
12.5 TABLET ORAL DAILY
Status: DISCONTINUED | OUTPATIENT
Start: 2020-11-25 | End: 2020-11-30 | Stop reason: HOSPADM

## 2020-11-24 RX ORDER — IPRATROPIUM BROMIDE AND ALBUTEROL SULFATE 2.5; .5 MG/3ML; MG/3ML
3 SOLUTION RESPIRATORY (INHALATION) EVERY 8 HOURS
Status: DISCONTINUED | OUTPATIENT
Start: 2020-11-25 | End: 2020-11-24

## 2020-11-24 RX ORDER — LATANOPROST 50 UG/ML
1 SOLUTION/ DROPS OPHTHALMIC NIGHTLY
Status: DISCONTINUED | OUTPATIENT
Start: 2020-11-24 | End: 2020-11-30 | Stop reason: HOSPADM

## 2020-11-24 RX ORDER — POLYETHYLENE GLYCOL 3350 17 G/17G
17 POWDER, FOR SOLUTION ORAL DAILY
Status: DISCONTINUED | OUTPATIENT
Start: 2020-11-25 | End: 2020-11-30 | Stop reason: HOSPADM

## 2020-11-24 RX ORDER — FLUOXETINE HYDROCHLORIDE 20 MG/1
40 CAPSULE ORAL DAILY
Status: DISCONTINUED | OUTPATIENT
Start: 2020-11-25 | End: 2020-11-30 | Stop reason: HOSPADM

## 2020-11-24 RX ORDER — IPRATROPIUM BROMIDE AND ALBUTEROL SULFATE 2.5; .5 MG/3ML; MG/3ML
3 SOLUTION RESPIRATORY (INHALATION) EVERY 6 HOURS PRN
Status: DISCONTINUED | OUTPATIENT
Start: 2020-11-24 | End: 2020-11-30 | Stop reason: HOSPADM

## 2020-11-24 RX ORDER — ROPINIROLE 2 MG/1
4 TABLET, FILM COATED ORAL 2 TIMES DAILY
Status: DISCONTINUED | OUTPATIENT
Start: 2020-11-24 | End: 2020-11-30 | Stop reason: HOSPADM

## 2020-11-24 RX ORDER — LEVOFLOXACIN 5 MG/ML
750 INJECTION, SOLUTION INTRAVENOUS
Status: DISCONTINUED | OUTPATIENT
Start: 2020-11-24 | End: 2020-11-25

## 2020-11-24 RX ORDER — CARBOXYMETHYLCELLULOSE SODIUM 5 MG/ML
1 SOLUTION/ DROPS OPHTHALMIC 3 TIMES DAILY
Status: DISCONTINUED | OUTPATIENT
Start: 2020-11-24 | End: 2020-11-30 | Stop reason: HOSPADM

## 2020-11-24 RX ORDER — METHYLPREDNISOLONE SOD SUCC 125 MG
125 VIAL (EA) INJECTION
Status: COMPLETED | OUTPATIENT
Start: 2020-11-24 | End: 2020-11-24

## 2020-11-24 RX ORDER — LOSARTAN POTASSIUM 50 MG/1
50 TABLET ORAL DAILY
Status: DISCONTINUED | OUTPATIENT
Start: 2020-11-25 | End: 2020-11-30 | Stop reason: HOSPADM

## 2020-11-24 RX ORDER — CLONAZEPAM 0.5 MG/1
0.5 TABLET ORAL 2 TIMES DAILY PRN
Status: DISCONTINUED | OUTPATIENT
Start: 2020-11-24 | End: 2020-11-30 | Stop reason: HOSPADM

## 2020-11-24 RX ORDER — QUETIAPINE FUMARATE 25 MG/1
25 TABLET, FILM COATED ORAL NIGHTLY
Status: DISCONTINUED | OUTPATIENT
Start: 2020-11-24 | End: 2020-11-30 | Stop reason: HOSPADM

## 2020-11-24 RX ORDER — LEVOFLOXACIN 750 MG/1
750 TABLET ORAL DAILY
Status: ON HOLD | COMMUNITY
End: 2020-11-30 | Stop reason: HOSPADM

## 2020-11-24 RX ORDER — MECLIZINE HCL 12.5 MG 12.5 MG/1
25 TABLET ORAL 3 TIMES DAILY PRN
Status: DISCONTINUED | OUTPATIENT
Start: 2020-11-24 | End: 2020-11-30 | Stop reason: HOSPADM

## 2020-11-24 RX ORDER — FLUTICASONE PROPIONATE 50 MCG
1 SPRAY, SUSPENSION (ML) NASAL DAILY
Status: DISCONTINUED | OUTPATIENT
Start: 2020-11-25 | End: 2020-11-30 | Stop reason: HOSPADM

## 2020-11-24 RX ORDER — IBUPROFEN 200 MG
24 TABLET ORAL
Status: DISCONTINUED | OUTPATIENT
Start: 2020-11-24 | End: 2020-11-26

## 2020-11-24 RX ORDER — ALBUTEROL SULFATE 90 UG/1
2 AEROSOL, METERED RESPIRATORY (INHALATION) EVERY 6 HOURS PRN
Status: DISCONTINUED | OUTPATIENT
Start: 2020-11-24 | End: 2020-11-24 | Stop reason: SDUPTHER

## 2020-11-24 RX ORDER — PANTOPRAZOLE SODIUM 40 MG/1
40 TABLET, DELAYED RELEASE ORAL DAILY
Status: DISCONTINUED | OUTPATIENT
Start: 2020-11-25 | End: 2020-11-30 | Stop reason: HOSPADM

## 2020-11-24 RX ORDER — GLUCAGON 1 MG
1 KIT INJECTION
Status: DISCONTINUED | OUTPATIENT
Start: 2020-11-24 | End: 2020-11-26

## 2020-11-24 RX ORDER — SODIUM CHLORIDE 0.9 % (FLUSH) 0.9 %
10 SYRINGE (ML) INJECTION
Status: DISCONTINUED | OUTPATIENT
Start: 2020-11-24 | End: 2020-11-30 | Stop reason: HOSPADM

## 2020-11-24 RX ORDER — ACETAMINOPHEN 325 MG/1
650 TABLET ORAL EVERY 4 HOURS PRN
Status: DISCONTINUED | OUTPATIENT
Start: 2020-11-24 | End: 2020-11-30 | Stop reason: HOSPADM

## 2020-11-24 RX ORDER — ENOXAPARIN SODIUM 100 MG/ML
40 INJECTION SUBCUTANEOUS EVERY 24 HOURS
Status: DISCONTINUED | OUTPATIENT
Start: 2020-11-24 | End: 2020-11-30 | Stop reason: HOSPADM

## 2020-11-24 RX ORDER — METHOCARBAMOL 750 MG/1
750 TABLET, FILM COATED ORAL 2 TIMES DAILY PRN
Status: DISCONTINUED | OUTPATIENT
Start: 2020-11-24 | End: 2020-11-30 | Stop reason: HOSPADM

## 2020-11-24 RX ORDER — INSULIN ASPART 100 [IU]/ML
0-5 INJECTION, SOLUTION INTRAVENOUS; SUBCUTANEOUS
Status: DISCONTINUED | OUTPATIENT
Start: 2020-11-24 | End: 2020-11-26

## 2020-11-24 RX ORDER — MUPIROCIN 20 MG/G
OINTMENT TOPICAL 3 TIMES DAILY
Status: DISCONTINUED | OUTPATIENT
Start: 2020-11-24 | End: 2020-11-30 | Stop reason: HOSPADM

## 2020-11-24 RX ORDER — CALCITRIOL 0.25 UG/1
0.5 CAPSULE ORAL NIGHTLY
Status: DISCONTINUED | OUTPATIENT
Start: 2020-11-25 | End: 2020-11-30 | Stop reason: HOSPADM

## 2020-11-24 RX ORDER — IPRATROPIUM BROMIDE AND ALBUTEROL SULFATE 2.5; .5 MG/3ML; MG/3ML
3 SOLUTION RESPIRATORY (INHALATION)
Status: DISCONTINUED | OUTPATIENT
Start: 2020-11-25 | End: 2020-11-30 | Stop reason: HOSPADM

## 2020-11-24 RX ORDER — LEVALBUTEROL 1.25 MG/.5ML
1.25 SOLUTION, CONCENTRATE RESPIRATORY (INHALATION)
Status: COMPLETED | OUTPATIENT
Start: 2020-11-24 | End: 2020-11-24

## 2020-11-24 RX ORDER — IBUPROFEN 200 MG
16 TABLET ORAL
Status: DISCONTINUED | OUTPATIENT
Start: 2020-11-24 | End: 2020-11-26

## 2020-11-24 RX ADMIN — ROPINIROLE 4 MG: 2 TABLET, FILM COATED ORAL at 08:11

## 2020-11-24 RX ADMIN — CLONAZEPAM 0.5 MG: 0.5 TABLET ORAL at 07:11

## 2020-11-24 RX ADMIN — MUPIROCIN: 20 OINTMENT TOPICAL at 08:11

## 2020-11-24 RX ADMIN — LEVOFLOXACIN 750 MG: 750 INJECTION, SOLUTION INTRAVENOUS at 07:11

## 2020-11-24 RX ADMIN — OXYCODONE HYDROCHLORIDE 10 MG: 5 TABLET ORAL at 07:11

## 2020-11-24 RX ADMIN — ENOXAPARIN SODIUM 40 MG: 40 INJECTION SUBCUTANEOUS at 07:11

## 2020-11-24 RX ADMIN — INSULIN DETEMIR 70 UNITS: 100 INJECTION, SOLUTION SUBCUTANEOUS at 08:11

## 2020-11-24 RX ADMIN — LAMOTRIGINE 150 MG: 100 TABLET ORAL at 08:11

## 2020-11-24 RX ADMIN — CARBOXYMETHYLCELLULOSE SODIUM 1 DROP: 5 SOLUTION/ DROPS OPHTHALMIC at 08:11

## 2020-11-24 RX ADMIN — LATANOPROST 1 DROP: 50 SOLUTION OPHTHALMIC at 09:11

## 2020-11-24 RX ADMIN — METHYLPREDNISOLONE SODIUM SUCCINATE 125 MG: 125 INJECTION, POWDER, FOR SOLUTION INTRAMUSCULAR; INTRAVENOUS at 01:11

## 2020-11-24 RX ADMIN — LEVALBUTEROL HYDROCHLORIDE 1.25 MG: 1.25 SOLUTION, CONCENTRATE RESPIRATORY (INHALATION) at 01:11

## 2020-11-24 RX ADMIN — QUETIAPINE 25 MG: 25 TABLET ORAL at 08:11

## 2020-11-24 RX ADMIN — GABAPENTIN 800 MG: 300 CAPSULE ORAL at 08:11

## 2020-11-24 RX ADMIN — METHYLPREDNISOLONE SODIUM SUCCINATE 80 MG: 40 INJECTION, POWDER, FOR SOLUTION INTRAMUSCULAR; INTRAVENOUS at 09:11

## 2020-11-24 NOTE — PROGRESS NOTES
"    Office Visit Note *    Patient Name: Alanis Mendieta  MRN: [id number]  : 1950      Reason for visit: COPD    HPI:     2020 - Pt came to office for a first visit.  She has known COPD (which I suspect is severe).  Has been having increased SOB, CRUZ for the last few weeks worse over the last week.  In getting to my office she got so dyspneic that she got presyncopal.  She reports dyspnea at minimal exertion ("a few feet walking") that did not respond to a steroid ntaper ordered by her primary MD at home.  She is on TRELEGY and prn albuterol or duonebs.  She gets some relief from nebulizer but only lasts an hour or so.  After seeing her in the office we brought her to the ER for evaluation/admission for COPD exacerbation which is not responding to outpt management.    2020 - Here for follow up, in the interim has been hospitalized twice (most recently mid October).  Has had increased SOB and talked with [place] nursing over the weekend - was started on antibiotics yesterday and increased steroids over the week end.  Feels that respiratory treatments are not helping at this time, using respiratory treatments 4x per day.  No fever, chills, sweats.  Has a dry cough.  Has chest tightness and wheezing    Past Medical History    Past Medical History:   Diagnosis Date    Allergy     Anxiety     Arthritis     Asthma     Bipolar disorder     Cancer     thyroid    Chronic back pain     COPD (chronic obstructive pulmonary disease)     Diabetes mellitus     Diabetes mellitus, type 2     Fibromyalgia     GERD (gastroesophageal reflux disease)     History of fall 2018    [place] (hard of hearing)     Hx of thyroid cancer     Hyperlipidemia     Hypertension     Hypothyroidism     Neuropathy     On home oxygen therapy     3 l/m     Restless leg syndrome     Sleep apnea     Urinary tract infection        Past Surgical History    Past Surgical History:   Procedure Laterality Date    " APPENDECTOMY      BACK SURGERY      x 3    broken foot and ankle      CHOLECYSTECTOMY      EYE SURGERY Bilateral     cataract    HYSTERECTOMY      JOINT REPLACEMENT Left 09/17/2018    knee    KNEE ARTHROPLASTY Left 9/17/2018    Procedure: ARTHROPLASTY, KNEE;  Surgeon: Yariel Marin MD;  Location: [facility];  Service: Orthopedics;  Laterality: Left;    MYELOGRAPHY N/A 8/7/2019    Procedure: MYELOGRAM;  Surgeon: Sun Diagnostic Provider;  Location: [facility] OR;  Service: General;  Laterality: N/A;    POSTERIOR REPAIR      SPINAL FUSION      x3 BACK, NECK X 4    TOTAL THYROIDECTOMY  2014       Medications      Current Outpatient Medications:     ACCU-CHEK LIBERTY PLUS TEST STRP Strp, , Disp: , Rfl:     albuterol (VENTOLIN HFA) 90 mcg/actuation inhaler, Inhale 2 puffs into the lungs every 6 (six) hours as needed for Wheezing. Rescue, Disp: 1 Inhaler, Rfl: 3    ARTIFICIAL TEARS,HYPROMELLOSE, OPHT, Place 1 drop into both eyes 3 (three) times daily., Disp: , Rfl:     calcitRIOL (ROCALTROL) 0.5 MCG Cap, Take 1 capsule (0.5 mcg total) by mouth every evening., Disp: 90 capsule, Rfl: 1    clonazePAM (KLONOPIN) 0.5 MG tablet, Take 1 tablet (0.5 mg total) by mouth 2 (two) times daily., Disp: 180 tablet, Rfl: 0    doxepin (ZONALON) 5 % cream, Apply 1 application topically 3 (three) times daily as needed., Disp: , Rfl:     DULoxetine (CYMBALTA) 20 MG capsule, Take 20 mg by mouth once daily. , Disp: , Rfl:     fluticasone propionate (FLONASE) 50 mcg/actuation nasal spray, 1 spray by Each Nostril route once daily., Disp: , Rfl:     fluticasone-umeclidin-vilanter (TRELEGY ELLIPTA) 100-62.5-25 mcg DsDv, Inhale 1 puff into the lungs once daily., Disp: 3 each, Rfl: 1    gabapentin (NEURONTIN) 800 MG tablet, Take 1 tablet (800 mg total) by mouth 3 (three) times daily., Disp: 270 tablet, Rfl: 1    guaiFENesin (MUCINEX) 600 mg 12 hr tablet, Take 1 tablet (600 mg total) by mouth 2 (two) times daily., Disp: 30 tablet, Rfl: 2    " hydroCHLOROthiazide (HYDRODIURIL) 12.5 MG Tab, Take 1 tablet (12.5 mg total) by mouth once daily., Disp: 30 tablet, Rfl: 11    ibuprofen (ADVIL,MOTRIN) 400 MG tablet, Take 400 mg by mouth every 8 (eight) hours as needed for Other., Disp: , Rfl:     insulin aspart U-100 (NOVOLOG) 100 unit/mL injection, Inject 2-10 Units into the skin 4 (four) times daily with meals and nightly. Sliding scale 151-400, Disp: , Rfl:     latanoprost 0.005 % ophthalmic solution, Place 1 drop into both eyes every evening. , Disp: , Rfl:     LEVEMIR FLEXTOUCH U-100 INSULN 100 unit/mL (3 mL) InPn pen, Inject 65 Units into the skin 2 (two) times daily. (Patient taking differently: Inject 70 Units into the skin 2 (two) times daily. ), Disp: 3 Box, Rfl: 3    levothyroxine (SYNTHROID) 150 MCG tablet, Take 1 tablet (150 mcg total) by mouth before breakfast., Disp: 90 tablet, Rfl: 1    linaCLOtide (LINZESS) 290 mcg Cap capsule, Take 1 capsule (290 mcg total) by mouth once daily., Disp: 90 capsule, Rfl: 1    losartan (COZAAR) 50 MG tablet, Take 1 tablet (50 mg total) by mouth once daily., Disp: 90 tablet, Rfl: 3    methocarbamoL (ROBAXIN) 750 MG Tab, Take 1 tablet (750 mg total) by mouth 2 (two) times daily as needed., Disp: 60 tablet, Rfl: 2    mupirocin (BACTROBAN) 2 % ointment, Apply topically 3 (three) times daily., Disp: 22 g, Rfl: 1    NOVOFINE AUTOCOVER 30 gauge x 1/3" Ndle, 1 each by In Vitro route 4 (four) times daily., Disp: 360 each, Rfl: 3    oxyCODONE (ROXICODONE) 10 mg Tab immediate release tablet, Take 10 mg by mouth 3 (three) times daily. , Disp: , Rfl:     potassium chloride SA (K-DUR,KLOR-CON) 10 MEQ tablet, Take 1 tablet (10 mEq total) by mouth 3 (three) times a week. Mon , Wed, and Fri. (Patient taking differently: Take 10 mEq by mouth every Mon, Wed, Fri. Mon , Wed, and Fri.), Disp: 36 tablet, Rfl: 1    QUEtiapine (SEROQUEL) 50 MG tablet, Take 50 mg by mouth every evening. , Disp: , Rfl:     RELISTOR 150 mg Tab, " Take 150 mg by mouth once daily., Disp: 30 tablet, Rfl: 2    rOPINIRole (REQUIP) 4 MG tablet, Take 1 tablet (4 mg total) by mouth 2 (two) times daily., Disp: 180 tablet, Rfl: 1    temazepam (RESTORIL) 15 mg Cap, Take 15 mg by mouth every evening. , Disp: , Rfl:     albuterol-ipratropium (DUO-NEB) 2.5 mg-0.5 mg/3 mL nebulizer solution, Take 3 mLs by nebulization every 6 (six) hours as needed for Wheezing or Shortness of Breath. Rescue, Disp: 360 mL, Rfl: 5    FLUoxetine 40 MG capsule, Take 1 capsule (40 mg total) by mouth once daily., Disp: 90 capsule, Rfl: 1    lamoTRIgine (LAMICTAL) 150 MG Tab, Take 1 tablet (150 mg total) by mouth 2 (two) times daily., Disp: 180 tablet, Rfl: 1    losartan-hydrochlorothiazide 50-12.5 mg (HYZAAR) 50-12.5 mg per tablet, Take 0.5 tablets by mouth once daily., Disp: 90 tablet, Rfl: 1    pantoprazole (PROTONIX) 40 MG tablet, Take 1 tablet (40 mg total) by mouth once daily., Disp: 90 tablet, Rfl: 1    Allergies    Review of patient's allergies indicates:   Allergen Reactions    Morphine Itching    Tubersol [tuberculin ppd] Other (See Comments)     Site swells bad. Has to have chest xray       SocHx    Social History     Tobacco Use   Smoking Status Former Smoker    Packs/day: 1.00    Years: 30.00    Pack years: 30.00    Types: Cigarettes    Quit date: 2000    Years since quittin.5   Smokeless Tobacco Never Used       Social History     Substance and Sexual Activity   Alcohol Use No    Frequency: Never    Drinks per session: Patient refused    Binge frequency: Never       Drug Use - no  Occupation - not working  Asbestos exposure - no  Pets - no    FMHx    Family History   Problem Relation Age of Onset    Diabetes Mother     Heart disease Mother     Hypertension Mother     Diabetes Father     Heart disease Father     Hypertension Father          Review of Systems  Review of Systems   Constitutional: Positive for malaise/fatigue. Negative for chills,  "diaphoresis, fever and weight loss.   HENT: Positive for hearing loss. Negative for congestion.    Eyes: Negative for pain.   Respiratory: Positive for cough and shortness of breath. Negative for hemoptysis, sputum production, wheezing and stridor.    Cardiovascular: Positive for chest pain ("tightness") and palpitations (at times). Negative for orthopnea, claudication, leg swelling and PND.   Gastrointestinal: Negative for abdominal pain, blood in stool, constipation, diarrhea, heartburn, nausea and vomiting.   Genitourinary: Negative for dysuria, frequency, hematuria and urgency.   Musculoskeletal: Negative for falls and myalgias.   Skin: Negative for itching and rash.   Neurological: Negative for dizziness, tingling, tremors, sensory change, speech change, focal weakness, seizures, loss of consciousness, weakness and headaches.   Psychiatric/Behavioral: Negative for depression, substance abuse and suicidal ideas. The patient is nervous/anxious.        Physical Exam    Vitals:    11/24/20 1108   BP: (P) 118/64   BP Location: (P) Left arm   Patient Position: (P) Sitting   BP Method: (P) Large (Manual)   Pulse: (P) 104   Temp: (P) 98.2 °F (36.8 °C)   TempSrc: (P) Tympanic   SpO2: (P) 96%   Weight: (P) 96.2 kg (212 lb)       Physical Exam  Vitals signs and nursing note reviewed.   Constitutional:       General: She is not in acute distress.     Appearance: Normal appearance. She is well-developed. She is not ill-appearing, toxic-appearing or diaphoretic.      Comments: Overweight  Dyspneic with minimal exertion   HENT:      Head: Normocephalic and atraumatic.      Right Ear: External ear normal.      Left Ear: External ear normal.      Nose: Nose normal.      Mouth/Throat:      Mouth: Mucous membranes are moist.      Pharynx: Oropharynx is clear.   Eyes:      Conjunctiva/sclera: Conjunctivae normal.      Pupils: Pupils are equal, round, and reactive to light.   Neck:      Musculoskeletal: Normal range of motion and " neck supple.      Thyroid: No thyromegaly.      Vascular: No JVD.      Trachea: No tracheal deviation.   Cardiovascular:      Rate and Rhythm: Regular rhythm. Tachycardia present.      Pulses: Normal pulses.      Heart sounds: Normal heart sounds. No murmur. No friction rub. No gallop.    Pulmonary:      Effort: Pulmonary effort is normal. No respiratory distress.      Breath sounds: Normal breath sounds. No stridor. No wheezing, rhonchi or rales.      Comments: Decreased BS throughout  No wheezes  No acc m use  Dyspneic with minimal exertion  Chest:      Chest wall: No tenderness.   Abdominal:      General: Bowel sounds are normal. There is no distension.      Palpations: Abdomen is soft.      Tenderness: There is no abdominal tenderness.   Musculoskeletal: Normal range of motion.         General: No swelling, tenderness or deformity.      Right lower leg: No edema.      Left lower leg: No edema.   Lymphadenopathy:      Cervical: No cervical adenopathy.   Skin:     General: Skin is warm and dry.      Coloration: Skin is not jaundiced.      Findings: No bruising or lesion.   Neurological:      General: No focal deficit present.      Mental Status: She is alert and oriented to person, place, and time. Mental status is at baseline.      Cranial Nerves: No cranial nerve deficit.   Psychiatric:         Mood and Affect: Mood normal.         Behavior: Behavior normal.         Thought Content: Thought content normal.         Judgment: Judgment normal.      Comments: At bit anxious         Labs    Lab Results   Component Value Date    WBC 9.84 10/17/2020    HGB 12.0 10/17/2020    HCT 38.7 10/17/2020     (H) 10/17/2020       Sodium   Date Value Ref Range Status   10/17/2020 139 136 - 145 mmol/L Final   04/03/2018 141 134 - 144 mmol/L      Potassium   Date Value Ref Range Status   10/17/2020 3.9 3.5 - 5.1 mmol/L Final     Chloride   Date Value Ref Range Status   10/17/2020 96 95 - 110 mmol/L Final   04/03/2018 95 (L)  98 - 110 mmol/L      CO2   Date Value Ref Range Status   10/17/2020 31 (H) 23 - 29 mmol/L Final     Glucose   Date Value Ref Range Status   10/17/2020 198 (H) 70 - 110 mg/dL Final   04/03/2018 80 70 - 99 mg/dL      BUN   Date Value Ref Range Status   10/17/2020 15 8 - 23 mg/dL Final     Creatinine   Date Value Ref Range Status   10/17/2020 0.6 0.5 - 1.4 mg/dL Final   04/03/2018 0.56 (L) 0.60 - 1.40 mg/dL      Calcium   Date Value Ref Range Status   10/17/2020 8.5 (L) 8.7 - 10.5 mg/dL Final     Total Protein   Date Value Ref Range Status   10/17/2020 6.8 6.0 - 8.4 g/dL Final     Albumin   Date Value Ref Range Status   10/17/2020 3.6 3.5 - 5.2 g/dL Final   04/03/2018 3.6 3.1 - 4.7 g/dL      Total Bilirubin   Date Value Ref Range Status   10/17/2020 0.4 0.1 - 1.0 mg/dL Final     Comment:     For infants and newborns, interpretation of results should be based  on gestational age, weight and in agreement with clinical  observations.  Premature Infant recommended reference ranges:  Up to 24 hours.............<8.0 mg/dL  Up to 48 hours............<12.0 mg/dL  3-5 days..................<15.0 mg/dL  6-29 days.................<15.0 mg/dL       Alkaline Phosphatase   Date Value Ref Range Status   10/17/2020 57 55 - 135 U/L Final     AST   Date Value Ref Range Status   10/17/2020 17 10 - 40 U/L Final     ALT   Date Value Ref Range Status   10/17/2020 14 10 - 44 U/L Final     Anion Gap   Date Value Ref Range Status   10/17/2020 12 8 - 16 mmol/L Final       Xrays        Impression/Plan    Problem List Items Addressed This Visit        Pulmonary    Acute on chronic respiratory failure with hypoxia     · Worse  · Will send to ER         Asthma-COPD overlap syndrome     · Worse and not responding to current therapy  · Will send to ER  · She agrees to go to ER            Other    Shortness of breath     · Worse                      Sanya Reis MD

## 2020-11-24 NOTE — ED PROVIDER NOTES
Encounter Date: 11/24/2020       History     Chief Complaint   Patient presents with    Shortness of Breath     WORSE OVER WEEKEND    Chest Pain     69-year-old female with a history of COPD sent from her pulmonologist for admission, patient is having COPD exacerbation worsening over the weekend this is despite steroid treatment, patient's pulmonologist reports that they attempted steroid treatments Saturday Sunday and Monday, when she did not improve at clinic today they decided to send patient to the hospital.  Patient reports that she is currently on 4 L of oxygen at home, patient complains of shortness of breath and cough she denies fever        Review of patient's allergies indicates:   Allergen Reactions    Morphine Itching    Tubersol [tuberculin ppd] Other (See Comments)     Site swells bad. Has to have chest xray     Past Medical History:   Diagnosis Date    Allergy     Anxiety     Arthritis     Asthma     Bipolar disorder     Cancer     thyroid    Chronic back pain     COPD (chronic obstructive pulmonary disease)     Diabetes mellitus     Diabetes mellitus, type 2     Fibromyalgia     GERD (gastroesophageal reflux disease)     History of fall 4/26/2018    [place] (hard of hearing)     Hx of thyroid cancer     Hyperlipidemia     Hypertension     Hypothyroidism     Neuropathy     On home oxygen therapy     3 l/m 24/7    Restless leg syndrome     Sleep apnea     Urinary tract infection      Past Surgical History:   Procedure Laterality Date    APPENDECTOMY      BACK SURGERY      x 3    broken foot and ankle      CHOLECYSTECTOMY      EYE SURGERY Bilateral     cataract    HYSTERECTOMY      JOINT REPLACEMENT Left 09/17/2018    knee    KNEE ARTHROPLASTY Left 9/17/2018    Procedure: ARTHROPLASTY, KNEE;  Surgeon: Yariel Marin MD;  Location: [facility];  Service: Orthopedics;  Laterality: Left;    MYELOGRAPHY N/A 8/7/2019    Procedure: MYELOGRAM;  Surgeon: Sun Diagnostic Provider;   Location: [facility] OR;  Service: General;  Laterality: N/A;    POSTERIOR REPAIR      SPINAL FUSION      x3 BACK, NECK X 4    TOTAL THYROIDECTOMY  2014     Family History   Problem Relation Age of Onset    Diabetes Mother     Heart disease Mother     Hypertension Mother     Diabetes Father     Heart disease Father     Hypertension Father      Social History     Tobacco Use    Smoking status: Former Smoker     Packs/day: 1.00     Years: 30.00     Pack years: 30.00     Types: Cigarettes     Quit date: 2000     Years since quittin.5    Smokeless tobacco: Never Used   Substance Use Topics    Alcohol use: No     Frequency: Never     Drinks per session: Patient refused     Binge frequency: Never    Drug use: No     Review of Systems   Constitutional: Negative for fever.   HENT: Negative for congestion, rhinorrhea, sore throat and trouble swallowing.    Eyes: Negative for visual disturbance.   Respiratory: Positive for cough. Negative for chest tightness, shortness of breath and wheezing.    Cardiovascular: Negative for chest pain, palpitations and leg swelling.   Gastrointestinal: Negative for abdominal distention, abdominal pain, constipation, diarrhea, nausea and vomiting.   Genitourinary: Negative for difficulty urinating, dysuria, flank pain and frequency.   Musculoskeletal: Negative for arthralgias, back pain, joint swelling and neck pain.   Skin: Negative for color change and rash.   Neurological: Negative for dizziness, syncope, speech difficulty, weakness, numbness and headaches.   All other systems reviewed and are negative.      Physical Exam     Initial Vitals [20 1258]   BP Pulse Resp Temp SpO2   (!) 175/76 88 (!) 28 98.3 °F (36.8 °C) 95 %      MAP       --         Physical Exam    Nursing note and vitals reviewed.  Constitutional: She appears well-developed and well-nourished. She is not diaphoretic. No distress.   HENT:   Head: Normocephalic and atraumatic.   Right Ear: External ear  normal.   Left Ear: External ear normal.   Nose: Nose normal.   Mouth/Throat: Oropharynx is clear and moist. No oropharyngeal exudate.   Eyes: Conjunctivae and EOM are normal. Pupils are equal, round, and reactive to light. Right eye exhibits no discharge. Left eye exhibits no discharge. No scleral icterus.   Neck: Normal range of motion. Neck supple. No thyromegaly present. No tracheal deviation present. No JVD present.   Cardiovascular: Normal rate, regular rhythm, normal heart sounds and intact distal pulses. Exam reveals no gallop and no friction rub.    No murmur heard.  Pulmonary/Chest: No stridor. She is in respiratory distress. She has no wheezes. She has no rhonchi. She has no rales. She exhibits no tenderness.   Patient has coarse breath sounds bilaterally   Abdominal: Soft. Bowel sounds are normal. She exhibits no distension and no mass. There is no abdominal tenderness. There is no rebound and no guarding.   Musculoskeletal: Normal range of motion. No tenderness or edema.   Lymphadenopathy:     She has no cervical adenopathy.   Neurological: She is alert and oriented to person, place, and time. She has normal strength. She displays normal reflexes. No cranial nerve deficit or sensory deficit.   Skin: Skin is warm and dry. No rash and no abscess noted. No erythema. No pallor.         ED Course   Procedures  Labs Reviewed   CBC W/ AUTO DIFFERENTIAL - Abnormal; Notable for the following components:       Result Value    Hemoglobin 11.6 (*)     MCHC 30.9 (*)     Platelets 352 (*)     Immature Granulocytes 1.1 (*)     Immature Grans (Abs) 0.10 (*)     Gran % 80.9 (*)     Lymph % 14.3 (*)     Mono % 3.3 (*)     All other components within normal limits   COMPREHENSIVE METABOLIC PANEL - Abnormal; Notable for the following components:    CO2 32 (*)     Glucose 274 (*)     Calcium 8.3 (*)     All other components within normal limits   POCT GLUCOSE - Abnormal; Notable for the following components:    POC Glucose  307 (*)     All other components within normal limits   TROPONIN I   B-TYPE NATRIURETIC PEPTIDE   PROTIME-INR   SARS-COV-2 RNA AMPLIFICATION, QUAL   LACTIC ACID, PLASMA   PROCALCITONIN   POCT GLUCOSE MONITORING CONTINUOUS        ECG Results          EKG 12-lead (Final result)  Result time 11/24/20 19:26:49    Final result by Interface, Lab In [facility] (11/24/20 19:26:49)                 Narrative:    Test Reason : R06.02,    Vent. Rate : 082 BPM     Atrial Rate : 082 BPM     P-R Int : 152 ms          QRS Dur : 080 ms      QT Int : 424 ms       P-R-T Axes : 033 -06 073 degrees     QTc Int : 495 ms    Normal sinus rhythm  Possible Left atrial enlargement  Prolonged QT  Abnormal ECG  When compared with ECG of 15-OCT-2020 15:07,  Premature ventricular complexes are no longer Present  Criteria for Anterior infarct are no longer Present  Confirmed by Delio Chang MD ([id number]) on 11/24/2020 7:26:39 PM    Referred By: [email or web address]   SELF           Confirmed By:Delio Chang MD                            Imaging Results          X-Ray Chest AP Portable (Final result)  Result time 11/24/20 13:16:05    Final result by Gutierrez Mathew MD (11/24/20 13:16:05)                 Narrative:    XR CHEST AP PORTABLE    CLINICAL HISTORY:  69 years Female CHF    COMPARISON: Chest radiograph October 15, 2020    FINDINGS: Cardiomediastinal silhouette is stable from prior.  Atherosclerotic calcification of the aorta. Right hemidiaphragm  elevation is unchanged. No airspace consolidation. No pleural effusion  or pneumothorax. No acute osseous abnormality. Prior cervical ACDF.    IMPRESSION:    Stable chest radiograph demonstrating chronic findings discussed  above.  No new or acute pulmonary process.    Electronically Signed by Gutierrez MCNAMARA on 11/24/2020 1:22 PM                               Medical Decision Making:   History:   Old Medical Records: I decided to obtain old medical records.  Initial Assessment:   Emergent evaluation  of a 69-year-old female presenting with shortness of breath and cough differential diagnosis includes infection, electrolyte abnormality, endocrine dysfunction, COPD exacerbation              Attending Attestation:             Attending ED Notes:   Patient will be admitted to Internal Medicine for COPD exacerbation at request of her pulmonologist, on ambulation patient noted to desaturate to 88% on 4 L of oxygen                    Clinical Impression:       ICD-10-CM ICD-9-CM   1. COPD exacerbation  J44.1 491.21   2. Shortness of breath  R06.02 786.05                          ED Disposition Condition    Admit                             Eduard Ham MD  11/24/20 [id number]

## 2020-11-24 NOTE — H&P
[facility] Medicine History & Physical Examination   Patient Name: Alanis Mendieta  MRN: [id number]  Patient Class: IP- Inpatient   Admission Date: 11/24/2020  1:00 PM  Length of Stay: 0  Attending Physician: Todd Rivera MD  Primary Care Provider: Michelle Matthew NP  Face-to-Face encounter date: 11/24/2020  Code Status: full code  Chief Complaint: Shortness of Breath (WORSE OVER WEEKEND) and Chest Pain        Patient information was obtained from patient, past medical records and ER records.   HISTORY OF PRESENT ILLNESS:   History was obtained from the patient and ER physician Sign-out. Patient presents to the ED with the complaint of SOB that is progressively worsening over the weekend despite treatment outpatient with her pulmonologist. SOB is worse with exertion. No alleviating factors. She reports associated chest tightness, wheezing and dry cough. She was started on oral Prednisone and antibiotics over the weekend as well as neb treatments 4 times per day. Symptoms have not improved. She went to see her pulmonologist today for a follow up visit and was deferred to the ED for further evaluation. She continues to feel SOB on her usual home 4L NC. She denies fever, chills, productive cough, nausea, vomiting, abdominal pain, dysuria, diarrhea, numbness, tingling, or LOC.     In the emergency room, patient is afebrile. She is tachypneic. When she got up to ambulate her O2 saturation decreased to 88% on 4L NC. CBC was unremarkable. CMP with CO2 32, glucose 274. BNP and troponin within reference ranges. I have reviewed the EKG and it shows NSR with prolonged QT. No ST/T wave abnormalities. No concerning finding on chest x ray. The patient is treated with neb treatments and IV Solumedrol.     Decision to admit was taken and patient was informed about the plan of care.   REVIEW OF SYSTEMS:   10 Point Review of System was performed and was found to be negative except for that mentioned  already in the HPI above.     PAST MEDICAL HISTORY:     Past Medical History:   Diagnosis Date    Allergy     Anxiety     Arthritis     Asthma     Bipolar disorder     Cancer     thyroid    Chronic back pain     COPD (chronic obstructive pulmonary disease)     Diabetes mellitus     Diabetes mellitus, type 2     Fibromyalgia     GERD (gastroesophageal reflux disease)     History of fall 2018    [place] (hard of hearing)     Hx of thyroid cancer     Hyperlipidemia     Hypertension     Hypothyroidism     Neuropathy     On home oxygen therapy     3 l/m 24/    Restless leg syndrome     Sleep apnea     Urinary tract infection        PAST SURGICAL HISTORY:     Past Surgical History:   Procedure Laterality Date    APPENDECTOMY      BACK SURGERY      x 3    broken foot and ankle      CHOLECYSTECTOMY      EYE SURGERY Bilateral     cataract    HYSTERECTOMY      JOINT REPLACEMENT Left 2018    knee    KNEE ARTHROPLASTY Left 2018    Procedure: ARTHROPLASTY, KNEE;  Surgeon: Yariel Marin MD;  Location: [facility] OR;  Service: Orthopedics;  Laterality: Left;    MYELOGRAPHY N/A 2019    Procedure: MYELOGRAM;  Surgeon: Sun Diagnostic Provider;  Location: [facility] OR;  Service: General;  Laterality: N/A;    POSTERIOR REPAIR      SPINAL FUSION      x3 BACK, NECK X 4    TOTAL THYROIDECTOMY         ALLERGIES:   Morphine and Tubersol [tuberculin ppd]    FAMILY HISTORY:     Family History   Problem Relation Age of Onset    Diabetes Mother     Heart disease Mother     Hypertension Mother     Diabetes Father     Heart disease Father     Hypertension Father        SOCIAL HISTORY:     Social History     Tobacco Use    Smoking status: Former Smoker     Packs/day: 1.00     Years: 30.00     Pack years: 30.00     Types: Cigarettes     Quit date: 2000     Years since quittin.5    Smokeless tobacco: Never Used   Substance Use Topics    Alcohol use: No     Frequency: Never     Drinks  per session: Patient refused     Binge frequency: Never        Social History     Substance and Sexual Activity   Sexual Activity Never        HOME MEDICATIONS:     Prior to Admission medications    Medication Sig Start Date End Date Taking? Authorizing Provider   albuterol (VENTOLIN HFA) 90 mcg/actuation inhaler Inhale 2 puffs into the lungs every 6 (six) hours as needed for Wheezing. Rescue 1/15/20  Yes Delio Melendez MD   albuterol-ipratropium (DUO-NEB) 2.5 mg-0.5 mg/3 mL nebulizer solution Take 3 mLs by nebulization every 6 (six) hours as needed for Wheezing or Shortness of Breath. Rescue 6/16/20 11/24/20 Yes Michelle Matthew NP   ARTIFICIAL TEARS,HYPROMELLOSE, OPHT Place 1 drop into both eyes 3 (three) times daily.   Yes Historical Provider   calcitRIOL (ROCALTROL) 0.5 MCG Cap Take 1 capsule (0.5 mcg total) by mouth every evening. 6/16/20  Yes Michelle Matthew NP   clonazePAM (KLONOPIN) 0.5 MG tablet Take 1 tablet (0.5 mg total) by mouth 2 (two) times daily.  Patient taking differently: Take 0.5 mg by mouth 2 (two) times daily as needed.  10/6/20  Yes Teresa Bryan NP   DULoxetine (CYMBALTA) 20 MG capsule Take 20 mg by mouth once daily.  9/2/20  Yes Historical Provider   FLUoxetine 40 MG capsule Take 1 capsule (40 mg total) by mouth once daily. 6/16/20 11/24/20 Yes Michelle Matthew NP   fluticasone propionate (FLONASE) 50 mcg/actuation nasal spray 1 spray by Each Nostril route once daily.   Yes Historical Provider   gabapentin (NEURONTIN) 800 MG tablet Take 1 tablet (800 mg total) by mouth 3 (three) times daily. 10/30/20  Yes Michelle Matthew NP   guaiFENesin (MUCINEX) 600 mg 12 hr tablet Take 1 tablet (600 mg total) by mouth 2 (two) times daily. 10/17/20  Yes Todd Rivera MD   hydroCHLOROthiazide (HYDRODIURIL) 12.5 MG Tab Take 1 tablet (12.5 mg total) by mouth once daily. 10/17/20 10/17/21 Yes Todd Rivera MD   ibuprofen (ADVIL,MOTRIN) 400 MG tablet Take 400 mg by mouth every  "8 (eight) hours as needed for Other.   Yes Historical Provider   insulin aspart U-100 (NOVOLOG) 100 unit/mL injection Inject 2-10 Units into the skin 4 (four) times daily with meals and nightly. Sliding scale 151-400   Yes Historical Provider   lamoTRIgine (LAMICTAL) 150 MG Tab Take 1 tablet (150 mg total) by mouth 2 (two) times daily. 6/16/20 11/24/20 Yes Michelle Matthew NP   latanoprost 0.005 % ophthalmic solution Place 1 drop into both eyes every evening.  10/23/17  Yes Historical Provider   LEVEMIR FLEXTOUCH U-100 INSULN 100 unit/mL (3 mL) InPn pen Inject 65 Units into the skin 2 (two) times daily.  Patient taking differently: Inject 70 Units into the skin 2 (two) times daily.  7/2/20  Yes Michelle Matthew NP   levoFLOXacin (LEVAQUIN) 750 MG tablet Take 750 mg by mouth once daily.   Yes Historical Provider   levothyroxine (SYNTHROID) 150 MCG tablet Take 1 tablet (150 mcg total) by mouth before breakfast. 6/16/20  Yes Michelle Matthew NP   losartan (COZAAR) 50 MG tablet Take 1 tablet (50 mg total) by mouth once daily. 10/17/20 10/17/21 Yes Todd Rivera MD   meclizine (ANTIVERT) 25 mg tablet Take 25 mg by mouth 3 (three) times daily as needed.   Yes Historical Provider   methocarbamoL (ROBAXIN) 750 MG Tab Take 1 tablet (750 mg total) by mouth 2 (two) times daily as needed. 8/31/20  Yes Michelle Matthew NP   mupirocin (BACTROBAN) 2 % ointment Apply topically 3 (three) times daily. 11/4/20  Yes Michelle Matthew NP   NOVOFINE AUTOCOVER 30 gauge x 1/3" Ndle 1 each by In Vitro route 4 (four) times daily. 10/6/20  Yes Teresa Bryan NP   oxyCODONE (ROXICODONE) 10 mg Tab immediate release tablet Take 10 mg by mouth 3 (three) times daily.  11/10/15  Yes Historical Provider   pantoprazole (PROTONIX) 40 MG tablet Take 1 tablet (40 mg total) by mouth once daily. 6/16/20 11/24/20 Yes Michelle Matthew, STEFAN   potassium chloride SA (K-DUR,KLOR-CON) 10 MEQ tablet Take 1 tablet (10 mEq total) by " "mouth 3 (three) times a week. Mon , Wed, and Fri.  Patient taking differently: Take 10 mEq by mouth every Mon, Wed, Fri. Mon , Wed, and Fri. 6/17/20  Yes Michelle Matthew NP   QUEtiapine (SEROQUEL) 50 MG tablet Take 25 mg by mouth every evening.  9/28/20  Yes Historical Provider   RELISTOR 150 mg Tab Take 150 mg by mouth once daily. 8/31/20  Yes Michelle Matthew NP   rOPINIRole (REQUIP) 4 MG tablet Take 1 tablet (4 mg total) by mouth 2 (two) times daily. 11/20/20  Yes Delio Melendez MD   temazepam (RESTORIL) 15 mg Cap Take 15 mg by mouth every evening.  10/8/20  Yes Historical Provider   ACCU-CHEK LIBERTY PLUS TEST STRP Strp  4/14/20   Historical Provider   doxepin (ZONALON) 5 % cream Apply 1 application topically 3 (three) times daily as needed. 10/5/20   Historical Provider   fluticasone-umeclidin-vilanter (TRELEGY ELLIPTA) 100-62.5-25 mcg DsDv Inhale 1 puff into the lungs once daily. 5/14/20   Teresa Bryan NP   linaCLOtide (LINZESS) 290 mcg Cap capsule Take 1 capsule (290 mcg total) by mouth once daily. 6/16/20   Michelle Matthew NP   losartan-hydrochlorothiazide 50-12.5 mg (HYZAAR) 50-12.5 mg per tablet Take 0.5 tablets by mouth once daily. 2/26/20 10/13/20  Delio Melendez MD         PHYSICAL EXAM:   BP (!) 136/101   Pulse 89   Temp 98.3 °F (36.8 °C) (Oral)   Resp 20   Ht 5' 4" (1.626 m)   Wt 96.2 kg (212 lb)   SpO2 100%   BMI 36.39 kg/m²   Vitals Reviewed  General appearance: Well-developed,obese,  White female in no respiratory distress.  Skin: No Rash. Warm, dry.   Neuro: AAOx3. Motor and sensory exams grossly intact. Good tone. Power in all 4 extremities 5/5.   HENT: Atraumatic head. Moist mucous membranes of oral cavity.  Eyes: Normal extraocular movements. PERRLA  Neck: Supple. No evidence of lymphadenopathy. No thyroidomegaly.  Lungs: Tachypneic. Diffuse wheezing.   Heart: Regular rate and rhythm. S1 and S2 present with no murmurs/gallop/rub. No pedal edema. No JVD present. "   Abdomen: Soft, non-distended, non-tender. No rebound tenderness/guarding. No masses or organomegaly. Bowel sounds are normal. Bladder is not palpable.   Extremities: Full ROM. Capillary refill less than 2 seconds.   Psych/mental status:Mood and affect appropriate. Cooperative. Responds appropriately to questions.   EMERGENCY DEPARTMENT LABS AND IMAGING:     Labs Reviewed   CBC W/ AUTO DIFFERENTIAL - Abnormal; Notable for the following components:       Result Value    Hemoglobin 11.6 (*)     MCHC 30.9 (*)     Platelets 352 (*)     Immature Granulocytes 1.1 (*)     Immature Grans (Abs) 0.10 (*)     Gran % 80.9 (*)     Lymph % 14.3 (*)     Mono % 3.3 (*)     All other components within normal limits   COMPREHENSIVE METABOLIC PANEL - Abnormal; Notable for the following components:    CO2 32 (*)     Glucose 274 (*)     Calcium 8.3 (*)     All other components within normal limits   CULTURE, BLOOD   CULTURE, BLOOD   TROPONIN I   B-TYPE NATRIURETIC PEPTIDE   PROTIME-INR   SARS-COV-2 RNA AMPLIFICATION, QUAL   LACTIC ACID, PLASMA   PROCALCITONIN   POCT GLUCOSE MONITORING CONTINUOUS       X-Ray Chest AP Portable   Final Result          ASSESSMENT & PLAN:   Acute on Chronic respiratory failure with hypoxia  Acute COPD exacerbation  HTN  IDDM  Hypothyroidism  Anxiety    Admit to Med-Tele  Neb treatments  IV Solumedrol 80mg q8h  Continue home inhalers  IV Levaquin   Consult Dr. Reis  Continue losartan  Check Hba1c  accuchecks with low dose SSI; hypoglycemia protocol  Continue home detemir 70 units BID  Check tsh; continue levothyroxine  Continue clonazepam prn    DVT Prophylaxis: will be placed on Lovenox for DVT prophylaxis and will be advised to be as mobile as possible and sit in a chair as tolerated.   ________________________________________________________________________________    Discharge Planning and Disposition: No mobility needs. Ambulating well. Patient will be discharged in 2-3 days  Face-to-Face encounter  date: 11/24/2020  Encounter included review of the medical records, interviewing and examining the patient face-to-face, discussion with other health care providers including emergency medicine physician, admission orders, interpreting lab/test results and formulating a plan of care.   Medical Decision Making during this encounter was  [_] Low Complexity  [_] Moderate Complexity  [x] High Complexity  _________________________________________________________________________________    INPATIENT LIST OF MEDICATIONS     Current Facility-Administered Medications:     dextrose 50% injection 12.5 g, 12.5 g, Intravenous, PRN, Althea Cardenas NP    dextrose 50% injection 25 g, 25 g, Intravenous, PRN, Althea Cardenas NP    glucagon (human recombinant) injection 1 mg, 1 mg, Intramuscular, PRN, Althea Cardenas NP    glucose chewable tablet 16 g, 16 g, Oral, PRN, Althea Cardenas NP    glucose chewable tablet 24 g, 24 g, Oral, PRN, Althea Cardenas NP    insulin aspart U-100 pen 0-5 Units, 0-5 Units, Subcutaneous, QID (AC + HS) PRN, Althea Cardenas NP    levalbuterol nebulizer solution 1.25 mg, 1.25 mg, Nebulization, ED 1 Time, Eduard Ham MD    Current Outpatient Medications:     albuterol (VENTOLIN HFA) 90 mcg/actuation inhaler, Inhale 2 puffs into the lungs every 6 (six) hours as needed for Wheezing. Rescue, Disp: 1 Inhaler, Rfl: 3    albuterol-ipratropium (DUO-NEB) 2.5 mg-0.5 mg/3 mL nebulizer solution, Take 3 mLs by nebulization every 6 (six) hours as needed for Wheezing or Shortness of Breath. Rescue, Disp: 360 mL, Rfl: 5    ARTIFICIAL TEARS,HYPROMELLOSE, OPHT, Place 1 drop into both eyes 3 (three) times daily., Disp: , Rfl:     calcitRIOL (ROCALTROL) 0.5 MCG Cap, Take 1 capsule (0.5 mcg total) by mouth every evening., Disp: 90 capsule, Rfl: 1    clonazePAM (KLONOPIN) 0.5 MG tablet, Take 1 tablet (0.5 mg total) by mouth 2 (two) times daily. (Patient taking differently: Take 0.5 mg by mouth 2  (two) times daily as needed. ), Disp: 180 tablet, Rfl: 0    DULoxetine (CYMBALTA) 20 MG capsule, Take 20 mg by mouth once daily. , Disp: , Rfl:     FLUoxetine 40 MG capsule, Take 1 capsule (40 mg total) by mouth once daily., Disp: 90 capsule, Rfl: 1    fluticasone propionate (FLONASE) 50 mcg/actuation nasal spray, 1 spray by Each Nostril route once daily., Disp: , Rfl:     gabapentin (NEURONTIN) 800 MG tablet, Take 1 tablet (800 mg total) by mouth 3 (three) times daily., Disp: 270 tablet, Rfl: 1    guaiFENesin (MUCINEX) 600 mg 12 hr tablet, Take 1 tablet (600 mg total) by mouth 2 (two) times daily., Disp: 30 tablet, Rfl: 2    hydroCHLOROthiazide (HYDRODIURIL) 12.5 MG Tab, Take 1 tablet (12.5 mg total) by mouth once daily., Disp: 30 tablet, Rfl: 11    ibuprofen (ADVIL,MOTRIN) 400 MG tablet, Take 400 mg by mouth every 8 (eight) hours as needed for Other., Disp: , Rfl:     insulin aspart U-100 (NOVOLOG) 100 unit/mL injection, Inject 2-10 Units into the skin 4 (four) times daily with meals and nightly. Sliding scale 151-400, Disp: , Rfl:     lamoTRIgine (LAMICTAL) 150 MG Tab, Take 1 tablet (150 mg total) by mouth 2 (two) times daily., Disp: 180 tablet, Rfl: 1    latanoprost 0.005 % ophthalmic solution, Place 1 drop into both eyes every evening. , Disp: , Rfl:     LEVEMIR FLEXTOUCH U-100 INSULN 100 unit/mL (3 mL) InPn pen, Inject 65 Units into the skin 2 (two) times daily. (Patient taking differently: Inject 70 Units into the skin 2 (two) times daily. ), Disp: 3 Box, Rfl: 3    levoFLOXacin (LEVAQUIN) 750 MG tablet, Take 750 mg by mouth once daily., Disp: , Rfl:     levothyroxine (SYNTHROID) 150 MCG tablet, Take 1 tablet (150 mcg total) by mouth before breakfast., Disp: 90 tablet, Rfl: 1    losartan (COZAAR) 50 MG tablet, Take 1 tablet (50 mg total) by mouth once daily., Disp: 90 tablet, Rfl: 3    meclizine (ANTIVERT) 25 mg tablet, Take 25 mg by mouth 3 (three) times daily as needed., Disp: , Rfl:      "methocarbamoL (ROBAXIN) 750 MG Tab, Take 1 tablet (750 mg total) by mouth 2 (two) times daily as needed., Disp: 60 tablet, Rfl: 2    mupirocin (BACTROBAN) 2 % ointment, Apply topically 3 (three) times daily., Disp: 22 g, Rfl: 1    NOVOFINE AUTOCOVER 30 gauge x 1/3" Ndle, 1 each by In Vitro route 4 (four) times daily., Disp: 360 each, Rfl: 3    oxyCODONE (ROXICODONE) 10 mg Tab immediate release tablet, Take 10 mg by mouth 3 (three) times daily. , Disp: , Rfl:     pantoprazole (PROTONIX) 40 MG tablet, Take 1 tablet (40 mg total) by mouth once daily., Disp: 90 tablet, Rfl: 1    potassium chloride SA (K-DUR,KLOR-CON) 10 MEQ tablet, Take 1 tablet (10 mEq total) by mouth 3 (three) times a week. Mon , Wed, and Fri. (Patient taking differently: Take 10 mEq by mouth every Mon, Wed, Fri. Mon , Wed, and Fri.), Disp: 36 tablet, Rfl: 1    QUEtiapine (SEROQUEL) 50 MG tablet, Take 25 mg by mouth every evening. , Disp: , Rfl:     RELISTOR 150 mg Tab, Take 150 mg by mouth once daily., Disp: 30 tablet, Rfl: 2    rOPINIRole (REQUIP) 4 MG tablet, Take 1 tablet (4 mg total) by mouth 2 (two) times daily., Disp: 180 tablet, Rfl: 1    temazepam (RESTORIL) 15 mg Cap, Take 15 mg by mouth every evening. , Disp: , Rfl:     ACCU-CHEK LIBERTY PLUS TEST STRP Strp, , Disp: , Rfl:     doxepin (ZONALON) 5 % cream, Apply 1 application topically 3 (three) times daily as needed., Disp: , Rfl:     fluticasone-umeclidin-vilanter (TRELEGY ELLIPTA) 100-62.5-25 mcg DsDv, Inhale 1 puff into the lungs once daily., Disp: 3 each, Rfl: 1    linaCLOtide (LINZESS) 290 mcg Cap capsule, Take 1 capsule (290 mcg total) by mouth once daily., Disp: 90 capsule, Rfl: 1    losartan-hydrochlorothiazide 50-12.5 mg (HYZAAR) 50-12.5 mg per tablet, Take 0.5 tablets by mouth once daily., Disp: 90 tablet, Rfl: 1      Scheduled Meds:   levalbuterol  1.25 mg Nebulization ED 1 Time     Continuous Infusions:  PRN Meds:.dextrose 50%, dextrose 50%, glucagon (human " recombinant), glucose, glucose, insulin aspart U-100      Althea Cardenas  [facility] Hospitalist  11/24/2020

## 2020-11-25 PROBLEM — R94.31 QT PROLONGATION: Status: ACTIVE | Noted: 2020-11-25

## 2020-11-25 LAB
ALBUMIN SERPL BCP-MCNC: 3.5 G/DL (ref 3.5–5.2)
ALP SERPL-CCNC: 60 U/L (ref 55–135)
ALT SERPL W/O P-5'-P-CCNC: 17 U/L (ref 10–44)
ANION GAP SERPL CALC-SCNC: 11 MMOL/L (ref 8–16)
AST SERPL-CCNC: 18 U/L (ref 10–40)
BASOPHILS # BLD AUTO: 0.01 K/UL (ref 0–0.2)
BASOPHILS NFR BLD: 0.1 % (ref 0–1.9)
BILIRUB SERPL-MCNC: 0.2 MG/DL (ref 0.1–1)
BUN SERPL-MCNC: 17 MG/DL (ref 8–23)
CALCIUM SERPL-MCNC: 8.1 MG/DL (ref 8.7–10.5)
CHLORIDE SERPL-SCNC: 96 MMOL/L (ref 95–110)
CO2 SERPL-SCNC: 32 MMOL/L (ref 23–29)
CREAT SERPL-MCNC: 0.8 MG/DL (ref 0.5–1.4)
DIFFERENTIAL METHOD: ABNORMAL
EOSINOPHIL # BLD AUTO: 0 K/UL (ref 0–0.5)
EOSINOPHIL NFR BLD: 0.1 % (ref 0–8)
ERYTHROCYTE [DISTWIDTH] IN BLOOD BY AUTOMATED COUNT: 14.2 % (ref 11.5–14.5)
EST. GFR  (AFRICAN AMERICAN): >60 ML/MIN/1.73 M^2
EST. GFR  (NON AFRICAN AMERICAN): >60 ML/MIN/1.73 M^2
ESTIMATED AVG GLUCOSE: 197 MG/DL (ref 68–131)
GLUCOSE SERPL-MCNC: 238 MG/DL (ref 70–110)
GLUCOSE SERPL-MCNC: 253 MG/DL (ref 70–110)
GLUCOSE SERPL-MCNC: 266 MG/DL (ref 70–110)
GLUCOSE SERPL-MCNC: 335 MG/DL (ref 70–110)
GLUCOSE SERPL-MCNC: 366 MG/DL (ref 70–110)
HBA1C MFR BLD HPLC: 8.5 % (ref 4.5–6.2)
HCT VFR BLD AUTO: 36.1 % (ref 37–48.5)
HGB BLD-MCNC: 11.6 G/DL (ref 12–16)
IMM GRANULOCYTES # BLD AUTO: 0.05 K/UL (ref 0–0.04)
IMM GRANULOCYTES NFR BLD AUTO: 0.6 % (ref 0–0.5)
LYMPHOCYTES # BLD AUTO: 1.2 K/UL (ref 1–4.8)
LYMPHOCYTES NFR BLD: 14.4 % (ref 18–48)
MAGNESIUM SERPL-MCNC: 2 MG/DL (ref 1.6–2.6)
MCH RBC QN AUTO: 29.1 PG (ref 27–31)
MCHC RBC AUTO-ENTMCNC: 32.1 G/DL (ref 32–36)
MCV RBC AUTO: 91 FL (ref 82–98)
MONOCYTES # BLD AUTO: 0.2 K/UL (ref 0.3–1)
MONOCYTES NFR BLD: 2.6 % (ref 4–15)
NEUTROPHILS # BLD AUTO: 6.9 K/UL (ref 1.8–7.7)
NEUTROPHILS NFR BLD: 82.2 % (ref 38–73)
NRBC BLD-RTO: 0 /100 WBC
PHOSPHATE SERPL-MCNC: 5 MG/DL (ref 2.7–4.5)
PLATELET # BLD AUTO: 370 K/UL (ref 150–350)
PMV BLD AUTO: 10.5 FL (ref 9.2–12.9)
POTASSIUM SERPL-SCNC: 4.9 MMOL/L (ref 3.5–5.1)
PROT SERPL-MCNC: 6.2 G/DL (ref 6–8.4)
RBC # BLD AUTO: 3.99 M/UL (ref 4–5.4)
SODIUM SERPL-SCNC: 139 MMOL/L (ref 136–145)
TSH SERPL DL<=0.005 MIU/L-ACNC: 0.71 UIU/ML (ref 0.34–5.6)
WBC # BLD AUTO: 8.38 K/UL (ref 3.9–12.7)

## 2020-11-25 PROCEDURE — 84100 ASSAY OF PHOSPHORUS: CPT

## 2020-11-25 PROCEDURE — 94640 AIRWAY INHALATION TREATMENT: CPT

## 2020-11-25 PROCEDURE — 94760 N-INVAS EAR/PLS OXIMETRY 1: CPT

## 2020-11-25 PROCEDURE — 83036 HEMOGLOBIN GLYCOSYLATED A1C: CPT

## 2020-11-25 PROCEDURE — 94761 N-INVAS EAR/PLS OXIMETRY MLT: CPT

## 2020-11-25 PROCEDURE — 25000003 PHARM REV CODE 250: Performed by: INTERNAL MEDICINE

## 2020-11-25 PROCEDURE — 63600175 PHARM REV CODE 636 W HCPCS: Performed by: NURSE PRACTITIONER

## 2020-11-25 PROCEDURE — 25000242 PHARM REV CODE 250 ALT 637 W/ HCPCS: Performed by: NURSE PRACTITIONER

## 2020-11-25 PROCEDURE — 84443 ASSAY THYROID STIM HORMONE: CPT

## 2020-11-25 PROCEDURE — 85025 COMPLETE CBC W/AUTO DIFF WBC: CPT

## 2020-11-25 PROCEDURE — 83735 ASSAY OF MAGNESIUM: CPT

## 2020-11-25 PROCEDURE — 99900035 HC TECH TIME PER 15 MIN (STAT)

## 2020-11-25 PROCEDURE — 99222 1ST HOSP IP/OBS MODERATE 55: CPT | Mod: ,,, | Performed by: INTERNAL MEDICINE

## 2020-11-25 PROCEDURE — 27000221 HC OXYGEN, UP TO 24 HOURS

## 2020-11-25 PROCEDURE — 80053 COMPREHEN METABOLIC PANEL: CPT

## 2020-11-25 PROCEDURE — 12000002 HC ACUTE/MED SURGE SEMI-PRIVATE ROOM

## 2020-11-25 PROCEDURE — 82962 GLUCOSE BLOOD TEST: CPT

## 2020-11-25 PROCEDURE — 36415 COLL VENOUS BLD VENIPUNCTURE: CPT

## 2020-11-25 PROCEDURE — 25000003 PHARM REV CODE 250: Performed by: NURSE PRACTITIONER

## 2020-11-25 PROCEDURE — 25000242 PHARM REV CODE 250 ALT 637 W/ HCPCS: Performed by: INTERNAL MEDICINE

## 2020-11-25 PROCEDURE — 99222 PR INITIAL HOSPITAL CARE,LEVL II: ICD-10-PCS | Mod: ,,, | Performed by: INTERNAL MEDICINE

## 2020-11-25 RX ORDER — DOXYCYCLINE 100 MG/1
100 CAPSULE ORAL EVERY 12 HOURS
Status: DISCONTINUED | OUTPATIENT
Start: 2020-11-25 | End: 2020-11-30 | Stop reason: HOSPADM

## 2020-11-25 RX ADMIN — IPRATROPIUM BROMIDE AND ALBUTEROL SULFATE 3 ML: .5; 3 SOLUTION RESPIRATORY (INHALATION) at 08:11

## 2020-11-25 RX ADMIN — PANTOPRAZOLE SODIUM 40 MG: 40 TABLET, DELAYED RELEASE ORAL at 09:11

## 2020-11-25 RX ADMIN — INSULIN ASPART 3 UNITS: 100 INJECTION, SOLUTION INTRAVENOUS; SUBCUTANEOUS at 07:11

## 2020-11-25 RX ADMIN — METHYLPREDNISOLONE SODIUM SUCCINATE 80 MG: 40 INJECTION, POWDER, FOR SOLUTION INTRAMUSCULAR; INTRAVENOUS at 05:11

## 2020-11-25 RX ADMIN — QUETIAPINE 25 MG: 25 TABLET ORAL at 09:11

## 2020-11-25 RX ADMIN — CLONAZEPAM 0.5 MG: 0.5 TABLET ORAL at 03:11

## 2020-11-25 RX ADMIN — FLUTICASONE PROPIONATE 50 MCG: 50 SPRAY, METERED NASAL at 09:11

## 2020-11-25 RX ADMIN — MUPIROCIN: 20 OINTMENT TOPICAL at 03:11

## 2020-11-25 RX ADMIN — MUPIROCIN: 20 OINTMENT TOPICAL at 09:11

## 2020-11-25 RX ADMIN — ROPINIROLE 4 MG: 2 TABLET, FILM COATED ORAL at 09:11

## 2020-11-25 RX ADMIN — INSULIN ASPART 2 UNITS: 100 INJECTION, SOLUTION INTRAVENOUS; SUBCUTANEOUS at 04:11

## 2020-11-25 RX ADMIN — INSULIN DETEMIR 70 UNITS: 100 INJECTION, SOLUTION SUBCUTANEOUS at 09:11

## 2020-11-25 RX ADMIN — LAMOTRIGINE 150 MG: 100 TABLET ORAL at 09:11

## 2020-11-25 RX ADMIN — IPRATROPIUM BROMIDE AND ALBUTEROL SULFATE 3 ML: .5; 3 SOLUTION RESPIRATORY (INHALATION) at 01:11

## 2020-11-25 RX ADMIN — IPRATROPIUM BROMIDE AND ALBUTEROL SULFATE 3 ML: .5; 3 SOLUTION RESPIRATORY (INHALATION) at 05:11

## 2020-11-25 RX ADMIN — INSULIN ASPART 5 UNITS: 100 INJECTION, SOLUTION INTRAVENOUS; SUBCUTANEOUS at 12:11

## 2020-11-25 RX ADMIN — ENOXAPARIN SODIUM 40 MG: 40 INJECTION SUBCUTANEOUS at 04:11

## 2020-11-25 RX ADMIN — HYDROCHLOROTHIAZIDE 12.5 MG: 12.5 TABLET ORAL at 09:11

## 2020-11-25 RX ADMIN — METHYLPREDNISOLONE SODIUM SUCCINATE 80 MG: 40 INJECTION, POWDER, FOR SOLUTION INTRAMUSCULAR; INTRAVENOUS at 02:11

## 2020-11-25 RX ADMIN — DOXYCYCLINE HYCLATE 100 MG: 100 CAPSULE ORAL at 09:11

## 2020-11-25 RX ADMIN — GABAPENTIN 800 MG: 300 CAPSULE ORAL at 09:11

## 2020-11-25 RX ADMIN — OXYCODONE HYDROCHLORIDE 10 MG: 5 TABLET ORAL at 09:11

## 2020-11-25 RX ADMIN — GABAPENTIN 800 MG: 300 CAPSULE ORAL at 08:11

## 2020-11-25 RX ADMIN — OXYCODONE HYDROCHLORIDE 10 MG: 5 TABLET ORAL at 04:11

## 2020-11-25 RX ADMIN — LOSARTAN POTASSIUM 50 MG: 50 TABLET, FILM COATED ORAL at 09:11

## 2020-11-25 RX ADMIN — METHYLPREDNISOLONE SODIUM SUCCINATE 80 MG: 40 INJECTION, POWDER, FOR SOLUTION INTRAMUSCULAR; INTRAVENOUS at 09:11

## 2020-11-25 RX ADMIN — INSULIN ASPART 2 UNITS: 100 INJECTION, SOLUTION INTRAVENOUS; SUBCUTANEOUS at 09:11

## 2020-11-25 RX ADMIN — LATANOPROST 1 DROP: 50 SOLUTION OPHTHALMIC at 09:11

## 2020-11-25 RX ADMIN — FLUOXETINE 40 MG: 20 CAPSULE ORAL at 09:11

## 2020-11-25 RX ADMIN — IPRATROPIUM BROMIDE AND ALBUTEROL SULFATE 3 ML: .5; 3 SOLUTION RESPIRATORY (INHALATION) at 09:11

## 2020-11-25 RX ADMIN — GABAPENTIN 800 MG: 300 CAPSULE ORAL at 03:11

## 2020-11-25 RX ADMIN — CALCITRIOL CAPSULES 0.25 MCG 0.5 MCG: 0.25 CAPSULE ORAL at 09:11

## 2020-11-25 RX ADMIN — CLONAZEPAM 0.5 MG: 0.5 TABLET ORAL at 09:11

## 2020-11-25 RX ADMIN — LEVOTHYROXINE SODIUM 150 MCG: 25 TABLET ORAL at 05:11

## 2020-11-25 NOTE — HOSPITAL COURSE
11/25/2020  Ms Hester states that she is much less short of breath today-able to lay down. She denies use of tobacco for 25 years    11/26/2020  Ms Hester states that she is much less short of breath but has significant CRUZ. She lives by herself and will be unable to freform AODL's without assistance. Pt states that Dr Reis states that she should stay until Saturday 11/27/2020  Ms Hester is feeling better at rest but still has significant CRUZ. She does not feel that she will be able to care for herself at home by herself.    11/28/2020  Ms Hester has been offered SNF placement but will no go because of the terrible experience she had at a nursing home in the past..Pt states that she is feeling much better at rest and with exertion    11/29/2020  Ms Hester really wants to go home with home health. She has no SOB and less CRUZ at present.    11/30/2020  Ms Hester is ready to go home and refuses SNF placement despite our strong recommendation. Dr Reis feels that she can go home from his standpoint.  ROS: CRUZ otherwise negative X 9  PE: in no distress, HEENT MARA EOM intact no use of accessory muscles of respiration, Neck supple no use of accessory muscles of respiration, Lungs diminished breath sounds without crackles wheezes or rhonchi, Heart distant sounds S 1 S 2 RRR, Abdomen BS+ nontender, EXT without CC or E pulses 1-2+, Skin no finding, Neuro AA O X 3 no acute sensory or motor deficit

## 2020-11-25 NOTE — CONSULTS
Pulmonary/Critical Care Consult      Patient name: Alanis Mendieta  MRN: [id number]  Date: 11/25/2020    Admit Date: 11/24/2020  Consult Requested By: Todd Pichardo MD    Reason for Consult: COPD with exacerbation    HPI:    11/25/2020 - 68 yo female known to me with COPD who presented to my office with about 1 week of increased SOb, CRUZ.  She was started on steroids and antibiotics over the weekend.  When she saw me yesterday she told me taht she did not feel any better and had significant dyspnea with exertion.  After seeing her she was referred to the ER and is now admitted for further treatment.  Overnight she feels better but she has not been out of bed.  No fever, chills, sweats, + SOB, CRUZ, no chest pain/palpitations, no CHF symptoms.  SHe lives at home alone and has some sitter help during the day.    Review of Systems    Review of Systems   Constitutional: Positive for malaise/fatigue. Negative for chills, diaphoresis, fever and weight loss.   HENT: Negative for congestion.    Eyes: Negative for pain.   Respiratory: Positive for cough, shortness of breath and wheezing. Negative for hemoptysis, sputum production and stridor.    Cardiovascular: Negative for chest pain, palpitations, orthopnea, claudication, leg swelling and PND.   Gastrointestinal: Negative for abdominal pain, blood in stool, constipation, diarrhea, heartburn, melena, nausea and vomiting.   Genitourinary: Negative for dysuria, frequency, hematuria and urgency.   Musculoskeletal: Negative for falls and myalgias.   Neurological: Negative for dizziness, tingling, tremors, sensory change, speech change, focal weakness, seizures, loss of consciousness, weakness and headaches.   Psychiatric/Behavioral: Negative for depression, substance abuse and suicidal ideas. The patient is not nervous/anxious.        Past Medical History    Past Medical History:   Diagnosis Date    Allergy     Anxiety     Arthritis     Asthma     Bipolar disorder      Cancer     thyroid    Chronic back pain     COPD (chronic obstructive pulmonary disease)     Diabetes mellitus     Diabetes mellitus, type 2     Fibromyalgia     GERD (gastroesophageal reflux disease)     History of fall 4/26/2018    Douglas (hard of hearing)     Hx of thyroid cancer     Hyperlipidemia     Hypertension     Hypothyroidism     Neuropathy     On home oxygen therapy     3 l/m 24/7    Restless leg syndrome     Sleep apnea     Urinary tract infection        Past Surgical History    Past Surgical History:   Procedure Laterality Date    APPENDECTOMY      BACK SURGERY      x 3    broken foot and ankle      CHOLECYSTECTOMY      EYE SURGERY Bilateral     cataract    HYSTERECTOMY      JOINT REPLACEMENT Left 09/17/2018    knee    KNEE ARTHROPLASTY Left 9/17/2018    Procedure: ARTHROPLASTY, KNEE;  Surgeon: Yariel Marin MD;  Location: [facility] [place] OR;  Service: Orthopedics;  Laterality: Left;    MYELOGRAPHY N/A 8/7/2019    Procedure: MYELOGRAM;  Surgeon: [facility];  Location: [facility] OR;  Service: General;  Laterality: N/A;    POSTERIOR REPAIR      SPINAL FUSION      x3 BACK, NECK X 4    TOTAL THYROIDECTOMY  2014       Medications (scheduled):      albuterol-ipratropium  3 mL Nebulization TID WAKE    calcitRIOL  0.5 mcg Oral QHS    carboxymethylcellulose  1 drop Both Eyes TID    enoxaparin  40 mg Subcutaneous Q24H    FLUoxetine  40 mg Oral Daily    fluticasone propionate  1 spray Each Nostril Daily    fluticasone-umeclidin-vilanter  1 puff Inhalation Daily    gabapentin  800 mg Oral TID    hydroCHLOROthiazide  12.5 mg Oral Daily    insulin detemir U-100  70 Units Subcutaneous BID    lamoTRIgine  150 mg Oral BID    latanoprost  1 drop Both Eyes QHS    levoFLOXacin  750 mg Intravenous Q24H    levothyroxine  150 mcg Oral Before breakfast    losartan  50 mg Oral Daily    methylPREDNISolone sodium succinate  80 mg Intravenous Q8H    mupirocin   Topical (Top) TID     "pantoprazole  40 mg Oral Daily    polyethylene glycol  17 g Oral Daily    QUEtiapine  25 mg Oral QHS    rOPINIRole  4 mg Oral BID       Medications (infusions):         Medications (prn):     acetaminophen, albuterol-ipratropium, clonazePAM, dextrose 50%, dextrose 50%, glucagon (human recombinant), glucose, glucose, insulin aspart U-100, meclizine, methocarbamoL, oxyCODONE, sodium chloride 0.9%    Family History:   Family History   Problem Relation Age of Onset    Diabetes Mother     Heart disease Mother     Hypertension Mother     Diabetes Father     Heart disease Father     Hypertension Father        Social History: Tobacco:   Social History     Tobacco Use   Smoking Status Former Smoker    Packs/day: 1.00    Years: 30.00    Pack years: 30.00    Types: Cigarettes    Quit date: 2000    Years since quittin.5   Smokeless Tobacco Never Used                                EtOH:   Social History     Substance and Sexual Activity   Alcohol Use No    Frequency: Never    Drinks per session: Patient refused    Binge frequency: Never                                Drugs:   Social History     Substance and Sexual Activity   Drug Use No                                Occupation: retired                             Asbestos exposure: no                             Pets: no                             Environmental allergies: no    Physical Exam    Vital signs:  Temp:  [98.2 °F (36.8 °C)-98.7 °F (37.1 °C)]   Pulse:  [79-90]   Resp:  [16-30]   BP: (123-175)/()   SpO2:  [88 %-100 %]     Intake/Output:     Intake/Output Summary (Last 24 hours) at 2020 1250  Last data filed at 2020 0800  Gross per 24 hour   Intake 300 ml   Output 700 ml   Net -400 ml        BMI: Estimated body mass index is 37.84 kg/m² as calculated from the following:    Height as of this encounter: 5' 4" (1.626 m).    Weight as of this encounter: 100 kg (220 lb 7.4 oz).    Physical Exam   Constitutional: She is oriented " to person, place, and time.   Older female no distress, laying in bed   HENT:   Head: Normocephalic and atraumatic.   Right Ear: External ear normal.   Left Ear: External ear normal.   Mouth/Throat: Oropharynx is clear and moist.   Eyes: Pupils are equal, round, and reactive to light. Conjunctivae and EOM are normal. Right eye exhibits no discharge. Left eye exhibits no discharge. No scleral icterus.   Neck: Normal range of motion. Neck supple. No JVD present. No tracheal deviation present. No thyromegaly present.   Cardiovascular: Normal rate, regular rhythm, normal heart sounds and intact distal pulses. Exam reveals no gallop and no friction rub.   No murmur heard.  Pulmonary/Chest: Effort normal. No stridor. No respiratory distress. She has no wheezes. She has no rales.   Decreased BS throughout   Abdominal: She exhibits no distension. There is no abdominal tenderness. There is no rebound.   obese   Musculoskeletal: Normal range of motion.         General: No tenderness or edema.   Lymphadenopathy:     She has no cervical adenopathy.   Neurological: She is alert and oriented to person, place, and time. GCS score is 15.   Skin: Skin is warm and dry.   Psychiatric: Mood, memory, affect and judgment normal.   A bit anxious   Nursing note and vitals reviewed.      Laboratory    Recent Labs   Lab 11/25/20  0522   WBC 8.38   RBC 3.99*   HGB 11.6*   HCT 36.1*   *   MCV 91   MCH 29.1   MCHC 32.1       Recent Labs   Lab 11/25/20  0521   CALCIUM 8.1*   PROT 6.2      K 4.9   CO2 32*   CL 96   BUN 17   CREATININE 0.8   ALKPHOS 60   ALT 17   AST 18   BILITOT 0.2       Recent Labs   Lab 11/24/20  1328   INR 1.1       Recent Labs   Lab 11/24/20  1328   TROPONINI <0.030       Additional labs:     LA - 1.4  HgbA1C - 8.5  TSH - 0.710  Procalcitonin - < 0.05    Microbiology:       Microbiology Results (last 7 days)     Procedure Component Value Units Date/Time    Blood culture #2 **CANNOT BE ORDERED STAT** [[id number]]  Collected: 11/24/20 1345    Order Status: Completed Specimen: Blood from Peripheral, Forearm, Left Updated: 11/24/20 2117     Blood Culture, Routine No Growth to date    Blood culture #1 **CANNOT BE ORDERED STAT** [[id number]] Collected: 11/24/20 1328    Order Status: Completed Specimen: Blood from Peripheral, Antecubital, Right Updated: 11/24/20 1958     Blood Culture, Routine No Growth to date          Radiology    X-Ray Chest AP Portable  XR CHEST AP PORTABLE    CLINICAL HISTORY:  69 years Female CHF    COMPARISON: Chest radiograph October 15, 2020    FINDINGS: Cardiomediastinal silhouette is stable from prior.  Atherosclerotic calcification of the aorta. Right hemidiaphragm  elevation is unchanged. No airspace consolidation. No pleural effusion  or pneumothorax. No acute osseous abnormality. Prior cervical ACDF.    IMPRESSION:    Stable chest radiograph demonstrating chronic findings discussed  above.  No new or acute pulmonary process.    Electronically Signed by Gutierrez MCNAMARA on 11/24/2020 1:22 PM        Additional Studies    EKG  Vent. Rate : 082 BPM     Atrial Rate : 082 BPM      P-R Int : 152 ms          QRS Dur : 080 ms       QT Int : 424 ms       P-R-T Axes : 033 -06 073 degrees      QTc Int : 495 ms     Normal sinus rhythm   Possible Left atrial enlargement   Prolonged QT   Abnormal ECG   When compared with ECG of 15-OCT-2020 15:07,   Premature ventricular complexes are no longer Present   Criteria for Anterior infarct are no longer Present   Confirmed by Bernardo RASHID, Delio FULTON ([id number]) on 11/24/2020 7:26:39 PM     Ventilator Information              No results for input(s): PH, PCO2, PO2, HCO3, POCSATURATED, BE in the last 72 hours.      Impression    Active Hospital Problems    Diagnosis  POA    *COPD exacerbation [J44.1]  Yes    QT prolongation [R94.31]  Unknown    Class 2 obesity in adult [E66.9]  Yes     Chronic    Postoperative hypothyroidism [E89.0]  Yes    GERD (gastroesophageal reflux  disease) [K21.9]  Yes    Anxiety [F41.9]  Yes    Type 2 diabetes mellitus, with long-term current use of insulin [E11.9, Z79.4]  Not Applicable      Resolved Hospital Problems   No resolved problems to display.       Plan    · Continue respiratory treatments, steroids, TRELEGY  · D/C levaquin and change to po doxycycline (note QT prolonged)  · Increase activity as able  · Follow EKG to monitor QTc  · Needs better control of her diabetes  · I am concerned that she may be reaching a point that home discharge may be difficult    Thank you for this consult.  I will follow with you while the patient is hospitalized.        Sanya Reis MD  [facility] Pulmonary/Critical Care  11/25/2020

## 2020-11-25 NOTE — PLAN OF CARE
Pt lives alone but has worker from 8-4 daily through the waiver program.  Pt uses nebulizxer, o2, glucometer, bsc, rw, and a rollator.  It is the pts wish to return home    RX= [facility]  PCP= opal [facility]     11/25/20 0943   Discharge Assessment   Assessment Type Discharge Planning Assessment   Confirmed/corrected address and phone number on facesheet? Yes   Assessment information obtained from? Patient;Medical Record   Communicated expected length of stay with patient/caregiver no   Prior to hospitilization cognitive status: Alert/Oriented   Prior to hospitalization functional status: Needs Assistance   Current cognitive status: Alert/Oriented   Current Functional Status: Needs Assistance   Facility Arrived From: [facility] office   Lives With alone  (pt on waiver program and has a home worker from 8-4)   Able to Return to Prior Arrangements yes   Is patient able to care for self after discharge? Yes   Who are your caregiver(s) and their phone number(s)? Kj Mendieta [phone]   Patient's perception of discharge disposition home or selfcare   Readmission Within the Last 30 Days no previous admission in last 30 days   Patient currently being followed by outpatient case management? No   Patient currently receives any other outside agency services? No   Equipment Currently Used at Home walker, rolling;cane, straight;nebulizer;oxygen;bedside commode;rollator   Do you have any problems affording any of your prescribed medications? No   Is the patient taking medications as prescribed? yes   Does the patient have transportation home? Yes   Transportation Anticipated family or friend will provide   Dialysis Name and Scheduled days n/a   Does the patient receive services at the [facility]? No   Discharge Plan A Home with family   Discharge Plan B Home   DME Needed Upon Discharge  none   Patient/Family in Agreement with Plan unable to assess

## 2020-11-25 NOTE — HPI
HISTORY OF PRESENT ILLNESS:   History was obtained from the patient and ER physician Sign-out. Patient presents to the ED with the complaint of SOB that is progressively worsening over the weekend despite treatment outpatient with her pulmonologist. SOB is worse with exertion. No alleviating factors. She reports associated chest tightness, wheezing and dry cough. She was started on oral Prednisone and antibiotics over the weekend as well as neb treatments 4 times per day. Symptoms have not improved. She went to see her pulmonologist today for a follow up visit and was deferred to the ED for further evaluation. She continues to feel SOB on her usual home 4L NC. She denies fever, chills, productive cough, nausea, vomiting, abdominal pain, dysuria, diarrhea, numbness, tingling, or LOC.      In the emergency room, patient is afebrile. She is tachypneic. When she got up to ambulate her O2 saturation decreased to 88% on 4L NC. CBC was unremarkable. CMP with CO2 32, glucose 274. BNP and troponin within reference ranges. I have reviewed the EKG and it shows NSR with prolonged QT. No ST/T wave abnormalities. No concerning finding on chest x ray. The patient is treated with neb treatments and IV Solumedrol.      Decision to admit was taken and patient was informed about the plan of care.

## 2020-11-25 NOTE — SUBJECTIVE & OBJECTIVE
"Interval History: Ms Hester is able to "breath better today". She has much reduced SOB but still has significant CRUZ.    Review of Systems   Constitutional: Positive for diaphoresis and fatigue.   HENT: Negative.    Eyes: Negative.    Respiratory: Positive for choking, shortness of breath and wheezing.    Cardiovascular: Positive for palpitations. Negative for chest pain.   Gastrointestinal: Negative.    Genitourinary: Positive for enuresis and frequency.   Musculoskeletal: Positive for arthralgias and myalgias.   Skin: Negative.    Allergic/Immunologic: Negative.    Neurological: Positive for weakness.   Hematological: Bruises/bleeds easily.   Psychiatric/Behavioral: Positive for decreased concentration. The patient is nervous/anxious.      Objective:     Vital Signs (Most Recent):  Temp: 98.4 °F (36.9 °C) (11/25/20 0710)  Pulse: 80 (11/25/20 0804)  Resp: 16 (11/25/20 0804)  BP: 127/64 (11/25/20 0710)  SpO2: 100 % (11/25/20 0804) Vital Signs (24h Range):  Temp:  [98.2 °F (36.8 °C)-98.7 °F (37.1 °C)] 98.4 °F (36.9 °C)  Pulse:  [] 80  Resp:  [16-30] 16  SpO2:  [88 %-100 %] 100 %  BP: (123-175)/() 127/64     Weight: 100 kg (220 lb 7.4 oz)  Body mass index is 37.84 kg/m².    Intake/Output Summary (Last 24 hours) at 11/25/2020 1042  Last data filed at 11/25/2020 0732  Gross per 24 hour   Intake --   Output 700 ml   Net -700 ml      Physical Exam  Vitals signs and nursing note reviewed.   Constitutional:       General: She is not in acute distress.     Appearance: Normal appearance.   HENT:      Head: Normocephalic and atraumatic.      Nose: Nose normal.      Mouth/Throat:      Mouth: Mucous membranes are moist.   Eyes:      Extraocular Movements: Extraocular movements intact.      Pupils: Pupils are equal, round, and reactive to light.   Neck:      Musculoskeletal: Normal range of motion and neck supple.      Comments: No use of accessory muscles of respiration  Cardiovascular:      Rate and Rhythm: Normal " rate and regular rhythm.      Heart sounds: Gallop present.    Pulmonary:      Effort: Pulmonary effort is normal.      Breath sounds: No wheezing, rhonchi or rales.   Abdominal:      General: Bowel sounds are normal. There is no distension.      Tenderness: There is no abdominal tenderness. There is no guarding.   Musculoskeletal: Normal range of motion.   Skin:     General: Skin is warm.   Neurological:      General: No focal deficit present.      Mental Status: She is alert and oriented to person, place, and time.   Psychiatric:         Mood and Affect: Mood normal.         Significant Labs:   Recent Lab Results       11/25/20  0714   11/25/20  0522   11/25/20  0521   11/24/20 2047 11/24/20  1710        Procalcitonin               Albumin     3.5         Alkaline Phosphatase     60         ALT     17         Anion Gap     11         AST     18         Baso #   0.01           Basophil %   0.1           BILIRUBIN TOTAL     0.2  Comment:  For infants and newborns, interpretation of results should be based  on gestational age, weight and in agreement with clinical  observations.  Premature Infant recommended reference ranges:  Up to 24 hours.............<8.0 mg/dL  Up to 48 hours............<12.0 mg/dL  3-5 days..................<15.0 mg/dL  6-29 days.................<15.0 mg/dL           Blood Culture, Routine               BNP               BUN     17         Calcium     8.1         Chloride     96         CO2     32         Creatinine     0.8         Differential Method   Automated           eGFR if      >60.0         eGFR if non      >60.0  Comment:  Calculation used to obtain the estimated glomerular filtration  rate (eGFR) is the CKD-EPI equation.            Eos #   0.0           Eosinophil %   0.1           Estimated Avg Glucose   197           Glucose     266         Gran # (ANC)   6.9           Gran %   82.2           Hematocrit   36.1           Hemoglobin   11.6            Hemoglobin A1C External   8.5  Comment:  According to ADA guidelines, hemoglobin A1C <7.0% represents  optimal control in non-pregnant diabetic patients.  Different  metrics may apply to specific populations.   Standards of Medical Care in Diabetes - 2016.  For the purpose of screening for the presence of diabetes:  <5.7%     Consistent with the absence of diabetes  5.7-6.4%  Consistent with increasing risk for diabetes   (prediabetes)  >or=6.5%  Consistent with diabetes  Currently no consensus exists for use of hemoglobin A1C  for diagnosis of diabetes for children.             Immature Grans (Abs)   0.05  Comment:  Mild elevation in immature granulocytes is non specific and   can be seen in a variety of conditions including stress response,   acute inflammation, trauma and pregnancy. Correlation with other   laboratory and clinical findings is essential.             Immature Granulocytes   0.6           INR               Lactate, Marco               Lymph #   1.2           Lymph %   14.4           Magnesium     2.0         MCH   29.1           MCHC   32.1           MCV   91           Mono #   0.2           Mono %   2.6           MPV   10.5           nRBC   0           Phosphorus     5.0         Platelets   370           POC Glucose 253     381 307     Potassium     4.9         PROTEIN TOTAL     6.2         PT               RBC   3.99           RDW   14.2           SARS-CoV-2 RNA, Amplification, Qual               Sodium     139         Troponin I               TSH     0.710         WBC   8.38                            11/24/20  1345   11/24/20  1328        Procalcitonin   <0.05  Comment:  Procalcitonin Result Interpretation:  <=0.50 ng/mL  Systemic infection (sepsis) is not likely. Local bacterial infection   is   possible.  >0.50 and <= 2.00 ng/mL  Systemic infection (sepsis) is possible, but other conditions are   also known   to elevate procalcitonin.   >2.00 ng/mL  Systemic infection (sepsis) is likely unless  other causes are known.  >=10.00 ng/mL  Important systemic inflammatory response, almost exclusively due to   severe   bacterial sepsis or septic shock.       Albumin   3.8     Alkaline Phosphatase   62     ALT   18     Anion Gap   10     AST   17     Baso #   0.03     Basophil %   0.3     BILIRUBIN TOTAL   0.4  Comment:  For infants and newborns, interpretation of results should be based  on gestational age, weight and in agreement with clinical  observations.  Premature Infant recommended reference ranges:  Up to 24 hours.............<8.0 mg/dL  Up to 48 hours............<12.0 mg/dL  3-5 days..................<15.0 mg/dL  6-29 days.................<15.0 mg/dL       Blood Culture, Routine No Growth to date[P] No Growth to date[P]     BNP   23  Comment:  Values of less than 100 pg/ml are consistent with non-CHF populations.     BUN   20     Calcium   8.3     Chloride   97     CO2   32     Creatinine   0.6     Differential Method   Automated     eGFR if    >60.0     eGFR if non    >60.0  Comment:  Calculation used to obtain the estimated glomerular filtration  rate (eGFR) is the CKD-EPI equation.        Eos #   0.0     Eosinophil %   0.1     Estimated Avg Glucose         Glucose   274     Gran # (ANC)   7.4     Gran %   80.9     Hematocrit   37.6     Hemoglobin   11.6     Hemoglobin A1C External         Immature Grans (Abs)   0.10  Comment:  Mild elevation in immature granulocytes is non specific and   can be seen in a variety of conditions including stress response,   acute inflammation, trauma and pregnancy. Correlation with other   laboratory and clinical findings is essential.       Immature Granulocytes   1.1     INR   1.1  Comment:  Coumadin Therapy:  INR: 2.0-3.0 conventional anticoagulation  INR: 2.5-3.5 intensive anticoagulation       Lactate, Marco 1.4  Comment:  Falsely low lactic acid results can be found in samples   containing >=13.0 mg/dL total bilirubin and/or >=3.5 mg/dL    direct bilirubin.         Lymph #   1.3     Lymph %   14.3     Magnesium         MCH   27.8     MCHC   30.9     MCV   90     Mono #   0.3     Mono %   3.3     MPV   10.6     nRBC   0     Phosphorus         Platelets   352     POC Glucose         Potassium   4.0     PROTEIN TOTAL   6.9     PT   13.5     RBC   4.17     RDW   14.4     SARS-CoV-2 RNA, Amplification, Qual   Negative  Comment:  This test utilizes isothermal nucleic acid amplification   technology to detect the SARS-CoV-2 RdRp nucleic acid segment.   The analytical sensitivity (limit of detection) is 125 genome   equivalents/mL.   A POSITIVE result implies infection with the SARS-CoV-2 virus;  the patient is presumed to be contagious.    A NEGATIVE result means that SARS-CoV-2 nucleic acids are not  present above the limit of detection. A NEGATIVE result should be   treated as presumptive. It does not rule out the possibility of   COVID-19 and should not be the sole basis for treatment decisions.   If COVID-19 is strongly suspected based on clinical and exposure   history, re-testing using an alternate molecular assay should be   considered.   This test is only for use under the Food and Drug   Administration s Emergency Use Authorization (EUA).   Commercial kits are provided by [employer].   Performance characteristics of the EUA have been independently  verified by [facility] Department of  Pathology and Laboratory Medicine.   _________________________________________________________________  The ID NOW COVID-19 Letter of Authorization, along with the   authorized Fact Sheet for Healthcare Providers, the authorized Fact  Sheet for Patients, and authorized labeling are available on the FDA   website:  www.fda.gov/MedicalDevices/Safety/EmergencySituations/eaf075393.htm       Sodium   139     Troponin I   <0.030     TSH         WBC   9.19           Significant Imaging: I have reviewed all pertinent imaging results/findings within the  past 24 hours.

## 2020-11-25 NOTE — PROGRESS NOTES
"[facility] Medicine  Progress Note    Patient Name: Alanis Mendieta  MRN: [id number]  Patient Class: IP- Inpatient   Admission Date: 11/24/2020  Length of Stay: 1 days  Attending Physician: Todd Pichardo MD  Primary Care Provider: Michelle Matthew NP        Subjective:     Principal Problem:COPD exacerbation        HPI:  HISTORY OF PRESENT ILLNESS:   History was obtained from the patient and ER physician Sign-out. Patient presents to the ED with the complaint of SOB that is progressively worsening over the weekend despite treatment outpatient with her pulmonologist. SOB is worse with exertion. No alleviating factors. She reports associated chest tightness, wheezing and dry cough. She was started on oral Prednisone and antibiotics over the weekend as well as neb treatments 4 times per day. Symptoms have not improved. She went to see her pulmonologist today for a follow up visit and was deferred to the ED for further evaluation. She continues to feel SOB on her usual home 4L NC. She denies fever, chills, productive cough, nausea, vomiting, abdominal pain, dysuria, diarrhea, numbness, tingling, or LOC.      In the emergency room, patient is afebrile. She is tachypneic. When she got up to ambulate her O2 saturation decreased to 88% on 4L NC. CBC was unremarkable. CMP with CO2 32, glucose 274. BNP and troponin within reference ranges. I have reviewed the EKG and it shows NSR with prolonged QT. No ST/T wave abnormalities. No concerning finding on chest x ray. The patient is treated with neb treatments and IV Solumedrol.      Decision to admit was taken and patient was informed about the plan of care.       Overview/Hospital Course:  11/25/2020  Ms Hester states that she is much less short of breath today-able to lay down. She denies use of tobacco for 25 years    Interval History: Ms Hester is able to "breath better today". She has much reduced SOB but still has significant " CRUZ.    Review of Systems   Constitutional: Positive for diaphoresis and fatigue.   HENT: Negative.    Eyes: Negative.    Respiratory: Positive for choking, shortness of breath and wheezing.    Cardiovascular: Positive for palpitations. Negative for chest pain.   Gastrointestinal: Negative.    Genitourinary: Positive for enuresis and frequency.   Musculoskeletal: Positive for arthralgias and myalgias.   Skin: Negative.    Allergic/Immunologic: Negative.    Neurological: Positive for weakness.   Hematological: Bruises/bleeds easily.   Psychiatric/Behavioral: Positive for decreased concentration. The patient is nervous/anxious.      Objective:     Vital Signs (Most Recent):  Temp: 98.4 °F (36.9 °C) (11/25/20 0710)  Pulse: 80 (11/25/20 0804)  Resp: 16 (11/25/20 0804)  BP: 127/64 (11/25/20 0710)  SpO2: 100 % (11/25/20 0804) Vital Signs (24h Range):  Temp:  [98.2 °F (36.8 °C)-98.7 °F (37.1 °C)] 98.4 °F (36.9 °C)  Pulse:  [] 80  Resp:  [16-30] 16  SpO2:  [88 %-100 %] 100 %  BP: (123-175)/() 127/64     Weight: 100 kg (220 lb 7.4 oz)  Body mass index is 37.84 kg/m².    Intake/Output Summary (Last 24 hours) at 11/25/2020 1042  Last data filed at 11/25/2020 0732  Gross per 24 hour   Intake --   Output 700 ml   Net -700 ml      Physical Exam  Vitals signs and nursing note reviewed.   Constitutional:       General: She is not in acute distress.     Appearance: Normal appearance.   HENT:      Head: Normocephalic and atraumatic.      Nose: Nose normal.      Mouth/Throat:      Mouth: Mucous membranes are moist.   Eyes:      Extraocular Movements: Extraocular movements intact.      Pupils: Pupils are equal, round, and reactive to light.   Neck:      Musculoskeletal: Normal range of motion and neck supple.      Comments: No use of accessory muscles of respiration  Cardiovascular:      Rate and Rhythm: Normal rate and regular rhythm.      Heart sounds: Gallop present.    Pulmonary:      Effort: Pulmonary effort is normal.       Breath sounds: No wheezing, rhonchi or rales.   Abdominal:      General: Bowel sounds are normal. There is no distension.      Tenderness: There is no abdominal tenderness. There is no guarding.   Musculoskeletal: Normal range of motion.   Skin:     General: Skin is warm.   Neurological:      General: No focal deficit present.      Mental Status: She is alert and oriented to person, place, and time.   Psychiatric:         Mood and Affect: Mood normal.         Significant Labs:   Recent Lab Results       11/25/20  0714   11/25/20  0522   11/25/20  0521   11/24/20  2047   11/24/20  1710        Procalcitonin               Albumin     3.5         Alkaline Phosphatase     60         ALT     17         Anion Gap     11         AST     18         Baso #   0.01           Basophil %   0.1           BILIRUBIN TOTAL     0.2  Comment:  For infants and newborns, interpretation of results should be based  on gestational age, weight and in agreement with clinical  observations.  Premature Infant recommended reference ranges:  Up to 24 hours.............<8.0 mg/dL  Up to 48 hours............<12.0 mg/dL  3-5 days..................<15.0 mg/dL  6-29 days.................<15.0 mg/dL           Blood Culture, Routine               BNP               BUN     17         Calcium     8.1         Chloride     96         CO2     32         Creatinine     0.8         Differential Method   Automated           eGFR if      >60.0         eGFR if non      >60.0  Comment:  Calculation used to obtain the estimated glomerular filtration  rate (eGFR) is the CKD-EPI equation.            Eos #   0.0           Eosinophil %   0.1           Estimated Avg Glucose   197           Glucose     266         Gran # (ANC)   6.9           Gran %   82.2           Hematocrit   36.1           Hemoglobin   11.6           Hemoglobin A1C External   8.5  Comment:  According to ADA guidelines, hemoglobin A1C <7.0% represents  optimal  control in non-pregnant diabetic patients.  Different  metrics may apply to specific populations.   Standards of Medical Care in Diabetes - 2016.  For the purpose of screening for the presence of diabetes:  <5.7%     Consistent with the absence of diabetes  5.7-6.4%  Consistent with increasing risk for diabetes   (prediabetes)  >or=6.5%  Consistent with diabetes  Currently no consensus exists for use of hemoglobin A1C  for diagnosis of diabetes for children.             Immature Grans (Abs)   0.05  Comment:  Mild elevation in immature granulocytes is non specific and   can be seen in a variety of conditions including stress response,   acute inflammation, trauma and pregnancy. Correlation with other   laboratory and clinical findings is essential.             Immature Granulocytes   0.6           INR               Lactate, Marco               Lymph #   1.2           Lymph %   14.4           Magnesium     2.0         MCH   29.1           MCHC   32.1           MCV   91           Mono #   0.2           Mono %   2.6           MPV   10.5           nRBC   0           Phosphorus     5.0         Platelets   370           POC Glucose 253     381 307     Potassium     4.9         PROTEIN TOTAL     6.2         PT               RBC   3.99           RDW   14.2           SARS-CoV-2 RNA, Amplification, Qual               Sodium     139         Troponin I               TSH     0.710         WBC   8.38                            11/24/20  1345   11/24/20  1328        Procalcitonin   <0.05  Comment:  Procalcitonin Result Interpretation:  <=0.50 ng/mL  Systemic infection (sepsis) is not likely. Local bacterial infection   is   possible.  >0.50 and <= 2.00 ng/mL  Systemic infection (sepsis) is possible, but other conditions are   also known   to elevate procalcitonin.   >2.00 ng/mL  Systemic infection (sepsis) is likely unless other causes are known.  >=10.00 ng/mL  Important systemic inflammatory response, almost exclusively due to    severe   bacterial sepsis or septic shock.       Albumin   3.8     Alkaline Phosphatase   62     ALT   18     Anion Gap   10     AST   17     Baso #   0.03     Basophil %   0.3     BILIRUBIN TOTAL   0.4  Comment:  For infants and newborns, interpretation of results should be based  on gestational age, weight and in agreement with clinical  observations.  Premature Infant recommended reference ranges:  Up to 24 hours.............<8.0 mg/dL  Up to 48 hours............<12.0 mg/dL  3-5 days..................<15.0 mg/dL  6-29 days.................<15.0 mg/dL       Blood Culture, Routine No Growth to date[P] No Growth to date[P]     BNP   23  Comment:  Values of less than 100 pg/ml are consistent with non-CHF populations.     BUN   20     Calcium   8.3     Chloride   97     CO2   32     Creatinine   0.6     Differential Method   Automated     eGFR if    >60.0     eGFR if non    >60.0  Comment:  Calculation used to obtain the estimated glomerular filtration  rate (eGFR) is the CKD-EPI equation.        Eos #   0.0     Eosinophil %   0.1     Estimated Avg Glucose         Glucose   274     Gran # (ANC)   7.4     Gran %   80.9     Hematocrit   37.6     Hemoglobin   11.6     Hemoglobin A1C External         Immature Grans (Abs)   0.10  Comment:  Mild elevation in immature granulocytes is non specific and   can be seen in a variety of conditions including stress response,   acute inflammation, trauma and pregnancy. Correlation with other   laboratory and clinical findings is essential.       Immature Granulocytes   1.1     INR   1.1  Comment:  Coumadin Therapy:  INR: 2.0-3.0 conventional anticoagulation  INR: 2.5-3.5 intensive anticoagulation       Lactate, Marco 1.4  Comment:  Falsely low lactic acid results can be found in samples   containing >=13.0 mg/dL total bilirubin and/or >=3.5 mg/dL   direct bilirubin.         Lymph #   1.3     Lymph %   14.3     Magnesium         MCH   27.8     MCHC    30.9     MCV   90     Mono #   0.3     Mono %   3.3     MPV   10.6     nRBC   0     Phosphorus         Platelets   352     POC Glucose         Potassium   4.0     PROTEIN TOTAL   6.9     PT   13.5     RBC   4.17     RDW   14.4     SARS-CoV-2 RNA, Amplification, Qual   Negative  Comment:  This test utilizes isothermal nucleic acid amplification   technology to detect the SARS-CoV-2 RdRp nucleic acid segment.   The analytical sensitivity (limit of detection) is 125 genome   equivalents/mL.   A POSITIVE result implies infection with the SARS-CoV-2 virus;  the patient is presumed to be contagious.    A NEGATIVE result means that SARS-CoV-2 nucleic acids are not  present above the limit of detection. A NEGATIVE result should be   treated as presumptive. It does not rule out the possibility of   COVID-19 and should not be the sole basis for treatment decisions.   If COVID-19 is strongly suspected based on clinical and exposure   history, re-testing using an alternate molecular assay should be   considered.   This test is only for use under the Food and Drug   Administration s Emergency Use Authorization (EUA).   Commercial kits are provided by [employer].   Performance characteristics of the EUA have been independently  verified by [facility] Department of  Pathology and Laboratory Medicine.   _________________________________________________________________  The ID NOW COVID-19 Letter of Authorization, along with the   authorized Fact Sheet for Healthcare Providers, the authorized Fact  Sheet for Patients, and authorized labeling are available on the FDA   website:  www.fda.gov/MedicalDevices/Safety/EmergencySituations/hdn957949.htm       Sodium   139     Troponin I   <0.030     TSH         WBC   9.19           Significant Imaging: I have reviewed all pertinent imaging results/findings within the past 24 hours.      Assessment/Plan:   11/25/2020  A)  Acute on chronic respiratory failure with  hypoxia-symptoms beginning to resolve with treatment  COPD exacerbation but the pt has a Dx of mixed disorder with asthma  DM 2 uncontrolled  HTN  Hypothyroidism  P)  Dr Reis saw the patient this AM  Continue current therapy      11/24/2020  Acute on Chronic respiratory failure with hypoxia  Acute COPD exacerbation  HTN  IDDM  Hypothyroidism  Anxiety     Admit to Med-Tele  Neb treatments  IV Solumedrol 80mg q8h  Continue home inhalers  IV Levaquin   Consult Dr. Reis  Continue losartan  Check Hba1c  accuchecks with low dose SSI; hypoglycemia protocol  Continue home detemir 70 units BID  Check tsh; continue levothyroxine  Continue clonazepam prn  No notes have been filed under this hospital service.  Service: Hospital Medicine    VTE Risk Mitigation (From admission, onward)         Ordered     enoxaparin injection 40 mg  Every 24 hours      11/24/20 1744     IP VTE HIGH RISK PATIENT  Once      11/24/20 1744     Place sequential compression device  Until discontinued      11/24/20 1744                Discharge Planning   KELSEY:      Code Status: Full Code   Is the patient medically ready for discharge?:     Reason for patient still in hospital (select all that apply): Treatment and Consult recommendations  Discharge Plan A: Home with family                  Todd Pichardo MD  Department of Hospital Medicine   [facility]

## 2020-11-25 NOTE — PLAN OF CARE
Problem: Fall Injury Risk  Goal: Absence of Fall and Fall-Related Injury  Outcome: Ongoing, Progressing     Problem: Adult Inpatient Plan of Care  Goal: Plan of Care Review  Outcome: Ongoing, Progressing     Problem: Diabetes Comorbidity  Goal: Blood Glucose Level Within Desired Range  Outcome: Ongoing, Progressing     Problem: Adult Inpatient Plan of Care  Goal: Optimal Comfort and Wellbeing  Outcome: Ongoing, Progressing

## 2020-11-25 NOTE — PLAN OF CARE
11/25/20 0804   Patient Assessment/Suction   Level of Consciousness (AVPU) alert   Respiratory Effort Unlabored   All Lung Fields Breath Sounds diminished;clear   PRE-TX-O2   O2 Device (Oxygen Therapy) nasal cannula   $ Is the patient on Low Flow Oxygen? Yes   Flow (L/min) 4   SpO2 100 %   Pulse Oximetry Type Intermittent   $ Pulse Oximetry - Multiple Charge Pulse Oximetry - Multiple   Pulse 80   Resp 16   Aerosol Therapy   $ Aerosol Therapy Charges Aerosol Treatment   Daily Review of Necessity (SVN) completed   Respiratory Treatment Status (SVN) given   Treatment Route (SVN) mask   Patient Position (SVN) HOB elevated   Post Treatment Assessment (SVN) breath sounds improved   Signs of Intolerance (SVN) none   Inhaler   $ Inhaler Charges MDI (Metered Dose Inahler) Treatment   Daily Review of Necessity (Inhaler) completed   Respiratory Treatment Status (Inhaler) given   Treatment Route (Inhaler) mouthpiece   Patient Position (Inhaler) HOB elevated   Post Treatment Assessment (Inhaler) breath sounds improved   Signs of Intolerance (Inhaler) none   Respiratory Evaluation   $ Care Plan Tech Time 15 min

## 2020-11-25 NOTE — PLAN OF CARE
Problem: Fall Injury Risk  Goal: Absence of Fall and Fall-Related Injury  Outcome: Ongoing, Not Progressing     Problem: Adult Inpatient Plan of Care  Goal: Plan of Care Review  Outcome: Ongoing, Not Progressing  Goal: Patient-Specific Goal (Individualization)  Outcome: Ongoing, Not Progressing  Goal: Absence of Hospital-Acquired Illness or Injury  Outcome: Ongoing, Not Progressing  Goal: Optimal Comfort and Wellbeing  Outcome: Ongoing, Not Progressing  Goal: Readiness for Transition of Care  Outcome: Ongoing, Not Progressing  Goal: Rounds/Family Conference  Outcome: Ongoing, Not Progressing     Problem: Diabetes Comorbidity  Goal: Blood Glucose Level Within Desired Range  Outcome: Ongoing, Not Progressing     Problem: Skin Injury Risk Increased  Goal: Skin Health and Integrity  Outcome: Ongoing, Not Progressing

## 2020-11-26 LAB
GLUCOSE SERPL-MCNC: 249 MG/DL (ref 70–110)
GLUCOSE SERPL-MCNC: 257 MG/DL (ref 70–110)
GLUCOSE SERPL-MCNC: 270 MG/DL (ref 70–110)
GLUCOSE SERPL-MCNC: 299 MG/DL (ref 70–110)

## 2020-11-26 PROCEDURE — 25000242 PHARM REV CODE 250 ALT 637 W/ HCPCS: Performed by: INTERNAL MEDICINE

## 2020-11-26 PROCEDURE — 25000003 PHARM REV CODE 250: Performed by: NURSE PRACTITIONER

## 2020-11-26 PROCEDURE — 99900035 HC TECH TIME PER 15 MIN (STAT)

## 2020-11-26 PROCEDURE — 99900031 HC PATIENT EDUCATION (STAT)

## 2020-11-26 PROCEDURE — 63600175 PHARM REV CODE 636 W HCPCS: Performed by: NURSE PRACTITIONER

## 2020-11-26 PROCEDURE — 12000002 HC ACUTE/MED SURGE SEMI-PRIVATE ROOM

## 2020-11-26 PROCEDURE — 27000221 HC OXYGEN, UP TO 24 HOURS

## 2020-11-26 PROCEDURE — C9399 UNCLASSIFIED DRUGS OR BIOLOG: HCPCS | Performed by: NURSE PRACTITIONER

## 2020-11-26 PROCEDURE — 94640 AIRWAY INHALATION TREATMENT: CPT

## 2020-11-26 PROCEDURE — 63600175 PHARM REV CODE 636 W HCPCS: Performed by: INTERNAL MEDICINE

## 2020-11-26 PROCEDURE — 94761 N-INVAS EAR/PLS OXIMETRY MLT: CPT

## 2020-11-26 PROCEDURE — 25000003 PHARM REV CODE 250: Performed by: INTERNAL MEDICINE

## 2020-11-26 RX ADMIN — ROPINIROLE 4 MG: 2 TABLET, FILM COATED ORAL at 09:11

## 2020-11-26 RX ADMIN — DOXYCYCLINE HYCLATE 100 MG: 100 CAPSULE ORAL at 08:11

## 2020-11-26 RX ADMIN — INSULIN ASPART 2 UNITS: 100 INJECTION, SOLUTION INTRAVENOUS; SUBCUTANEOUS at 12:11

## 2020-11-26 RX ADMIN — ENOXAPARIN SODIUM 40 MG: 40 INJECTION SUBCUTANEOUS at 04:11

## 2020-11-26 RX ADMIN — HYDROCHLOROTHIAZIDE 12.5 MG: 12.5 TABLET ORAL at 08:11

## 2020-11-26 RX ADMIN — MUPIROCIN: 20 OINTMENT TOPICAL at 08:11

## 2020-11-26 RX ADMIN — LOSARTAN POTASSIUM 50 MG: 50 TABLET, FILM COATED ORAL at 08:11

## 2020-11-26 RX ADMIN — LEVOTHYROXINE SODIUM 150 MCG: 25 TABLET ORAL at 06:11

## 2020-11-26 RX ADMIN — GABAPENTIN 800 MG: 300 CAPSULE ORAL at 02:11

## 2020-11-26 RX ADMIN — INSULIN DETEMIR 70 UNITS: 100 INJECTION, SOLUTION SUBCUTANEOUS at 09:11

## 2020-11-26 RX ADMIN — IPRATROPIUM BROMIDE AND ALBUTEROL SULFATE 3 ML: .5; 3 SOLUTION RESPIRATORY (INHALATION) at 07:11

## 2020-11-26 RX ADMIN — DOXYCYCLINE HYCLATE 100 MG: 100 CAPSULE ORAL at 09:11

## 2020-11-26 RX ADMIN — HUMAN INSULIN 6 UNITS: 100 INJECTION, SOLUTION SUBCUTANEOUS at 05:11

## 2020-11-26 RX ADMIN — QUETIAPINE 25 MG: 25 TABLET ORAL at 09:11

## 2020-11-26 RX ADMIN — GABAPENTIN 800 MG: 300 CAPSULE ORAL at 08:11

## 2020-11-26 RX ADMIN — INSULIN DETEMIR 70 UNITS: 100 INJECTION, SOLUTION SUBCUTANEOUS at 08:11

## 2020-11-26 RX ADMIN — LAMOTRIGINE 150 MG: 100 TABLET ORAL at 08:11

## 2020-11-26 RX ADMIN — LATANOPROST 1 DROP: 50 SOLUTION OPHTHALMIC at 09:11

## 2020-11-26 RX ADMIN — FLUOXETINE 40 MG: 20 CAPSULE ORAL at 08:11

## 2020-11-26 RX ADMIN — METHYLPREDNISOLONE SODIUM SUCCINATE 80 MG: 40 INJECTION, POWDER, FOR SOLUTION INTRAMUSCULAR; INTRAVENOUS at 09:11

## 2020-11-26 RX ADMIN — METHYLPREDNISOLONE SODIUM SUCCINATE 80 MG: 40 INJECTION, POWDER, FOR SOLUTION INTRAMUSCULAR; INTRAVENOUS at 02:11

## 2020-11-26 RX ADMIN — OXYCODONE HYDROCHLORIDE 10 MG: 5 TABLET ORAL at 09:11

## 2020-11-26 RX ADMIN — GABAPENTIN 800 MG: 300 CAPSULE ORAL at 09:11

## 2020-11-26 RX ADMIN — IPRATROPIUM BROMIDE AND ALBUTEROL SULFATE 3 ML: .5; 3 SOLUTION RESPIRATORY (INHALATION) at 01:11

## 2020-11-26 RX ADMIN — CALCITRIOL CAPSULES 0.25 MCG 0.5 MCG: 0.25 CAPSULE ORAL at 09:11

## 2020-11-26 RX ADMIN — ROPINIROLE 4 MG: 2 TABLET, FILM COATED ORAL at 08:11

## 2020-11-26 RX ADMIN — MUPIROCIN: 20 OINTMENT TOPICAL at 05:11

## 2020-11-26 RX ADMIN — HUMAN INSULIN 6 UNITS: 100 INJECTION, SOLUTION SUBCUTANEOUS at 09:11

## 2020-11-26 RX ADMIN — CLONAZEPAM 0.5 MG: 0.5 TABLET ORAL at 03:11

## 2020-11-26 RX ADMIN — INSULIN ASPART 3 UNITS: 100 INJECTION, SOLUTION INTRAVENOUS; SUBCUTANEOUS at 08:11

## 2020-11-26 RX ADMIN — METHYLPREDNISOLONE SODIUM SUCCINATE 80 MG: 40 INJECTION, POWDER, FOR SOLUTION INTRAMUSCULAR; INTRAVENOUS at 06:11

## 2020-11-26 RX ADMIN — FLUTICASONE PROPIONATE 50 MCG: 50 SPRAY, METERED NASAL at 08:11

## 2020-11-26 RX ADMIN — PANTOPRAZOLE SODIUM 40 MG: 40 TABLET, DELAYED RELEASE ORAL at 08:11

## 2020-11-26 RX ADMIN — LAMOTRIGINE 150 MG: 100 TABLET ORAL at 09:11

## 2020-11-26 NOTE — PROGRESS NOTES
[facility] Medicine  Progress Note    Patient Name: Alanis Mendieta  MRN: [id number]  Patient Class: IP- Inpatient   Admission Date: 11/24/2020  Length of Stay: 2 days  Attending Physician: Todd Pichardo MD  Primary Care Provider: Michelle Matthew NP        Subjective:     Principal Problem:COPD exacerbation        HPI:  HISTORY OF PRESENT ILLNESS:   History was obtained from the patient and ER physician Sign-out. Patient presents to the ED with the complaint of SOB that is progressively worsening over the weekend despite treatment outpatient with her pulmonologist. SOB is worse with exertion. No alleviating factors. She reports associated chest tightness, wheezing and dry cough. She was started on oral Prednisone and antibiotics over the weekend as well as neb treatments 4 times per day. Symptoms have not improved. She went to see her pulmonologist today for a follow up visit and was deferred to the ED for further evaluation. She continues to feel SOB on her usual home 4L NC. She denies fever, chills, productive cough, nausea, vomiting, abdominal pain, dysuria, diarrhea, numbness, tingling, or LOC.      In the emergency room, patient is afebrile. She is tachypneic. When she got up to ambulate her O2 saturation decreased to 88% on 4L NC. CBC was unremarkable. CMP with CO2 32, glucose 274. BNP and troponin within reference ranges. I have reviewed the EKG and it shows NSR with prolonged QT. No ST/T wave abnormalities. No concerning finding on chest x ray. The patient is treated with neb treatments and IV Solumedrol.      Decision to admit was taken and patient was informed about the plan of care.       Overview/Hospital Course:  11/25/2020  Ms Mir states that she is much less short of breath today-able to lay down. She denies use of tobacco for 25 years    11/26/2020  Ms Mir states that she is much less short of breath but has significant CRUZ. She lives by herself and will be  unable to freform AODL's without assistance. Pt states that Dr Reis states that she should stay until Saturday    Interval History: Ms Hester is feeling better at rest but has significant CRUZ with limits her ability to care for herself.    Review of Systems   Constitutional: Positive for diaphoresis and fatigue.   HENT: Negative.    Eyes: Negative.    Respiratory: Positive for cough, chest tightness, shortness of breath and wheezing.         Reduced   Cardiovascular: Positive for palpitations.   Gastrointestinal: Negative.    Endocrine: Positive for cold intolerance and heat intolerance.   Genitourinary: Negative.    Musculoskeletal: Positive for arthralgias and myalgias.   Skin: Negative.    Neurological: Positive for weakness.   Hematological: Bruises/bleeds easily.   Psychiatric/Behavioral: Positive for decreased concentration. The patient is nervous/anxious.      Objective:     Vital Signs (Most Recent):  Temp: 98 °F (36.7 °C) (11/26/20 1139)  Pulse: 77 (11/26/20 1343)  Resp: 15 (11/26/20 1343)  BP: (!) 156/66 (11/26/20 1139)  SpO2: 99 % (11/26/20 1343) Vital Signs (24h Range):  Temp:  [98 °F (36.7 °C)-98.5 °F (36.9 °C)] 98 °F (36.7 °C)  Pulse:  [76-90] 77  Resp:  [15-18] 15  SpO2:  [97 %-100 %] 99 %  BP: (122-160)/(45-74) 156/66     Weight: 100 kg (220 lb 7.4 oz)  Body mass index is 37.84 kg/m².    Intake/Output Summary (Last 24 hours) at 11/26/2020 1348  Last data filed at 11/26/2020 0346  Gross per 24 hour   Intake 400 ml   Output 2000 ml   Net -1600 ml      Physical Exam  Vitals signs and nursing note reviewed.   Constitutional:       Appearance: Normal appearance.   HENT:      Head: Normocephalic and atraumatic.      Nose: Nose normal.      Mouth/Throat:      Mouth: Mucous membranes are dry.   Eyes:      Extraocular Movements: Extraocular movements intact.      Pupils: Pupils are equal, round, and reactive to light.   Neck:      Musculoskeletal: Normal range of motion and neck supple.      Comments:  No use of accessory muscles  Cardiovascular:      Rate and Rhythm: Normal rate and regular rhythm.      Heart sounds: Gallop present.    Pulmonary:      Breath sounds: Wheezing present.      Comments: Occasional fine end expiratory  Musculoskeletal: Normal range of motion.   Skin:     General: Skin is warm.   Neurological:      General: No focal deficit present.      Mental Status: She is alert and oriented to person, place, and time.   Psychiatric:         Mood and Affect: Mood normal.         Significant Labs:   Recent Lab Results       11/26/20  1134   11/26/20  0809   11/25/20  2123   11/25/20  1610        POC Glucose 249 270 335 238           Significant Imaging: I have reviewed all pertinent imaging results/findings within the past 24 hours.      Assessment/Plan:   11/26/2020  A)  COPD exacerbation  Improved respiratory status at rest but significant CRUZ. Status improved with use of the Trilogy vent   DM 2 uncontrolled  HTN  Hypothyroidism  P)  Increase insulin sliding scale to moderate  Pulmonary input appreciated      11/25/2020  A)  Acute on chronic respiratory failure with hypoxia-symptoms beginning to resolve with treatment  COPD exacerbation but the pt has a Dx of mixed disorder with asthma  DM 2 uncontrolled  HTN  Hypothyroidism  P)  Dr Reis saw the patient this AM  Continue current therapy        11/24/2020  Acute on Chronic respiratory failure with hypoxia  Acute COPD exacerbation  HTN  IDDM  Hypothyroidism  Anxiety     Admit to Med-Tele  Neb treatments  IV Solumedrol 80mg q8h  Continue home inhalers  IV Levaquin   Consult Dr. Reis  Continue losartan  Check Hba1c  accuchecks with low dose SSI; hypoglycemia protocol  Continue home detemir 70 units BID  Check tsh; continue levothyroxine  Continue clonazepam prn  No notes have been filed under this hospital service.  Service: Hospital Medicine    VTE Risk Mitigation (From admission, onward)         Ordered     enoxaparin injection 40 mg  Every 24  hours      11/24/20 1744     IP VTE HIGH RISK PATIENT  Once      11/24/20 1744     Place sequential compression device  Until discontinued      11/24/20 1744                Discharge Planning   KELSEY:      Code Status: Full Code   Is the patient medically ready for discharge?:     Reason for patient still in hospital (select all that apply): Treatment, Consult recommendations and Pending disposition  Discharge Plan A: Home with family                  Todd Pichardo MD  Department of Hospital Medicine   [facility]

## 2020-11-26 NOTE — PLAN OF CARE
11/26/20 0725   Patient Assessment/Suction   Level of Consciousness (AVPU) alert   All Lung Fields Breath Sounds clear;crackles   PRE-TX-O2   O2 Device (Oxygen Therapy) nasal cannula   $ Is the patient on Low Flow Oxygen? Yes   Flow (L/min) 4  (decreased to 3)   SpO2 100 %   Pulse Oximetry Type Intermittent   $ Pulse Oximetry - Multiple Charge Pulse Oximetry - Multiple   Pulse 79   Resp 15   Aerosol Therapy   $ Aerosol Therapy Charges Aerosol Treatment   Daily Review of Necessity (SVN) completed   Respiratory Treatment Status (SVN) given   Treatment Route (SVN) mask   Patient Position (SVN) semi-Pretty's   Post Treatment Assessment (SVN) increased aeration   Signs of Intolerance (SVN) none   Inhaler   $ Inhaler Charges MDI (Metered Dose Inahler) Treatment   Daily Review of Necessity (Inhaler) completed   Respiratory Treatment Status (Inhaler) given   Treatment Route (Inhaler) mouthpiece   Patient Position (Inhaler) semi-Pretty's   Post Treatment Assessment (Inhaler) increased aeration   Signs of Intolerance (Inhaler) none   Breath Sounds Post-Respiratory Treatment   Post-treatment Heart Rate (beats/min) 80   Post-treatment Resp Rate (breaths/min) 15   Respiratory Evaluation   $ Care Plan Tech Time 15 min      DATE OF ADMISSION:  



CHIEF COMPLAINT: Chest pain. 



HISTORY OF PRESENT ILLNESS: This gentleman developed severe left 

posterior paraspinous chest pain associated with shortness of breath 

and diaphoresis. He had no history of any trauma and he had no 

hemoptysis, anterior chest discomfort, etc. He is diabetic and he is 

very noncompliant. Sugars are usually not in control. He has also been 

a heavy smoker. He came to the emergency room. His enzymes were 

unremarkable. He describes his pain as being pulsatile, but there is 

nothing on his ER studies to suggest dissection. His blood pressure is 

usually very low as well.  



REVIEW OF SYSTEMS: He has had no syncope, dizziness, confusion, change 

in vision or hearing, TIAs, amaurosis fugax, etc. He has had no 

hemoptysis, sputum production. He does have a history of chronic 

bronchitis and COPD and continues to smoke. He denied any angina, 

orthopnea, PND. He had no nausea. He has had no abdominal pain, 

vomiting, diarrhea, melena, hematochezia, dysphagia, etc. He has had 

no history of pancreatitis. He has had no renal disease, dysuria, 

frequency, urgency, etc. His diabetes is usually not in good control.  



Past medical history, family history and personal and social histories 

are otherwise unremarkable. He cannot take Coumadin. He is currently 

on:  

1. Vitamin D 50,000 units a month. 

2. Simvastatin 80 mg at bedtime. 

3. Eliquis 5 mg twice a day. 

4. Fenofibrate 145 mg once a day. 

5. NovoLog Mix 70/30 70 units in the morning and 50 at night. 

6. Lanoxin 0.125 once a day. 



The remainder of his history is unremarkable. He does continue to 

smoke.  



PHYSICAL EXAMINATION: Blood pressure is 110/64 with a pulse of 64, 

respirations of 16. He is afebrile. In general he appeared to be well 

developed, well nourished, in no acute distress. Skin color is normal. 

Skin is warm and dry. Lymph nodes are not enlarged. Head, ears, eyes, 

nose, mouth and throat were normal. Neck veins are not distended. 

Thyroid is not enlarged. Chest is clear. Cardiac exam is normal, with 

no murmurs or extra sounds. Abdomen is soft and nontender with no 

visceromegaly or masses. Bowel sounds are present. Extremities are 

normal. Neurologically he is intact.  



IMPRESSION: 

1. Posterior chest pain with shortness of breath and diaphoresis. 

2. Poorly controlled insulin-dependent type 2 diabetes mellitus. 

3. Uncontrolled diabetes mellitus.

4. Chronic obstructive pulmonary disease.



PLAN: 

1. Bed rest. 

2. IV fluids. 

3. Serial EKGs and cardiac enzymes. 

4. D-dimer. 

5. Rule out thoracic aortic disease. 

6. Cardiology consult.

## 2020-11-26 NOTE — SUBJECTIVE & OBJECTIVE
Interval History: Ms Hester is feeling better at rest but has significant CRUZ with limits her ability to care for herself.    Review of Systems   Constitutional: Positive for diaphoresis and fatigue.   HENT: Negative.    Eyes: Negative.    Respiratory: Positive for cough, chest tightness, shortness of breath and wheezing.         Reduced   Cardiovascular: Positive for palpitations.   Gastrointestinal: Negative.    Endocrine: Positive for cold intolerance and heat intolerance.   Genitourinary: Negative.    Musculoskeletal: Positive for arthralgias and myalgias.   Skin: Negative.    Neurological: Positive for weakness.   Hematological: Bruises/bleeds easily.   Psychiatric/Behavioral: Positive for decreased concentration. The patient is nervous/anxious.      Objective:     Vital Signs (Most Recent):  Temp: 98 °F (36.7 °C) (11/26/20 1139)  Pulse: 77 (11/26/20 1343)  Resp: 15 (11/26/20 1343)  BP: (!) 156/66 (11/26/20 1139)  SpO2: 99 % (11/26/20 1343) Vital Signs (24h Range):  Temp:  [98 °F (36.7 °C)-98.5 °F (36.9 °C)] 98 °F (36.7 °C)  Pulse:  [76-90] 77  Resp:  [15-18] 15  SpO2:  [97 %-100 %] 99 %  BP: (122-160)/(45-74) 156/66     Weight: 100 kg (220 lb 7.4 oz)  Body mass index is 37.84 kg/m².    Intake/Output Summary (Last 24 hours) at 11/26/2020 1348  Last data filed at 11/26/2020 0346  Gross per 24 hour   Intake 400 ml   Output 2000 ml   Net -1600 ml      Physical Exam  Vitals signs and nursing note reviewed.   Constitutional:       Appearance: Normal appearance.   HENT:      Head: Normocephalic and atraumatic.      Nose: Nose normal.      Mouth/Throat:      Mouth: Mucous membranes are dry.   Eyes:      Extraocular Movements: Extraocular movements intact.      Pupils: Pupils are equal, round, and reactive to light.   Neck:      Musculoskeletal: Normal range of motion and neck supple.      Comments: No use of accessory muscles  Cardiovascular:      Rate and Rhythm: Normal rate and regular rhythm.      Heart sounds:  Gallop present.    Pulmonary:      Breath sounds: Wheezing present.      Comments: Occasional fine end expiratory  Musculoskeletal: Normal range of motion.   Skin:     General: Skin is warm.   Neurological:      General: No focal deficit present.      Mental Status: She is alert and oriented to person, place, and time.   Psychiatric:         Mood and Affect: Mood normal.         Significant Labs:   Recent Lab Results       11/26/20  1134   11/26/20  0809   11/25/20  2123   11/25/20  1610        POC Glucose 249 270 335 238           Significant Imaging: I have reviewed all pertinent imaging results/findings within the past 24 hours.

## 2020-11-26 NOTE — PLAN OF CARE
11/25/20 2114   Patient Assessment/Suction   Level of Consciousness (AVPU) alert   Respiratory Effort Unlabored   Expansion/Accessory Muscles/Retractions no use of accessory muscles   All Lung Fields Breath Sounds clear   Rhythm/Pattern, Respiratory unlabored   Cough Frequency infrequent   Cough Type nonproductive   PRE-TX-O2   O2 Device (Oxygen Therapy) nasal cannula   Flow (L/min) 4   SpO2 99 %   Pulse 80   Resp 18   Aerosol Therapy   $ Aerosol Therapy Charges Aerosol Treatment   Daily Review of Necessity (SVN) completed   Respiratory Treatment Status (SVN) given   Treatment Route (SVN) mask   Patient Position (SVN) HOB elevated   Post Treatment Assessment (SVN) breath sounds improved   Signs of Intolerance (SVN) none   Breath Sounds Post-Respiratory Treatment   Throughout All Fields Post-Treatment All Fields   Throughout All Fields Post-Treatment clear   Post-treatment Heart Rate (beats/min) 82   Post-treatment Resp Rate (breaths/min) 18   Respiratory Evaluation   $ Care Plan Tech Time 15 min   Evaluation For   (CARE PLAN)        11/25/20 2114   Patient Assessment/Suction   Level of Consciousness (AVPU) alert   Respiratory Effort Unlabored   Expansion/Accessory Muscles/Retractions no use of accessory muscles   All Lung Fields Breath Sounds clear   Rhythm/Pattern, Respiratory unlabored   Cough Frequency infrequent   Cough Type nonproductive   PRE-TX-O2   O2 Device (Oxygen Therapy) nasal cannula   Flow (L/min) 4   SpO2 99 %   Pulse 80   Resp 18   Aerosol Therapy   $ Aerosol Therapy Charges Aerosol Treatment   Daily Review of Necessity (SVN) completed   Respiratory Treatment Status (SVN) given   Treatment Route (SVN) mask   Patient Position (SVN) HOB elevated   Post Treatment Assessment (SVN) breath sounds improved   Signs of Intolerance (SVN) none   Breath Sounds Post-Respiratory Treatment   Throughout All Fields Post-Treatment All Fields   Throughout All Fields Post-Treatment clear   Post-treatment Heart Rate  (beats/min) 82   Post-treatment Resp Rate (breaths/min) 18   Respiratory Evaluation   $ Care Plan Tech Time 15 min   Evaluation For   (CARE PLAN)

## 2020-11-27 LAB
ANION GAP SERPL CALC-SCNC: 9 MMOL/L (ref 8–16)
BUN SERPL-MCNC: 25 MG/DL (ref 8–23)
CALCIUM SERPL-MCNC: 8.5 MG/DL (ref 8.7–10.5)
CHLORIDE SERPL-SCNC: 97 MMOL/L (ref 95–110)
CO2 SERPL-SCNC: 34 MMOL/L (ref 23–29)
CREAT SERPL-MCNC: 0.7 MG/DL (ref 0.5–1.4)
ERYTHROCYTE [DISTWIDTH] IN BLOOD BY AUTOMATED COUNT: 14.6 % (ref 11.5–14.5)
EST. GFR  (AFRICAN AMERICAN): >60 ML/MIN/1.73 M^2
EST. GFR  (NON AFRICAN AMERICAN): >60 ML/MIN/1.73 M^2
GLUCOSE SERPL-MCNC: 162 MG/DL (ref 70–110)
GLUCOSE SERPL-MCNC: 185 MG/DL (ref 70–110)
GLUCOSE SERPL-MCNC: 256 MG/DL (ref 70–110)
GLUCOSE SERPL-MCNC: 286 MG/DL (ref 70–110)
GLUCOSE SERPL-MCNC: 291 MG/DL (ref 70–110)
HCT VFR BLD AUTO: 37.4 % (ref 37–48.5)
HGB BLD-MCNC: 11.8 G/DL (ref 12–16)
MCH RBC QN AUTO: 28 PG (ref 27–31)
MCHC RBC AUTO-ENTMCNC: 31.6 G/DL (ref 32–36)
MCV RBC AUTO: 89 FL (ref 82–98)
PLATELET # BLD AUTO: 382 K/UL (ref 150–350)
PMV BLD AUTO: 10.4 FL (ref 9.2–12.9)
POTASSIUM SERPL-SCNC: 4.1 MMOL/L (ref 3.5–5.1)
RBC # BLD AUTO: 4.22 M/UL (ref 4–5.4)
SODIUM SERPL-SCNC: 140 MMOL/L (ref 136–145)
WBC # BLD AUTO: 11.01 K/UL (ref 3.9–12.7)

## 2020-11-27 PROCEDURE — 94761 N-INVAS EAR/PLS OXIMETRY MLT: CPT

## 2020-11-27 PROCEDURE — 94760 N-INVAS EAR/PLS OXIMETRY 1: CPT

## 2020-11-27 PROCEDURE — 99232 PR SUBSEQUENT HOSPITAL CARE,LEVL II: ICD-10-PCS | Mod: ,,, | Performed by: INTERNAL MEDICINE

## 2020-11-27 PROCEDURE — 97161 PT EVAL LOW COMPLEX 20 MIN: CPT

## 2020-11-27 PROCEDURE — 80048 BASIC METABOLIC PNL TOTAL CA: CPT

## 2020-11-27 PROCEDURE — 36415 COLL VENOUS BLD VENIPUNCTURE: CPT

## 2020-11-27 PROCEDURE — 93010 ELECTROCARDIOGRAM REPORT: CPT | Mod: ,,, | Performed by: SPECIALIST

## 2020-11-27 PROCEDURE — 99900035 HC TECH TIME PER 15 MIN (STAT)

## 2020-11-27 PROCEDURE — 85027 COMPLETE CBC AUTOMATED: CPT

## 2020-11-27 PROCEDURE — 25000003 PHARM REV CODE 250: Performed by: INTERNAL MEDICINE

## 2020-11-27 PROCEDURE — 63600175 PHARM REV CODE 636 W HCPCS: Performed by: INTERNAL MEDICINE

## 2020-11-27 PROCEDURE — 27000221 HC OXYGEN, UP TO 24 HOURS

## 2020-11-27 PROCEDURE — 93010 EKG 12-LEAD: ICD-10-PCS | Mod: ,,, | Performed by: SPECIALIST

## 2020-11-27 PROCEDURE — 97165 OT EVAL LOW COMPLEX 30 MIN: CPT

## 2020-11-27 PROCEDURE — 12000002 HC ACUTE/MED SURGE SEMI-PRIVATE ROOM

## 2020-11-27 PROCEDURE — 25000003 PHARM REV CODE 250: Performed by: NURSE PRACTITIONER

## 2020-11-27 PROCEDURE — 99232 SBSQ HOSP IP/OBS MODERATE 35: CPT | Mod: ,,, | Performed by: INTERNAL MEDICINE

## 2020-11-27 PROCEDURE — 97530 THERAPEUTIC ACTIVITIES: CPT

## 2020-11-27 PROCEDURE — 94640 AIRWAY INHALATION TREATMENT: CPT

## 2020-11-27 PROCEDURE — 93005 ELECTROCARDIOGRAM TRACING: CPT | Performed by: SPECIALIST

## 2020-11-27 PROCEDURE — 97116 GAIT TRAINING THERAPY: CPT

## 2020-11-27 PROCEDURE — 97535 SELF CARE MNGMENT TRAINING: CPT

## 2020-11-27 PROCEDURE — 63600175 PHARM REV CODE 636 W HCPCS: Performed by: NURSE PRACTITIONER

## 2020-11-27 PROCEDURE — 99900031 HC PATIENT EDUCATION (STAT)

## 2020-11-27 PROCEDURE — 25000242 PHARM REV CODE 250 ALT 637 W/ HCPCS: Performed by: INTERNAL MEDICINE

## 2020-11-27 RX ADMIN — LAMOTRIGINE 150 MG: 100 TABLET ORAL at 09:11

## 2020-11-27 RX ADMIN — METHYLPREDNISOLONE SODIUM SUCCINATE 40 MG: 40 INJECTION, POWDER, FOR SOLUTION INTRAMUSCULAR; INTRAVENOUS at 09:11

## 2020-11-27 RX ADMIN — PANTOPRAZOLE SODIUM 40 MG: 40 TABLET, DELAYED RELEASE ORAL at 08:11

## 2020-11-27 RX ADMIN — ENOXAPARIN SODIUM 40 MG: 40 INJECTION SUBCUTANEOUS at 04:11

## 2020-11-27 RX ADMIN — MUPIROCIN: 20 OINTMENT TOPICAL at 10:11

## 2020-11-27 RX ADMIN — FLUOXETINE 40 MG: 20 CAPSULE ORAL at 08:11

## 2020-11-27 RX ADMIN — DOXYCYCLINE HYCLATE 100 MG: 100 CAPSULE ORAL at 09:11

## 2020-11-27 RX ADMIN — CLONAZEPAM 0.5 MG: 0.5 TABLET ORAL at 03:11

## 2020-11-27 RX ADMIN — CARBOXYMETHYLCELLULOSE SODIUM 1 DROP: 5 SOLUTION/ DROPS OPHTHALMIC at 10:11

## 2020-11-27 RX ADMIN — QUETIAPINE 25 MG: 25 TABLET ORAL at 09:11

## 2020-11-27 RX ADMIN — METHYLPREDNISOLONE SODIUM SUCCINATE 40 MG: 40 INJECTION, POWDER, FOR SOLUTION INTRAMUSCULAR; INTRAVENOUS at 01:11

## 2020-11-27 RX ADMIN — LOSARTAN POTASSIUM 50 MG: 50 TABLET, FILM COATED ORAL at 08:11

## 2020-11-27 RX ADMIN — ROPINIROLE 4 MG: 2 TABLET, FILM COATED ORAL at 09:11

## 2020-11-27 RX ADMIN — CLONAZEPAM 0.5 MG: 0.5 TABLET ORAL at 09:11

## 2020-11-27 RX ADMIN — HUMAN INSULIN 2 UNITS: 100 INJECTION, SOLUTION SUBCUTANEOUS at 09:11

## 2020-11-27 RX ADMIN — DOXYCYCLINE HYCLATE 100 MG: 100 CAPSULE ORAL at 08:11

## 2020-11-27 RX ADMIN — INSULIN DETEMIR 70 UNITS: 100 INJECTION, SOLUTION SUBCUTANEOUS at 09:11

## 2020-11-27 RX ADMIN — IPRATROPIUM BROMIDE AND ALBUTEROL SULFATE 3 ML: .5; 3 SOLUTION RESPIRATORY (INHALATION) at 01:11

## 2020-11-27 RX ADMIN — GABAPENTIN 800 MG: 300 CAPSULE ORAL at 08:11

## 2020-11-27 RX ADMIN — IPRATROPIUM BROMIDE AND ALBUTEROL SULFATE 3 ML: .5; 3 SOLUTION RESPIRATORY (INHALATION) at 08:11

## 2020-11-27 RX ADMIN — OXYCODONE HYDROCHLORIDE 10 MG: 5 TABLET ORAL at 09:11

## 2020-11-27 RX ADMIN — IBUPROFEN 600 MG: 200 TABLET, FILM COATED ORAL at 03:11

## 2020-11-27 RX ADMIN — LATANOPROST 1 DROP: 50 SOLUTION OPHTHALMIC at 09:11

## 2020-11-27 RX ADMIN — GABAPENTIN 800 MG: 300 CAPSULE ORAL at 02:11

## 2020-11-27 RX ADMIN — LEVOTHYROXINE SODIUM 150 MCG: 25 TABLET ORAL at 05:11

## 2020-11-27 RX ADMIN — METHYLPREDNISOLONE SODIUM SUCCINATE 80 MG: 40 INJECTION, POWDER, FOR SOLUTION INTRAMUSCULAR; INTRAVENOUS at 05:11

## 2020-11-27 RX ADMIN — FLUTICASONE PROPIONATE 50 MCG: 50 SPRAY, METERED NASAL at 08:11

## 2020-11-27 RX ADMIN — INSULIN DETEMIR 70 UNITS: 100 INJECTION, SOLUTION SUBCUTANEOUS at 08:11

## 2020-11-27 RX ADMIN — HUMAN INSULIN 6 UNITS: 100 INJECTION, SOLUTION SUBCUTANEOUS at 01:11

## 2020-11-27 RX ADMIN — HYDROCHLOROTHIAZIDE 12.5 MG: 12.5 TABLET ORAL at 08:11

## 2020-11-27 RX ADMIN — HUMAN INSULIN 6 UNITS: 100 INJECTION, SOLUTION SUBCUTANEOUS at 04:11

## 2020-11-27 RX ADMIN — CALCITRIOL CAPSULES 0.25 MCG 0.5 MCG: 0.25 CAPSULE ORAL at 09:11

## 2020-11-27 RX ADMIN — LAMOTRIGINE 150 MG: 100 TABLET ORAL at 08:11

## 2020-11-27 RX ADMIN — GABAPENTIN 800 MG: 300 CAPSULE ORAL at 09:11

## 2020-11-27 RX ADMIN — HUMAN INSULIN 6 UNITS: 100 INJECTION, SOLUTION SUBCUTANEOUS at 09:11

## 2020-11-27 RX ADMIN — POLYETHYLENE GLYCOL 3350 17 G: 17 POWDER, FOR SOLUTION ORAL at 08:11

## 2020-11-27 RX ADMIN — ROPINIROLE 4 MG: 2 TABLET, FILM COATED ORAL at 08:11

## 2020-11-27 RX ADMIN — IPRATROPIUM BROMIDE AND ALBUTEROL SULFATE 3 ML: .5; 3 SOLUTION RESPIRATORY (INHALATION) at 07:11

## 2020-11-27 NOTE — PLAN OF CARE
11/26/20 1930   Patient Assessment/Suction   Level of Consciousness (AVPU) alert   Respiratory Effort Normal;Unlabored   Expansion/Accessory Muscles/Retractions no use of accessory muscles   All Lung Fields Breath Sounds equal bilaterally;diminished;clear   Rhythm/Pattern, Respiratory unlabored   Cough Frequency infrequent   PRE-TX-O2   O2 Device (Oxygen Therapy) nasal cannula   Flow (L/min) 3   SpO2 99 %   Pulse Oximetry Type Intermittent   $ Pulse Oximetry - Multiple Charge Pulse Oximetry - Multiple   Pulse 80   Resp 18   Aerosol Therapy   $ Aerosol Therapy Charges Aerosol Treatment   Daily Review of Necessity (SVN) completed   Respiratory Treatment Status (SVN) given   Treatment Route (SVN) mask   Patient Position (SVN) HOB elevated   Post Treatment Assessment (SVN) breath sounds unchanged   Signs of Intolerance (SVN) none   Breath Sounds Post-Respiratory Treatment   Post-treatment Heart Rate (beats/min) 80   Post-treatment Resp Rate (breaths/min) 18   Education   $ Education Bronchodilator;15 min   Respiratory Evaluation   $ Care Plan Tech Time 15 min   Evaluation For Re-Eval 3 day   Admitting Diagnosis COPD

## 2020-11-27 NOTE — RESPIRATORY THERAPY
11/27/20 0722   Patient Assessment/Suction   Level of Consciousness (AVPU) alert   Respiratory Effort Unlabored;Normal   Expansion/Accessory Muscles/Retractions no use of accessory muscles   Rhythm/Pattern, Respiratory unlabored   Cough Frequency infrequent   Cough Type nonproductive   PRE-TX-O2   O2 Device (Oxygen Therapy) nasal cannula   $ Is the patient on Low Flow Oxygen? Yes   Flow (L/min) 3   SpO2 100 %   Pulse Oximetry Type Intermittent   $ Pulse Oximetry - Single Charge Pulse Oximetry - Single   Pulse 75   Resp 16   Aerosol Therapy   $ Aerosol Therapy Charges Aerosol Treatment   Daily Review of Necessity (SVN) completed   Respiratory Treatment Status (SVN) given   Treatment Route (SVN) oxygen;mask   Patient Position (SVN) HOB elevated   Post Treatment Assessment (SVN) breath sounds unchanged   Signs of Intolerance (SVN) none   Inhaler   $ Inhaler Charges MDI (Metered Dose Inahler) Treatment;Independent   Daily Review of Necessity (Inhaler) completed   Respiratory Treatment Status (Inhaler) given   Treatment Route (Inhaler) mouthpiece   Patient Position (Inhaler) semi-Pretty's   Post Treatment Assessment (Inhaler) breath sounds unchanged   Education   $ Education Bronchodilator   Respiratory Evaluation   $ Care Plan Tech Time 15 min

## 2020-11-27 NOTE — PROGRESS NOTES
[facility] Medicine  Progress Note    Patient Name: Alanis Mendieta  MRN: [id number]  Patient Class: IP- Inpatient   Admission Date: 11/24/2020  Length of Stay: 3 days  Attending Physician: Todd Pichardo MD  Primary Care Provider: Michelle Matthew NP        Subjective:     Principal Problem:COPD exacerbation        HPI:  HISTORY OF PRESENT ILLNESS:   History was obtained from the patient and ER physician Sign-out. Patient presents to the ED with the complaint of SOB that is progressively worsening over the weekend despite treatment outpatient with her pulmonologist. SOB is worse with exertion. No alleviating factors. She reports associated chest tightness, wheezing and dry cough. She was started on oral Prednisone and antibiotics over the weekend as well as neb treatments 4 times per day. Symptoms have not improved. She went to see her pulmonologist today for a follow up visit and was deferred to the ED for further evaluation. She continues to feel SOB on her usual home 4L NC. She denies fever, chills, productive cough, nausea, vomiting, abdominal pain, dysuria, diarrhea, numbness, tingling, or LOC.      In the emergency room, patient is afebrile. She is tachypneic. When she got up to ambulate her O2 saturation decreased to 88% on 4L NC. CBC was unremarkable. CMP with CO2 32, glucose 274. BNP and troponin within reference ranges. I have reviewed the EKG and it shows NSR with prolonged QT. No ST/T wave abnormalities. No concerning finding on chest x ray. The patient is treated with neb treatments and IV Solumedrol.      Decision to admit was taken and patient was informed about the plan of care.       Overview/Hospital Course:  11/25/2020  Ms Mir states that she is much less short of breath today-able to lay down. She denies use of tobacco for 25 years    11/26/2020  Ms Mir states that she is much less short of breath but has significant CRUZ. She lives by herself and will be  unable to freform AODL's without assistance. Pt states that Dr Reis states that she should stay until Saturday 11/27/2020  Ms Hester is feeling better at rest but still has significant CRUZ. She does not feel that she will be able to care for herself at home by herself.    Interval History: Pt has no SOB but has significant CRUZ. I agree with Dr Urbina that SNF placement may be indicated.      Review of Systems   Constitutional: Positive for diaphoresis and fatigue.   HENT: Negative.    Eyes: Negative.    Respiratory: Positive for shortness of breath.         Significant CRUZ   Cardiovascular: Positive for palpitations. Negative for chest pain.   Gastrointestinal: Negative.    Endocrine: Positive for heat intolerance.   Genitourinary: Negative.    Musculoskeletal: Positive for arthralgias and myalgias.   Skin: Negative.    Neurological: Positive for weakness.   Hematological: Bruises/bleeds easily.   Psychiatric/Behavioral: Positive for decreased concentration. The patient is nervous/anxious.      Objective:     Vital Signs (Most Recent):  Temp: 97.5 °F (36.4 °C) (11/27/20 1116)  Pulse: 84 (11/27/20 1116)  Resp: 16 (11/27/20 1116)  BP: (!) 145/74 (11/27/20 1116)  SpO2: 96 % (11/27/20 1116) Vital Signs (24h Range):  Temp:  [97.5 °F (36.4 °C)-98.5 °F (36.9 °C)] 97.5 °F (36.4 °C)  Pulse:  [75-94] 84  Resp:  [15-20] 16  SpO2:  [96 %-100 %] 96 %  BP: (143-165)/(61-76) 145/74     Weight: 100 kg (220 lb 7.4 oz)  Body mass index is 37.84 kg/m².    Intake/Output Summary (Last 24 hours) at 11/27/2020 1306  Last data filed at 11/26/2020 2115  Gross per 24 hour   Intake --   Output 1700 ml   Net -1700 ml      Physical Exam  Vitals signs and nursing note reviewed.   Constitutional:       Appearance: Normal appearance.   HENT:      Head: Normocephalic and atraumatic.      Mouth/Throat:      Mouth: Mucous membranes are moist.   Eyes:      Extraocular Movements: Extraocular movements intact.      Pupils: Pupils are equal,  round, and reactive to light.   Neck:      Musculoskeletal: Normal range of motion and neck supple.   Cardiovascular:      Rate and Rhythm: Normal rate and regular rhythm.      Heart sounds: Gallop present.    Pulmonary:      Effort: Pulmonary effort is normal.      Breath sounds: Normal breath sounds.   Abdominal:      General: Bowel sounds are normal. There is no distension.      Tenderness: There is no abdominal tenderness. There is no guarding.   Musculoskeletal: Normal range of motion.   Skin:     General: Skin is warm.   Neurological:      General: No focal deficit present.      Mental Status: She is alert and oriented to person, place, and time.   Psychiatric:         Mood and Affect: Mood normal.         Significant Labs:   Recent Lab Results       11/27/20  1118   11/27/20  0750   11/27/20  0516   11/26/20 2020 11/26/20  1622        Anion Gap     9         BUN     25         Calcium     8.5         Chloride     97         CO2     34         Creatinine     0.7         eGFR if      >60.0         eGFR if non      >60.0  Comment:  Calculation used to obtain the estimated glomerular filtration  rate (eGFR) is the CKD-EPI equation.            Glucose     185         Hematocrit     37.4         Hemoglobin     11.8         MCH     28.0         MCHC     31.6         MCV     89         MPV     10.4         Platelets     382         POC Glucose 286 162   299 257     Potassium     4.1         RBC     4.22         RDW     14.6         Sodium     140         WBC     11.01                              Significant Imaging: I have reviewed all pertinent imaging results/findings within the past 24 hours.      Assessment/Plan:    11/27/2020  A)  COPD exacerbation with significant deconditioning with significant CRUZ  Allergic to PPD  DM 2 with improving control  HTN  Hypothyroidism  P)  Consult PT, OT and SNF  Pulmonary input appreciated      11/26/2020  A)  COPD exacerbation  Improved  respiratory status at rest but significant CRUZ. Status improved with use of the Trilogy vent   DM 2 uncontrolled  HTN  Hypothyroidism  P)  Increase insulin sliding scale to moderate  Pulmonary input appreciated        11/25/2020  A)  Acute on chronic respiratory failure with hypoxia-symptoms beginning to resolve with treatment  COPD exacerbation but the pt has a Dx of mixed disorder with asthma  DM 2 uncontrolled  HTN  Hypothyroidism  P)  Dr Reis saw the patient this AM  Continue current therapy        11/24/2020  Acute on Chronic respiratory failure with hypoxia  Acute COPD exacerbation  HTN  IDDM  Hypothyroidism  Anxiety     Admit to Med-Tele  Neb treatments  IV Solumedrol 80mg q8h  Continue home inhalers  IV Levaquin   Consult Dr. Reis  Continue losartan  Check Hba1c  accuchecks with low dose SSI; hypoglycemia protocol  Continue home detemir 70 units BID  Check tsh; continue levothyroxine  Continue clonazepam prn  No notes have been filed under this hospital service.  Service: Hospital Medicine  No notes have been filed under this hospital service.  Service: Hospital Medicine    VTE Risk Mitigation (From admission, onward)         Ordered     enoxaparin injection 40 mg  Every 24 hours      11/24/20 1744     IP VTE HIGH RISK PATIENT  Once      11/24/20 1744     Place sequential compression device  Until discontinued      11/24/20 1744                Discharge Planning   KELSEY:      Code Status: Full Code   Is the patient medically ready for discharge?:     Reason for patient still in hospital (select all that apply): Treatment and Consult recommendations  Discharge Plan A: Home with family                  Todd Pichardo MD  Department of Hospital Medicine   Critical access hospital

## 2020-11-27 NOTE — PROGRESS NOTES
Pulmonary/Critical Care Progress Note      Patient name: Alanis Mendieta  MRN: [id number]  Date: 11/27/2020    Admit Date: 11/24/2020  Consult Requested By: Todd Pichardo MD    Reason for Consult: COPD with exacerbation    HPI:    11/25/2020 - 68 yo female known to me with COPD who presented to my office with about 1 week of increased SOb, CRUZ.  She was started on steroids and antibiotics over the weekend.  When she saw me yesterday she told me taht she did not feel any better and had significant dyspnea with exertion.  After seeing her she was referred to the ER and is now admitted for further treatment.  Overnight she feels better but she has not been out of bed.  No fever, chills, sweats, + SOB, CRUZ, no chest pain/palpitations, no CHF symptoms.  SHe lives at home alone and has some sitter help during the day.    11/27/2020 - Stable overnight, has been up to chair wit(h some difficulty), has not ambulated, feels better but is not back to her baseline.  BP is up    Review of Systems    Review of Systems   Constitutional: Positive for malaise/fatigue. Negative for chills, diaphoresis, fever and weight loss.   HENT: Negative for congestion.    Eyes: Negative for pain.   Respiratory: Positive for cough, shortness of breath and wheezing. Negative for hemoptysis, sputum production and stridor.    Cardiovascular: Negative for chest pain, palpitations, orthopnea, claudication, leg swelling and PND.   Gastrointestinal: Negative for abdominal pain, blood in stool, constipation, diarrhea, heartburn, melena, nausea and vomiting.   Genitourinary: Negative for dysuria, frequency, hematuria and urgency.   Musculoskeletal: Negative for falls and myalgias.   Neurological: Negative for dizziness, tingling, tremors, sensory change, speech change, focal weakness, seizures, loss of consciousness, weakness and headaches.   Psychiatric/Behavioral: Negative for depression, substance abuse and suicidal ideas. The patient is not  nervous/anxious.        Past Medical History    Past Medical History:   Diagnosis Date    Allergy     Anxiety     Arthritis     Asthma     Bipolar disorder     Cancer     thyroid    Chronic back pain     COPD (chronic obstructive pulmonary disease)     Diabetes mellitus     Diabetes mellitus, type 2     Fibromyalgia     GERD (gastroesophageal reflux disease)     History of fall 4/26/2018    [place] (hard of hearing)     Hx of thyroid cancer     Hyperlipidemia     Hypertension     Hypothyroidism     Neuropathy     On home oxygen therapy     3 l/m 24/7    Restless leg syndrome     Sleep apnea     Urinary tract infection        Past Surgical History    Past Surgical History:   Procedure Laterality Date    APPENDECTOMY      BACK SURGERY      x 3    broken foot and ankle      CHOLECYSTECTOMY      EYE SURGERY Bilateral     cataract    HYSTERECTOMY      JOINT REPLACEMENT Left 09/17/2018    knee    KNEE ARTHROPLASTY Left 9/17/2018    Procedure: ARTHROPLASTY, KNEE;  Surgeon: Yariel Marin MD;  Location: [facility];  Service: Orthopedics;  Laterality: Left;    MYELOGRAPHY N/A 8/7/2019    Procedure: MYELOGRAM;  Surgeon: Sun Diagnostic Provider;  Location: [facility] OR;  Service: General;  Laterality: N/A;    POSTERIOR REPAIR      SPINAL FUSION      x3 BACK, NECK X 4    TOTAL THYROIDECTOMY  2014       Medications (scheduled):      albuterol-ipratropium  3 mL Nebulization TID WAKE    calcitRIOL  0.5 mcg Oral QHS    carboxymethylcellulose  1 drop Both Eyes TID    doxycycline  100 mg Oral Q12H    enoxaparin  40 mg Subcutaneous Q24H    FLUoxetine  40 mg Oral Daily    fluticasone propionate  1 spray Each Nostril Daily    fluticasone-umeclidin-vilanter  1 puff Inhalation Daily    gabapentin  800 mg Oral TID    hydroCHLOROthiazide  12.5 mg Oral Daily    insulin detemir U-100  70 Units Subcutaneous BID    lamoTRIgine  150 mg Oral BID    latanoprost  1 drop Both Eyes QHS    levothyroxine  150 mcg  "Oral Before breakfast    losartan  50 mg Oral Daily    methylPREDNISolone sodium succinate  80 mg Intravenous Q8H    mupirocin   Topical (Top) TID    pantoprazole  40 mg Oral Daily    polyethylene glycol  17 g Oral Daily    QUEtiapine  25 mg Oral QHS    rOPINIRole  4 mg Oral BID       Medications (infusions):         Medications (prn):     acetaminophen, albuterol-ipratropium, clonazePAM, insulin regular, meclizine, methocarbamoL, oxyCODONE, sodium chloride 0.9%    Family History:   Family History   Problem Relation Age of Onset    Diabetes Mother     Heart disease Mother     Hypertension Mother     Diabetes Father     Heart disease Father     Hypertension Father        Social History: Tobacco:   Social History     Tobacco Use   Smoking Status Former Smoker    Packs/day: 1.00    Years: 30.00    Pack years: 30.00    Types: Cigarettes    Quit date: 2000    Years since quittin.5   Smokeless Tobacco Never Used                                EtOH:   Social History     Substance and Sexual Activity   Alcohol Use No    Frequency: Never    Drinks per session: Patient refused    Binge frequency: Never                                Drugs:   Social History     Substance and Sexual Activity   Drug Use No                                Occupation: retired                             Asbestos exposure: no                             Pets: no                             Environmental allergies: no    Physical Exam    Vital signs:  Temp:  [97.6 °F (36.4 °C)-98.5 °F (36.9 °C)]   Pulse:  [75-94]   Resp:  [15-20]   BP: (143-165)/(61-76)   SpO2:  [96 %-100 %]     Intake/Output:     Intake/Output Summary (Last 24 hours) at 2020 0823  Last data filed at 2020 2115  Gross per 24 hour   Intake --   Output 1700 ml   Net -1700 ml        BMI: Estimated body mass index is 37.84 kg/m² as calculated from the following:    Height as of this encounter: 5' 4" (1.626 m).    Weight as of this encounter: 100 " kg (220 lb 7.4 oz).    Physical Exam   Constitutional: She is oriented to person, place, and time.   Older female no distress, laying in bed   HENT:   Head: Normocephalic and atraumatic.   Right Ear: External ear normal.   Left Ear: External ear normal.   Mouth/Throat: Oropharynx is clear and moist.   Eyes: Pupils are equal, round, and reactive to light. Conjunctivae and EOM are normal. Right eye exhibits no discharge. Left eye exhibits no discharge. No scleral icterus.   Neck: Normal range of motion. Neck supple. No JVD present. No tracheal deviation present. No thyromegaly present.   Cardiovascular: Normal rate, regular rhythm, normal heart sounds and intact distal pulses. Exam reveals no gallop and no friction rub.   No murmur heard.  Pulmonary/Chest: Effort normal. No stridor. No respiratory distress. She has no wheezes. She has no rales.   Decreased BS throughout   Abdominal: She exhibits no distension. There is no abdominal tenderness. There is no rebound.   obese   Musculoskeletal: Normal range of motion.         General: No tenderness or edema.   Lymphadenopathy:     She has no cervical adenopathy.   Neurological: She is alert and oriented to person, place, and time. GCS score is 15.   Skin: Skin is warm and dry.   Psychiatric: Mood, memory, affect and judgment normal.   A bit anxious   Nursing note and vitals reviewed.      Laboratory    Recent Labs   Lab 11/27/20  0516   WBC 11.01   RBC 4.22   HGB 11.8*   HCT 37.4   *   MCV 89   MCH 28.0   MCHC 31.6*       Recent Labs   Lab 11/27/20  0516   CALCIUM 8.5*      K 4.1   CO2 34*   CL 97   BUN 25*   CREATININE 0.7       No results for input(s): PT, INR, APTT in the last 24 hours.    No results for input(s): CPK, CPKMB, TROPONINI, MB in the last 24 hours.    Additional labs:     LA - 1.4  HgbA1C - 8.5  TSH - 0.710  Procalcitonin - < 0.05    Microbiology:       Microbiology Results (last 7 days)     Procedure Component Value Units Date/Time    Blood  culture #2 **CANNOT BE ORDERED STAT** [[id number]] Collected: 11/24/20 1345    Order Status: Completed Specimen: Blood from Peripheral, Forearm, Left Updated: 11/26/20 1432     Blood Culture, Routine No Growth to date      No Growth to date      No Growth to date    Blood culture #1 **CANNOT BE ORDERED STAT** [[id number]] Collected: 11/24/20 1328    Order Status: Completed Specimen: Blood from Peripheral, Antecubital, Right Updated: 11/26/20 1432     Blood Culture, Routine No Growth to date      No Growth to date      No Growth to date          Radiology    X-Ray Chest AP Portable  XR CHEST AP PORTABLE    CLINICAL HISTORY:  69 years Female CHF    COMPARISON: Chest radiograph October 15, 2020    FINDINGS: Cardiomediastinal silhouette is stable from prior.  Atherosclerotic calcification of the aorta. Right hemidiaphragm  elevation is unchanged. No airspace consolidation. No pleural effusion  or pneumothorax. No acute osseous abnormality. Prior cervical ACDF.    IMPRESSION:    Stable chest radiograph demonstrating chronic findings discussed  above.  No new or acute pulmonary process.    Electronically Signed by Gutierrez MCNAMARA on 11/24/2020 1:22 PM        Additional Studies    EKG  Vent. Rate : 082 BPM     Atrial Rate : 082 BPM      P-R Int : 152 ms          QRS Dur : 080 ms       QT Int : 424 ms       P-R-T Axes : 033 -06 073 degrees      QTc Int : 495 ms     Normal sinus rhythm   Possible Left atrial enlargement   Prolonged QT   Abnormal ECG   When compared with ECG of 15-OCT-2020 15:07,   Premature ventricular complexes are no longer Present   Criteria for Anterior infarct are no longer Present   Confirmed by Bernardo RASHID, Delio FULTON ([id number]) on 11/24/2020 7:26:39 PM     Ventilator Information              No results for input(s): PH, PCO2, PO2, HCO3, POCSATURATED, BE in the last 72 hours.      Impression    Active Hospital Problems    Diagnosis  POA    *COPD exacerbation [J44.1]  Yes    QT prolongation [R94.31]  Yes     Class 2 obesity in adult [E66.9]  Yes     Chronic    Postoperative hypothyroidism [E89.0]  Yes    GERD (gastroesophageal reflux disease) [K21.9]  Yes    Anxiety [F41.9]  Yes    Type 2 diabetes mellitus, with long-term current use of insulin [E11.9, Z79.4]  Not Applicable      Resolved Hospital Problems   No resolved problems to display.       Plan    · Continue respiratory treatments, steroids (will decrease today), TRELEGY  · D/C levaquin and change to po doxycycline (note QT prolonged)  · Increase activity as able - consult PT, OT  · Follow EKG to monitor QTc  · Needs better control of her diabetes  · I am concerned that she may be reaching a point that home discharge may be difficult - ? She may benefit from a short stay at SNF or rehab    Thank you for this consult.  I will follow with you while the patient is hospitalized.      Dr. Brennan is covering for the weekend, please call if needed.  I will return next week.        Sanya Reis MD  [facility] Pulmonary/Critical Care  11/27/2020

## 2020-11-27 NOTE — PT/OT/SLP EVAL
Physical Therapy Evaluation and Discharge Note    Patient Name:  Alanis Mendieta   MRN:  [id number]    Recommendations:     Discharge Recommendations:  home health PT   Discharge Equipment Recommendations: none   Barriers to discharge: None    Assessment:     Alanis Mendieta is a 69 y.o. female admitted with a medical diagnosis of COPD exacerbation. .  At this time, patient is functioning at their prior level of function and does not require further acute PT services.     Recent Surgery: * No surgery found *      Plan:     During this hospitalization, patient does not require further acute PT services.  Please re-consult if situation changes.      Subjective     Chief Complaint: I need therapy at home  Patient/Family Comments/goals: return home with HHPT  Pain/Comfort:  · Pain Rating 1: 0/10    Patients cultural, spiritual, [employer] conflicts given the current situation: no    Living Environment:  Pt lives alone in a one story home with ramp access. Pt ambulates with RW household distance. Has a sitter from 8a-4p 5 days a week M-F. Sitter assists with transportation, grocery shopping and cooking. Pt on 4 L O2 via NC has concentrator and portable O2.   Prior to admission, patients level of function was MI with rollator household mobility.  Equipment used at home: shower chair, bedside commode, walker, rolling, wheelchair, oxygen.  DME owned (not currently used): none.  Upon discharge, patient will have assistance from caregiver.    Objective:     Communicated with RN prior to session.  Patient found up in chair with telemetry, peripheral IV, PureWick upon PT entry to room.    General Precautions: Standard, fall   Orthopedic Precautions:N/A   Braces: N/A     Exams:  · Cognitive Exam:  Patient is oriented to Person, Place, Time and Situation  · RLE ROM: WFL  · RLE Strength: WFL  · LLE ROM: WFL  · LLE Strength: WFL    Functional Mobility:  · Bed Mobility:     · Sit to Supine: independence  · Transfers:      · Sit to Stand:  modified independence with rolling walker  · Gait: 100 ft with RW and supervision    AM-PAC 6 CLICK MOBILITY  Total Score:22       Therapeutic Activities and Exercises:   Pt educated on POC (eval only), recommendation for HHPT (pt agreeable), importance of O2 for optimal saturation, fall prevention, use of call bell to seek assistance as needed and PLB    AM-PAC 6 CLICK MOBILITY  Total Score:22     Patient left supine with all lines intact, call button in reach and bed alarm on.    GOALS:   Multidisciplinary Problems     Physical Therapy Goals     Not on file                History:     Past Medical History:   Diagnosis Date    Allergy     Anxiety     Arthritis     Asthma     Bipolar disorder     Cancer     thyroid    Chronic back pain     COPD (chronic obstructive pulmonary disease)     Diabetes mellitus     Diabetes mellitus, type 2     Fibromyalgia     GERD (gastroesophageal reflux disease)     History of fall 4/26/2018    [place] (hard of hearing)     Hx of thyroid cancer     Hyperlipidemia     Hypertension     Hypothyroidism     Neuropathy     On home oxygen therapy     3 l/m 24/7    Restless leg syndrome     Sleep apnea     Urinary tract infection        Past Surgical History:   Procedure Laterality Date    APPENDECTOMY      BACK SURGERY      x 3    broken foot and ankle      CHOLECYSTECTOMY      EYE SURGERY Bilateral     cataract    HYSTERECTOMY      JOINT REPLACEMENT Left 09/17/2018    knee    KNEE ARTHROPLASTY Left 9/17/2018    Procedure: ARTHROPLASTY, KNEE;  Surgeon: Yariel Marin MD;  Location: [facility] OR;  Service: Orthopedics;  Laterality: Left;    MYELOGRAPHY N/A 8/7/2019    Procedure: MYELOGRAM;  Surgeon: Sun Diagnostic Provider;  Location: [facility] OR;  Service: General;  Laterality: N/A;    POSTERIOR REPAIR      SPINAL FUSION      x3 BACK, NECK X 4    TOTAL THYROIDECTOMY  2014       Time Tracking:     PT Received On: 11/27/20  PT Start Time: 1235     PT  Stop Time: 1305  PT Total Time (min): 30 min     Billable Minutes: Evaluation 5, Gait Training 10 and Therapeutic Activity 15      Precious Kilpatrick, PT  11/27/2020

## 2020-11-27 NOTE — SUBJECTIVE & OBJECTIVE
Interval History: Pt has no SOB but has significant CRUZ. I agree with Dr Urbina that SNF placement may be indicated.      Review of Systems   Constitutional: Positive for diaphoresis and fatigue.   HENT: Negative.    Eyes: Negative.    Respiratory: Positive for shortness of breath.         Significant CRUZ   Cardiovascular: Positive for palpitations. Negative for chest pain.   Gastrointestinal: Negative.    Endocrine: Positive for heat intolerance.   Genitourinary: Negative.    Musculoskeletal: Positive for arthralgias and myalgias.   Skin: Negative.    Neurological: Positive for weakness.   Hematological: Bruises/bleeds easily.   Psychiatric/Behavioral: Positive for decreased concentration. The patient is nervous/anxious.      Objective:     Vital Signs (Most Recent):  Temp: 97.5 °F (36.4 °C) (11/27/20 1116)  Pulse: 84 (11/27/20 1116)  Resp: 16 (11/27/20 1116)  BP: (!) 145/74 (11/27/20 1116)  SpO2: 96 % (11/27/20 1116) Vital Signs (24h Range):  Temp:  [97.5 °F (36.4 °C)-98.5 °F (36.9 °C)] 97.5 °F (36.4 °C)  Pulse:  [75-94] 84  Resp:  [15-20] 16  SpO2:  [96 %-100 %] 96 %  BP: (143-165)/(61-76) 145/74     Weight: 100 kg (220 lb 7.4 oz)  Body mass index is 37.84 kg/m².    Intake/Output Summary (Last 24 hours) at 11/27/2020 1306  Last data filed at 11/26/2020 2115  Gross per 24 hour   Intake --   Output 1700 ml   Net -1700 ml      Physical Exam  Vitals signs and nursing note reviewed.   Constitutional:       Appearance: Normal appearance.   HENT:      Head: Normocephalic and atraumatic.      Mouth/Throat:      Mouth: Mucous membranes are moist.   Eyes:      Extraocular Movements: Extraocular movements intact.      Pupils: Pupils are equal, round, and reactive to light.   Neck:      Musculoskeletal: Normal range of motion and neck supple.   Cardiovascular:      Rate and Rhythm: Normal rate and regular rhythm.      Heart sounds: Gallop present.    Pulmonary:      Effort: Pulmonary effort is normal.      Breath sounds:  Normal breath sounds.   Abdominal:      General: Bowel sounds are normal. There is no distension.      Tenderness: There is no abdominal tenderness. There is no guarding.   Musculoskeletal: Normal range of motion.   Skin:     General: Skin is warm.   Neurological:      General: No focal deficit present.      Mental Status: She is alert and oriented to person, place, and time.   Psychiatric:         Mood and Affect: Mood normal.         Significant Labs:   Recent Lab Results       11/27/20  1118   11/27/20  0750   11/27/20  0516   11/26/20  2020   11/26/20  1622        Anion Gap     9         BUN     25         Calcium     8.5         Chloride     97         CO2     34         Creatinine     0.7         eGFR if      >60.0         eGFR if non      >60.0  Comment:  Calculation used to obtain the estimated glomerular filtration  rate (eGFR) is the CKD-EPI equation.            Glucose     185         Hematocrit     37.4         Hemoglobin     11.8         MCH     28.0         MCHC     31.6         MCV     89         MPV     10.4         Platelets     382         POC Glucose 286 162   299 257     Potassium     4.1         RBC     4.22         RDW     14.6         Sodium     140         WBC     11.01                              Significant Imaging: I have reviewed all pertinent imaging results/findings within the past 24 hours.

## 2020-11-27 NOTE — PLAN OF CARE
Important Message from Medicare was sign, explained and given to patient/caregiver on 11/27/2020 at 8:52am     answered all questions.

## 2020-11-27 NOTE — PT/OT/SLP EVAL
Occupational Therapy   Evaluation and Discharge Note    Name: Alanis Mendieta  MRN: [id number]  Admitting Diagnosis:  COPD exacerbation      Recommendations:     Discharge Recommendations: home health OT  Discharge Equipment Recommendations:  none  Barriers to discharge:  None    Assessment:     Alanis Mendieta is a 69 y.o. female with a medical diagnosis of COPD exacerbation. At this time, patient is functioning at their prior level of function and does not require further acute OT services.     Plan:     During this hospitalization, patient does not require further acute OT services.  Please re-consult if situation changes.    · Plan of Care Reviewed with: patient    Subjective     Chief Complaint: None  Patient/Family Comments/goals: to go home    Occupational Profile:  Living Environment: patient lives alone. Has Private Pay Caregiver who assists patient with household management and transportation  Previous level of function: modified independent for ADLs and ambulation in the home.  Roles and Routines: primary homemaker  Equipment Used at home:  shower chair, bedside commode, walker, rolling, rollator, wheelchair, oxygen  Assistance upon Discharge: Private Caregiver, Mon-Fri from 8am to 4pm.    Pain/Comfort:  · Pain Rating 1: 0/10  · Pain Rating Post-Intervention 1: 0/10    Patients cultural, spiritual, [employer] conflicts given the current situation: no    Objective:     Communicated with: nurse prior to session.  Patient found HOB elevated with telemetry, peripheral IV upon OT entry to room.    General Precautions: Standard, (None)   Orthopedic Precautions:N/A   Braces: N/A     Occupational Performance:    Bed Mobility:    · Patient completed Scooting/Bridging with supervision  · Patient completed Supine to Sit with supervision  · Patient completed Sit to Supine with supervision    Functional Mobility/Transfers:  · Patient completed Toilet Transfer Step Transfer technique with supervision with   rolling walker  · Functional Mobility: ambulated 20 feet in the hospital room and bathroom with supervision using rolling walker.    Activities of Daily Living:  · Grooming: supervision to wash hands standing at sink  · Lower Body Dressing: supervision to don/doff socks sitting in EOB.  · Toileting: supervision to perform toilet hygiene from commode    Cognitive/Visual Perceptual:  Cognitive/Psychosocial Skills:     -       Oriented to: Person, Place, Time and Situation   -       Follows Commands/attention:Follows multistep  commands  -       Communication: clear/fluent  -       Memory: No Deficits noted  -       Safety awareness/insight to disability: intact   -       Mood/Affect/Coping skills/emotional control: Cooperative and Pleasant  Visual/Perceptual:      -Intact Acuity    Physical Exam:  Balance:    -       Sitting/Standing: Supervision  Upper Extremity Range of Motion:     -       Right Upper Extremity: WFL  -       Left Upper Extremity: WFL  Upper Extremity Strength:    -       Right Upper Extremity: WFL  -       Left Upper Extremity: WFL   Strength:    -       Right Upper Extremity: WFL  -       Left Upper Extremity: WFL  Fine Motor Coordination:    -       Intact    AMPAC 6 Click ADL:  AMPAC Total Score: 24    Treatment & Education:  Patient performed sitting/standing ADL activity with supervision using rolling walker. O2 Sats remained above 94% on 4L O2 via NC throughout session.  Education:    Patient left HOB elevated with all lines intact and call button in reach    GOALS:   Multidisciplinary Problems     Occupational Therapy Goals     Not on file                History:     Past Medical History:   Diagnosis Date    Allergy     Anxiety     Arthritis     Asthma     Bipolar disorder     Cancer     thyroid    Chronic back pain     COPD (chronic obstructive pulmonary disease)     Diabetes mellitus     Diabetes mellitus, type 2     Fibromyalgia     GERD (gastroesophageal reflux disease)      History of fall 4/26/2018    [place] (hard of hearing)     Hx of thyroid cancer     Hyperlipidemia     Hypertension     Hypothyroidism     Neuropathy     On home oxygen therapy     3 l/m 24/7    Restless leg syndrome     Sleep apnea     Urinary tract infection        Past Surgical History:   Procedure Laterality Date    APPENDECTOMY      BACK SURGERY      x 3    broken foot and ankle      CHOLECYSTECTOMY      EYE SURGERY Bilateral     cataract    HYSTERECTOMY      JOINT REPLACEMENT Left 09/17/2018    knee    KNEE ARTHROPLASTY Left 9/17/2018    Procedure: ARTHROPLASTY, KNEE;  Surgeon: Yariel Marin MD;  [facility];  Service: Orthopedics;  Laterality: Left;    MYELOGRAPHY N/A 8/7/2019    Procedure: MYELOGRAM;  Surgeon: Sun Diagnostic Provider;  Location: [facility] OR;  Service: General;  Laterality: N/A;    POSTERIOR REPAIR      SPINAL FUSION      x3 BACK, NECK X 4    TOTAL THYROIDECTOMY  2014       Time Tracking:     OT Date of Treatment:    OT Start Time: 1348  OT Stop Time: 1410  OT Total Time (min): 22 min    Billable Minutes:Evaluation 10  Self Care/Home Management 12    Javan Molina OT  11/27/2020

## 2020-11-28 LAB
ANION GAP SERPL CALC-SCNC: 9 MMOL/L (ref 8–16)
BUN SERPL-MCNC: 25 MG/DL (ref 8–23)
CALCIUM SERPL-MCNC: 8.5 MG/DL (ref 8.7–10.5)
CHLORIDE SERPL-SCNC: 95 MMOL/L (ref 95–110)
CO2 SERPL-SCNC: 34 MMOL/L (ref 23–29)
CREAT SERPL-MCNC: 0.6 MG/DL (ref 0.5–1.4)
ERYTHROCYTE [DISTWIDTH] IN BLOOD BY AUTOMATED COUNT: 14.6 % (ref 11.5–14.5)
EST. GFR  (AFRICAN AMERICAN): >60 ML/MIN/1.73 M^2
EST. GFR  (NON AFRICAN AMERICAN): >60 ML/MIN/1.73 M^2
GLUCOSE SERPL-MCNC: 111 MG/DL (ref 70–110)
GLUCOSE SERPL-MCNC: 113 MG/DL (ref 70–110)
GLUCOSE SERPL-MCNC: 186 MG/DL (ref 70–110)
GLUCOSE SERPL-MCNC: 198 MG/DL (ref 70–110)
GLUCOSE SERPL-MCNC: 273 MG/DL (ref 70–110)
HCT VFR BLD AUTO: 39.6 % (ref 37–48.5)
HGB BLD-MCNC: 12.6 G/DL (ref 12–16)
MCH RBC QN AUTO: 28.7 PG (ref 27–31)
MCHC RBC AUTO-ENTMCNC: 31.8 G/DL (ref 32–36)
MCV RBC AUTO: 90 FL (ref 82–98)
PLATELET # BLD AUTO: 373 K/UL (ref 150–350)
PMV BLD AUTO: 10.4 FL (ref 9.2–12.9)
POTASSIUM SERPL-SCNC: 4 MMOL/L (ref 3.5–5.1)
RBC # BLD AUTO: 4.39 M/UL (ref 4–5.4)
SODIUM SERPL-SCNC: 138 MMOL/L (ref 136–145)
WBC # BLD AUTO: 11.62 K/UL (ref 3.9–12.7)

## 2020-11-28 PROCEDURE — 94640 AIRWAY INHALATION TREATMENT: CPT

## 2020-11-28 PROCEDURE — 63600175 PHARM REV CODE 636 W HCPCS: Performed by: INTERNAL MEDICINE

## 2020-11-28 PROCEDURE — 12000002 HC ACUTE/MED SURGE SEMI-PRIVATE ROOM

## 2020-11-28 PROCEDURE — 25000242 PHARM REV CODE 250 ALT 637 W/ HCPCS: Performed by: INTERNAL MEDICINE

## 2020-11-28 PROCEDURE — 27000221 HC OXYGEN, UP TO 24 HOURS

## 2020-11-28 PROCEDURE — 80048 BASIC METABOLIC PNL TOTAL CA: CPT

## 2020-11-28 PROCEDURE — 63600175 PHARM REV CODE 636 W HCPCS: Performed by: NURSE PRACTITIONER

## 2020-11-28 PROCEDURE — 99900031 HC PATIENT EDUCATION (STAT)

## 2020-11-28 PROCEDURE — 85027 COMPLETE CBC AUTOMATED: CPT

## 2020-11-28 PROCEDURE — 94761 N-INVAS EAR/PLS OXIMETRY MLT: CPT

## 2020-11-28 PROCEDURE — 25000003 PHARM REV CODE 250: Performed by: INTERNAL MEDICINE

## 2020-11-28 PROCEDURE — 99900035 HC TECH TIME PER 15 MIN (STAT)

## 2020-11-28 PROCEDURE — 36415 COLL VENOUS BLD VENIPUNCTURE: CPT

## 2020-11-28 PROCEDURE — 25000003 PHARM REV CODE 250: Performed by: NURSE PRACTITIONER

## 2020-11-28 RX ADMIN — LAMOTRIGINE 150 MG: 100 TABLET ORAL at 09:11

## 2020-11-28 RX ADMIN — HUMAN INSULIN 6 UNITS: 100 INJECTION, SOLUTION SUBCUTANEOUS at 09:11

## 2020-11-28 RX ADMIN — FLUTICASONE PROPIONATE 50 MCG: 50 SPRAY, METERED NASAL at 09:11

## 2020-11-28 RX ADMIN — MUPIROCIN: 20 OINTMENT TOPICAL at 09:11

## 2020-11-28 RX ADMIN — QUETIAPINE 25 MG: 25 TABLET ORAL at 09:11

## 2020-11-28 RX ADMIN — CLONAZEPAM 0.5 MG: 0.5 TABLET ORAL at 09:11

## 2020-11-28 RX ADMIN — ENOXAPARIN SODIUM 40 MG: 40 INJECTION SUBCUTANEOUS at 05:11

## 2020-11-28 RX ADMIN — CARBOXYMETHYLCELLULOSE SODIUM 1 DROP: 5 SOLUTION/ DROPS OPHTHALMIC at 10:11

## 2020-11-28 RX ADMIN — INSULIN DETEMIR 70 UNITS: 100 INJECTION, SOLUTION SUBCUTANEOUS at 09:11

## 2020-11-28 RX ADMIN — IPRATROPIUM BROMIDE AND ALBUTEROL SULFATE 3 ML: .5; 3 SOLUTION RESPIRATORY (INHALATION) at 02:11

## 2020-11-28 RX ADMIN — LATANOPROST 1 DROP: 50 SOLUTION OPHTHALMIC at 09:11

## 2020-11-28 RX ADMIN — IPRATROPIUM BROMIDE AND ALBUTEROL SULFATE 3 ML: .5; 3 SOLUTION RESPIRATORY (INHALATION) at 08:11

## 2020-11-28 RX ADMIN — GABAPENTIN 800 MG: 300 CAPSULE ORAL at 09:11

## 2020-11-28 RX ADMIN — POLYETHYLENE GLYCOL 3350 17 G: 17 POWDER, FOR SOLUTION ORAL at 09:11

## 2020-11-28 RX ADMIN — METHYLPREDNISOLONE SODIUM SUCCINATE 40 MG: 40 INJECTION, POWDER, FOR SOLUTION INTRAMUSCULAR; INTRAVENOUS at 01:11

## 2020-11-28 RX ADMIN — GABAPENTIN 800 MG: 300 CAPSULE ORAL at 03:11

## 2020-11-28 RX ADMIN — MUPIROCIN: 20 OINTMENT TOPICAL at 03:11

## 2020-11-28 RX ADMIN — LEVOTHYROXINE SODIUM 150 MCG: 25 TABLET ORAL at 06:11

## 2020-11-28 RX ADMIN — HUMAN INSULIN 2 UNITS: 100 INJECTION, SOLUTION SUBCUTANEOUS at 12:11

## 2020-11-28 RX ADMIN — LOSARTAN POTASSIUM 50 MG: 50 TABLET, FILM COATED ORAL at 09:11

## 2020-11-28 RX ADMIN — PANTOPRAZOLE SODIUM 40 MG: 40 TABLET, DELAYED RELEASE ORAL at 09:11

## 2020-11-28 RX ADMIN — METHYLPREDNISOLONE SODIUM SUCCINATE 40 MG: 40 INJECTION, POWDER, FOR SOLUTION INTRAMUSCULAR; INTRAVENOUS at 09:11

## 2020-11-28 RX ADMIN — DOXYCYCLINE HYCLATE 100 MG: 100 CAPSULE ORAL at 09:11

## 2020-11-28 RX ADMIN — HUMAN INSULIN 2 UNITS: 100 INJECTION, SOLUTION SUBCUTANEOUS at 05:11

## 2020-11-28 RX ADMIN — CALCITRIOL CAPSULES 0.25 MCG 0.5 MCG: 0.25 CAPSULE ORAL at 09:11

## 2020-11-28 RX ADMIN — FLUOXETINE 40 MG: 20 CAPSULE ORAL at 09:11

## 2020-11-28 RX ADMIN — ROPINIROLE 4 MG: 2 TABLET, FILM COATED ORAL at 09:11

## 2020-11-28 RX ADMIN — METHYLPREDNISOLONE SODIUM SUCCINATE 40 MG: 40 INJECTION, POWDER, FOR SOLUTION INTRAMUSCULAR; INTRAVENOUS at 06:11

## 2020-11-28 RX ADMIN — OXYCODONE HYDROCHLORIDE 10 MG: 5 TABLET ORAL at 03:11

## 2020-11-28 RX ADMIN — HYDROCHLOROTHIAZIDE 12.5 MG: 12.5 TABLET ORAL at 09:11

## 2020-11-28 NOTE — PLAN OF CARE
11/28/20 1123   Discharge Reassessment   Assessment Type Discharge Planning Reassessment   Anticipated Discharge Disposition Home-Health   Discharge Plan A Home Health   Discharge Plan B Home Health   DME Needed Upon Discharge  none   Post-Acute Status   Post-Acute Authorization Home Health   Home Health Status Awaiting Orders for HH   Discharge Delays None known at this time

## 2020-11-28 NOTE — CONSULTS
SW met w/ pt to discuss SNF recommendation by provider. Pt reported she is not interested in going to a SNF. Pt reports she desires to d/c home w/ [facility]. Pt states she is on the medicaid waiver program and has a sitter 8 hours a day x 5 days a week. [employer] is the company who provides sitting services.  Pt states she was in [facility] previously and has no desire to return. Pt choice form signed for [facility].

## 2020-11-28 NOTE — RESPIRATORY THERAPY
11/27/20 2048   Patient Assessment/Suction   Level of Consciousness (AVPU) alert   Respiratory Effort Unlabored   Expansion/Accessory Muscles/Retractions expansion symmetric;no retractions;no use of accessory muscles   All Lung Fields Breath Sounds diminished   Rhythm/Pattern, Respiratory no shortness of breath reported;pattern regular;unlabored   PRE-TX-O2   O2 Device (Oxygen Therapy) nasal cannula w/ humidification   Flow (L/min) 4   SpO2 98 %   Pulse Oximetry Type Intermittent   $ Pulse Oximetry - Multiple Charge Pulse Oximetry - Multiple   Pulse 76   Resp 18   Aerosol Therapy   $ Aerosol Therapy Charges Aerosol Treatment   Daily Review of Necessity (SVN) completed   Respiratory Treatment Status (SVN) given;mouth rinsed post treatment   Treatment Route (SVN) mask;oxygen   Patient Position (SVN) Pretty's   Post Treatment Assessment (SVN) increased aeration;patient reports breathing improved   Signs of Intolerance (SVN) none   Breath Sounds Post-Respiratory Treatment   Throughout All Fields Post-Treatment All Fields   Throughout All Fields Post-Treatment aeration increased   Post-treatment Heart Rate (beats/min) 74   Post-treatment Resp Rate (breaths/min) 18   Respiratory Interventions   Cough And Deep Breathing done with encouragement   Breathing Techniques/Airway Clearance deep/controlled cough encouraged;diaphragmatic breathing promoted   Education   $ Education Bronchodilator;DME Nebulizer;DME Oxygen;Other (see comment);15 min  (deep diaphragmatic breathing exercises)   Respiratory Evaluation   $ Care Plan Tech Time 15 min   Evaluation For   (care plan)   Home Oxygen   Has Home Oxygen? Yes   Liter Flow 4        11/27/20 2048   Patient Assessment/Suction   Level of Consciousness (AVPU) alert   Respiratory Effort Unlabored   Expansion/Accessory Muscles/Retractions expansion symmetric;no retractions;no use of accessory muscles   All Lung Fields Breath Sounds diminished   Rhythm/Pattern, Respiratory no shortness  of breath reported;pattern regular;unlabored   PRE-TX-O2   O2 Device (Oxygen Therapy) nasal cannula w/ humidification   Flow (L/min) 4   SpO2 98 %   Pulse Oximetry Type Intermittent   $ Pulse Oximetry - Multiple Charge Pulse Oximetry - Multiple   Pulse 76   Resp 18   Aerosol Therapy   $ Aerosol Therapy Charges Aerosol Treatment   Daily Review of Necessity (SVN) completed   Respiratory Treatment Status (SVN) given;mouth rinsed post treatment   Treatment Route (SVN) mask;oxygen   Patient Position (SVN) Pretty's   Post Treatment Assessment (SVN) increased aeration;patient reports breathing improved   Signs of Intolerance (SVN) none   Breath Sounds Post-Respiratory Treatment   Throughout All Fields Post-Treatment All Fields   Throughout All Fields Post-Treatment aeration increased   Post-treatment Heart Rate (beats/min) 74   Post-treatment Resp Rate (breaths/min) 18   Respiratory Interventions   Cough And Deep Breathing done with encouragement   Breathing Techniques/Airway Clearance deep/controlled cough encouraged;diaphragmatic breathing promoted   Education   $ Education Bronchodilator;DME Nebulizer;DME Oxygen;Other (see comment);15 min  (deep diaphragmatic breathing exercises)   Respiratory Evaluation   $ Care Plan Tech Time 15 min   Evaluation For   (care plan)   Home Oxygen   Has Home Oxygen? Yes   Liter Flow 4

## 2020-11-28 NOTE — SUBJECTIVE & OBJECTIVE
Interval History: Ms Hester is feeling much better but still has CRUZ and does not feel that she can take care of herself alone. She refuses SNF placement    Review of Systems   Constitutional: Positive for diaphoresis and fatigue.   HENT: Negative.    Eyes: Negative.    Respiratory: Positive for cough, shortness of breath and wheezing.         Improving   Gastrointestinal: Negative.    Endocrine: Positive for cold intolerance and heat intolerance.   Genitourinary: Negative.    Musculoskeletal: Positive for arthralgias, gait problem and myalgias.   Skin: Negative.    Neurological: Positive for weakness.   Hematological: Bruises/bleeds easily.   Psychiatric/Behavioral: Positive for decreased concentration and dysphoric mood. The patient is nervous/anxious.      Objective:     Vital Signs (Most Recent):  Temp: 98 °F (36.7 °C) (11/28/20 1612)  Pulse: 77 (11/28/20 1612)  Resp: 16 (11/28/20 1412)  BP: 114/67 (11/28/20 1612)  SpO2: 100 % (11/28/20 1612) Vital Signs (24h Range):  Temp:  [97.5 °F (36.4 °C)-98 °F (36.7 °C)] 98 °F (36.7 °C)  Pulse:  [75-82] 77  Resp:  [16-20] 16  SpO2:  [98 %-100 %] 100 %  BP: (114-167)/(67-80) 114/67     Weight: 100 kg (220 lb 7.4 oz)  Body mass index is 37.84 kg/m².    Intake/Output Summary (Last 24 hours) at 11/28/2020 1617  Last data filed at 11/28/2020 1500  Gross per 24 hour   Intake --   Output 2000 ml   Net -2000 ml      Physical Exam  Vitals signs reviewed.   Constitutional:       General: She is not in acute distress.     Appearance: Normal appearance.   HENT:      Head: Normocephalic and atraumatic.      Nose: Nose normal.      Mouth/Throat:      Mouth: Mucous membranes are moist.   Eyes:      Extraocular Movements: Extraocular movements intact.      Pupils: Pupils are equal, round, and reactive to light.   Neck:      Musculoskeletal: Normal range of motion and neck supple.      Comments: No use of accessory muscles of respiration  Cardiovascular:      Rate and Rhythm: Normal rate  and regular rhythm.      Heart sounds: Gallop present.    Pulmonary:      Breath sounds: Wheezing present.      Comments: Occasional fine intermittent  Abdominal:      General: Bowel sounds are normal. There is no distension.      Tenderness: There is no abdominal tenderness. There is no guarding.   Musculoskeletal: Normal range of motion.   Neurological:      General: No focal deficit present.      Mental Status: She is alert and oriented to person, place, and time.   Psychiatric:      Comments: Nervous /anxious over SNF placement.         Significant Labs:   Recent Lab Results       11/28/20  1608   11/28/20  1054   11/28/20  0711   11/28/20  0634   11/27/20  1934        Anion Gap       9       BUN       25       Calcium       8.5       Chloride       95       CO2       34       Creatinine       0.6       eGFR if        >60.0       eGFR if non        >60.0  Comment:  Calculation used to obtain the estimated glomerular filtration  rate (eGFR) is the CKD-EPI equation.          Glucose       113       Hematocrit       39.6       Hemoglobin       12.6       MCH       28.7       MCHC       31.8       MCV       90       MPV       10.4       Platelets       373       POC Glucose 198 186 111   256     Potassium       4.0       RBC       4.39       RDW       14.6       Sodium       138       WBC       11.62                            Significant Imaging: I have reviewed all pertinent imaging results/findings within the past 24 hours.

## 2020-11-28 NOTE — PROGRESS NOTES
[facility] Medicine  Progress Note    Patient Name: Alanis Mendieta  MRN: [id number]  Patient Class: IP- Inpatient   Admission Date: 11/24/2020  Length of Stay: 4 days  Attending Physician: Todd Pichardo MD  Primary Care Provider: Michelle Matthew NP        Subjective:     Principal Problem:COPD exacerbation        HPI:  HISTORY OF PRESENT ILLNESS:   History was obtained from the patient and ER physician Sign-out. Patient presents to the ED with the complaint of SOB that is progressively worsening over the weekend despite treatment outpatient with her pulmonologist. SOB is worse with exertion. No alleviating factors. She reports associated chest tightness, wheezing and dry cough. She was started on oral Prednisone and antibiotics over the weekend as well as neb treatments 4 times per day. Symptoms have not improved. She went to see her pulmonologist today for a follow up visit and was deferred to the ED for further evaluation. She continues to feel SOB on her usual home 4L NC. She denies fever, chills, productive cough, nausea, vomiting, abdominal pain, dysuria, diarrhea, numbness, tingling, or LOC.      In the emergency room, patient is afebrile. She is tachypneic. When she got up to ambulate her O2 saturation decreased to 88% on 4L NC. CBC was unremarkable. CMP with CO2 32, glucose 274. BNP and troponin within reference ranges. I have reviewed the EKG and it shows NSR with prolonged QT. No ST/T wave abnormalities. No concerning finding on chest x ray. The patient is treated with neb treatments and IV Solumedrol.      Decision to admit was taken and patient was informed about the plan of care.       Overview/Hospital Course:  11/25/2020  Ms Mir states that she is much less short of breath today-able to lay down. She denies use of tobacco for 25 years    11/26/2020  Ms Mir states that she is much less short of breath but has significant CRUZ. She lives by herself and will be  unable to freform AODL's without assistance. Pt states that Dr Reis states that she should stay until Saturday 11/27/2020  Ms Hester is feeling better at rest but still has significant CRUZ. She does not feel that she will be able to care for herself at home by herself.    11/28/2020  Ms Hester has been offered SNF placement but will no go because of the terrible experience she had at a nursing home in the past..Pt states that she is feeling much better at rest and with exertion    Interval History: Ms Hester is feeling much better but still has CRUZ and does not feel that she can take care of herself alone. She refuses SNF placement    Review of Systems   Constitutional: Positive for diaphoresis and fatigue.   HENT: Negative.    Eyes: Negative.    Respiratory: Positive for cough, shortness of breath and wheezing.         Improving   Gastrointestinal: Negative.    Endocrine: Positive for cold intolerance and heat intolerance.   Genitourinary: Negative.    Musculoskeletal: Positive for arthralgias, gait problem and myalgias.   Skin: Negative.    Neurological: Positive for weakness.   Hematological: Bruises/bleeds easily.   Psychiatric/Behavioral: Positive for decreased concentration and dysphoric mood. The patient is nervous/anxious.      Objective:     Vital Signs (Most Recent):  Temp: 98 °F (36.7 °C) (11/28/20 1612)  Pulse: 77 (11/28/20 1612)  Resp: 16 (11/28/20 1412)  BP: 114/67 (11/28/20 1612)  SpO2: 100 % (11/28/20 1612) Vital Signs (24h Range):  Temp:  [97.5 °F (36.4 °C)-98 °F (36.7 °C)] 98 °F (36.7 °C)  Pulse:  [75-82] 77  Resp:  [16-20] 16  SpO2:  [98 %-100 %] 100 %  BP: (114-167)/(67-80) 114/67     Weight: 100 kg (220 lb 7.4 oz)  Body mass index is 37.84 kg/m².    Intake/Output Summary (Last 24 hours) at 11/28/2020 1617  Last data filed at 11/28/2020 1500  Gross per 24 hour   Intake --   Output 2000 ml   Net -2000 ml      Physical Exam  Vitals signs reviewed.   Constitutional:       General: She  is not in acute distress.     Appearance: Normal appearance.   HENT:      Head: Normocephalic and atraumatic.      Nose: Nose normal.      Mouth/Throat:      Mouth: Mucous membranes are moist.   Eyes:      Extraocular Movements: Extraocular movements intact.      Pupils: Pupils are equal, round, and reactive to light.   Neck:      Musculoskeletal: Normal range of motion and neck supple.      Comments: No use of accessory muscles of respiration  Cardiovascular:      Rate and Rhythm: Normal rate and regular rhythm.      Heart sounds: Gallop present.    Pulmonary:      Breath sounds: Wheezing present.      Comments: Occasional fine intermittent  Abdominal:      General: Bowel sounds are normal. There is no distension.      Tenderness: There is no abdominal tenderness. There is no guarding.   Musculoskeletal: Normal range of motion.   Neurological:      General: No focal deficit present.      Mental Status: She is alert and oriented to person, place, and time.   Psychiatric:      Comments: Nervous /anxious over SNF placement.         Significant Labs:   Recent Lab Results       11/28/20  1608   11/28/20  1054   11/28/20  0711   11/28/20  0634   11/27/20  1934        Anion Gap       9       BUN       25       Calcium       8.5       Chloride       95       CO2       34       Creatinine       0.6       eGFR if        >60.0       eGFR if non        >60.0  Comment:  Calculation used to obtain the estimated glomerular filtration  rate (eGFR) is the CKD-EPI equation.          Glucose       113       Hematocrit       39.6       Hemoglobin       12.6       MCH       28.7       MCHC       31.8       MCV       90       MPV       10.4       Platelets       373       POC Glucose 198 186 111   256     Potassium       4.0       RBC       4.39       RDW       14.6       Sodium       138       WBC       11.62                            Significant Imaging: I have reviewed all pertinent imaging  results/findings within the past 24 hours.      Assessment/Plan:   11/28/2020  A)  COPD exacerbation with deconditioning which inteferes with AODL's. She lives by herself  DM 2 control improving  HTN  Hypothyroidism  P)  Refuses SNF placement  She will consider going home with PT and OT. Pt is trying to have a relative from out of [place] come live with her  Discuss the case with Dr Reis on Monday 11/27/2020  A)  COPD exacerbation with significant deconditioning with significant CRUZ  Allergic to PPD  DM 2 with improving control  HTN  Hypothyroidism  P)  Consult PT, OT and SNF  Pulmonary input appreciated        11/26/2020  A)  COPD exacerbation  Improved respiratory status at rest but significant CRUZ. Status improved with use of the Trilogy vent   DM 2 uncontrolled  HTN  Hypothyroidism  P)  Increase insulin sliding scale to moderate  Pulmonary input appreciated        11/25/2020  A)  Acute on chronic respiratory failure with hypoxia-symptoms beginning to resolve with treatment  COPD exacerbation but the pt has a Dx of mixed disorder with asthma  DM 2 uncontrolled  HTN  Hypothyroidism  P)  Dr Reis saw the patient this AM  Continue current therapy        11/24/2020  Acute on Chronic respiratory failure with hypoxia  Acute COPD exacerbation  HTN  IDDM  Hypothyroidism  Anxiety     Admit to Med-Tele  Neb treatments  IV Solumedrol 80mg q8h  Continue home inhalers  IV Levaquin   Consult Dr. Reis  Continue losartan  Check Hba1c  accuchecks with low dose SSI; hypoglycemia protocol  Continue home detemir 70 units BID  Check tsh; continue levothyroxine  Continue clonazepam prn  No notes have been filed under this hospital service.  Service: Hospital Medicine    VTE Risk Mitigation (From admission, onward)         Ordered     enoxaparin injection 40 mg  Every 24 hours      11/24/20 1744     IP VTE HIGH RISK PATIENT  Once      11/24/20 1744     Place sequential compression device  Until discontinued       11/24/20 1744                Discharge Planning   KELSEY:      Code Status: Full Code   Is the patient medically ready for discharge?:     Reason for patient still in hospital (select all that apply): Treatment and Consult recommendations  Discharge Plan A: Home Health   Discharge Delays: None known at this time              Todd Pichardo MD  Department of Hospital Medicine   [facility]

## 2020-11-28 NOTE — PLAN OF CARE
11/28/20 0821   Patient Assessment/Suction   Level of Consciousness (AVPU) alert   Respiratory Effort Normal;Unlabored   Expansion/Accessory Muscles/Retractions no use of accessory muscles;no retractions   All Lung Fields Breath Sounds wheezes, expiratory   PRE-TX-O2   O2 Device (Oxygen Therapy) nasal cannula   $ Is the patient on Low Flow Oxygen? Yes   Flow (L/min) 4   SpO2 99 %   Pulse Oximetry Type Intermittent   $ Pulse Oximetry - Multiple Charge Pulse Oximetry - Multiple   Pulse 82   Resp 16   Aerosol Therapy   $ Aerosol Therapy Charges Aerosol Treatment   Daily Review of Necessity (SVN) completed   Respiratory Treatment Status (SVN) given   Treatment Route (SVN) mask;oxygen   Patient Position (SVN) Pretty's   Post Treatment Assessment (SVN) breath sounds unchanged   Signs of Intolerance (SVN) none   Inhaler   $ Inhaler Charges MDI (Metered Dose Inahler) Treatment;Mouth rinsed post treatment   Daily Review of Necessity (Inhaler) completed   Respiratory Treatment Status (Inhaler) given;mouth rinsed post treatment   Treatment Route (Inhaler) mouthpiece   Patient Position (Inhaler) HOB elevated   Post Treatment Assessment (Inhaler) breath sounds unchanged   Signs of Intolerance (Inhaler) none   Breath Sounds Post-Respiratory Treatment   Post-treatment Heart Rate (beats/min) 87   Post-treatment Resp Rate (breaths/min) 18   Education   $ Education Bronchodilator;15 min   Respiratory Evaluation   $ Care Plan Tech Time 15 min

## 2020-11-29 LAB
ANION GAP SERPL CALC-SCNC: 11 MMOL/L (ref 8–16)
BACTERIA BLD CULT: NORMAL
BACTERIA BLD CULT: NORMAL
BUN SERPL-MCNC: 26 MG/DL (ref 8–23)
CALCIUM SERPL-MCNC: 9 MG/DL (ref 8.7–10.5)
CHLORIDE SERPL-SCNC: 93 MMOL/L (ref 95–110)
CO2 SERPL-SCNC: 34 MMOL/L (ref 23–29)
CREAT SERPL-MCNC: 0.7 MG/DL (ref 0.5–1.4)
ERYTHROCYTE [DISTWIDTH] IN BLOOD BY AUTOMATED COUNT: 14.6 % (ref 11.5–14.5)
EST. GFR  (AFRICAN AMERICAN): >60 ML/MIN/1.73 M^2
EST. GFR  (NON AFRICAN AMERICAN): >60 ML/MIN/1.73 M^2
GLUCOSE SERPL-MCNC: 117 MG/DL (ref 70–110)
GLUCOSE SERPL-MCNC: 170 MG/DL (ref 70–110)
GLUCOSE SERPL-MCNC: 239 MG/DL (ref 70–110)
GLUCOSE SERPL-MCNC: 271 MG/DL (ref 70–110)
GLUCOSE SERPL-MCNC: 92 MG/DL (ref 70–110)
HCT VFR BLD AUTO: 40.8 % (ref 37–48.5)
HGB BLD-MCNC: 12.5 G/DL (ref 12–16)
MCH RBC QN AUTO: 27.3 PG (ref 27–31)
MCHC RBC AUTO-ENTMCNC: 30.6 G/DL (ref 32–36)
MCV RBC AUTO: 89 FL (ref 82–98)
PLATELET # BLD AUTO: 386 K/UL (ref 150–350)
PMV BLD AUTO: 10.8 FL (ref 9.2–12.9)
POTASSIUM SERPL-SCNC: 4.6 MMOL/L (ref 3.5–5.1)
RBC # BLD AUTO: 4.58 M/UL (ref 4–5.4)
SODIUM SERPL-SCNC: 138 MMOL/L (ref 136–145)
WBC # BLD AUTO: 12.45 K/UL (ref 3.9–12.7)

## 2020-11-29 PROCEDURE — 27000221 HC OXYGEN, UP TO 24 HOURS

## 2020-11-29 PROCEDURE — 25000003 PHARM REV CODE 250: Performed by: INTERNAL MEDICINE

## 2020-11-29 PROCEDURE — 94640 AIRWAY INHALATION TREATMENT: CPT

## 2020-11-29 PROCEDURE — 63600175 PHARM REV CODE 636 W HCPCS: Performed by: INTERNAL MEDICINE

## 2020-11-29 PROCEDURE — 99900031 HC PATIENT EDUCATION (STAT)

## 2020-11-29 PROCEDURE — 25000003 PHARM REV CODE 250: Performed by: NURSE PRACTITIONER

## 2020-11-29 PROCEDURE — 63600175 PHARM REV CODE 636 W HCPCS: Performed by: NURSE PRACTITIONER

## 2020-11-29 PROCEDURE — 85027 COMPLETE CBC AUTOMATED: CPT

## 2020-11-29 PROCEDURE — 99900035 HC TECH TIME PER 15 MIN (STAT)

## 2020-11-29 PROCEDURE — 25000242 PHARM REV CODE 250 ALT 637 W/ HCPCS: Performed by: INTERNAL MEDICINE

## 2020-11-29 PROCEDURE — 36415 COLL VENOUS BLD VENIPUNCTURE: CPT

## 2020-11-29 PROCEDURE — 12000002 HC ACUTE/MED SURGE SEMI-PRIVATE ROOM

## 2020-11-29 PROCEDURE — 94761 N-INVAS EAR/PLS OXIMETRY MLT: CPT

## 2020-11-29 PROCEDURE — 80048 BASIC METABOLIC PNL TOTAL CA: CPT

## 2020-11-29 RX ADMIN — LAMOTRIGINE 150 MG: 100 TABLET ORAL at 08:11

## 2020-11-29 RX ADMIN — LOSARTAN POTASSIUM 50 MG: 50 TABLET, FILM COATED ORAL at 08:11

## 2020-11-29 RX ADMIN — DOXYCYCLINE HYCLATE 100 MG: 100 CAPSULE ORAL at 08:11

## 2020-11-29 RX ADMIN — ROPINIROLE 4 MG: 2 TABLET, FILM COATED ORAL at 08:11

## 2020-11-29 RX ADMIN — HUMAN INSULIN 6 UNITS: 100 INJECTION, SOLUTION SUBCUTANEOUS at 09:11

## 2020-11-29 RX ADMIN — OXYCODONE HYDROCHLORIDE 10 MG: 5 TABLET ORAL at 01:11

## 2020-11-29 RX ADMIN — HUMAN INSULIN 4 UNITS: 100 INJECTION, SOLUTION SUBCUTANEOUS at 11:11

## 2020-11-29 RX ADMIN — GABAPENTIN 800 MG: 300 CAPSULE ORAL at 03:11

## 2020-11-29 RX ADMIN — DOXYCYCLINE HYCLATE 100 MG: 100 CAPSULE ORAL at 09:11

## 2020-11-29 RX ADMIN — POLYETHYLENE GLYCOL 3350 17 G: 17 POWDER, FOR SOLUTION ORAL at 08:11

## 2020-11-29 RX ADMIN — METHYLPREDNISOLONE SODIUM SUCCINATE 40 MG: 40 INJECTION, POWDER, FOR SOLUTION INTRAMUSCULAR; INTRAVENOUS at 05:11

## 2020-11-29 RX ADMIN — INSULIN DETEMIR 70 UNITS: 100 INJECTION, SOLUTION SUBCUTANEOUS at 09:11

## 2020-11-29 RX ADMIN — IPRATROPIUM BROMIDE AND ALBUTEROL SULFATE 3 ML: .5; 3 SOLUTION RESPIRATORY (INHALATION) at 07:11

## 2020-11-29 RX ADMIN — MUPIROCIN: 20 OINTMENT TOPICAL at 08:11

## 2020-11-29 RX ADMIN — LEVOTHYROXINE SODIUM 150 MCG: 25 TABLET ORAL at 05:11

## 2020-11-29 RX ADMIN — CALCITRIOL CAPSULES 0.25 MCG 0.5 MCG: 0.25 CAPSULE ORAL at 09:11

## 2020-11-29 RX ADMIN — QUETIAPINE 25 MG: 25 TABLET ORAL at 09:11

## 2020-11-29 RX ADMIN — CARBOXYMETHYLCELLULOSE SODIUM 1 DROP: 5 SOLUTION/ DROPS OPHTHALMIC at 09:11

## 2020-11-29 RX ADMIN — PANTOPRAZOLE SODIUM 40 MG: 40 TABLET, DELAYED RELEASE ORAL at 08:11

## 2020-11-29 RX ADMIN — CLONAZEPAM 0.5 MG: 0.5 TABLET ORAL at 07:11

## 2020-11-29 RX ADMIN — IPRATROPIUM BROMIDE AND ALBUTEROL SULFATE 3 ML: .5; 3 SOLUTION RESPIRATORY (INHALATION) at 01:11

## 2020-11-29 RX ADMIN — ROPINIROLE 4 MG: 2 TABLET, FILM COATED ORAL at 09:11

## 2020-11-29 RX ADMIN — HUMAN INSULIN 2 UNITS: 100 INJECTION, SOLUTION SUBCUTANEOUS at 05:11

## 2020-11-29 RX ADMIN — GABAPENTIN 800 MG: 300 CAPSULE ORAL at 08:11

## 2020-11-29 RX ADMIN — OXYCODONE HYDROCHLORIDE 10 MG: 5 TABLET ORAL at 03:11

## 2020-11-29 RX ADMIN — LAMOTRIGINE 150 MG: 100 TABLET ORAL at 09:11

## 2020-11-29 RX ADMIN — METHYLPREDNISOLONE SODIUM SUCCINATE 40 MG: 40 INJECTION, POWDER, FOR SOLUTION INTRAMUSCULAR; INTRAVENOUS at 01:11

## 2020-11-29 RX ADMIN — MUPIROCIN: 20 OINTMENT TOPICAL at 09:11

## 2020-11-29 RX ADMIN — LATANOPROST 1 DROP: 50 SOLUTION OPHTHALMIC at 09:11

## 2020-11-29 RX ADMIN — FLUOXETINE 40 MG: 20 CAPSULE ORAL at 08:11

## 2020-11-29 RX ADMIN — INSULIN DETEMIR 70 UNITS: 100 INJECTION, SOLUTION SUBCUTANEOUS at 08:11

## 2020-11-29 RX ADMIN — HYDROCHLOROTHIAZIDE 12.5 MG: 12.5 TABLET ORAL at 08:11

## 2020-11-29 RX ADMIN — MUPIROCIN: 20 OINTMENT TOPICAL at 03:11

## 2020-11-29 RX ADMIN — GABAPENTIN 800 MG: 300 CAPSULE ORAL at 09:11

## 2020-11-29 RX ADMIN — ENOXAPARIN SODIUM 40 MG: 40 INJECTION SUBCUTANEOUS at 05:11

## 2020-11-29 RX ADMIN — METHYLPREDNISOLONE SODIUM SUCCINATE 40 MG: 40 INJECTION, POWDER, FOR SOLUTION INTRAMUSCULAR; INTRAVENOUS at 09:11

## 2020-11-29 NOTE — SUBJECTIVE & OBJECTIVE
Interval History: Ms Fundally has had improvement in SOB and CRUZ.    Review of Systems   Constitutional: Positive for diaphoresis and fatigue. Negative for chills and fever.   HENT: Negative.    Eyes: Negative.    Respiratory: Positive for shortness of breath and wheezing.    Cardiovascular: Negative.    Gastrointestinal: Negative.    Endocrine: Positive for cold intolerance and heat intolerance.   Genitourinary: Negative.    Musculoskeletal: Positive for arthralgias and myalgias.   Skin: Negative.    Neurological: Positive for weakness.   Hematological: Bruises/bleeds easily.   Psychiatric/Behavioral: Positive for decreased concentration and sleep disturbance. The patient is nervous/anxious.      Objective:     Vital Signs (Most Recent):  Temp: 98 °F (36.7 °C) (11/29/20 1200)  Pulse: 81 (11/29/20 1359)  Resp: 18 (11/29/20 1359)  BP: 112/66 (11/29/20 1200)  SpO2: 98 % (11/29/20 1359) Vital Signs (24h Range):  Temp:  [97.8 °F (36.6 °C)-98.4 °F (36.9 °C)] 98 °F (36.7 °C)  Pulse:  [68-86] 81  Resp:  [16-18] 18  SpO2:  [97 %-100 %] 98 %  BP: (112-157)/(66-89) 112/66     Weight: 100 kg (220 lb 7.4 oz)  Body mass index is 37.84 kg/m².    Intake/Output Summary (Last 24 hours) at 11/29/2020 1552  Last data filed at 11/29/2020 0400  Gross per 24 hour   Intake 720 ml   Output 1200 ml   Net -480 ml      Physical Exam  Vitals signs and nursing note reviewed.   Constitutional:       Appearance: Normal appearance.   HENT:      Head: Normocephalic and atraumatic.      Nose: Nose normal.      Mouth/Throat:      Mouth: Mucous membranes are moist.   Eyes:      Extraocular Movements: Extraocular movements intact.      Pupils: Pupils are equal, round, and reactive to light.   Neck:      Musculoskeletal: Normal range of motion and neck supple.      Comments: No use of accessory muscles of respiration  Cardiovascular:      Rate and Rhythm: Normal rate and regular rhythm.   Pulmonary:      Effort: Pulmonary effort is normal.      Breath  sounds: Normal breath sounds.   Abdominal:      General: Bowel sounds are normal.   Musculoskeletal: Normal range of motion.   Skin:     General: Skin is warm.   Neurological:      General: No focal deficit present.      Mental Status: She is alert and oriented to person, place, and time.   Psychiatric:         Mood and Affect: Mood normal.         Significant Labs:   Recent Lab Results       11/29/20  1152   11/29/20  0709   11/29/20  0528   11/29/20  0527   11/28/20  1902        Anion Gap       11       BUN       26       Calcium       9.0       Chloride       93       CO2       34       Creatinine       0.7       eGFR if        >60.0       eGFR if non        >60.0  Comment:  Calculation used to obtain the estimated glomerular filtration  rate (eGFR) is the CKD-EPI equation.          Glucose       117       Hematocrit     40.8         Hemoglobin     12.5         MCH     27.3         MCHC     30.6         MCV     89         MPV     10.8         Platelets     386         POC Glucose 239 92     273     Potassium       4.6       RBC     4.58         RDW     14.6         Sodium       138       WBC     12.45                          11/28/20  1608        Anion Gap       BUN       Calcium       Chloride       CO2       Creatinine       eGFR if        eGFR if non African American       Glucose       Hematocrit       Hemoglobin       MCH       MCHC       MCV       MPV       Platelets       POC Glucose 198     Potassium       RBC       RDW       Sodium       WBC             Significant Imaging: I have reviewed all pertinent imaging results/findings within the past 24 hours.

## 2020-11-29 NOTE — PROGRESS NOTES
"[facility]  Adult Nutrition   Progress Note (Initial Assessment)     SUMMARY     Recommendations  Recommendation/Intervention: 1) Menu  to continue obtaining meal preferences daily. 2) RD will continue to monitor.  Goals: Po intake >75% of meals  Nutrition Goal Status: new    Dietitian Rounds Brief  Patient is tolerating diet well, eating most of her meals. Patiet lives by herself but may be discharged to SNF.    Reason for Assessment  Reason For Assessment: length of stay  Diagnosis: pulmonary disease(COPD exacerbation)  Relevant Medical History: DM, COPD    Nutrition Risk Screen  Nutrition Risk Screen: no indicators present     MST Score: 0  Have you recently lost weight without trying?: No  Weight loss score: 0  Have you been eating poorly because of a decreased appetite?: No  Appetite score: 0       Nutrition/Diet History  Spiritual, Cultural Beliefs, Sabianist Practices, Values that Affect Care: no  Food Allergies: NKFA  Factors Affecting Nutritional Intake: None identified at this time    Anthropometrics  Temp: 98 °F (36.7 °C)  Height Method: Stated  Height: 5' 4" (162.6 cm)  Height (inches): 64 in  Weight Method: Bed Scale  Weight: 100 kg (220 lb 7.4 oz)  Weight (lb): 220.46 lb  Ideal Body Weight (IBW), Female: 120 lb  % Ideal Body Weight, Female (lb): 183.72 %  BMI (Calculated): 37.8  BMI Grade: 35 - 39.9 - obesity - grade II       Weight History:  Wt Readings from Last 10 Encounters:   11/25/20 100 kg (220 lb 7.4 oz)   11/24/20 (P) 96.2 kg (212 lb)   11/04/20 96.2 kg (212 lb)   10/15/20 99.5 kg (219 lb 5.7 oz)   10/13/20 96.2 kg (212 lb)   09/11/20 96.2 kg (212 lb)   09/11/20 96.2 kg (212 lb)   07/27/20 97.1 kg (214 lb)   07/17/20 93.4 kg (206 lb)   07/02/20 94.8 kg (209 lb)       Lab/Procedures/Meds: Pertinent Labs Reviewed    Clinical Chemistry:  Recent Labs   Lab 11/25/20  0521  11/29/20  0527      < > 138   K 4.9   < > 4.6   CL 96   < > 93*   CO2 32*   < > 34*   *   < " > 117*   BUN 17   < > 26*   CREATININE 0.8   < > 0.7   CALCIUM 8.1*   < > 9.0   PROT 6.2  --   --    ALBUMIN 3.5  --   --    BILITOT 0.2  --   --    ALKPHOS 60  --   --    AST 18  --   --    ALT 17  --   --    ANIONGAP 11   < > 11   ESTGFRAFRICA >60.0   < > >60.0   EGFRNONAA >60.0   < > >60.0   MG 2.0  --   --    PHOS 5.0*  --   --     < > = values in this interval not displayed.       CBC:   Recent Labs   Lab 11/29/20  0528   WBC 12.45   RBC 4.58   HGB 12.5   HCT 40.8   *   MCV 89   MCH 27.3   MCHC 30.6*     Cardiac Profile:  Recent Labs   Lab 11/24/20  1328   BNP 23   TROPONINI <0.030     Diabetes:  Recent Labs   Lab 11/25/20  0522   HGBA1C 8.5*       Thyroid & Parathyroid:  Recent Labs   Lab 11/25/20  0521   TSH 0.710     Medications: Pertinent Medications reviewed    Scheduled Meds:   albuterol-ipratropium  3 mL Nebulization TID WAKE    calcitRIOL  0.5 mcg Oral QHS    carboxymethylcellulose  1 drop Both Eyes TID    doxycycline  100 mg Oral Q12H    enoxaparin  40 mg Subcutaneous Q24H    FLUoxetine  40 mg Oral Daily    fluticasone propionate  1 spray Each Nostril Daily    fluticasone-umeclidin-vilanter  1 puff Inhalation Daily    gabapentin  800 mg Oral TID    hydroCHLOROthiazide  12.5 mg Oral Daily    insulin detemir U-100  70 Units Subcutaneous BID    lamoTRIgine  150 mg Oral BID    latanoprost  1 drop Both Eyes QHS    levothyroxine  150 mcg Oral Before breakfast    losartan  50 mg Oral Daily    methylPREDNISolone sodium succinate  40 mg Intravenous Q8H    mupirocin   Topical (Top) TID    pantoprazole  40 mg Oral Daily    polyethylene glycol  17 g Oral Daily    QUEtiapine  25 mg Oral QHS    rOPINIRole  4 mg Oral BID       Continuous Infusions:    PRN Meds:.acetaminophen, albuterol-ipratropium, clonazePAM, insulin regular, meclizine, methocarbamoL, oxyCODONE, sodium chloride 0.9%    Estimated/Assessed Needs  Weight Used For Calorie Calculations: 100 kg (220 lb 7.4 oz)  Energy Calorie  Requirements (kcal): 4987-2076 (15-20)  Energy Need Method: Kcal/kg  Protein Requirements: 82-109gm (1.5-2gm/kg IBW)  Weight Used For Protein Calculations: 54.5 kg (120 lb 2.4 oz)(IBW)     Estimated Fluid Requirement Method: RDA Method  RDA Method (mL): 1500       Nutrition Prescription Ordered  Current Diet Order: diabetic    Evaluation of Received Nutrient/Fluid Intake  Energy Calories Required: meeting needs  Protein Required: meeting needs  Comments: Patient is tolerating diet well, eating most of her meals. Patiet lives by herself but may be discharged to SNF.  Tolerance: tolerating     Intake/Output Summary (Last 24 hours) at 11/29/2020 1400  Last data filed at 11/29/2020 0400  Gross per 24 hour   Intake 720 ml   Output 2200 ml   Net -1480 ml      % Intake of Estimated Energy Needs: 75 - 100 %  % Meal Intake: 75 - 100 %    Nutrition Risk  Level of Risk/Frequency of Follow-up: moderate     Monitor and Evaluation  Food and Nutrient Intake: energy intake  Food and Nutrient Adminstration: diet order  Physical Activity and Function: nutrition-related ADLs and IADLs  Anthropometric Measurements: weight change  Biochemical Data, Medical Tests and Procedures: electrolyte and renal panel, gastrointestinal profile, glucose/endocrine profile  Nutrition-Focused Physical Findings: overall appearance     Nutrition Follow-Up  RD Follow-up?: Yes

## 2020-11-29 NOTE — PLAN OF CARE
11/29/20 7546   Patient Assessment/Suction   Level of Consciousness (AVPU) alert   Respiratory Effort Normal;Unlabored   Expansion/Accessory Muscles/Retractions no use of accessory muscles;no retractions   All Lung Fields Breath Sounds diminished   PRE-TX-O2   O2 Device (Oxygen Therapy) nasal cannula   $ Is the patient on Low Flow Oxygen? Yes   Flow (L/min) 4   SpO2 98 %   Pulse Oximetry Type Intermittent   $ Pulse Oximetry - Multiple Charge Pulse Oximetry - Multiple   Pulse 81   Resp 18   Aerosol Therapy   $ Aerosol Therapy Charges Aerosol Treatment   Daily Review of Necessity (SVN) completed   Respiratory Treatment Status (SVN) given   Treatment Route (SVN) mask;oxygen   Patient Position (SVN) Pretty's   Post Treatment Assessment (SVN) breath sounds unchanged   Signs of Intolerance (SVN) none   Breath Sounds Post-Respiratory Treatment   Post-treatment Heart Rate (beats/min) 81   Post-treatment Resp Rate (breaths/min) 18

## 2020-11-29 NOTE — PROGRESS NOTES
[facility]  Progress Note    Patient Name: Alanis Mendieta  MRN: [id number]  Patient Class: IP- Inpatient   Admission Date: 11/24/2020  Length of Stay: 5 days  Attending Physician: Todd Pichardo MD  Primary Care Provider: Michelle Matthew NP        Subjective:     Principal Problem:COPD exacerbation        HPI:  HISTORY OF PRESENT ILLNESS:   History was obtained from the patient and ER physician Sign-out. Patient presents to the ED with the complaint of SOB that is progressively worsening over the weekend despite treatment outpatient with her pulmonologist. SOB is worse with exertion. No alleviating factors. She reports associated chest tightness, wheezing and dry cough. She was started on oral Prednisone and antibiotics over the weekend as well as neb treatments 4 times per day. Symptoms have not improved. She went to see her pulmonologist today for a follow up visit and was deferred to the ED for further evaluation. She continues to feel SOB on her usual home 4L NC. She denies fever, chills, productive cough, nausea, vomiting, abdominal pain, dysuria, diarrhea, numbness, tingling, or LOC.      In the emergency room, patient is afebrile. She is tachypneic. When she got up to ambulate her O2 saturation decreased to 88% on 4L NC. CBC was unremarkable. CMP with CO2 32, glucose 274. BNP and troponin within reference ranges. I have reviewed the EKG and it shows NSR with prolonged QT. No ST/T wave abnormalities. No concerning finding on chest x ray. The patient is treated with neb treatments and IV Solumedrol.      Decision to admit was taken and patient was informed about the plan of care.       Overview/Hospital Course:  11/25/2020  Ms Mir states that she is much less short of breath today-able to lay down. She denies use of tobacco for 25 years    11/26/2020  Ms Mir states that she is much less short of breath but has significant CRUZ. She lives by herself and will be  unable to freform AODL's without assistance. Pt states that Dr Reis states that she should stay until Saturday 11/27/2020  Ms Hester is feeling better at rest but still has significant CRUZ. She does not feel that she will be able to care for herself at home by herself.    11/28/2020  Ms Hester has been offered SNF placement but will no go because of the terrible experience she had at a nursing home in the past..Pt states that she is feeling much better at rest and with exertion    11/29/2020  Ms Hester really wants to go home with home health. She has no SOB and less CRUZ at present.    Interval History: Ms Hester has had improvement in SOB and CRUZ.    Review of Systems   Constitutional: Positive for diaphoresis and fatigue. Negative for chills and fever.   HENT: Negative.    Eyes: Negative.    Respiratory: Positive for shortness of breath and wheezing.    Cardiovascular: Negative.    Gastrointestinal: Negative.    Endocrine: Positive for cold intolerance and heat intolerance.   Genitourinary: Negative.    Musculoskeletal: Positive for arthralgias and myalgias.   Skin: Negative.    Neurological: Positive for weakness.   Hematological: Bruises/bleeds easily.   Psychiatric/Behavioral: Positive for decreased concentration and sleep disturbance. The patient is nervous/anxious.      Objective:     Vital Signs (Most Recent):  Temp: 98 °F (36.7 °C) (11/29/20 1200)  Pulse: 81 (11/29/20 1359)  Resp: 18 (11/29/20 1359)  BP: 112/66 (11/29/20 1200)  SpO2: 98 % (11/29/20 1359) Vital Signs (24h Range):  Temp:  [97.8 °F (36.6 °C)-98.4 °F (36.9 °C)] 98 °F (36.7 °C)  Pulse:  [68-86] 81  Resp:  [16-18] 18  SpO2:  [97 %-100 %] 98 %  BP: (112-157)/(66-89) 112/66     Weight: 100 kg (220 lb 7.4 oz)  Body mass index is 37.84 kg/m².    Intake/Output Summary (Last 24 hours) at 11/29/2020 1552  Last data filed at 11/29/2020 0400  Gross per 24 hour   Intake 720 ml   Output 1200 ml   Net -480 ml      Physical Exam  Vitals signs  and nursing note reviewed.   Constitutional:       Appearance: Normal appearance.   HENT:      Head: Normocephalic and atraumatic.      Nose: Nose normal.      Mouth/Throat:      Mouth: Mucous membranes are moist.   Eyes:      Extraocular Movements: Extraocular movements intact.      Pupils: Pupils are equal, round, and reactive to light.   Neck:      Musculoskeletal: Normal range of motion and neck supple.      Comments: No use of accessory muscles of respiration  Cardiovascular:      Rate and Rhythm: Normal rate and regular rhythm.   Pulmonary:      Effort: Pulmonary effort is normal.      Breath sounds: Normal breath sounds.   Abdominal:      General: Bowel sounds are normal.   Musculoskeletal: Normal range of motion.   Skin:     General: Skin is warm.   Neurological:      General: No focal deficit present.      Mental Status: She is alert and oriented to person, place, and time.   Psychiatric:         Mood and Affect: Mood normal.         Significant Labs:   Recent Lab Results       11/29/20  1152   11/29/20  0709   11/29/20  0528   11/29/20  0527   11/28/20  1902        Anion Gap       11       BUN       26       Calcium       9.0       Chloride       93       CO2       34       Creatinine       0.7       eGFR if        >60.0       eGFR if non        >60.0  Comment:  Calculation used to obtain the estimated glomerular filtration  rate (eGFR) is the CKD-EPI equation.          Glucose       117       Hematocrit     40.8         Hemoglobin     12.5         MCH     27.3         MCHC     30.6         MCV     89         MPV     10.8         Platelets     386         POC Glucose 239 92     273     Potassium       4.6       RBC     4.58         RDW     14.6         Sodium       138       WBC     12.45                          11/28/20  1608        Anion Gap       BUN       Calcium       Chloride       CO2       Creatinine       eGFR if        eGFR if non         Glucose       Hematocrit       Hemoglobin       MCH       MCHC       MCV       MPV       Platelets       POC Glucose 198     Potassium       RBC       RDW       Sodium       WBC             Significant Imaging: I have reviewed all pertinent imaging results/findings within the past 24 hours.      Assessment/Plan:   11/29/2020  A)  COPD exacerbation resolving  DM 2  HTN  Hypothyroidism  P)  Refuses SNF placement  Likely will go home with home health PT & OT  Will speak to Dr Reis tomorrow       11/28/2020  A)  COPD exacerbation with deconditioning which inteferes with AODL's. She lives by herself  DM 2 control improving  HTN  Hypothyroidism  P)  Refuses SNF placement  She will consider going home with PT and OT. Pt is trying to have a relative from out of state come live with her  Discuss the case with Dr Reis on Monday 11/27/2020  A)  COPD exacerbation with significant deconditioning with significant CRUZ  Allergic to PPD  DM 2 with improving control  HTN  Hypothyroidism  P)  Consult PT, OT and SNF  Pulmonary input appreciated        11/26/2020  A)  COPD exacerbation  Improved respiratory status at rest but significant CRUZ. Status improved with use of the Trilogy vent   DM 2 uncontrolled  HTN  Hypothyroidism  P)  Increase insulin sliding scale to moderate  Pulmonary input appreciated        11/25/2020  A)  Acute on chronic respiratory failure with hypoxia-symptoms beginning to resolve with treatment  COPD exacerbation but the pt has a Dx of mixed disorder with asthma  DM 2 uncontrolled  HTN  Hypothyroidism  P)  Dr Reis saw the patient this AM  Continue current therapy        11/24/2020  Acute on Chronic respiratory failure with hypoxia  Acute COPD exacerbation  HTN  IDDM  Hypothyroidism  Anxiety     Admit to Med-Tele  Neb treatments  IV Solumedrol 80mg q8h  Continue home inhalers  IV Levaquin   Consult Dr. Reis  Continue losartan  Check Hba1c  accuchecks with low dose SSI; hypoglycemia  protocol  Continue home detemir 70 units BID  Check tsh; continue levothyroxine  Continue clonazepam prn  No notes have been filed under this hospital service.  Service: Hospital Medicine    VTE Risk Mitigation (From admission, onward)         Ordered     enoxaparin injection 40 mg  Every 24 hours      11/24/20 1744     IP VTE HIGH RISK PATIENT  Once      11/24/20 1744     Place sequential compression device  Until discontinued      11/24/20 1744                Discharge Planning   KELSEY:      Code Status: Full Code   Is the patient medically ready for discharge?:     Reason for patient still in hospital (select all that apply): Treatment, PT / OT recommendations and Pending disposition  Discharge Plan A: Home Health   Discharge Delays: None known at this time              Todd Pichardo MD  Department of Hospital Medicine   [facility]

## 2020-11-29 NOTE — RESPIRATORY THERAPY
11/28/20 2047   Patient Assessment/Suction   Level of Consciousness (AVPU) alert   Respiratory Effort Unlabored   Expansion/Accessory Muscles/Retractions expansion symmetric;no retractions;no use of accessory muscles   All Lung Fields Breath Sounds diminished   Rhythm/Pattern, Respiratory no shortness of breath reported;pattern regular;unlabored   Cough Type dry;good;nonproductive   PRE-TX-O2   O2 Device (Oxygen Therapy) nasal cannula w/ humidification   Flow (L/min) 4   SpO2 97 %   Pulse Oximetry Type Intermittent   $ Pulse Oximetry - Multiple Charge Pulse Oximetry - Multiple   Pulse 86   Resp 18   Aerosol Therapy   $ Aerosol Therapy Charges Aerosol Treatment   Daily Review of Necessity (SVN) completed   Respiratory Treatment Status (SVN) given   Treatment Route (SVN) mask;oxygen   Patient Position (SVN) Pretty's   Post Treatment Assessment (SVN) breath sounds unchanged;patient reports no change in breathing   Signs of Intolerance (SVN) none   Breath Sounds Post-Respiratory Treatment   Throughout All Fields Post-Treatment All Fields   Throughout All Fields Post-Treatment no change   Post-treatment Heart Rate (beats/min) 87   Post-treatment Resp Rate (breaths/min) 18   Respiratory Interventions   Cough And Deep Breathing done independently per patient   Breathing Techniques/Airway Clearance deep/controlled cough encouraged;diaphragmatic breathing promoted   Education   $ Education Bronchodilator;15 min   Respiratory Evaluation   $ Care Plan Tech Time 15 min   Evaluation For   (care plan)

## 2020-11-29 NOTE — PLAN OF CARE
11/29/20 0709   Patient Assessment/Suction   Level of Consciousness (AVPU) alert   Respiratory Effort Normal;Unlabored   Expansion/Accessory Muscles/Retractions no use of accessory muscles;no retractions   All Lung Fields Breath Sounds diminished   PRE-TX-O2   O2 Device (Oxygen Therapy) nasal cannula   $ Is the patient on Low Flow Oxygen? Yes   Flow (L/min) 4   SpO2 100 %   Pulse Oximetry Type Intermittent   $ Pulse Oximetry - Multiple Charge Pulse Oximetry - Multiple   Pulse 68   Resp 18   Aerosol Therapy   $ Aerosol Therapy Charges Aerosol Treatment   Daily Review of Necessity (SVN) completed   Respiratory Treatment Status (SVN) given   Treatment Route (SVN) mask;oxygen   Patient Position (SVN) Pretty's   Post Treatment Assessment (SVN) breath sounds unchanged   Signs of Intolerance (SVN) none   Inhaler   $ Inhaler Charges MDI (Metered Dose Inahler) Treatment;Mouth rinsed post treatment   Daily Review of Necessity (Inhaler) completed   Respiratory Treatment Status (Inhaler) given;mouth rinsed post treatment   Treatment Route (Inhaler) mouthpiece   Patient Position (Inhaler) semi-Pretty's   Post Treatment Assessment (Inhaler) breath sounds unchanged   Signs of Intolerance (Inhaler) none   Breath Sounds Post-Respiratory Treatment   Post-treatment Heart Rate (beats/min) 84   Post-treatment Resp Rate (breaths/min) 18   Education   $ Education Bronchodilator;15 min   Respiratory Evaluation   $ Care Plan Tech Time 15 min

## 2020-11-30 VITALS
HEART RATE: 85 BPM | BODY MASS INDEX: 37.56 KG/M2 | DIASTOLIC BLOOD PRESSURE: 72 MMHG | RESPIRATION RATE: 15 BRPM | HEIGHT: 64 IN | TEMPERATURE: 98 F | OXYGEN SATURATION: 99 % | SYSTOLIC BLOOD PRESSURE: 137 MMHG | WEIGHT: 220 LBS

## 2020-11-30 LAB
GLUCOSE SERPL-MCNC: 165 MG/DL (ref 70–110)
GLUCOSE SERPL-MCNC: 205 MG/DL (ref 70–110)

## 2020-11-30 PROCEDURE — 25000242 PHARM REV CODE 250 ALT 637 W/ HCPCS: Performed by: NURSE PRACTITIONER

## 2020-11-30 PROCEDURE — 99232 SBSQ HOSP IP/OBS MODERATE 35: CPT | Mod: ,,, | Performed by: INTERNAL MEDICINE

## 2020-11-30 PROCEDURE — 99900031 HC PATIENT EDUCATION (STAT)

## 2020-11-30 PROCEDURE — 94640 AIRWAY INHALATION TREATMENT: CPT

## 2020-11-30 PROCEDURE — 82962 GLUCOSE BLOOD TEST: CPT

## 2020-11-30 PROCEDURE — 63600175 PHARM REV CODE 636 W HCPCS: Performed by: INTERNAL MEDICINE

## 2020-11-30 PROCEDURE — 94761 N-INVAS EAR/PLS OXIMETRY MLT: CPT

## 2020-11-30 PROCEDURE — 25000242 PHARM REV CODE 250 ALT 637 W/ HCPCS: Performed by: INTERNAL MEDICINE

## 2020-11-30 PROCEDURE — 25000003 PHARM REV CODE 250: Performed by: NURSE PRACTITIONER

## 2020-11-30 PROCEDURE — 99900035 HC TECH TIME PER 15 MIN (STAT)

## 2020-11-30 PROCEDURE — 25000003 PHARM REV CODE 250: Performed by: INTERNAL MEDICINE

## 2020-11-30 PROCEDURE — 27000221 HC OXYGEN, UP TO 24 HOURS

## 2020-11-30 PROCEDURE — 99232 PR SUBSEQUENT HOSPITAL CARE,LEVL II: ICD-10-PCS | Mod: ,,, | Performed by: INTERNAL MEDICINE

## 2020-11-30 RX ORDER — PREDNISONE 10 MG/1
10 TABLET ORAL DAILY
Qty: 30 TABLET | Refills: 0 | Status: SHIPPED | OUTPATIENT
Start: 2020-11-30 | End: 2020-12-30

## 2020-11-30 RX ORDER — DOXYCYCLINE 100 MG/1
100 CAPSULE ORAL EVERY 12 HOURS
Qty: 8 CAPSULE | Refills: 0 | Status: SHIPPED | OUTPATIENT
Start: 2020-11-30 | End: 2020-12-04

## 2020-11-30 RX ADMIN — IPRATROPIUM BROMIDE AND ALBUTEROL SULFATE 3 ML: .5; 3 SOLUTION RESPIRATORY (INHALATION) at 07:11

## 2020-11-30 RX ADMIN — INSULIN DETEMIR 70 UNITS: 100 INJECTION, SOLUTION SUBCUTANEOUS at 09:11

## 2020-11-30 RX ADMIN — IPRATROPIUM BROMIDE AND ALBUTEROL SULFATE 3 ML: .5; 3 SOLUTION RESPIRATORY (INHALATION) at 05:11

## 2020-11-30 RX ADMIN — HUMAN INSULIN 4 UNITS: 100 INJECTION, SOLUTION SUBCUTANEOUS at 01:11

## 2020-11-30 RX ADMIN — PANTOPRAZOLE SODIUM 40 MG: 40 TABLET, DELAYED RELEASE ORAL at 09:11

## 2020-11-30 RX ADMIN — POLYETHYLENE GLYCOL 3350 17 G: 17 POWDER, FOR SOLUTION ORAL at 09:11

## 2020-11-30 RX ADMIN — LAMOTRIGINE 150 MG: 100 TABLET ORAL at 09:11

## 2020-11-30 RX ADMIN — CARBOXYMETHYLCELLULOSE SODIUM 1 DROP: 5 SOLUTION/ DROPS OPHTHALMIC at 11:11

## 2020-11-30 RX ADMIN — LOSARTAN POTASSIUM 50 MG: 50 TABLET, FILM COATED ORAL at 09:11

## 2020-11-30 RX ADMIN — IPRATROPIUM BROMIDE AND ALBUTEROL SULFATE 3 ML: .5; 3 SOLUTION RESPIRATORY (INHALATION) at 01:11

## 2020-11-30 RX ADMIN — ROPINIROLE 4 MG: 2 TABLET, FILM COATED ORAL at 09:11

## 2020-11-30 RX ADMIN — LEVOTHYROXINE SODIUM 150 MCG: 25 TABLET ORAL at 05:11

## 2020-11-30 RX ADMIN — HYDROCHLOROTHIAZIDE 12.5 MG: 12.5 TABLET ORAL at 09:11

## 2020-11-30 RX ADMIN — METHYLPREDNISOLONE SODIUM SUCCINATE 40 MG: 40 INJECTION, POWDER, FOR SOLUTION INTRAMUSCULAR; INTRAVENOUS at 05:11

## 2020-11-30 RX ADMIN — DOXYCYCLINE HYCLATE 100 MG: 100 CAPSULE ORAL at 09:11

## 2020-11-30 RX ADMIN — MUPIROCIN: 20 OINTMENT TOPICAL at 09:11

## 2020-11-30 RX ADMIN — FLUOXETINE 40 MG: 20 CAPSULE ORAL at 09:11

## 2020-11-30 RX ADMIN — GABAPENTIN 800 MG: 300 CAPSULE ORAL at 09:11

## 2020-11-30 NOTE — RESPIRATORY THERAPY
11/30/20 0521   Patient Assessment/Suction   Level of Consciousness (AVPU) alert   Respiratory Effort Unlabored   Expansion/Accessory Muscles/Retractions expansion symmetric;no retractions;no use of accessory muscles   All Lung Fields Breath Sounds diminished;clear   Rhythm/Pattern, Respiratory no shortness of breath reported   PRE-TX-O2   O2 Device (Oxygen Therapy) nasal cannula   SpO2 99 %   Pulse Oximetry Type Intermittent   $ Pulse Oximetry - Multiple Charge Pulse Oximetry - Multiple   Pulse 76   Resp 20   Aerosol Therapy   $ Aerosol Therapy Charges Aerosol Treatment   Daily Review of Necessity (SVN) completed   Respiratory Treatment Status (SVN) given;mouth rinsed post treatment   Treatment Route (SVN) mask;oxygen   Patient Position (SVN) Pretty's   Post Treatment Assessment (SVN) breath sounds unchanged;patient reports breathing improved   Signs of Intolerance (SVN) none   Breath Sounds Post-Respiratory Treatment   Throughout All Fields Post-Treatment All Fields   Throughout All Fields Post-Treatment no change   Post-treatment Heart Rate (beats/min) 76   Post-treatment Resp Rate (breaths/min) 16   Respiratory Interventions   Cough And Deep Breathing done independently per patient   Breathing Techniques/Airway Clearance deep/controlled cough encouraged;diaphragmatic breathing promoted   Education   $ Education Bronchodilator;Other (see comment);15 min  (prn tx)   Respiratory Evaluation   $ Care Plan Tech Time 15 min   Evaluation For   (care plan)   Pt asked for prn tx

## 2020-11-30 NOTE — NURSING
Pt c/o SOB; appears very anxious. RRT Xochilt notified for breathing tx. Pt provided with PRN klonopin.     Presently pt is sitting high ramirez @ 50 degrees. Verbalized improved comfort.

## 2020-11-30 NOTE — DISCHARGE SUMMARY
[facility] Medicine  Discharge Summary      Patient Name: Alanis Mendieta  MRN: [id number]  Admission Date: 11/24/2020  Hospital Length of Stay: 6 days  Discharge Date and Time:  11/30/2020 12:32 PM  Attending Physician: Todd Pichardo MD   Discharging Provider: Todd Pichardo MD  Primary Care Provider: Michelle Matthew NP      HPI:   HISTORY OF PRESENT ILLNESS:   History was obtained from the patient and ER physician Sign-out. Patient presents to the ED with the complaint of SOB that is progressively worsening over the weekend despite treatment outpatient with her pulmonologist. SOB is worse with exertion. No alleviating factors. She reports associated chest tightness, wheezing and dry cough. She was started on oral Prednisone and antibiotics over the weekend as well as neb treatments 4 times per day. Symptoms have not improved. She went to see her pulmonologist today for a follow up visit and was deferred to the ED for further evaluation. She continues to feel SOB on her usual home 4L NC. She denies fever, chills, productive cough, nausea, vomiting, abdominal pain, dysuria, diarrhea, numbness, tingling, or LOC.      In the emergency room, patient is afebrile. She is tachypneic. When she got up to ambulate her O2 saturation decreased to 88% on 4L NC. CBC was unremarkable. CMP with CO2 32, glucose 274. BNP and troponin within reference ranges. I have reviewed the EKG and it shows NSR with prolonged QT. No ST/T wave abnormalities. No concerning finding on chest x ray. The patient is treated with neb treatments and IV Solumedrol.      Decision to admit was taken and patient was informed about the plan of care.       * No surgery found *      Hospital Course:   11/25/2020  Ms Fundally states that she is much less short of breath today-able to lay down. She denies use of tobacco for 25 years    11/26/2020  Ms Fundally states that she is much less short of breath but has significant CRUZ.  She lives by herself and will be unable to freform AODL's without assistance. Pt states that Dr Reis states that she should stay until Saturday 11/27/2020  Ms Hester is feeling better at rest but still has significant CRUZ. She does not feel that she will be able to care for herself at home by herself.    11/28/2020  Ms Hester has been offered SNF placement but will no go because of the terrible experience she had at a nursing home in the past..Pt states that she is feeling much better at rest and with exertion    11/29/2020  Ms Hester really wants to go home with home health. She has no SOB and less CRUZ at present.    11/30/2020  Ms Hester is ready to go home and refuses SNF placement despite our strong recommendation. Dr Reis feels that she can go home from his standpoint.  ROS: CRUZ otherwise negative X 9  PE: in no distress, HEENT MARA EOM intact no use of accessory muscles of respiration, Neck supple no use of accessory muscles of respiration, Lungs diminished breath sounds without crackles wheezes or rhonchi, Heart distant sounds S 1 S 2 RRR, Abdomen BS+ nontender, EXT without CC or E pulses 1-2+, Skin no finding, Neuro AA O X 3 no acute sensory or motor deficit     Consults:   Consults (From admission, onward)        Status Ordering Provider     Inpatient consult to Internal Medicine  Once     Provider:  Erendira Cardenas NP    Acknowledged SUZIE LEE     Inpatient consult to Pulmonology  Once     Provider:  Sanya Reis MD    Completed ERENDIRA CARDENAS     Inpatient consult to Respiratory Care  Once     Provider:  (Not yet assigned)    Completed ERENDIRA CARDENAS     Inpatient consult to   Once     Provider:  (Not yet assigned)    Completed PRICE SHARMA          No new Assessment & Plan notes have been filed under this hospital service since the last note was generated.  Service: Hospital Medicine    Final Active Diagnoses:    Diagnosis Date Noted POA     PRINCIPAL PROBLEM:  COPD exacerbation [J44.1] 11/24/2020 Yes    QT prolongation [R94.31] 11/25/2020 Yes    Class 2 obesity in adult [E66.9] 03/19/2020 Yes     Chronic    Postoperative hypothyroidism [E89.0] 05/05/2018 Yes    GERD (gastroesophageal reflux disease) [K21.9] 05/05/2018 Yes    Anxiety [F41.9] 05/05/2018 Yes    Type 2 diabetes mellitus, with long-term current use of insulin [E11.9, Z79.4] 04/26/2018 Not Applicable      Problems Resolved During this Admission:       Discharged Condition: good    Disposition: Home or Self Care    Follow Up:  Follow-up Information     Michelle Matthew NP In 1 week.    Specialty: Family Medicine  Contact information:  [address]  [address]  [place] [address]  [phone]             Sanya Reis MD In 2 weeks.    Specialty: Pulmonary Disease  Contact information:  [address]  [id number]  [facility] [place] [address]  [phone]                 Patient Instructions:      Ambulatory referral/consult to Home Health   Standing Status: Future   Referral Priority: Routine Referral Type: Home Health   Referral Reason: Specialty Services Required   Requested Specialty: Home Health Services   Number of Visits Requested: 1     Diet Cardiac     Activity as tolerated       Significant Diagnostic Studies: Labs:   BMP:   Recent Labs   Lab 11/29/20 0527   *      K 4.6   CL 93*   CO2 34*   BUN 26*   CREATININE 0.7   CALCIUM 9.0   , CMP   Recent Labs   Lab 11/29/20  0527      K 4.6   CL 93*   CO2 34*   *   BUN 26*   CREATININE 0.7   CALCIUM 9.0   ANIONGAP 11   ESTGFRAFRICA >60.0   EGFRNONAA >60.0   , CBC   Recent Labs   Lab 11/29/20  0528   WBC 12.45   HGB 12.5   HCT 40.8   *   , INR   Lab Results   Component Value Date    INR 1.1 11/24/2020    INR 1.0 10/13/2020    INR 1.0 06/26/2020   , Troponin   Recent Labs   Lab 11/24/20  1328   TROPONINI <0.030    and All labs within the past 24 hours have been  reviewed  Microbiology:   Blood Culture   Lab Results   Component Value Date    LABBLOO No growth after 5 days. 11/24/2020       Pending Diagnostic Studies:     None         Medications:  Reconciled Home Medications:      Medication List      START taking these medications    doxycycline 100 MG Cap  Commonly known as: VIBRAMYCIN  Take 1 capsule (100 mg total) by mouth every 12 (twelve) hours. for 4 days     predniSONE 10 MG tablet  Commonly known as: DELTASONE  Take 1 tablet (10 mg total) by mouth once daily. Take 3 tablets per day for 5 days, then take 2 tablets per day times 5 days then take 1 tablet per day for 5 days for 15 days        CHANGE how you take these medications    clonazePAM 0.5 MG tablet  Commonly known as: KLONOPIN  Take 1 tablet (0.5 mg total) by mouth 2 (two) times daily.  What changed:   · when to take this  · reasons to take this     LEVEMIR FLEXTOUCH U-100 INSULN 100 unit/mL (3 mL) Inpn pen  Generic drug: insulin detemir U-100  Inject 65 Units into the skin 2 (two) times daily.  What changed: how much to take     potassium chloride SA 10 MEQ tablet  Commonly known as: K-DUR,KLOR-CON  Take 1 tablet (10 mEq total) by mouth 3 (three) times a week. Mon , Wed, and Fri.  What changed: when to take this        CONTINUE taking these medications    ACCU-CHEK LIBERTY PLUS TEST STRP Strp  Generic drug: blood sugar diagnostic     albuterol 90 mcg/actuation inhaler  Commonly known as: VENTOLIN HFA  Inhale 2 puffs into the lungs every 6 (six) hours as needed for Wheezing. Rescue     albuterol-ipratropium 2.5 mg-0.5 mg/3 mL nebulizer solution  Commonly known as: DUO-NEB  Take 3 mLs by nebulization every 6 (six) hours as needed for Wheezing or Shortness of Breath. Rescue     ARTIFICIAL TEARS(HYPROMELLOSE) OPHT  Place 1 drop into both eyes 3 (three) times daily.     calcitRIOL 0.5 MCG Cap  Commonly known as: ROCALTROL  Take 1 capsule (0.5 mcg total) by mouth every evening.     doxepin 5 % cream  Commonly known  as: ZONALON  Apply 1 application topically 3 (three) times daily as needed.     DULoxetine 20 MG capsule  Commonly known as: CYMBALTA  Take 20 mg by mouth once daily.     FLUoxetine 40 MG capsule  Take 1 capsule (40 mg total) by mouth once daily.     fluticasone propionate 50 mcg/actuation nasal spray  Commonly known as: FLONASE  1 spray by Each Nostril route once daily.     fluticasone-umeclidin-vilanter 100-62.5-25 mcg Dsdv  Commonly known as: TRELEGY ELLIPTA  Inhale 1 puff into the lungs once daily.     gabapentin 800 MG tablet  Commonly known as: NEURONTIN  Take 1 tablet (800 mg total) by mouth 3 (three) times daily.     guaiFENesin 600 mg 12 hr tablet  Commonly known as: MUCINEX  Take 1 tablet (600 mg total) by mouth 2 (two) times daily.     hydroCHLOROthiazide 12.5 MG Tab  Commonly known as: HYDRODIURIL  Take 1 tablet (12.5 mg total) by mouth once daily.     ibuprofen 400 MG tablet  Commonly known as: ADVIL,MOTRIN  Take 400 mg by mouth every 8 (eight) hours as needed for Other.     insulin aspart U-100 100 unit/mL injection  Commonly known as: NOVOLOG  Inject 2-10 Units into the skin 4 (four) times daily with meals and nightly. Sliding scale 151-400     lamoTRIgine 150 MG Tab  Commonly known as: LAMICTAL  Take 1 tablet (150 mg total) by mouth 2 (two) times daily.     latanoprost 0.005 % ophthalmic solution  Place 1 drop into both eyes every evening.     levothyroxine 150 MCG tablet  Commonly known as: SYNTHROID  Take 1 tablet (150 mcg total) by mouth before breakfast.     linaCLOtide 290 mcg Cap capsule  Commonly known as: LINZESS  Take 1 capsule (290 mcg total) by mouth once daily.     losartan 50 MG tablet  Commonly known as: COZAAR  Take 1 tablet (50 mg total) by mouth once daily.     losartan-hydrochlorothiazide 50-12.5 mg 50-12.5 mg per tablet  Commonly known as: HYZAAR  Take 0.5 tablets by mouth once daily.     meclizine 25 mg tablet  Commonly known as: ANTIVERT  Take 25 mg by mouth 3 (three) times daily  "as needed.     methocarbamoL 750 MG Tab  Commonly known as: ROBAXIN  Take 1 tablet (750 mg total) by mouth 2 (two) times daily as needed.     mupirocin 2 % ointment  Commonly known as: BACTROBAN  Apply topically 3 (three) times daily.     NOVOFINE AUTOCOVER 30 gauge x 1/3" Ndle  Generic drug: pen needle, diabetic, safety  1 each by In Vitro route 4 (four) times daily.     oxyCODONE 10 mg Tab immediate release tablet  Commonly known as: ROXICODONE  Take 10 mg by mouth 3 (three) times daily.     pantoprazole 40 MG tablet  Commonly known as: PROTONIX  Take 1 tablet (40 mg total) by mouth once daily.     QUEtiapine 50 MG tablet  Commonly known as: SEROQUEL  Take 25 mg by mouth every evening.     RELISTOR 150 mg Tab  Generic drug: methylnaltrexone  Take 150 mg by mouth once daily.     rOPINIRole 4 MG tablet  Commonly known as: REQUIP  Take 1 tablet (4 mg total) by mouth 2 (two) times daily.     temazepam 15 mg Cap  Commonly known as: RESTORIL  Take 15 mg by mouth every evening.        STOP taking these medications    levoFLOXacin 750 MG tablet  Commonly known as: LEVAQUIN            Indwelling Lines/Drains at time of discharge:   Lines/Drains/Airways     None                 Time spent on the discharge of patient: 40 minutes  Patient was seen and examined on the date of discharge and determined to be suitable for discharge.         Todd Pichardo MD  Department of Hospital Medicine  [facility]  "

## 2020-11-30 NOTE — PLAN OF CARE
Important Message from Medicare was sign, explained and given to patient/caregiver on 11/30/2020 at 8:50am     answered all questions.

## 2020-11-30 NOTE — PLAN OF CARE
Problem: Fall Injury Risk  Goal: Absence of Fall and Fall-Related Injury  Outcome: Ongoing, Progressing     Problem: Adult Inpatient Plan of Care  Goal: Plan of Care Review  Outcome: Ongoing, Progressing     Problem: Adult Inpatient Plan of Care  Goal: Optimal Comfort and Wellbeing  Outcome: Ongoing, Progressing     Problem: Diabetes Comorbidity  Goal: Blood Glucose Level Within Desired Range  Outcome: Ongoing, Progressing

## 2020-11-30 NOTE — PROGRESS NOTES
"Pulmonary/Critical Care Progress Note      Patient name: Alanis Mendieta  MRN: [id number]  Date: 11/30/2020    Admit Date: 11/24/2020  Consult Requested By: Todd Pichardo MD    Reason for Consult: COPD with exacerbation    HPI:    11/25/2020 - 68 yo female known to me with COPD who presented to my office with about 1 week of increased SOb, CRUZ.  She was started on steroids and antibiotics over the weekend.  When she saw me yesterday she told me taht she did not feel any better and had significant dyspnea with exertion.  After seeing her she was referred to the ER and is now admitted for further treatment.  Overnight she feels better but she has not been out of bed.  No fever, chills, sweats, + SOB, CRUZ, no chest pain/palpitations, no CHF symptoms.  SHe lives at home alone and has some sitter help during the day.    11/27/2020 - Stable overnight, has been up to chair wit(h some difficulty), has not ambulated, feels better but is not back to her baseline.  BP is up    11/30/2020 - Feels better, she is not very interested in going to SNF.  We had a long discussion and she would like to try and go home with home health. PT, OT and then transition to outpt pulmonary rehabilitation.  She feels that she is near her baseline and she admits to not "always following" her regimen (we discussed this).    Review of Systems    Review of Systems   Constitutional: Positive for malaise/fatigue. Negative for chills, diaphoresis, fever and weight loss.   HENT: Negative for congestion.    Eyes: Negative for pain.   Respiratory: Positive for cough, shortness of breath and wheezing. Negative for hemoptysis, sputum production and stridor.    Cardiovascular: Negative for chest pain, palpitations, orthopnea, claudication, leg swelling and PND.   Gastrointestinal: Negative for abdominal pain, blood in stool, constipation, diarrhea, heartburn, melena, nausea and vomiting.   Genitourinary: Negative for dysuria, frequency, hematuria and " urgency.   Musculoskeletal: Negative for falls and myalgias.   Neurological: Negative for dizziness, tingling, tremors, sensory change, speech change, focal weakness, seizures, loss of consciousness, weakness and headaches.   Psychiatric/Behavioral: Negative for depression, substance abuse and suicidal ideas. The patient is not nervous/anxious.        Past Medical History    Past Medical History:   Diagnosis Date    Allergy     Anxiety     Arthritis     Asthma     Bipolar disorder     Cancer     thyroid    Chronic back pain     COPD (chronic obstructive pulmonary disease)     Diabetes mellitus     Diabetes mellitus, type 2     Fibromyalgia     GERD (gastroesophageal reflux disease)     History of fall 4/26/2018    [place] (hard of hearing)     Hx of thyroid cancer     Hyperlipidemia     Hypertension     Hypothyroidism     Neuropathy     On home oxygen therapy     3 l/m 24/7    Restless leg syndrome     Sleep apnea     Urinary tract infection        Past Surgical History    Past Surgical History:   Procedure Laterality Date    APPENDECTOMY      BACK SURGERY      x 3    broken foot and ankle      CHOLECYSTECTOMY      EYE SURGERY Bilateral     cataract    HYSTERECTOMY      JOINT REPLACEMENT Left 09/17/2018    knee    KNEE ARTHROPLASTY Left 9/17/2018    Procedure: ARTHROPLASTY, KNEE;  Surgeon: Yariel Marin MD;  Location: [facility] OR;  Service: Orthopedics;  Laterality: Left;    MYELOGRAPHY N/A 8/7/2019    Procedure: MYELOGRAM;  Surgeon: Sun Diagnostic Provider;  Location: [facility] OR;  Service: General;  Laterality: N/A;    POSTERIOR REPAIR      SPINAL FUSION      x3 BACK, NECK X 4    TOTAL THYROIDECTOMY  2014       Medications (scheduled):      albuterol-ipratropium  3 mL Nebulization TID WAKE    calcitRIOL  0.5 mcg Oral QHS    carboxymethylcellulose  1 drop Both Eyes TID    doxycycline  100 mg Oral Q12H    enoxaparin  40 mg Subcutaneous Q24H    FLUoxetine  40 mg Oral Daily     fluticasone propionate  1 spray Each Nostril Daily    fluticasone-umeclidin-vilanter  1 puff Inhalation Daily    gabapentin  800 mg Oral TID    hydroCHLOROthiazide  12.5 mg Oral Daily    insulin detemir U-100  70 Units Subcutaneous BID    lamoTRIgine  150 mg Oral BID    latanoprost  1 drop Both Eyes QHS    levothyroxine  150 mcg Oral Before breakfast    losartan  50 mg Oral Daily    methylPREDNISolone sodium succinate  40 mg Intravenous Q8H    mupirocin   Topical (Top) TID    pantoprazole  40 mg Oral Daily    polyethylene glycol  17 g Oral Daily    QUEtiapine  25 mg Oral QHS    rOPINIRole  4 mg Oral BID       Medications (infusions):         Medications (prn):     acetaminophen, albuterol-ipratropium, clonazePAM, insulin regular, meclizine, methocarbamoL, oxyCODONE, sodium chloride 0.9%    Family History:   Family History   Problem Relation Age of Onset    Diabetes Mother     Heart disease Mother     Hypertension Mother     Diabetes Father     Heart disease Father     Hypertension Father        Social History: Tobacco:   Social History     Tobacco Use   Smoking Status Former Smoker    Packs/day: 1.00    Years: 30.00    Pack years: 30.00    Types: Cigarettes    Quit date: 2000    Years since quittin.5   Smokeless Tobacco Never Used                                EtOH:   Social History     Substance and Sexual Activity   Alcohol Use No    Frequency: Never    Drinks per session: Patient refused    Binge frequency: Never                                Drugs:   Social History     Substance and Sexual Activity   Drug Use No                                Occupation: retired                             Asbestos exposure: no                             Pets: no                             Environmental allergies: no    Physical Exam    Vital signs:  Temp:  [97.8 °F (36.6 °C)-98.5 °F (36.9 °C)]   Pulse:  [76-98]   Resp:  [15-20]   BP: (109-148)/(66-78)   SpO2:  [97 %-100 %]  "    Intake/Output:   No intake or output data in the 24 hours ending 11/30/20 0813     BMI: Estimated body mass index is 37.84 kg/m² as calculated from the following:    Height as of this encounter: 5' 4" (1.626 m).    Weight as of this encounter: 100 kg (220 lb 7.4 oz).    Physical Exam   Constitutional: She is oriented to person, place, and time.   Older female no distress, laying in bed   HENT:   Head: Normocephalic and atraumatic.   Right Ear: External ear normal.   Left Ear: External ear normal.   Mouth/Throat: Oropharynx is clear and moist.   Eyes: Pupils are equal, round, and reactive to light. Conjunctivae and EOM are normal. Right eye exhibits no discharge. Left eye exhibits no discharge. No scleral icterus.   Neck: Normal range of motion. Neck supple. No JVD present. No tracheal deviation present. No thyromegaly present.   Cardiovascular: Normal rate, regular rhythm, normal heart sounds and intact distal pulses. Exam reveals no gallop and no friction rub.   No murmur heard.  Pulmonary/Chest: Effort normal. No stridor. No respiratory distress. She has no wheezes. She has no rales.   Decreased BS throughout   Abdominal: She exhibits no distension. There is no abdominal tenderness. There is no rebound.   obese   Musculoskeletal: Normal range of motion.         General: No tenderness or edema.   Lymphadenopathy:     She has no cervical adenopathy.   Neurological: She is alert and oriented to person, place, and time. GCS score is 15.   Skin: Skin is warm and dry.   Psychiatric: Mood, memory, affect and judgment normal.   A bit anxious   Nursing note and vitals reviewed.      Laboratory    No results for input(s): WBC, RBC, HGB, HCT, PLT, MCV, MCH, MCHC in the last 24 hours.    No results for input(s): GLUCOSE, CALCIUM, PROT, NA, K, CO2, CL, BUN, CREATININE, ALKPHOS, ALT, AST, BILITOT in the last 24 hours.    Invalid input(s):  ALBUMIN    No results for input(s): PT, INR, APTT in the last 24 hours.    No results " for input(s): CPK, CPKMB, TROPONINI, MB in the last 24 hours.    Additional labs:     LA - 1.4  HgbA1C - 8.5  TSH - 0.710  Procalcitonin - < 0.05    Microbiology:       Microbiology Results (last 7 days)     Procedure Component Value Units Date/Time    Blood culture #1 **CANNOT BE ORDERED STAT** [[id number]] Collected: 11/24/20 1328    Order Status: Completed Specimen: Blood from Peripheral, Antecubital, Right Updated: 11/29/20 1432     Blood Culture, Routine No growth after 5 days.    Blood culture #2 **CANNOT BE ORDERED STAT** [[id number]] Collected: 11/24/20 1345    Order Status: Completed Specimen: Blood from Peripheral, Forearm, Left Updated: 11/29/20 1432     Blood Culture, Routine No growth after 5 days.          Radiology    X-Ray Chest AP Portable  XR CHEST AP PORTABLE    CLINICAL HISTORY:  69 years Female CHF    COMPARISON: Chest radiograph October 15, 2020    FINDINGS: Cardiomediastinal silhouette is stable from prior.  Atherosclerotic calcification of the aorta. Right hemidiaphragm  elevation is unchanged. No airspace consolidation. No pleural effusion  or pneumothorax. No acute osseous abnormality. Prior cervical ACDF.    IMPRESSION:    Stable chest radiograph demonstrating chronic findings discussed  above.  No new or acute pulmonary process.    Electronically Signed by Gutierrez MCNAMARA on 11/24/2020 1:22 PM        Additional Studies    EKG  Vent. Rate : 082 BPM     Atrial Rate : 082 BPM      P-R Int : 152 ms          QRS Dur : 080 ms       QT Int : 424 ms       P-R-T Axes : 033 -06 073 degrees      QTc Int : 495 ms     Normal sinus rhythm   Possible Left atrial enlargement   Prolonged QT   Abnormal ECG   When compared with ECG of 15-OCT-2020 15:07,   Premature ventricular complexes are no longer Present   Criteria for Anterior infarct are no longer Present   Confirmed by Bernardo RASHID, Delio FULTON ([id number]) on 11/24/2020 7:26:39 PM     Ventilator Information              No results for input(s): PH, PCO2,  PO2, HCO3, POCSATURATED, BE in the last 72 hours.      Impression    Active Hospital Problems    Diagnosis  POA    *COPD exacerbation [J44.1]  Yes    QT prolongation [R94.31]  Yes    Class 2 obesity in adult [E66.9]  Yes     Chronic    Postoperative hypothyroidism [E89.0]  Yes    GERD (gastroesophageal reflux disease) [K21.9]  Yes    Anxiety [F41.9]  Yes    Type 2 diabetes mellitus, with long-term current use of insulin [E11.9, Z79.4]  Not Applicable      Resolved Hospital Problems   No resolved problems to display.       Plan    · OK to DC on    Patient is OK for DC from a pulmonary standpoint on:    Their usual home respiratory medications  Prednisone taper (30 x 5, 20 x 5, 10 x 5)  Finish 7 days total doxycycline  PRN mucinex for congestion  NO smoking  Set up home health with Pt and OT  Will plan to set up outpt pulmonary rehabilitation as outpt when stable  If sahe gets readmitted soon will need to consider SNF/NH placement (d/w her)  Should have a followup appointment in 2-4 weeks or call the office if symptoms worsen.        Thank you for this consult.  I will follow with you while the patient is hospitalized.              Sanya Reis MD  [facility] Pulmonary/Critical Care  11/30/2020

## 2020-11-30 NOTE — PLAN OF CARE
11/30/20 1239   Final Note   Assessment Type Final Discharge Note   Anticipated Discharge Disposition Home-Health   Patient is discharging home with home health. Coworker Corine already had the patient sign patient choice form choosing [facility]. Sent referral to [facility] via  [employer] fax.

## 2020-11-30 NOTE — PLAN OF CARE
11/30/20 0716   Patient Assessment/Suction   Level of Consciousness (AVPU) alert   All Lung Fields Breath Sounds clear;diminished   PRE-TX-O2   O2 Device (Oxygen Therapy) nasal cannula   $ Is the patient on Low Flow Oxygen? Yes   Flow (L/min) 4   SpO2 99 %   Pulse Oximetry Type Intermittent   $ Pulse Oximetry - Multiple Charge Pulse Oximetry - Multiple   Pulse 81   Resp 15   Aerosol Therapy   $ Aerosol Therapy Charges Aerosol Treatment   Daily Review of Necessity (SVN) completed   Respiratory Treatment Status (SVN) given   Treatment Route (SVN) mask   Patient Position (SVN) semi-Pretty's   Post Treatment Assessment (SVN) increased aeration   Signs of Intolerance (SVN) none   Inhaler   $ Inhaler Charges MDI (Metered Dose Inahler) Treatment   Daily Review of Necessity (Inhaler) completed   Respiratory Treatment Status (Inhaler) given   Treatment Route (Inhaler) mouthpiece   Patient Position (Inhaler) semi-Pretty's   Post Treatment Assessment (Inhaler) increased aeration   Signs of Intolerance (Inhaler) none   Breath Sounds Post-Respiratory Treatment   Post-treatment Heart Rate (beats/min) 82   Post-treatment Resp Rate (breaths/min) 15   Respiratory Evaluation   $ Care Plan Tech Time 15 min

## 2020-11-30 NOTE — RESPIRATORY THERAPY
11/29/20 1919   Patient Assessment/Suction   Respiratory Effort Unlabored   All Lung Fields Breath Sounds wheezes, expiratory   Rhythm/Pattern, Respiratory pattern regular   PRE-TX-O2   O2 Device (Oxygen Therapy) nasal cannula   Flow (L/min) 4   SpO2 97 %   Pulse 91   Resp 16   Aerosol Therapy   $ Aerosol Therapy Charges Aerosol Treatment   Respiratory Treatment Status (SVN) given   Treatment Route (SVN) mask   Patient Position (SVN) HOB elevated   Post Treatment Assessment (SVN) breath sounds unchanged   Signs of Intolerance (SVN) none   Breath Sounds Post-Respiratory Treatment   Throughout All Fields Post-Treatment All Fields   Throughout All Fields Post-Treatment no change   Post-treatment Heart Rate (beats/min) 91   Post-treatment Resp Rate (breaths/min) 16   Respiratory Evaluation   $ Care Plan Tech Time 15 min   Evaluation For Re-Eval 5+ day   Admitting Diagnosis copd

## 2020-12-01 ENCOUNTER — PATIENT OUTREACH (OUTPATIENT)
Dept: FAMILY MEDICINE | Facility: CLINIC | Age: 70
End: 2020-12-01

## 2020-12-01 ENCOUNTER — TELEPHONE (OUTPATIENT)
Dept: FAMILY MEDICINE | Facility: CLINIC | Age: 70
End: 2020-12-01

## 2020-12-01 NOTE — PROGRESS NOTES
Discharge Information     Discharge Date:   11/30/20    Primary Discharge Diagnosis:  COPD exacerbation     Discharge Summary:  Reviewed      Medication & Order Review     Were medication changes made or new medications added?   Yes    If so, has the patient filled the prescriptions?  Yes     Was Home Health ordered? Yes    If so, has Home Health contacted patient and/or initiated services?  Yes    Name of Home Health Agency? [facility]    Durable Medical Equipment ordered?  No     If so, has the DME provider contacted patient and delivered equipment?  N/A    Follow Up               Any problems since discharge? No    How is the patient feeling since returning home?  States she is doing much better    Have you set up recommended follow up appointments?  yes    Schedule Hospital Follow-up appointment within 7-14 days (preferably 7).      Notes:        Sveta Anton

## 2020-12-07 ENCOUNTER — TELEPHONE (OUTPATIENT)
Dept: FAMILY MEDICINE | Facility: CLINIC | Age: 70
End: 2020-12-07

## 2020-12-07 NOTE — TELEPHONE ENCOUNTER
----- Message from Remington Westfall sent at 12/7/2020  8:58 AM CST -----  Regarding: leighton  Pt cancled her hosp f.u appt because she has no ride

## 2020-12-08 ENCOUNTER — EXTERNAL HOME HEALTH (OUTPATIENT)
Dept: HOME HEALTH SERVICES | Facility: HOSPITAL | Age: 70
End: 2020-12-08

## 2020-12-11 ENCOUNTER — DOCUMENT SCAN (OUTPATIENT)
Dept: HOME HEALTH SERVICES | Facility: HOSPITAL | Age: 70
End: 2020-12-11

## 2020-12-11 ENCOUNTER — OFFICE VISIT (OUTPATIENT)
Dept: FAMILY MEDICINE | Facility: CLINIC | Age: 70
End: 2020-12-11
Payer: MEDICARE

## 2020-12-11 VITALS
BODY MASS INDEX: 37.05 KG/M2 | SYSTOLIC BLOOD PRESSURE: 118 MMHG | HEIGHT: 64 IN | HEART RATE: 84 BPM | DIASTOLIC BLOOD PRESSURE: 60 MMHG | WEIGHT: 217 LBS

## 2020-12-11 DIAGNOSIS — E89.0 POSTOPERATIVE HYPOTHYROIDISM: ICD-10-CM

## 2020-12-11 DIAGNOSIS — E44.1 MILD PROTEIN-CALORIE MALNUTRITION: ICD-10-CM

## 2020-12-11 DIAGNOSIS — E11.42 TYPE 2 DIABETES MELLITUS WITH DIABETIC POLYNEUROPATHY, WITH LONG-TERM CURRENT USE OF INSULIN: ICD-10-CM

## 2020-12-11 DIAGNOSIS — F51.01 PRIMARY INSOMNIA: ICD-10-CM

## 2020-12-11 DIAGNOSIS — I10 ESSENTIAL HYPERTENSION: ICD-10-CM

## 2020-12-11 DIAGNOSIS — M15.9 PRIMARY OSTEOARTHRITIS INVOLVING MULTIPLE JOINTS: ICD-10-CM

## 2020-12-11 DIAGNOSIS — K21.9 GASTROESOPHAGEAL REFLUX DISEASE WITHOUT ESOPHAGITIS: ICD-10-CM

## 2020-12-11 DIAGNOSIS — Z09 HOSPITAL DISCHARGE FOLLOW-UP: ICD-10-CM

## 2020-12-11 DIAGNOSIS — J44.1 CHRONIC OBSTRUCTIVE PULMONARY DISEASE WITH ACUTE EXACERBATION: ICD-10-CM

## 2020-12-11 DIAGNOSIS — F41.9 ANXIETY: ICD-10-CM

## 2020-12-11 DIAGNOSIS — Z79.4 TYPE 2 DIABETES MELLITUS WITH DIABETIC POLYNEUROPATHY, WITH LONG-TERM CURRENT USE OF INSULIN: ICD-10-CM

## 2020-12-11 DIAGNOSIS — J34.1 CYST OF NASAL CAVITY: Primary | ICD-10-CM

## 2020-12-11 PROCEDURE — 3008F BODY MASS INDEX DOCD: CPT | Mod: S$GLB,,, | Performed by: NURSE PRACTITIONER

## 2020-12-11 PROCEDURE — 1157F PR ADVANCE CARE PLAN OR EQUIV PRESENT IN MEDICAL RECORD: ICD-10-PCS | Mod: S$GLB,,, | Performed by: NURSE PRACTITIONER

## 2020-12-11 PROCEDURE — 3008F PR BODY MASS INDEX (BMI) DOCUMENTED: ICD-10-PCS | Mod: S$GLB,,, | Performed by: NURSE PRACTITIONER

## 2020-12-11 PROCEDURE — 1157F ADVNC CARE PLAN IN RCRD: CPT | Mod: S$GLB,,, | Performed by: NURSE PRACTITIONER

## 2020-12-11 PROCEDURE — 99496 TRANSITIONAL CARE MANAGE SERVICE 7 DAY DISCHARGE: ICD-10-PCS | Mod: S$GLB,,, | Performed by: NURSE PRACTITIONER

## 2020-12-11 PROCEDURE — 99496 TRANSJ CARE MGMT HIGH F2F 7D: CPT | Mod: S$GLB,,, | Performed by: NURSE PRACTITIONER

## 2020-12-11 RX ORDER — MECLIZINE HYDROCHLORIDE 25 MG/1
25 TABLET ORAL 3 TIMES DAILY PRN
Qty: 90 TABLET | Refills: 1 | Status: SHIPPED | OUTPATIENT
Start: 2020-12-11 | End: 2021-06-22 | Stop reason: SDUPTHER

## 2020-12-11 RX ORDER — QUETIAPINE FUMARATE 25 MG/1
TABLET, FILM COATED ORAL
COMMUNITY
Start: 2020-12-08 | End: 2021-03-20 | Stop reason: CLARIF

## 2020-12-11 RX ORDER — IPRATROPIUM BROMIDE AND ALBUTEROL SULFATE 2.5; .5 MG/3ML; MG/3ML
3 SOLUTION RESPIRATORY (INHALATION) EVERY 6 HOURS PRN
Qty: 360 ML | Refills: 5 | Status: SHIPPED | OUTPATIENT
Start: 2020-12-11 | End: 2021-12-01 | Stop reason: SDUPTHER

## 2020-12-11 RX ORDER — METHYLNALTREXONE BROMIDE 150 MG/1
150 TABLET ORAL DAILY
Qty: 30 TABLET | Refills: 2 | Status: ON HOLD | OUTPATIENT
Start: 2020-12-11 | End: 2021-05-13 | Stop reason: HOSPADM

## 2020-12-11 RX ORDER — PREDNISONE 10 MG/1
10 TABLET ORAL DAILY
Qty: 30 TABLET | Refills: 0 | Status: CANCELLED | OUTPATIENT
Start: 2020-12-11 | End: 2020-12-26

## 2020-12-11 RX ORDER — ROPINIROLE 4 MG/1
4 TABLET, FILM COATED ORAL 2 TIMES DAILY
Qty: 180 TABLET | Refills: 1 | Status: SHIPPED | OUTPATIENT
Start: 2020-12-11 | End: 2021-05-05 | Stop reason: SDUPTHER

## 2020-12-11 RX ORDER — METHOCARBAMOL 750 MG/1
750 TABLET, FILM COATED ORAL 2 TIMES DAILY PRN
Qty: 60 TABLET | Refills: 2 | Status: SHIPPED | OUTPATIENT
Start: 2020-12-11 | End: 2021-03-20

## 2020-12-11 RX ORDER — CLONAZEPAM 0.5 MG/1
0.5 TABLET ORAL 2 TIMES DAILY PRN
Qty: 180 TABLET | Refills: 1 | Status: CANCELLED | OUTPATIENT
Start: 2020-12-11

## 2020-12-11 RX ORDER — LEVOTHYROXINE SODIUM 150 UG/1
150 TABLET ORAL
Qty: 90 TABLET | Refills: 1 | Status: SHIPPED | OUTPATIENT
Start: 2020-12-11 | End: 2020-12-14 | Stop reason: SDUPTHER

## 2020-12-11 RX ORDER — ALBUTEROL SULFATE 90 UG/1
2 AEROSOL, METERED RESPIRATORY (INHALATION) EVERY 6 HOURS PRN
Qty: 18 G | Refills: 3 | Status: SHIPPED | OUTPATIENT
Start: 2020-12-11 | End: 2021-10-19 | Stop reason: SDUPTHER

## 2020-12-11 RX ORDER — POTASSIUM CHLORIDE 750 MG/1
10 TABLET, EXTENDED RELEASE ORAL
Qty: 90 TABLET | Refills: 1 | Status: SHIPPED | OUTPATIENT
Start: 2020-12-11 | End: 2021-06-22 | Stop reason: SDUPTHER

## 2020-12-11 RX ORDER — GABAPENTIN 800 MG/1
800 TABLET ORAL 3 TIMES DAILY
Qty: 270 TABLET | Refills: 1 | Status: SHIPPED | OUTPATIENT
Start: 2020-12-11 | End: 2021-05-05 | Stop reason: SDUPTHER

## 2020-12-11 RX ORDER — CALCITRIOL 0.5 UG/1
0.5 CAPSULE ORAL NIGHTLY
Qty: 90 CAPSULE | Refills: 1 | Status: SHIPPED | OUTPATIENT
Start: 2020-12-11 | End: 2020-12-14 | Stop reason: SDUPTHER

## 2020-12-11 RX ORDER — FLUOXETINE HYDROCHLORIDE 40 MG/1
40 CAPSULE ORAL DAILY
Qty: 90 CAPSULE | Refills: 1 | Status: SHIPPED | OUTPATIENT
Start: 2020-12-11 | End: 2021-06-22

## 2020-12-11 RX ORDER — PANTOPRAZOLE SODIUM 40 MG/1
40 TABLET, DELAYED RELEASE ORAL DAILY
Qty: 90 TABLET | Refills: 1 | Status: SHIPPED | OUTPATIENT
Start: 2020-12-11 | End: 2020-12-14 | Stop reason: SDUPTHER

## 2020-12-11 NOTE — PROGRESS NOTES
SUBJECTIVE:    Patient ID: Alanis Mendieta is a 69 y.o. female.    Chief Complaint: Hospital Follow Up (chika bottles// FUENTES)    Pt presents for hospital follow-up. Hospitalized 11/24-11/30 with COPD exacerbation. Treat with steroids and Duo-Nebs.  No infection noted.  States she is feeling much- still on steroid taper. Has follow-up scheduled with Dr. Reis later this month. Will also be starting pulmonary rehab.  Patient reports the person she has working for her has not been completing many tasks in the house.  She has been completing physically demanding tasks such as changing her bed sheets and doing her laundry herself.  Becomes easily short winded when trying to complete such tasks.  Patient now has [facility] involved in care.  Still with plans for pain pump pending approval by Dr. Reis.      Admission on 11/24/2020, Discharged on 11/30/2020   Component Date Value Ref Range Status    WBC 11/24/2020 9.19  3.90 - 12.70 K/uL Final    RBC 11/24/2020 4.17  4.00 - 5.40 M/uL Final    Hemoglobin 11/24/2020 11.6* 12.0 - 16.0 g/dL Final    Hematocrit 11/24/2020 37.6  37.0 - 48.5 % Final    MCV 11/24/2020 90  82 - 98 fL Final    MCH 11/24/2020 27.8  27.0 - 31.0 pg Final    MCHC 11/24/2020 30.9* 32.0 - 36.0 g/dL Final    RDW 11/24/2020 14.4  11.5 - 14.5 % Final    Platelets 11/24/2020 352* 150 - 350 K/uL Final    MPV 11/24/2020 10.6  9.2 - 12.9 fL Final    Immature Granulocytes 11/24/2020 1.1* 0.0 - 0.5 % Final    Gran # (ANC) 11/24/2020 7.4  1.8 - 7.7 K/uL Final    Immature Grans (Abs) 11/24/2020 0.10* 0.00 - 0.04 K/uL Final    Lymph # 11/24/2020 1.3  1.0 - 4.8 K/uL Final    Mono # 11/24/2020 0.3  0.3 - 1.0 K/uL Final    Eos # 11/24/2020 0.0  0.0 - 0.5 K/uL Final    Baso # 11/24/2020 0.03  0.00 - 0.20 K/uL Final    nRBC 11/24/2020 0  0 /100 WBC Final    Gran % 11/24/2020 80.9* 38.0 - 73.0 % Final    Lymph % 11/24/2020 14.3* 18.0 - 48.0 % Final    Mono % 11/24/2020 3.3* 4.0 - 15.0 % Final     Eosinophil % 11/24/2020 0.1  0.0 - 8.0 % Final    Basophil % 11/24/2020 0.3  0.0 - 1.9 % Final    Differential Method 11/24/2020 Automated   Final    Sodium 11/24/2020 139  136 - 145 mmol/L Final    Potassium 11/24/2020 4.0  3.5 - 5.1 mmol/L Final    Chloride 11/24/2020 97  95 - 110 mmol/L Final    CO2 11/24/2020 32* 23 - 29 mmol/L Final    Glucose 11/24/2020 274* 70 - 110 mg/dL Final    BUN 11/24/2020 20  8 - 23 mg/dL Final    Creatinine 11/24/2020 0.6  0.5 - 1.4 mg/dL Final    Calcium 11/24/2020 8.3* 8.7 - 10.5 mg/dL Final    Total Protein 11/24/2020 6.9  6.0 - 8.4 g/dL Final    Albumin 11/24/2020 3.8  3.5 - 5.2 g/dL Final    Total Bilirubin 11/24/2020 0.4  0.1 - 1.0 mg/dL Final    Alkaline Phosphatase 11/24/2020 62  55 - 135 U/L Final    AST 11/24/2020 17  10 - 40 U/L Final    ALT 11/24/2020 18  10 - 44 U/L Final    Anion Gap 11/24/2020 10  8 - 16 mmol/L Final    eGFR if African American 11/24/2020 >60.0  >60 mL/min/1.73 m^2 Final    eGFR if non African American 11/24/2020 >60.0  >60 mL/min/1.73 m^2 Final    Troponin I 11/24/2020 <0.030  <=0.040 ng/mL Final    BNP 11/24/2020 23  0 - 99 pg/mL Final    PT 11/24/2020 13.5  10.6 - 14.8 sec Final    INR 11/24/2020 1.1   Final    SARS-CoV-2 RNA, Amplification, Qual 11/24/2020 Negative  Negative Final    Blood Culture, Routine 11/24/2020 No growth after 5 days.   Final    Blood Culture, Routine 11/24/2020 No growth after 5 days.   Final    Lactate (Lactic Acid) 11/24/2020 1.4  0.5 - 1.9 mmol/L Final    Procalcitonin 11/24/2020 <0.05  0.00 - 0.50 ng/mL Final    POC Glucose 11/24/2020 307* 70 - 110 Final    POC Glucose 11/24/2020 381* 70 - 110 Final    WBC 11/25/2020 8.38  3.90 - 12.70 K/uL Final    RBC 11/25/2020 3.99* 4.00 - 5.40 M/uL Final    Hemoglobin 11/25/2020 11.6* 12.0 - 16.0 g/dL Final    Hematocrit 11/25/2020 36.1* 37.0 - 48.5 % Final    MCV 11/25/2020 91  82 - 98 fL Final    MCH 11/25/2020 29.1  27.0 - 31.0 pg Final     MCHC 11/25/2020 32.1  32.0 - 36.0 g/dL Final    RDW 11/25/2020 14.2  11.5 - 14.5 % Final    Platelets 11/25/2020 370* 150 - 350 K/uL Final    MPV 11/25/2020 10.5  9.2 - 12.9 fL Final    Immature Granulocytes 11/25/2020 0.6* 0.0 - 0.5 % Final    Gran # (ANC) 11/25/2020 6.9  1.8 - 7.7 K/uL Final    Immature Grans (Abs) 11/25/2020 0.05* 0.00 - 0.04 K/uL Final    Lymph # 11/25/2020 1.2  1.0 - 4.8 K/uL Final    Mono # 11/25/2020 0.2* 0.3 - 1.0 K/uL Final    Eos # 11/25/2020 0.0  0.0 - 0.5 K/uL Final    Baso # 11/25/2020 0.01  0.00 - 0.20 K/uL Final    nRBC 11/25/2020 0  0 /100 WBC Final    Gran % 11/25/2020 82.2* 38.0 - 73.0 % Final    Lymph % 11/25/2020 14.4* 18.0 - 48.0 % Final    Mono % 11/25/2020 2.6* 4.0 - 15.0 % Final    Eosinophil % 11/25/2020 0.1  0.0 - 8.0 % Final    Basophil % 11/25/2020 0.1  0.0 - 1.9 % Final    Differential Method 11/25/2020 Automated   Final    Sodium 11/25/2020 139  136 - 145 mmol/L Final    Potassium 11/25/2020 4.9  3.5 - 5.1 mmol/L Final    Chloride 11/25/2020 96  95 - 110 mmol/L Final    CO2 11/25/2020 32* 23 - 29 mmol/L Final    Glucose 11/25/2020 266* 70 - 110 mg/dL Final    BUN 11/25/2020 17  8 - 23 mg/dL Final    Creatinine 11/25/2020 0.8  0.5 - 1.4 mg/dL Final    Calcium 11/25/2020 8.1* 8.7 - 10.5 mg/dL Final    Total Protein 11/25/2020 6.2  6.0 - 8.4 g/dL Final    Albumin 11/25/2020 3.5  3.5 - 5.2 g/dL Final    Total Bilirubin 11/25/2020 0.2  0.1 - 1.0 mg/dL Final    Alkaline Phosphatase 11/25/2020 60  55 - 135 U/L Final    AST 11/25/2020 18  10 - 40 U/L Final    ALT 11/25/2020 17  10 - 44 U/L Final    Anion Gap 11/25/2020 11  8 - 16 mmol/L Final    eGFR if African American 11/25/2020 >60.0  >60 mL/min/1.73 m^2 Final    eGFR if non African American 11/25/2020 >60.0  >60 mL/min/1.73 m^2 Final    Magnesium 11/25/2020 2.0  1.6 - 2.6 mg/dL Final    Phosphorus 11/25/2020 5.0* 2.7 - 4.5 mg/dL Final    Hemoglobin A1C 11/25/2020 8.5* 4.5 - 6.2 % Final     Estimated Avg Glucose 11/25/2020 197* 68 - 131 mg/dL Final    TSH 11/25/2020 0.710  0.340 - 5.600 uIU/mL Final    POC Glucose 11/25/2020 253* 70 - 110 Final    POC Glucose 11/25/2020 366* 70 - 110 Final    POC Glucose 11/25/2020 238* 70 - 110 Final    POC Glucose 11/25/2020 335* 70 - 110 Final    POC Glucose 11/26/2020 270* 70 - 110 Final    POC Glucose 11/26/2020 249* 70 - 110 Final    POC Glucose 11/26/2020 257* 70 - 110 Final    POC Glucose 11/26/2020 299* 70 - 110 Final    WBC 11/27/2020 11.01  3.90 - 12.70 K/uL Final    RBC 11/27/2020 4.22  4.00 - 5.40 M/uL Final    Hemoglobin 11/27/2020 11.8* 12.0 - 16.0 g/dL Final    Hematocrit 11/27/2020 37.4  37.0 - 48.5 % Final    MCV 11/27/2020 89  82 - 98 fL Final    MCH 11/27/2020 28.0  27.0 - 31.0 pg Final    MCHC 11/27/2020 31.6* 32.0 - 36.0 g/dL Final    RDW 11/27/2020 14.6* 11.5 - 14.5 % Final    Platelets 11/27/2020 382* 150 - 350 K/uL Final    MPV 11/27/2020 10.4  9.2 - 12.9 fL Final    Sodium 11/27/2020 140  136 - 145 mmol/L Final    Potassium 11/27/2020 4.1  3.5 - 5.1 mmol/L Final    Chloride 11/27/2020 97  95 - 110 mmol/L Final    CO2 11/27/2020 34* 23 - 29 mmol/L Final    Glucose 11/27/2020 185* 70 - 110 mg/dL Final    BUN 11/27/2020 25* 8 - 23 mg/dL Final    Creatinine 11/27/2020 0.7  0.5 - 1.4 mg/dL Final    Calcium 11/27/2020 8.5* 8.7 - 10.5 mg/dL Final    Anion Gap 11/27/2020 9  8 - 16 mmol/L Final    eGFR if African American 11/27/2020 >60.0  >60 mL/min/1.73 m^2 Final    eGFR if non African American 11/27/2020 >60.0  >60 mL/min/1.73 m^2 Final    POC Glucose 11/27/2020 162* 70 - 110 Final    POC Glucose 11/27/2020 286* 70 - 110 Final    POC Glucose 11/27/2020 291* 70 - 110 Final    POC Glucose 11/27/2020 256* 70 - 110 Final    WBC 11/28/2020 11.62  3.90 - 12.70 K/uL Final    RBC 11/28/2020 4.39  4.00 - 5.40 M/uL Final    Hemoglobin 11/28/2020 12.6  12.0 - 16.0 g/dL Final    Hematocrit 11/28/2020 39.6  37.0 - 48.5 %  Final    MCV 11/28/2020 90  82 - 98 fL Final    MCH 11/28/2020 28.7  27.0 - 31.0 pg Final    MCHC 11/28/2020 31.8* 32.0 - 36.0 g/dL Final    RDW 11/28/2020 14.6* 11.5 - 14.5 % Final    Platelets 11/28/2020 373* 150 - 350 K/uL Final    MPV 11/28/2020 10.4  9.2 - 12.9 fL Final    Sodium 11/28/2020 138  136 - 145 mmol/L Final    Potassium 11/28/2020 4.0  3.5 - 5.1 mmol/L Final    Chloride 11/28/2020 95  95 - 110 mmol/L Final    CO2 11/28/2020 34* 23 - 29 mmol/L Final    Glucose 11/28/2020 113* 70 - 110 mg/dL Final    BUN 11/28/2020 25* 8 - 23 mg/dL Final    Creatinine 11/28/2020 0.6  0.5 - 1.4 mg/dL Final    Calcium 11/28/2020 8.5* 8.7 - 10.5 mg/dL Final    Anion Gap 11/28/2020 9  8 - 16 mmol/L Final    eGFR if African American 11/28/2020 >60.0  >60 mL/min/1.73 m^2 Final    eGFR if non African American 11/28/2020 >60.0  >60 mL/min/1.73 m^2 Final    POC Glucose 11/28/2020 111* 70 - 110 Final    POC Glucose 11/28/2020 186* 70 - 110 Final    POC Glucose 11/28/2020 198* 70 - 110 Final    POC Glucose 11/28/2020 273* 70 - 110 Final    WBC 11/29/2020 12.45  3.90 - 12.70 K/uL Final    RBC 11/29/2020 4.58  4.00 - 5.40 M/uL Final    Hemoglobin 11/29/2020 12.5  12.0 - 16.0 g/dL Final    Hematocrit 11/29/2020 40.8  37.0 - 48.5 % Final    MCV 11/29/2020 89  82 - 98 fL Final    MCH 11/29/2020 27.3  27.0 - 31.0 pg Final    MCHC 11/29/2020 30.6* 32.0 - 36.0 g/dL Final    RDW 11/29/2020 14.6* 11.5 - 14.5 % Final    Platelets 11/29/2020 386* 150 - 350 K/uL Final    MPV 11/29/2020 10.8  9.2 - 12.9 fL Final    Sodium 11/29/2020 138  136 - 145 mmol/L Final    Potassium 11/29/2020 4.6  3.5 - 5.1 mmol/L Final    Chloride 11/29/2020 93* 95 - 110 mmol/L Final    CO2 11/29/2020 34* 23 - 29 mmol/L Final    Glucose 11/29/2020 117* 70 - 110 mg/dL Final    BUN 11/29/2020 26* 8 - 23 mg/dL Final    Creatinine 11/29/2020 0.7  0.5 - 1.4 mg/dL Final    Calcium 11/29/2020 9.0  8.7 - 10.5 mg/dL Final    Anion Gap  11/29/2020 11  8 - 16 mmol/L Final    eGFR if African American 11/29/2020 >60.0  >60 mL/min/1.73 m^2 Final    eGFR if non African American 11/29/2020 >60.0  >60 mL/min/1.73 m^2 Final    POC Glucose 11/29/2020 92  70 - 110 Final    POC Glucose 11/29/2020 239* 70 - 110 Final    POC Glucose 11/29/2020 170* 70 - 110 Final    POC Glucose 11/29/2020 271* 70 - 110 Final    POC Glucose 11/30/2020 165* 70 - 110 Final    POC Glucose 11/30/2020 205* 70 - 110 Final   Admission on 10/15/2020, Discharged on 10/17/2020   Component Date Value Ref Range Status    Blood Culture, Routine 10/15/2020 No growth after 5 days.   Final    Blood Culture, Routine 10/15/2020 No growth after 5 days.   Final    Lactate (Lactic Acid) 10/15/2020 1.8  0.5 - 1.9 mmol/L Final    SARS-CoV-2 RNA, Amplification, Qual 10/15/2020 Negative  Negative Final    BNP 10/15/2020 33  0 - 99 pg/mL Final    Influenza A, Molecular 10/15/2020 Negative  Negative Final    Influenza B, Molecular 10/15/2020 Negative  Negative Final    Flu A & B Source 10/15/2020 Nasal swab   Final    WBC 10/15/2020 7.25  3.90 - 12.70 K/uL Final    RBC 10/15/2020 4.02  4.00 - 5.40 M/uL Final    Hemoglobin 10/15/2020 11.9* 12.0 - 16.0 g/dL Final    Hematocrit 10/15/2020 36.7* 37.0 - 48.5 % Final    MCV 10/15/2020 91  82 - 98 fL Final    MCH 10/15/2020 29.6  27.0 - 31.0 pg Final    MCHC 10/15/2020 32.4  32.0 - 36.0 g/dL Final    RDW 10/15/2020 13.9  11.5 - 14.5 % Final    Platelets 10/15/2020 374* 150 - 350 K/uL Final    MPV 10/15/2020 10.1  9.2 - 12.9 fL Final    Immature Granulocytes 10/15/2020 0.6* 0.0 - 0.5 % Final    Gran # (ANC) 10/15/2020 5.9  1.8 - 7.7 K/uL Final    Immature Grans (Abs) 10/15/2020 0.04  0.00 - 0.04 K/uL Final    Lymph # 10/15/2020 1.2  1.0 - 4.8 K/uL Final    Mono # 10/15/2020 0.2* 0.3 - 1.0 K/uL Final    Eos # 10/15/2020 0.0  0.0 - 0.5 K/uL Final    Baso # 10/15/2020 0.01  0.00 - 0.20 K/uL Final    nRBC 10/15/2020 0  0 /100 WBC  Final    Gran % 10/15/2020 80.9* 38.0 - 73.0 % Final    Lymph % 10/15/2020 16.1* 18.0 - 48.0 % Final    Mono % 10/15/2020 2.3* 4.0 - 15.0 % Final    Eosinophil % 10/15/2020 0.0  0.0 - 8.0 % Final    Basophil % 10/15/2020 0.1  0.0 - 1.9 % Final    Differential Method 10/15/2020 Automated   Final    Sodium 10/15/2020 137  136 - 145 mmol/L Final    Potassium 10/15/2020 3.9  3.5 - 5.1 mmol/L Final    Chloride 10/15/2020 93* 95 - 110 mmol/L Final    CO2 10/15/2020 32* 23 - 29 mmol/L Final    Glucose 10/15/2020 222* 70 - 110 mg/dL Final    BUN 10/15/2020 13  8 - 23 mg/dL Final    Creatinine 10/15/2020 0.7  0.5 - 1.4 mg/dL Final    Calcium 10/15/2020 8.5* 8.7 - 10.5 mg/dL Final    Total Protein 10/15/2020 7.2  6.0 - 8.4 g/dL Final    Albumin 10/15/2020 3.7  3.5 - 5.2 g/dL Final    Total Bilirubin 10/15/2020 0.5  0.1 - 1.0 mg/dL Final    Alkaline Phosphatase 10/15/2020 58  55 - 135 U/L Final    AST 10/15/2020 20  10 - 40 U/L Final    ALT 10/15/2020 16  10 - 44 U/L Final    Anion Gap 10/15/2020 12  8 - 16 mmol/L Final    eGFR if African American 10/15/2020 >60.0  >60 mL/min/1.73 m^2 Final    eGFR if non African American 10/15/2020 >60.0  >60 mL/min/1.73 m^2 Final    Troponin I 10/15/2020 <0.030  <=0.040 ng/mL Final    BNP 10/15/2020 33  0 - 99 pg/mL Final    CPK MB 10/15/2020 3.5  0.1 - 6.5 ng/mL Final    CPK 10/15/2020 90  20 - 180 U/L Final    Magnesium 10/15/2020 1.6  1.6 - 2.6 mg/dL Final    Specimen UA 10/15/2020 Urine, Clean Catch   Final    Color, UA 10/15/2020 Yellow  Yellow, Straw, Edilia Final    Appearance, UA 10/15/2020 Hazy* Clear Final    pH, UA 10/15/2020 7.0  5.0 - 8.0 Final    Specific [place], UA 10/15/2020 1.010  1.005 - 1.030 Final    Protein, UA 10/15/2020 Negative  Negative Final    Glucose, UA 10/15/2020 1+* Negative Final    Ketones, UA 10/15/2020 Negative  Negative Final    Bilirubin (UA) 10/15/2020 Negative  Negative Final    Occult Blood UA 10/15/2020 Negative   Negative Final    Nitrite, UA 10/15/2020 Positive* Negative Final    Urobilinogen, UA 10/15/2020 Negative  Negative EU/dL Final    Leukocytes, UA 10/15/2020 1+* Negative Final    POC PH 10/15/2020 7.431  7.35 - 7.45 Final    POC PCO2 10/15/2020 52.8* 35 - 45 mmHg Final    POC PO2 10/15/2020 154* 80 - 100 mmHg Final    POC HCO3 10/15/2020 35.1* 24 - 28 mmol/L Final    POC BE 10/15/2020 11  -2 to 2 mmol/L Final    POC SATURATED O2 10/15/2020 99  95 - 100 % Final    POC pH Temp 10/15/2020 7.431   Final    POC pCO2 Temp 10/15/2020 52.8  mmHg Final    POC pO2 Temp 10/15/2020 154  mmHg Final    POC TCO2 10/15/2020 37* 23 - 27 mmol/L Final    Sample 10/15/2020 ARTERIAL   Final    Site 10/15/2020 RR   Final    Allens Test 10/15/2020 Pass   Final    DelSys 10/15/2020 Nasal Can   Final    Mode 10/15/2020 SPONT   Final    Flow 10/15/2020 4   Final    FiO2 10/15/2020 36   Final    POC Temp 10/15/2020 37.0 C   Final    RBC, UA 10/15/2020 11* 0 - 4 /hpf Final    WBC, UA 10/15/2020 13* 0 - 5 /hpf Final    Bacteria 10/15/2020 Many* None-Occ /hpf Final    Squam Epithel, UA 10/15/2020 3  /hpf Final    Hyaline Casts, UA 10/15/2020 0.00* 0-1/lpf /lpf Final    Microscopic Comment 10/15/2020 SEE COMMENT   Final    Urine Culture, Routine 10/15/2020 *  Final                    Value:GRAM NEGATIVE BERNARDO, LACTOSE   10,000 - 49,999 cfu/ml  No further identification  No susceptibility performed      Procalcitonin 10/16/2020 <0.05  0.00 - 0.50 ng/mL Final    POC Glucose 10/15/2020 156* 70 - 110 Final    WBC 10/16/2020 7.54  3.90 - 12.70 K/uL Final    RBC 10/16/2020 4.18  4.00 - 5.40 M/uL Final    Hemoglobin 10/16/2020 12.0  12.0 - 16.0 g/dL Final    Hematocrit 10/16/2020 37.7  37.0 - 48.5 % Final    MCV 10/16/2020 90  82 - 98 fL Final    MCH 10/16/2020 28.7  27.0 - 31.0 pg Final    MCHC 10/16/2020 31.8* 32.0 - 36.0 g/dL Final    RDW 10/16/2020 14.0  11.5 - 14.5 % Final    Platelets 10/16/2020 342   150 - 350 K/uL Final    MPV 10/16/2020 9.9  9.2 - 12.9 fL Final    Immature Granulocytes 10/16/2020 0.7* 0.0 - 0.5 % Final    Gran # (ANC) 10/16/2020 6.2  1.8 - 7.7 K/uL Final    Immature Grans (Abs) 10/16/2020 0.05* 0.00 - 0.04 K/uL Final    Lymph # 10/16/2020 1.2  1.0 - 4.8 K/uL Final    Mono # 10/16/2020 0.1* 0.3 - 1.0 K/uL Final    Eos # 10/16/2020 0.0  0.0 - 0.5 K/uL Final    Baso # 10/16/2020 0.01  0.00 - 0.20 K/uL Final    nRBC 10/16/2020 0  0 /100 WBC Final    Gran % 10/16/2020 81.6* 38.0 - 73.0 % Final    Lymph % 10/16/2020 16.4* 18.0 - 48.0 % Final    Mono % 10/16/2020 1.2* 4.0 - 15.0 % Final    Eosinophil % 10/16/2020 0.0  0.0 - 8.0 % Final    Basophil % 10/16/2020 0.1  0.0 - 1.9 % Final    Differential Method 10/16/2020 Automated   Final    Sodium 10/16/2020 138  136 - 145 mmol/L Final    Potassium 10/16/2020 3.9  3.5 - 5.1 mmol/L Final    Chloride 10/16/2020 93* 95 - 110 mmol/L Final    CO2 10/16/2020 34* 23 - 29 mmol/L Final    Glucose 10/16/2020 232* 70 - 110 mg/dL Final    BUN 10/16/2020 15  8 - 23 mg/dL Final    Creatinine 10/16/2020 0.6  0.5 - 1.4 mg/dL Final    Calcium 10/16/2020 8.3* 8.7 - 10.5 mg/dL Final    Total Protein 10/16/2020 6.8  6.0 - 8.4 g/dL Final    Albumin 10/16/2020 3.5  3.5 - 5.2 g/dL Final    Total Bilirubin 10/16/2020 0.5  0.1 - 1.0 mg/dL Final    Alkaline Phosphatase 10/16/2020 56  55 - 135 U/L Final    AST 10/16/2020 19  10 - 40 U/L Final    ALT 10/16/2020 16  10 - 44 U/L Final    Anion Gap 10/16/2020 11  8 - 16 mmol/L Final    eGFR if African American 10/16/2020 >60.0  >60 mL/min/1.73 m^2 Final    eGFR if non African American 10/16/2020 >60.0  >60 mL/min/1.73 m^2 Final    Magnesium 10/16/2020 1.8  1.6 - 2.6 mg/dL Final    TSH 10/16/2020 0.450  0.340 - 5.600 uIU/mL Final    POC Glucose 10/16/2020 194* 70 - 110 Final    POC Glucose 10/16/2020 208* 70 - 110 Final    POC Glucose 10/16/2020 294* 70 - 110 Final    WBC 10/17/2020 9.84  3.90 -  12.70 K/uL Final    RBC 10/17/2020 4.20  4.00 - 5.40 M/uL Final    Hemoglobin 10/17/2020 12.0  12.0 - 16.0 g/dL Final    Hematocrit 10/17/2020 38.7  37.0 - 48.5 % Final    MCV 10/17/2020 92  82 - 98 fL Final    MCH 10/17/2020 28.6  27.0 - 31.0 pg Final    MCHC 10/17/2020 31.0* 32.0 - 36.0 g/dL Final    RDW 10/17/2020 14.1  11.5 - 14.5 % Final    Platelets 10/17/2020 355* 150 - 350 K/uL Final    MPV 10/17/2020 10.4  9.2 - 12.9 fL Final    Immature Granulocytes 10/17/2020 0.7* 0.0 - 0.5 % Final    Gran # (ANC) 10/17/2020 7.8* 1.8 - 7.7 K/uL Final    Immature Grans (Abs) 10/17/2020 0.07* 0.00 - 0.04 K/uL Final    Lymph # 10/17/2020 1.6  1.0 - 4.8 K/uL Final    Mono # 10/17/2020 0.4  0.3 - 1.0 K/uL Final    Eos # 10/17/2020 0.0  0.0 - 0.5 K/uL Final    Baso # 10/17/2020 0.01  0.00 - 0.20 K/uL Final    nRBC 10/17/2020 0  0 /100 WBC Final    Gran % 10/17/2020 79.2* 38.0 - 73.0 % Final    Lymph % 10/17/2020 16.0* 18.0 - 48.0 % Final    Mono % 10/17/2020 4.0  4.0 - 15.0 % Final    Eosinophil % 10/17/2020 0.0  0.0 - 8.0 % Final    Basophil % 10/17/2020 0.1  0.0 - 1.9 % Final    Differential Method 10/17/2020 Automated   Final    Sodium 10/17/2020 139  136 - 145 mmol/L Final    Potassium 10/17/2020 3.9  3.5 - 5.1 mmol/L Final    Chloride 10/17/2020 96  95 - 110 mmol/L Final    CO2 10/17/2020 31* 23 - 29 mmol/L Final    Glucose 10/17/2020 198* 70 - 110 mg/dL Final    BUN 10/17/2020 15  8 - 23 mg/dL Final    Creatinine 10/17/2020 0.6  0.5 - 1.4 mg/dL Final    Calcium 10/17/2020 8.5* 8.7 - 10.5 mg/dL Final    Total Protein 10/17/2020 6.8  6.0 - 8.4 g/dL Final    Albumin 10/17/2020 3.6  3.5 - 5.2 g/dL Final    Total Bilirubin 10/17/2020 0.4  0.1 - 1.0 mg/dL Final    Alkaline Phosphatase 10/17/2020 57  55 - 135 U/L Final    AST 10/17/2020 17  10 - 40 U/L Final    ALT 10/17/2020 14  10 - 44 U/L Final    Anion Gap 10/17/2020 12  8 - 16 mmol/L Final    eGFR if African American 10/17/2020 >60.0   >60 mL/min/1.73 m^2 Final    eGFR if non African American 10/17/2020 >60.0  >60 mL/min/1.73 m^2 Final    Magnesium 10/17/2020 2.0  1.6 - 2.6 mg/dL Final    POC Glucose 10/17/2020 154* 70 - 110 Final   Admission on 10/13/2020, Discharged on 10/13/2020   Component Date Value Ref Range Status    WBC 10/13/2020 6.58  3.90 - 12.70 K/uL Final    RBC 10/13/2020 4.10  4.00 - 5.40 M/uL Final    Hemoglobin 10/13/2020 11.9* 12.0 - 16.0 g/dL Final    Hematocrit 10/13/2020 37.0  37.0 - 48.5 % Final    MCV 10/13/2020 90  82 - 98 fL Final    MCH 10/13/2020 29.0  27.0 - 31.0 pg Final    MCHC 10/13/2020 32.2  32.0 - 36.0 g/dL Final    RDW 10/13/2020 13.6  11.5 - 14.5 % Final    Platelets 10/13/2020 308  150 - 350 K/uL Final    MPV 10/13/2020 10.2  9.2 - 12.9 fL Final    Immature Granulocytes 10/13/2020 0.8* 0.0 - 0.5 % Final    Gran # (ANC) 10/13/2020 5.9  1.8 - 7.7 K/uL Final    Immature Grans (Abs) 10/13/2020 0.05* 0.00 - 0.04 K/uL Final    Lymph # 10/13/2020 0.6* 1.0 - 4.8 K/uL Final    Mono # 10/13/2020 0.1* 0.3 - 1.0 K/uL Final    Eos # 10/13/2020 0.0  0.0 - 0.5 K/uL Final    Baso # 10/13/2020 0.02  0.00 - 0.20 K/uL Final    nRBC 10/13/2020 0  0 /100 WBC Final    Gran % 10/13/2020 89.0* 38.0 - 73.0 % Final    Lymph % 10/13/2020 8.8* 18.0 - 48.0 % Final    Mono % 10/13/2020 1.1* 4.0 - 15.0 % Final    Eosinophil % 10/13/2020 0.0  0.0 - 8.0 % Final    Basophil % 10/13/2020 0.3  0.0 - 1.9 % Final    Differential Method 10/13/2020 Automated   Final    Sodium 10/13/2020 137  136 - 145 mmol/L Final    Potassium 10/13/2020 3.9  3.5 - 5.1 mmol/L Final    Chloride 10/13/2020 93* 95 - 110 mmol/L Final    CO2 10/13/2020 31* 23 - 29 mmol/L Final    Glucose 10/13/2020 326* 70 - 110 mg/dL Final    BUN 10/13/2020 13  8 - 23 mg/dL Final    Creatinine 10/13/2020 0.7  0.5 - 1.4 mg/dL Final    Calcium 10/13/2020 8.3* 8.7 - 10.5 mg/dL Final    Total Protein 10/13/2020 6.3  6.0 - 8.4 g/dL Final    Albumin  10/13/2020 3.3* 3.5 - 5.2 g/dL Final    Total Bilirubin 10/13/2020 0.2  0.1 - 1.0 mg/dL Final    Alkaline Phosphatase 10/13/2020 61  55 - 135 U/L Final    AST 10/13/2020 22  10 - 40 U/L Final    ALT 10/13/2020 15  10 - 44 U/L Final    Anion Gap 10/13/2020 13  8 - 16 mmol/L Final    eGFR if African American 10/13/2020 >60.0  >60 mL/min/1.73 m^2 Final    eGFR if non African American 10/13/2020 >60.0  >60 mL/min/1.73 m^2 Final    Troponin I 10/13/2020 <0.030  <=0.040 ng/mL Final    BNP 10/13/2020 59  0 - 99 pg/mL Final    PT 10/13/2020 12.6  10.6 - 14.8 sec Final    INR 10/13/2020 1.0   Final    D-Dimer 10/13/2020 0.57* <0.50 ug/mL FEU Final   Office Visit on 09/11/2020   Component Date Value Ref Range Status    Hemoglobin A1C 09/11/2020 8.1  % Final   Admission on 07/17/2020, Discharged on 07/17/2020   Component Date Value Ref Range Status    WBC 07/17/2020 7.63  3.90 - 12.70 K/uL Final    RBC 07/17/2020 4.10  4.00 - 5.40 M/uL Final    Hemoglobin 07/17/2020 12.1  12.0 - 16.0 g/dL Final    Hematocrit 07/17/2020 38.2  37.0 - 48.5 % Final    MCV 07/17/2020 93  82 - 98 fL Final    MCH 07/17/2020 29.5  27.0 - 31.0 pg Final    MCHC 07/17/2020 31.7* 32.0 - 36.0 g/dL Final    RDW 07/17/2020 13.9  11.5 - 14.5 % Final    Platelets 07/17/2020 290  150 - 350 K/uL Final    MPV 07/17/2020 10.2  9.2 - 12.9 fL Final    Immature Granulocytes 07/17/2020 0.4  0.0 - 0.5 % Final    Gran # (ANC) 07/17/2020 5.8  1.8 - 7.7 K/uL Final    Immature Grans (Abs) 07/17/2020 0.03  0.00 - 0.04 K/uL Final    Lymph # 07/17/2020 1.4  1.0 - 4.8 K/uL Final    Mono # 07/17/2020 0.3  0.3 - 1.0 K/uL Final    Eos # 07/17/2020 0.1  0.0 - 0.5 K/uL Final    Baso # 07/17/2020 0.02  0.00 - 0.20 K/uL Final    nRBC 07/17/2020 0  0 /100 WBC Final    Gran % 07/17/2020 75.4* 38.0 - 73.0 % Final    Lymph % 07/17/2020 18.7  18.0 - 48.0 % Final    Mono % 07/17/2020 4.5  4.0 - 15.0 % Final    Eosinophil % 07/17/2020 0.7  0.0 - 8.0 %  Final    Basophil % 07/17/2020 0.3  0.0 - 1.9 % Final    Differential Method 07/17/2020 Automated   Final    Sodium 07/17/2020 141  136 - 145 mmol/L Final    Potassium 07/17/2020 3.7  3.5 - 5.1 mmol/L Final    Chloride 07/17/2020 98  95 - 110 mmol/L Final    CO2 07/17/2020 34* 23 - 29 mmol/L Final    Glucose 07/17/2020 103  70 - 110 mg/dL Final    BUN 07/17/2020 14  8 - 23 mg/dL Final    Creatinine 07/17/2020 0.7  0.5 - 1.4 mg/dL Final    Calcium 07/17/2020 8.0* 8.7 - 10.5 mg/dL Final    Total Protein 07/17/2020 6.3  6.0 - 8.4 g/dL Final    Albumin 07/17/2020 3.3* 3.5 - 5.2 g/dL Final    Total Bilirubin 07/17/2020 0.3  0.1 - 1.0 mg/dL Final    Alkaline Phosphatase 07/17/2020 57  55 - 135 U/L Final    AST 07/17/2020 17  10 - 40 U/L Final    ALT 07/17/2020 14  10 - 44 U/L Final    Anion Gap 07/17/2020 9  8 - 16 mmol/L Final    eGFR if African American 07/17/2020 >60.0  >60 mL/min/1.73 m^2 Final    eGFR if non African American 07/17/2020 >60.0  >60 mL/min/1.73 m^2 Final    Troponin I 07/17/2020 <0.030  <=0.040 ng/mL Final    BNP 07/17/2020 20  0 - 99 pg/mL Final   No results displayed because visit has over 200 results.      Office Visit on 06/16/2020   Component Date Value Ref Range Status    Hemoglobin A1C 06/16/2020 8.4  % Final       Past Medical History:   Diagnosis Date    Allergy     Anxiety     Arthritis     Asthma     Bipolar disorder     Cancer     thyroid    Chronic back pain     COPD (chronic obstructive pulmonary disease)     Diabetes mellitus     Diabetes mellitus, type 2     Fibromyalgia     GERD (gastroesophageal reflux disease)     History of fall 4/26/2018    [place] (hard of hearing)     Hx of thyroid cancer     Hyperlipidemia     Hypertension     Hypothyroidism     Neuropathy     On home oxygen therapy     3 l/m 24/7    Restless leg syndrome     Sleep apnea     Urinary tract infection      Past Surgical History:   Procedure Laterality Date    APPENDECTOMY       BACK SURGERY      x 3    broken foot and ankle      CHOLECYSTECTOMY      EYE SURGERY Bilateral     cataract    HYSTERECTOMY      JOINT REPLACEMENT Left 09/17/2018    knee    KNEE ARTHROPLASTY Left 9/17/2018    Procedure: ARTHROPLASTY, KNEE;  Surgeon: Yariel Marin MD;  Location: [facility] OR;  Service: Orthopedics;  Laterality: Left;    MYELOGRAPHY N/A 8/7/2019    Procedure: MYELOGRAM;  Surgeon: Sun Diagnostic Provider;  Location: [facility] OR;  Service: General;  Laterality: N/A;    POSTERIOR REPAIR      SPINAL FUSION      x3 BACK, NECK X 4    TOTAL THYROIDECTOMY  2014     Family History   Problem Relation Age of Onset    Diabetes Mother     Heart disease Mother     Hypertension Mother     Diabetes Father     Heart disease Father     Hypertension Father        Marital Status:   Alcohol History:  reports no history of alcohol use.  Tobacco History:  reports that she quit smoking about 20 years ago. Her smoking use included cigarettes. She has a 30.00 pack-year smoking history. She has never used smokeless tobacco.  Drug History:  reports no history of drug use.    Review of patient's allergies indicates:   Allergen Reactions    Morphine Itching    Tubersol [tuberculin ppd] Other (See Comments)     Site swells bad. Has to have chest xray       Current Outpatient Medications:     ACCU-CHEK LIBERTY PLUS TEST STRP Strp, , Disp: , Rfl:     albuterol (VENTOLIN HFA) 90 mcg/actuation inhaler, Inhale 2 puffs into the lungs every 6 (six) hours as needed for Wheezing. Rescue, Disp: 18 g, Rfl: 3    albuterol-ipratropium (DUO-NEB) 2.5 mg-0.5 mg/3 mL nebulizer solution, Take 3 mLs by nebulization every 6 (six) hours as needed for Wheezing or Shortness of Breath. Rescue, Disp: 360 mL, Rfl: 5    ARTIFICIAL TEARS,HYPROMELLOSE, OPHT, Place 1 drop into both eyes 3 (three) times daily., Disp: , Rfl:     calcitRIOL (ROCALTROL) 0.5 MCG Cap, Take 1 capsule (0.5 mcg total) by mouth every evening., Disp: 90 capsule,  Rfl: 1    clonazePAM (KLONOPIN) 0.5 MG tablet, Take 1 tablet (0.5 mg total) by mouth 2 (two) times daily. (Patient taking differently: Take 0.5 mg by mouth 2 (two) times daily as needed. ), Disp: 180 tablet, Rfl: 0    doxepin (ZONALON) 5 % cream, Apply 1 application topically 3 (three) times daily as needed., Disp: , Rfl:     DULoxetine (CYMBALTA) 20 MG capsule, Take 20 mg by mouth once daily. , Disp: , Rfl:     FLUoxetine 40 MG capsule, Take 1 capsule (40 mg total) by mouth once daily., Disp: 90 capsule, Rfl: 1    fluticasone propionate (FLONASE) 50 mcg/actuation nasal spray, 1 spray by Each Nostril route once daily., Disp: , Rfl:     fluticasone-umeclidin-vilanter (TRELEGY ELLIPTA) 100-62.5-25 mcg DsDv, Inhale 1 puff into the lungs once daily., Disp: 3 each, Rfl: 1    gabapentin (NEURONTIN) 800 MG tablet, Take 1 tablet (800 mg total) by mouth 3 (three) times daily., Disp: 270 tablet, Rfl: 1    guaiFENesin (MUCINEX) 600 mg 12 hr tablet, Take 1 tablet (600 mg total) by mouth 2 (two) times daily., Disp: 30 tablet, Rfl: 2    hydroCHLOROthiazide (HYDRODIURIL) 12.5 MG Tab, Take 1 tablet (12.5 mg total) by mouth once daily., Disp: 30 tablet, Rfl: 11    ibuprofen (ADVIL,MOTRIN) 400 MG tablet, Take 400 mg by mouth every 8 (eight) hours as needed for Other., Disp: , Rfl:     insulin aspart U-100 (NOVOLOG) 100 unit/mL injection, Inject 2-10 Units into the skin 4 (four) times daily with meals and nightly. Sliding scale 151-400, Disp: , Rfl:     lamoTRIgine (LAMICTAL) 150 MG Tab, Take 1 tablet (150 mg total) by mouth 2 (two) times daily., Disp: 180 tablet, Rfl: 1    latanoprost 0.005 % ophthalmic solution, Place 1 drop into both eyes every evening. , Disp: , Rfl:     LEVEMIR FLEXTOUCH U-100 INSULN 100 unit/mL (3 mL) InPn pen, Inject 65 Units into the skin 2 (two) times daily. (Patient taking differently: Inject 70 Units into the skin 2 (two) times daily. ), Disp: 3 Box, Rfl: 3    levothyroxine (SYNTHROID) 150  "MCG tablet, Take 1 tablet (150 mcg total) by mouth before breakfast., Disp: 90 tablet, Rfl: 1    linaCLOtide (LINZESS) 290 mcg Cap capsule, Take 1 capsule (290 mcg total) by mouth once daily., Disp: 90 capsule, Rfl: 1    losartan (COZAAR) 50 MG tablet, Take 1 tablet (50 mg total) by mouth once daily., Disp: 90 tablet, Rfl: 3    losartan-hydrochlorothiazide 50-12.5 mg (HYZAAR) 50-12.5 mg per tablet, Take 0.5 tablets by mouth once daily., Disp: 90 tablet, Rfl: 1    meclizine (ANTIVERT) 25 mg tablet, Take 1 tablet (25 mg total) by mouth 3 (three) times daily as needed., Disp: 90 tablet, Rfl: 1    methocarbamoL (ROBAXIN) 750 MG Tab, Take 1 tablet (750 mg total) by mouth 2 (two) times daily as needed., Disp: 60 tablet, Rfl: 2    mupirocin (BACTROBAN) 2 % ointment, Apply topically 3 (three) times daily., Disp: 22 g, Rfl: 1    NOVOFINE AUTOCOVER 30 gauge x 1/3" Ndle, 1 each by In Vitro route 4 (four) times daily., Disp: 360 each, Rfl: 3    oxyCODONE (ROXICODONE) 10 mg Tab immediate release tablet, Take 10 mg by mouth 3 (three) times daily. , Disp: , Rfl:     pantoprazole (PROTONIX) 40 MG tablet, Take 1 tablet (40 mg total) by mouth once daily., Disp: 90 tablet, Rfl: 1    potassium chloride SA (K-DUR,KLOR-CON) 10 MEQ tablet, Take 1 tablet (10 mEq total) by mouth every Mon, Wed, Fri. Mon , Wed, and Fri., Disp: 90 tablet, Rfl: 1    predniSONE (DELTASONE) 10 MG tablet, Take 1 tablet (10 mg total) by mouth once daily. Take 3 tablets per day for 5 days, then take 2 tablets per day times 5 days then take 1 tablet per day for 5 days for 15 days, Disp: 30 tablet, Rfl: 0    QUEtiapine (SEROQUEL) 25 MG Tab, , Disp: , Rfl:     RELISTOR 150 mg Tab, Take 150 mg by mouth once daily., Disp: 30 tablet, Rfl: 2    rOPINIRole (REQUIP) 4 MG tablet, Take 1 tablet (4 mg total) by mouth 2 (two) times daily., Disp: 180 tablet, Rfl: 1    temazepam (RESTORIL) 15 mg Cap, Take 15 mg by mouth every evening. , Disp: , Rfl:     Review of " "Systems   Constitutional: Negative.    HENT: Negative for congestion, ear pain, sinus pressure, sinus pain, tinnitus and trouble swallowing.    Eyes: Negative for pain and redness.   Respiratory: Positive for shortness of breath and wheezing. Negative for cough and chest tightness.    Cardiovascular: Negative for chest pain and palpitations.   Gastrointestinal: Negative for abdominal pain, nausea and vomiting.   Genitourinary: Negative for dysuria, frequency and urgency.   Musculoskeletal: Positive for back pain. Negative for arthralgias and myalgias.   Skin: Negative for rash and wound.   Neurological: Negative for dizziness, weakness, light-headedness and headaches.   Psychiatric/Behavioral: Positive for sleep disturbance.          Transitional Care Note    Family and/or Caretaker present at visit?  No.  Diagnostic tests reviewed/disposition: No diagnosic tests pending after this hospitalization.  Disease/illness education: Follow-up with Pulm  Home health/community services discussion/referrals: Patient has home health established at [facility].   Establishment or re-establishment of referral orders for community resources: No other necessary community resources.   Discussion with other health care providers: No discussion with other health care providers necessary.         Objective:      Vitals:    12/11/20 1036 12/11/20 1048   BP: (!) 96/46 118/60   Pulse: 84    Weight: 98.4 kg (217 lb)    Height: 5' 4" (1.626 m)      Physical Exam  Constitutional:       Appearance: She is well-developed.   HENT:      Head: Normocephalic.      Nose: Nose normal.      Comments: Mild swelling to left septum. Some dried blood noted  Eyes:      Pupils: Pupils are equal, round, and reactive to light.   Neck:      Musculoskeletal: Normal range of motion.   Cardiovascular:      Rate and Rhythm: Normal rate and regular rhythm.   Pulmonary:      Effort: Tachypnea present.      Breath sounds: Decreased air movement present.   Abdominal:      " Palpations: Abdomen is soft.   Lymphadenopathy:      Cervical: No cervical adenopathy.   Skin:     General: Skin is warm and dry.   Neurological:      Mental Status: She is alert and oriented to person, place, and time.           Assessment:       1. Cyst of nasal cavity    2. Chronic obstructive pulmonary disease with acute exacerbation    3. Anxiety    4. Gastroesophageal reflux disease without esophagitis    5. Postoperative hypothyroidism    6. Primary osteoarthritis involving multiple joints    7. Primary insomnia    8. Mild protein-calorie malnutrition    9. Type 2 diabetes mellitus with diabetic polyneuropathy, with long-term current use of insulin    10. Essential hypertension    11. Hospital discharge follow-up         Plan:       Cyst of nasal cavity  -     Ambulatory referral/consult to ENT; Future; Expected date: 12/18/2020    Chronic obstructive pulmonary disease with acute exacerbation  -     albuterol-ipratropium (DUO-NEB) 2.5 mg-0.5 mg/3 mL nebulizer solution; Take 3 mLs by nebulization every 6 (six) hours as needed for Wheezing or Shortness of Breath. Rescue  Dispense: 360 mL; Refill: 5  -     albuterol (VENTOLIN HFA) 90 mcg/actuation inhaler; Inhale 2 puffs into the lungs every 6 (six) hours as needed for Wheezing. Rescue  Dispense: 18 g; Refill: 3    Anxiety  -     FLUoxetine 40 MG capsule; Take 1 capsule (40 mg total) by mouth once daily.  Dispense: 90 capsule; Refill: 1    Gastroesophageal reflux disease without esophagitis  -     pantoprazole (PROTONIX) 40 MG tablet; Take 1 tablet (40 mg total) by mouth once daily.  Dispense: 90 tablet; Refill: 1    Postoperative hypothyroidism  -     levothyroxine (SYNTHROID) 150 MCG tablet; Take 1 tablet (150 mcg total) by mouth before breakfast.  Dispense: 90 tablet; Refill: 1    Primary osteoarthritis involving multiple joints  -     methocarbamoL (ROBAXIN) 750 MG Tab; Take 1 tablet (750 mg total) by mouth 2 (two) times daily as needed.  Dispense: 60  tablet; Refill: 2    Primary insomnia  -     rOPINIRole (REQUIP) 4 MG tablet; Take 1 tablet (4 mg total) by mouth 2 (two) times daily.  Dispense: 180 tablet; Refill: 1    Mild protein-calorie malnutrition  -     calcitRIOL (ROCALTROL) 0.5 MCG Cap; Take 1 capsule (0.5 mcg total) by mouth every evening.  Dispense: 90 capsule; Refill: 1    Type 2 diabetes mellitus with diabetic polyneuropathy, with long-term current use of insulin  -     gabapentin (NEURONTIN) 800 MG tablet; Take 1 tablet (800 mg total) by mouth 3 (three) times daily.  Dispense: 270 tablet; Refill: 1    Essential hypertension  -     potassium chloride SA (K-DUR,KLOR-CON) 10 MEQ tablet; Take 1 tablet (10 mEq total) by mouth every Mon, Wed, Fri. Mon , Wed, and Fri.  Dispense: 90 tablet; Refill: 1    Hospital discharge follow-up  - Pt doing okay at this time. Keep follow-up with Dr. Reis. Start pulmonary rehab.     Other orders  -     meclizine (ANTIVERT) 25 mg tablet; Take 1 tablet (25 mg total) by mouth 3 (three) times daily as needed.  Dispense: 90 tablet; Refill: 1  -     RELISTOR 150 mg Tab; Take 150 mg by mouth once daily.  Dispense: 30 tablet; Refill: 2      Follow up as scheduled.

## 2020-12-14 DIAGNOSIS — K21.9 GASTROESOPHAGEAL REFLUX DISEASE WITHOUT ESOPHAGITIS: ICD-10-CM

## 2020-12-14 DIAGNOSIS — E89.0 POSTOPERATIVE HYPOTHYROIDISM: ICD-10-CM

## 2020-12-14 DIAGNOSIS — E44.1 MILD PROTEIN-CALORIE MALNUTRITION: ICD-10-CM

## 2020-12-14 RX ORDER — PANTOPRAZOLE SODIUM 40 MG/1
40 TABLET, DELAYED RELEASE ORAL DAILY
Qty: 90 TABLET | Refills: 1 | Status: SHIPPED | OUTPATIENT
Start: 2020-12-14 | End: 2021-05-05 | Stop reason: SDUPTHER

## 2020-12-14 RX ORDER — LEVOTHYROXINE SODIUM 150 UG/1
150 TABLET ORAL
Qty: 90 TABLET | Refills: 1 | Status: SHIPPED | OUTPATIENT
Start: 2020-12-14 | End: 2021-06-22 | Stop reason: SDUPTHER

## 2020-12-14 RX ORDER — CALCITRIOL 0.5 UG/1
0.5 CAPSULE ORAL NIGHTLY
Qty: 90 CAPSULE | Refills: 1 | Status: SHIPPED | OUTPATIENT
Start: 2020-12-14 | End: 2021-05-05 | Stop reason: SDUPTHER

## 2020-12-14 NOTE — TELEPHONE ENCOUNTER
----- Message from Faith Pace sent at 12/14/2020 10:09 AM CST -----  [facility] is calling for refill on calcitrial, levothyroxine, pantoprazole   [facility] pharmacy   [phone]

## 2020-12-28 ENCOUNTER — TELEPHONE (OUTPATIENT)
Dept: FAMILY MEDICINE | Facility: CLINIC | Age: 70
End: 2020-12-28

## 2020-12-28 NOTE — TELEPHONE ENCOUNTER
----- Message from Ruby Ahn sent at 12/28/2020 11:10 AM CST -----  [facility] calling to fu on a request for prescription and office notes. To support the pt's request for [facility] diabetic supplies    Cb # [phone]  Fax # [phone]

## 2020-12-28 NOTE — TELEPHONE ENCOUNTER
Spoke to [facility] they need Last OV and rx for brandon freestyle. The rx needs to say Abbot frestyle. That she test 4 times daily, and injects at least 3x daily. This is just an FYI. Please send back to me im waiting on the Fax for the RX. [facility] states they need it a particular way so we need the faxed form.

## 2020-12-29 DIAGNOSIS — Z79.4 TYPE 2 DIABETES MELLITUS WITH DIABETIC POLYNEUROPATHY, WITH LONG-TERM CURRENT USE OF INSULIN: ICD-10-CM

## 2020-12-29 DIAGNOSIS — E11.42 TYPE 2 DIABETES MELLITUS WITH DIABETIC POLYNEUROPATHY, WITH LONG-TERM CURRENT USE OF INSULIN: ICD-10-CM

## 2020-12-29 RX ORDER — INSULIN DETEMIR 100 [IU]/ML
70 INJECTION, SOLUTION SUBCUTANEOUS 2 TIMES DAILY
Qty: 3 BOX | Refills: 5 | Status: SHIPPED | OUTPATIENT
Start: 2020-12-29 | End: 2021-06-22 | Stop reason: SDUPTHER

## 2020-12-29 NOTE — TELEPHONE ENCOUNTER
----- Message from Remington Westfall sent at 12/29/2020 10:21 AM CST -----  Regarding: refills  [place]   Pharm [facility]   [phone]

## 2020-12-31 ENCOUNTER — TELEPHONE (OUTPATIENT)
Dept: FAMILY MEDICINE | Facility: CLINIC | Age: 70
End: 2020-12-31

## 2020-12-31 DIAGNOSIS — J44.1 COPD EXACERBATION: Primary | ICD-10-CM

## 2020-12-31 NOTE — TELEPHONE ENCOUNTER
----- Message from Ruby Ahn sent at 12/31/2020 10:40 AM CST -----  Lisandro with [facility] calling to report the pt is changing insurance so they will need to discharge her and readmit her under the new insurance.  Requesting new orders, clinic notes and med list faxed. Fax [phone]  # [phone]

## 2021-01-04 DIAGNOSIS — Z79.4 TYPE 2 DIABETES MELLITUS WITH HYPEROSMOLARITY WITHOUT COMA, WITH LONG-TERM CURRENT USE OF INSULIN: Primary | ICD-10-CM

## 2021-01-04 DIAGNOSIS — E11.00 TYPE 2 DIABETES MELLITUS WITH HYPEROSMOLARITY WITHOUT COMA, WITH LONG-TERM CURRENT USE OF INSULIN: Primary | ICD-10-CM

## 2021-01-04 RX ORDER — INSULIN PUMP SYRINGE, 3 ML
EACH MISCELLANEOUS
Qty: 1 EACH | Refills: 0 | Status: SHIPPED | OUTPATIENT
Start: 2021-01-04 | End: 2021-01-14

## 2021-01-05 ENCOUNTER — TELEPHONE (OUTPATIENT)
Dept: FAMILY MEDICINE | Facility: CLINIC | Age: 71
End: 2021-01-05

## 2021-01-06 ENCOUNTER — DOCUMENT SCAN (OUTPATIENT)
Dept: HOME HEALTH SERVICES | Facility: HOSPITAL | Age: 71
End: 2021-01-06

## 2021-01-06 ENCOUNTER — TELEPHONE (OUTPATIENT)
Dept: FAMILY MEDICINE | Facility: CLINIC | Age: 71
End: 2021-01-06

## 2021-01-06 DIAGNOSIS — E11.42 TYPE 2 DIABETES MELLITUS WITH DIABETIC POLYNEUROPATHY, WITH LONG-TERM CURRENT USE OF INSULIN: Primary | ICD-10-CM

## 2021-01-06 DIAGNOSIS — Z79.4 TYPE 2 DIABETES MELLITUS WITH DIABETIC POLYNEUROPATHY, WITH LONG-TERM CURRENT USE OF INSULIN: Primary | ICD-10-CM

## 2021-01-06 RX ORDER — LANCETS
EACH MISCELLANEOUS
Qty: 200 EACH | Refills: 4 | Status: SHIPPED | OUTPATIENT
Start: 2021-01-06 | End: 2021-01-14

## 2021-01-06 RX ORDER — INSULIN PUMP SYRINGE, 3 ML
EACH MISCELLANEOUS
Qty: 1 EACH | Refills: 0 | Status: SHIPPED | OUTPATIENT
Start: 2021-01-06 | End: 2021-01-14

## 2021-01-12 ENCOUNTER — TELEPHONE (OUTPATIENT)
Dept: FAMILY MEDICINE | Facility: CLINIC | Age: 71
End: 2021-01-12

## 2021-01-12 ENCOUNTER — TELEPHONE (OUTPATIENT)
Dept: CARDIAC REHAB | Facility: HOSPITAL | Age: 71
End: 2021-01-12

## 2021-01-13 ENCOUNTER — EXTERNAL HOME HEALTH (OUTPATIENT)
Dept: HOME HEALTH SERVICES | Facility: HOSPITAL | Age: 71
End: 2021-01-13

## 2021-01-13 ENCOUNTER — DOCUMENT SCAN (OUTPATIENT)
Dept: HOME HEALTH SERVICES | Facility: HOSPITAL | Age: 71
End: 2021-01-13

## 2021-01-13 ENCOUNTER — CLINICAL SUPPORT (OUTPATIENT)
Dept: CARDIAC REHAB | Facility: HOSPITAL | Age: 71
End: 2021-01-13
Attending: INTERNAL MEDICINE
Payer: MEDICAID

## 2021-01-13 DIAGNOSIS — J44.9 COPD (CHRONIC OBSTRUCTIVE PULMONARY DISEASE): ICD-10-CM

## 2021-01-13 PROCEDURE — G0424 PULMONARY REHAB W EXER: HCPCS

## 2021-01-14 ENCOUNTER — OFFICE VISIT (OUTPATIENT)
Dept: FAMILY MEDICINE | Facility: CLINIC | Age: 71
End: 2021-01-14
Payer: MEDICARE

## 2021-01-14 VITALS
SYSTOLIC BLOOD PRESSURE: 138 MMHG | WEIGHT: 213 LBS | DIASTOLIC BLOOD PRESSURE: 74 MMHG | OXYGEN SATURATION: 85 % | BODY MASS INDEX: 36.37 KG/M2 | HEIGHT: 64 IN | HEART RATE: 84 BPM

## 2021-01-14 DIAGNOSIS — E11.42 TYPE 2 DIABETES MELLITUS WITH DIABETIC POLYNEUROPATHY, WITH LONG-TERM CURRENT USE OF INSULIN: Primary | ICD-10-CM

## 2021-01-14 DIAGNOSIS — I10 ESSENTIAL HYPERTENSION: ICD-10-CM

## 2021-01-14 DIAGNOSIS — Z79.4 TYPE 2 DIABETES MELLITUS WITH DIABETIC POLYNEUROPATHY, WITH LONG-TERM CURRENT USE OF INSULIN: Primary | ICD-10-CM

## 2021-01-14 DIAGNOSIS — E78.2 MIXED HYPERLIPIDEMIA: ICD-10-CM

## 2021-01-14 DIAGNOSIS — J44.9 CHRONIC OBSTRUCTIVE PULMONARY DISEASE, UNSPECIFIED COPD TYPE: ICD-10-CM

## 2021-01-14 PROCEDURE — 3008F BODY MASS INDEX DOCD: CPT | Mod: S$GLB,,, | Performed by: NURSE PRACTITIONER

## 2021-01-14 PROCEDURE — 1157F ADVNC CARE PLAN IN RCRD: CPT | Mod: S$GLB,,, | Performed by: NURSE PRACTITIONER

## 2021-01-14 PROCEDURE — 99213 OFFICE O/P EST LOW 20 MIN: CPT | Mod: S$GLB,,, | Performed by: NURSE PRACTITIONER

## 2021-01-14 PROCEDURE — 3075F PR MOST RECENT SYSTOLIC BLOOD PRESS GE 130-139MM HG: ICD-10-PCS | Mod: S$GLB,,, | Performed by: NURSE PRACTITIONER

## 2021-01-14 PROCEDURE — 3052F PR MOST RECENT HEMOGLOBIN A1C LEVEL 8.0 - < 9.0%: ICD-10-PCS | Mod: S$GLB,,, | Performed by: NURSE PRACTITIONER

## 2021-01-14 PROCEDURE — 3052F HG A1C>EQUAL 8.0%<EQUAL 9.0%: CPT | Mod: S$GLB,,, | Performed by: NURSE PRACTITIONER

## 2021-01-14 PROCEDURE — 99213 PR OFFICE/OUTPT VISIT, EST, LEVL III, 20-29 MIN: ICD-10-PCS | Mod: S$GLB,,, | Performed by: NURSE PRACTITIONER

## 2021-01-14 PROCEDURE — 3008F PR BODY MASS INDEX (BMI) DOCUMENTED: ICD-10-PCS | Mod: S$GLB,,, | Performed by: NURSE PRACTITIONER

## 2021-01-14 PROCEDURE — 1159F MED LIST DOCD IN RCRD: CPT | Mod: S$GLB,,, | Performed by: NURSE PRACTITIONER

## 2021-01-14 PROCEDURE — 1159F PR MEDICATION LIST DOCUMENTED IN MEDICAL RECORD: ICD-10-PCS | Mod: S$GLB,,, | Performed by: NURSE PRACTITIONER

## 2021-01-14 PROCEDURE — 3075F SYST BP GE 130 - 139MM HG: CPT | Mod: S$GLB,,, | Performed by: NURSE PRACTITIONER

## 2021-01-14 PROCEDURE — 1157F PR ADVANCE CARE PLAN OR EQUIV PRESENT IN MEDICAL RECORD: ICD-10-PCS | Mod: S$GLB,,, | Performed by: NURSE PRACTITIONER

## 2021-01-14 PROCEDURE — 3078F DIAST BP <80 MM HG: CPT | Mod: S$GLB,,, | Performed by: NURSE PRACTITIONER

## 2021-01-14 PROCEDURE — 3078F PR MOST RECENT DIASTOLIC BLOOD PRESSURE < 80 MM HG: ICD-10-PCS | Mod: S$GLB,,, | Performed by: NURSE PRACTITIONER

## 2021-01-14 RX ORDER — LANCETS
EACH MISCELLANEOUS
Qty: 400 EACH | Refills: 2 | Status: SHIPPED | OUTPATIENT
Start: 2021-01-14

## 2021-01-14 RX ORDER — INSULIN PUMP SYRINGE, 3 ML
EACH MISCELLANEOUS
Qty: 1 EACH | Refills: 0 | Status: SHIPPED | OUTPATIENT
Start: 2021-01-14 | End: 2022-01-14

## 2021-01-15 ENCOUNTER — TELEPHONE (OUTPATIENT)
Dept: FAMILY MEDICINE | Facility: CLINIC | Age: 71
End: 2021-01-15

## 2021-01-15 LAB
ALBUMIN SERPL-MCNC: 3.7 G/DL (ref 3.6–5.1)
ALBUMIN/GLOB SERPL: 1.3 (CALC) (ref 1–2.5)
ALP SERPL-CCNC: 66 U/L (ref 37–153)
ALT SERPL-CCNC: 9 U/L (ref 6–29)
AST SERPL-CCNC: 15 U/L (ref 10–35)
BASOPHILS # BLD AUTO: 27 CELLS/UL (ref 0–200)
BASOPHILS NFR BLD AUTO: 0.4 %
BILIRUB SERPL-MCNC: 0.3 MG/DL (ref 0.2–1.2)
BUN SERPL-MCNC: 13 MG/DL (ref 7–25)
BUN/CREAT SERPL: ABNORMAL (CALC) (ref 6–22)
CALCIUM SERPL-MCNC: 8.6 MG/DL (ref 8.6–10.4)
CHLORIDE SERPL-SCNC: 92 MMOL/L (ref 98–110)
CHOLEST SERPL-MCNC: 198 MG/DL
CHOLEST/HDLC SERPL: 3.3 (CALC)
CO2 SERPL-SCNC: 40 MMOL/L (ref 20–32)
CREAT SERPL-MCNC: 0.71 MG/DL (ref 0.6–0.93)
EOSINOPHIL # BLD AUTO: 61 CELLS/UL (ref 15–500)
EOSINOPHIL NFR BLD AUTO: 0.9 %
ERYTHROCYTE [DISTWIDTH] IN BLOOD BY AUTOMATED COUNT: 14.6 % (ref 11–15)
GFRSERPLBLD MDRD-ARVRAT: 86 ML/MIN/1.73M2
GLOBULIN SER CALC-MCNC: 2.8 G/DL (CALC) (ref 1.9–3.7)
GLUCOSE SERPL-MCNC: 123 MG/DL (ref 65–99)
HBA1C MFR BLD: 9.3 % OF TOTAL HGB
HCT VFR BLD AUTO: 33.1 % (ref 35–45)
HDLC SERPL-MCNC: 60 MG/DL
HGB BLD-MCNC: 10.8 G/DL (ref 11.7–15.5)
LDLC SERPL CALC-MCNC: 112 MG/DL (CALC)
LYMPHOCYTES # BLD AUTO: 2142 CELLS/UL (ref 850–3900)
LYMPHOCYTES NFR BLD AUTO: 31.5 %
MCH RBC QN AUTO: 27.7 PG (ref 27–33)
MCHC RBC AUTO-ENTMCNC: 32.6 G/DL (ref 32–36)
MCV RBC AUTO: 84.9 FL (ref 80–100)
MONOCYTES # BLD AUTO: 428 CELLS/UL (ref 200–950)
MONOCYTES NFR BLD AUTO: 6.3 %
NEUTROPHILS # BLD AUTO: 4141 CELLS/UL (ref 1500–7800)
NEUTROPHILS NFR BLD AUTO: 60.9 %
NONHDLC SERPL-MCNC: 138 MG/DL (CALC)
PLATELET # BLD AUTO: 340 THOUSAND/UL (ref 140–400)
PMV BLD REES-ECKER: 10.7 FL (ref 7.5–12.5)
POTASSIUM SERPL-SCNC: 3.8 MMOL/L (ref 3.5–5.3)
PROT SERPL-MCNC: 6.5 G/DL (ref 6.1–8.1)
RBC # BLD AUTO: 3.9 MILLION/UL (ref 3.8–5.1)
SODIUM SERPL-SCNC: 139 MMOL/L (ref 135–146)
TRIGL SERPL-MCNC: 147 MG/DL
TSH SERPL-ACNC: 2.11 MIU/L (ref 0.4–4.5)
WBC # BLD AUTO: 6.8 THOUSAND/UL (ref 3.8–10.8)

## 2021-01-22 DIAGNOSIS — J44.1 COPD EXACERBATION: ICD-10-CM

## 2021-01-25 ENCOUNTER — TELEPHONE (OUTPATIENT)
Dept: FAMILY MEDICINE | Facility: CLINIC | Age: 71
End: 2021-01-25

## 2021-01-26 ENCOUNTER — EXTERNAL HOME HEALTH (OUTPATIENT)
Dept: HOME HEALTH SERVICES | Facility: HOSPITAL | Age: 71
End: 2021-01-26

## 2021-01-27 ENCOUNTER — TELEPHONE (OUTPATIENT)
Dept: FAMILY MEDICINE | Facility: CLINIC | Age: 71
End: 2021-01-27

## 2021-01-28 ENCOUNTER — DOCUMENT SCAN (OUTPATIENT)
Dept: HOME HEALTH SERVICES | Facility: HOSPITAL | Age: 71
End: 2021-01-28

## 2021-01-29 ENCOUNTER — EXTERNAL HOME HEALTH (OUTPATIENT)
Dept: HOME HEALTH SERVICES | Facility: HOSPITAL | Age: 71
End: 2021-01-29

## 2021-02-02 ENCOUNTER — DOCUMENT SCAN (OUTPATIENT)
Dept: HOME HEALTH SERVICES | Facility: HOSPITAL | Age: 71
End: 2021-02-02

## 2021-02-03 DIAGNOSIS — F41.9 ANXIETY: ICD-10-CM

## 2021-02-03 RX ORDER — CLONAZEPAM 0.5 MG/1
0.5 TABLET ORAL 2 TIMES DAILY
Qty: 180 TABLET | Refills: 1 | Status: SHIPPED | OUTPATIENT
Start: 2021-02-03 | End: 2021-06-22 | Stop reason: SDUPTHER

## 2021-02-03 RX ORDER — GABAPENTIN 100 MG/1
100 CAPSULE ORAL 3 TIMES DAILY
Qty: 270 CAPSULE | Refills: 1 | Status: SHIPPED | OUTPATIENT
Start: 2021-02-03 | End: 2021-05-04

## 2021-02-03 RX ORDER — PREDNISONE 5 MG/1
10 TABLET ORAL DAILY
Qty: 90 TABLET | Refills: 1 | Status: SHIPPED | OUTPATIENT
Start: 2021-02-03 | End: 2021-06-22 | Stop reason: SDUPTHER

## 2021-02-05 ENCOUNTER — DOCUMENT SCAN (OUTPATIENT)
Dept: HOME HEALTH SERVICES | Facility: HOSPITAL | Age: 71
End: 2021-02-05

## 2021-02-05 ENCOUNTER — HOSPITAL ENCOUNTER (OUTPATIENT)
Facility: HOSPITAL | Age: 71
Discharge: HOME | End: 2021-02-08
Attending: EMERGENCY MEDICINE | Admitting: INTERNAL MEDICINE
Payer: MEDICAID

## 2021-02-05 DIAGNOSIS — J44.89 ASTHMA-COPD OVERLAP SYNDROME: ICD-10-CM

## 2021-02-05 DIAGNOSIS — J44.1 COPD EXACERBATION: Primary | ICD-10-CM

## 2021-02-05 PROBLEM — J96.20 ACUTE ON CHRONIC RESPIRATORY FAILURE: Status: ACTIVE | Noted: 2020-03-19

## 2021-02-05 LAB
ALBUMIN SERPL BCP-MCNC: 2.9 G/DL (ref 3.5–5.2)
ALP SERPL-CCNC: 59 U/L (ref 55–135)
ALT SERPL W/O P-5'-P-CCNC: 11 U/L (ref 10–44)
ANION GAP SERPL CALC-SCNC: 10 MMOL/L (ref 8–16)
AST SERPL-CCNC: 13 U/L (ref 10–40)
BASOPHILS # BLD AUTO: 0.01 K/UL (ref 0–0.2)
BASOPHILS NFR BLD: 0.1 % (ref 0–1.9)
BILIRUB SERPL-MCNC: 0.1 MG/DL (ref 0.1–1)
BNP SERPL-MCNC: 21 PG/ML (ref 0–99)
BUN SERPL-MCNC: 12 MG/DL (ref 8–23)
CALCIUM SERPL-MCNC: 7.6 MG/DL (ref 8.7–10.5)
CHLORIDE SERPL-SCNC: 97 MMOL/L (ref 95–110)
CO2 SERPL-SCNC: 34 MMOL/L (ref 23–29)
CREAT SERPL-MCNC: 0.7 MG/DL (ref 0.5–1.4)
DIFFERENTIAL METHOD: ABNORMAL
EOSINOPHIL # BLD AUTO: 0 K/UL (ref 0–0.5)
EOSINOPHIL NFR BLD: 0 % (ref 0–8)
ERYTHROCYTE [DISTWIDTH] IN BLOOD BY AUTOMATED COUNT: 15.2 % (ref 11.5–14.5)
EST. GFR  (AFRICAN AMERICAN): >60 ML/MIN/1.73 M^2
EST. GFR  (NON AFRICAN AMERICAN): >60 ML/MIN/1.73 M^2
GLUCOSE SERPL-MCNC: 304 MG/DL (ref 70–110)
HCT VFR BLD AUTO: 32.5 % (ref 37–48.5)
HGB BLD-MCNC: 9.6 G/DL (ref 12–16)
IMM GRANULOCYTES # BLD AUTO: 0.02 K/UL (ref 0–0.04)
IMM GRANULOCYTES NFR BLD AUTO: 0.3 % (ref 0–0.5)
LYMPHOCYTES # BLD AUTO: 0.5 K/UL (ref 1–4.8)
LYMPHOCYTES NFR BLD: 7.4 % (ref 18–48)
MCH RBC QN AUTO: 26.4 PG (ref 27–31)
MCHC RBC AUTO-ENTMCNC: 29.5 G/DL (ref 32–36)
MCV RBC AUTO: 89 FL (ref 82–98)
MONOCYTES # BLD AUTO: 0.2 K/UL (ref 0.3–1)
MONOCYTES NFR BLD: 2.5 % (ref 4–15)
NEUTROPHILS # BLD AUTO: 6.5 K/UL (ref 1.8–7.7)
NEUTROPHILS NFR BLD: 89.7 % (ref 38–73)
NRBC BLD-RTO: 0 /100 WBC
PLATELET # BLD AUTO: 281 K/UL (ref 150–350)
PMV BLD AUTO: 10.8 FL (ref 9.2–12.9)
POTASSIUM SERPL-SCNC: 4 MMOL/L (ref 3.5–5.1)
PROT SERPL-MCNC: 6.2 G/DL (ref 6–8.4)
RBC # BLD AUTO: 3.64 M/UL (ref 4–5.4)
SARS-COV-2 RDRP RESP QL NAA+PROBE: NEGATIVE
SODIUM SERPL-SCNC: 141 MMOL/L (ref 136–145)
WBC # BLD AUTO: 7.19 K/UL (ref 3.9–12.7)

## 2021-02-05 PROCEDURE — 94760 N-INVAS EAR/PLS OXIMETRY 1: CPT

## 2021-02-05 PROCEDURE — 94761 N-INVAS EAR/PLS OXIMETRY MLT: CPT

## 2021-02-05 PROCEDURE — 96372 THER/PROPH/DIAG INJ SC/IM: CPT | Mod: 59

## 2021-02-05 PROCEDURE — 36415 COLL VENOUS BLD VENIPUNCTURE: CPT

## 2021-02-05 PROCEDURE — 63600175 PHARM REV CODE 636 W HCPCS: Performed by: NURSE PRACTITIONER

## 2021-02-05 PROCEDURE — 85025 COMPLETE CBC W/AUTO DIFF WBC: CPT

## 2021-02-05 PROCEDURE — 27000221 HC OXYGEN, UP TO 24 HOURS

## 2021-02-05 PROCEDURE — 83880 ASSAY OF NATRIURETIC PEPTIDE: CPT

## 2021-02-05 PROCEDURE — G0378 HOSPITAL OBSERVATION PER HR: HCPCS

## 2021-02-05 PROCEDURE — U0002 COVID-19 LAB TEST NON-CDC: HCPCS

## 2021-02-05 PROCEDURE — 94640 AIRWAY INHALATION TREATMENT: CPT

## 2021-02-05 PROCEDURE — 99285 EMERGENCY DEPT VISIT HI MDM: CPT | Mod: 25

## 2021-02-05 PROCEDURE — 80053 COMPREHEN METABOLIC PANEL: CPT

## 2021-02-05 PROCEDURE — 25000003 PHARM REV CODE 250: Performed by: NURSE PRACTITIONER

## 2021-02-05 PROCEDURE — 25000242 PHARM REV CODE 250 ALT 637 W/ HCPCS: Performed by: EMERGENCY MEDICINE

## 2021-02-05 PROCEDURE — 94640 AIRWAY INHALATION TREATMENT: CPT | Mod: 76

## 2021-02-05 RX ORDER — FLUOXETINE HYDROCHLORIDE 20 MG/1
40 CAPSULE ORAL DAILY
Status: DISCONTINUED | OUTPATIENT
Start: 2021-02-06 | End: 2021-02-08 | Stop reason: HOSPADM

## 2021-02-05 RX ORDER — LOSARTAN POTASSIUM 25 MG/1
50 TABLET ORAL DAILY
Status: DISCONTINUED | OUTPATIENT
Start: 2021-02-06 | End: 2021-02-08 | Stop reason: HOSPADM

## 2021-02-05 RX ORDER — HYDROCHLOROTHIAZIDE 12.5 MG/1
12.5 TABLET ORAL DAILY
Status: DISCONTINUED | OUTPATIENT
Start: 2021-02-06 | End: 2021-02-08 | Stop reason: HOSPADM

## 2021-02-05 RX ORDER — IBUPROFEN 200 MG
16 TABLET ORAL
Status: DISCONTINUED | OUTPATIENT
Start: 2021-02-05 | End: 2021-02-08 | Stop reason: HOSPADM

## 2021-02-05 RX ORDER — DULOXETIN HYDROCHLORIDE 20 MG/1
20 CAPSULE, DELAYED RELEASE ORAL DAILY
Status: DISCONTINUED | OUTPATIENT
Start: 2021-02-06 | End: 2021-02-05

## 2021-02-05 RX ORDER — INSULIN ASPART 100 [IU]/ML
1-10 INJECTION, SOLUTION INTRAVENOUS; SUBCUTANEOUS
Status: DISCONTINUED | OUTPATIENT
Start: 2021-02-05 | End: 2021-02-08 | Stop reason: HOSPADM

## 2021-02-05 RX ORDER — LEVOTHYROXINE SODIUM 150 UG/1
150 TABLET ORAL
Status: DISCONTINUED | OUTPATIENT
Start: 2021-02-06 | End: 2021-02-08 | Stop reason: HOSPADM

## 2021-02-05 RX ORDER — ROPINIROLE 1 MG/1
4 TABLET, FILM COATED ORAL 2 TIMES DAILY
Status: DISCONTINUED | OUTPATIENT
Start: 2021-02-05 | End: 2021-02-08 | Stop reason: HOSPADM

## 2021-02-05 RX ORDER — ONDANSETRON 8 MG/1
8 TABLET, ORALLY DISINTEGRATING ORAL EVERY 8 HOURS PRN
Status: DISCONTINUED | OUTPATIENT
Start: 2021-02-05 | End: 2021-02-08 | Stop reason: HOSPADM

## 2021-02-05 RX ORDER — GABAPENTIN 100 MG/1
100 CAPSULE ORAL 3 TIMES DAILY
Status: DISCONTINUED | OUTPATIENT
Start: 2021-02-05 | End: 2021-02-06

## 2021-02-05 RX ORDER — ACETAMINOPHEN 325 MG/1
650 TABLET ORAL EVERY 8 HOURS PRN
Status: DISCONTINUED | OUTPATIENT
Start: 2021-02-05 | End: 2021-02-08 | Stop reason: HOSPADM

## 2021-02-05 RX ORDER — FLUTICASONE FUROATE AND VILANTEROL 100; 25 UG/1; UG/1
1 POWDER RESPIRATORY (INHALATION) DAILY
Status: DISCONTINUED | OUTPATIENT
Start: 2021-02-06 | End: 2021-02-08 | Stop reason: HOSPADM

## 2021-02-05 RX ORDER — ENOXAPARIN SODIUM 100 MG/ML
40 INJECTION SUBCUTANEOUS EVERY 24 HOURS
Status: DISCONTINUED | OUTPATIENT
Start: 2021-02-05 | End: 2021-02-08 | Stop reason: HOSPADM

## 2021-02-05 RX ORDER — CLONAZEPAM 0.5 MG/1
0.5 TABLET ORAL 2 TIMES DAILY
Status: DISCONTINUED | OUTPATIENT
Start: 2021-02-05 | End: 2021-02-08 | Stop reason: HOSPADM

## 2021-02-05 RX ORDER — IBUPROFEN 200 MG
24 TABLET ORAL
Status: DISCONTINUED | OUTPATIENT
Start: 2021-02-05 | End: 2021-02-08 | Stop reason: HOSPADM

## 2021-02-05 RX ORDER — ACETAMINOPHEN 325 MG/1
650 TABLET ORAL EVERY 4 HOURS PRN
Status: DISCONTINUED | OUTPATIENT
Start: 2021-02-05 | End: 2021-02-08 | Stop reason: HOSPADM

## 2021-02-05 RX ORDER — TALC
6 POWDER (GRAM) TOPICAL NIGHTLY PRN
Status: DISCONTINUED | OUTPATIENT
Start: 2021-02-05 | End: 2021-02-08 | Stop reason: HOSPADM

## 2021-02-05 RX ORDER — ALBUTEROL SULFATE 2.5 MG/.5ML
10 SOLUTION RESPIRATORY (INHALATION)
Status: COMPLETED | OUTPATIENT
Start: 2021-02-05 | End: 2021-02-05

## 2021-02-05 RX ORDER — IPRATROPIUM BROMIDE AND ALBUTEROL SULFATE 2.5; .5 MG/3ML; MG/3ML
3 SOLUTION RESPIRATORY (INHALATION)
Status: DISCONTINUED | OUTPATIENT
Start: 2021-02-06 | End: 2021-02-05 | Stop reason: SDUPTHER

## 2021-02-05 RX ORDER — IPRATROPIUM BROMIDE 0.5 MG/2.5ML
0.5 SOLUTION RESPIRATORY (INHALATION)
Status: COMPLETED | OUTPATIENT
Start: 2021-02-05 | End: 2021-02-05

## 2021-02-05 RX ORDER — PREDNISONE 20 MG/1
40 TABLET ORAL DAILY
Status: DISCONTINUED | OUTPATIENT
Start: 2021-02-06 | End: 2021-02-06

## 2021-02-05 RX ORDER — IPRATROPIUM BROMIDE AND ALBUTEROL SULFATE 2.5; .5 MG/3ML; MG/3ML
3 SOLUTION RESPIRATORY (INHALATION) EVERY 4 HOURS
Status: DISCONTINUED | OUTPATIENT
Start: 2021-02-06 | End: 2021-02-08 | Stop reason: HOSPADM

## 2021-02-05 RX ORDER — PANTOPRAZOLE SODIUM 40 MG/1
40 TABLET, DELAYED RELEASE ORAL DAILY
Status: DISCONTINUED | OUTPATIENT
Start: 2021-02-06 | End: 2021-02-08 | Stop reason: HOSPADM

## 2021-02-05 RX ORDER — GLUCAGON 1 MG
1 KIT INJECTION
Status: DISCONTINUED | OUTPATIENT
Start: 2021-02-05 | End: 2021-02-08 | Stop reason: HOSPADM

## 2021-02-05 RX ORDER — SODIUM CHLORIDE 0.9 % (FLUSH) 0.9 %
3 SYRINGE (ML) INJECTION
Status: DISCONTINUED | OUTPATIENT
Start: 2021-02-05 | End: 2021-02-08 | Stop reason: HOSPADM

## 2021-02-05 RX ORDER — GUAIFENESIN 600 MG/1
600 TABLET, EXTENDED RELEASE ORAL 2 TIMES DAILY
Status: DISCONTINUED | OUTPATIENT
Start: 2021-02-05 | End: 2021-02-08 | Stop reason: HOSPADM

## 2021-02-05 RX ADMIN — ALBUTEROL SULFATE 10 MG: 2.5 SOLUTION RESPIRATORY (INHALATION) at 06:02

## 2021-02-05 RX ADMIN — ENOXAPARIN SODIUM 40 MG: 40 INJECTION SUBCUTANEOUS at 10:02

## 2021-02-05 RX ADMIN — CLONAZEPAM 0.5 MG: 0.5 TABLET ORAL at 10:02

## 2021-02-05 RX ADMIN — GABAPENTIN 100 MG: 100 CAPSULE ORAL at 10:02

## 2021-02-05 RX ADMIN — ROPINIROLE HYDROCHLORIDE 4 MG: 1 TABLET, FILM COATED ORAL at 10:02

## 2021-02-05 RX ADMIN — IPRATROPIUM BROMIDE AND ALBUTEROL SULFATE 3 ML: .5; 2.5 SOLUTION RESPIRATORY (INHALATION) at 11:02

## 2021-02-05 RX ADMIN — IPRATROPIUM BROMIDE 0.5 MG: 0.5 SOLUTION RESPIRATORY (INHALATION) at 06:02

## 2021-02-05 RX ADMIN — GUAIFENESIN 600 MG: 600 TABLET, EXTENDED RELEASE ORAL at 10:02

## 2021-02-06 PROBLEM — J96.11 CHRONIC RESPIRATORY FAILURE WITH HYPOXIA: Status: ACTIVE | Noted: 2020-03-19

## 2021-02-06 LAB
ANION GAP SERPL CALC-SCNC: 11 MMOL/L (ref 8–16)
BASOPHILS # BLD AUTO: 0.01 K/UL (ref 0–0.2)
BASOPHILS NFR BLD: 0.1 % (ref 0–1.9)
BUN SERPL-MCNC: 11 MG/DL (ref 8–23)
CALCIUM SERPL-MCNC: 7.7 MG/DL (ref 8.7–10.5)
CHLORIDE SERPL-SCNC: 97 MMOL/L (ref 95–110)
CO2 SERPL-SCNC: 34 MMOL/L (ref 23–29)
CREAT SERPL-MCNC: 0.6 MG/DL (ref 0.5–1.4)
DIFFERENTIAL METHOD: ABNORMAL
EOSINOPHIL # BLD AUTO: 0 K/UL (ref 0–0.5)
EOSINOPHIL NFR BLD: 0 % (ref 0–8)
ERYTHROCYTE [DISTWIDTH] IN BLOOD BY AUTOMATED COUNT: 15.6 % (ref 11.5–14.5)
EST. GFR  (AFRICAN AMERICAN): >60 ML/MIN/1.73 M^2
EST. GFR  (NON AFRICAN AMERICAN): >60 ML/MIN/1.73 M^2
GLUCOSE SERPL-MCNC: 260 MG/DL (ref 70–110)
HCT VFR BLD AUTO: 33.3 % (ref 37–48.5)
HGB BLD-MCNC: 9.6 G/DL (ref 12–16)
IMM GRANULOCYTES # BLD AUTO: 0.04 K/UL (ref 0–0.04)
IMM GRANULOCYTES NFR BLD AUTO: 0.5 % (ref 0–0.5)
LYMPHOCYTES # BLD AUTO: 1.2 K/UL (ref 1–4.8)
LYMPHOCYTES NFR BLD: 16.5 % (ref 18–48)
MAGNESIUM SERPL-MCNC: 1.8 MG/DL (ref 1.6–2.6)
MCH RBC QN AUTO: 25.6 PG (ref 27–31)
MCHC RBC AUTO-ENTMCNC: 28.8 G/DL (ref 32–36)
MCV RBC AUTO: 89 FL (ref 82–98)
MONOCYTES # BLD AUTO: 0.3 K/UL (ref 0.3–1)
MONOCYTES NFR BLD: 4.2 % (ref 4–15)
NEUTROPHILS # BLD AUTO: 5.8 K/UL (ref 1.8–7.7)
NEUTROPHILS NFR BLD: 78.7 % (ref 38–73)
NRBC BLD-RTO: 0 /100 WBC
PHOSPHATE SERPL-MCNC: 3 MG/DL (ref 2.7–4.5)
PLATELET # BLD AUTO: 321 K/UL (ref 150–350)
PMV BLD AUTO: 11.1 FL (ref 9.2–12.9)
POCT GLUCOSE: 187 MG/DL (ref 70–110)
POCT GLUCOSE: 229 MG/DL (ref 70–110)
POCT GLUCOSE: 291 MG/DL (ref 70–110)
POCT GLUCOSE: 331 MG/DL (ref 70–110)
POTASSIUM SERPL-SCNC: 3.8 MMOL/L (ref 3.5–5.1)
RBC # BLD AUTO: 3.75 M/UL (ref 4–5.4)
SODIUM SERPL-SCNC: 142 MMOL/L (ref 136–145)
WBC # BLD AUTO: 7.35 K/UL (ref 3.9–12.7)

## 2021-02-06 PROCEDURE — 25000242 PHARM REV CODE 250 ALT 637 W/ HCPCS: Performed by: NURSE PRACTITIONER

## 2021-02-06 PROCEDURE — 94640 AIRWAY INHALATION TREATMENT: CPT

## 2021-02-06 PROCEDURE — 36415 COLL VENOUS BLD VENIPUNCTURE: CPT

## 2021-02-06 PROCEDURE — 96376 TX/PRO/DX INJ SAME DRUG ADON: CPT

## 2021-02-06 PROCEDURE — 27000221 HC OXYGEN, UP TO 24 HOURS

## 2021-02-06 PROCEDURE — 83735 ASSAY OF MAGNESIUM: CPT

## 2021-02-06 PROCEDURE — 85025 COMPLETE CBC W/AUTO DIFF WBC: CPT

## 2021-02-06 PROCEDURE — 25000003 PHARM REV CODE 250: Performed by: NURSE PRACTITIONER

## 2021-02-06 PROCEDURE — 96372 THER/PROPH/DIAG INJ SC/IM: CPT | Mod: 59

## 2021-02-06 PROCEDURE — C9399 UNCLASSIFIED DRUGS OR BIOLOG: HCPCS | Performed by: NURSE PRACTITIONER

## 2021-02-06 PROCEDURE — 84100 ASSAY OF PHOSPHORUS: CPT

## 2021-02-06 PROCEDURE — 63600175 PHARM REV CODE 636 W HCPCS: Performed by: NURSE PRACTITIONER

## 2021-02-06 PROCEDURE — 80048 BASIC METABOLIC PNL TOTAL CA: CPT

## 2021-02-06 PROCEDURE — G0378 HOSPITAL OBSERVATION PER HR: HCPCS

## 2021-02-06 PROCEDURE — 63600175 PHARM REV CODE 636 W HCPCS: Performed by: HOSPITALIST

## 2021-02-06 PROCEDURE — 96374 THER/PROPH/DIAG INJ IV PUSH: CPT

## 2021-02-06 PROCEDURE — 94761 N-INVAS EAR/PLS OXIMETRY MLT: CPT

## 2021-02-06 PROCEDURE — 25000003 PHARM REV CODE 250: Performed by: HOSPITALIST

## 2021-02-06 RX ORDER — OXYCODONE HYDROCHLORIDE 10 MG/1
10 TABLET ORAL 3 TIMES DAILY
Status: DISCONTINUED | OUTPATIENT
Start: 2021-02-06 | End: 2021-02-08 | Stop reason: HOSPADM

## 2021-02-06 RX ORDER — GABAPENTIN 400 MG/1
800 CAPSULE ORAL 3 TIMES DAILY
Status: DISCONTINUED | OUTPATIENT
Start: 2021-02-06 | End: 2021-02-08 | Stop reason: HOSPADM

## 2021-02-06 RX ORDER — QUETIAPINE FUMARATE 25 MG/1
50 TABLET, FILM COATED ORAL NIGHTLY
Status: DISCONTINUED | OUTPATIENT
Start: 2021-02-06 | End: 2021-02-08 | Stop reason: HOSPADM

## 2021-02-06 RX ORDER — METHOCARBAMOL 750 MG/1
750 TABLET, FILM COATED ORAL 2 TIMES DAILY PRN
Status: DISCONTINUED | OUTPATIENT
Start: 2021-02-06 | End: 2021-02-08 | Stop reason: HOSPADM

## 2021-02-06 RX ADMIN — IPRATROPIUM BROMIDE AND ALBUTEROL SULFATE 3 ML: .5; 2.5 SOLUTION RESPIRATORY (INHALATION) at 07:02

## 2021-02-06 RX ADMIN — OXYCODONE HYDROCHLORIDE 10 MG: 10 TABLET ORAL at 09:02

## 2021-02-06 RX ADMIN — GABAPENTIN 800 MG: 400 CAPSULE ORAL at 04:02

## 2021-02-06 RX ADMIN — OXYCODONE HYDROCHLORIDE 10 MG: 10 TABLET ORAL at 04:02

## 2021-02-06 RX ADMIN — IPRATROPIUM BROMIDE AND ALBUTEROL SULFATE 3 ML: .5; 2.5 SOLUTION RESPIRATORY (INHALATION) at 03:02

## 2021-02-06 RX ADMIN — INSULIN ASPART 4 UNITS: 100 INJECTION, SOLUTION INTRAVENOUS; SUBCUTANEOUS at 08:02

## 2021-02-06 RX ADMIN — INSULIN ASPART 2 UNITS: 100 INJECTION, SOLUTION INTRAVENOUS; SUBCUTANEOUS at 11:02

## 2021-02-06 RX ADMIN — IPRATROPIUM BROMIDE AND ALBUTEROL SULFATE 3 ML: .5; 2.5 SOLUTION RESPIRATORY (INHALATION) at 08:02

## 2021-02-06 RX ADMIN — CLONAZEPAM 0.5 MG: 0.5 TABLET ORAL at 08:02

## 2021-02-06 RX ADMIN — INSULIN DETEMIR 35 UNITS: 100 INJECTION, SOLUTION SUBCUTANEOUS at 08:02

## 2021-02-06 RX ADMIN — INSULIN ASPART 6 UNITS: 100 INJECTION, SOLUTION INTRAVENOUS; SUBCUTANEOUS at 05:02

## 2021-02-06 RX ADMIN — PANTOPRAZOLE SODIUM 40 MG: 40 TABLET, DELAYED RELEASE ORAL at 08:02

## 2021-02-06 RX ADMIN — LAMOTRIGINE 150 MG: 100 TABLET ORAL at 09:02

## 2021-02-06 RX ADMIN — GABAPENTIN 800 MG: 400 CAPSULE ORAL at 09:02

## 2021-02-06 RX ADMIN — METHOCARBAMOL TABLETS 750 MG: 750 TABLET, COATED ORAL at 04:02

## 2021-02-06 RX ADMIN — CLONAZEPAM 0.5 MG: 0.5 TABLET ORAL at 09:02

## 2021-02-06 RX ADMIN — METHYLPREDNISOLONE SODIUM SUCCINATE 40 MG: 40 INJECTION, POWDER, FOR SOLUTION INTRAMUSCULAR; INTRAVENOUS at 01:02

## 2021-02-06 RX ADMIN — IPRATROPIUM BROMIDE AND ALBUTEROL SULFATE 3 ML: .5; 2.5 SOLUTION RESPIRATORY (INHALATION) at 11:02

## 2021-02-06 RX ADMIN — ROPINIROLE HYDROCHLORIDE 4 MG: 1 TABLET, FILM COATED ORAL at 08:02

## 2021-02-06 RX ADMIN — GUAIFENESIN 600 MG: 600 TABLET, EXTENDED RELEASE ORAL at 09:02

## 2021-02-06 RX ADMIN — ROPINIROLE HYDROCHLORIDE 4 MG: 1 TABLET, FILM COATED ORAL at 09:02

## 2021-02-06 RX ADMIN — QUETIAPINE FUMARATE 50 MG: 25 TABLET ORAL at 09:02

## 2021-02-06 RX ADMIN — LEVOTHYROXINE SODIUM 150 MCG: 150 TABLET ORAL at 06:02

## 2021-02-06 RX ADMIN — GABAPENTIN 100 MG: 100 CAPSULE ORAL at 08:02

## 2021-02-06 RX ADMIN — INSULIN ASPART 4 UNITS: 100 INJECTION, SOLUTION INTRAVENOUS; SUBCUTANEOUS at 09:02

## 2021-02-06 RX ADMIN — INSULIN DETEMIR 35 UNITS: 100 INJECTION, SOLUTION SUBCUTANEOUS at 09:02

## 2021-02-06 RX ADMIN — METHYLPREDNISOLONE SODIUM SUCCINATE 40 MG: 40 INJECTION, POWDER, FOR SOLUTION INTRAMUSCULAR; INTRAVENOUS at 09:02

## 2021-02-06 RX ADMIN — GUAIFENESIN 600 MG: 600 TABLET, EXTENDED RELEASE ORAL at 08:02

## 2021-02-06 RX ADMIN — LINACLOTIDE 290 MCG: 145 CAPSULE, GELATIN COATED ORAL at 01:02

## 2021-02-06 RX ADMIN — HYDROCHLOROTHIAZIDE 12.5 MG: 12.5 TABLET ORAL at 08:02

## 2021-02-06 RX ADMIN — OXYCODONE HYDROCHLORIDE 10 MG: 10 TABLET ORAL at 08:02

## 2021-02-06 RX ADMIN — FLUTICASONE FUROATE AND VILANTEROL TRIFENATATE 1 PUFF: 100; 25 POWDER RESPIRATORY (INHALATION) at 08:02

## 2021-02-06 RX ADMIN — ENOXAPARIN SODIUM 40 MG: 40 INJECTION SUBCUTANEOUS at 04:02

## 2021-02-06 RX ADMIN — FLUOXETINE HYDROCHLORIDE 40 MG: 20 CAPSULE ORAL at 08:02

## 2021-02-06 RX ADMIN — PREDNISONE 40 MG: 20 TABLET ORAL at 08:02

## 2021-02-07 LAB
ANION GAP SERPL CALC-SCNC: 10 MMOL/L (ref 8–16)
BASOPHILS # BLD AUTO: 0 K/UL (ref 0–0.2)
BASOPHILS NFR BLD: 0 % (ref 0–1.9)
BUN SERPL-MCNC: 14 MG/DL (ref 8–23)
CALCIUM SERPL-MCNC: 7.9 MG/DL (ref 8.7–10.5)
CHLORIDE SERPL-SCNC: 95 MMOL/L (ref 95–110)
CO2 SERPL-SCNC: 35 MMOL/L (ref 23–29)
CREAT SERPL-MCNC: 0.7 MG/DL (ref 0.5–1.4)
DIFFERENTIAL METHOD: ABNORMAL
EOSINOPHIL # BLD AUTO: 0 K/UL (ref 0–0.5)
EOSINOPHIL NFR BLD: 0 % (ref 0–8)
ERYTHROCYTE [DISTWIDTH] IN BLOOD BY AUTOMATED COUNT: 15.6 % (ref 11.5–14.5)
EST. GFR  (AFRICAN AMERICAN): >60 ML/MIN/1.73 M^2
EST. GFR  (NON AFRICAN AMERICAN): >60 ML/MIN/1.73 M^2
GLUCOSE SERPL-MCNC: 294 MG/DL (ref 70–110)
HCT VFR BLD AUTO: 32.9 % (ref 37–48.5)
HGB BLD-MCNC: 10 G/DL (ref 12–16)
IMM GRANULOCYTES # BLD AUTO: 0.05 K/UL (ref 0–0.04)
IMM GRANULOCYTES NFR BLD AUTO: 0.8 % (ref 0–0.5)
LYMPHOCYTES # BLD AUTO: 1.2 K/UL (ref 1–4.8)
LYMPHOCYTES NFR BLD: 18.8 % (ref 18–48)
MAGNESIUM SERPL-MCNC: 1.9 MG/DL (ref 1.6–2.6)
MCH RBC QN AUTO: 26.5 PG (ref 27–31)
MCHC RBC AUTO-ENTMCNC: 30.4 G/DL (ref 32–36)
MCV RBC AUTO: 87 FL (ref 82–98)
MONOCYTES # BLD AUTO: 0.2 K/UL (ref 0.3–1)
MONOCYTES NFR BLD: 3.4 % (ref 4–15)
NEUTROPHILS # BLD AUTO: 5.1 K/UL (ref 1.8–7.7)
NEUTROPHILS NFR BLD: 77 % (ref 38–73)
NRBC BLD-RTO: 0 /100 WBC
PHOSPHATE SERPL-MCNC: 3.9 MG/DL (ref 2.7–4.5)
PLATELET # BLD AUTO: 286 K/UL (ref 150–350)
PMV BLD AUTO: 11.1 FL (ref 9.2–12.9)
POCT GLUCOSE: 277 MG/DL (ref 70–110)
POCT GLUCOSE: 345 MG/DL (ref 70–110)
POCT GLUCOSE: 357 MG/DL (ref 70–110)
POTASSIUM SERPL-SCNC: 4.1 MMOL/L (ref 3.5–5.1)
RBC # BLD AUTO: 3.77 M/UL (ref 4–5.4)
SODIUM SERPL-SCNC: 140 MMOL/L (ref 136–145)
WBC # BLD AUTO: 6.56 K/UL (ref 3.9–12.7)

## 2021-02-07 PROCEDURE — 83735 ASSAY OF MAGNESIUM: CPT

## 2021-02-07 PROCEDURE — 36415 COLL VENOUS BLD VENIPUNCTURE: CPT

## 2021-02-07 PROCEDURE — 25000003 PHARM REV CODE 250: Performed by: HOSPITALIST

## 2021-02-07 PROCEDURE — 96376 TX/PRO/DX INJ SAME DRUG ADON: CPT

## 2021-02-07 PROCEDURE — 25000003 PHARM REV CODE 250: Performed by: NURSE PRACTITIONER

## 2021-02-07 PROCEDURE — 85025 COMPLETE CBC W/AUTO DIFF WBC: CPT

## 2021-02-07 PROCEDURE — 94640 AIRWAY INHALATION TREATMENT: CPT | Mod: 76

## 2021-02-07 PROCEDURE — G0378 HOSPITAL OBSERVATION PER HR: HCPCS | Mod: CS

## 2021-02-07 PROCEDURE — 25000242 PHARM REV CODE 250 ALT 637 W/ HCPCS: Performed by: NURSE PRACTITIONER

## 2021-02-07 PROCEDURE — 63600175 PHARM REV CODE 636 W HCPCS: Performed by: HOSPITALIST

## 2021-02-07 PROCEDURE — 94761 N-INVAS EAR/PLS OXIMETRY MLT: CPT

## 2021-02-07 PROCEDURE — 63600175 PHARM REV CODE 636 W HCPCS: Performed by: NURSE PRACTITIONER

## 2021-02-07 PROCEDURE — 96372 THER/PROPH/DIAG INJ SC/IM: CPT

## 2021-02-07 PROCEDURE — 80048 BASIC METABOLIC PNL TOTAL CA: CPT

## 2021-02-07 PROCEDURE — 84100 ASSAY OF PHOSPHORUS: CPT

## 2021-02-07 PROCEDURE — 27000221 HC OXYGEN, UP TO 24 HOURS

## 2021-02-07 RX ADMIN — ENOXAPARIN SODIUM 40 MG: 40 INJECTION SUBCUTANEOUS at 04:02

## 2021-02-07 RX ADMIN — METHYLPREDNISOLONE SODIUM SUCCINATE 40 MG: 40 INJECTION, POWDER, FOR SOLUTION INTRAMUSCULAR; INTRAVENOUS at 03:02

## 2021-02-07 RX ADMIN — IPRATROPIUM BROMIDE AND ALBUTEROL SULFATE 3 ML: .5; 2.5 SOLUTION RESPIRATORY (INHALATION) at 12:02

## 2021-02-07 RX ADMIN — CLONAZEPAM 0.5 MG: 0.5 TABLET ORAL at 09:02

## 2021-02-07 RX ADMIN — INSULIN DETEMIR 35 UNITS: 100 INJECTION, SOLUTION SUBCUTANEOUS at 08:02

## 2021-02-07 RX ADMIN — METHYLPREDNISOLONE SODIUM SUCCINATE 40 MG: 40 INJECTION, POWDER, FOR SOLUTION INTRAMUSCULAR; INTRAVENOUS at 09:02

## 2021-02-07 RX ADMIN — FLUOXETINE HYDROCHLORIDE 40 MG: 20 CAPSULE ORAL at 08:02

## 2021-02-07 RX ADMIN — OXYCODONE HYDROCHLORIDE 10 MG: 10 TABLET ORAL at 09:02

## 2021-02-07 RX ADMIN — GABAPENTIN 800 MG: 400 CAPSULE ORAL at 09:02

## 2021-02-07 RX ADMIN — METHYLPREDNISOLONE SODIUM SUCCINATE 40 MG: 40 INJECTION, POWDER, FOR SOLUTION INTRAMUSCULAR; INTRAVENOUS at 05:02

## 2021-02-07 RX ADMIN — IPRATROPIUM BROMIDE AND ALBUTEROL SULFATE 3 ML: .5; 2.5 SOLUTION RESPIRATORY (INHALATION) at 04:02

## 2021-02-07 RX ADMIN — LOSARTAN POTASSIUM 50 MG: 25 TABLET, FILM COATED ORAL at 08:02

## 2021-02-07 RX ADMIN — INSULIN ASPART 5 UNITS: 100 INJECTION, SOLUTION INTRAVENOUS; SUBCUTANEOUS at 09:02

## 2021-02-07 RX ADMIN — OXYCODONE HYDROCHLORIDE 10 MG: 10 TABLET ORAL at 03:02

## 2021-02-07 RX ADMIN — IPRATROPIUM BROMIDE AND ALBUTEROL SULFATE 3 ML: .5; 2.5 SOLUTION RESPIRATORY (INHALATION) at 03:02

## 2021-02-07 RX ADMIN — LAMOTRIGINE 150 MG: 100 TABLET ORAL at 09:02

## 2021-02-07 RX ADMIN — QUETIAPINE FUMARATE 50 MG: 25 TABLET ORAL at 09:02

## 2021-02-07 RX ADMIN — INSULIN ASPART 6 UNITS: 100 INJECTION, SOLUTION INTRAVENOUS; SUBCUTANEOUS at 04:02

## 2021-02-07 RX ADMIN — LINACLOTIDE 290 MCG: 145 CAPSULE, GELATIN COATED ORAL at 08:02

## 2021-02-07 RX ADMIN — GABAPENTIN 800 MG: 400 CAPSULE ORAL at 08:02

## 2021-02-07 RX ADMIN — ROPINIROLE HYDROCHLORIDE 4 MG: 1 TABLET, FILM COATED ORAL at 08:02

## 2021-02-07 RX ADMIN — OXYCODONE HYDROCHLORIDE 10 MG: 10 TABLET ORAL at 08:02

## 2021-02-07 RX ADMIN — METHOCARBAMOL TABLETS 750 MG: 750 TABLET, COATED ORAL at 12:02

## 2021-02-07 RX ADMIN — GUAIFENESIN 600 MG: 600 TABLET, EXTENDED RELEASE ORAL at 08:02

## 2021-02-07 RX ADMIN — IPRATROPIUM BROMIDE AND ALBUTEROL SULFATE 3 ML: .5; 2.5 SOLUTION RESPIRATORY (INHALATION) at 08:02

## 2021-02-07 RX ADMIN — PANTOPRAZOLE SODIUM 40 MG: 40 TABLET, DELAYED RELEASE ORAL at 08:02

## 2021-02-07 RX ADMIN — LEVOTHYROXINE SODIUM 150 MCG: 150 TABLET ORAL at 05:02

## 2021-02-07 RX ADMIN — ROPINIROLE HYDROCHLORIDE 4 MG: 1 TABLET, FILM COATED ORAL at 09:02

## 2021-02-07 RX ADMIN — LAMOTRIGINE 150 MG: 100 TABLET ORAL at 08:02

## 2021-02-07 RX ADMIN — INSULIN ASPART 8 UNITS: 100 INJECTION, SOLUTION INTRAVENOUS; SUBCUTANEOUS at 05:02

## 2021-02-07 RX ADMIN — HYDROCHLOROTHIAZIDE 12.5 MG: 12.5 TABLET ORAL at 08:02

## 2021-02-07 RX ADMIN — CLONAZEPAM 0.5 MG: 0.5 TABLET ORAL at 08:02

## 2021-02-07 RX ADMIN — FLUTICASONE FUROATE AND VILANTEROL TRIFENATATE 1 PUFF: 100; 25 POWDER RESPIRATORY (INHALATION) at 07:02

## 2021-02-07 RX ADMIN — GUAIFENESIN 600 MG: 600 TABLET, EXTENDED RELEASE ORAL at 09:02

## 2021-02-07 RX ADMIN — IPRATROPIUM BROMIDE AND ALBUTEROL SULFATE 3 ML: .5; 2.5 SOLUTION RESPIRATORY (INHALATION) at 07:02

## 2021-02-07 RX ADMIN — GABAPENTIN 800 MG: 400 CAPSULE ORAL at 03:02

## 2021-02-07 RX ADMIN — INSULIN DETEMIR 35 UNITS: 100 INJECTION, SOLUTION SUBCUTANEOUS at 09:02

## 2021-02-08 VITALS
OXYGEN SATURATION: 99 % | WEIGHT: 215.81 LBS | RESPIRATION RATE: 10 BRPM | HEIGHT: 64 IN | DIASTOLIC BLOOD PRESSURE: 67 MMHG | BODY MASS INDEX: 36.84 KG/M2 | HEART RATE: 88 BPM | SYSTOLIC BLOOD PRESSURE: 145 MMHG | TEMPERATURE: 97 F

## 2021-02-08 PROBLEM — J44.1 COPD EXACERBATION: Status: RESOLVED | Noted: 2020-11-24 | Resolved: 2021-02-08

## 2021-02-08 LAB
ANION GAP SERPL CALC-SCNC: 10 MMOL/L (ref 8–16)
BASOPHILS # BLD AUTO: 0.01 K/UL (ref 0–0.2)
BASOPHILS NFR BLD: 0.1 % (ref 0–1.9)
BUN SERPL-MCNC: 16 MG/DL (ref 8–23)
CALCIUM SERPL-MCNC: 8.1 MG/DL (ref 8.7–10.5)
CHLORIDE SERPL-SCNC: 94 MMOL/L (ref 95–110)
CO2 SERPL-SCNC: 33 MMOL/L (ref 23–29)
CREAT SERPL-MCNC: 0.7 MG/DL (ref 0.5–1.4)
DIFFERENTIAL METHOD: ABNORMAL
EOSINOPHIL # BLD AUTO: 0 K/UL (ref 0–0.5)
EOSINOPHIL NFR BLD: 0 % (ref 0–8)
ERYTHROCYTE [DISTWIDTH] IN BLOOD BY AUTOMATED COUNT: 15.6 % (ref 11.5–14.5)
EST. GFR  (AFRICAN AMERICAN): >60 ML/MIN/1.73 M^2
EST. GFR  (NON AFRICAN AMERICAN): >60 ML/MIN/1.73 M^2
GLUCOSE SERPL-MCNC: 358 MG/DL (ref 70–110)
HCT VFR BLD AUTO: 34.3 % (ref 37–48.5)
HGB BLD-MCNC: 10.3 G/DL (ref 12–16)
IMM GRANULOCYTES # BLD AUTO: 0.07 K/UL (ref 0–0.04)
IMM GRANULOCYTES NFR BLD AUTO: 0.9 % (ref 0–0.5)
LYMPHOCYTES # BLD AUTO: 1.3 K/UL (ref 1–4.8)
LYMPHOCYTES NFR BLD: 15.9 % (ref 18–48)
MAGNESIUM SERPL-MCNC: 1.9 MG/DL (ref 1.6–2.6)
MCH RBC QN AUTO: 26.4 PG (ref 27–31)
MCHC RBC AUTO-ENTMCNC: 30 G/DL (ref 32–36)
MCV RBC AUTO: 88 FL (ref 82–98)
MONOCYTES # BLD AUTO: 0.3 K/UL (ref 0.3–1)
MONOCYTES NFR BLD: 3.2 % (ref 4–15)
NEUTROPHILS # BLD AUTO: 6.3 K/UL (ref 1.8–7.7)
NEUTROPHILS NFR BLD: 79.9 % (ref 38–73)
NRBC BLD-RTO: 0 /100 WBC
PHOSPHATE SERPL-MCNC: 4.4 MG/DL (ref 2.7–4.5)
PLATELET # BLD AUTO: 295 K/UL (ref 150–350)
PMV BLD AUTO: 10.9 FL (ref 9.2–12.9)
POCT GLUCOSE: 320 MG/DL (ref 70–110)
POCT GLUCOSE: 359 MG/DL (ref 70–110)
POTASSIUM SERPL-SCNC: 4.2 MMOL/L (ref 3.5–5.1)
RBC # BLD AUTO: 3.9 M/UL (ref 4–5.4)
SODIUM SERPL-SCNC: 137 MMOL/L (ref 136–145)
WBC # BLD AUTO: 7.86 K/UL (ref 3.9–12.7)

## 2021-02-08 PROCEDURE — 80048 BASIC METABOLIC PNL TOTAL CA: CPT

## 2021-02-08 PROCEDURE — 25000003 PHARM REV CODE 250: Performed by: HOSPITALIST

## 2021-02-08 PROCEDURE — 85025 COMPLETE CBC W/AUTO DIFF WBC: CPT

## 2021-02-08 PROCEDURE — 36415 COLL VENOUS BLD VENIPUNCTURE: CPT

## 2021-02-08 PROCEDURE — 25000003 PHARM REV CODE 250: Performed by: NURSE PRACTITIONER

## 2021-02-08 PROCEDURE — 27000221 HC OXYGEN, UP TO 24 HOURS

## 2021-02-08 PROCEDURE — 84100 ASSAY OF PHOSPHORUS: CPT

## 2021-02-08 PROCEDURE — 94640 AIRWAY INHALATION TREATMENT: CPT

## 2021-02-08 PROCEDURE — 96376 TX/PRO/DX INJ SAME DRUG ADON: CPT

## 2021-02-08 PROCEDURE — 63600175 PHARM REV CODE 636 W HCPCS: Performed by: HOSPITALIST

## 2021-02-08 PROCEDURE — G0378 HOSPITAL OBSERVATION PER HR: HCPCS

## 2021-02-08 PROCEDURE — 96372 THER/PROPH/DIAG INJ SC/IM: CPT

## 2021-02-08 PROCEDURE — 94761 N-INVAS EAR/PLS OXIMETRY MLT: CPT

## 2021-02-08 PROCEDURE — 25000242 PHARM REV CODE 250 ALT 637 W/ HCPCS: Performed by: NURSE PRACTITIONER

## 2021-02-08 PROCEDURE — 83735 ASSAY OF MAGNESIUM: CPT

## 2021-02-08 RX ADMIN — INSULIN DETEMIR 35 UNITS: 100 INJECTION, SOLUTION SUBCUTANEOUS at 08:02

## 2021-02-08 RX ADMIN — LINACLOTIDE 290 MCG: 145 CAPSULE, GELATIN COATED ORAL at 08:02

## 2021-02-08 RX ADMIN — METHYLPREDNISOLONE SODIUM SUCCINATE 40 MG: 40 INJECTION, POWDER, FOR SOLUTION INTRAMUSCULAR; INTRAVENOUS at 06:02

## 2021-02-08 RX ADMIN — LAMOTRIGINE 150 MG: 100 TABLET ORAL at 08:02

## 2021-02-08 RX ADMIN — FLUTICASONE FUROATE AND VILANTEROL TRIFENATATE 1 PUFF: 100; 25 POWDER RESPIRATORY (INHALATION) at 07:02

## 2021-02-08 RX ADMIN — HYDROCHLOROTHIAZIDE 12.5 MG: 12.5 TABLET ORAL at 08:02

## 2021-02-08 RX ADMIN — IPRATROPIUM BROMIDE AND ALBUTEROL SULFATE 3 ML: .5; 2.5 SOLUTION RESPIRATORY (INHALATION) at 04:02

## 2021-02-08 RX ADMIN — PANTOPRAZOLE SODIUM 40 MG: 40 TABLET, DELAYED RELEASE ORAL at 08:02

## 2021-02-08 RX ADMIN — GUAIFENESIN 600 MG: 600 TABLET, EXTENDED RELEASE ORAL at 08:02

## 2021-02-08 RX ADMIN — CLONAZEPAM 0.5 MG: 0.5 TABLET ORAL at 08:02

## 2021-02-08 RX ADMIN — METHOCARBAMOL TABLETS 750 MG: 750 TABLET, COATED ORAL at 12:02

## 2021-02-08 RX ADMIN — LEVOTHYROXINE SODIUM 150 MCG: 150 TABLET ORAL at 06:02

## 2021-02-08 RX ADMIN — INSULIN ASPART 8 UNITS: 100 INJECTION, SOLUTION INTRAVENOUS; SUBCUTANEOUS at 06:02

## 2021-02-08 RX ADMIN — OXYCODONE HYDROCHLORIDE 10 MG: 10 TABLET ORAL at 08:02

## 2021-02-08 RX ADMIN — LOSARTAN POTASSIUM 50 MG: 25 TABLET, FILM COATED ORAL at 08:02

## 2021-02-08 RX ADMIN — ROPINIROLE HYDROCHLORIDE 4 MG: 1 TABLET, FILM COATED ORAL at 08:02

## 2021-02-08 RX ADMIN — FLUOXETINE HYDROCHLORIDE 40 MG: 20 CAPSULE ORAL at 08:02

## 2021-02-08 RX ADMIN — IPRATROPIUM BROMIDE AND ALBUTEROL SULFATE 3 ML: .5; 2.5 SOLUTION RESPIRATORY (INHALATION) at 12:02

## 2021-02-08 RX ADMIN — IPRATROPIUM BROMIDE AND ALBUTEROL SULFATE 3 ML: .5; 2.5 SOLUTION RESPIRATORY (INHALATION) at 11:02

## 2021-02-08 RX ADMIN — IPRATROPIUM BROMIDE AND ALBUTEROL SULFATE 3 ML: .5; 2.5 SOLUTION RESPIRATORY (INHALATION) at 07:02

## 2021-02-08 RX ADMIN — GABAPENTIN 800 MG: 400 CAPSULE ORAL at 08:02

## 2021-02-08 RX ADMIN — INSULIN ASPART 10 UNITS: 100 INJECTION, SOLUTION INTRAVENOUS; SUBCUTANEOUS at 10:02

## 2021-02-09 ENCOUNTER — TELEPHONE (OUTPATIENT)
Dept: MEDSURG UNIT | Facility: HOSPITAL | Age: 71
End: 2021-02-09

## 2021-02-09 ENCOUNTER — PATIENT MESSAGE (OUTPATIENT)
Dept: FAMILY MEDICINE | Facility: CLINIC | Age: 71
End: 2021-02-09

## 2021-02-09 PROCEDURE — G0179 PR HOME HEALTH MD RECERTIFICATION: ICD-10-PCS | Mod: ,,, | Performed by: HOSPITALIST

## 2021-02-09 PROCEDURE — G0179 MD RECERTIFICATION HHA PT: HCPCS | Mod: ,,, | Performed by: HOSPITALIST

## 2021-02-10 ENCOUNTER — TELEPHONE (OUTPATIENT)
Dept: FAMILY MEDICINE | Facility: CLINIC | Age: 71
End: 2021-02-10

## 2021-02-10 ENCOUNTER — PATIENT OUTREACH (OUTPATIENT)
Dept: FAMILY MEDICINE | Facility: CLINIC | Age: 71
End: 2021-02-10

## 2021-02-11 ENCOUNTER — TELEPHONE (OUTPATIENT)
Dept: FAMILY MEDICINE | Facility: CLINIC | Age: 71
End: 2021-02-11

## 2021-02-24 ENCOUNTER — EXTERNAL HOME HEALTH (OUTPATIENT)
Dept: HOME HEALTH SERVICES | Facility: HOSPITAL | Age: 71
End: 2021-02-24
Payer: MEDICARE

## 2021-02-26 ENCOUNTER — TELEPHONE (OUTPATIENT)
Dept: FAMILY MEDICINE | Facility: CLINIC | Age: 71
End: 2021-02-26

## 2021-02-26 ENCOUNTER — DOCUMENT SCAN (OUTPATIENT)
Dept: HOME HEALTH SERVICES | Facility: HOSPITAL | Age: 71
End: 2021-02-26

## 2021-03-15 ENCOUNTER — TELEPHONE (OUTPATIENT)
Dept: FAMILY MEDICINE | Facility: CLINIC | Age: 71
End: 2021-03-15

## 2021-03-20 ENCOUNTER — HOSPITAL ENCOUNTER (EMERGENCY)
Facility: HOSPITAL | Age: 71
Discharge: HOME | End: 2021-03-20
Attending: EMERGENCY MEDICINE
Payer: MEDICAID

## 2021-03-20 VITALS
DIASTOLIC BLOOD PRESSURE: 89 MMHG | SYSTOLIC BLOOD PRESSURE: 146 MMHG | WEIGHT: 215 LBS | BODY MASS INDEX: 36.7 KG/M2 | HEIGHT: 64 IN | TEMPERATURE: 98 F | RESPIRATION RATE: 20 BRPM | OXYGEN SATURATION: 100 % | HEART RATE: 88 BPM

## 2021-03-20 DIAGNOSIS — M79.605 PAIN IN BOTH LOWER EXTREMITIES: ICD-10-CM

## 2021-03-20 DIAGNOSIS — M54.9 BACK PAIN, UNSPECIFIED BACK LOCATION, UNSPECIFIED BACK PAIN LATERALITY, UNSPECIFIED CHRONICITY: Primary | ICD-10-CM

## 2021-03-20 DIAGNOSIS — K59.00 CONSTIPATION, UNSPECIFIED CONSTIPATION TYPE: ICD-10-CM

## 2021-03-20 DIAGNOSIS — M79.604 PAIN IN BOTH LOWER EXTREMITIES: ICD-10-CM

## 2021-03-20 DIAGNOSIS — N39.0 URINARY TRACT INFECTION WITHOUT HEMATURIA, SITE UNSPECIFIED: ICD-10-CM

## 2021-03-20 LAB
ALBUMIN SERPL BCP-MCNC: 3.3 G/DL (ref 3.5–5.2)
ALP SERPL-CCNC: 56 U/L (ref 55–135)
ALT SERPL W/O P-5'-P-CCNC: 15 U/L (ref 10–44)
ANION GAP SERPL CALC-SCNC: 7 MMOL/L (ref 8–16)
AST SERPL-CCNC: 18 U/L (ref 10–40)
BACTERIA #/AREA URNS HPF: ABNORMAL /HPF
BASOPHILS # BLD AUTO: 0.02 K/UL (ref 0–0.2)
BASOPHILS NFR BLD: 0.3 % (ref 0–1.9)
BILIRUB SERPL-MCNC: 0.3 MG/DL (ref 0.1–1)
BILIRUB UR QL STRIP: NEGATIVE
BUN SERPL-MCNC: 15 MG/DL (ref 8–23)
CALCIUM SERPL-MCNC: 8.3 MG/DL (ref 8.7–10.5)
CHLORIDE SERPL-SCNC: 93 MMOL/L (ref 95–110)
CLARITY UR: ABNORMAL
CO2 SERPL-SCNC: 39 MMOL/L (ref 23–29)
COLOR UR: YELLOW
CREAT SERPL-MCNC: 0.5 MG/DL (ref 0.5–1.4)
DIFFERENTIAL METHOD: ABNORMAL
EOSINOPHIL # BLD AUTO: 0.1 K/UL (ref 0–0.5)
EOSINOPHIL NFR BLD: 0.7 % (ref 0–8)
ERYTHROCYTE [DISTWIDTH] IN BLOOD BY AUTOMATED COUNT: 16.5 % (ref 11.5–14.5)
EST. GFR  (AFRICAN AMERICAN): >60 ML/MIN/1.73 M^2
EST. GFR  (NON AFRICAN AMERICAN): >60 ML/MIN/1.73 M^2
GLUCOSE SERPL-MCNC: 201 MG/DL (ref 70–110)
GLUCOSE UR QL STRIP: ABNORMAL
HCT VFR BLD AUTO: 31.8 % (ref 37–48.5)
HGB BLD-MCNC: 9 G/DL (ref 12–16)
HGB UR QL STRIP: NEGATIVE
HYALINE CASTS #/AREA URNS LPF: 12 /LPF
IMM GRANULOCYTES # BLD AUTO: 0.06 K/UL (ref 0–0.04)
IMM GRANULOCYTES NFR BLD AUTO: 0.8 % (ref 0–0.5)
KETONES UR QL STRIP: NEGATIVE
LEUKOCYTE ESTERASE UR QL STRIP: ABNORMAL
LYMPHOCYTES # BLD AUTO: 1.4 K/UL (ref 1–4.8)
LYMPHOCYTES NFR BLD: 19.7 % (ref 18–48)
MCH RBC QN AUTO: 24.7 PG (ref 27–31)
MCHC RBC AUTO-ENTMCNC: 28.3 G/DL (ref 32–36)
MCV RBC AUTO: 87 FL (ref 82–98)
MICROSCOPIC COMMENT: ABNORMAL
MONOCYTES # BLD AUTO: 0.3 K/UL (ref 0.3–1)
MONOCYTES NFR BLD: 4.6 % (ref 4–15)
NEUTROPHILS # BLD AUTO: 5.3 K/UL (ref 1.8–7.7)
NEUTROPHILS NFR BLD: 73.9 % (ref 38–73)
NITRITE UR QL STRIP: POSITIVE
NRBC BLD-RTO: 0 /100 WBC
PH UR STRIP: 7 [PH] (ref 5–8)
PLATELET # BLD AUTO: 270 K/UL (ref 150–350)
PMV BLD AUTO: 11.1 FL (ref 9.2–12.9)
POTASSIUM SERPL-SCNC: 4 MMOL/L (ref 3.5–5.1)
PROT SERPL-MCNC: 6.7 G/DL (ref 6–8.4)
PROT UR QL STRIP: NEGATIVE
RBC # BLD AUTO: 3.64 M/UL (ref 4–5.4)
RBC #/AREA URNS HPF: 1 /HPF (ref 0–4)
SODIUM SERPL-SCNC: 139 MMOL/L (ref 136–145)
SP GR UR STRIP: 1.03 (ref 1–1.03)
SQUAMOUS #/AREA URNS HPF: 1 /HPF
URN SPEC COLLECT METH UR: ABNORMAL
UROBILINOGEN UR STRIP-ACNC: NEGATIVE EU/DL
WBC # BLD AUTO: 7.19 K/UL (ref 3.9–12.7)
WBC #/AREA URNS HPF: 58 /HPF (ref 0–5)

## 2021-03-20 PROCEDURE — 63600175 PHARM REV CODE 636 W HCPCS: Performed by: EMERGENCY MEDICINE

## 2021-03-20 PROCEDURE — 81001 URINALYSIS AUTO W/SCOPE: CPT | Performed by: EMERGENCY MEDICINE

## 2021-03-20 PROCEDURE — 25000003 PHARM REV CODE 250: Performed by: EMERGENCY MEDICINE

## 2021-03-20 PROCEDURE — 96374 THER/PROPH/DIAG INJ IV PUSH: CPT | Mod: 59

## 2021-03-20 PROCEDURE — 99285 EMERGENCY DEPT VISIT HI MDM: CPT | Mod: 25

## 2021-03-20 PROCEDURE — 87086 URINE CULTURE/COLONY COUNT: CPT | Performed by: EMERGENCY MEDICINE

## 2021-03-20 PROCEDURE — 85025 COMPLETE CBC W/AUTO DIFF WBC: CPT | Performed by: EMERGENCY MEDICINE

## 2021-03-20 PROCEDURE — 87186 SC STD MICRODIL/AGAR DIL: CPT | Performed by: EMERGENCY MEDICINE

## 2021-03-20 PROCEDURE — 25500020 PHARM REV CODE 255: Performed by: EMERGENCY MEDICINE

## 2021-03-20 PROCEDURE — 80053 COMPREHEN METABOLIC PANEL: CPT | Performed by: EMERGENCY MEDICINE

## 2021-03-20 PROCEDURE — 96375 TX/PRO/DX INJ NEW DRUG ADDON: CPT

## 2021-03-20 PROCEDURE — 87077 CULTURE AEROBIC IDENTIFY: CPT | Performed by: EMERGENCY MEDICINE

## 2021-03-20 RX ORDER — HYDROMORPHONE HYDROCHLORIDE 1 MG/ML
0.5 INJECTION, SOLUTION INTRAMUSCULAR; INTRAVENOUS; SUBCUTANEOUS
Status: COMPLETED | OUTPATIENT
Start: 2021-03-20 | End: 2021-03-20

## 2021-03-20 RX ORDER — LORAZEPAM 2 MG/ML
1 INJECTION INTRAMUSCULAR
Status: COMPLETED | OUTPATIENT
Start: 2021-03-20 | End: 2021-03-20

## 2021-03-20 RX ORDER — SODIUM CHLORIDE 9 MG/ML
INJECTION, SOLUTION INTRAVENOUS
Status: COMPLETED | OUTPATIENT
Start: 2021-03-20 | End: 2021-03-20

## 2021-03-20 RX ORDER — CEPHALEXIN 500 MG/1
500 CAPSULE ORAL EVERY 8 HOURS
Qty: 28 CAPSULE | Refills: 0 | Status: SHIPPED | OUTPATIENT
Start: 2021-03-20 | End: 2021-03-27

## 2021-03-20 RX ORDER — DOCUSATE SODIUM 100 MG/1
100 CAPSULE, LIQUID FILLED ORAL 2 TIMES DAILY
Qty: 60 CAPSULE | Refills: 0 | Status: SHIPPED | OUTPATIENT
Start: 2021-03-20

## 2021-03-20 RX ORDER — METHOCARBAMOL 500 MG/1
1000 TABLET, FILM COATED ORAL 3 TIMES DAILY
Qty: 30 TABLET | Refills: 0 | Status: SHIPPED | OUTPATIENT
Start: 2021-03-20 | End: 2021-03-25

## 2021-03-20 RX ORDER — ONDANSETRON 2 MG/ML
4 INJECTION INTRAMUSCULAR; INTRAVENOUS
Status: COMPLETED | OUTPATIENT
Start: 2021-03-20 | End: 2021-03-20

## 2021-03-20 RX ORDER — QUETIAPINE FUMARATE 50 MG/1
50 TABLET, FILM COATED ORAL NIGHTLY
Status: ON HOLD | COMMUNITY
End: 2021-05-13 | Stop reason: HOSPADM

## 2021-03-20 RX ADMIN — SODIUM CHLORIDE 999 ML/HR: 0.9 INJECTION, SOLUTION INTRAVENOUS at 05:03

## 2021-03-20 RX ADMIN — LORAZEPAM 1 MG: 2 INJECTION INTRAMUSCULAR; INTRAVENOUS at 05:03

## 2021-03-20 RX ADMIN — ONDANSETRON 4 MG: 2 INJECTION INTRAMUSCULAR; INTRAVENOUS at 05:03

## 2021-03-20 RX ADMIN — IOHEXOL 100 ML: 350 INJECTION, SOLUTION INTRAVENOUS at 07:03

## 2021-03-20 RX ADMIN — HYDROMORPHONE HYDROCHLORIDE 0.5 MG: 1 INJECTION, SOLUTION INTRAMUSCULAR; INTRAVENOUS; SUBCUTANEOUS at 05:03

## 2021-03-21 ENCOUNTER — HOSPITAL ENCOUNTER (EMERGENCY)
Facility: HOSPITAL | Age: 71
Discharge: HOME | End: 2021-03-22
Attending: EMERGENCY MEDICINE
Payer: MEDICARE

## 2021-03-21 DIAGNOSIS — R06.00 DYSPNEA: ICD-10-CM

## 2021-03-21 DIAGNOSIS — E16.2 HYPOGLYCEMIA: Primary | ICD-10-CM

## 2021-03-21 LAB
ALBUMIN SERPL BCP-MCNC: 3.2 G/DL (ref 3.5–5.2)
ALP SERPL-CCNC: 55 U/L (ref 55–135)
ALT SERPL W/O P-5'-P-CCNC: 19 U/L (ref 10–44)
ANION GAP SERPL CALC-SCNC: 9 MMOL/L (ref 8–16)
AST SERPL-CCNC: 24 U/L (ref 10–40)
BASOPHILS # BLD AUTO: 0.02 K/UL (ref 0–0.2)
BASOPHILS NFR BLD: 0.3 % (ref 0–1.9)
BILIRUB SERPL-MCNC: 0.3 MG/DL (ref 0.1–1)
BNP SERPL-MCNC: 31 PG/ML (ref 0–99)
BUN SERPL-MCNC: 16 MG/DL (ref 8–23)
CALCIUM SERPL-MCNC: 8.1 MG/DL (ref 8.7–10.5)
CHLORIDE SERPL-SCNC: 92 MMOL/L (ref 95–110)
CO2 SERPL-SCNC: 38 MMOL/L (ref 23–29)
CREAT SERPL-MCNC: 0.5 MG/DL (ref 0.5–1.4)
DIFFERENTIAL METHOD: ABNORMAL
EOSINOPHIL # BLD AUTO: 0 K/UL (ref 0–0.5)
EOSINOPHIL NFR BLD: 0.6 % (ref 0–8)
ERYTHROCYTE [DISTWIDTH] IN BLOOD BY AUTOMATED COUNT: 16.5 % (ref 11.5–14.5)
EST. GFR  (AFRICAN AMERICAN): >60 ML/MIN/1.73 M^2
EST. GFR  (NON AFRICAN AMERICAN): >60 ML/MIN/1.73 M^2
GLUCOSE SERPL-MCNC: 284 MG/DL (ref 70–110)
GLUCOSE SERPL-MCNC: 39 MG/DL (ref 70–110)
GLUCOSE SERPL-MCNC: 81 MG/DL (ref 70–110)
HCT VFR BLD AUTO: 29.9 % (ref 37–48.5)
HGB BLD-MCNC: 8.6 G/DL (ref 12–16)
IMM GRANULOCYTES # BLD AUTO: 0.04 K/UL (ref 0–0.04)
IMM GRANULOCYTES NFR BLD AUTO: 0.6 % (ref 0–0.5)
LYMPHOCYTES # BLD AUTO: 1.6 K/UL (ref 1–4.8)
LYMPHOCYTES NFR BLD: 21.3 % (ref 18–48)
MCH RBC QN AUTO: 24.7 PG (ref 27–31)
MCHC RBC AUTO-ENTMCNC: 28.8 G/DL (ref 32–36)
MCV RBC AUTO: 86 FL (ref 82–98)
MONOCYTES # BLD AUTO: 0.4 K/UL (ref 0.3–1)
MONOCYTES NFR BLD: 5 % (ref 4–15)
NEUTROPHILS # BLD AUTO: 5.3 K/UL (ref 1.8–7.7)
NEUTROPHILS NFR BLD: 72.2 % (ref 38–73)
NRBC BLD-RTO: 0 /100 WBC
PLATELET # BLD AUTO: 266 K/UL (ref 150–350)
PMV BLD AUTO: 10.7 FL (ref 9.2–12.9)
POTASSIUM SERPL-SCNC: 3.5 MMOL/L (ref 3.5–5.1)
PROT SERPL-MCNC: 6.1 G/DL (ref 6–8.4)
RBC # BLD AUTO: 3.48 M/UL (ref 4–5.4)
SODIUM SERPL-SCNC: 139 MMOL/L (ref 136–145)
TROPONIN I SERPL DL<=0.01 NG/ML-MCNC: <0.03 NG/ML
WBC # BLD AUTO: 7.26 K/UL (ref 3.9–12.7)

## 2021-03-21 PROCEDURE — 93005 ELECTROCARDIOGRAM TRACING: CPT | Performed by: SPECIALIST

## 2021-03-21 PROCEDURE — 93010 EKG 12-LEAD: ICD-10-PCS | Mod: ,,, | Performed by: SPECIALIST

## 2021-03-21 PROCEDURE — 25000003 PHARM REV CODE 250: Performed by: EMERGENCY MEDICINE

## 2021-03-21 PROCEDURE — 84484 ASSAY OF TROPONIN QUANT: CPT | Performed by: EMERGENCY MEDICINE

## 2021-03-21 PROCEDURE — 82947 ASSAY GLUCOSE BLOOD QUANT: CPT | Performed by: EMERGENCY MEDICINE

## 2021-03-21 PROCEDURE — 85025 COMPLETE CBC W/AUTO DIFF WBC: CPT | Performed by: EMERGENCY MEDICINE

## 2021-03-21 PROCEDURE — 93010 ELECTROCARDIOGRAM REPORT: CPT | Mod: ,,, | Performed by: SPECIALIST

## 2021-03-21 PROCEDURE — 83880 ASSAY OF NATRIURETIC PEPTIDE: CPT | Performed by: EMERGENCY MEDICINE

## 2021-03-21 PROCEDURE — 96374 THER/PROPH/DIAG INJ IV PUSH: CPT

## 2021-03-21 PROCEDURE — 99285 EMERGENCY DEPT VISIT HI MDM: CPT | Mod: 25

## 2021-03-21 PROCEDURE — 80053 COMPREHEN METABOLIC PANEL: CPT | Performed by: EMERGENCY MEDICINE

## 2021-03-21 PROCEDURE — 82962 GLUCOSE BLOOD TEST: CPT

## 2021-03-21 RX ORDER — DEXTROSE 50 % IN WATER (D50W) INTRAVENOUS SYRINGE
25
Status: COMPLETED | OUTPATIENT
Start: 2021-03-21 | End: 2021-03-21

## 2021-03-21 RX ADMIN — DEXTROSE MONOHYDRATE 25 G: 25 INJECTION, SOLUTION INTRAVENOUS at 11:03

## 2021-03-22 VITALS
SYSTOLIC BLOOD PRESSURE: 142 MMHG | TEMPERATURE: 98 F | BODY MASS INDEX: 37.56 KG/M2 | HEIGHT: 63 IN | HEART RATE: 81 BPM | DIASTOLIC BLOOD PRESSURE: 63 MMHG | OXYGEN SATURATION: 98 % | RESPIRATION RATE: 24 BRPM | WEIGHT: 212 LBS

## 2021-03-22 LAB
BACTERIA UR CULT: ABNORMAL
GLUCOSE SERPL-MCNC: 117 MG/DL (ref 70–110)
GLUCOSE SERPL-MCNC: 170 MG/DL (ref 70–110)
GLUCOSE SERPL-MCNC: 38 MG/DL (ref 70–110)
SARS-COV-2 RDRP RESP QL NAA+PROBE: NEGATIVE

## 2021-03-22 PROCEDURE — 25000003 PHARM REV CODE 250: Performed by: EMERGENCY MEDICINE

## 2021-03-22 PROCEDURE — 82962 GLUCOSE BLOOD TEST: CPT | Mod: 91

## 2021-03-22 PROCEDURE — U0002 COVID-19 LAB TEST NON-CDC: HCPCS | Performed by: EMERGENCY MEDICINE

## 2021-03-22 RX ORDER — ACETAMINOPHEN 500 MG
1000 TABLET ORAL
Status: COMPLETED | OUTPATIENT
Start: 2021-03-22 | End: 2021-03-22

## 2021-03-22 RX ADMIN — ACETAMINOPHEN 1000 MG: 500 TABLET, FILM COATED ORAL at 01:03

## 2021-03-25 ENCOUNTER — DOCUMENT SCAN (OUTPATIENT)
Dept: HOME HEALTH SERVICES | Facility: HOSPITAL | Age: 71
End: 2021-03-25

## 2021-03-26 ENCOUNTER — OFFICE VISIT (OUTPATIENT)
Dept: FAMILY MEDICINE | Facility: CLINIC | Age: 71
End: 2021-03-26
Payer: MEDICARE

## 2021-03-26 VITALS
BODY MASS INDEX: 37.74 KG/M2 | WEIGHT: 213 LBS | HEART RATE: 80 BPM | HEIGHT: 63 IN | SYSTOLIC BLOOD PRESSURE: 110 MMHG | DIASTOLIC BLOOD PRESSURE: 60 MMHG | OXYGEN SATURATION: 90 %

## 2021-03-26 DIAGNOSIS — I10 ESSENTIAL HYPERTENSION: ICD-10-CM

## 2021-03-26 DIAGNOSIS — E11.65 TYPE 2 DIABETES MELLITUS WITH HYPERGLYCEMIA, WITH LONG-TERM CURRENT USE OF INSULIN: ICD-10-CM

## 2021-03-26 DIAGNOSIS — E78.2 MIXED HYPERLIPIDEMIA: ICD-10-CM

## 2021-03-26 DIAGNOSIS — G89.4 CHRONIC PAIN SYNDROME: ICD-10-CM

## 2021-03-26 DIAGNOSIS — M62.830 MUSCLE SPASM OF BACK: Primary | ICD-10-CM

## 2021-03-26 DIAGNOSIS — J44.9 CHRONIC OBSTRUCTIVE PULMONARY DISEASE, UNSPECIFIED COPD TYPE: ICD-10-CM

## 2021-03-26 DIAGNOSIS — J96.11 CHRONIC RESPIRATORY FAILURE WITH HYPOXIA: ICD-10-CM

## 2021-03-26 DIAGNOSIS — E66.01 CLASS 2 SEVERE OBESITY DUE TO EXCESS CALORIES WITH SERIOUS COMORBIDITY AND BODY MASS INDEX (BMI) OF 37.0 TO 37.9 IN ADULT: ICD-10-CM

## 2021-03-26 DIAGNOSIS — Z79.4 TYPE 2 DIABETES MELLITUS WITH HYPERGLYCEMIA, WITH LONG-TERM CURRENT USE OF INSULIN: ICD-10-CM

## 2021-03-26 PROCEDURE — 99214 PR OFFICE/OUTPT VISIT, EST, LEVL IV, 30-39 MIN: ICD-10-PCS | Mod: S$GLB,,, | Performed by: NURSE PRACTITIONER

## 2021-03-26 PROCEDURE — 99214 OFFICE O/P EST MOD 30 MIN: CPT | Mod: S$GLB,,, | Performed by: NURSE PRACTITIONER

## 2021-03-26 RX ORDER — METHOCARBAMOL 750 MG/1
750 TABLET, FILM COATED ORAL 4 TIMES DAILY
Qty: 40 TABLET | Refills: 2 | Status: SHIPPED | OUTPATIENT
Start: 2021-03-26 | End: 2021-04-05

## 2021-03-30 ENCOUNTER — TELEPHONE (OUTPATIENT)
Dept: FAMILY MEDICINE | Facility: CLINIC | Age: 71
End: 2021-03-30

## 2021-04-23 ENCOUNTER — TELEPHONE (OUTPATIENT)
Dept: FAMILY MEDICINE | Facility: CLINIC | Age: 71
End: 2021-04-23

## 2021-05-05 ENCOUNTER — OFFICE VISIT (OUTPATIENT)
Dept: FAMILY MEDICINE | Facility: CLINIC | Age: 71
End: 2021-05-05

## 2021-05-05 VITALS
DIASTOLIC BLOOD PRESSURE: 60 MMHG | HEIGHT: 63 IN | HEART RATE: 88 BPM | WEIGHT: 209 LBS | SYSTOLIC BLOOD PRESSURE: 128 MMHG | BODY MASS INDEX: 37.03 KG/M2

## 2021-05-05 DIAGNOSIS — M54.41 CHRONIC BILATERAL LOW BACK PAIN WITH BILATERAL SCIATICA: ICD-10-CM

## 2021-05-05 DIAGNOSIS — R53.81 PHYSICAL DEBILITY: ICD-10-CM

## 2021-05-05 DIAGNOSIS — M62.830 MUSCLE SPASM OF BACK: ICD-10-CM

## 2021-05-05 DIAGNOSIS — E44.1 MILD PROTEIN-CALORIE MALNUTRITION: ICD-10-CM

## 2021-05-05 DIAGNOSIS — F51.01 PRIMARY INSOMNIA: ICD-10-CM

## 2021-05-05 DIAGNOSIS — M54.42 CHRONIC BILATERAL LOW BACK PAIN WITH BILATERAL SCIATICA: ICD-10-CM

## 2021-05-05 DIAGNOSIS — E11.42 TYPE 2 DIABETES MELLITUS WITH DIABETIC POLYNEUROPATHY, WITH LONG-TERM CURRENT USE OF INSULIN: ICD-10-CM

## 2021-05-05 DIAGNOSIS — Z79.4 TYPE 2 DIABETES MELLITUS WITH DIABETIC POLYNEUROPATHY, WITH LONG-TERM CURRENT USE OF INSULIN: ICD-10-CM

## 2021-05-05 DIAGNOSIS — K21.9 GASTROESOPHAGEAL REFLUX DISEASE WITHOUT ESOPHAGITIS: ICD-10-CM

## 2021-05-05 DIAGNOSIS — G89.29 CHRONIC BILATERAL LOW BACK PAIN WITH BILATERAL SCIATICA: ICD-10-CM

## 2021-05-05 DIAGNOSIS — J96.11 CHRONIC RESPIRATORY FAILURE WITH HYPOXIA: Primary | ICD-10-CM

## 2021-05-05 PROCEDURE — 1157F PR ADVANCE CARE PLAN OR EQUIV PRESENT IN MEDICAL RECORD: ICD-10-PCS | Mod: S$GLB,,, | Performed by: NURSE PRACTITIONER

## 2021-05-05 PROCEDURE — 3008F BODY MASS INDEX DOCD: CPT | Mod: S$GLB,,, | Performed by: NURSE PRACTITIONER

## 2021-05-05 PROCEDURE — 1125F AMNT PAIN NOTED PAIN PRSNT: CPT | Mod: S$GLB,,, | Performed by: NURSE PRACTITIONER

## 2021-05-05 PROCEDURE — 1157F ADVNC CARE PLAN IN RCRD: CPT | Mod: S$GLB,,, | Performed by: NURSE PRACTITIONER

## 2021-05-05 PROCEDURE — 99214 PR OFFICE/OUTPT VISIT, EST, LEVL IV, 30-39 MIN: ICD-10-PCS | Mod: S$GLB,,, | Performed by: NURSE PRACTITIONER

## 2021-05-05 PROCEDURE — 1159F MED LIST DOCD IN RCRD: CPT | Mod: S$GLB,,, | Performed by: NURSE PRACTITIONER

## 2021-05-05 PROCEDURE — 1125F PR PAIN SEVERITY QUANTIFIED, PAIN PRESENT: ICD-10-PCS | Mod: S$GLB,,, | Performed by: NURSE PRACTITIONER

## 2021-05-05 PROCEDURE — 3008F PR BODY MASS INDEX (BMI) DOCUMENTED: ICD-10-PCS | Mod: S$GLB,,, | Performed by: NURSE PRACTITIONER

## 2021-05-05 PROCEDURE — 1159F PR MEDICATION LIST DOCUMENTED IN MEDICAL RECORD: ICD-10-PCS | Mod: S$GLB,,, | Performed by: NURSE PRACTITIONER

## 2021-05-05 PROCEDURE — 99214 OFFICE O/P EST MOD 30 MIN: CPT | Mod: S$GLB,,, | Performed by: NURSE PRACTITIONER

## 2021-05-05 RX ORDER — FLASH GLUCOSE SENSOR
1 KIT MISCELLANEOUS
Qty: 2 KIT | Refills: 4 | Status: SHIPPED | OUTPATIENT
Start: 2021-05-05 | End: 2021-07-06 | Stop reason: SDUPTHER

## 2021-05-05 RX ORDER — GABAPENTIN 800 MG/1
800 TABLET ORAL 3 TIMES DAILY
Qty: 270 TABLET | Refills: 1 | Status: ON HOLD | OUTPATIENT
Start: 2021-05-05 | End: 2021-05-13 | Stop reason: SDUPTHER

## 2021-05-05 RX ORDER — ROPINIROLE 4 MG/1
4 TABLET, FILM COATED ORAL 2 TIMES DAILY
Qty: 180 TABLET | Refills: 1 | Status: ON HOLD | OUTPATIENT
Start: 2021-05-05 | End: 2021-05-13 | Stop reason: SDUPTHER

## 2021-05-05 RX ORDER — TIZANIDINE 4 MG/1
4 TABLET ORAL EVERY 8 HOURS
Qty: 30 TABLET | Refills: 1 | Status: ON HOLD | OUTPATIENT
Start: 2021-05-05 | End: 2021-05-13 | Stop reason: HOSPADM

## 2021-05-05 RX ORDER — CALCITRIOL 0.5 UG/1
0.5 CAPSULE ORAL NIGHTLY
Qty: 90 CAPSULE | Refills: 1 | Status: SHIPPED | OUTPATIENT
Start: 2021-05-05 | End: 2021-06-22 | Stop reason: SDUPTHER

## 2021-05-05 RX ORDER — OXYCODONE AND ACETAMINOPHEN 5; 325 MG/1; MG/1
1 TABLET ORAL EVERY 6 HOURS PRN
Qty: 20 EACH | Refills: 0 | Status: SHIPPED | OUTPATIENT
Start: 2021-05-05 | End: 2021-06-22

## 2021-05-05 RX ORDER — PANTOPRAZOLE SODIUM 40 MG/1
40 TABLET, DELAYED RELEASE ORAL DAILY
Qty: 90 TABLET | Refills: 1 | Status: SHIPPED | OUTPATIENT
Start: 2021-05-05 | End: 2021-09-09 | Stop reason: SDUPTHER

## 2021-05-11 ENCOUNTER — TELEPHONE (OUTPATIENT)
Dept: FAMILY MEDICINE | Facility: CLINIC | Age: 71
End: 2021-05-11

## 2021-05-12 ENCOUNTER — HOSPITAL ENCOUNTER (OUTPATIENT)
Facility: HOSPITAL | Age: 71
Discharge: HOME HEALTH | End: 2021-05-13
Attending: EMERGENCY MEDICINE | Admitting: INTERNAL MEDICINE
Payer: MEDICARE
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
## 2021-05-12 DIAGNOSIS — F51.01 PRIMARY INSOMNIA: ICD-10-CM

## 2021-05-12 DIAGNOSIS — E11.42 TYPE 2 DIABETES MELLITUS WITH DIABETIC POLYNEUROPATHY, WITH LONG-TERM CURRENT USE OF INSULIN: ICD-10-CM

## 2021-05-12 DIAGNOSIS — Z79.899 POLYPHARMACY: ICD-10-CM

## 2021-05-12 DIAGNOSIS — Z79.4 TYPE 2 DIABETES MELLITUS WITH DIABETIC POLYNEUROPATHY, WITH LONG-TERM CURRENT USE OF INSULIN: ICD-10-CM

## 2021-05-12 DIAGNOSIS — I95.9 HYPOTENSION, UNSPECIFIED HYPOTENSION TYPE: ICD-10-CM
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
## 2021-05-12 DIAGNOSIS — J98.6 DIAPHRAGMATIC DISORDER: ICD-10-CM

## 2021-05-12 DIAGNOSIS — R07.9 CHEST PAIN: Primary | ICD-10-CM

## 2021-05-12 LAB
ALBUMIN SERPL BCP-MCNC: 3.1 G/DL (ref 3.5–5.2)
ALBUMIN SERPL BCP-MCNC: 3.3 G/DL (ref 3.5–5.2)
ALLENS TEST: ABNORMAL
ALP SERPL-CCNC: 61 U/L (ref 55–135)
ALP SERPL-CCNC: 67 U/L (ref 55–135)
ALT SERPL W/O P-5'-P-CCNC: 14 U/L (ref 10–44)
ALT SERPL W/O P-5'-P-CCNC: 15 U/L (ref 10–44)
AMPHET+METHAMPHET UR QL: NEGATIVE
ANION GAP SERPL CALC-SCNC: 10 MMOL/L (ref 8–16)
ANION GAP SERPL CALC-SCNC: 9 MMOL/L (ref 8–16)
APTT PPP: 26.7 SEC (ref 25.6–35.8)
AST SERPL-CCNC: 15 U/L (ref 10–40)
AST SERPL-CCNC: 16 U/L (ref 10–40)
BACTERIA #/AREA URNS HPF: ABNORMAL /HPF
BARBITURATES UR QL SCN>200 NG/ML: NEGATIVE
BASOPHILS # BLD AUTO: 0.02 K/UL (ref 0–0.2)
BASOPHILS # BLD AUTO: 0.03 K/UL (ref 0–0.2)
BASOPHILS NFR BLD: 0.3 % (ref 0–1.9)
BASOPHILS NFR BLD: 0.4 % (ref 0–1.9)
BENZODIAZ UR QL SCN>200 NG/ML: NEGATIVE
BILIRUB SERPL-MCNC: 0.7 MG/DL (ref 0.1–1)
BILIRUB SERPL-MCNC: 0.7 MG/DL (ref 0.1–1)
BILIRUB UR QL STRIP: NEGATIVE
BNP SERPL-MCNC: 29 PG/ML (ref 0–99)
BNP SERPL-MCNC: 29 PG/ML (ref 0–99)
BUN SERPL-MCNC: 10 MG/DL (ref 8–23)
BUN SERPL-MCNC: 13 MG/DL (ref 8–23)
BZE UR QL SCN: NEGATIVE
CALCIUM SERPL-MCNC: 8.1 MG/DL (ref 8.7–10.5)
CALCIUM SERPL-MCNC: 8.4 MG/DL (ref 8.7–10.5)
CANNABINOIDS UR QL SCN: NEGATIVE
CHLORIDE SERPL-SCNC: 91 MMOL/L (ref 95–110)
CHLORIDE SERPL-SCNC: 93 MMOL/L (ref 95–110)
CLARITY UR: ABNORMAL
CO2 SERPL-SCNC: 36 MMOL/L (ref 23–29)
CO2 SERPL-SCNC: 39 MMOL/L (ref 23–29)
COLOR UR: YELLOW
CREAT SERPL-MCNC: 0.5 MG/DL (ref 0.5–1.4)
CREAT SERPL-MCNC: 0.6 MG/DL (ref 0.5–1.4)
CREAT UR-MCNC: 56 MG/DL (ref 15–325)
DELSYS: ABNORMAL
DIFFERENTIAL METHOD: ABNORMAL
DIFFERENTIAL METHOD: ABNORMAL
EOSINOPHIL # BLD AUTO: 0 K/UL (ref 0–0.5)
EOSINOPHIL # BLD AUTO: 0 K/UL (ref 0–0.5)
EOSINOPHIL NFR BLD: 0.4 % (ref 0–8)
EOSINOPHIL NFR BLD: 0.6 % (ref 0–8)
ERYTHROCYTE [DISTWIDTH] IN BLOOD BY AUTOMATED COUNT: 17 % (ref 11.5–14.5)
ERYTHROCYTE [DISTWIDTH] IN BLOOD BY AUTOMATED COUNT: 17.1 % (ref 11.5–14.5)
ERYTHROCYTE [SEDIMENTATION RATE] IN BLOOD BY WESTERGREN METHOD: 18 MM/H
EST. GFR  (AFRICAN AMERICAN): >60 ML/MIN/1.73 M^2
EST. GFR  (AFRICAN AMERICAN): >60 ML/MIN/1.73 M^2
EST. GFR  (NON AFRICAN AMERICAN): >60 ML/MIN/1.73 M^2
EST. GFR  (NON AFRICAN AMERICAN): >60 ML/MIN/1.73 M^2
FLOW: 4
GLUCOSE SERPL-MCNC: 158 MG/DL (ref 70–110)
GLUCOSE SERPL-MCNC: 177 MG/DL (ref 70–110)
GLUCOSE SERPL-MCNC: 182 MG/DL (ref 70–110)
GLUCOSE SERPL-MCNC: 194 MG/DL (ref 70–110)
GLUCOSE SERPL-MCNC: 198 MG/DL (ref 70–110)
GLUCOSE SERPL-MCNC: 271 MG/DL (ref 70–110)
GLUCOSE SERPL-MCNC: 275 MG/DL (ref 70–110)
GLUCOSE UR QL STRIP: ABNORMAL
HCO3 UR-SCNC: 42.3 MMOL/L (ref 24–28)
HCT VFR BLD AUTO: 30.4 % (ref 37–48.5)
HCT VFR BLD AUTO: 33 % (ref 37–48.5)
HGB BLD-MCNC: 8.9 G/DL (ref 12–16)
HGB BLD-MCNC: 9.2 G/DL (ref 12–16)
HGB UR QL STRIP: NEGATIVE
HYALINE CASTS #/AREA URNS LPF: 6 /LPF
IMM GRANULOCYTES # BLD AUTO: 0.02 K/UL (ref 0–0.04)
IMM GRANULOCYTES # BLD AUTO: 0.04 K/UL (ref 0–0.04)
IMM GRANULOCYTES NFR BLD AUTO: 0.3 % (ref 0–0.5)
IMM GRANULOCYTES NFR BLD AUTO: 0.5 % (ref 0–0.5)
INR PPP: 1
KETONES UR QL STRIP: NEGATIVE
LACTATE SERPL-SCNC: 1 MMOL/L (ref 0.5–1.9)
LEUKOCYTE ESTERASE UR QL STRIP: ABNORMAL
LIPASE SERPL-CCNC: 22 U/L (ref 4–60)
LYMPHOCYTES # BLD AUTO: 1.3 K/UL (ref 1–4.8)
LYMPHOCYTES # BLD AUTO: 1.6 K/UL (ref 1–4.8)
LYMPHOCYTES NFR BLD: 16.5 % (ref 18–48)
LYMPHOCYTES NFR BLD: 23.7 % (ref 18–48)
MAGNESIUM SERPL-MCNC: 1.7 MG/DL (ref 1.6–2.6)
MCH RBC QN AUTO: 22.9 PG (ref 27–31)
MCH RBC QN AUTO: 23.2 PG (ref 27–31)
MCHC RBC AUTO-ENTMCNC: 27.9 G/DL (ref 32–36)
MCHC RBC AUTO-ENTMCNC: 29.3 G/DL (ref 32–36)
MCV RBC AUTO: 79 FL (ref 82–98)
MCV RBC AUTO: 82 FL (ref 82–98)
MICROSCOPIC COMMENT: ABNORMAL
MODE: ABNORMAL
MONOCYTES # BLD AUTO: 0.4 K/UL (ref 0.3–1)
MONOCYTES # BLD AUTO: 0.4 K/UL (ref 0.3–1)
MONOCYTES NFR BLD: 5.5 % (ref 4–15)
MONOCYTES NFR BLD: 6.5 % (ref 4–15)
NEUTROPHILS # BLD AUTO: 4.7 K/UL (ref 1.8–7.7)
NEUTROPHILS # BLD AUTO: 6 K/UL (ref 1.8–7.7)
NEUTROPHILS NFR BLD: 68.6 % (ref 38–73)
NEUTROPHILS NFR BLD: 76.7 % (ref 38–73)
NITRITE UR QL STRIP: NEGATIVE
NRBC BLD-RTO: 0 /100 WBC
NRBC BLD-RTO: 0 /100 WBC
OPIATES UR QL SCN: NEGATIVE
PCO2 BLDA: 84.5 MMHG (ref 35–45)
PCP UR QL SCN>25 NG/ML: NEGATIVE
PH SMN: 7.31 [PH] (ref 7.35–7.45)
PH UR STRIP: 6 [PH] (ref 5–8)
PLATELET # BLD AUTO: 298 K/UL (ref 150–450)
PLATELET # BLD AUTO: 300 K/UL (ref 150–450)
PMV BLD AUTO: 10.2 FL (ref 9.2–12.9)
PMV BLD AUTO: 11 FL (ref 9.2–12.9)
PO2 BLDA: 117 MMHG (ref 80–100)
POC BE: 16 MMOL/L
POC SATURATED O2: 98 % (ref 95–100)
POC TCO2: 45 MMOL/L (ref 23–27)
POTASSIUM SERPL-SCNC: 4.5 MMOL/L (ref 3.5–5.1)
POTASSIUM SERPL-SCNC: 4.6 MMOL/L (ref 3.5–5.1)
PROT SERPL-MCNC: 6.4 G/DL (ref 6–8.4)
PROT SERPL-MCNC: 6.6 G/DL (ref 6–8.4)
PROT UR QL STRIP: NEGATIVE
PROTHROMBIN TIME: 12.7 SEC (ref 11.8–14.3)
RBC # BLD AUTO: 3.83 M/UL (ref 4–5.4)
RBC # BLD AUTO: 4.02 M/UL (ref 4–5.4)
RBC #/AREA URNS HPF: 1 /HPF (ref 0–4)
SAMPLE: ABNORMAL
SARS-COV-2 RDRP RESP QL NAA+PROBE: NEGATIVE
SITE: ABNORMAL
SODIUM SERPL-SCNC: 137 MMOL/L (ref 136–145)
SODIUM SERPL-SCNC: 141 MMOL/L (ref 136–145)
SP GR UR STRIP: 1.01 (ref 1–1.03)
SP02: 100
SQUAMOUS #/AREA URNS HPF: 0 /HPF
TOXICOLOGY INFORMATION: NORMAL
TROPONIN I SERPL DL<=0.01 NG/ML-MCNC: <0.03 NG/ML
TSH SERPL DL<=0.005 MIU/L-ACNC: 2.11 UIU/ML (ref 0.34–5.6)
URN SPEC COLLECT METH UR: ABNORMAL
UROBILINOGEN UR STRIP-ACNC: NEGATIVE EU/DL
WBC # BLD AUTO: 6.8 K/UL (ref 3.9–12.7)
WBC # BLD AUTO: 7.76 K/UL (ref 3.9–12.7)
WBC #/AREA URNS HPF: 56 /HPF (ref 0–5)

## 2021-05-12 PROCEDURE — G0378 HOSPITAL OBSERVATION PER HR: HCPCS

## 2021-05-12 PROCEDURE — 25000003 PHARM REV CODE 250: Performed by: INTERNAL MEDICINE

## 2021-05-12 PROCEDURE — 87040 BLOOD CULTURE FOR BACTERIA: CPT | Mod: 59 | Performed by: EMERGENCY MEDICINE

## 2021-05-12 PROCEDURE — 99900031 HC PATIENT EDUCATION (STAT)

## 2021-05-12 PROCEDURE — 96372 THER/PROPH/DIAG INJ SC/IM: CPT

## 2021-05-12 PROCEDURE — 80307 DRUG TEST PRSMV CHEM ANLYZR: CPT | Performed by: EMERGENCY MEDICINE

## 2021-05-12 PROCEDURE — 84443 ASSAY THYROID STIM HORMONE: CPT | Performed by: EMERGENCY MEDICINE

## 2021-05-12 PROCEDURE — 83735 ASSAY OF MAGNESIUM: CPT | Performed by: EMERGENCY MEDICINE

## 2021-05-12 PROCEDURE — 80053 COMPREHEN METABOLIC PANEL: CPT | Performed by: EMERGENCY MEDICINE

## 2021-05-12 PROCEDURE — 85730 THROMBOPLASTIN TIME PARTIAL: CPT | Performed by: EMERGENCY MEDICINE

## 2021-05-12 PROCEDURE — 36415 COLL VENOUS BLD VENIPUNCTURE: CPT | Performed by: INTERNAL MEDICINE

## 2021-05-12 PROCEDURE — 63600175 PHARM REV CODE 636 W HCPCS: Performed by: EMERGENCY MEDICINE

## 2021-05-12 PROCEDURE — 96374 THER/PROPH/DIAG INJ IV PUSH: CPT | Mod: 59

## 2021-05-12 PROCEDURE — 85610 PROTHROMBIN TIME: CPT | Performed by: EMERGENCY MEDICINE

## 2021-05-12 PROCEDURE — 85025 COMPLETE CBC W/AUTO DIFF WBC: CPT | Performed by: EMERGENCY MEDICINE

## 2021-05-12 PROCEDURE — 81001 URINALYSIS AUTO W/SCOPE: CPT | Mod: 59 | Performed by: EMERGENCY MEDICINE

## 2021-05-12 PROCEDURE — 94761 N-INVAS EAR/PLS OXIMETRY MLT: CPT

## 2021-05-12 PROCEDURE — 96361 HYDRATE IV INFUSION ADD-ON: CPT

## 2021-05-12 PROCEDURE — 25000003 PHARM REV CODE 250: Performed by: EMERGENCY MEDICINE

## 2021-05-12 PROCEDURE — 36600 WITHDRAWAL OF ARTERIAL BLOOD: CPT

## 2021-05-12 PROCEDURE — 80053 COMPREHEN METABOLIC PANEL: CPT | Mod: 91 | Performed by: INTERNAL MEDICINE

## 2021-05-12 PROCEDURE — 99285 EMERGENCY DEPT VISIT HI MDM: CPT | Mod: 25

## 2021-05-12 PROCEDURE — 82803 BLOOD GASES ANY COMBINATION: CPT

## 2021-05-12 PROCEDURE — U0002 COVID-19 LAB TEST NON-CDC: HCPCS | Performed by: EMERGENCY MEDICINE

## 2021-05-12 PROCEDURE — 85025 COMPLETE CBC W/AUTO DIFF WBC: CPT | Mod: 91 | Performed by: INTERNAL MEDICINE

## 2021-05-12 PROCEDURE — 83605 ASSAY OF LACTIC ACID: CPT | Performed by: EMERGENCY MEDICINE

## 2021-05-12 PROCEDURE — 27000221 HC OXYGEN, UP TO 24 HOURS

## 2021-05-12 PROCEDURE — 84484 ASSAY OF TROPONIN QUANT: CPT | Performed by: EMERGENCY MEDICINE

## 2021-05-12 PROCEDURE — 99900035 HC TECH TIME PER 15 MIN (STAT)

## 2021-05-12 PROCEDURE — 83880 ASSAY OF NATRIURETIC PEPTIDE: CPT | Performed by: EMERGENCY MEDICINE

## 2021-05-12 PROCEDURE — 82962 GLUCOSE BLOOD TEST: CPT

## 2021-05-12 PROCEDURE — 83690 ASSAY OF LIPASE: CPT | Performed by: EMERGENCY MEDICINE

## 2021-05-12 PROCEDURE — 63600175 PHARM REV CODE 636 W HCPCS: Performed by: INTERNAL MEDICINE

## 2021-05-12 PROCEDURE — 93005 ELECTROCARDIOGRAM TRACING: CPT | Performed by: INTERNAL MEDICINE

## 2021-05-12 RX ORDER — ALBUTEROL SULFATE 90 UG/1
2 AEROSOL, METERED RESPIRATORY (INHALATION) EVERY 6 HOURS PRN
Status: DISCONTINUED | OUTPATIENT
Start: 2021-05-12 | End: 2021-05-13 | Stop reason: HOSPADM

## 2021-05-12 RX ORDER — IPRATROPIUM BROMIDE AND ALBUTEROL SULFATE 2.5; .5 MG/3ML; MG/3ML
3 SOLUTION RESPIRATORY (INHALATION) EVERY 6 HOURS PRN
Status: DISCONTINUED | OUTPATIENT
Start: 2021-05-12 | End: 2021-05-13 | Stop reason: HOSPADM

## 2021-05-12 RX ORDER — CEFUROXIME AXETIL 250 MG/1
500 TABLET ORAL EVERY 12 HOURS
Status: DISCONTINUED | OUTPATIENT
Start: 2021-05-12 | End: 2021-05-13 | Stop reason: HOSPADM

## 2021-05-12 RX ORDER — IBUPROFEN 200 MG
16 TABLET ORAL
Status: DISCONTINUED | OUTPATIENT
Start: 2021-05-12 | End: 2021-05-13 | Stop reason: HOSPADM

## 2021-05-12 RX ORDER — ONDANSETRON 2 MG/ML
4 INJECTION INTRAMUSCULAR; INTRAVENOUS EVERY 6 HOURS PRN
Status: DISCONTINUED | OUTPATIENT
Start: 2021-05-12 | End: 2021-05-13 | Stop reason: HOSPADM

## 2021-05-12 RX ORDER — ACETAMINOPHEN 325 MG/1
650 TABLET ORAL EVERY 4 HOURS PRN
Status: DISCONTINUED | OUTPATIENT
Start: 2021-05-12 | End: 2021-05-13 | Stop reason: HOSPADM

## 2021-05-12 RX ORDER — OXYCODONE AND ACETAMINOPHEN 10; 325 MG/1; MG/1
1 TABLET ORAL ONCE
Status: COMPLETED | OUTPATIENT
Start: 2021-05-13 | End: 2021-05-13

## 2021-05-12 RX ORDER — IBUPROFEN 200 MG
24 TABLET ORAL
Status: DISCONTINUED | OUTPATIENT
Start: 2021-05-12 | End: 2021-05-13 | Stop reason: HOSPADM

## 2021-05-12 RX ORDER — PANTOPRAZOLE SODIUM 40 MG/1
40 TABLET, DELAYED RELEASE ORAL
Status: DISCONTINUED | OUTPATIENT
Start: 2021-05-12 | End: 2021-05-13 | Stop reason: HOSPADM

## 2021-05-12 RX ORDER — ENOXAPARIN SODIUM 100 MG/ML
40 INJECTION SUBCUTANEOUS EVERY 24 HOURS
Status: DISCONTINUED | OUTPATIENT
Start: 2021-05-12 | End: 2021-05-13 | Stop reason: HOSPADM

## 2021-05-12 RX ORDER — INSULIN ASPART 100 [IU]/ML
1-10 INJECTION, SOLUTION INTRAVENOUS; SUBCUTANEOUS
Status: DISCONTINUED | OUTPATIENT
Start: 2021-05-12 | End: 2021-05-13 | Stop reason: HOSPADM

## 2021-05-12 RX ORDER — SODIUM CHLORIDE 9 MG/ML
INJECTION, SOLUTION INTRAVENOUS CONTINUOUS
Status: DISCONTINUED | OUTPATIENT
Start: 2021-05-12 | End: 2021-05-13 | Stop reason: HOSPADM

## 2021-05-12 RX ORDER — PREDNISONE 5 MG/1
10 TABLET ORAL DAILY
Status: DISCONTINUED | OUTPATIENT
Start: 2021-05-12 | End: 2021-05-13 | Stop reason: HOSPADM

## 2021-05-12 RX ORDER — GLUCAGON 1 MG
1 KIT INJECTION
Status: DISCONTINUED | OUTPATIENT
Start: 2021-05-12 | End: 2021-05-13 | Stop reason: HOSPADM

## 2021-05-12 RX ADMIN — CEFUROXIME AXETIL 500 MG: 250 TABLET, FILM COATED ORAL at 09:05

## 2021-05-12 RX ADMIN — SODIUM CHLORIDE 500 ML: 0.9 INJECTION, SOLUTION INTRAVENOUS at 12:05

## 2021-05-12 RX ADMIN — PANTOPRAZOLE SODIUM 40 MG: 40 TABLET, DELAYED RELEASE ORAL at 07:05

## 2021-05-12 RX ADMIN — PREDNISONE 10 MG: 5 TABLET ORAL at 09:05

## 2021-05-12 RX ADMIN — SODIUM CHLORIDE 500 ML: 0.9 INJECTION, SOLUTION INTRAVENOUS at 03:05

## 2021-05-12 RX ADMIN — CEFUROXIME AXETIL 500 MG: 250 TABLET, FILM COATED ORAL at 08:05

## 2021-05-12 RX ADMIN — INSULIN ASPART 3 UNITS: 100 INJECTION, SOLUTION INTRAVENOUS; SUBCUTANEOUS at 08:05

## 2021-05-12 RX ADMIN — ACETAMINOPHEN 650 MG: 325 TABLET, FILM COATED ORAL at 09:05

## 2021-05-12 RX ADMIN — CEFTRIAXONE 1 G: 1 INJECTION, SOLUTION INTRAVENOUS at 04:05

## 2021-05-12 RX ADMIN — SODIUM CHLORIDE: 0.9 INJECTION, SOLUTION INTRAVENOUS at 06:05

## 2021-05-12 RX ADMIN — ACETAMINOPHEN 650 MG: 325 TABLET, FILM COATED ORAL at 08:05

## 2021-05-12 RX ADMIN — ACETAMINOPHEN 650 MG: 325 TABLET, FILM COATED ORAL at 03:05

## 2021-05-13 VITALS
SYSTOLIC BLOOD PRESSURE: 179 MMHG | TEMPERATURE: 98 F | DIASTOLIC BLOOD PRESSURE: 76 MMHG | RESPIRATION RATE: 18 BRPM | HEIGHT: 64 IN | OXYGEN SATURATION: 100 % | WEIGHT: 207.25 LBS | HEART RATE: 92 BPM | BODY MASS INDEX: 35.38 KG/M2

## 2021-05-13 LAB
ALBUMIN SERPL BCP-MCNC: 3.3 G/DL (ref 3.5–5.2)
ALP SERPL-CCNC: 69 U/L (ref 55–135)
ALT SERPL W/O P-5'-P-CCNC: 15 U/L (ref 10–44)
ANION GAP SERPL CALC-SCNC: 8 MMOL/L (ref 8–16)
AST SERPL-CCNC: 16 U/L (ref 10–40)
BASOPHILS # BLD AUTO: 0.03 K/UL (ref 0–0.2)
BASOPHILS NFR BLD: 0.5 % (ref 0–1.9)
BILIRUB SERPL-MCNC: 0.6 MG/DL (ref 0.1–1)
BUN SERPL-MCNC: 11 MG/DL (ref 8–23)
CALCIUM SERPL-MCNC: 8.7 MG/DL (ref 8.7–10.5)
CHLORIDE SERPL-SCNC: 93 MMOL/L (ref 95–110)
CO2 SERPL-SCNC: 41 MMOL/L (ref 23–29)
CREAT SERPL-MCNC: 0.5 MG/DL (ref 0.5–1.4)
DIFFERENTIAL METHOD: ABNORMAL
EOSINOPHIL # BLD AUTO: 0.1 K/UL (ref 0–0.5)
EOSINOPHIL NFR BLD: 1.4 % (ref 0–8)
ERYTHROCYTE [DISTWIDTH] IN BLOOD BY AUTOMATED COUNT: 16.9 % (ref 11.5–14.5)
EST. GFR  (AFRICAN AMERICAN): >60 ML/MIN/1.73 M^2
EST. GFR  (NON AFRICAN AMERICAN): >60 ML/MIN/1.73 M^2
GLUCOSE SERPL-MCNC: 179 MG/DL (ref 70–110)
GLUCOSE SERPL-MCNC: 183 MG/DL (ref 70–110)
GLUCOSE SERPL-MCNC: 238 MG/DL (ref 70–110)
HCT VFR BLD AUTO: 34.5 % (ref 37–48.5)
HGB BLD-MCNC: 9.9 G/DL (ref 12–16)
IMM GRANULOCYTES # BLD AUTO: 0.04 K/UL (ref 0–0.04)
IMM GRANULOCYTES NFR BLD AUTO: 0.7 % (ref 0–0.5)
LYMPHOCYTES # BLD AUTO: 1.9 K/UL (ref 1–4.8)
LYMPHOCYTES NFR BLD: 32.8 % (ref 18–48)
MCH RBC QN AUTO: 23.3 PG (ref 27–31)
MCHC RBC AUTO-ENTMCNC: 28.7 G/DL (ref 32–36)
MCV RBC AUTO: 81 FL (ref 82–98)
MONOCYTES # BLD AUTO: 0.4 K/UL (ref 0.3–1)
MONOCYTES NFR BLD: 7.3 % (ref 4–15)
NEUTROPHILS # BLD AUTO: 3.4 K/UL (ref 1.8–7.7)
NEUTROPHILS NFR BLD: 57.3 % (ref 38–73)
NRBC BLD-RTO: 0 /100 WBC
PLATELET # BLD AUTO: 318 K/UL (ref 150–450)
PMV BLD AUTO: 10.5 FL (ref 9.2–12.9)
POTASSIUM SERPL-SCNC: 5 MMOL/L (ref 3.5–5.1)
PROT SERPL-MCNC: 7 G/DL (ref 6–8.4)
RBC # BLD AUTO: 4.25 M/UL (ref 4–5.4)
SODIUM SERPL-SCNC: 142 MMOL/L (ref 136–145)
WBC # BLD AUTO: 5.91 K/UL (ref 3.9–12.7)

## 2021-05-13 PROCEDURE — 36415 COLL VENOUS BLD VENIPUNCTURE: CPT | Performed by: INTERNAL MEDICINE

## 2021-05-13 PROCEDURE — 97161 PT EVAL LOW COMPLEX 20 MIN: CPT

## 2021-05-13 PROCEDURE — 97530 THERAPEUTIC ACTIVITIES: CPT

## 2021-05-13 PROCEDURE — 97535 SELF CARE MNGMENT TRAINING: CPT | Mod: 59

## 2021-05-13 PROCEDURE — 80053 COMPREHEN METABOLIC PANEL: CPT | Performed by: INTERNAL MEDICINE

## 2021-05-13 PROCEDURE — 99900031 HC PATIENT EDUCATION (STAT)

## 2021-05-13 PROCEDURE — 85025 COMPLETE CBC W/AUTO DIFF WBC: CPT | Performed by: INTERNAL MEDICINE

## 2021-05-13 PROCEDURE — 25000003 PHARM REV CODE 250: Performed by: INTERNAL MEDICINE

## 2021-05-13 PROCEDURE — 94761 N-INVAS EAR/PLS OXIMETRY MLT: CPT

## 2021-05-13 PROCEDURE — 97165 OT EVAL LOW COMPLEX 30 MIN: CPT

## 2021-05-13 PROCEDURE — 99900035 HC TECH TIME PER 15 MIN (STAT)

## 2021-05-13 PROCEDURE — 63600175 PHARM REV CODE 636 W HCPCS: Performed by: INTERNAL MEDICINE

## 2021-05-13 PROCEDURE — G0378 HOSPITAL OBSERVATION PER HR: HCPCS

## 2021-05-13 PROCEDURE — 27000221 HC OXYGEN, UP TO 24 HOURS

## 2021-05-13 RX ORDER — ROPINIROLE 4 MG/1
4 TABLET, FILM COATED ORAL NIGHTLY
Qty: 180 TABLET | Refills: 1 | Status: SHIPPED | OUTPATIENT
Start: 2021-05-13 | End: 2021-07-12 | Stop reason: SDUPTHER

## 2021-05-13 RX ORDER — GABAPENTIN 800 MG/1
400 TABLET ORAL 3 TIMES DAILY
Qty: 270 TABLET | Refills: 1 | Status: SHIPPED | OUTPATIENT
Start: 2021-05-13 | End: 2021-09-09 | Stop reason: SDUPTHER

## 2021-05-13 RX ORDER — CEFUROXIME AXETIL 500 MG/1
500 TABLET ORAL EVERY 12 HOURS
Qty: 6 TABLET | Refills: 0 | Status: SHIPPED | OUTPATIENT
Start: 2021-05-13 | End: 2021-05-16

## 2021-05-13 RX ADMIN — PANTOPRAZOLE SODIUM 40 MG: 40 TABLET, DELAYED RELEASE ORAL at 09:05

## 2021-05-13 RX ADMIN — CEFUROXIME AXETIL 500 MG: 250 TABLET, FILM COATED ORAL at 09:05

## 2021-05-13 RX ADMIN — OXYCODONE HYDROCHLORIDE AND ACETAMINOPHEN 1 TABLET: 10; 325 TABLET ORAL at 12:05

## 2021-05-13 RX ADMIN — PREDNISONE 10 MG: 5 TABLET ORAL at 09:05

## 2021-05-13 RX ADMIN — ACETAMINOPHEN 650 MG: 325 TABLET, FILM COATED ORAL at 03:05

## 2021-05-17 ENCOUNTER — PATIENT OUTREACH (OUTPATIENT)
Dept: FAMILY MEDICINE | Facility: CLINIC | Age: 71
End: 2021-05-17

## 2021-05-17 LAB
BACTERIA BLD CULT: NORMAL
BACTERIA BLD CULT: NORMAL

## 2021-05-17 RX ORDER — BLOOD SUGAR DIAGNOSTIC
1 STRIP MISCELLANEOUS 4 TIMES DAILY
Qty: 200 STRIP | Refills: 2 | Status: SHIPPED | OUTPATIENT
Start: 2021-05-17 | End: 2021-08-29

## 2021-05-17 RX ORDER — BLOOD SUGAR DIAGNOSTIC
STRIP MISCELLANEOUS
Status: CANCELLED | OUTPATIENT
Start: 2021-05-17

## 2021-05-20 ENCOUNTER — OFFICE VISIT (OUTPATIENT)
Dept: FAMILY MEDICINE | Facility: CLINIC | Age: 71
End: 2021-05-20
Payer: MEDICARE

## 2021-05-20 VITALS
SYSTOLIC BLOOD PRESSURE: 130 MMHG | BODY MASS INDEX: 35.34 KG/M2 | WEIGHT: 207 LBS | HEIGHT: 64 IN | HEART RATE: 80 BPM | DIASTOLIC BLOOD PRESSURE: 70 MMHG

## 2021-05-20 DIAGNOSIS — Z91.81 HISTORY OF RECENT FALL: ICD-10-CM

## 2021-05-20 DIAGNOSIS — J96.11 CHRONIC RESPIRATORY FAILURE WITH HYPOXIA: ICD-10-CM

## 2021-05-20 DIAGNOSIS — Z09 HOSPITAL DISCHARGE FOLLOW-UP: Primary | ICD-10-CM

## 2021-05-20 DIAGNOSIS — Z79.899 POLYPHARMACY: ICD-10-CM

## 2021-05-20 PROCEDURE — 3008F BODY MASS INDEX DOCD: CPT | Mod: S$GLB,,, | Performed by: NURSE PRACTITIONER

## 2021-05-20 PROCEDURE — 99495 TRANSJ CARE MGMT MOD F2F 14D: CPT | Mod: S$GLB,,, | Performed by: NURSE PRACTITIONER

## 2021-05-20 PROCEDURE — 1157F ADVNC CARE PLAN IN RCRD: CPT | Mod: S$GLB,,, | Performed by: NURSE PRACTITIONER

## 2021-05-20 PROCEDURE — 1157F PR ADVANCE CARE PLAN OR EQUIV PRESENT IN MEDICAL RECORD: ICD-10-PCS | Mod: S$GLB,,, | Performed by: NURSE PRACTITIONER

## 2021-05-20 PROCEDURE — 99495 TCM SERVICES (MODERATE COMPLEXITY): ICD-10-PCS | Mod: S$GLB,,, | Performed by: NURSE PRACTITIONER

## 2021-05-20 PROCEDURE — 3008F PR BODY MASS INDEX (BMI) DOCUMENTED: ICD-10-PCS | Mod: S$GLB,,, | Performed by: NURSE PRACTITIONER

## 2021-05-21 ENCOUNTER — TELEPHONE (OUTPATIENT)
Dept: FAMILY MEDICINE | Facility: CLINIC | Age: 71
End: 2021-05-21

## 2021-05-27 ENCOUNTER — TELEPHONE (OUTPATIENT)
Dept: FAMILY MEDICINE | Facility: CLINIC | Age: 71
End: 2021-05-27

## 2021-06-04 ENCOUNTER — EXTERNAL HOME HEALTH (OUTPATIENT)
Dept: HOME HEALTH SERVICES | Facility: HOSPITAL | Age: 71
End: 2021-06-04

## 2021-06-08 ENCOUNTER — TELEPHONE (OUTPATIENT)
Dept: FAMILY MEDICINE | Facility: CLINIC | Age: 71
End: 2021-06-08

## 2021-06-10 ENCOUNTER — TELEPHONE (OUTPATIENT)
Dept: FAMILY MEDICINE | Facility: CLINIC | Age: 71
End: 2021-06-10

## 2021-06-15 NOTE — OP NOTE
[facility]  Operative Note     Date: 04/26/2018     Pre-Op Diagnosis: Stress Urinary Incontinence     Post-Op Diagnosis: same     Procedure(s) Performed:   1.  Retropubic sling placement, [place] Lynx  2.  Cystoscopy      Specimen(s): posterior bladder wall     Staff Surgeon: Faith Strong MD  Assistant Surgeon: Dr.Laurephile Luis Angel MD     Anesthesia: General endotracheal anesthesia     Indications: Alanis Mendieta is a 67 y.o. female  a 65 y.o. female with WILLIAM and posterior prolapse     Findings:   No perforation of bladder with trocar  No perforation of urethra with trocar  Trocars well away from detrussor   uo's in anatomic position, trocar placement well away from UO's  Hypertonic appearing bladder with Grade 1 trabeculations throughout.      Estimated Blood Loss: 50cc     Drains: 16 Fr preston catheter     Procedure in detail:  After the risks and benefits of the procedure were explained, consent was obtained. Her urinalysis was confirmed to be negative. The patient was then taken to the operating room and placed under general anesthesia. Pre-operative antibiotics were administered. She was placed in a dorsal lithotomy position and prepped and draped in a normal and sterile fashion.      The mid-urethra was identified and an allis clamp was placed on the vaginal epithelium just distal to this.  The overlying vaginal epithelium was infiltrated with 1% lidocaine. A 1.5 cm mid-urethral incision was made on the vaginal epithelium using a 15 blade. The plane between the vaginal epithelium and silvino-urethral fascia was developed to each side of the urethra bilaterally. Two abdominal incisions about 0.5 cm in length were made on both sides of the midline  just superior to the pubic symphsis. A subcutaneous bleeding vessel was cauterized on the left. The bladder was then emptied again. The Lynx trocar was then pushed through the rectus fascia as the handle was moved cephalad. The needle  Subjective


Date of Service: 06/15/21


Chief Complaint: chest pain, ACS rule out


Patient with ongoing intermittent chest pain exacerbated by stress.











Physical Examination





- Vital Signs


Temperature: 97.4 F


Blood Pressure: 140/76


Pulse: 57


Respirations: 16


Pulse Ox (%): 99





- Physical Exam


General: Alert, In no apparent distress


HEENT: PERRLA, Mucous membr. moist/pink


Neck: Supple, JVD not distended


Respiratory: Clear to auscultation bilaterally, Normal air movement


Cardiovascular: No edema, Regular rate/rhythm, Normal S1 S2


Gastrointestinal: Normal bowel sounds, Soft and benign, Non-distended, No 

tenderness


Musculoskeletal: No swelling, No tenderness


Integumentary: No rashes


Neurological: Normal speech, Normal strength at 5/5 x4 extr





Assessment And Plan


Physician Review Additional Text: 





Problem list


Angina-recurrent. 


Hypertension


DM 2, non-insulin-dependent


obesity





-patient with a high risk factors for coronary disease.  Recurrent


-cardiology input appreciated.


-patient will need echocardiogram and stress test in a.m. given his high cardiac

risk factors


-continue insulin sliding scale, accucheks


-aspirin, lovenox, statin was walked down the posterior pubic bone through the retropubic space with the needle exiting the vaginal. After it was confirmed that there was no perforation of the vaginal epithelium, this same step was repeated on the contralateral side.      A 17 Fr cystoscope with a 70 degree lens was then used to evaluate the urethra and bladder. It was confirmed there was no perforation of the urethra or bladder. efflux could be seen exiting both ureteral orifices bilaterally.  The cystoscope was removed and the preston was then replaced.     The Lynx needles, which had been soaking in Bacitracin irrigation, were then connected to the mesh. The mesh was then pulled up through the suprapubic incisions as the needles were pulled back in the reverse trajectory in which they were initially placed. The needles and connectors were then cut. The mesh was then tensioned appropriately by placing a mets between the urethra and the mesh with the screw of the mets at the level of the mesh. This allows for an appropriate tension free placement of the mesh. The plastic sheaths were then removed and the exposed mesh was cut at the level of the skin.     Floseal was placed into the crevices on each side.      After irrigating the vaginal incision, the vaginal incision was closed  in a running fashion using 3-0 vicryl.  These abdominal skin incisions were closed using dermabond, 2-0 vicryl had been used to bring together the subcutaneous tissue below the incision.     The posterior repair and perineorapphy were then performed by .        Plan:  Place in observation overnight.   Pt already has  Pain medicine managed by a pain doctor. Can take every 4 to 6 hours as needed for pain.   Continue Miralax 1 cup daily   Voiding trial in am.   No antibiotics necessary.  Return in 2 weeks for post-op visit with  for vaginal exam and check residual by in and out cath, assess straining and emptying.   She will f/u with me in 6  weeks post-op again to check residual and vaginal exam. Hodan will arrange or sooner if she is c/o straining to urinate or has elevated urine residual.   Return to ER if she has significant vaginal drainage or difficulty urinating.   See post op instructions for prolapse and sling and review with her.     Will need to continue myrbetriq postop and may need botox in the future based on appearance of bladder.

## 2021-06-21 ENCOUNTER — TELEPHONE (OUTPATIENT)
Dept: FAMILY MEDICINE | Facility: CLINIC | Age: 71
End: 2021-06-21

## 2021-06-22 ENCOUNTER — OFFICE VISIT (OUTPATIENT)
Dept: FAMILY MEDICINE | Facility: CLINIC | Age: 71
End: 2021-06-22
Payer: COMMERCIAL

## 2021-06-22 VITALS
BODY MASS INDEX: 35.34 KG/M2 | SYSTOLIC BLOOD PRESSURE: 118 MMHG | HEIGHT: 64 IN | WEIGHT: 207 LBS | DIASTOLIC BLOOD PRESSURE: 58 MMHG | HEART RATE: 60 BPM

## 2021-06-22 DIAGNOSIS — K59.01 SLOW TRANSIT CONSTIPATION: ICD-10-CM

## 2021-06-22 DIAGNOSIS — F41.9 ANXIETY: ICD-10-CM

## 2021-06-22 DIAGNOSIS — J96.11 CHRONIC RESPIRATORY FAILURE WITH HYPOXIA: ICD-10-CM

## 2021-06-22 DIAGNOSIS — M62.830 MUSCLE SPASM OF BACK: ICD-10-CM

## 2021-06-22 DIAGNOSIS — E89.0 POSTOPERATIVE HYPOTHYROIDISM: ICD-10-CM

## 2021-06-22 DIAGNOSIS — F32.A DEPRESSION, UNSPECIFIED DEPRESSION TYPE: ICD-10-CM

## 2021-06-22 DIAGNOSIS — R42 VERTIGO: ICD-10-CM

## 2021-06-22 DIAGNOSIS — F51.01 PRIMARY INSOMNIA: ICD-10-CM

## 2021-06-22 DIAGNOSIS — Z79.4 TYPE 2 DIABETES MELLITUS WITH DIABETIC POLYNEUROPATHY, WITH LONG-TERM CURRENT USE OF INSULIN: Primary | ICD-10-CM

## 2021-06-22 DIAGNOSIS — E44.1 MILD PROTEIN-CALORIE MALNUTRITION: ICD-10-CM

## 2021-06-22 DIAGNOSIS — I10 ESSENTIAL HYPERTENSION: ICD-10-CM

## 2021-06-22 DIAGNOSIS — E11.42 TYPE 2 DIABETES MELLITUS WITH DIABETIC POLYNEUROPATHY, WITH LONG-TERM CURRENT USE OF INSULIN: Primary | ICD-10-CM

## 2021-06-22 LAB — HBA1C MFR BLD: 8.2 %

## 2021-06-22 PROCEDURE — 99214 OFFICE O/P EST MOD 30 MIN: CPT | Mod: S$GLB,,, | Performed by: NURSE PRACTITIONER

## 2021-06-22 PROCEDURE — 99214 PR OFFICE/OUTPT VISIT, EST, LEVL IV, 30-39 MIN: ICD-10-PCS | Mod: S$GLB,,, | Performed by: NURSE PRACTITIONER

## 2021-06-22 PROCEDURE — 83036 POCT HEMOGLOBIN A1C: ICD-10-PCS | Mod: QW,,, | Performed by: NURSE PRACTITIONER

## 2021-06-22 PROCEDURE — 83036 HEMOGLOBIN GLYCOSYLATED A1C: CPT | Mod: QW,,, | Performed by: NURSE PRACTITIONER

## 2021-06-22 RX ORDER — FLUOXETINE HYDROCHLORIDE 20 MG/1
CAPSULE ORAL
COMMUNITY
Start: 2021-05-26 | End: 2021-06-22

## 2021-06-22 RX ORDER — POTASSIUM CHLORIDE 750 MG/1
10 TABLET, EXTENDED RELEASE ORAL
Qty: 90 TABLET | Refills: 1 | Status: SHIPPED | OUTPATIENT
Start: 2021-06-23 | End: 2021-09-09 | Stop reason: SDUPTHER

## 2021-06-22 RX ORDER — CLONAZEPAM 0.5 MG/1
0.5 TABLET ORAL 2 TIMES DAILY
Qty: 60 TABLET | Refills: 2 | Status: SHIPPED | OUTPATIENT
Start: 2021-07-08 | End: 2021-10-11 | Stop reason: SDUPTHER

## 2021-06-22 RX ORDER — INSULIN DETEMIR 100 [IU]/ML
55 INJECTION, SOLUTION SUBCUTANEOUS 2 TIMES DAILY
Qty: 3 BOX | Refills: 5 | Status: ON HOLD | OUTPATIENT
Start: 2021-06-22 | End: 2021-09-02 | Stop reason: SDUPTHER

## 2021-06-22 RX ORDER — CALCITRIOL 0.5 UG/1
0.5 CAPSULE ORAL NIGHTLY
Qty: 90 CAPSULE | Refills: 1 | Status: SHIPPED | OUTPATIENT
Start: 2021-06-22 | End: 2021-09-09 | Stop reason: SDUPTHER

## 2021-06-22 RX ORDER — VENLAFAXINE HYDROCHLORIDE 37.5 MG/1
37.5 CAPSULE, EXTENDED RELEASE ORAL DAILY
COMMUNITY
Start: 2021-06-19 | End: 2021-09-09 | Stop reason: SDUPTHER

## 2021-06-22 RX ORDER — OXYCODONE AND ACETAMINOPHEN 10; 325 MG/1; MG/1
1 TABLET ORAL 3 TIMES DAILY
COMMUNITY
Start: 2021-06-08 | End: 2021-10-07

## 2021-06-22 RX ORDER — QUETIAPINE FUMARATE 50 MG/1
50 TABLET, FILM COATED ORAL NIGHTLY
COMMUNITY
Start: 2021-05-26 | End: 2021-08-29

## 2021-06-22 RX ORDER — LAMOTRIGINE 150 MG/1
150 TABLET ORAL 2 TIMES DAILY
Qty: 180 TABLET | Refills: 1 | Status: SHIPPED | OUTPATIENT
Start: 2021-06-22 | End: 2021-09-09 | Stop reason: SDUPTHER

## 2021-06-22 RX ORDER — PREDNISONE 5 MG/1
10 TABLET ORAL DAILY
Qty: 90 TABLET | Refills: 1 | Status: SHIPPED | OUTPATIENT
Start: 2021-06-22 | End: 2021-09-09 | Stop reason: SDUPTHER

## 2021-06-22 RX ORDER — METHOCARBAMOL 500 MG/1
500 TABLET, FILM COATED ORAL 3 TIMES DAILY
Qty: 30 TABLET | Refills: 2 | Status: ON HOLD | OUTPATIENT
Start: 2021-06-22 | End: 2021-06-27 | Stop reason: HOSPADM

## 2021-06-22 RX ORDER — MECLIZINE HYDROCHLORIDE 25 MG/1
25 TABLET ORAL 3 TIMES DAILY PRN
Qty: 90 TABLET | Refills: 1 | Status: SHIPPED | OUTPATIENT
Start: 2021-06-22 | End: 2021-09-09 | Stop reason: SDUPTHER

## 2021-06-22 RX ORDER — LEVOTHYROXINE SODIUM 150 UG/1
150 TABLET ORAL
Qty: 90 TABLET | Refills: 1 | Status: SHIPPED | OUTPATIENT
Start: 2021-06-22 | End: 2021-09-09 | Stop reason: SDUPTHER

## 2021-06-26 ENCOUNTER — HOSPITAL ENCOUNTER (OUTPATIENT)
Facility: HOSPITAL | Age: 71
Discharge: HOME | End: 2021-06-27
Attending: EMERGENCY MEDICINE | Admitting: INTERNAL MEDICINE
Payer: COMMERCIAL

## 2021-06-26 DIAGNOSIS — E11.00 TYPE 2 DIABETES MELLITUS WITH HYPEROSMOLARITY WITHOUT COMA, WITH LONG-TERM CURRENT USE OF INSULIN: ICD-10-CM

## 2021-06-26 DIAGNOSIS — R06.02 SHORTNESS OF BREATH: ICD-10-CM

## 2021-06-26 DIAGNOSIS — R55 SYNCOPE: ICD-10-CM

## 2021-06-26 DIAGNOSIS — J96.11 CHRONIC RESPIRATORY FAILURE WITH HYPOXIA: ICD-10-CM

## 2021-06-26 DIAGNOSIS — Z79.4 TYPE 2 DIABETES MELLITUS WITH HYPEROSMOLARITY WITHOUT COMA, WITH LONG-TERM CURRENT USE OF INSULIN: ICD-10-CM

## 2021-06-26 DIAGNOSIS — R55 SYNCOPE AND COLLAPSE: Primary | ICD-10-CM

## 2021-06-26 LAB
ALBUMIN SERPL BCP-MCNC: 3.2 G/DL (ref 3.5–5.2)
ALLENS TEST: ABNORMAL
ALP SERPL-CCNC: 56 U/L (ref 55–135)
ALT SERPL W/O P-5'-P-CCNC: 14 U/L (ref 10–44)
ANION GAP SERPL CALC-SCNC: 8 MMOL/L (ref 8–16)
AST SERPL-CCNC: 18 U/L (ref 10–40)
BACTERIA #/AREA URNS HPF: ABNORMAL /HPF
BASOPHILS # BLD AUTO: 0.02 K/UL (ref 0–0.2)
BASOPHILS NFR BLD: 0.3 % (ref 0–1.9)
BILIRUB SERPL-MCNC: 0.4 MG/DL (ref 0.1–1)
BILIRUB UR QL STRIP: NEGATIVE
BNP SERPL-MCNC: 48 PG/ML (ref 0–99)
BUN SERPL-MCNC: 19 MG/DL (ref 8–23)
CALCIUM SERPL-MCNC: 8.6 MG/DL (ref 8.7–10.5)
CHLORIDE SERPL-SCNC: 98 MMOL/L (ref 95–110)
CLARITY UR: CLEAR
CO2 SERPL-SCNC: 35 MMOL/L (ref 23–29)
COLOR UR: YELLOW
CREAT SERPL-MCNC: 0.4 MG/DL (ref 0.5–1.4)
CREAT SERPL-MCNC: 0.5 MG/DL (ref 0.5–1.4)
D DIMER PPP IA.FEU-MCNC: 0.5 UG/ML FEU
DELSYS: ABNORMAL
DIFFERENTIAL METHOD: ABNORMAL
EOSINOPHIL # BLD AUTO: 0.1 K/UL (ref 0–0.5)
EOSINOPHIL NFR BLD: 1 % (ref 0–8)
ERYTHROCYTE [DISTWIDTH] IN BLOOD BY AUTOMATED COUNT: 18.1 % (ref 11.5–14.5)
EST. GFR  (AFRICAN AMERICAN): >60 ML/MIN/1.73 M^2
EST. GFR  (NON AFRICAN AMERICAN): >60 ML/MIN/1.73 M^2
FLOW: 3
GLUCOSE SERPL-MCNC: 236 MG/DL (ref 70–110)
GLUCOSE UR QL STRIP: ABNORMAL
HCO3 UR-SCNC: 39.4 MMOL/L (ref 24–28)
HCT VFR BLD AUTO: 29.2 % (ref 37–48.5)
HGB BLD-MCNC: 8.2 G/DL (ref 12–16)
HGB UR QL STRIP: NEGATIVE
HYALINE CASTS #/AREA URNS LPF: 1 /LPF
IMM GRANULOCYTES # BLD AUTO: 0.02 K/UL (ref 0–0.04)
IMM GRANULOCYTES NFR BLD AUTO: 0.3 % (ref 0–0.5)
KETONES UR QL STRIP: NEGATIVE
LACTATE SERPL-SCNC: 0.9 MMOL/L (ref 0.5–1.9)
LEUKOCYTE ESTERASE UR QL STRIP: NEGATIVE
LYMPHOCYTES # BLD AUTO: 1.4 K/UL (ref 1–4.8)
LYMPHOCYTES NFR BLD: 24.1 % (ref 18–48)
MCH RBC QN AUTO: 22.7 PG (ref 27–31)
MCHC RBC AUTO-ENTMCNC: 28.1 G/DL (ref 32–36)
MCV RBC AUTO: 81 FL (ref 82–98)
MICROSCOPIC COMMENT: ABNORMAL
MODE: ABNORMAL
MONOCYTES # BLD AUTO: 0.4 K/UL (ref 0.3–1)
MONOCYTES NFR BLD: 6.3 % (ref 4–15)
NEUTROPHILS # BLD AUTO: 4 K/UL (ref 1.8–7.7)
NEUTROPHILS NFR BLD: 68 % (ref 38–73)
NITRITE UR QL STRIP: POSITIVE
NRBC BLD-RTO: 0 /100 WBC
PCO2 BLDA: 67.6 MMHG (ref 35–45)
PH SMN: 7.37 [PH] (ref 7.35–7.45)
PH UR STRIP: 7 [PH] (ref 5–8)
PLATELET # BLD AUTO: 324 K/UL (ref 150–450)
PMV BLD AUTO: 10.6 FL (ref 9.2–12.9)
PO2 BLDA: 44 MMHG (ref 40–60)
POC BE: 14 MMOL/L
POC SATURATED O2: 76 % (ref 95–100)
POC TCO2: 41 MMOL/L (ref 24–29)
POTASSIUM SERPL-SCNC: 4.3 MMOL/L (ref 3.5–5.1)
PROT SERPL-MCNC: 6.6 G/DL (ref 6–8.4)
PROT UR QL STRIP: ABNORMAL
RBC # BLD AUTO: 3.62 M/UL (ref 4–5.4)
RBC #/AREA URNS HPF: 3 /HPF (ref 0–4)
SAMPLE: ABNORMAL
SAMPLE: NORMAL
SITE: ABNORMAL
SODIUM SERPL-SCNC: 141 MMOL/L (ref 136–145)
SP GR UR STRIP: 1.02 (ref 1–1.03)
SQUAMOUS #/AREA URNS HPF: 1 /HPF
TROPONIN I SERPL DL<=0.01 NG/ML-MCNC: <0.03 NG/ML
TSH SERPL DL<=0.005 MIU/L-ACNC: 3.51 UIU/ML (ref 0.34–5.6)
URN SPEC COLLECT METH UR: ABNORMAL
UROBILINOGEN UR STRIP-ACNC: NEGATIVE EU/DL
WBC # BLD AUTO: 5.86 K/UL (ref 3.9–12.7)
WBC #/AREA URNS HPF: 14 /HPF (ref 0–5)
YEAST URNS QL MICRO: ABNORMAL

## 2021-06-26 PROCEDURE — 87086 URINE CULTURE/COLONY COUNT: CPT | Performed by: STUDENT IN AN ORGANIZED HEALTH CARE EDUCATION/TRAINING PROGRAM

## 2021-06-26 PROCEDURE — 82803 BLOOD GASES ANY COMBINATION: CPT

## 2021-06-26 PROCEDURE — 93005 ELECTROCARDIOGRAM TRACING: CPT | Performed by: INTERNAL MEDICINE

## 2021-06-26 PROCEDURE — 83605 ASSAY OF LACTIC ACID: CPT | Performed by: STUDENT IN AN ORGANIZED HEALTH CARE EDUCATION/TRAINING PROGRAM

## 2021-06-26 PROCEDURE — 36600 WITHDRAWAL OF ARTERIAL BLOOD: CPT

## 2021-06-26 PROCEDURE — 27000221 HC OXYGEN, UP TO 24 HOURS

## 2021-06-26 PROCEDURE — 83880 ASSAY OF NATRIURETIC PEPTIDE: CPT | Performed by: STUDENT IN AN ORGANIZED HEALTH CARE EDUCATION/TRAINING PROGRAM

## 2021-06-26 PROCEDURE — 87186 SC STD MICRODIL/AGAR DIL: CPT | Performed by: STUDENT IN AN ORGANIZED HEALTH CARE EDUCATION/TRAINING PROGRAM

## 2021-06-26 PROCEDURE — 93010 EKG 12-LEAD: ICD-10-PCS | Mod: ,,, | Performed by: INTERNAL MEDICINE

## 2021-06-26 PROCEDURE — 81001 URINALYSIS AUTO W/SCOPE: CPT | Performed by: STUDENT IN AN ORGANIZED HEALTH CARE EDUCATION/TRAINING PROGRAM

## 2021-06-26 PROCEDURE — 25500020 PHARM REV CODE 255: Performed by: EMERGENCY MEDICINE

## 2021-06-26 PROCEDURE — 85379 FIBRIN DEGRADATION QUANT: CPT | Performed by: STUDENT IN AN ORGANIZED HEALTH CARE EDUCATION/TRAINING PROGRAM

## 2021-06-26 PROCEDURE — 85025 COMPLETE CBC W/AUTO DIFF WBC: CPT | Performed by: STUDENT IN AN ORGANIZED HEALTH CARE EDUCATION/TRAINING PROGRAM

## 2021-06-26 PROCEDURE — 93010 ELECTROCARDIOGRAM REPORT: CPT | Mod: ,,, | Performed by: INTERNAL MEDICINE

## 2021-06-26 PROCEDURE — 99285 EMERGENCY DEPT VISIT HI MDM: CPT | Mod: 25

## 2021-06-26 PROCEDURE — 36415 COLL VENOUS BLD VENIPUNCTURE: CPT | Performed by: STUDENT IN AN ORGANIZED HEALTH CARE EDUCATION/TRAINING PROGRAM

## 2021-06-26 PROCEDURE — 84443 ASSAY THYROID STIM HORMONE: CPT | Performed by: STUDENT IN AN ORGANIZED HEALTH CARE EDUCATION/TRAINING PROGRAM

## 2021-06-26 PROCEDURE — 80053 COMPREHEN METABOLIC PANEL: CPT | Performed by: STUDENT IN AN ORGANIZED HEALTH CARE EDUCATION/TRAINING PROGRAM

## 2021-06-26 PROCEDURE — 99900035 HC TECH TIME PER 15 MIN (STAT)

## 2021-06-26 PROCEDURE — 84484 ASSAY OF TROPONIN QUANT: CPT | Performed by: STUDENT IN AN ORGANIZED HEALTH CARE EDUCATION/TRAINING PROGRAM

## 2021-06-26 PROCEDURE — 87077 CULTURE AEROBIC IDENTIFY: CPT | Performed by: STUDENT IN AN ORGANIZED HEALTH CARE EDUCATION/TRAINING PROGRAM

## 2021-06-26 RX ADMIN — IOHEXOL 100 ML: 350 INJECTION, SOLUTION INTRAVENOUS at 09:06

## 2021-06-27 VITALS
SYSTOLIC BLOOD PRESSURE: 135 MMHG | HEIGHT: 64 IN | RESPIRATION RATE: 22 BRPM | DIASTOLIC BLOOD PRESSURE: 72 MMHG | OXYGEN SATURATION: 100 % | TEMPERATURE: 98 F | WEIGHT: 208.13 LBS | HEART RATE: 84 BPM | BODY MASS INDEX: 35.53 KG/M2

## 2021-06-27 PROBLEM — G25.81 RLS (RESTLESS LEGS SYNDROME): Chronic | Status: ACTIVE | Noted: 2021-06-27

## 2021-06-27 PROBLEM — E66.9 CLASS 1 OBESITY IN ADULT: Chronic | Status: ACTIVE | Noted: 2021-06-27

## 2021-06-27 PROBLEM — R55 SYNCOPE: Status: ACTIVE | Noted: 2021-06-27

## 2021-06-27 PROBLEM — N39.0 UTI (URINARY TRACT INFECTION): Status: ACTIVE | Noted: 2021-06-27

## 2021-06-27 PROBLEM — R55 SYNCOPE: Status: RESOLVED | Noted: 2021-06-27 | Resolved: 2021-06-27

## 2021-06-27 LAB
ALBUMIN SERPL BCP-MCNC: 3.4 G/DL (ref 3.5–5.2)
ALP SERPL-CCNC: 64 U/L (ref 55–135)
ALT SERPL W/O P-5'-P-CCNC: 14 U/L (ref 10–44)
ANION GAP SERPL CALC-SCNC: 10 MMOL/L (ref 8–16)
AST SERPL-CCNC: 19 U/L (ref 10–40)
BASOPHILS # BLD AUTO: 0.03 K/UL (ref 0–0.2)
BASOPHILS NFR BLD: 0.6 % (ref 0–1.9)
BILIRUB SERPL-MCNC: 0.6 MG/DL (ref 0.1–1)
BUN SERPL-MCNC: 13 MG/DL (ref 8–23)
CALCIUM SERPL-MCNC: 8.5 MG/DL (ref 8.7–10.5)
CHLORIDE SERPL-SCNC: 95 MMOL/L (ref 95–110)
CO2 SERPL-SCNC: 35 MMOL/L (ref 23–29)
CREAT SERPL-MCNC: 0.4 MG/DL (ref 0.5–1.4)
DIFFERENTIAL METHOD: ABNORMAL
EOSINOPHIL # BLD AUTO: 0.1 K/UL (ref 0–0.5)
EOSINOPHIL NFR BLD: 0.9 % (ref 0–8)
ERYTHROCYTE [DISTWIDTH] IN BLOOD BY AUTOMATED COUNT: 18.2 % (ref 11.5–14.5)
EST. GFR  (AFRICAN AMERICAN): >60 ML/MIN/1.73 M^2
EST. GFR  (NON AFRICAN AMERICAN): >60 ML/MIN/1.73 M^2
ESTIMATED AVG GLUCOSE: 192 MG/DL (ref 68–131)
GLUCOSE SERPL-MCNC: 145 MG/DL (ref 70–110)
GLUCOSE SERPL-MCNC: 234 MG/DL (ref 70–110)
GLUCOSE SERPL-MCNC: 252 MG/DL (ref 70–110)
HBA1C MFR BLD: 8.3 % (ref 4.5–6.2)
HCT VFR BLD AUTO: 31.2 % (ref 37–48.5)
HGB BLD-MCNC: 8.8 G/DL (ref 12–16)
IMM GRANULOCYTES # BLD AUTO: 0.03 K/UL (ref 0–0.04)
IMM GRANULOCYTES NFR BLD AUTO: 0.6 % (ref 0–0.5)
LYMPHOCYTES # BLD AUTO: 1.7 K/UL (ref 1–4.8)
LYMPHOCYTES NFR BLD: 32.5 % (ref 18–48)
MCH RBC QN AUTO: 22.6 PG (ref 27–31)
MCHC RBC AUTO-ENTMCNC: 28.2 G/DL (ref 32–36)
MCV RBC AUTO: 80 FL (ref 82–98)
MONOCYTES # BLD AUTO: 0.4 K/UL (ref 0.3–1)
MONOCYTES NFR BLD: 6.8 % (ref 4–15)
NEUTROPHILS # BLD AUTO: 3.1 K/UL (ref 1.8–7.7)
NEUTROPHILS NFR BLD: 58.6 % (ref 38–73)
NRBC BLD-RTO: 0 /100 WBC
PLATELET # BLD AUTO: 319 K/UL (ref 150–450)
PMV BLD AUTO: 10.1 FL (ref 9.2–12.9)
POTASSIUM SERPL-SCNC: 3.7 MMOL/L (ref 3.5–5.1)
PROT SERPL-MCNC: 6.9 G/DL (ref 6–8.4)
RBC # BLD AUTO: 3.9 M/UL (ref 4–5.4)
SARS-COV-2 RDRP RESP QL NAA+PROBE: NEGATIVE
SODIUM SERPL-SCNC: 140 MMOL/L (ref 136–145)
TROPONIN I SERPL DL<=0.01 NG/ML-MCNC: <0.03 NG/ML
TROPONIN I SERPL DL<=0.01 NG/ML-MCNC: <0.03 NG/ML
WBC # BLD AUTO: 5.29 K/UL (ref 3.9–12.7)

## 2021-06-27 PROCEDURE — 84484 ASSAY OF TROPONIN QUANT: CPT | Mod: 91 | Performed by: NURSE PRACTITIONER

## 2021-06-27 PROCEDURE — C9399 UNCLASSIFIED DRUGS OR BIOLOG: HCPCS | Performed by: NURSE PRACTITIONER

## 2021-06-27 PROCEDURE — 83036 HEMOGLOBIN GLYCOSYLATED A1C: CPT | Performed by: NURSE PRACTITIONER

## 2021-06-27 PROCEDURE — 63600175 PHARM REV CODE 636 W HCPCS: Mod: GY | Performed by: NURSE PRACTITIONER

## 2021-06-27 PROCEDURE — 99900031 HC PATIENT EDUCATION (STAT)

## 2021-06-27 PROCEDURE — 25000242 PHARM REV CODE 250 ALT 637 W/ HCPCS: Performed by: INTERNAL MEDICINE

## 2021-06-27 PROCEDURE — G0378 HOSPITAL OBSERVATION PER HR: HCPCS

## 2021-06-27 PROCEDURE — 94761 N-INVAS EAR/PLS OXIMETRY MLT: CPT

## 2021-06-27 PROCEDURE — 99900035 HC TECH TIME PER 15 MIN (STAT)

## 2021-06-27 PROCEDURE — 27000221 HC OXYGEN, UP TO 24 HOURS

## 2021-06-27 PROCEDURE — 80053 COMPREHEN METABOLIC PANEL: CPT | Performed by: NURSE PRACTITIONER

## 2021-06-27 PROCEDURE — 25000003 PHARM REV CODE 250: Performed by: NURSE PRACTITIONER

## 2021-06-27 PROCEDURE — 96372 THER/PROPH/DIAG INJ SC/IM: CPT | Mod: 59

## 2021-06-27 PROCEDURE — 94640 AIRWAY INHALATION TREATMENT: CPT

## 2021-06-27 PROCEDURE — 36415 COLL VENOUS BLD VENIPUNCTURE: CPT | Performed by: NURSE PRACTITIONER

## 2021-06-27 PROCEDURE — 85025 COMPLETE CBC W/AUTO DIFF WBC: CPT | Performed by: NURSE PRACTITIONER

## 2021-06-27 PROCEDURE — U0002 COVID-19 LAB TEST NON-CDC: HCPCS | Performed by: EMERGENCY MEDICINE

## 2021-06-27 RX ORDER — VENLAFAXINE HYDROCHLORIDE 37.5 MG/1
37.5 CAPSULE, EXTENDED RELEASE ORAL DAILY
Status: DISCONTINUED | OUTPATIENT
Start: 2021-06-27 | End: 2021-06-27 | Stop reason: HOSPADM

## 2021-06-27 RX ORDER — INSULIN ASPART 100 [IU]/ML
0-5 INJECTION, SOLUTION INTRAVENOUS; SUBCUTANEOUS
Status: DISCONTINUED | OUTPATIENT
Start: 2021-06-27 | End: 2021-06-27 | Stop reason: HOSPADM

## 2021-06-27 RX ORDER — QUETIAPINE FUMARATE 25 MG/1
50 TABLET, FILM COATED ORAL NIGHTLY
Status: DISCONTINUED | OUTPATIENT
Start: 2021-06-27 | End: 2021-06-27 | Stop reason: HOSPADM

## 2021-06-27 RX ORDER — LATANOPROST 50 UG/ML
1 SOLUTION/ DROPS OPHTHALMIC NIGHTLY
Status: DISCONTINUED | OUTPATIENT
Start: 2021-06-27 | End: 2021-06-27 | Stop reason: HOSPADM

## 2021-06-27 RX ORDER — ONDANSETRON 2 MG/ML
4 INJECTION INTRAMUSCULAR; INTRAVENOUS EVERY 8 HOURS PRN
Status: DISCONTINUED | OUTPATIENT
Start: 2021-06-27 | End: 2021-06-27 | Stop reason: HOSPADM

## 2021-06-27 RX ORDER — AMOXICILLIN AND CLAVULANATE POTASSIUM 875; 125 MG/1; MG/1
1 TABLET, FILM COATED ORAL 2 TIMES DAILY
Qty: 6 TABLET | Refills: 0 | Status: SHIPPED | OUTPATIENT
Start: 2021-06-27 | End: 2021-06-30

## 2021-06-27 RX ORDER — ROPINIROLE 2 MG/1
4 TABLET, FILM COATED ORAL NIGHTLY
Status: DISCONTINUED | OUTPATIENT
Start: 2021-06-27 | End: 2021-06-27 | Stop reason: HOSPADM

## 2021-06-27 RX ORDER — IBUPROFEN 200 MG
16 TABLET ORAL
Status: DISCONTINUED | OUTPATIENT
Start: 2021-06-27 | End: 2021-06-27 | Stop reason: HOSPADM

## 2021-06-27 RX ORDER — DOCUSATE SODIUM 100 MG/1
100 CAPSULE, LIQUID FILLED ORAL 2 TIMES DAILY
Status: DISCONTINUED | OUTPATIENT
Start: 2021-06-27 | End: 2021-06-27 | Stop reason: HOSPADM

## 2021-06-27 RX ORDER — GLUCAGON 1 MG
1 KIT INJECTION
Status: DISCONTINUED | OUTPATIENT
Start: 2021-06-27 | End: 2021-06-27 | Stop reason: HOSPADM

## 2021-06-27 RX ORDER — PREDNISONE 5 MG/1
10 TABLET ORAL DAILY
Status: DISCONTINUED | OUTPATIENT
Start: 2021-06-27 | End: 2021-06-27 | Stop reason: HOSPADM

## 2021-06-27 RX ORDER — IPRATROPIUM BROMIDE AND ALBUTEROL SULFATE 2.5; .5 MG/3ML; MG/3ML
3 SOLUTION RESPIRATORY (INHALATION)
Status: DISCONTINUED | OUTPATIENT
Start: 2021-06-27 | End: 2021-06-27 | Stop reason: HOSPADM

## 2021-06-27 RX ORDER — IBUPROFEN 200 MG
24 TABLET ORAL
Status: DISCONTINUED | OUTPATIENT
Start: 2021-06-27 | End: 2021-06-27 | Stop reason: HOSPADM

## 2021-06-27 RX ORDER — POTASSIUM CHLORIDE 750 MG/1
10 CAPSULE, EXTENDED RELEASE ORAL
Status: DISCONTINUED | OUTPATIENT
Start: 2021-06-28 | End: 2021-06-27 | Stop reason: HOSPADM

## 2021-06-27 RX ORDER — LOSARTAN POTASSIUM 50 MG/1
50 TABLET ORAL DAILY
Status: DISCONTINUED | OUTPATIENT
Start: 2021-06-27 | End: 2021-06-27 | Stop reason: HOSPADM

## 2021-06-27 RX ORDER — PANTOPRAZOLE SODIUM 40 MG/1
40 TABLET, DELAYED RELEASE ORAL
Status: DISCONTINUED | OUTPATIENT
Start: 2021-06-27 | End: 2021-06-27 | Stop reason: HOSPADM

## 2021-06-27 RX ORDER — HYDROCHLOROTHIAZIDE 12.5 MG/1
12.5 TABLET ORAL DAILY
Status: DISCONTINUED | OUTPATIENT
Start: 2021-06-27 | End: 2021-06-27 | Stop reason: HOSPADM

## 2021-06-27 RX ORDER — IPRATROPIUM BROMIDE AND ALBUTEROL SULFATE 2.5; .5 MG/3ML; MG/3ML
3 SOLUTION RESPIRATORY (INHALATION) EVERY 6 HOURS
Status: DISCONTINUED | OUTPATIENT
Start: 2021-06-27 | End: 2021-06-27

## 2021-06-27 RX ORDER — CLONAZEPAM 0.5 MG/1
0.5 TABLET ORAL 2 TIMES DAILY
Status: DISCONTINUED | OUTPATIENT
Start: 2021-06-27 | End: 2021-06-27 | Stop reason: HOSPADM

## 2021-06-27 RX ORDER — CALCITRIOL 0.25 UG/1
0.5 CAPSULE ORAL NIGHTLY
Status: DISCONTINUED | OUTPATIENT
Start: 2021-06-27 | End: 2021-06-27 | Stop reason: HOSPADM

## 2021-06-27 RX ORDER — MECLIZINE HCL 12.5 MG 12.5 MG/1
25 TABLET ORAL 3 TIMES DAILY PRN
Status: DISCONTINUED | OUTPATIENT
Start: 2021-06-27 | End: 2021-06-27 | Stop reason: HOSPADM

## 2021-06-27 RX ORDER — SODIUM CHLORIDE 0.9 % (FLUSH) 0.9 %
10 SYRINGE (ML) INJECTION
Status: DISCONTINUED | OUTPATIENT
Start: 2021-06-27 | End: 2021-06-27 | Stop reason: HOSPADM

## 2021-06-27 RX ORDER — ACETAMINOPHEN 325 MG/1
650 TABLET ORAL EVERY 8 HOURS PRN
Status: DISCONTINUED | OUTPATIENT
Start: 2021-06-27 | End: 2021-06-27 | Stop reason: HOSPADM

## 2021-06-27 RX ADMIN — INSULIN ASPART 2 UNITS: 100 INJECTION, SOLUTION INTRAVENOUS; SUBCUTANEOUS at 04:06

## 2021-06-27 RX ADMIN — INSULIN ASPART 2 UNITS: 100 INJECTION, SOLUTION INTRAVENOUS; SUBCUTANEOUS at 10:06

## 2021-06-27 RX ADMIN — LAMOTRIGINE 150 MG: 100 TABLET ORAL at 10:06

## 2021-06-27 RX ADMIN — PREDNISONE 10 MG: 5 TABLET ORAL at 10:06

## 2021-06-27 RX ADMIN — QUETIAPINE FUMARATE 50 MG: 25 TABLET, FILM COATED ORAL at 05:06

## 2021-06-27 RX ADMIN — IPRATROPIUM BROMIDE AND ALBUTEROL SULFATE 3 ML: .5; 3 SOLUTION RESPIRATORY (INHALATION) at 01:06

## 2021-06-27 RX ADMIN — VENLAFAXINE HYDROCHLORIDE 37.5 MG: 37.5 CAPSULE, EXTENDED RELEASE ORAL at 10:06

## 2021-06-27 RX ADMIN — PANTOPRAZOLE SODIUM 40 MG: 40 TABLET, DELAYED RELEASE ORAL at 05:06

## 2021-06-27 RX ADMIN — DOCUSATE SODIUM 100 MG: 100 CAPSULE, LIQUID FILLED ORAL at 10:06

## 2021-06-27 RX ADMIN — HYDROCHLOROTHIAZIDE 12.5 MG: 12.5 TABLET ORAL at 10:06

## 2021-06-27 RX ADMIN — IPRATROPIUM BROMIDE AND ALBUTEROL SULFATE 3 ML: .5; 3 SOLUTION RESPIRATORY (INHALATION) at 07:06

## 2021-06-27 RX ADMIN — LEVOTHYROXINE SODIUM 150 MCG: 25 TABLET ORAL at 05:06

## 2021-06-27 RX ADMIN — ROPINIROLE 4 MG: 2 TABLET, FILM COATED ORAL at 05:06

## 2021-06-27 RX ADMIN — INSULIN DETEMIR 55 UNITS: 100 INJECTION, SOLUTION SUBCUTANEOUS at 10:06

## 2021-06-27 RX ADMIN — CLONAZEPAM 0.5 MG: 0.5 TABLET ORAL at 10:06

## 2021-06-27 RX ADMIN — LOSARTAN POTASSIUM 50 MG: 50 TABLET, FILM COATED ORAL at 10:06

## 2021-06-28 ENCOUNTER — PATIENT OUTREACH (OUTPATIENT)
Dept: FAMILY MEDICINE | Facility: CLINIC | Age: 71
End: 2021-06-28

## 2021-06-28 ENCOUNTER — TELEPHONE (OUTPATIENT)
Dept: FAMILY MEDICINE | Facility: CLINIC | Age: 71
End: 2021-06-28

## 2021-06-29 LAB — BACTERIA UR CULT: ABNORMAL

## 2021-07-01 ENCOUNTER — OFFICE VISIT (OUTPATIENT)
Dept: PULMONOLOGY | Facility: CLINIC | Age: 71
End: 2021-07-01
Payer: COMMERCIAL

## 2021-07-01 VITALS
BODY MASS INDEX: 35.53 KG/M2 | OXYGEN SATURATION: 97 % | WEIGHT: 207 LBS | HEART RATE: 97 BPM | SYSTOLIC BLOOD PRESSURE: 98 MMHG | DIASTOLIC BLOOD PRESSURE: 40 MMHG

## 2021-07-01 DIAGNOSIS — R06.02 SHORTNESS OF BREATH: ICD-10-CM

## 2021-07-01 DIAGNOSIS — J98.6 DIAPHRAGMATIC DISORDER: ICD-10-CM

## 2021-07-01 DIAGNOSIS — J96.11 CHRONIC RESPIRATORY FAILURE WITH HYPOXIA: ICD-10-CM

## 2021-07-01 DIAGNOSIS — J44.89 ASTHMA-COPD OVERLAP SYNDROME: ICD-10-CM

## 2021-07-01 PROCEDURE — 99214 PR OFFICE/OUTPT VISIT, EST, LEVL IV, 30-39 MIN: ICD-10-PCS | Mod: S$GLB,,, | Performed by: INTERNAL MEDICINE

## 2021-07-01 PROCEDURE — 99214 OFFICE O/P EST MOD 30 MIN: CPT | Mod: S$GLB,,, | Performed by: INTERNAL MEDICINE

## 2021-07-06 ENCOUNTER — PATIENT MESSAGE (OUTPATIENT)
Dept: FAMILY MEDICINE | Facility: CLINIC | Age: 71
End: 2021-07-06

## 2021-07-06 ENCOUNTER — TELEPHONE (OUTPATIENT)
Dept: FAMILY MEDICINE | Facility: CLINIC | Age: 71
End: 2021-07-06

## 2021-07-06 DIAGNOSIS — E11.42 TYPE 2 DIABETES MELLITUS WITH DIABETIC POLYNEUROPATHY, WITH LONG-TERM CURRENT USE OF INSULIN: ICD-10-CM

## 2021-07-06 DIAGNOSIS — Z79.4 TYPE 2 DIABETES MELLITUS WITH DIABETIC POLYNEUROPATHY, WITH LONG-TERM CURRENT USE OF INSULIN: ICD-10-CM

## 2021-07-06 RX ORDER — FLASH GLUCOSE SENSOR
1 KIT MISCELLANEOUS
Qty: 2 KIT | Refills: 4 | Status: SHIPPED | OUTPATIENT
Start: 2021-07-06

## 2021-07-07 ENCOUNTER — DOCUMENT SCAN (OUTPATIENT)
Dept: HOME HEALTH SERVICES | Facility: HOSPITAL | Age: 71
End: 2021-07-07

## 2021-07-07 ENCOUNTER — TELEPHONE (OUTPATIENT)
Dept: FAMILY MEDICINE | Facility: CLINIC | Age: 71
End: 2021-07-07

## 2021-07-09 ENCOUNTER — PATIENT MESSAGE (OUTPATIENT)
Dept: FAMILY MEDICINE | Facility: CLINIC | Age: 71
End: 2021-07-09

## 2021-07-12 DIAGNOSIS — F51.01 PRIMARY INSOMNIA: ICD-10-CM

## 2021-07-12 RX ORDER — ROPINIROLE 4 MG/1
4 TABLET, FILM COATED ORAL NIGHTLY
Qty: 90 TABLET | Refills: 1 | Status: ON HOLD | OUTPATIENT
Start: 2021-07-12 | End: 2021-09-02 | Stop reason: SDUPTHER

## 2021-07-13 ENCOUNTER — PATIENT MESSAGE (OUTPATIENT)
Dept: FAMILY MEDICINE | Facility: CLINIC | Age: 71
End: 2021-07-13

## 2021-07-24 ENCOUNTER — HOSPITAL ENCOUNTER (EMERGENCY)
Facility: HOSPITAL | Age: 71
Discharge: HOME | End: 2021-07-24
Attending: EMERGENCY MEDICINE
Payer: COMMERCIAL

## 2021-07-24 ENCOUNTER — PATIENT MESSAGE (OUTPATIENT)
Dept: FAMILY MEDICINE | Facility: CLINIC | Age: 71
End: 2021-07-24

## 2021-07-24 VITALS
BODY MASS INDEX: 35.68 KG/M2 | SYSTOLIC BLOOD PRESSURE: 153 MMHG | DIASTOLIC BLOOD PRESSURE: 69 MMHG | WEIGHT: 209 LBS | HEART RATE: 77 BPM | OXYGEN SATURATION: 100 % | RESPIRATION RATE: 18 BRPM | HEIGHT: 64 IN | TEMPERATURE: 98 F

## 2021-07-24 DIAGNOSIS — N30.00 ACUTE CYSTITIS WITHOUT HEMATURIA: ICD-10-CM

## 2021-07-24 DIAGNOSIS — F32.A DEPRESSION, UNSPECIFIED DEPRESSION TYPE: Primary | ICD-10-CM

## 2021-07-24 LAB
ALBUMIN SERPL BCP-MCNC: 3.2 G/DL (ref 3.5–5.2)
ALP SERPL-CCNC: 62 U/L (ref 55–135)
ALT SERPL W/O P-5'-P-CCNC: 15 U/L (ref 10–44)
AMPHET+METHAMPHET UR QL: NEGATIVE
ANION GAP SERPL CALC-SCNC: 10 MMOL/L (ref 8–16)
APAP SERPL-MCNC: <10 UG/ML (ref 10–20)
AST SERPL-CCNC: 21 U/L (ref 10–40)
BACTERIA #/AREA URNS HPF: ABNORMAL /HPF
BARBITURATES UR QL SCN>200 NG/ML: NEGATIVE
BASOPHILS # BLD AUTO: 0.04 K/UL (ref 0–0.2)
BASOPHILS NFR BLD: 0.5 % (ref 0–1.9)
BENZODIAZ UR QL SCN>200 NG/ML: NEGATIVE
BILIRUB SERPL-MCNC: 0.6 MG/DL (ref 0.1–1)
BILIRUB UR QL STRIP: NEGATIVE
BUN SERPL-MCNC: 10 MG/DL (ref 8–23)
BZE UR QL SCN: NEGATIVE
CALCIUM SERPL-MCNC: 7.8 MG/DL (ref 8.7–10.5)
CANNABINOIDS UR QL SCN: NEGATIVE
CHLORIDE SERPL-SCNC: 90 MMOL/L (ref 95–110)
CLARITY UR: CLEAR
CO2 SERPL-SCNC: 38 MMOL/L (ref 23–29)
COLOR UR: YELLOW
CREAT SERPL-MCNC: 0.5 MG/DL (ref 0.5–1.4)
CREAT UR-MCNC: 34 MG/DL (ref 15–325)
DIFFERENTIAL METHOD: ABNORMAL
EOSINOPHIL # BLD AUTO: 0 K/UL (ref 0–0.5)
EOSINOPHIL NFR BLD: 0.4 % (ref 0–8)
ERYTHROCYTE [DISTWIDTH] IN BLOOD BY AUTOMATED COUNT: 18.2 % (ref 11.5–14.5)
EST. GFR  (AFRICAN AMERICAN): >60 ML/MIN/1.73 M^2
EST. GFR  (NON AFRICAN AMERICAN): >60 ML/MIN/1.73 M^2
ETHANOL SERPL-MCNC: <5 MG/DL
GLUCOSE SERPL-MCNC: 93 MG/DL (ref 70–110)
GLUCOSE UR QL STRIP: NEGATIVE
HCT VFR BLD AUTO: 29.6 % (ref 37–48.5)
HGB BLD-MCNC: 8.4 G/DL (ref 12–16)
HGB UR QL STRIP: NEGATIVE
HYALINE CASTS #/AREA URNS LPF: 1 /LPF
IMM GRANULOCYTES # BLD AUTO: 0.03 K/UL (ref 0–0.04)
IMM GRANULOCYTES NFR BLD AUTO: 0.4 % (ref 0–0.5)
KETONES UR QL STRIP: NEGATIVE
LEUKOCYTE ESTERASE UR QL STRIP: NEGATIVE
LYMPHOCYTES # BLD AUTO: 1.5 K/UL (ref 1–4.8)
LYMPHOCYTES NFR BLD: 19.6 % (ref 18–48)
MCH RBC QN AUTO: 22.3 PG (ref 27–31)
MCHC RBC AUTO-ENTMCNC: 28.4 G/DL (ref 32–36)
MCV RBC AUTO: 79 FL (ref 82–98)
MICROSCOPIC COMMENT: ABNORMAL
MONOCYTES # BLD AUTO: 0.4 K/UL (ref 0.3–1)
MONOCYTES NFR BLD: 5 % (ref 4–15)
NEUTROPHILS # BLD AUTO: 5.8 K/UL (ref 1.8–7.7)
NEUTROPHILS NFR BLD: 74.1 % (ref 38–73)
NITRITE UR QL STRIP: POSITIVE
NRBC BLD-RTO: 0 /100 WBC
OPIATES UR QL SCN: NEGATIVE
PCP UR QL SCN>25 NG/ML: NEGATIVE
PH UR STRIP: 8 [PH] (ref 5–8)
PLATELET # BLD AUTO: 307 K/UL (ref 150–450)
PMV BLD AUTO: 10.8 FL (ref 9.2–12.9)
POTASSIUM SERPL-SCNC: 4 MMOL/L (ref 3.5–5.1)
PROT SERPL-MCNC: 6.6 G/DL (ref 6–8.4)
PROT UR QL STRIP: NEGATIVE
RBC # BLD AUTO: 3.76 M/UL (ref 4–5.4)
RBC #/AREA URNS HPF: 0 /HPF (ref 0–4)
SALICYLATES SERPL-MCNC: <4 MG/DL (ref 15–30)
SARS-COV-2 RDRP RESP QL NAA+PROBE: NEGATIVE
SODIUM SERPL-SCNC: 138 MMOL/L (ref 136–145)
SP GR UR STRIP: 1.01 (ref 1–1.03)
SQUAMOUS #/AREA URNS HPF: 1 /HPF
TOXICOLOGY INFORMATION: NORMAL
TSH SERPL DL<=0.005 MIU/L-ACNC: 2.85 UIU/ML (ref 0.34–5.6)
URN SPEC COLLECT METH UR: ABNORMAL
UROBILINOGEN UR STRIP-ACNC: NEGATIVE EU/DL
WBC # BLD AUTO: 7.8 K/UL (ref 3.9–12.7)
WBC #/AREA URNS HPF: 5 /HPF (ref 0–5)

## 2021-07-24 PROCEDURE — U0002 COVID-19 LAB TEST NON-CDC: HCPCS | Performed by: EMERGENCY MEDICINE

## 2021-07-24 PROCEDURE — 80143 DRUG ASSAY ACETAMINOPHEN: CPT | Performed by: EMERGENCY MEDICINE

## 2021-07-24 PROCEDURE — G0426 PR INPT TELEHEALTH CONSULT 50M: ICD-10-PCS | Mod: 95,,, | Performed by: STUDENT IN AN ORGANIZED HEALTH CARE EDUCATION/TRAINING PROGRAM

## 2021-07-24 PROCEDURE — 82077 ASSAY SPEC XCP UR&BREATH IA: CPT | Performed by: EMERGENCY MEDICINE

## 2021-07-24 PROCEDURE — 85025 COMPLETE CBC W/AUTO DIFF WBC: CPT | Performed by: EMERGENCY MEDICINE

## 2021-07-24 PROCEDURE — 99284 EMERGENCY DEPT VISIT MOD MDM: CPT

## 2021-07-24 PROCEDURE — 80179 DRUG ASSAY SALICYLATE: CPT | Performed by: EMERGENCY MEDICINE

## 2021-07-24 PROCEDURE — 25000003 PHARM REV CODE 250: Performed by: EMERGENCY MEDICINE

## 2021-07-24 PROCEDURE — G0426 INPT/ED TELECONSULT50: HCPCS | Mod: 95,,, | Performed by: STUDENT IN AN ORGANIZED HEALTH CARE EDUCATION/TRAINING PROGRAM

## 2021-07-24 PROCEDURE — 80053 COMPREHEN METABOLIC PANEL: CPT | Performed by: EMERGENCY MEDICINE

## 2021-07-24 PROCEDURE — 80307 DRUG TEST PRSMV CHEM ANLYZR: CPT | Performed by: EMERGENCY MEDICINE

## 2021-07-24 PROCEDURE — 81001 URINALYSIS AUTO W/SCOPE: CPT | Mod: 59 | Performed by: EMERGENCY MEDICINE

## 2021-07-24 PROCEDURE — 84443 ASSAY THYROID STIM HORMONE: CPT | Performed by: EMERGENCY MEDICINE

## 2021-07-24 RX ORDER — NITROFURANTOIN 25; 75 MG/1; MG/1
100 CAPSULE ORAL 2 TIMES DAILY
Qty: 14 CAPSULE | Refills: 0 | Status: SHIPPED | OUTPATIENT
Start: 2021-07-24 | End: 2021-07-31

## 2021-07-24 RX ORDER — METHOCARBAMOL 500 MG/1
500 TABLET, FILM COATED ORAL 3 TIMES DAILY
Status: ON HOLD | COMMUNITY
End: 2021-09-02 | Stop reason: SDUPTHER

## 2021-07-24 RX ORDER — NITROFURANTOIN 25; 75 MG/1; MG/1
100 CAPSULE ORAL
Status: COMPLETED | OUTPATIENT
Start: 2021-07-24 | End: 2021-07-24

## 2021-07-24 RX ADMIN — NITROFURANTOIN MONOHYDRATE/MACROCRYSTALLINE 100 MG: 25; 75 CAPSULE ORAL at 05:07

## 2021-08-23 ENCOUNTER — TELEPHONE (OUTPATIENT)
Dept: FAMILY MEDICINE | Facility: CLINIC | Age: 71
End: 2021-08-23

## 2021-08-26 NOTE — TELEPHONE ENCOUNTER
No answer. Voicemail not set up. Sent letter. Patient back in the ER. She was severly SOB and now has a fever in the ER, Temp 100.3    FYI to Michelle

## 2021-08-29 ENCOUNTER — HOSPITAL ENCOUNTER (INPATIENT)
Facility: HOSPITAL | Age: 71
LOS: 2 days | Discharge: HOME | DRG: 189 | End: 2021-09-02
Attending: EMERGENCY MEDICINE | Admitting: FAMILY MEDICINE
Payer: MEDICARE

## 2021-08-29 DIAGNOSIS — Z79.4 TYPE 2 DIABETES MELLITUS WITH DIABETIC POLYNEUROPATHY, WITH LONG-TERM CURRENT USE OF INSULIN: ICD-10-CM

## 2021-08-29 DIAGNOSIS — J96.10 CHRONIC RESPIRATORY FAILURE: ICD-10-CM

## 2021-08-29 DIAGNOSIS — R07.9 CHEST PAIN: ICD-10-CM

## 2021-08-29 DIAGNOSIS — F51.01 PRIMARY INSOMNIA: ICD-10-CM

## 2021-08-29 DIAGNOSIS — E11.42 TYPE 2 DIABETES MELLITUS WITH DIABETIC POLYNEUROPATHY, WITH LONG-TERM CURRENT USE OF INSULIN: ICD-10-CM

## 2021-08-29 PROBLEM — Z96.652 S/P TOTAL KNEE ARTHROPLASTY, LEFT: Status: RESOLVED | Noted: 2018-09-17 | Resolved: 2021-08-29

## 2021-08-29 PROBLEM — T17.908A ASPIRATION INTO AIRWAY: Status: RESOLVED | Noted: 2020-06-27 | Resolved: 2021-08-29

## 2021-08-29 PROBLEM — N39.0 UTI (URINARY TRACT INFECTION): Status: RESOLVED | Noted: 2021-06-27 | Resolved: 2021-08-29

## 2021-08-29 PROBLEM — E87.6 HYPOKALEMIA: Status: RESOLVED | Noted: 2020-03-19 | Resolved: 2021-08-29

## 2021-08-29 PROBLEM — I95.9 HYPOTENSION: Status: RESOLVED | Noted: 2021-05-12 | Resolved: 2021-08-29

## 2021-08-29 LAB
GLUCOSE SERPL-MCNC: 151 MG/DL (ref 70–110)
SARS-COV-2 RDRP RESP QL NAA+PROBE: NEGATIVE

## 2021-08-29 PROCEDURE — C9399 UNCLASSIFIED DRUGS OR BIOLOG: HCPCS | Performed by: FAMILY MEDICINE

## 2021-08-29 PROCEDURE — 96372 THER/PROPH/DIAG INJ SC/IM: CPT

## 2021-08-29 PROCEDURE — 25000242 PHARM REV CODE 250 ALT 637 W/ HCPCS: Performed by: EMERGENCY MEDICINE

## 2021-08-29 PROCEDURE — 27000221 HC OXYGEN, UP TO 24 HOURS

## 2021-08-29 PROCEDURE — 83036 HEMOGLOBIN GLYCOSYLATED A1C: CPT | Performed by: FAMILY MEDICINE

## 2021-08-29 PROCEDURE — 99285 EMERGENCY DEPT VISIT HI MDM: CPT

## 2021-08-29 PROCEDURE — 94640 AIRWAY INHALATION TREATMENT: CPT

## 2021-08-29 PROCEDURE — 94761 N-INVAS EAR/PLS OXIMETRY MLT: CPT

## 2021-08-29 PROCEDURE — 94799 UNLISTED PULMONARY SVC/PX: CPT

## 2021-08-29 PROCEDURE — U0002 COVID-19 LAB TEST NON-CDC: HCPCS | Performed by: EMERGENCY MEDICINE

## 2021-08-29 PROCEDURE — 99900031 HC PATIENT EDUCATION (STAT)

## 2021-08-29 PROCEDURE — 82962 GLUCOSE BLOOD TEST: CPT

## 2021-08-29 PROCEDURE — 99900035 HC TECH TIME PER 15 MIN (STAT)

## 2021-08-29 PROCEDURE — 63600175 PHARM REV CODE 636 W HCPCS: Performed by: FAMILY MEDICINE

## 2021-08-29 PROCEDURE — G0378 HOSPITAL OBSERVATION PER HR: HCPCS

## 2021-08-29 PROCEDURE — 25000003 PHARM REV CODE 250: Performed by: FAMILY MEDICINE

## 2021-08-29 RX ORDER — TALC
6 POWDER (GRAM) TOPICAL NIGHTLY PRN
Status: DISCONTINUED | OUTPATIENT
Start: 2021-08-29 | End: 2021-09-02 | Stop reason: HOSPADM

## 2021-08-29 RX ORDER — INSULIN ASPART 100 [IU]/ML
1-10 INJECTION, SOLUTION INTRAVENOUS; SUBCUTANEOUS
Status: DISCONTINUED | OUTPATIENT
Start: 2021-08-29 | End: 2021-09-02 | Stop reason: HOSPADM

## 2021-08-29 RX ORDER — HYDROCHLOROTHIAZIDE 12.5 MG/1
12.5 TABLET ORAL DAILY
Status: DISCONTINUED | OUTPATIENT
Start: 2021-08-30 | End: 2021-09-02 | Stop reason: HOSPADM

## 2021-08-29 RX ORDER — CARBOXYMETHYLCELLULOSE SODIUM 10 MG/ML
2 GEL OPHTHALMIC 3 TIMES DAILY
Status: DISCONTINUED | OUTPATIENT
Start: 2021-08-29 | End: 2021-09-02 | Stop reason: HOSPADM

## 2021-08-29 RX ORDER — HYDRALAZINE HYDROCHLORIDE 20 MG/ML
5 INJECTION INTRAMUSCULAR; INTRAVENOUS EVERY 4 HOURS PRN
Status: DISCONTINUED | OUTPATIENT
Start: 2021-08-29 | End: 2021-09-02 | Stop reason: HOSPADM

## 2021-08-29 RX ORDER — ACETAMINOPHEN 325 MG/1
650 TABLET ORAL EVERY 8 HOURS PRN
Status: DISCONTINUED | OUTPATIENT
Start: 2021-08-29 | End: 2021-09-02 | Stop reason: HOSPADM

## 2021-08-29 RX ORDER — HYDRALAZINE HYDROCHLORIDE 20 MG/ML
10 INJECTION INTRAMUSCULAR; INTRAVENOUS EVERY 4 HOURS PRN
Status: DISCONTINUED | OUTPATIENT
Start: 2021-08-29 | End: 2021-09-02 | Stop reason: HOSPADM

## 2021-08-29 RX ORDER — LOSARTAN POTASSIUM 50 MG/1
50 TABLET ORAL DAILY
Status: DISCONTINUED | OUTPATIENT
Start: 2021-08-30 | End: 2021-09-02 | Stop reason: HOSPADM

## 2021-08-29 RX ORDER — CALCITRIOL 0.25 UG/1
0.5 CAPSULE ORAL NIGHTLY
Status: DISCONTINUED | OUTPATIENT
Start: 2021-08-29 | End: 2021-09-02 | Stop reason: HOSPADM

## 2021-08-29 RX ORDER — POLYETHYLENE GLYCOL 3350 17 G/17G
17 POWDER, FOR SOLUTION ORAL 2 TIMES DAILY PRN
Status: DISCONTINUED | OUTPATIENT
Start: 2021-08-29 | End: 2021-09-02 | Stop reason: HOSPADM

## 2021-08-29 RX ORDER — SODIUM CHLORIDE 0.9 % (FLUSH) 0.9 %
10 SYRINGE (ML) INJECTION
Status: DISCONTINUED | OUTPATIENT
Start: 2021-08-29 | End: 2021-09-02 | Stop reason: HOSPADM

## 2021-08-29 RX ORDER — ROPINIROLE 2 MG/1
4 TABLET, FILM COATED ORAL 2 TIMES DAILY
Status: DISCONTINUED | OUTPATIENT
Start: 2021-08-30 | End: 2021-09-02 | Stop reason: HOSPADM

## 2021-08-29 RX ORDER — IBUPROFEN 200 MG
16 TABLET ORAL
Status: DISCONTINUED | OUTPATIENT
Start: 2021-08-29 | End: 2021-09-02 | Stop reason: HOSPADM

## 2021-08-29 RX ORDER — VENLAFAXINE HYDROCHLORIDE 37.5 MG/1
37.5 CAPSULE, EXTENDED RELEASE ORAL DAILY
Status: DISCONTINUED | OUTPATIENT
Start: 2021-08-30 | End: 2021-09-02 | Stop reason: HOSPADM

## 2021-08-29 RX ORDER — LATANOPROST 50 UG/ML
1 SOLUTION/ DROPS OPHTHALMIC NIGHTLY
Status: DISCONTINUED | OUTPATIENT
Start: 2021-08-29 | End: 2021-09-02 | Stop reason: HOSPADM

## 2021-08-29 RX ORDER — FLUTICASONE PROPIONATE 50 MCG
1 SPRAY, SUSPENSION (ML) NASAL DAILY
Status: DISCONTINUED | OUTPATIENT
Start: 2021-08-30 | End: 2021-09-02 | Stop reason: HOSPADM

## 2021-08-29 RX ORDER — IPRATROPIUM BROMIDE AND ALBUTEROL SULFATE 2.5; .5 MG/3ML; MG/3ML
3 SOLUTION RESPIRATORY (INHALATION) EVERY 4 HOURS PRN
Status: DISCONTINUED | OUTPATIENT
Start: 2021-08-29 | End: 2021-09-02 | Stop reason: HOSPADM

## 2021-08-29 RX ORDER — CLONAZEPAM 0.5 MG/1
0.5 TABLET ORAL 2 TIMES DAILY
Status: DISCONTINUED | OUTPATIENT
Start: 2021-08-29 | End: 2021-09-02 | Stop reason: HOSPADM

## 2021-08-29 RX ORDER — FLUTICASONE FUROATE AND VILANTEROL 200; 25 UG/1; UG/1
1 POWDER RESPIRATORY (INHALATION) DAILY
Status: DISCONTINUED | OUTPATIENT
Start: 2021-08-30 | End: 2021-09-02 | Stop reason: HOSPADM

## 2021-08-29 RX ORDER — ROPINIROLE 2 MG/1
4 TABLET, FILM COATED ORAL NIGHTLY
Status: DISCONTINUED | OUTPATIENT
Start: 2021-08-29 | End: 2021-08-29

## 2021-08-29 RX ORDER — IPRATROPIUM BROMIDE AND ALBUTEROL SULFATE 2.5; .5 MG/3ML; MG/3ML
3 SOLUTION RESPIRATORY (INHALATION) EVERY 6 HOURS
Status: DISCONTINUED | OUTPATIENT
Start: 2021-08-29 | End: 2021-09-02 | Stop reason: HOSPADM

## 2021-08-29 RX ORDER — GLUCAGON 1 MG
1 KIT INJECTION
Status: DISCONTINUED | OUTPATIENT
Start: 2021-08-29 | End: 2021-09-02 | Stop reason: HOSPADM

## 2021-08-29 RX ORDER — DOCUSATE SODIUM 100 MG/1
100 CAPSULE, LIQUID FILLED ORAL 2 TIMES DAILY
Status: DISCONTINUED | OUTPATIENT
Start: 2021-08-29 | End: 2021-09-02 | Stop reason: HOSPADM

## 2021-08-29 RX ORDER — IBUPROFEN 200 MG
24 TABLET ORAL
Status: DISCONTINUED | OUTPATIENT
Start: 2021-08-29 | End: 2021-09-02 | Stop reason: HOSPADM

## 2021-08-29 RX ORDER — MECLIZINE HCL 12.5 MG 12.5 MG/1
25 TABLET ORAL 3 TIMES DAILY PRN
Status: DISCONTINUED | OUTPATIENT
Start: 2021-08-29 | End: 2021-09-02 | Stop reason: HOSPADM

## 2021-08-29 RX ORDER — LEVOTHYROXINE SODIUM 25 UG/1
150 TABLET ORAL
Status: DISCONTINUED | OUTPATIENT
Start: 2021-08-30 | End: 2021-09-02 | Stop reason: HOSPADM

## 2021-08-29 RX ORDER — PREDNISONE 5 MG/1
10 TABLET ORAL DAILY
Status: DISCONTINUED | OUTPATIENT
Start: 2021-08-30 | End: 2021-09-02 | Stop reason: HOSPADM

## 2021-08-29 RX ADMIN — DOCUSATE SODIUM 100 MG: 100 CAPSULE, LIQUID FILLED ORAL at 09:08

## 2021-08-29 RX ADMIN — INSULIN ASPART 1 UNITS: 100 INJECTION, SOLUTION INTRAVENOUS; SUBCUTANEOUS at 09:08

## 2021-08-29 RX ADMIN — CARBOXYMETHYLCELLULOSE SODIUM 2 DROP: 10 GEL OPHTHALMIC at 09:08

## 2021-08-29 RX ADMIN — GABAPENTIN 800 MG: 100 CAPSULE ORAL at 09:08

## 2021-08-29 RX ADMIN — CLONAZEPAM 0.5 MG: 0.5 TABLET ORAL at 10:08

## 2021-08-29 RX ADMIN — LAMOTRIGINE 150 MG: 100 TABLET ORAL at 09:08

## 2021-08-29 RX ADMIN — LATANOPROST 1 DROP: 50 SOLUTION OPHTHALMIC at 09:08

## 2021-08-29 RX ADMIN — INSULIN DETEMIR 30 UNITS: 100 INJECTION, SOLUTION SUBCUTANEOUS at 09:08

## 2021-08-29 RX ADMIN — CALCITRIOL CAPSULES 0.25 MCG 0.5 MCG: 0.25 CAPSULE ORAL at 09:08

## 2021-08-29 RX ADMIN — IPRATROPIUM BROMIDE AND ALBUTEROL SULFATE 3 ML: .5; 3 SOLUTION RESPIRATORY (INHALATION) at 05:08

## 2021-08-30 LAB
GLUCOSE SERPL-MCNC: 170 MG/DL (ref 70–110)
GLUCOSE SERPL-MCNC: 177 MG/DL (ref 70–110)
GLUCOSE SERPL-MCNC: 197 MG/DL (ref 70–110)
GLUCOSE SERPL-MCNC: 237 MG/DL (ref 70–110)

## 2021-08-30 PROCEDURE — 25000242 PHARM REV CODE 250 ALT 637 W/ HCPCS: Performed by: FAMILY MEDICINE

## 2021-08-30 PROCEDURE — 25000003 PHARM REV CODE 250

## 2021-08-30 PROCEDURE — 99900031 HC PATIENT EDUCATION (STAT)

## 2021-08-30 PROCEDURE — 94761 N-INVAS EAR/PLS OXIMETRY MLT: CPT

## 2021-08-30 PROCEDURE — 25000003 PHARM REV CODE 250: Performed by: FAMILY MEDICINE

## 2021-08-30 PROCEDURE — 99900035 HC TECH TIME PER 15 MIN (STAT)

## 2021-08-30 PROCEDURE — 63600175 PHARM REV CODE 636 W HCPCS: Performed by: FAMILY MEDICINE

## 2021-08-30 PROCEDURE — 27000221 HC OXYGEN, UP TO 24 HOURS

## 2021-08-30 PROCEDURE — 96372 THER/PROPH/DIAG INJ SC/IM: CPT | Mod: 59

## 2021-08-30 PROCEDURE — 94640 AIRWAY INHALATION TREATMENT: CPT

## 2021-08-30 PROCEDURE — G0378 HOSPITAL OBSERVATION PER HR: HCPCS

## 2021-08-30 RX ORDER — OXYCODONE AND ACETAMINOPHEN 5; 325 MG/1; MG/1
TABLET ORAL
Status: DISPENSED
Start: 2021-08-30 | End: 2021-08-30

## 2021-08-30 RX ORDER — OXYCODONE AND ACETAMINOPHEN 5; 325 MG/1; MG/1
1 TABLET ORAL EVERY 6 HOURS PRN
Status: DISCONTINUED | OUTPATIENT
Start: 2021-08-30 | End: 2021-09-02 | Stop reason: HOSPADM

## 2021-08-30 RX ORDER — CYCLOBENZAPRINE HCL 10 MG
10 TABLET ORAL 3 TIMES DAILY PRN
Status: DISCONTINUED | OUTPATIENT
Start: 2021-08-30 | End: 2021-09-02 | Stop reason: HOSPADM

## 2021-08-30 RX ORDER — CYCLOBENZAPRINE HCL 10 MG
TABLET ORAL
Status: COMPLETED
Start: 2021-08-30 | End: 2021-08-30

## 2021-08-30 RX ADMIN — HYDROCHLOROTHIAZIDE 12.5 MG: 12.5 TABLET ORAL at 09:08

## 2021-08-30 RX ADMIN — LATANOPROST 1 DROP: 50 SOLUTION OPHTHALMIC at 09:08

## 2021-08-30 RX ADMIN — CYCLOBENZAPRINE HYDROCHLORIDE: 10 TABLET, FILM COATED ORAL at 01:08

## 2021-08-30 RX ADMIN — GABAPENTIN 800 MG: 100 CAPSULE ORAL at 04:08

## 2021-08-30 RX ADMIN — INSULIN DETEMIR 30 UNITS: 100 INJECTION, SOLUTION SUBCUTANEOUS at 09:08

## 2021-08-30 RX ADMIN — LAMOTRIGINE 150 MG: 100 TABLET ORAL at 09:08

## 2021-08-30 RX ADMIN — PREDNISONE 10 MG: 5 TABLET ORAL at 09:08

## 2021-08-30 RX ADMIN — VENLAFAXINE HYDROCHLORIDE 37.5 MG: 37.5 CAPSULE, EXTENDED RELEASE ORAL at 09:08

## 2021-08-30 RX ADMIN — DOCUSATE SODIUM 100 MG: 100 CAPSULE, LIQUID FILLED ORAL at 09:08

## 2021-08-30 RX ADMIN — GABAPENTIN 800 MG: 100 CAPSULE ORAL at 09:08

## 2021-08-30 RX ADMIN — ROPINIROLE 4 MG: 2 TABLET, FILM COATED ORAL at 09:08

## 2021-08-30 RX ADMIN — INSULIN ASPART 2 UNITS: 100 INJECTION, SOLUTION INTRAVENOUS; SUBCUTANEOUS at 09:08

## 2021-08-30 RX ADMIN — CARBOXYMETHYLCELLULOSE SODIUM 2 DROP: 10 GEL OPHTHALMIC at 09:08

## 2021-08-30 RX ADMIN — LOSARTAN POTASSIUM 50 MG: 50 TABLET, FILM COATED ORAL at 09:08

## 2021-08-30 RX ADMIN — CALCITRIOL CAPSULES 0.25 MCG 0.5 MCG: 0.25 CAPSULE ORAL at 09:08

## 2021-08-30 RX ADMIN — FLUTICASONE PROPIONATE 50 MCG: 50 SPRAY, METERED NASAL at 09:08

## 2021-08-30 RX ADMIN — CLONAZEPAM 0.5 MG: 0.5 TABLET ORAL at 09:08

## 2021-08-30 RX ADMIN — IPRATROPIUM BROMIDE AND ALBUTEROL SULFATE 3 ML: .5; 3 SOLUTION RESPIRATORY (INHALATION) at 08:08

## 2021-08-30 RX ADMIN — CARBOXYMETHYLCELLULOSE SODIUM 2 DROP: 10 GEL OPHTHALMIC at 03:08

## 2021-08-31 LAB
ANION GAP SERPL CALC-SCNC: 13 MMOL/L (ref 8–16)
BASOPHILS # BLD AUTO: 0.02 K/UL (ref 0–0.2)
BASOPHILS NFR BLD: 0.4 % (ref 0–1.9)
BUN SERPL-MCNC: 14 MG/DL (ref 8–23)
CALCIUM SERPL-MCNC: 9 MG/DL (ref 8.7–10.5)
CHLORIDE SERPL-SCNC: 90 MMOL/L (ref 95–110)
CO2 SERPL-SCNC: 38 MMOL/L (ref 23–29)
CREAT SERPL-MCNC: 0.5 MG/DL (ref 0.5–1.4)
DIFFERENTIAL METHOD: ABNORMAL
EOSINOPHIL # BLD AUTO: 0.1 K/UL (ref 0–0.5)
EOSINOPHIL NFR BLD: 0.9 % (ref 0–8)
ERYTHROCYTE [DISTWIDTH] IN BLOOD BY AUTOMATED COUNT: 18.2 % (ref 11.5–14.5)
EST. GFR  (AFRICAN AMERICAN): >60 ML/MIN/1.73 M^2
EST. GFR  (NON AFRICAN AMERICAN): >60 ML/MIN/1.73 M^2
ESTIMATED AVG GLUCOSE: 220 MG/DL (ref 68–131)
GLUCOSE SERPL-MCNC: 108 MG/DL (ref 70–110)
GLUCOSE SERPL-MCNC: 125 MG/DL (ref 70–110)
GLUCOSE SERPL-MCNC: 232 MG/DL (ref 70–110)
GLUCOSE SERPL-MCNC: 243 MG/DL (ref 70–110)
GLUCOSE SERPL-MCNC: 299 MG/DL (ref 70–110)
HBA1C MFR BLD: 9.3 % (ref 4.5–6.2)
HCT VFR BLD AUTO: 33.8 % (ref 37–48.5)
HGB BLD-MCNC: 9.4 G/DL (ref 12–16)
IMM GRANULOCYTES # BLD AUTO: 0.02 K/UL (ref 0–0.04)
IMM GRANULOCYTES NFR BLD AUTO: 0.4 % (ref 0–0.5)
LYMPHOCYTES # BLD AUTO: 1.9 K/UL (ref 1–4.8)
LYMPHOCYTES NFR BLD: 32.7 % (ref 18–48)
MAGNESIUM SERPL-MCNC: 1.7 MG/DL (ref 1.6–2.6)
MCH RBC QN AUTO: 22.1 PG (ref 27–31)
MCHC RBC AUTO-ENTMCNC: 27.8 G/DL (ref 32–36)
MCV RBC AUTO: 80 FL (ref 82–98)
MONOCYTES # BLD AUTO: 0.5 K/UL (ref 0.3–1)
MONOCYTES NFR BLD: 9.1 % (ref 4–15)
NEUTROPHILS # BLD AUTO: 3.2 K/UL (ref 1.8–7.7)
NEUTROPHILS NFR BLD: 56.5 % (ref 38–73)
NRBC BLD-RTO: 0 /100 WBC
PHOSPHATE SERPL-MCNC: 5.2 MG/DL (ref 2.7–4.5)
PLATELET # BLD AUTO: 284 K/UL (ref 150–450)
PMV BLD AUTO: 11.3 FL (ref 9.2–12.9)
POTASSIUM SERPL-SCNC: 3.6 MMOL/L (ref 3.5–5.1)
RBC # BLD AUTO: 4.25 M/UL (ref 4–5.4)
SODIUM SERPL-SCNC: 141 MMOL/L (ref 136–145)
WBC # BLD AUTO: 5.69 K/UL (ref 3.9–12.7)

## 2021-08-31 PROCEDURE — 96372 THER/PROPH/DIAG INJ SC/IM: CPT | Mod: 59

## 2021-08-31 PROCEDURE — 99900035 HC TECH TIME PER 15 MIN (STAT)

## 2021-08-31 PROCEDURE — 80048 BASIC METABOLIC PNL TOTAL CA: CPT | Performed by: FAMILY MEDICINE

## 2021-08-31 PROCEDURE — 25000242 PHARM REV CODE 250 ALT 637 W/ HCPCS: Performed by: FAMILY MEDICINE

## 2021-08-31 PROCEDURE — 94640 AIRWAY INHALATION TREATMENT: CPT

## 2021-08-31 PROCEDURE — 83735 ASSAY OF MAGNESIUM: CPT | Performed by: FAMILY MEDICINE

## 2021-08-31 PROCEDURE — 84100 ASSAY OF PHOSPHORUS: CPT | Performed by: FAMILY MEDICINE

## 2021-08-31 PROCEDURE — 27000221 HC OXYGEN, UP TO 24 HOURS

## 2021-08-31 PROCEDURE — 36415 COLL VENOUS BLD VENIPUNCTURE: CPT | Performed by: FAMILY MEDICINE

## 2021-08-31 PROCEDURE — 94761 N-INVAS EAR/PLS OXIMETRY MLT: CPT

## 2021-08-31 PROCEDURE — 99900031 HC PATIENT EDUCATION (STAT)

## 2021-08-31 PROCEDURE — 12000002 HC ACUTE/MED SURGE SEMI-PRIVATE ROOM

## 2021-08-31 PROCEDURE — 82962 GLUCOSE BLOOD TEST: CPT

## 2021-08-31 PROCEDURE — 63600175 PHARM REV CODE 636 W HCPCS: Performed by: FAMILY MEDICINE

## 2021-08-31 PROCEDURE — 25000003 PHARM REV CODE 250: Performed by: FAMILY MEDICINE

## 2021-08-31 PROCEDURE — 94799 UNLISTED PULMONARY SVC/PX: CPT

## 2021-08-31 PROCEDURE — 85025 COMPLETE CBC W/AUTO DIFF WBC: CPT | Performed by: FAMILY MEDICINE

## 2021-08-31 RX ADMIN — IPRATROPIUM BROMIDE AND ALBUTEROL SULFATE 3 ML: .5; 3 SOLUTION RESPIRATORY (INHALATION) at 01:08

## 2021-08-31 RX ADMIN — IPRATROPIUM BROMIDE AND ALBUTEROL SULFATE 3 ML: .5; 3 SOLUTION RESPIRATORY (INHALATION) at 08:08

## 2021-08-31 RX ADMIN — HYDROCHLOROTHIAZIDE 12.5 MG: 12.5 TABLET ORAL at 09:08

## 2021-08-31 RX ADMIN — IPRATROPIUM BROMIDE AND ALBUTEROL SULFATE 3 ML: .5; 3 SOLUTION RESPIRATORY (INHALATION) at 07:08

## 2021-08-31 RX ADMIN — GABAPENTIN 800 MG: 100 CAPSULE ORAL at 09:08

## 2021-08-31 RX ADMIN — CYCLOBENZAPRINE 10 MG: 10 TABLET, FILM COATED ORAL at 11:08

## 2021-08-31 RX ADMIN — VENLAFAXINE HYDROCHLORIDE 37.5 MG: 37.5 CAPSULE, EXTENDED RELEASE ORAL at 09:08

## 2021-08-31 RX ADMIN — CLONAZEPAM 0.5 MG: 0.5 TABLET ORAL at 08:08

## 2021-08-31 RX ADMIN — PREDNISONE 10 MG: 5 TABLET ORAL at 09:08

## 2021-08-31 RX ADMIN — INSULIN DETEMIR 30 UNITS: 100 INJECTION, SOLUTION SUBCUTANEOUS at 09:08

## 2021-08-31 RX ADMIN — ROPINIROLE 4 MG: 2 TABLET, FILM COATED ORAL at 08:08

## 2021-08-31 RX ADMIN — INSULIN DETEMIR 30 UNITS: 100 INJECTION, SOLUTION SUBCUTANEOUS at 08:08

## 2021-08-31 RX ADMIN — CARBOXYMETHYLCELLULOSE SODIUM 2 DROP: 10 GEL OPHTHALMIC at 02:08

## 2021-08-31 RX ADMIN — LAMOTRIGINE 150 MG: 100 TABLET ORAL at 09:08

## 2021-08-31 RX ADMIN — LEVOTHYROXINE SODIUM 150 MCG: 25 TABLET ORAL at 05:08

## 2021-08-31 RX ADMIN — LAMOTRIGINE 150 MG: 100 TABLET ORAL at 08:08

## 2021-08-31 RX ADMIN — CARBOXYMETHYLCELLULOSE SODIUM 2 DROP: 10 GEL OPHTHALMIC at 09:08

## 2021-08-31 RX ADMIN — CALCITRIOL CAPSULES 0.25 MCG 0.5 MCG: 0.25 CAPSULE ORAL at 08:08

## 2021-08-31 RX ADMIN — FLUTICASONE PROPIONATE 50 MCG: 50 SPRAY, METERED NASAL at 09:08

## 2021-08-31 RX ADMIN — GABAPENTIN 800 MG: 100 CAPSULE ORAL at 08:08

## 2021-08-31 RX ADMIN — INSULIN ASPART 6 UNITS: 100 INJECTION, SOLUTION INTRAVENOUS; SUBCUTANEOUS at 05:08

## 2021-08-31 RX ADMIN — FLUTICASONE FUROATE AND VILANTEROL TRIFENATATE 1 PUFF: 200; 25 POWDER RESPIRATORY (INHALATION) at 07:08

## 2021-08-31 RX ADMIN — INSULIN ASPART 4 UNITS: 100 INJECTION, SOLUTION INTRAVENOUS; SUBCUTANEOUS at 12:08

## 2021-08-31 RX ADMIN — DOCUSATE SODIUM 100 MG: 100 CAPSULE, LIQUID FILLED ORAL at 09:08

## 2021-08-31 RX ADMIN — INSULIN ASPART 2 UNITS: 100 INJECTION, SOLUTION INTRAVENOUS; SUBCUTANEOUS at 08:08

## 2021-08-31 RX ADMIN — GABAPENTIN 800 MG: 100 CAPSULE ORAL at 02:08

## 2021-08-31 RX ADMIN — DOCUSATE SODIUM 100 MG: 100 CAPSULE, LIQUID FILLED ORAL at 08:08

## 2021-08-31 RX ADMIN — ACETAMINOPHEN 650 MG: 325 TABLET, FILM COATED ORAL at 07:08

## 2021-08-31 RX ADMIN — CLONAZEPAM 0.5 MG: 0.5 TABLET ORAL at 09:08

## 2021-08-31 RX ADMIN — ROPINIROLE 4 MG: 2 TABLET, FILM COATED ORAL at 09:08

## 2021-08-31 RX ADMIN — LOSARTAN POTASSIUM 50 MG: 50 TABLET, FILM COATED ORAL at 09:08

## 2021-09-01 LAB
GLUCOSE SERPL-MCNC: 102 MG/DL (ref 70–110)
GLUCOSE SERPL-MCNC: 264 MG/DL (ref 70–110)
GLUCOSE SERPL-MCNC: 283 MG/DL (ref 70–110)
GLUCOSE SERPL-MCNC: 326 MG/DL (ref 70–110)

## 2021-09-01 PROCEDURE — 25000003 PHARM REV CODE 250: Performed by: INTERNAL MEDICINE

## 2021-09-01 PROCEDURE — 27000221 HC OXYGEN, UP TO 24 HOURS

## 2021-09-01 PROCEDURE — 82962 GLUCOSE BLOOD TEST: CPT

## 2021-09-01 PROCEDURE — 94640 AIRWAY INHALATION TREATMENT: CPT

## 2021-09-01 PROCEDURE — 94761 N-INVAS EAR/PLS OXIMETRY MLT: CPT

## 2021-09-01 PROCEDURE — 99900031 HC PATIENT EDUCATION (STAT)

## 2021-09-01 PROCEDURE — 99900035 HC TECH TIME PER 15 MIN (STAT)

## 2021-09-01 PROCEDURE — 25000242 PHARM REV CODE 250 ALT 637 W/ HCPCS: Performed by: FAMILY MEDICINE

## 2021-09-01 PROCEDURE — 12000002 HC ACUTE/MED SURGE SEMI-PRIVATE ROOM

## 2021-09-01 PROCEDURE — 63600175 PHARM REV CODE 636 W HCPCS: Performed by: FAMILY MEDICINE

## 2021-09-01 PROCEDURE — 25000003 PHARM REV CODE 250: Performed by: FAMILY MEDICINE

## 2021-09-01 RX ADMIN — INSULIN DETEMIR 30 UNITS: 100 INJECTION, SOLUTION SUBCUTANEOUS at 08:09

## 2021-09-01 RX ADMIN — GABAPENTIN 800 MG: 100 CAPSULE ORAL at 08:09

## 2021-09-01 RX ADMIN — GABAPENTIN 800 MG: 100 CAPSULE ORAL at 02:09

## 2021-09-01 RX ADMIN — CARBOXYMETHYLCELLULOSE SODIUM 2 DROP: 10 GEL OPHTHALMIC at 02:09

## 2021-09-01 RX ADMIN — INSULIN ASPART 6 UNITS: 100 INJECTION, SOLUTION INTRAVENOUS; SUBCUTANEOUS at 12:09

## 2021-09-01 RX ADMIN — OXYCODONE AND ACETAMINOPHEN 1 TABLET: 5; 325 TABLET ORAL at 12:09

## 2021-09-01 RX ADMIN — CARBOXYMETHYLCELLULOSE SODIUM 2 DROP: 10 GEL OPHTHALMIC at 08:09

## 2021-09-01 RX ADMIN — CLONAZEPAM 0.5 MG: 0.5 TABLET ORAL at 08:09

## 2021-09-01 RX ADMIN — ROPINIROLE 4 MG: 2 TABLET, FILM COATED ORAL at 08:09

## 2021-09-01 RX ADMIN — PREDNISONE 10 MG: 5 TABLET ORAL at 08:09

## 2021-09-01 RX ADMIN — LEVOTHYROXINE SODIUM 150 MCG: 25 TABLET ORAL at 05:09

## 2021-09-01 RX ADMIN — IPRATROPIUM BROMIDE AND ALBUTEROL SULFATE 3 ML: .5; 3 SOLUTION RESPIRATORY (INHALATION) at 07:09

## 2021-09-01 RX ADMIN — LAMOTRIGINE 150 MG: 100 TABLET ORAL at 08:09

## 2021-09-01 RX ADMIN — HYDROCHLOROTHIAZIDE 12.5 MG: 12.5 TABLET ORAL at 08:09

## 2021-09-01 RX ADMIN — OXYCODONE AND ACETAMINOPHEN 1 TABLET: 5; 325 TABLET ORAL at 09:09

## 2021-09-01 RX ADMIN — CALCITRIOL CAPSULES 0.25 MCG 0.5 MCG: 0.25 CAPSULE ORAL at 08:09

## 2021-09-01 RX ADMIN — VENLAFAXINE HYDROCHLORIDE 37.5 MG: 37.5 CAPSULE, EXTENDED RELEASE ORAL at 08:09

## 2021-09-01 RX ADMIN — DOCUSATE SODIUM 100 MG: 100 CAPSULE, LIQUID FILLED ORAL at 08:09

## 2021-09-01 RX ADMIN — IPRATROPIUM BROMIDE AND ALBUTEROL SULFATE 3 ML: .5; 3 SOLUTION RESPIRATORY (INHALATION) at 01:09

## 2021-09-01 RX ADMIN — LOSARTAN POTASSIUM 50 MG: 50 TABLET, FILM COATED ORAL at 08:09

## 2021-09-01 RX ADMIN — INSULIN ASPART 8 UNITS: 100 INJECTION, SOLUTION INTRAVENOUS; SUBCUTANEOUS at 06:09

## 2021-09-01 RX ADMIN — CYCLOBENZAPRINE 10 MG: 10 TABLET, FILM COATED ORAL at 05:09

## 2021-09-01 RX ADMIN — CYCLOBENZAPRINE 10 MG: 10 TABLET, FILM COATED ORAL at 09:09

## 2021-09-01 RX ADMIN — FLUTICASONE PROPIONATE 50 MCG: 50 SPRAY, METERED NASAL at 08:09

## 2021-09-01 RX ADMIN — INSULIN ASPART 3 UNITS: 100 INJECTION, SOLUTION INTRAVENOUS; SUBCUTANEOUS at 08:09

## 2021-09-01 RX ADMIN — FLUTICASONE FUROATE AND VILANTEROL TRIFENATATE 1 PUFF: 200; 25 POWDER RESPIRATORY (INHALATION) at 07:09

## 2021-09-01 RX ADMIN — MELATONIN 6 MG: at 08:09

## 2021-09-01 RX ADMIN — IPRATROPIUM BROMIDE AND ALBUTEROL SULFATE 3 ML: .5; 3 SOLUTION RESPIRATORY (INHALATION) at 09:09

## 2021-09-02 VITALS
SYSTOLIC BLOOD PRESSURE: 134 MMHG | OXYGEN SATURATION: 98 % | WEIGHT: 203.94 LBS | RESPIRATION RATE: 20 BRPM | HEART RATE: 78 BPM | BODY MASS INDEX: 34.82 KG/M2 | DIASTOLIC BLOOD PRESSURE: 71 MMHG | HEIGHT: 64 IN | TEMPERATURE: 98 F

## 2021-09-02 LAB — GLUCOSE SERPL-MCNC: 111 MG/DL (ref 70–110)

## 2021-09-02 PROCEDURE — 27000221 HC OXYGEN, UP TO 24 HOURS

## 2021-09-02 PROCEDURE — 94761 N-INVAS EAR/PLS OXIMETRY MLT: CPT

## 2021-09-02 PROCEDURE — 94640 AIRWAY INHALATION TREATMENT: CPT

## 2021-09-02 PROCEDURE — 99900031 HC PATIENT EDUCATION (STAT)

## 2021-09-02 PROCEDURE — 63600175 PHARM REV CODE 636 W HCPCS: Performed by: FAMILY MEDICINE

## 2021-09-02 PROCEDURE — 25000003 PHARM REV CODE 250: Performed by: INTERNAL MEDICINE

## 2021-09-02 PROCEDURE — 25000003 PHARM REV CODE 250: Performed by: FAMILY MEDICINE

## 2021-09-02 PROCEDURE — 25000242 PHARM REV CODE 250 ALT 637 W/ HCPCS: Performed by: FAMILY MEDICINE

## 2021-09-02 PROCEDURE — 99900035 HC TECH TIME PER 15 MIN (STAT)

## 2021-09-02 RX ORDER — METHOCARBAMOL 500 MG/1
500 TABLET, FILM COATED ORAL 3 TIMES DAILY
Qty: 90 TABLET | Refills: 0 | Status: SHIPPED | OUTPATIENT
Start: 2021-09-02 | End: 2021-10-02

## 2021-09-02 RX ORDER — INSULIN ASPART 100 [IU]/ML
2-10 INJECTION, SOLUTION INTRAVENOUS; SUBCUTANEOUS
Qty: 10 ML | Refills: 3 | Status: SHIPPED | OUTPATIENT
Start: 2021-09-02 | End: 2021-12-01 | Stop reason: SDUPTHER

## 2021-09-02 RX ORDER — INSULIN DETEMIR 100 [IU]/ML
55 INJECTION, SOLUTION SUBCUTANEOUS 2 TIMES DAILY
Qty: 33 ML | Refills: 0 | Status: SHIPPED | OUTPATIENT
Start: 2021-09-02 | End: 2021-09-09 | Stop reason: SDUPTHER

## 2021-09-02 RX ORDER — ROPINIROLE 4 MG/1
4 TABLET, FILM COATED ORAL NIGHTLY
Qty: 30 TABLET | Refills: 0 | Status: SHIPPED | OUTPATIENT
Start: 2021-09-02 | End: 2021-09-09 | Stop reason: SDUPTHER

## 2021-09-02 RX ADMIN — HYDROCHLOROTHIAZIDE 12.5 MG: 12.5 TABLET ORAL at 09:09

## 2021-09-02 RX ADMIN — INSULIN DETEMIR 30 UNITS: 100 INJECTION, SOLUTION SUBCUTANEOUS at 09:09

## 2021-09-02 RX ADMIN — ROPINIROLE 4 MG: 2 TABLET, FILM COATED ORAL at 09:09

## 2021-09-02 RX ADMIN — CARBOXYMETHYLCELLULOSE SODIUM 2 DROP: 10 GEL OPHTHALMIC at 09:09

## 2021-09-02 RX ADMIN — PREDNISONE 10 MG: 5 TABLET ORAL at 09:09

## 2021-09-02 RX ADMIN — IPRATROPIUM BROMIDE AND ALBUTEROL SULFATE 3 ML: .5; 3 SOLUTION RESPIRATORY (INHALATION) at 07:09

## 2021-09-02 RX ADMIN — FLUTICASONE PROPIONATE 50 MCG: 50 SPRAY, METERED NASAL at 09:09

## 2021-09-02 RX ADMIN — IPRATROPIUM BROMIDE AND ALBUTEROL SULFATE 3 ML: .5; 3 SOLUTION RESPIRATORY (INHALATION) at 01:09

## 2021-09-02 RX ADMIN — VENLAFAXINE HYDROCHLORIDE 37.5 MG: 37.5 CAPSULE, EXTENDED RELEASE ORAL at 09:09

## 2021-09-02 RX ADMIN — LAMOTRIGINE 150 MG: 100 TABLET ORAL at 09:09

## 2021-09-02 RX ADMIN — GABAPENTIN 800 MG: 100 CAPSULE ORAL at 09:09

## 2021-09-02 RX ADMIN — LOSARTAN POTASSIUM 50 MG: 50 TABLET, FILM COATED ORAL at 09:09

## 2021-09-02 RX ADMIN — DOCUSATE SODIUM 100 MG: 100 CAPSULE, LIQUID FILLED ORAL at 09:09

## 2021-09-02 RX ADMIN — LEVOTHYROXINE SODIUM 150 MCG: 25 TABLET ORAL at 05:09

## 2021-09-02 RX ADMIN — CLONAZEPAM 0.5 MG: 0.5 TABLET ORAL at 09:09

## 2021-09-02 RX ADMIN — OXYCODONE AND ACETAMINOPHEN 1 TABLET: 5; 325 TABLET ORAL at 03:09

## 2021-09-02 RX ADMIN — FLUTICASONE FUROATE AND VILANTEROL TRIFENATATE 1 PUFF: 200; 25 POWDER RESPIRATORY (INHALATION) at 07:09

## 2021-09-02 RX ADMIN — INSULIN ASPART 2 UNITS: 100 INJECTION, SOLUTION INTRAVENOUS; SUBCUTANEOUS at 12:09

## 2021-09-03 LAB — GLUCOSE SERPL-MCNC: 169 MG/DL (ref 70–110)

## 2021-09-07 ENCOUNTER — TELEPHONE (OUTPATIENT)
Dept: FAMILY MEDICINE | Facility: CLINIC | Age: 71
End: 2021-09-07

## 2021-09-09 ENCOUNTER — OFFICE VISIT (OUTPATIENT)
Dept: FAMILY MEDICINE | Facility: CLINIC | Age: 71
End: 2021-09-09
Payer: MEDICARE

## 2021-09-09 VITALS
HEIGHT: 64 IN | OXYGEN SATURATION: 98 % | HEART RATE: 88 BPM | SYSTOLIC BLOOD PRESSURE: 130 MMHG | BODY MASS INDEX: 35.13 KG/M2 | DIASTOLIC BLOOD PRESSURE: 64 MMHG | WEIGHT: 205.81 LBS

## 2021-09-09 DIAGNOSIS — E89.0 POSTOPERATIVE HYPOTHYROIDISM: ICD-10-CM

## 2021-09-09 DIAGNOSIS — E11.42 TYPE 2 DIABETES MELLITUS WITH DIABETIC POLYNEUROPATHY, WITH LONG-TERM CURRENT USE OF INSULIN: ICD-10-CM

## 2021-09-09 DIAGNOSIS — K59.01 SLOW TRANSIT CONSTIPATION: ICD-10-CM

## 2021-09-09 DIAGNOSIS — F32.A DEPRESSION, UNSPECIFIED DEPRESSION TYPE: ICD-10-CM

## 2021-09-09 DIAGNOSIS — I10 ESSENTIAL HYPERTENSION: ICD-10-CM

## 2021-09-09 DIAGNOSIS — Z99.81 SUPPLEMENTAL OXYGEN DEPENDENT: ICD-10-CM

## 2021-09-09 DIAGNOSIS — Z79.4 TYPE 2 DIABETES MELLITUS WITH DIABETIC POLYNEUROPATHY, WITH LONG-TERM CURRENT USE OF INSULIN: ICD-10-CM

## 2021-09-09 DIAGNOSIS — R42 VERTIGO: ICD-10-CM

## 2021-09-09 DIAGNOSIS — Z74.09 IMPAIRED MOBILITY: ICD-10-CM

## 2021-09-09 DIAGNOSIS — F51.01 PRIMARY INSOMNIA: ICD-10-CM

## 2021-09-09 DIAGNOSIS — E44.1 MILD PROTEIN-CALORIE MALNUTRITION: ICD-10-CM

## 2021-09-09 DIAGNOSIS — J96.11 CHRONIC RESPIRATORY FAILURE WITH HYPOXIA: ICD-10-CM

## 2021-09-09 DIAGNOSIS — K21.9 GASTROESOPHAGEAL REFLUX DISEASE WITHOUT ESOPHAGITIS: ICD-10-CM

## 2021-09-09 DIAGNOSIS — Z09 HOSPITAL DISCHARGE FOLLOW-UP: Primary | ICD-10-CM

## 2021-09-09 PROCEDURE — 3046F HEMOGLOBIN A1C LEVEL >9.0%: CPT | Mod: S$GLB,,, | Performed by: NURSE PRACTITIONER

## 2021-09-09 PROCEDURE — 1159F PR MEDICATION LIST DOCUMENTED IN MEDICAL RECORD: ICD-10-PCS | Mod: S$GLB,,, | Performed by: NURSE PRACTITIONER

## 2021-09-09 PROCEDURE — 3288F FALL RISK ASSESSMENT DOCD: CPT | Mod: S$GLB,,, | Performed by: NURSE PRACTITIONER

## 2021-09-09 PROCEDURE — 4010F ACE/ARB THERAPY RXD/TAKEN: CPT | Mod: S$GLB,,, | Performed by: NURSE PRACTITIONER

## 2021-09-09 PROCEDURE — 1157F PR ADVANCE CARE PLAN OR EQUIV PRESENT IN MEDICAL RECORD: ICD-10-PCS | Mod: S$GLB,,, | Performed by: NURSE PRACTITIONER

## 2021-09-09 PROCEDURE — 1159F MED LIST DOCD IN RCRD: CPT | Mod: S$GLB,,, | Performed by: NURSE PRACTITIONER

## 2021-09-09 PROCEDURE — 1160F PR REVIEW ALL MEDS BY PRESCRIBER/CLIN PHARMACIST DOCUMENTED: ICD-10-PCS | Mod: S$GLB,,, | Performed by: NURSE PRACTITIONER

## 2021-09-09 PROCEDURE — 1111F DSCHRG MED/CURRENT MED MERGE: CPT | Mod: S$GLB,,, | Performed by: NURSE PRACTITIONER

## 2021-09-09 PROCEDURE — 3008F PR BODY MASS INDEX (BMI) DOCUMENTED: ICD-10-PCS | Mod: S$GLB,,, | Performed by: NURSE PRACTITIONER

## 2021-09-09 PROCEDURE — 3075F PR MOST RECENT SYSTOLIC BLOOD PRESS GE 130-139MM HG: ICD-10-PCS | Mod: S$GLB,,, | Performed by: NURSE PRACTITIONER

## 2021-09-09 PROCEDURE — 3008F BODY MASS INDEX DOCD: CPT | Mod: S$GLB,,, | Performed by: NURSE PRACTITIONER

## 2021-09-09 PROCEDURE — 1111F PR DISCHARGE MEDS RECONCILED W/ CURRENT OUTPATIENT MED LIST: ICD-10-PCS | Mod: S$GLB,,, | Performed by: NURSE PRACTITIONER

## 2021-09-09 PROCEDURE — 3075F SYST BP GE 130 - 139MM HG: CPT | Mod: S$GLB,,, | Performed by: NURSE PRACTITIONER

## 2021-09-09 PROCEDURE — 99214 PR OFFICE/OUTPT VISIT, EST, LEVL IV, 30-39 MIN: ICD-10-PCS | Mod: S$GLB,,, | Performed by: NURSE PRACTITIONER

## 2021-09-09 PROCEDURE — 1160F RVW MEDS BY RX/DR IN RCRD: CPT | Mod: S$GLB,,, | Performed by: NURSE PRACTITIONER

## 2021-09-09 PROCEDURE — 3078F DIAST BP <80 MM HG: CPT | Mod: S$GLB,,, | Performed by: NURSE PRACTITIONER

## 2021-09-09 PROCEDURE — 1100F PTFALLS ASSESS-DOCD GE2>/YR: CPT | Mod: S$GLB,,, | Performed by: NURSE PRACTITIONER

## 2021-09-09 PROCEDURE — 1100F PR PT FALLS ASSESS DOC 2+ FALLS/FALL W/INJURY/YR: ICD-10-PCS | Mod: S$GLB,,, | Performed by: NURSE PRACTITIONER

## 2021-09-09 PROCEDURE — 3288F PR FALLS RISK ASSESSMENT DOCUMENTED: ICD-10-PCS | Mod: S$GLB,,, | Performed by: NURSE PRACTITIONER

## 2021-09-09 PROCEDURE — 99214 OFFICE O/P EST MOD 30 MIN: CPT | Mod: S$GLB,,, | Performed by: NURSE PRACTITIONER

## 2021-09-09 PROCEDURE — 1157F ADVNC CARE PLAN IN RCRD: CPT | Mod: S$GLB,,, | Performed by: NURSE PRACTITIONER

## 2021-09-09 PROCEDURE — 3078F PR MOST RECENT DIASTOLIC BLOOD PRESSURE < 80 MM HG: ICD-10-PCS | Mod: S$GLB,,, | Performed by: NURSE PRACTITIONER

## 2021-09-09 PROCEDURE — 4010F PR ACE/ARB THEARPY RXD/TAKEN: ICD-10-PCS | Mod: S$GLB,,, | Performed by: NURSE PRACTITIONER

## 2021-09-09 PROCEDURE — 3046F PR MOST RECENT HEMOGLOBIN A1C LEVEL > 9.0%: ICD-10-PCS | Mod: S$GLB,,, | Performed by: NURSE PRACTITIONER

## 2021-09-09 RX ORDER — POTASSIUM CHLORIDE 750 MG/1
10 TABLET, EXTENDED RELEASE ORAL
Qty: 90 TABLET | Refills: 1 | Status: SHIPPED | OUTPATIENT
Start: 2021-09-10 | End: 2021-12-01 | Stop reason: SDUPTHER

## 2021-09-09 RX ORDER — INSULIN ASPART 100 [IU]/ML
INJECTION, SOLUTION INTRAVENOUS; SUBCUTANEOUS
Qty: 4 BOX | Refills: 1 | Status: SHIPPED | OUTPATIENT
Start: 2021-09-09 | End: 2021-12-01 | Stop reason: SDUPTHER

## 2021-09-09 RX ORDER — ROPINIROLE 4 MG/1
4 TABLET, FILM COATED ORAL 2 TIMES DAILY
Qty: 180 TABLET | Refills: 1 | Status: SHIPPED | OUTPATIENT
Start: 2021-09-09 | End: 2021-12-01 | Stop reason: SDUPTHER

## 2021-09-09 RX ORDER — MECLIZINE HYDROCHLORIDE 25 MG/1
25 TABLET ORAL 3 TIMES DAILY PRN
Qty: 90 TABLET | Refills: 1 | Status: SHIPPED | OUTPATIENT
Start: 2021-09-09 | End: 2021-12-01 | Stop reason: SDUPTHER

## 2021-09-09 RX ORDER — PREDNISONE 5 MG/1
10 TABLET ORAL DAILY
Qty: 90 TABLET | Refills: 1 | Status: SHIPPED | OUTPATIENT
Start: 2021-09-09

## 2021-09-09 RX ORDER — INSULIN DETEMIR 100 [IU]/ML
55 INJECTION, SOLUTION SUBCUTANEOUS 2 TIMES DAILY
Qty: 9 BOX | Refills: 1 | Status: SHIPPED | OUTPATIENT
Start: 2021-09-09 | End: 2021-12-01 | Stop reason: SDUPTHER

## 2021-09-09 RX ORDER — CALCITRIOL 0.5 UG/1
0.5 CAPSULE ORAL NIGHTLY
Qty: 90 CAPSULE | Refills: 1 | Status: SHIPPED | OUTPATIENT
Start: 2021-09-09 | End: 2021-12-01 | Stop reason: SDUPTHER

## 2021-09-09 RX ORDER — LAMOTRIGINE 150 MG/1
150 TABLET ORAL 2 TIMES DAILY
Qty: 180 TABLET | Refills: 1 | Status: SHIPPED | OUTPATIENT
Start: 2021-09-09 | End: 2021-12-01 | Stop reason: SDUPTHER

## 2021-09-09 RX ORDER — VENLAFAXINE HYDROCHLORIDE 37.5 MG/1
37.5 CAPSULE, EXTENDED RELEASE ORAL DAILY
Qty: 90 CAPSULE | Refills: 1 | Status: SHIPPED | OUTPATIENT
Start: 2021-09-09 | End: 2021-12-01 | Stop reason: SDUPTHER

## 2021-09-09 RX ORDER — LOSARTAN POTASSIUM 50 MG/1
50 TABLET ORAL DAILY
Qty: 90 TABLET | Refills: 3 | Status: SHIPPED | OUTPATIENT
Start: 2021-09-09 | End: 2021-12-01 | Stop reason: SDUPTHER

## 2021-09-09 RX ORDER — GABAPENTIN 800 MG/1
800 TABLET ORAL 3 TIMES DAILY
Qty: 270 TABLET | Refills: 1 | Status: SHIPPED | OUTPATIENT
Start: 2021-09-09 | End: 2021-12-01 | Stop reason: SDUPTHER

## 2021-09-09 RX ORDER — INSULIN ASPART 100 [IU]/ML
2-10 INJECTION, SOLUTION INTRAVENOUS; SUBCUTANEOUS
Qty: 10 ML | Refills: 3 | Status: CANCELLED | OUTPATIENT
Start: 2021-09-09

## 2021-09-09 RX ORDER — LEVOTHYROXINE SODIUM 150 UG/1
150 TABLET ORAL
Qty: 90 TABLET | Refills: 1 | Status: SHIPPED | OUTPATIENT
Start: 2021-09-09 | End: 2021-12-01 | Stop reason: SDUPTHER

## 2021-09-09 RX ORDER — PANTOPRAZOLE SODIUM 40 MG/1
40 TABLET, DELAYED RELEASE ORAL DAILY
Qty: 90 TABLET | Refills: 1 | Status: SHIPPED | OUTPATIENT
Start: 2021-09-09 | End: 2021-12-08

## 2021-10-05 ENCOUNTER — TELEPHONE (OUTPATIENT)
Dept: FAMILY MEDICINE | Facility: CLINIC | Age: 71
End: 2021-10-05

## 2021-10-07 ENCOUNTER — HOSPITAL ENCOUNTER (OUTPATIENT)
Dept: RADIOLOGY | Facility: HOSPITAL | Age: 71
Discharge: HOME | End: 2021-10-07
Attending: NURSE PRACTITIONER
Payer: MEDICAID

## 2021-10-07 ENCOUNTER — OFFICE VISIT (OUTPATIENT)
Dept: FAMILY MEDICINE | Facility: CLINIC | Age: 71
End: 2021-10-07
Payer: MEDICARE

## 2021-10-07 ENCOUNTER — TELEPHONE (OUTPATIENT)
Dept: FAMILY MEDICINE | Facility: CLINIC | Age: 71
End: 2021-10-07

## 2021-10-07 VITALS
BODY MASS INDEX: 36.19 KG/M2 | DIASTOLIC BLOOD PRESSURE: 78 MMHG | WEIGHT: 212 LBS | SYSTOLIC BLOOD PRESSURE: 142 MMHG | HEART RATE: 80 BPM | HEIGHT: 64 IN

## 2021-10-07 DIAGNOSIS — Z79.899 POLYPHARMACY: ICD-10-CM

## 2021-10-07 DIAGNOSIS — J96.11 CHRONIC RESPIRATORY FAILURE WITH HYPOXIA: ICD-10-CM

## 2021-10-07 DIAGNOSIS — E78.2 MIXED HYPERLIPIDEMIA: ICD-10-CM

## 2021-10-07 DIAGNOSIS — Z23 FLU VACCINE NEED: Primary | ICD-10-CM

## 2021-10-07 DIAGNOSIS — R53.81 PHYSICAL DEBILITY: ICD-10-CM

## 2021-10-07 DIAGNOSIS — E66.01 CLASS 2 SEVERE OBESITY DUE TO EXCESS CALORIES WITH SERIOUS COMORBIDITY AND BODY MASS INDEX (BMI) OF 36.0 TO 36.9 IN ADULT: ICD-10-CM

## 2021-10-07 DIAGNOSIS — Z79.4 TYPE 2 DIABETES MELLITUS WITH DIABETIC POLYNEUROPATHY, WITH LONG-TERM CURRENT USE OF INSULIN: ICD-10-CM

## 2021-10-07 DIAGNOSIS — I10 ESSENTIAL HYPERTENSION: ICD-10-CM

## 2021-10-07 DIAGNOSIS — Z11.1 SCREENING-PULMONARY TB: ICD-10-CM

## 2021-10-07 DIAGNOSIS — J44.9 CHRONIC OBSTRUCTIVE PULMONARY DISEASE, UNSPECIFIED COPD TYPE: ICD-10-CM

## 2021-10-07 DIAGNOSIS — Z65.8 INADEQUATE SOCIAL SUPPORT: ICD-10-CM

## 2021-10-07 DIAGNOSIS — E11.42 TYPE 2 DIABETES MELLITUS WITH DIABETIC POLYNEUROPATHY, WITH LONG-TERM CURRENT USE OF INSULIN: ICD-10-CM

## 2021-10-07 PROCEDURE — 90662 FLU VACCINE - QUADRIVALENT - HIGH DOSE (65+) PRESERVATIVE FREE IM: ICD-10-PCS | Mod: S$GLB,,, | Performed by: NURSE PRACTITIONER

## 2021-10-07 PROCEDURE — 99215 PR OFFICE/OUTPT VISIT, EST, LEVL V, 40-54 MIN: ICD-10-PCS | Mod: 25,S$GLB,, | Performed by: NURSE PRACTITIONER

## 2021-10-07 PROCEDURE — G0008 ADMIN INFLUENZA VIRUS VAC: HCPCS | Mod: S$GLB,,, | Performed by: NURSE PRACTITIONER

## 2021-10-07 PROCEDURE — 71046 X-RAY EXAM CHEST 2 VIEWS: CPT | Mod: TC,PO

## 2021-10-07 PROCEDURE — 99215 OFFICE O/P EST HI 40 MIN: CPT | Mod: 25,S$GLB,, | Performed by: NURSE PRACTITIONER

## 2021-10-07 PROCEDURE — 90662 IIV NO PRSV INCREASED AG IM: CPT | Mod: S$GLB,,, | Performed by: NURSE PRACTITIONER

## 2021-10-07 PROCEDURE — G0008 FLU VACCINE - QUADRIVALENT - HIGH DOSE (65+) PRESERVATIVE FREE IM: ICD-10-PCS | Mod: S$GLB,,, | Performed by: NURSE PRACTITIONER

## 2021-10-11 DIAGNOSIS — M62.830 MUSCLE SPASM OF BACK: Primary | ICD-10-CM

## 2021-10-11 DIAGNOSIS — F41.9 ANXIETY: ICD-10-CM

## 2021-10-11 DIAGNOSIS — J44.1 COPD EXACERBATION: ICD-10-CM

## 2021-10-11 RX ORDER — METHOCARBAMOL 750 MG/1
750 TABLET, FILM COATED ORAL 4 TIMES DAILY
Qty: 40 TABLET | Refills: 0 | Status: SHIPPED | OUTPATIENT
Start: 2021-10-11 | End: 2021-10-21

## 2021-10-11 RX ORDER — CLONAZEPAM 0.5 MG/1
0.5 TABLET ORAL 2 TIMES DAILY
Qty: 60 TABLET | Refills: 2 | Status: SHIPPED | OUTPATIENT
Start: 2021-10-11 | End: 2021-12-01 | Stop reason: SDUPTHER

## 2021-10-12 ENCOUNTER — TELEPHONE (OUTPATIENT)
Dept: FAMILY MEDICINE | Facility: CLINIC | Age: 71
End: 2021-10-12

## 2021-10-14 ENCOUNTER — TELEPHONE (OUTPATIENT)
Dept: FAMILY MEDICINE | Facility: CLINIC | Age: 71
End: 2021-10-14

## 2021-10-18 ENCOUNTER — TELEPHONE (OUTPATIENT)
Dept: FAMILY MEDICINE | Facility: CLINIC | Age: 71
End: 2021-10-18

## 2021-10-19 DIAGNOSIS — J44.1 CHRONIC OBSTRUCTIVE PULMONARY DISEASE WITH ACUTE EXACERBATION: ICD-10-CM

## 2021-10-19 RX ORDER — ALBUTEROL SULFATE 90 UG/1
2 AEROSOL, METERED RESPIRATORY (INHALATION) EVERY 6 HOURS PRN
Qty: 18 G | Refills: 3 | Status: SHIPPED | OUTPATIENT
Start: 2021-10-19 | End: 2021-12-01 | Stop reason: SDUPTHER

## 2021-10-27 ENCOUNTER — TELEPHONE (OUTPATIENT)
Dept: FAMILY MEDICINE | Facility: CLINIC | Age: 71
End: 2021-10-27
Payer: MEDICARE

## 2021-12-01 DIAGNOSIS — E44.1 MILD PROTEIN-CALORIE MALNUTRITION: ICD-10-CM

## 2021-12-01 DIAGNOSIS — Z79.4 TYPE 2 DIABETES MELLITUS WITH DIABETIC POLYNEUROPATHY, WITH LONG-TERM CURRENT USE OF INSULIN: ICD-10-CM

## 2021-12-01 DIAGNOSIS — J44.1 COPD EXACERBATION: ICD-10-CM

## 2021-12-01 DIAGNOSIS — R42 VERTIGO: ICD-10-CM

## 2021-12-01 DIAGNOSIS — F41.9 ANXIETY: ICD-10-CM

## 2021-12-01 DIAGNOSIS — I10 ESSENTIAL HYPERTENSION: ICD-10-CM

## 2021-12-01 DIAGNOSIS — K59.01 SLOW TRANSIT CONSTIPATION: ICD-10-CM

## 2021-12-01 DIAGNOSIS — E11.42 TYPE 2 DIABETES MELLITUS WITH DIABETIC POLYNEUROPATHY, WITH LONG-TERM CURRENT USE OF INSULIN: ICD-10-CM

## 2021-12-01 DIAGNOSIS — J44.1 CHRONIC OBSTRUCTIVE PULMONARY DISEASE WITH ACUTE EXACERBATION: ICD-10-CM

## 2021-12-01 DIAGNOSIS — F32.A DEPRESSION, UNSPECIFIED DEPRESSION TYPE: ICD-10-CM

## 2021-12-01 DIAGNOSIS — F51.01 PRIMARY INSOMNIA: ICD-10-CM

## 2021-12-01 DIAGNOSIS — E89.0 POSTOPERATIVE HYPOTHYROIDISM: ICD-10-CM

## 2021-12-01 RX ORDER — IPRATROPIUM BROMIDE AND ALBUTEROL SULFATE 2.5; .5 MG/3ML; MG/3ML
3 SOLUTION RESPIRATORY (INHALATION) EVERY 6 HOURS PRN
Qty: 360 ML | Refills: 0 | Status: SHIPPED | OUTPATIENT
Start: 2021-12-01 | End: 2021-12-31

## 2021-12-01 RX ORDER — MECLIZINE HYDROCHLORIDE 25 MG/1
25 TABLET ORAL 3 TIMES DAILY PRN
Qty: 90 TABLET | Refills: 0 | Status: SHIPPED | OUTPATIENT
Start: 2021-12-01

## 2021-12-01 RX ORDER — LEVOTHYROXINE SODIUM 150 UG/1
150 TABLET ORAL
Qty: 90 TABLET | Refills: 0 | Status: SHIPPED | OUTPATIENT
Start: 2021-12-01

## 2021-12-01 RX ORDER — VENLAFAXINE HYDROCHLORIDE 37.5 MG/1
37.5 CAPSULE, EXTENDED RELEASE ORAL DAILY
Qty: 90 CAPSULE | Refills: 0 | Status: SHIPPED | OUTPATIENT
Start: 2021-12-01 | End: 2022-02-09 | Stop reason: SDUPTHER

## 2021-12-01 RX ORDER — GABAPENTIN 800 MG/1
800 TABLET ORAL 3 TIMES DAILY
Qty: 270 TABLET | Refills: 0 | Status: SHIPPED | OUTPATIENT
Start: 2021-12-01

## 2021-12-01 RX ORDER — POTASSIUM CHLORIDE 750 MG/1
10 TABLET, EXTENDED RELEASE ORAL
Qty: 90 TABLET | Refills: 0 | Status: SHIPPED | OUTPATIENT
Start: 2021-12-01

## 2021-12-01 RX ORDER — CLONAZEPAM 0.5 MG/1
0.5 TABLET ORAL 2 TIMES DAILY
Qty: 60 TABLET | Refills: 2 | Status: SHIPPED | OUTPATIENT
Start: 2021-12-01

## 2021-12-01 RX ORDER — LOSARTAN POTASSIUM 50 MG/1
50 TABLET ORAL DAILY
Qty: 90 TABLET | Refills: 0 | Status: SHIPPED | OUTPATIENT
Start: 2021-12-01 | End: 2022-12-01

## 2021-12-01 RX ORDER — LAMOTRIGINE 150 MG/1
150 TABLET ORAL 2 TIMES DAILY
Qty: 180 TABLET | Refills: 0 | Status: SHIPPED | OUTPATIENT
Start: 2021-12-01 | End: 2022-03-01

## 2021-12-01 RX ORDER — INSULIN ASPART 100 [IU]/ML
2-10 INJECTION, SOLUTION INTRAVENOUS; SUBCUTANEOUS
Qty: 10 ML | Refills: 3 | OUTPATIENT
Start: 2021-12-01

## 2021-12-01 RX ORDER — INSULIN DETEMIR 100 [IU]/ML
55 INJECTION, SOLUTION SUBCUTANEOUS 2 TIMES DAILY
Qty: 9 EACH | Refills: 0 | Status: SHIPPED | OUTPATIENT
Start: 2021-12-01 | End: 2021-12-31

## 2021-12-01 RX ORDER — CALCITRIOL 0.5 UG/1
0.5 CAPSULE ORAL NIGHTLY
Qty: 90 CAPSULE | Refills: 0 | Status: SHIPPED | OUTPATIENT
Start: 2021-12-01

## 2021-12-01 RX ORDER — INSULIN ASPART 100 [IU]/ML
INJECTION, SOLUTION INTRAVENOUS; SUBCUTANEOUS
Qty: 4 EACH | Refills: 1 | Status: SHIPPED | OUTPATIENT
Start: 2021-12-01

## 2021-12-01 RX ORDER — ROPINIROLE 4 MG/1
4 TABLET, FILM COATED ORAL 2 TIMES DAILY
Qty: 180 TABLET | Refills: 0 | Status: SHIPPED | OUTPATIENT
Start: 2021-12-01 | End: 2021-12-31

## 2021-12-01 RX ORDER — ALBUTEROL SULFATE 90 UG/1
2 AEROSOL, METERED RESPIRATORY (INHALATION) EVERY 6 HOURS PRN
Qty: 18 G | Refills: 0 | Status: SHIPPED | OUTPATIENT
Start: 2021-12-01

## 2021-12-03 ENCOUNTER — TELEPHONE (OUTPATIENT)
Dept: FAMILY MEDICINE | Facility: CLINIC | Age: 71
End: 2021-12-03
Payer: MEDICARE

## 2022-02-09 DIAGNOSIS — F32.A DEPRESSION, UNSPECIFIED DEPRESSION TYPE: ICD-10-CM

## 2022-02-09 RX ORDER — METHOCARBAMOL 750 MG/1
750 TABLET, FILM COATED ORAL 4 TIMES DAILY
Qty: 40 TABLET | Refills: 2 | Status: SHIPPED | OUTPATIENT
Start: 2022-02-09 | End: 2022-02-19

## 2022-02-09 RX ORDER — VENLAFAXINE HYDROCHLORIDE 37.5 MG/1
37.5 CAPSULE, EXTENDED RELEASE ORAL DAILY
Qty: 90 CAPSULE | Refills: 0 | Status: SHIPPED | OUTPATIENT
Start: 2022-02-09

## 2022-02-09 NOTE — TELEPHONE ENCOUNTER
----- Message from Faith Pace sent at 2/9/2022  2:58 PM CST -----  Pt moved to [place] and can't not get a new doctor till the end of march. She needs a refill on venlafaxine and methocarbamol   [address] [id number] [phone]

## 2022-02-10 ENCOUNTER — TELEPHONE (OUTPATIENT)
Dept: FAMILY MEDICINE | Facility: CLINIC | Age: 72
End: 2022-02-10
Payer: MEDICARE

## 2022-02-10 NOTE — TELEPHONE ENCOUNTER
----- Message from Lanny Shelton sent at 2/10/2022  2:55 PM CST -----  Keyshawn calling from [facility] called and would like to confirm that the patient has a prescription for oxygen please give him a call at [phone]

## 2022-02-10 NOTE — TELEPHONE ENCOUNTER
Spoke to Keyshawn. Pt is trying to get 02 since she is not in [place]. [facility] is needing a copy of last OV  That shows she is on 02. Fax number [phone]

## 2022-04-06 ENCOUNTER — TELEPHONE (OUTPATIENT)
Dept: FAMILY MEDICINE | Facility: CLINIC | Age: 72
End: 2022-04-06
Payer: MEDICARE

## 2022-04-06 NOTE — TELEPHONE ENCOUNTER
Pt is no longer being seen at this clinic. Pt states in a previous encounter that she was establishing care on 11/2/21. Attempted to call pt left VM. Pt will need to discuss further with new PCP. Last OV faxed to show she was on 02 while living in [place].

## 2022-04-06 NOTE — TELEPHONE ENCOUNTER
----- Message from Lanny Shelton sent at 4/6/2022 11:41 AM CDT -----  Danyel with [facility] called and stated that he need a copy of the patient prescription for her oxygen if any questions please give him a call at [phone] or fax number [phone]

## 2022-05-26 NOTE — PROGRESS NOTES
"[facility] Note - [place]  Staff: MD Ligia    Referring provider and please cc: Christos  PCP: dominic hopkins pt    Jorge Albertoner: inactive    Chief Complaint: recurrent uti    Subjective:        HPI: Alanis Mendieta is a 66 y.o. female presents with     Last visit was 7/13/17 (1 month)  Pt was an LPN    Recurrent uti  Likely related to pad use. Last pvr by in and out cath was 10cc  Sx include: dysuria with voiding  ua today with nitrite + urine, states she has some burning with urination today.     oab/mixed incontinence  -fell in December 2015 and says she had significant incontinence after, but says her incontinence was present for many years before surgery.  +WILLIAM and UUI. had c-spine surgery a year ago and says sx have increased. She states she does not feel the urge to go many times.  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   - 5/8/17 seen by christos for recurrent E.coli uti and incontinence. Nocturia  3x a night. Wears 10-11 depends/day. Was started on myrbetriq 25mg daily. Voided ucx was aerococcus. She says she has asymptomatic utis. Used to be symptomatic.  -6/12/17 seen by monty nunn. States myrbetriq helped "some" and increased to myrbetriq 50mg.  In and out cath was 80cc. ua was nitrite +. ucx with k.pneumoniae.   - 7/13/17 frequency had decreased. Prior to the myrbetriq she was going 10x a day, now about 6x a day. Down to 1 pad and 6 or 7 pads a day. Denies any WILLIAM. + constipation (q3 to 4 days and takes Linzess). DM x 17 years. +stress test with large volume incontinence. But poor candidate for surgery. hba1c 8.9    Returns today stating she is having some dysuria. Urine with nitrite + urine today. Nocturia 2-3x a night. Voids 6-8x a day without lasix. Lasix 3x a week. +UUI.  6-7 depends a day. Has never tried tried ditropan. Takes colace 1x a day and has a bm soft to hard, daily (on oxycodone 10mg 3x " a day for her back). Drinks 1 coke a day and 2 coffees a day.     ECOG Status: 1 - in a wheelchair but can walk with assistance. She is on O2 for copd. Resident of [facility] after c-spine surgery. She's been there for a year and plan is for her to leave.    G3, P 2, vaginal   Gross HematuriaYes - >5 years ago  History of UTI: yes    REVIEW OF SYSTEMS:  General ROS: no fevers, no chills  Psychological ROS: no depression  Endocrine ROS: no heat or cold  Respiratory ROS: + SOB  Cardiovascular ROS: no CP  Gastrointestinal ROS: no abdominal pain, + constipation, no diarrhea, +BRBPR with tearing. Musculoskeletal ROS: no muscle pain  Neurological ROS: no headaches  Dermatological ROS: no rashes  HEENT: noiglasses, no sinus    ROS: per HPI     Past medical, surgical, social and family hx have been reviewed. There have not any changes.     Allergies:  Morpholine analogues and Tubersol [tuberculin ppd]    Medications: Myrbetriq, pain medicine  Anticoagulation: No    Objective:     Vitals:    08/14/17 1029   BP: 124/73   Pulse: 93   Temp: 99.3 °F (37.4 °C)       General:WDWN in NAD  Eyes: PERRLA, normal conjunctiva  Respiratory: no increased work on breathing, clear to auscultation  Cardiovascular: regular rate and rhythm. No obvious extremity edema.  GI: no palpation of masses. No tenderness. No hepatosplenomegaly to palpation.  Musculoskeletal: normal range of motion of bilateral upper extremities. Normal muscle strength and tone.  Skin: no obvious rashes or lesions. No tightening of skin noted.  Neurologic: CN grossly normal. Normal sensation.   Psychiatric: awake, alert and oriented x 3. Mood and affect normal. Cooperative.      Pelvic exam 7/13/17:  negative stress test with coughing  In and out cath performed with 20 residual  Bladder filled to 150 very slowly  Sensation to void felt at 120cc  Catheter removed  +large volume stress test with coughing but not with valsava  Had pt stand and she had large volume leakage  with coughing and valsava  No prolapse  Stool palpable in vault  +severe atrophic vaginitis      LABS REVIEW:  UA voided: 1.015/6/2+leukocyte/nitrite + - send for culture     UCx:   7/27/17 E.coli  6/12/17  k.pneumoniae, cath, nitrite +  5/8/17   aerococcus, 100k, voided, tr leuk    Labs reviewed 7/13/17  Glucose 113  Cr 0  0.8  H/H 14.4/42.4  Hba1c 8.9    Cr:   Lab Results   Component Value Date    CREATININE 0.81 07/27/2017       PATHOLOGY REVIEW:  none    RADIOGRAPHIC REVIEW:  rbus 5/23/17  No stone  No hydro  Bladder moderately distended      Assessment:       1. Recurrent UTI    2. Mixed incontinence          Plan:     Recurrent uti  -send urine for culture but will hold off on treating infection  -pt does hba1c.     Mixed incontinence and OAB  -urge incontinence has improved some on myrbetriq 50mg, still has significant teresa  -not a great surgical candidate for general anesthesia, will go ahead and refer to urogynecology for pessary. Before any procedure ever done would need control of diabetes (hba1c 8.9)  -if she has no improvement with pessary will plan for uds and schedule uds for 3 months with pessary for now with a catheterized culture and urinalysis 10 days prior at [facility].   -add ditropan 10mg XL once a day and continue myrbetriq 50mg daily. But can cause constipation.  Increase colace to twice a a day.   -change to decaf everything    F/u 3 months for uds  Refer to urogynecology for pessary    Faith Strong MD   Information: Selecting Yes will display possible errors in your note based on the variables you have selected. This validation is only offered as a suggestion for you. PLEASE NOTE THAT THE VALIDATION TEXT WILL BE REMOVED WHEN YOU FINALIZE YOUR NOTE. IF YOU WANT TO FAX A PRELIMINARY NOTE YOU WILL NEED TO TOGGLE THIS TO 'NO' IF YOU DO NOT WANT IT IN YOUR FAXED NOTE.

## 2022-08-15 NOTE — NURSING
Patient called in for medication for herpes outbreak.  She says she is just having sx's for the first time since finding out Patient was given discharge instructions. IV was removed; IV catheter intact. Telemetry monitor was removed. Patient was escorted outside via wheel chair. Departed the hospital via private car accompanied by son
